# Patient Record
Sex: FEMALE | Race: WHITE | NOT HISPANIC OR LATINO | URBAN - METROPOLITAN AREA
[De-identification: names, ages, dates, MRNs, and addresses within clinical notes are randomized per-mention and may not be internally consistent; named-entity substitution may affect disease eponyms.]

---

## 2018-03-27 ENCOUNTER — INPATIENT (INPATIENT)
Facility: HOSPITAL | Age: 83
LOS: 2 days | Discharge: HOME | DRG: 390 | End: 2018-03-30
Attending: SURGERY | Admitting: SURGERY
Payer: MEDICARE

## 2018-03-27 VITALS
HEART RATE: 78 BPM | WEIGHT: 139.99 LBS | SYSTOLIC BLOOD PRESSURE: 134 MMHG | RESPIRATION RATE: 18 BRPM | DIASTOLIC BLOOD PRESSURE: 71 MMHG | OXYGEN SATURATION: 97 %

## 2018-03-27 DIAGNOSIS — R10.9 UNSPECIFIED ABDOMINAL PAIN: ICD-10-CM

## 2018-03-27 DIAGNOSIS — Z98.89 OTHER SPECIFIED POSTPROCEDURAL STATES: Chronic | ICD-10-CM

## 2018-03-27 LAB
ALBUMIN SERPL ELPH-MCNC: 4.1 G/DL — SIGNIFICANT CHANGE UP (ref 3.3–5)
ALP SERPL-CCNC: 70 U/L — SIGNIFICANT CHANGE UP (ref 40–120)
ALT FLD-CCNC: 11 U/L RC — SIGNIFICANT CHANGE UP (ref 10–45)
ANION GAP SERPL CALC-SCNC: 15 MMOL/L — SIGNIFICANT CHANGE UP (ref 5–17)
APTT BLD: 29.1 SEC — SIGNIFICANT CHANGE UP (ref 27.5–37.4)
AST SERPL-CCNC: 16 U/L — SIGNIFICANT CHANGE UP (ref 10–40)
BASOPHILS # BLD AUTO: 0 K/UL — SIGNIFICANT CHANGE UP (ref 0–0.2)
BASOPHILS NFR BLD AUTO: 0.1 % — SIGNIFICANT CHANGE UP (ref 0–2)
BILIRUB SERPL-MCNC: 0.7 MG/DL — SIGNIFICANT CHANGE UP (ref 0.2–1.2)
BUN SERPL-MCNC: 13 MG/DL — SIGNIFICANT CHANGE UP (ref 7–23)
CALCIUM SERPL-MCNC: 9.5 MG/DL — SIGNIFICANT CHANGE UP (ref 8.4–10.5)
CHLORIDE SERPL-SCNC: 102 MMOL/L — SIGNIFICANT CHANGE UP (ref 96–108)
CK MB BLD-MCNC: 5.9 % — HIGH (ref 0–3.5)
CK MB CFR SERPL CALC: 2.2 NG/ML — SIGNIFICANT CHANGE UP (ref 0–3.8)
CK SERPL-CCNC: 37 U/L — SIGNIFICANT CHANGE UP (ref 25–170)
CO2 SERPL-SCNC: 25 MMOL/L — SIGNIFICANT CHANGE UP (ref 22–31)
CREAT SERPL-MCNC: 0.71 MG/DL — SIGNIFICANT CHANGE UP (ref 0.5–1.3)
EOSINOPHIL # BLD AUTO: 0 K/UL — SIGNIFICANT CHANGE UP (ref 0–0.5)
EOSINOPHIL NFR BLD AUTO: 0.5 % — SIGNIFICANT CHANGE UP (ref 0–6)
GAS PNL BLDV: SIGNIFICANT CHANGE UP
GLUCOSE SERPL-MCNC: 118 MG/DL — HIGH (ref 70–99)
HCT VFR BLD CALC: 44.8 % — SIGNIFICANT CHANGE UP (ref 34.5–45)
HGB BLD-MCNC: 14.7 G/DL — SIGNIFICANT CHANGE UP (ref 11.5–15.5)
INR BLD: 1.06 RATIO — SIGNIFICANT CHANGE UP (ref 0.88–1.16)
LIDOCAIN IGE QN: 25 U/L — SIGNIFICANT CHANGE UP (ref 7–60)
LYMPHOCYTES # BLD AUTO: 0.4 K/UL — LOW (ref 1–3.3)
LYMPHOCYTES # BLD AUTO: 5.3 % — LOW (ref 13–44)
MAGNESIUM SERPL-MCNC: 2 MG/DL — SIGNIFICANT CHANGE UP (ref 1.6–2.6)
MCHC RBC-ENTMCNC: 30.4 PG — SIGNIFICANT CHANGE UP (ref 27–34)
MCHC RBC-ENTMCNC: 32.9 GM/DL — SIGNIFICANT CHANGE UP (ref 32–36)
MCV RBC AUTO: 92.5 FL — SIGNIFICANT CHANGE UP (ref 80–100)
MONOCYTES # BLD AUTO: 0.4 K/UL — SIGNIFICANT CHANGE UP (ref 0–0.9)
MONOCYTES NFR BLD AUTO: 4.2 % — SIGNIFICANT CHANGE UP (ref 2–14)
NEUTROPHILS # BLD AUTO: 7.6 K/UL — HIGH (ref 1.8–7.4)
NEUTROPHILS NFR BLD AUTO: 89.9 % — HIGH (ref 43–77)
PHOSPHATE SERPL-MCNC: 3.5 MG/DL — SIGNIFICANT CHANGE UP (ref 2.5–4.5)
PLATELET # BLD AUTO: 196 K/UL — SIGNIFICANT CHANGE UP (ref 150–400)
POTASSIUM SERPL-MCNC: 4.1 MMOL/L — SIGNIFICANT CHANGE UP (ref 3.5–5.3)
POTASSIUM SERPL-SCNC: 4.1 MMOL/L — SIGNIFICANT CHANGE UP (ref 3.5–5.3)
PROT SERPL-MCNC: 7.5 G/DL — SIGNIFICANT CHANGE UP (ref 6–8.3)
PROTHROM AB SERPL-ACNC: 11.5 SEC — SIGNIFICANT CHANGE UP (ref 9.8–12.7)
RBC # BLD: 4.84 M/UL — SIGNIFICANT CHANGE UP (ref 3.8–5.2)
RBC # FLD: 12 % — SIGNIFICANT CHANGE UP (ref 10.3–14.5)
SODIUM SERPL-SCNC: 142 MMOL/L — SIGNIFICANT CHANGE UP (ref 135–145)
TROPONIN T SERPL-MCNC: <0.01 NG/ML — SIGNIFICANT CHANGE UP (ref 0–0.06)
WBC # BLD: 8.4 K/UL — SIGNIFICANT CHANGE UP (ref 3.8–10.5)
WBC # FLD AUTO: 8.4 K/UL — SIGNIFICANT CHANGE UP (ref 3.8–10.5)

## 2018-03-27 PROCEDURE — 43753 TX GASTRO INTUB W/ASP: CPT | Mod: GC

## 2018-03-27 PROCEDURE — 99285 EMERGENCY DEPT VISIT HI MDM: CPT | Mod: 25,GC

## 2018-03-27 PROCEDURE — 71045 X-RAY EXAM CHEST 1 VIEW: CPT | Mod: 26

## 2018-03-27 PROCEDURE — 74177 CT ABD & PELVIS W/CONTRAST: CPT | Mod: 26

## 2018-03-27 RX ORDER — PANTOPRAZOLE SODIUM 20 MG/1
40 TABLET, DELAYED RELEASE ORAL DAILY
Qty: 0 | Refills: 0 | Status: DISCONTINUED | OUTPATIENT
Start: 2018-03-27 | End: 2018-03-28

## 2018-03-27 RX ORDER — LEVOTHYROXINE SODIUM 125 MCG
25 TABLET ORAL AT BEDTIME
Qty: 0 | Refills: 0 | Status: DISCONTINUED | OUTPATIENT
Start: 2018-03-27 | End: 2018-03-30

## 2018-03-27 RX ORDER — ONDANSETRON 8 MG/1
4 TABLET, FILM COATED ORAL ONCE
Qty: 0 | Refills: 0 | Status: COMPLETED | OUTPATIENT
Start: 2018-03-27 | End: 2018-03-27

## 2018-03-27 RX ORDER — MORPHINE SULFATE 50 MG/1
2 CAPSULE, EXTENDED RELEASE ORAL ONCE
Qty: 0 | Refills: 0 | Status: DISCONTINUED | OUTPATIENT
Start: 2018-03-27 | End: 2018-03-27

## 2018-03-27 RX ADMIN — PANTOPRAZOLE SODIUM 40 MILLIGRAM(S): 20 TABLET, DELAYED RELEASE ORAL at 19:05

## 2018-03-27 RX ADMIN — ONDANSETRON 4 MILLIGRAM(S): 8 TABLET, FILM COATED ORAL at 16:58

## 2018-03-27 RX ADMIN — MORPHINE SULFATE 2 MILLIGRAM(S): 50 CAPSULE, EXTENDED RELEASE ORAL at 17:02

## 2018-03-27 RX ADMIN — MORPHINE SULFATE 2 MILLIGRAM(S): 50 CAPSULE, EXTENDED RELEASE ORAL at 16:32

## 2018-03-27 NOTE — ED PROVIDER NOTE - GASTROINTESTINAL, MLM
Abdomen soft, with abdominal tenderness diffusely (epigastrum, LLQ, upper quadrants) with reduced bowel sounds, no guarding or rigidity

## 2018-03-27 NOTE — ED PROVIDER NOTE - PSH
H/O hemorrhoidectomy    History of Bilateral Breast Biopsy    S/P Exploratory Laparotomy  TB  S/P vein stripping

## 2018-03-27 NOTE — ED PROVIDER NOTE - PROGRESS NOTE DETAILS
Placed NG tube at bedside with consent of patient. Confirmed placement with CXR and started suction. Bebo Alcaraz MD

## 2018-03-27 NOTE — ED PROVIDER NOTE - MEDICAL DECISION MAKING DETAILS
Planning to d/c to medicine for further work-up. NG tube placed. Waiting for surgery consult. Pt with abdominal pain and n/v.  Pain mainly in upper abdomen worse with any oral intake Pt not able to give details. 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 Waiting for surgery consult. will admit --Palma

## 2018-03-27 NOTE — ED PROVIDER NOTE - OBJECTIVE STATEMENT
89 yo F pmhx HTN, atrial fibrillation, hypothyroidism, previously treated TB (1950s) abdominal TB?? s/p ex-lap and MACKENZIE CLEMENS (2015), who presents with abdominal pain. 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    Of note patient was diagnosed with abdominal TB during abdominal surgery. She has had recurrent episodes of SBO subsequently. She first noticed her symptoms a few days ago. It began with abdominal pain in the center/epigastrum in a bandlike distribution of her upper abdomen. She described it as pain which would wax/wane. Nothing makes the pain better or worse. She came in today due to her vomiting which began last night. She denied hemoptysis. She brought up food. She has not belia able to take her medications today as she has not been able to keep food down. She had a bowel movement yesterday. She denies fevers/chills.

## 2018-03-27 NOTE — ED PROVIDER NOTE - ATTENDING CONTRIBUTION TO CARE
I have seen and evaluated this patient with the resident.   I agree with the findings  unless other wise stated.  I have made appropriate changes in documentations where needed, After my face to face bedside evaluation, I am further  noting: Pt with abdominal pain and n/v.  Pain mainly in upper abdomen worse with any oral intake Pt not able to give details. 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 Waiting for surgery consult. will admit --Palma

## 2018-03-27 NOTE — ED PROVIDER NOTE - CARE PLAN
Principal Discharge DX:	Abdominal pain  Goal:	Improvement  Assessment and plan of treatment:	Possibly 2/2 small bowel obstruction given previous extensive history. Planning CT abdomen and pelvis. CMP/Lipase/CBC. Less likely diagnoses include cardiac, gastritis and pancreatitis. Planning to send set of cardiac enzymes as well. ECG inconsistent with acute coronary syndrome. ST depressions present in V2/V3, however only one of two beats (possible motion artifact).

## 2018-03-27 NOTE — ED PROVIDER NOTE - PLAN OF CARE
Improvement Possibly 2/2 small bowel obstruction given previous extensive history. Planning CT abdomen and pelvis. CMP/Lipase/CBC. Less likely diagnoses include cardiac, gastritis and pancreatitis. Planning to send set of cardiac enzymes as well. ECG inconsistent with acute coronary syndrome. ST depressions present in V2/V3, however only one of two beats (possible motion artifact).

## 2018-03-27 NOTE — ED PROVIDER NOTE - PMH
Afib    Gastritis    HTN (hypertension)    Hypothyroid    Pulmonary TB    SBO (small bowel obstruction)    TB (pulmonary tuberculosis)  TB of abdomen 1960  UTI (urinary tract infection)

## 2018-03-27 NOTE — ED ADULT NURSE NOTE - OBJECTIVE STATEMENT
89 y/o female A&Ox4, PMH afib on aspirin, HTN, SOB, TB pulmonary, gastritis, presents to ED with c/o ABD pain. Pt states she began having ABD pain and vomiting last night, decreased PO intake. Upon further assessment, airway patent and intact, denies chest pain/SOB. ABD soft, denies loose stools. Denies blood in urine and/or stool. Skin intact. Peripheral pulses strong and normal baseline sensation present x4. Safety and comfort measures maintained.

## 2018-03-28 LAB
ANION GAP SERPL CALC-SCNC: 8 MMOL/L — SIGNIFICANT CHANGE UP (ref 5–17)
BUN SERPL-MCNC: 11 MG/DL — SIGNIFICANT CHANGE UP (ref 7–23)
CALCIUM SERPL-MCNC: 8.8 MG/DL — SIGNIFICANT CHANGE UP (ref 8.4–10.5)
CHLORIDE SERPL-SCNC: 106 MMOL/L — SIGNIFICANT CHANGE UP (ref 96–108)
CO2 SERPL-SCNC: 28 MMOL/L — SIGNIFICANT CHANGE UP (ref 22–31)
CREAT SERPL-MCNC: 0.77 MG/DL — SIGNIFICANT CHANGE UP (ref 0.5–1.3)
GLUCOSE SERPL-MCNC: 76 MG/DL — SIGNIFICANT CHANGE UP (ref 70–99)
HCT VFR BLD CALC: 41.4 % — SIGNIFICANT CHANGE UP (ref 34.5–45)
HGB BLD-MCNC: 12.5 G/DL — SIGNIFICANT CHANGE UP (ref 11.5–15.5)
LACTATE SERPL-SCNC: 0.9 MMOL/L — SIGNIFICANT CHANGE UP (ref 0.7–2)
MAGNESIUM SERPL-MCNC: 2 MG/DL — SIGNIFICANT CHANGE UP (ref 1.6–2.6)
MCHC RBC-ENTMCNC: 28.3 PG — SIGNIFICANT CHANGE UP (ref 27–34)
MCHC RBC-ENTMCNC: 30.2 GM/DL — LOW (ref 32–36)
MCV RBC AUTO: 93.7 FL — SIGNIFICANT CHANGE UP (ref 80–100)
NRBC # BLD: 0 /100 WBCS — SIGNIFICANT CHANGE UP (ref 0–0)
PHOSPHATE SERPL-MCNC: 3.8 MG/DL — SIGNIFICANT CHANGE UP (ref 2.5–4.5)
PLATELET # BLD AUTO: 194 K/UL — SIGNIFICANT CHANGE UP (ref 150–400)
POTASSIUM SERPL-MCNC: 3.8 MMOL/L — SIGNIFICANT CHANGE UP (ref 3.5–5.3)
POTASSIUM SERPL-SCNC: 3.8 MMOL/L — SIGNIFICANT CHANGE UP (ref 3.5–5.3)
RBC # BLD: 4.42 M/UL — SIGNIFICANT CHANGE UP (ref 3.8–5.2)
RBC # FLD: 13.6 % — SIGNIFICANT CHANGE UP (ref 10.3–14.5)
SODIUM SERPL-SCNC: 142 MMOL/L — SIGNIFICANT CHANGE UP (ref 135–145)
WBC # BLD: 4.47 K/UL — SIGNIFICANT CHANGE UP (ref 3.8–10.5)
WBC # FLD AUTO: 4.47 K/UL — SIGNIFICANT CHANGE UP (ref 3.8–10.5)

## 2018-03-28 RX ORDER — ENOXAPARIN SODIUM 100 MG/ML
40 INJECTION SUBCUTANEOUS DAILY
Qty: 0 | Refills: 0 | Status: DISCONTINUED | OUTPATIENT
Start: 2018-03-28 | End: 2018-03-30

## 2018-03-28 RX ORDER — AMIODARONE HYDROCHLORIDE 400 MG/1
200 TABLET ORAL
Qty: 0 | Refills: 0 | Status: DISCONTINUED | OUTPATIENT
Start: 2018-03-28 | End: 2018-03-30

## 2018-03-28 RX ORDER — PANTOPRAZOLE SODIUM 20 MG/1
40 TABLET, DELAYED RELEASE ORAL
Qty: 0 | Refills: 0 | Status: DISCONTINUED | OUTPATIENT
Start: 2018-03-28 | End: 2018-03-30

## 2018-03-28 RX ORDER — POTASSIUM CHLORIDE 20 MEQ
10 PACKET (EA) ORAL
Qty: 0 | Refills: 0 | Status: COMPLETED | OUTPATIENT
Start: 2018-03-28 | End: 2018-03-28

## 2018-03-28 RX ORDER — ASPIRIN/CALCIUM CARB/MAGNESIUM 324 MG
81 TABLET ORAL DAILY
Qty: 0 | Refills: 0 | Status: DISCONTINUED | OUTPATIENT
Start: 2018-03-28 | End: 2018-03-30

## 2018-03-28 RX ORDER — AMLODIPINE BESYLATE 2.5 MG/1
2.5 TABLET ORAL DAILY
Qty: 0 | Refills: 0 | Status: DISCONTINUED | OUTPATIENT
Start: 2018-03-28 | End: 2018-03-30

## 2018-03-28 RX ORDER — ACETAMINOPHEN 500 MG
1000 TABLET ORAL ONCE
Qty: 0 | Refills: 0 | Status: COMPLETED | OUTPATIENT
Start: 2018-03-28 | End: 2018-03-28

## 2018-03-28 RX ORDER — SODIUM CHLORIDE 9 MG/ML
1000 INJECTION, SOLUTION INTRAVENOUS
Qty: 0 | Refills: 0 | Status: DISCONTINUED | OUTPATIENT
Start: 2018-03-28 | End: 2018-03-29

## 2018-03-28 RX ADMIN — Medication 100 MILLIEQUIVALENT(S): at 08:22

## 2018-03-28 RX ADMIN — ENOXAPARIN SODIUM 40 MILLIGRAM(S): 100 INJECTION SUBCUTANEOUS at 12:21

## 2018-03-28 RX ADMIN — AMIODARONE HYDROCHLORIDE 200 MILLIGRAM(S): 400 TABLET ORAL at 13:30

## 2018-03-28 RX ADMIN — Medication 1000 MILLIGRAM(S): at 23:30

## 2018-03-28 RX ADMIN — Medication 81 MILLIGRAM(S): at 12:21

## 2018-03-28 RX ADMIN — PANTOPRAZOLE SODIUM 40 MILLIGRAM(S): 20 TABLET, DELAYED RELEASE ORAL at 17:40

## 2018-03-28 RX ADMIN — AMLODIPINE BESYLATE 2.5 MILLIGRAM(S): 2.5 TABLET ORAL at 12:21

## 2018-03-28 RX ADMIN — Medication 25 MICROGRAM(S): at 06:55

## 2018-03-28 RX ADMIN — Medication 100 MILLIEQUIVALENT(S): at 12:27

## 2018-03-28 RX ADMIN — SODIUM CHLORIDE 65 MILLILITER(S): 9 INJECTION, SOLUTION INTRAVENOUS at 12:22

## 2018-03-28 RX ADMIN — Medication 400 MILLIGRAM(S): at 23:00

## 2018-03-28 NOTE — PROVIDER CONTACT NOTE (OTHER) - SITUATION
Patient urine output throughout my shift was only 300ml. Pt denies difficulty urinating or retention. IV fluids decreased from 100ml/hr to 65ml/hr during the day

## 2018-03-28 NOTE — H&P ADULT - NSHPLABSRESULTS_GEN_ALL_CORE
CBC (03-27 @ 12:33)                          14.7                     8.4     )--------------(  196        89.9<H>% Neuts, 5.3<L>% Lymphs, ANC: 7.6<H>                          44.8      BMP (03-27 @ 12:33)       142     |  102     |  13    			Ca++ --      Ca 9.5          ---------------------------------( 118<H>		Mg 2.0          4.1     |  25      |  0.71  			Ph 3.5       LFTs (03-27 @ 12:33)      TPro 7.5 / Alb 4.1 / TBili 0.7 / DBili -- / AST 16 / ALT 11 / AlkPhos 70    Coags (03-27 @ 12:33)  aPTT 29.1 / INR 1.06 / PT 11.5    Cardiac Markers (03-27 @ 12:33)     Trop: <0.01 -- / CKMB: -- / CK: 37      VBG (03-27 @ 12:33)     7.36 / 54<H> / 22<L> / 30<H> / 3.2<H> / 35<L>%      Lactate: 2.5<H>      CT abdomen/pelvis showing small bowel obstruction with a transition point in the proximal to mid ileum

## 2018-03-28 NOTE — H&P ADULT - NSHPREVIEWOFSYSTEMS_GEN_ALL_CORE
REVIEW OF SYSTEMS:    CONSTITUTIONAL: No weakness, fevers or chills  EYES/ENT: No visual changes;  No vertigo or throat pain   NECK: No pain or stiffness  RESPIRATORY: No cough, wheezing, hemoptysis; No shortness of breath  CARDIOVASCULAR: No chest pain or palpitations  GASTROINTESTINAL: See HPI  GENITOURINARY: No dysuria, frequency or hematuria  NEUROLOGICAL: No numbness or weakness  SKIN: No itching, burning, rashes, or lesions   All other review of systems is negative unless indicated above.

## 2018-03-28 NOTE — PROVIDER CONTACT NOTE (OTHER) - ASSESSMENT
VS stable; 300ml urine output noted over 11hrs, urine yellow & clear, pt denies lower abdominal discomfort or difficulty urinating, 250ml noted from NG tube; no distress noted.

## 2018-03-28 NOTE — PATIENT PROFILE ADULT. - VISION (WITH CORRECTIVE LENSES IF THE PATIENT USUALLY WEARS THEM):
wears eye glasses/Partially impaired: cannot see medication labels or newsprint, but can see obstacles in path, and the surrounding layout; can count fingers at arm's length

## 2018-03-28 NOTE — H&P ADULT - NSHPPHYSICALEXAM_GEN_ALL_CORE
Vital Signs Last 24 Hrs  T(C): 36.8 (28 Mar 2018 00:15), Max: 36.8 (27 Mar 2018 11:48)  T(F): 98.2 (28 Mar 2018 00:15), Max: 98.3 (27 Mar 2018 11:48)  HR: 67 (27 Mar 2018 23:15) (66 - 78)  BP: 141/64 (27 Mar 2018 23:15) (134/71 - 148/71)  RR: 18 (28 Mar 2018 00:15) (16 - 23)  SpO2: 93% (28 Mar 2018 00:15) (92% - 97%)    General: No acute distress, appears nontoxic, fully alert and oriented  Neurologic: No focal deficits  Psychiatric: Appropriate affect  Cardiovascular: Irregularly irregular   Pulmonary: Unlabored breathing  Abdominal: Nontender, soft, nondistended  Extremities: No edema, moves all without limitations  Skin: Warm, no rashes

## 2018-03-28 NOTE — H&P ADULT - ASSESSMENT
89 year old woman with recurrent SBO, nontoxic appearing    - Admit to [facility] (Dr. Millard)  - Spoke with Dr. Krishnamurthy, who has admitted this patient on previous admissions, and he defers to on call team for management at this time  - NPO/IVF  - Nasogastric decompression  - Serial abdominal exams  - Replete electrolytes PRN  - Patient seen and examined with Dr. Venice Brody MD PGY3 #[id number] 89 year old woman with recurrent SBO, nontoxic appearing    - Admit to [facility] (Dr. Millard)  - Spoke with Dr. Krishnamurthy, who has admitted this patient on previous admissions, and he defers to on call team for management at this time  - NPO/IVF  - Nasogastric decompression  - Serial abdominal exams  - Replete electrolytes PRN  - Resuscitate and recheck lactate  - Patient seen and examined with Dr. Venice Brody MD PGY3 #[id number]

## 2018-03-28 NOTE — H&P ADULT - HISTORY OF PRESENT ILLNESS
89 year old woman with history of afib (no AC), hypothyroidism, laparotomy in 1960s with recurrent SBO requiring multiple hospitalizations for nasogastric decompression presents with nausea, vomiting and abdominal pain.  A few days ago, she noted lower abdominal pain and subsequently had multiple episodes of emesis.  Last BM yesterday.  She has otherwise been feeling well recently.

## 2018-03-28 NOTE — PROVIDER CONTACT NOTE (OTHER) - ACTION/TREATMENT ORDERED:
MD aware, night RN aware, pending orders to increased IV fluids, will continue to monitor I&O, will continue to monitor. MD Adri mosley, juanis RN aware, pending orders to increased IV fluids, will continue to monitor I&O, will continue to monitor.

## 2018-03-29 DIAGNOSIS — Z29.9 ENCOUNTER FOR PROPHYLACTIC MEASURES, UNSPECIFIED: ICD-10-CM

## 2018-03-29 DIAGNOSIS — K56.609 UNSPECIFIED INTESTINAL OBSTRUCTION, UNSPECIFIED AS TO PARTIAL VERSUS COMPLETE OBSTRUCTION: ICD-10-CM

## 2018-03-29 DIAGNOSIS — I48.91 UNSPECIFIED ATRIAL FIBRILLATION: ICD-10-CM

## 2018-03-29 DIAGNOSIS — E03.9 HYPOTHYROIDISM, UNSPECIFIED: ICD-10-CM

## 2018-03-29 DIAGNOSIS — I10 ESSENTIAL (PRIMARY) HYPERTENSION: ICD-10-CM

## 2018-03-29 LAB
ANION GAP SERPL CALC-SCNC: 13 MMOL/L — SIGNIFICANT CHANGE UP (ref 5–17)
BUN SERPL-MCNC: 9 MG/DL — SIGNIFICANT CHANGE UP (ref 7–23)
CALCIUM SERPL-MCNC: 8.6 MG/DL — SIGNIFICANT CHANGE UP (ref 8.4–10.5)
CHLORIDE SERPL-SCNC: 98 MMOL/L — SIGNIFICANT CHANGE UP (ref 96–108)
CO2 SERPL-SCNC: 27 MMOL/L — SIGNIFICANT CHANGE UP (ref 22–31)
CREAT SERPL-MCNC: 0.66 MG/DL — SIGNIFICANT CHANGE UP (ref 0.5–1.3)
GLUCOSE SERPL-MCNC: 69 MG/DL — LOW (ref 70–99)
HCT VFR BLD CALC: 42.1 % — SIGNIFICANT CHANGE UP (ref 34.5–45)
HGB BLD-MCNC: 13.5 G/DL — SIGNIFICANT CHANGE UP (ref 11.5–15.5)
MAGNESIUM SERPL-MCNC: 2 MG/DL — SIGNIFICANT CHANGE UP (ref 1.6–2.6)
MCHC RBC-ENTMCNC: 29.3 PG — SIGNIFICANT CHANGE UP (ref 27–34)
MCHC RBC-ENTMCNC: 32.1 GM/DL — SIGNIFICANT CHANGE UP (ref 32–36)
MCV RBC AUTO: 91.5 FL — SIGNIFICANT CHANGE UP (ref 80–100)
NRBC # BLD: 0 /100 WBCS — SIGNIFICANT CHANGE UP (ref 0–0)
PHOSPHATE SERPL-MCNC: 3 MG/DL — SIGNIFICANT CHANGE UP (ref 2.5–4.5)
PLATELET # BLD AUTO: 173 K/UL — SIGNIFICANT CHANGE UP (ref 150–400)
POTASSIUM SERPL-MCNC: 3.5 MMOL/L — SIGNIFICANT CHANGE UP (ref 3.5–5.3)
POTASSIUM SERPL-SCNC: 3.5 MMOL/L — SIGNIFICANT CHANGE UP (ref 3.5–5.3)
RBC # BLD: 4.6 M/UL — SIGNIFICANT CHANGE UP (ref 3.8–5.2)
RBC # FLD: 13.6 % — SIGNIFICANT CHANGE UP (ref 10.3–14.5)
SODIUM SERPL-SCNC: 138 MMOL/L — SIGNIFICANT CHANGE UP (ref 135–145)
WBC # BLD: 4.13 K/UL — SIGNIFICANT CHANGE UP (ref 3.8–10.5)
WBC # FLD AUTO: 4.13 K/UL — SIGNIFICANT CHANGE UP (ref 3.8–10.5)

## 2018-03-29 PROCEDURE — 99232 SBSQ HOSP IP/OBS MODERATE 35: CPT

## 2018-03-29 PROCEDURE — 99223 1ST HOSP IP/OBS HIGH 75: CPT

## 2018-03-29 RX ORDER — DEXTROSE MONOHYDRATE, SODIUM CHLORIDE, AND POTASSIUM CHLORIDE 50; .745; 4.5 G/1000ML; G/1000ML; G/1000ML
1000 INJECTION, SOLUTION INTRAVENOUS
Qty: 0 | Refills: 0 | Status: DISCONTINUED | OUTPATIENT
Start: 2018-03-29 | End: 2018-03-30

## 2018-03-29 RX ORDER — POTASSIUM CHLORIDE 20 MEQ
10 PACKET (EA) ORAL
Qty: 0 | Refills: 0 | Status: COMPLETED | OUTPATIENT
Start: 2018-03-29 | End: 2018-03-29

## 2018-03-29 RX ADMIN — AMLODIPINE BESYLATE 2.5 MILLIGRAM(S): 2.5 TABLET ORAL at 05:56

## 2018-03-29 RX ADMIN — Medication 100 MILLIEQUIVALENT(S): at 11:40

## 2018-03-29 RX ADMIN — PANTOPRAZOLE SODIUM 40 MILLIGRAM(S): 20 TABLET, DELAYED RELEASE ORAL at 17:20

## 2018-03-29 RX ADMIN — Medication 25 MICROGRAM(S): at 05:56

## 2018-03-29 RX ADMIN — SODIUM CHLORIDE 65 MILLILITER(S): 9 INJECTION, SOLUTION INTRAVENOUS at 05:57

## 2018-03-29 RX ADMIN — Medication 100 MILLIEQUIVALENT(S): at 16:45

## 2018-03-29 RX ADMIN — PANTOPRAZOLE SODIUM 40 MILLIGRAM(S): 20 TABLET, DELAYED RELEASE ORAL at 05:56

## 2018-03-29 RX ADMIN — Medication 81 MILLIGRAM(S): at 11:37

## 2018-03-29 RX ADMIN — Medication 100 MILLIEQUIVALENT(S): at 14:12

## 2018-03-29 RX ADMIN — DEXTROSE MONOHYDRATE, SODIUM CHLORIDE, AND POTASSIUM CHLORIDE 100 MILLILITER(S): 50; .745; 4.5 INJECTION, SOLUTION INTRAVENOUS at 11:37

## 2018-03-29 RX ADMIN — ENOXAPARIN SODIUM 40 MILLIGRAM(S): 100 INJECTION SUBCUTANEOUS at 11:37

## 2018-03-29 NOTE — PHYSICAL THERAPY INITIAL EVALUATION ADULT - ADDITIONAL COMMENTS
Patient was living at home with spouse. No stairs to enter the home. Patient is independent in ADL's and ambulation.

## 2018-03-29 NOTE — PROGRESS NOTE ADULT - ATTENDING COMMENTS
Pt seen and examined at 3pm, agree with above. Pt feeling well, abdominal pain has resolved. +flatus, no BM. NGT was removed after successful clamp trial. Tolerated some clear liquids.     Resolving recurrent adhesive SBO: continue liquids today, start regular diet tomorrow. OOB, ambulate.    Afib: continue amiodarone    Essential htn: continue norvasc    Acquired hypothyroidism: continue synthroid

## 2018-03-29 NOTE — CONSULT NOTE ADULT - PROBLEM SELECTOR RECOMMENDATION 9
Pt found to have recurrent SBO, prior hx sbo s.p ex lap and lubna   NGT removed  Diet advanced to clears  C/w IVF  Monitor GI function   Passing flatus, has not had bm

## 2018-03-29 NOTE — PROGRESS NOTE ADULT - SUBJECTIVE AND OBJECTIVE BOX
Trauma Surgery Progress Note     Subjective/24hour Events: No acute events overnight. Complaining of abdominal pain, abdomen remained soft, nontender, mmildly distended. IV tylenol given. Awaiting bowel function    Vital Signs:  Vital Signs Last 24 Hrs  T(C): 36.9 (29 Mar 2018 05:00), Max: 37.1 (28 Mar 2018 14:34)  T(F): 98.4 (29 Mar 2018 05:00), Max: 98.8 (28 Mar 2018 14:34)  HR: 63 (29 Mar 2018 05:00) (62 - 72)  BP: 159/77 (29 Mar 2018 05:00) (128/75 - 176/77)  BP(mean): --  RR: 18 (29 Mar 2018 05:00) (18 - 18)  SpO2: 92% (29 Mar 2018 05:00) (92% - 93%)    CAPILLARY BLOOD GLUCOSE          I&O's Detail    27 Mar 2018 07:01  -  28 Mar 2018 07:00  --------------------------------------------------------  IN:    lactated ringers.: 500 mL  Total IN: 500 mL    OUT:    Nasoenteral Tube: 50 mL    Voided: 250 mL  Total OUT: 300 mL    Total NET: 200 mL      28 Mar 2018 07:01  -  29 Mar 2018 05:48  --------------------------------------------------------  IN:    IV PiggyBack: 300 mL    lactated ringers.: 920 mL  Total IN: 1220 mL    OUT:    Nasoenteral Tube: 350 mL    Voided: 475 mL  Total OUT: 825 mL    Total NET: 395 mL            MEDICATIONS  (STANDING):  amiodarone    Tablet 200 milliGRAM(s) Oral <User Schedule>  amLODIPine   Tablet 2.5 milliGRAM(s) Oral daily  aspirin enteric coated 81 milliGRAM(s) Oral daily  enoxaparin Injectable 40 milliGRAM(s) SubCutaneous daily  lactated ringers. 1000 milliLiter(s) (65 mL/Hr) IV Continuous <Continuous>  levothyroxine Injectable 25 MICROGram(s) IV Push at bedtime  pantoprazole  Injectable 40 milliGRAM(s) IV Push two times a day    MEDICATIONS  (PRN):        Physical Exam:  Gen: NAD, NGT  Abd: Soft, NT, Distended      Labs:    03-28    142  |  106  |  11  ----------------------------<  76  3.8   |  28  |  0.77    Ca    8.8      28 Mar 2018 07:18  Phos  3.8     03-28  Mg     2.0     03-28    TPro  7.5  /  Alb  4.1  /  TBili  0.7  /  DBili  x   /  AST  16  /  ALT  11  /  AlkPhos  70  03-27    LIVER FUNCTIONS - ( 27 Mar 2018 12:33 )  Alb: 4.1 g/dL / Pro: 7.5 g/dL / ALK PHOS: 70 U/L / ALT: 11 U/L RC / AST: 16 U/L / GGT: x                                 12.5   4.47  )-----------( 194      ( 28 Mar 2018 08:59 )             41.4     PT/INR - ( 27 Mar 2018 12:33 )   PT: 11.5 sec;   INR: 1.06 ratio         PTT - ( 27 Mar 2018 12:33 )  PTT:29.1 sec        89 year old woman with recurrent SBO, nontoxic appearing    - NPO/IVF  - Nasogastric decompression, await bowel function  - Serial abdominal exams

## 2018-03-29 NOTE — PHYSICAL THERAPY INITIAL EVALUATION ADULT - PERTINENT HX OF CURRENT PROBLEM, REHAB EVAL
89 year old woman with history of afib (no AC), hypothyroidism, laparotomy in 1960s with recurrent SBO requiring multiple hospitalizations for nasogastric decompression presents with nausea, vomiting and abdominal pain.  A few days ago, she noted lower abdominal pain and subsequently had multiple episodes of emesis.  Last BM yesterday.  She has otherwise been feeling well recently. dx- SBO

## 2018-03-29 NOTE — CONSULT NOTE ADULT - SUBJECTIVE AND OBJECTIVE BOX
HPI:  89 year old woman with history of afib (no AC), hypothyroidism, laparotomy in 1960s with recurrent SBO requiring multiple hospitalizations for nasogastric decompression presents with nausea, vomiting and abdominal pain.  A few days ago, she noted lower abdominal pain and subsequently had multiple episodes of emesis.  Last BM yesterday.  She has otherwise been feeling well recently. (28 Mar 2018 00:17)      PAST MEDICAL & SURGICAL HISTORY:  TB (pulmonary tuberculosis): TB of abdomen 1960  HTN (hypertension)  UTI (urinary tract infection)  Hypothyroid  SBO (small bowel obstruction)  Afib  Gastritis  Pulmonary TB  S/P vein stripping  H/O hemorrhoidectomy  S/P Exploratory Laparotomy: TB  History of Bilateral Breast Biopsy      Review of Systems:   CONSTITUTIONAL: No fever, weight loss, or fatigue  EYES: No eye pain, visual disturbances, or discharge  ENMT:  No difficulty hearing, tinnitus, vertigo; No sinus or throat pain  NECK: No pain or stiffness  RESPIRATORY: No cough, wheezing, chills or hemoptysis; No shortness of breath  CARDIOVASCULAR: No chest pain, palpitations, dizziness, or leg swelling  GASTROINTESTINAL: Abdominal pain and nausea better, no vomiting, or hematemesis; passing flatus, no bm   GENITOURINARY: No dysuria, frequency, hematuria, or incontinence  NEUROLOGICAL: No headaches, memory loss, loss of strength, numbness, or tremors  SKIN: No itching, burning, rashes, or lesions   MUSCULOSKELETAL: No joint pain or swelling; No muscle, back, or extremity pain  PSYCHIATRIC: No depression, anxiety, mood swings, or difficulty sleeping  ALLERY AND IMMUNOLOGIC: No hives or eczema    Allergies    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 reported allergy (Unknown)  penicillin (Rash)    Intolerances        Social History: No smoking or etoh use    FAMILY HISTORY:  No pertinent family history in first degree relatives      MEDICATIONS  (STANDING):  amiodarone    Tablet 200 milliGRAM(s) Oral <User Schedule>  amLODIPine   Tablet 2.5 milliGRAM(s) Oral daily  aspirin enteric coated 81 milliGRAM(s) Oral daily  dextrose 5% + sodium chloride 0.45% with potassium chloride 20 mEq/L 1000 milliLiter(s) (100 mL/Hr) IV Continuous <Continuous>  enoxaparin Injectable 40 milliGRAM(s) SubCutaneous daily  levothyroxine Injectable 25 MICROGram(s) IV Push at bedtime  pantoprazole  Injectable 40 milliGRAM(s) IV Push two times a day  potassium chloride  10 mEq/100 mL IVPB 10 milliEquivalent(s) IV Intermittent every 1 hour    MEDICATIONS  (PRN):        CAPILLARY BLOOD GLUCOSE        I&O's Summary    28 Mar 2018 07:01  -  29 Mar 2018 07:00  --------------------------------------------------------  IN: 1935 mL / OUT: 925 mL / NET: 1010 mL    29 Mar 2018 07:01  -  29 Mar 2018 15:21  --------------------------------------------------------  IN: 200 mL / OUT: 300 mL / NET: -100 mL        PHYSICAL EXAM:  GENERAL: NAD, well-developed  HEAD:  Atraumatic, Normocephalic  EYES: EOMI, PERRLA, conjunctiva and sclera clear  NECK: Supple, No JVD  CHEST/LUNG: Clear to auscultation bilaterally; No wheeze  HEART: Regular rate and rhythm; No murmurs, rubs, or gallops  ABDOMEN: Soft, Nontender, Nondistended; Bowel sounds present  EXTREMITIES:  2+ Peripheral Pulses, No clubbing, cyanosis, or edema  PSYCH: AAOx3  NEUROLOGY: non-focal  SKIN: No rashes or lesions    LABS:                        13.5   4.13  )-----------( 173      ( 29 Mar 2018 09:28 )             42.1     03-29    138  |  98  |  9   ----------------------------<  69<L>  3.5   |  27  |  0.66    Ca    8.6      29 Mar 2018 07:06  Phos  3.0     03-29  Mg     2.0     03-29                RADIOLOGY & ADDITIONAL TESTS:    Imaging Personally Reviewed:    Consultant(s) Notes Reviewed:  sx    Care Discussed with Consultants/Other Providers: sx

## 2018-03-29 NOTE — PROVIDER CONTACT NOTE (OTHER) - ASSESSMENT
Pt had one void in the beginning of shift that was counted.  Pt later had 175 ml in the AM. Pt urine output from previous shift was 300 ml, team was made aware. Pt states no bladder discomfort, abdomen no more distended than have previously been. Bladder scan done showing 68 ml.

## 2018-03-30 ENCOUNTER — TRANSCRIPTION ENCOUNTER (OUTPATIENT)
Age: 83
End: 2018-03-30

## 2018-03-30 VITALS
HEART RATE: 67 BPM | RESPIRATION RATE: 18 BRPM | SYSTOLIC BLOOD PRESSURE: 150 MMHG | DIASTOLIC BLOOD PRESSURE: 76 MMHG | OXYGEN SATURATION: 95 % | TEMPERATURE: 99 F

## 2018-03-30 DIAGNOSIS — N32.81 OVERACTIVE BLADDER: ICD-10-CM

## 2018-03-30 LAB
ANION GAP SERPL CALC-SCNC: 11 MMOL/L — SIGNIFICANT CHANGE UP (ref 5–17)
BUN SERPL-MCNC: 6 MG/DL — LOW (ref 7–23)
CALCIUM SERPL-MCNC: 9.2 MG/DL — SIGNIFICANT CHANGE UP (ref 8.4–10.5)
CHLORIDE SERPL-SCNC: 101 MMOL/L — SIGNIFICANT CHANGE UP (ref 96–108)
CO2 SERPL-SCNC: 27 MMOL/L — SIGNIFICANT CHANGE UP (ref 22–31)
CREAT SERPL-MCNC: 0.65 MG/DL — SIGNIFICANT CHANGE UP (ref 0.5–1.3)
GLUCOSE SERPL-MCNC: 88 MG/DL — SIGNIFICANT CHANGE UP (ref 70–99)
HCT VFR BLD CALC: 43.1 % — SIGNIFICANT CHANGE UP (ref 34.5–45)
HGB BLD-MCNC: 13.6 G/DL — SIGNIFICANT CHANGE UP (ref 11.5–15.5)
MAGNESIUM SERPL-MCNC: 1.9 MG/DL — SIGNIFICANT CHANGE UP (ref 1.6–2.6)
MCHC RBC-ENTMCNC: 28.9 PG — SIGNIFICANT CHANGE UP (ref 27–34)
MCHC RBC-ENTMCNC: 31.6 GM/DL — LOW (ref 32–36)
MCV RBC AUTO: 91.7 FL — SIGNIFICANT CHANGE UP (ref 80–100)
NRBC # BLD: 0 /100 WBCS — SIGNIFICANT CHANGE UP (ref 0–0)
PHOSPHATE SERPL-MCNC: 2.6 MG/DL — SIGNIFICANT CHANGE UP (ref 2.5–4.5)
PLATELET # BLD AUTO: 197 K/UL — SIGNIFICANT CHANGE UP (ref 150–400)
POTASSIUM SERPL-MCNC: 3.9 MMOL/L — SIGNIFICANT CHANGE UP (ref 3.5–5.3)
POTASSIUM SERPL-SCNC: 3.9 MMOL/L — SIGNIFICANT CHANGE UP (ref 3.5–5.3)
RBC # BLD: 4.7 M/UL — SIGNIFICANT CHANGE UP (ref 3.8–5.2)
RBC # FLD: 12.9 % — SIGNIFICANT CHANGE UP (ref 10.3–14.5)
SODIUM SERPL-SCNC: 139 MMOL/L — SIGNIFICANT CHANGE UP (ref 135–145)
WBC # BLD: 5 K/UL — SIGNIFICANT CHANGE UP (ref 3.8–10.5)
WBC # FLD AUTO: 5 K/UL — SIGNIFICANT CHANGE UP (ref 3.8–10.5)

## 2018-03-30 PROCEDURE — 82553 CREATINE MB FRACTION: CPT

## 2018-03-30 PROCEDURE — 96374 THER/PROPH/DIAG INJ IV PUSH: CPT | Mod: XU

## 2018-03-30 PROCEDURE — 96375 TX/PRO/DX INJ NEW DRUG ADDON: CPT | Mod: XU

## 2018-03-30 PROCEDURE — 83690 ASSAY OF LIPASE: CPT

## 2018-03-30 PROCEDURE — 80048 BASIC METABOLIC PNL TOTAL CA: CPT

## 2018-03-30 PROCEDURE — 99285 EMERGENCY DEPT VISIT HI MDM: CPT | Mod: 25

## 2018-03-30 PROCEDURE — 82803 BLOOD GASES ANY COMBINATION: CPT

## 2018-03-30 PROCEDURE — 80053 COMPREHEN METABOLIC PANEL: CPT

## 2018-03-30 PROCEDURE — 85027 COMPLETE CBC AUTOMATED: CPT

## 2018-03-30 PROCEDURE — 84295 ASSAY OF SERUM SODIUM: CPT

## 2018-03-30 PROCEDURE — 97161 PT EVAL LOW COMPLEX 20 MIN: CPT

## 2018-03-30 PROCEDURE — 82435 ASSAY OF BLOOD CHLORIDE: CPT

## 2018-03-30 PROCEDURE — 82550 ASSAY OF CK (CPK): CPT

## 2018-03-30 PROCEDURE — 99233 SBSQ HOSP IP/OBS HIGH 50: CPT

## 2018-03-30 PROCEDURE — 85730 THROMBOPLASTIN TIME PARTIAL: CPT

## 2018-03-30 PROCEDURE — 82947 ASSAY GLUCOSE BLOOD QUANT: CPT

## 2018-03-30 PROCEDURE — 85610 PROTHROMBIN TIME: CPT

## 2018-03-30 PROCEDURE — 43753 TX GASTRO INTUB W/ASP: CPT

## 2018-03-30 PROCEDURE — 84484 ASSAY OF TROPONIN QUANT: CPT

## 2018-03-30 PROCEDURE — 83735 ASSAY OF MAGNESIUM: CPT

## 2018-03-30 PROCEDURE — 82330 ASSAY OF CALCIUM: CPT

## 2018-03-30 PROCEDURE — 84132 ASSAY OF SERUM POTASSIUM: CPT

## 2018-03-30 PROCEDURE — 85014 HEMATOCRIT: CPT

## 2018-03-30 PROCEDURE — 99232 SBSQ HOSP IP/OBS MODERATE 35: CPT

## 2018-03-30 PROCEDURE — 74177 CT ABD & PELVIS W/CONTRAST: CPT

## 2018-03-30 PROCEDURE — 83605 ASSAY OF LACTIC ACID: CPT

## 2018-03-30 PROCEDURE — 84100 ASSAY OF PHOSPHORUS: CPT

## 2018-03-30 PROCEDURE — 71045 X-RAY EXAM CHEST 1 VIEW: CPT

## 2018-03-30 RX ADMIN — ENOXAPARIN SODIUM 40 MILLIGRAM(S): 100 INJECTION SUBCUTANEOUS at 12:35

## 2018-03-30 RX ADMIN — Medication 25 MICROGRAM(S): at 05:21

## 2018-03-30 RX ADMIN — PANTOPRAZOLE SODIUM 40 MILLIGRAM(S): 20 TABLET, DELAYED RELEASE ORAL at 05:21

## 2018-03-30 RX ADMIN — AMLODIPINE BESYLATE 2.5 MILLIGRAM(S): 2.5 TABLET ORAL at 05:21

## 2018-03-30 RX ADMIN — Medication 81 MILLIGRAM(S): at 12:35

## 2018-03-30 RX ADMIN — DEXTROSE MONOHYDRATE, SODIUM CHLORIDE, AND POTASSIUM CHLORIDE 50 MILLILITER(S): 50; .745; 4.5 INJECTION, SOLUTION INTRAVENOUS at 01:21

## 2018-03-30 RX ADMIN — AMIODARONE HYDROCHLORIDE 200 MILLIGRAM(S): 400 TABLET ORAL at 12:35

## 2018-03-30 NOTE — PROGRESS NOTE ADULT - SUBJECTIVE AND OBJECTIVE BOX
Patient is a 89y old  Female who presents with a chief complaint of Small bowel obstruction (30 Mar 2018 12:24)      SUBJECTIVE / OVERNIGHT EVENTS: Abdominal pain resolved, passing flatus, tolerating diet, no n/v, has not had bm    MEDICATIONS  (STANDING):  amiodarone    Tablet 200 milliGRAM(s) Oral <User Schedule>  amLODIPine   Tablet 2.5 milliGRAM(s) Oral daily  aspirin enteric coated 81 milliGRAM(s) Oral daily  enoxaparin Injectable 40 milliGRAM(s) SubCutaneous daily  levothyroxine Injectable 25 MICROGram(s) IV Push at bedtime  pantoprazole  Injectable 40 milliGRAM(s) IV Push two times a day    MEDICATIONS  (PRN):        CAPILLARY BLOOD GLUCOSE        I&O's Summary    29 Mar 2018 07:01  -  30 Mar 2018 07:00  --------------------------------------------------------  IN: 1500 mL / OUT: 925 mL / NET: 575 mL    30 Mar 2018 07:01  -  30 Mar 2018 12:57  --------------------------------------------------------  IN: 490 mL / OUT: 500 mL / NET: -10 mL        PHYSICAL EXAM:  GENERAL: NAD, well-developed  HEAD:  Atraumatic, Normocephalic  EYES: EOMI, PERRLA, conjunctiva and sclera clear  NECK: Supple, No JVD  CHEST/LUNG: Clear to auscultation bilaterally; No wheeze  HEART: Regular rate and rhythm; No murmurs, rubs, or gallops  ABDOMEN: Soft, Nontender, Nondistended; Bowel sounds present  EXTREMITIES:  2+ Peripheral Pulses, No clubbing, cyanosis, or edema  PSYCH: AAOx3  NEUROLOGY: non-focal  SKIN: No rashes or lesions    LABS:                        13.6   5.00  )-----------( 197      ( 30 Mar 2018 09:36 )             43.1     03-30    139  |  101  |  6<L>  ----------------------------<  88  3.9   |  27  |  0.65    Ca    9.2      30 Mar 2018 07:17  Phos  2.6     03-30  Mg     1.9     03-30                RADIOLOGY & ADDITIONAL TESTS:    Imaging Personally Reviewed:    Consultant(s) Notes Reviewed:  sx    Care Discussed with Consultants/Other Providers: sx

## 2018-03-30 NOTE — PROGRESS NOTE ADULT - SUBJECTIVE AND OBJECTIVE BOX
Trauma Surgery Progress Note     Subjective/24hour Events: No acute events. Pain controlled. NGT removed yesterday. Passing flatus, no stool. Tolerating CLD    Vital Signs:  Vital Signs Last 24 Hrs  T(C): 37.4 (30 Mar 2018 05:14), Max: 37.4 (30 Mar 2018 05:14)  T(F): 99.3 (30 Mar 2018 05:14), Max: 99.3 (30 Mar 2018 05:14)  HR: 60 (30 Mar 2018 05:14) (60 - 74)  BP: 130/69 (30 Mar 2018 05:14) (130/69 - 165/49)  BP(mean): --  RR: 18 (30 Mar 2018 05:14) (18 - 18)  SpO2: 94% (30 Mar 2018 05:14) (90% - 94%)    CAPILLARY BLOOD GLUCOSE          I&O's Detail    28 Mar 2018 07:01  -  29 Mar 2018 07:00  --------------------------------------------------------  IN:    IV PiggyBack: 300 mL    lactated ringers.: 1635 mL  Total IN: 1935 mL    OUT:    Nasoenteral Tube: 450 mL    Voided: 475 mL  Total OUT: 925 mL    Total NET: 1010 mL      29 Mar 2018 07:01  -  30 Mar 2018 05:28  --------------------------------------------------------  IN:    dextrose 5% + sodium chloride 0.45% with potassium chloride 20 mEq/L: 1200 mL    IV PiggyBack: 300 mL  Total IN: 1500 mL    OUT:    Voided: 925 mL  Total OUT: 925 mL    Total NET: 575 mL            MEDICATIONS  (STANDING):  amiodarone    Tablet 200 milliGRAM(s) Oral <User Schedule>  amLODIPine   Tablet 2.5 milliGRAM(s) Oral daily  aspirin enteric coated 81 milliGRAM(s) Oral daily  dextrose 5% + sodium chloride 0.45% with potassium chloride 20 mEq/L 1000 milliLiter(s) (50 mL/Hr) IV Continuous <Continuous>  enoxaparin Injectable 40 milliGRAM(s) SubCutaneous daily  levothyroxine Injectable 25 MICROGram(s) IV Push at bedtime  pantoprazole  Injectable 40 milliGRAM(s) IV Push two times a day    MEDICATIONS  (PRN):        Physical Exam:  Gen:  Abd: Soft, NT, mildly distended      Labs:    03-29    138  |  98  |  9   ----------------------------<  69<L>  3.5   |  27  |  0.66    Ca    8.6      29 Mar 2018 07:06  Phos  3.0     03-29  Mg     2.0     03-29                              13.5   4.13  )-----------( 173      ( 29 Mar 2018 09:28 )             42.1       89 year old woman with recurrent SBO, nontoxic appearing    - regular diet  - ambulate/OOB  - dispo planning

## 2018-03-30 NOTE — DISCHARGE NOTE ADULT - CARE PLAN
Principal Discharge DX:	SBO (small bowel obstruction)  Goal:	Resolved  Assessment and plan of treatment:	1. Please follow-up with your inpatient physician and surgeon Dr. Millard in 1-2 weeks. Please call to schedule appointment.   2. Pain control with over the counter tylenol or motrin as needed. No pain medications were prescribed in hospital while admitted.  3. Please follow-up with your Primary Care Physician within one week of discharge for further outpatient management.

## 2018-03-30 NOTE — DISCHARGE NOTE ADULT - HOSPITAL COURSE
89 year old woman with history of afib (no AC), hypothyroidism, laparotomy in 1960s with recurrent SBO requiring multiple hospitalizations for nasogastric decompression presents with nausea, vomiting and abdominal pain.  A few days ago, she noted lower abdominal pain and subsequently had multiple episodes of emesis. Patient was admitted for recurrent SBO with CT abdomen/pelvis showing small bowel obstruction with a transition point in the proximal to mid ileum. NGT was inserted on 3/28. Patient subsequently experienced no nausea and was passing flatus, so NGT removed on 3/29. Home medications were restarted and patient tolerated clear liquid diet and regular diet with no more feelings of nausea or vomiting. Patient is hemodynamically stable and ready for discharge to home, not requiring any skilled PT needs.

## 2018-03-30 NOTE — PROGRESS NOTE ADULT - ASSESSMENT
89 year old female with hx of HTN, Hypothyroid, SBO, Afib on asprin, overactive bladder admitted for SBO

## 2018-03-30 NOTE — DISCHARGE NOTE ADULT - VISION (WITH CORRECTIVE LENSES IF THE PATIENT USUALLY WEARS THEM):
Partially impaired: cannot see medication labels or newsprint, but can see obstacles in path, and the surrounding layout; can count fingers at arm's length/wears eye glasses

## 2018-03-30 NOTE — PROGRESS NOTE ADULT - PROBLEM SELECTOR PLAN 3
C/w amiodarone 200mg M,W,F  Resume asprin on discharge   Follow up with PMD Dr. Steven Goldberg as outpt

## 2018-03-30 NOTE — DISCHARGE NOTE ADULT - PATIENT PORTAL LINK FT
You can access the [facility] Patient Portal, offered by [facility], by registering with the following website: [email or web address]

## 2018-03-30 NOTE — DISCHARGE NOTE ADULT - PLAN OF CARE
Resolved 1. Please follow-up with your inpatient physician and surgeon Dr. Millard in 1-2 weeks. Please call to schedule appointment.   2. Pain control with over the counter tylenol or motrin as needed. No pain medications were prescribed in hospital while admitted.  3. Please follow-up with your Primary Care Physician within one week of discharge for further outpatient management.

## 2018-03-30 NOTE — DISCHARGE NOTE ADULT - MEDICATION SUMMARY - MEDICATIONS TO TAKE
I will START or STAY ON the medications listed below when I get home from the hospital:    Aspir 81 oral delayed release tablet  -- 1 tab(s) by mouth once a day  -- Indication: For clot prevention    amiodarone 200 mg oral tablet  -- 1 tab(s) by mouth Monday, Wednesday, and Friday  -- Indication: For Atrial fibrillation     amLODIPine 5 mg oral tablet  -- 1 tab(s) by mouth once a day  -- Indication: For HTN (hypertension)    Protonix 20 mg oral delayed release tablet  -- 1 tab(s) by mouth once a day  -- Indication: For reflux    Synthroid 50 mcg (0.05 mg) oral tablet  -- 1 tab(s) by mouth once a day  -- Indication: For Hypothyroid    Myrbetriq 50 mg oral tablet, extended release  -- 1 tab(s) by mouth once a day  -- Indication: For urinary spasm

## 2018-03-30 NOTE — PROGRESS NOTE ADULT - ATTENDING COMMENTS
Patient seen and examined  Tolerating diet  No complaints  Denies nausea / vomiting  abd exam benign    - Resolved SBO  - OK to d/c home

## 2018-03-30 NOTE — PROGRESS NOTE ADULT - PROBLEM SELECTOR PLAN 2
Has been on merbetriq 50mg qd for over a year   Pt reports it helps to control her symptoms of overactive bladder  High risk medication of causing severe hypertension and confusion, will continue for now and have pt follow up with PMD for titration off medication

## 2018-03-30 NOTE — PROGRESS NOTE ADULT - PROBLEM SELECTOR PLAN 1
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

## 2018-03-30 NOTE — DISCHARGE NOTE ADULT - CARE PROVIDER_API CALL
Cruz Millard), Surgery; Surgical Critical Care  [address] [place] [address]  Phone: [phone]  Fax: [phone]

## 2019-07-23 ENCOUNTER — EMERGENCY (EMERGENCY)
Facility: HOSPITAL | Age: 84
LOS: 1 days | Discharge: HOME | End: 2019-07-23
Attending: EMERGENCY MEDICINE
Payer: MEDICARE

## 2019-07-23 VITALS
RESPIRATION RATE: 18 BRPM | DIASTOLIC BLOOD PRESSURE: 77 MMHG | HEART RATE: 64 BPM | OXYGEN SATURATION: 95 % | TEMPERATURE: 98 F | WEIGHT: 145.95 LBS | HEIGHT: 66 IN | SYSTOLIC BLOOD PRESSURE: 179 MMHG

## 2019-07-23 VITALS
DIASTOLIC BLOOD PRESSURE: 72 MMHG | TEMPERATURE: 98 F | SYSTOLIC BLOOD PRESSURE: 147 MMHG | OXYGEN SATURATION: 99 % | RESPIRATION RATE: 18 BRPM | HEART RATE: 57 BPM

## 2019-07-23 DIAGNOSIS — Z98.89 OTHER SPECIFIED POSTPROCEDURAL STATES: Chronic | ICD-10-CM

## 2019-07-23 LAB
ALBUMIN SERPL ELPH-MCNC: 4.2 G/DL — SIGNIFICANT CHANGE UP (ref 3.3–5)
ALP SERPL-CCNC: 63 U/L — SIGNIFICANT CHANGE UP (ref 40–120)
ALT FLD-CCNC: 9 U/L — LOW (ref 10–45)
ANION GAP SERPL CALC-SCNC: 15 MMOL/L — SIGNIFICANT CHANGE UP (ref 5–17)
APPEARANCE UR: CLEAR — SIGNIFICANT CHANGE UP
AST SERPL-CCNC: 15 U/L — SIGNIFICANT CHANGE UP (ref 10–40)
BASOPHILS # BLD AUTO: 0 K/UL — SIGNIFICANT CHANGE UP (ref 0–0.2)
BASOPHILS NFR BLD AUTO: 0 % — SIGNIFICANT CHANGE UP (ref 0–2)
BILIRUB SERPL-MCNC: 0.6 MG/DL — SIGNIFICANT CHANGE UP (ref 0.2–1.2)
BILIRUB UR-MCNC: NEGATIVE — SIGNIFICANT CHANGE UP
BUN SERPL-MCNC: 20 MG/DL — SIGNIFICANT CHANGE UP (ref 7–23)
CALCIUM SERPL-MCNC: 9.1 MG/DL — SIGNIFICANT CHANGE UP (ref 8.4–10.5)
CHLORIDE SERPL-SCNC: 105 MMOL/L — SIGNIFICANT CHANGE UP (ref 96–108)
CO2 SERPL-SCNC: 22 MMOL/L — SIGNIFICANT CHANGE UP (ref 22–31)
COLOR SPEC: COLORLESS — SIGNIFICANT CHANGE UP
CREAT SERPL-MCNC: 0.98 MG/DL — SIGNIFICANT CHANGE UP (ref 0.5–1.3)
DIFF PNL FLD: ABNORMAL
EOSINOPHIL # BLD AUTO: 0.1 K/UL — SIGNIFICANT CHANGE UP (ref 0–0.5)
EOSINOPHIL NFR BLD AUTO: 0.7 % — SIGNIFICANT CHANGE UP (ref 0–6)
GAS PNL BLDV: SIGNIFICANT CHANGE UP
GAS PNL BLDV: SIGNIFICANT CHANGE UP
GLUCOSE SERPL-MCNC: 123 MG/DL — HIGH (ref 70–99)
GLUCOSE UR QL: NEGATIVE — SIGNIFICANT CHANGE UP
HCT VFR BLD CALC: 40.9 % — SIGNIFICANT CHANGE UP (ref 34.5–45)
HGB BLD-MCNC: 13.8 G/DL — SIGNIFICANT CHANGE UP (ref 11.5–15.5)
KETONES UR-MCNC: NEGATIVE — SIGNIFICANT CHANGE UP
LEUKOCYTE ESTERASE UR-ACNC: ABNORMAL
LIDOCAIN IGE QN: 27 U/L — SIGNIFICANT CHANGE UP (ref 7–60)
LYMPHOCYTES # BLD AUTO: 0.6 K/UL — LOW (ref 1–3.3)
LYMPHOCYTES # BLD AUTO: 8 % — LOW (ref 13–44)
MCHC RBC-ENTMCNC: 32.1 PG — SIGNIFICANT CHANGE UP (ref 27–34)
MCHC RBC-ENTMCNC: 33.7 GM/DL — SIGNIFICANT CHANGE UP (ref 32–36)
MCV RBC AUTO: 95.2 FL — SIGNIFICANT CHANGE UP (ref 80–100)
MONOCYTES # BLD AUTO: 0.4 K/UL — SIGNIFICANT CHANGE UP (ref 0–0.9)
MONOCYTES NFR BLD AUTO: 4.5 % — SIGNIFICANT CHANGE UP (ref 2–14)
NEUTROPHILS # BLD AUTO: 7 K/UL — SIGNIFICANT CHANGE UP (ref 1.8–7.4)
NEUTROPHILS NFR BLD AUTO: 86.8 % — HIGH (ref 43–77)
NITRITE UR-MCNC: NEGATIVE — SIGNIFICANT CHANGE UP
PH UR: 7 — SIGNIFICANT CHANGE UP (ref 5–8)
PLATELET # BLD AUTO: 159 K/UL — SIGNIFICANT CHANGE UP (ref 150–400)
POTASSIUM SERPL-MCNC: 4.1 MMOL/L — SIGNIFICANT CHANGE UP (ref 3.5–5.3)
POTASSIUM SERPL-SCNC: 4.1 MMOL/L — SIGNIFICANT CHANGE UP (ref 3.5–5.3)
PROT SERPL-MCNC: 7.1 G/DL — SIGNIFICANT CHANGE UP (ref 6–8.3)
PROT UR-MCNC: NEGATIVE — SIGNIFICANT CHANGE UP
RBC # BLD: 4.3 M/UL — SIGNIFICANT CHANGE UP (ref 3.8–5.2)
RBC # FLD: 11.8 % — SIGNIFICANT CHANGE UP (ref 10.3–14.5)
SODIUM SERPL-SCNC: 142 MMOL/L — SIGNIFICANT CHANGE UP (ref 135–145)
SP GR SPEC: 1.01 — SIGNIFICANT CHANGE UP (ref 1.01–1.02)
UROBILINOGEN FLD QL: NEGATIVE — SIGNIFICANT CHANGE UP
WBC # BLD: 8.1 K/UL — SIGNIFICANT CHANGE UP (ref 3.8–10.5)
WBC # FLD AUTO: 8.1 K/UL — SIGNIFICANT CHANGE UP (ref 3.8–10.5)

## 2019-07-23 PROCEDURE — 99284 EMERGENCY DEPT VISIT MOD MDM: CPT | Mod: GC

## 2019-07-23 PROCEDURE — 99284 EMERGENCY DEPT VISIT MOD MDM: CPT

## 2019-07-23 PROCEDURE — 84295 ASSAY OF SERUM SODIUM: CPT

## 2019-07-23 PROCEDURE — 83605 ASSAY OF LACTIC ACID: CPT

## 2019-07-23 PROCEDURE — 82435 ASSAY OF BLOOD CHLORIDE: CPT

## 2019-07-23 PROCEDURE — 83690 ASSAY OF LIPASE: CPT

## 2019-07-23 PROCEDURE — 80053 COMPREHEN METABOLIC PANEL: CPT

## 2019-07-23 PROCEDURE — 74177 CT ABD & PELVIS W/CONTRAST: CPT

## 2019-07-23 PROCEDURE — 85027 COMPLETE CBC AUTOMATED: CPT

## 2019-07-23 PROCEDURE — 74177 CT ABD & PELVIS W/CONTRAST: CPT | Mod: 26

## 2019-07-23 PROCEDURE — 96374 THER/PROPH/DIAG INJ IV PUSH: CPT | Mod: XU

## 2019-07-23 PROCEDURE — 82803 BLOOD GASES ANY COMBINATION: CPT

## 2019-07-23 PROCEDURE — 99284 EMERGENCY DEPT VISIT MOD MDM: CPT | Mod: 25

## 2019-07-23 PROCEDURE — 82330 ASSAY OF CALCIUM: CPT

## 2019-07-23 PROCEDURE — 96375 TX/PRO/DX INJ NEW DRUG ADDON: CPT | Mod: XU

## 2019-07-23 PROCEDURE — 81001 URINALYSIS AUTO W/SCOPE: CPT

## 2019-07-23 PROCEDURE — 82947 ASSAY GLUCOSE BLOOD QUANT: CPT

## 2019-07-23 PROCEDURE — 84132 ASSAY OF SERUM POTASSIUM: CPT

## 2019-07-23 PROCEDURE — 85014 HEMATOCRIT: CPT

## 2019-07-23 RX ORDER — OXYCODONE HYDROCHLORIDE 5 MG/1
1 TABLET ORAL
Qty: 6 | Refills: 0
Start: 2019-07-23 | End: 2019-07-24

## 2019-07-23 RX ORDER — MORPHINE SULFATE 50 MG/1
4 CAPSULE, EXTENDED RELEASE ORAL ONCE
Refills: 0 | Status: DISCONTINUED | OUTPATIENT
Start: 2019-07-23 | End: 2019-07-23

## 2019-07-23 RX ORDER — ONDANSETRON 8 MG/1
4 TABLET, FILM COATED ORAL ONCE
Refills: 0 | Status: COMPLETED | OUTPATIENT
Start: 2019-07-23 | End: 2019-07-23

## 2019-07-23 RX ORDER — SODIUM CHLORIDE 9 MG/ML
500 INJECTION INTRAMUSCULAR; INTRAVENOUS; SUBCUTANEOUS ONCE
Refills: 0 | Status: COMPLETED | OUTPATIENT
Start: 2019-07-23 | End: 2019-07-23

## 2019-07-23 RX ORDER — CEFTRIAXONE 500 MG/1
1000 INJECTION, POWDER, FOR SOLUTION INTRAMUSCULAR; INTRAVENOUS ONCE
Refills: 0 | Status: COMPLETED | OUTPATIENT
Start: 2019-07-23 | End: 2019-07-23

## 2019-07-23 RX ORDER — TAMSULOSIN HYDROCHLORIDE 0.4 MG/1
1 CAPSULE ORAL
Qty: 14 | Refills: 0
Start: 2019-07-23 | End: 2019-08-05

## 2019-07-23 RX ORDER — SODIUM CHLORIDE 9 MG/ML
1000 INJECTION INTRAMUSCULAR; INTRAVENOUS; SUBCUTANEOUS ONCE
Refills: 0 | Status: COMPLETED | OUTPATIENT
Start: 2019-07-23 | End: 2019-07-23

## 2019-07-23 RX ORDER — OXYCODONE HYDROCHLORIDE 5 MG/1
1 TABLET ORAL
Qty: 5 | Refills: 0
Start: 2019-07-23 | End: 2019-07-24

## 2019-07-23 RX ORDER — CEFPODOXIME PROXETIL 100 MG
1 TABLET ORAL
Qty: 20 | Refills: 0
Start: 2019-07-23 | End: 2019-08-01

## 2019-07-23 RX ADMIN — SODIUM CHLORIDE 1000 MILLILITER(S): 9 INJECTION INTRAMUSCULAR; INTRAVENOUS; SUBCUTANEOUS at 05:56

## 2019-07-23 RX ADMIN — SODIUM CHLORIDE 500 MILLILITER(S): 9 INJECTION INTRAMUSCULAR; INTRAVENOUS; SUBCUTANEOUS at 10:00

## 2019-07-23 RX ADMIN — MORPHINE SULFATE 4 MILLIGRAM(S): 50 CAPSULE, EXTENDED RELEASE ORAL at 07:07

## 2019-07-23 RX ADMIN — CEFTRIAXONE 100 MILLIGRAM(S): 500 INJECTION, POWDER, FOR SOLUTION INTRAMUSCULAR; INTRAVENOUS at 13:05

## 2019-07-23 RX ADMIN — ONDANSETRON 4 MILLIGRAM(S): 8 TABLET, FILM COATED ORAL at 05:56

## 2019-07-23 NOTE — CONSULT NOTE ADULT - ASSESSMENT
Patient is a 89 yo Female with a h/o A-fib (on eliquis), abdominal TB, SBO, presented with abdominal pain.  CT showed a  4 mm obstructing right UVJ stone.  Patient was alert and oriented in no acute distress, pain controlled.  - Advance to PO fluids as tolerated.  - Flomax   - Use straining when urinating to collect stone if it passes  - Pain medication PRN.  - Follow up with urology in one week.  - Return to ED if pain worsens of if fever presents. Patient is a 89 yo Female with a h/o A-fib (on eliquis), abdominal TB, SBO, presented with abdominal pain.  CT showed a  4 mm obstructing right UVJ stone.  Patient was alert and oriented in no acute distress, pain controlled.  - Advance to PO fluids as tolerated.  - Flomax   - Use straining when urinating to collect stone if it passes  - Pain medication PRN.  - Follow up with urology in one week, [phone]  - Return to ED if pain worsens of if fever presents.  - D/ W Dr. Sarkar

## 2019-07-23 NOTE — ED PROVIDER NOTE - NSFOLLOWUPCLINICS_GEN_ALL_ED_FT
[facility] - Urology  Urology  [address], [address]rd & [address]th [place] [address]  Phone: [phone]  Fax:   Follow Up Time:

## 2019-07-23 NOTE — ED PROVIDER NOTE - OBJECTIVE STATEMENT
89yF with h/o Afib on elii 89yF with h/o Afib on eliquis, abdominal TB (s/p laparotomy 1960), SBO presents with abd pain a few hours prior to presentation. Endorse non radiating epigastric pain, two episodes of NBNB emesis but denies fever, chest pain, sob, urinary or bowel irregularities. Last BM was yesterday

## 2019-07-23 NOTE — CONSULT NOTE ADULT - SUBJECTIVE AND OBJECTIVE BOX
HPI:  Patient is a 89 yo Female with a h/o A-fib (on eliquis), abdominal TB, SBO, presented with abdominal pain since the night prior to presentation.  Pain was also nauseous and had 2 episodes of emesis. Patient endorses mild flank discomfort prior to the abdominal pain and agreed to symptoms of urgency without dysuria.  She denied hematuria, fever, chills, no chest pain, SOB, no diarrhea, no constipation, no melena.  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 reported allergy (Unknown)  penicillin (Rash)      REVIEW OF SYSTEMS: Pertinent positives and negatives as stated in HPI, otherwise negative    Vital signs  T(C): 36.8 (19 @ 08:02), Max: 36.9 (19 @ 03:54)  HR: 93 (19 @ 08:02)  BP: 185/83 (19 @ 08:02)  SpO2: 98% (19 @ 08:02)  Wt(kg): --    Physical Exam  Gen: NAD, alert and oriented to time, place and person  Pulm: No respiratory distress, on nasal canula  CV: RRR, no JVD  Abd: Soft, NT, ND, mild right CVA tenderness  :   MSK: No edema present    LABS:     @ 05:39    WBC 8.1   / Hct 40.9  / SCr 0.98         142  |  105  |  20  ----------------------------<  123<H>  4.1   |  22  |  0.98    Ca    9.1      2019 05:39    TPro  7.1  /  Alb  4.2  /  TBili  0.6  /  DBili  x   /  AST  15  /  ALT  9<L>  /  AlkPhos  63        Urinalysis Basic - ( 2019 05:55 )    Color: Colorless / Appearance: Clear / S.012 / pH: x  Gluc: x / Ketone: Negative  / Bili: Negative / Urobili: Negative   Blood: x / Protein: Negative / Nitrite: Negative   Leuk Esterase: Large / RBC: 6 /hpf / WBC 8 /HPF   Sq Epi: x / Non Sq Epi: 2 /hpf / Bacteria: Negative        Urine Cx:   Blood Cx:    Radiology:     EXAM:  CT ABDOMEN AND PELVIS OC IC                            PROCEDURE DATE:  2019            INTERPRETATION:  CLINICAL INFORMATION: Right lower quadrant pain and   vomiting.    COMPARISON: CT abdomen pelvis 3/27/2018, 2014    PROCEDURE:   CT of the Abdomen and Pelvis was performed with intravenous contrast.   Intravenous contrast: 90 ml Omnipaque 350. 10 ml discarded.  Oral contrast: positive contrast was administered.  Sagittal and coronal reformats were performed.    FINDINGS:    LOWER CHEST: Aortic root and mitral annular calcifications Subsegmental   atelectasis of the right lung base.    LIVER: Hepatomegaly measuring 21 cm  BILE DUCTS: Normal caliber  GALLBLADDER: Biliary sludge  SPLEEN: Within normal limits.  PANCREAS: Within normal limits.  ADRENALS: Within normal limits.  KIDNEYS/URETERS: Moderate right hydroureteronephrosis to the level of the   4 mm obstructing right UVJ stone. Delayed right renal nephrogram, with   surrounding perinephric fat stranding.Subcentimeter left renal   hypodensities, too small to characterize.  BLADDER: Within normal limits.  REPRODUCTIVE ORGANS: Calcified fibroid uterus. There is a stable 2.7 x   2.2 cm cystic lesion in the left adnexa. There is a stable 5.8 x 5.0 cm   hypodense lesion in the right adnexa.    BOWEL: Colonic diverticula, predominantly in the ascending and transverse   colon. No bowel obstruction or wall thickening. Appendix is not identified  PERITONEUM: No ascites.  VESSELS:  Atherosclerotic calcifications.  RETROPERITONEUM: No lymphadenopathy.    ABDOMINAL WALL: Within normal limits.  BONES: Degenerative changes of the spine. Dextroscoliosis of the lumbar   spine with apex at L3. Grade 1 anterolisthesis of L5 on S1.    IMPRESSION:     Moderate right hydroureteronephrosis to the level of a 4 mm obstructing   right UVJ stone, with evidence of pyelonephritis of the right kidney.    Hypodense lesions in bilateral adnexa, measuring 5.8 x 5.0 cm on the   right and 2.7 x 2.2 cm on the left which are unchanged dating back to   . HPI:  Patient is a 89 yo Female with a h/o A-fib (on eliquis), abdominal TB, SBO, presented with abdominal pain since the night prior to presentation.  Patient was also nauseous and had 2 episodes of emesis. Patient endorses mild flank discomfort prior to the abdominal pain and agreed to symptoms of urgency without dysuria.  She denied hematuria, fever, chills, no chest pain, SOB, no diarrhea, no constipation, no melena.  Urology was consulted on CT results showing  4 mm obstructing right UVJ stone.    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 reported allergy (Unknown)  penicillin (Rash)      REVIEW OF SYSTEMS: Pertinent positives and negatives as stated in HPI, otherwise negative    Vital signs  T(C): 36.8 (19 @ 08:02), Max: 36.9 (19 @ 03:54)  HR: 93 (19 @ 08:02)  BP: 185/83 (19 @ 08:02)  SpO2: 98% (19 @ 08:02)  Wt(kg): --    Physical Exam  Gen: NAD, alert and oriented to time, place and person  Pulm: No respiratory distress, on nasal canula  CV: RRR, no JVD  Abd: Soft, NT, ND, mild right CVA tenderness  :   MSK: No edema present    LABS:     @ 05:39    WBC 8.1   / Hct 40.9  / SCr 0.98         142  |  105  |  20  ----------------------------<  123<H>  4.1   |  22  |  0.98    Ca    9.1      2019 05:39    TPro  7.1  /  Alb  4.2  /  TBili  0.6  /  DBili  x   /  AST  15  /  ALT  9<L>  /  AlkPhos  63  07-23      Urinalysis Basic - ( 2019 05:55 )    Color: Colorless / Appearance: Clear / S.012 / pH: x  Gluc: x / Ketone: Negative  / Bili: Negative / Urobili: Negative   Blood: x / Protein: Negative / Nitrite: Negative   Leuk Esterase: Large / RBC: 6 /hpf / WBC 8 /HPF   Sq Epi: x / Non Sq Epi: 2 /hpf / Bacteria: Negative        Urine Cx:   Blood Cx:    Radiology:     EXAM:  CT ABDOMEN AND PELVIS OC IC                            PROCEDURE DATE:  2019            INTERPRETATION:  CLINICAL INFORMATION: Right lower quadrant pain and   vomiting.    COMPARISON: CT abdomen pelvis 3/27/2018, 2014    PROCEDURE:   CT of the Abdomen and Pelvis was performed with intravenous contrast.   Intravenous contrast: 90 ml Omnipaque 350. 10 ml discarded.  Oral contrast: positive contrast was administered.  Sagittal and coronal reformats were performed.    FINDINGS:    LOWER CHEST: Aortic root and mitral annular calcifications Subsegmental   atelectasis of the right lung base.    LIVER: Hepatomegaly measuring 21 cm  BILE DUCTS: Normal caliber  GALLBLADDER: Biliary sludge  SPLEEN: Within normal limits.  PANCREAS: Within normal limits.  ADRENALS: Within normal limits.  KIDNEYS/URETERS: Moderate right hydroureteronephrosis to the level of the   4 mm obstructing right UVJ stone. Delayed right renal nephrogram, with   surrounding perinephric fat stranding.Subcentimeter left renal   hypodensities, too small to characterize.  BLADDER: Within normal limits.  REPRODUCTIVE ORGANS: Calcified fibroid uterus. There is a stable 2.7 x   2.2 cm cystic lesion in the left adnexa. There is a stable 5.8 x 5.0 cm   hypodense lesion in the right adnexa.    BOWEL: Colonic diverticula, predominantly in the ascending and transverse   colon. No bowel obstruction or wall thickening. Appendix is not identified  PERITONEUM: No ascites.  VESSELS:  Atherosclerotic calcifications.  RETROPERITONEUM: No lymphadenopathy.    ABDOMINAL WALL: Within normal limits.  BONES: Degenerative changes of the spine. Dextroscoliosis of the lumbar   spine with apex at L3. Grade 1 anterolisthesis of L5 on S1.    IMPRESSION:     Moderate right hydroureteronephrosis to the level of a 4 mm obstructing   right UVJ stone, with evidence of pyelonephritis of the right kidney.    Hypodense lesions in bilateral adnexa, measuring 5.8 x 5.0 cm on the   right and 2.7 x 2.2 cm on the left which are unchanged dating back to   . HPI:  Patient is a 89 yo Female with a h/o A-fib (on eliquis), abdominal TB, SBO, presented with abdominal pain since the night prior to presentation.  Patient was also nauseous and had 2 episodes of emesis. Patient endorses mild flank discomfort prior to the abdominal pain and agreed to symptoms of urgency without dysuria.  She denied hematuria, fever, chills, no chest pain, SOB, no diarrhea, no constipation, no melena.  Urology was consulted on CT results showing  4 mm obstructing right UVJ stone. She is currently pain controlled.     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 reported allergy (Unknown)  penicillin (Rash)      REVIEW OF SYSTEMS: Pertinent positives and negatives as stated in HPI, otherwise negative    Vital signs  T(C): 36.8 (19 @ 08:02), Max: 36.9 (19 @ 03:54)  HR: 93 (19 @ 08:02)  BP: 185/83 (19 @ 08:02)  SpO2: 98% (19 @ 08:02)  Wt(kg): --    Physical Exam  Gen: NAD, alert and oriented to time, place and person  Pulm: No respiratory distress, on nasal canula  CV: RRR, no JVD  Abd: Soft, NT, ND, mild right CVA tenderness  :   MSK: No edema present    LABS:     @ 05:39    WBC 8.1   / Hct 40.9  / SCr 0.98         142  |  105  |  20  ----------------------------<  123<H>  4.1   |  22  |  0.98    Ca    9.1      2019 05:39    TPro  7.1  /  Alb  4.2  /  TBili  0.6  /  DBili  x   /  AST  15  /  ALT  9<L>  /  AlkPhos  63        Urinalysis Basic - ( 2019 05:55 )    Color: Colorless / Appearance: Clear / S.012 / pH: x  Gluc: x / Ketone: Negative  / Bili: Negative / Urobili: Negative   Blood: x / Protein: Negative / Nitrite: Negative   Leuk Esterase: Large / RBC: 6 /hpf / WBC 8 /HPF   Sq Epi: x / Non Sq Epi: 2 /hpf / Bacteria: Negative        Radiology:     EXAM:  CT ABDOMEN AND PELVIS OC IC                            PROCEDURE DATE:  2019            INTERPRETATION:  CLINICAL INFORMATION: Right lower quadrant pain and   vomiting.    COMPARISON: CT abdomen pelvis 3/27/2018, 2014    PROCEDURE:   CT of the Abdomen and Pelvis was performed with intravenous contrast.   Intravenous contrast: 90 ml Omnipaque 350. 10 ml discarded.  Oral contrast: positive contrast was administered.  Sagittal and coronal reformats were performed.    FINDINGS:    LOWER CHEST: Aortic root and mitral annular calcifications Subsegmental   atelectasis of the right lung base.    LIVER: Hepatomegaly measuring 21 cm  BILE DUCTS: Normal caliber  GALLBLADDER: Biliary sludge  SPLEEN: Within normal limits.  PANCREAS: Within normal limits.  ADRENALS: Within normal limits.  KIDNEYS/URETERS: Moderate right hydroureteronephrosis to the level of the   4 mm obstructing right UVJ stone. Delayed right renal nephrogram, with   surrounding perinephric fat stranding.Subcentimeter left renal   hypodensities, too small to characterize.  BLADDER: Within normal limits.  REPRODUCTIVE ORGANS: Calcified fibroid uterus. There is a stable 2.7 x   2.2 cm cystic lesion in the left adnexa. There is a stable 5.8 x 5.0 cm   hypodense lesion in the right adnexa.    BOWEL: Colonic diverticula, predominantly in the ascending and transverse   colon. No bowel obstruction or wall thickening. Appendix is not identified  PERITONEUM: No ascites.  VESSELS:  Atherosclerotic calcifications.  RETROPERITONEUM: No lymphadenopathy.    ABDOMINAL WALL: Within normal limits.  BONES: Degenerative changes of the spine. Dextroscoliosis of the lumbar   spine with apex at L3. Grade 1 anterolisthesis of L5 on S1.    IMPRESSION:     Moderate right hydroureteronephrosis to the level of a 4 mm obstructing   right UVJ stone, with evidence of pyelonephritis of the right kidney.    Hypodense lesions in bilateral adnexa, measuring 5.8 x 5.0 cm on the   right and 2.7 x 2.2 cm on the left which are unchanged dating back to   .

## 2019-07-23 NOTE — ED PROVIDER NOTE - ATTENDING CONTRIBUTION TO CARE
Attending MD Gentile: I personally have seen and examined this patient.  Resident note reviewed and agree on plan of care and except where noted.  See below for details.    seen in [facility], accompanied by     89F with PMH/PSH including hypothyroidism, laparotomy in 1960s for abdominal TB, recurrent SBOs, HTN, AFib on Eliquis presents to the ED with abdominal pain.  Reports developed abdominal pain at around midnight, reports was not quite asleep yet.  Reports had BM yesterday morning and during the night twice, reports normal.  Reports vomiting "foam" once and here in the ED once (foam with few bits of gastric content), nonbloody.  Reports R sided abdominal pain, reports constant, feels like her previous SBOs.  Reports at one point was also feeling it in the R back. Denies dysuria, hematuria, change in urinary habits including frequency, urgency. Denies bloody stools.  Denies fevers, chills.  Denies chest pain, shortness of breath.  On exam, NAD, head NCAT, PERRL, FROM at neck, no tenderness to midline palpation, no stepoffs along length of spine, lungs CTAB with good inspiratory effort, +S1S2, no m/r/g, abdomen soft with +BS, +L sided abdominal tenderness, ND, no CVAT, moving all extremities with 5/5 strength bilateral upper and lower extremities, good and equal  strength bilaterally     TO BE COMPLETED Attending MD Gentile: I personally have seen and examined this patient.  Resident note reviewed and agree on plan of care and except where noted.  See below for details.    seen in [facility]3, accompanied by     89F with PMH/PSH including hypothyroidism, laparotomy in 1960s for abdominal TB, recurrent SBOs, HTN, AFib on Eliquis presents to the ED with abdominal pain.  Reports developed abdominal pain at around midnight, reports was not quite asleep yet.  Reports had BM yesterday morning and during the night twice, reports normal.  Reports vomiting "foam" once and here in the ED once (foam with few bits of gastric content), nonbloody.  Reports R sided abdominal pain, reports constant, feels like her previous SBOs.  Reports at one point was also feeling it in the R back. Denies dysuria, hematuria, change in urinary habits including frequency, urgency. Denies bloody stools.  Denies fevers, chills.  Denies chest pain, shortness of breath.  On exam, NAD, head NCAT, PERRL, FROM at neck, no tenderness to midline palpation, no stepoffs along length of spine, lungs CTAB with good inspiratory effort, +S1S2, no m/r/g, abdomen soft with +BS, +L sided abdominal tenderness, ND, no CVAT, moving all extremities; A/P: 89F with L sided abdominal pain, DDx includes SBO, colitis, will obtain labs, UA, give iVFs, keep npo, antiemetic, pain control, reassess

## 2019-07-23 NOTE — ED ADULT NURSE NOTE - OBJECTIVE STATEMENT
89 y.o F A&Ox3 with PMH of TB, HTN, UTI, hypothyroid, SBO, a-fib, gastritis, presents to the ED via EMS from home c.o R quad pain radiating to R flank x2 hours. Pt. reports symptoms presented suddenly associated with nausea. States she vomited "white foam." Had SBO in the past and reports symptoms are similar. 89 y.o F A&Ox3 with PMH of TB, HTN, UTI, hypothyroid, SBO, a-fib, gastritis, presents to the ED via EMS from home c.o R quad pain radiating to R flank x2 hours. Pt. reports symptoms presented suddenly associated with nausea. States she vomited "white foam." Had SBO about a year and a half ago and reports symptoms are similar. Pt. reports she has been passing flatus and belching. Had a BM yesterday and 2 hours ago - states it was normal. Denies melena. Rates pain level 8/10 at this time. Denies taking pain medication prior to arrival to the ED. Received 100 cc's of IVF by EMS. ABD appears slightly distended, tender to touch. Pt. denies fever, chills, SOB, CP, dizziness, urinary symptoms at this time. Safety and comfort provided. Family at bedside.

## 2019-07-23 NOTE — ED PROVIDER NOTE - NSFOLLOWUPINSTRUCTIONS_ED_ALL_ED_FT
You were seen in the Emergency Department for a kidney stone.  1) Advance activity as tolerated.  Drink plenty of fluids.  2) Continue all previously prescribed medications as directed.   your prescription for antibiotics and take the full course (10 days). You were also sent a prescription for Flomax to help you pass your kidney stone and a prescription for oxycodone AS NEEDED for moderate pain. For mild pain take 650 mg tylenol every 6 hours.  3) Follow up with urology this week - take copies of your results.    4) Return to the Emergency Department for worsening or persistent symptoms, and/or ANY NEW OR CONCERNING SYMPTOMS. If you have issues obtaining follow up, please call: [phone] to obtain a doctor or specialist who takes your insurance in your area.

## 2019-07-23 NOTE — ED PROVIDER NOTE - NS ED ROS FT
GENERAL: No fever or chills, EYES: no change in vision, HEENT: no trouble swallowing or speaking, CARDIAC: no chest pain, PULMONARY: no cough or SOB, GI: +abdominal pain, +nausea, +vomiting, no diarrhea or constipation, : No changes in urination, SKIN: no rashes, NEURO: no headache,  MSK: No joint pain ~Justin Aldana M.D. Resident

## 2019-07-23 NOTE — ED ADULT NURSE NOTE - NSIMPLEMENTINTERV_GEN_ALL_ED
Implemented All Fall with Harm Risk Interventions:  [place] to call system. Call bell, personal items and telephone within reach. Instruct patient to call for assistance. Room bathroom lighting operational. Non-slip footwear when patient is off stretcher. Physically safe environment: no spills, clutter or unnecessary equipment. Stretcher in lowest position, wheels locked, appropriate side rails in place. Provide visual cue, wrist band, yellow gown, etc. Monitor gait and stability. Monitor for mental status changes and reorient to person, place, and time. Review medications for side effects contributing to fall risk. Reinforce activity limits and safety measures with patient and family. Provide visual clues: red socks.

## 2019-07-23 NOTE — ED PROVIDER NOTE - CLINICAL SUMMARY MEDICAL DECISION MAKING FREE TEXT BOX
89yF with h/o Afib on eliquis, abdominal TB (s/p laparotomy 1960), SBO presents with abd pain a few hours prior to presentation. Concern for pancreatitis vs gastritis vs SBO. Will evaluate with labs, lipase, VBG, UA, Zofran, CT abd/pelvis and reassess

## 2019-07-23 NOTE — ED PROVIDER NOTE - PROGRESS NOTE DETAILS
Keerthi Escalante M.D. Resident: Discussed pt with urology, consult for infected kidney stone, will see pt in ER Keerthi Escalante M.D. Resident: Pt denies pain at this time, urology evaluated pt, will prescribe flomax, abx, and PRN pain meds at home. Will DC home with uro follow up.

## 2019-07-23 NOTE — ED PROVIDER NOTE - PHYSICAL EXAMINATION
Gen: AAOx3, non-toxic  Head: NCAT  HEENT: EOMI, oral mucosa moist, normal conjunctiva  Lung: CTAB, no respiratory distress,  speaking in full sentences  CV: RRR, no murmurs, rubs or gallops  Abd: soft, diffuse ttp (mostly epigastric), no guarding, no CVA tenderness  MSK: no visible deformities  Neuro: No focal sensory or motor deficits  Skin: Warm, well perfused, no rash  Psych: normal affect.   ~Justin Aldana M.D. Resident

## 2020-10-23 ENCOUNTER — INPATIENT (INPATIENT)
Facility: HOSPITAL | Age: 85
LOS: 2 days | Discharge: HOME | DRG: 390 | End: 2020-10-26
Attending: INTERNAL MEDICINE | Admitting: INTERNAL MEDICINE
Payer: MEDICARE

## 2020-10-23 VITALS
TEMPERATURE: 98 F | OXYGEN SATURATION: 95 % | HEIGHT: 66 IN | RESPIRATION RATE: 16 BRPM | SYSTOLIC BLOOD PRESSURE: 103 MMHG | DIASTOLIC BLOOD PRESSURE: 69 MMHG | HEART RATE: 78 BPM | WEIGHT: 130.07 LBS

## 2020-10-23 DIAGNOSIS — I48.0 PAROXYSMAL ATRIAL FIBRILLATION: ICD-10-CM

## 2020-10-23 DIAGNOSIS — E03.9 HYPOTHYROIDISM, UNSPECIFIED: ICD-10-CM

## 2020-10-23 DIAGNOSIS — N32.81 OVERACTIVE BLADDER: ICD-10-CM

## 2020-10-23 DIAGNOSIS — I10 ESSENTIAL (PRIMARY) HYPERTENSION: ICD-10-CM

## 2020-10-23 DIAGNOSIS — K56.7 ILEUS, UNSPECIFIED: ICD-10-CM

## 2020-10-23 DIAGNOSIS — K56.600 PARTIAL INTESTINAL OBSTRUCTION, UNSPECIFIED AS TO CAUSE: ICD-10-CM

## 2020-10-23 DIAGNOSIS — Z98.89 OTHER SPECIFIED POSTPROCEDURAL STATES: Chronic | ICD-10-CM

## 2020-10-23 LAB
ALBUMIN SERPL ELPH-MCNC: 4.3 G/DL — SIGNIFICANT CHANGE UP (ref 3.3–5)
ALP SERPL-CCNC: 73 U/L — SIGNIFICANT CHANGE UP (ref 40–120)
ALT FLD-CCNC: 13 U/L — SIGNIFICANT CHANGE UP (ref 10–45)
ANION GAP SERPL CALC-SCNC: 12 MMOL/L — SIGNIFICANT CHANGE UP (ref 5–17)
ANISOCYTOSIS BLD QL: SLIGHT — SIGNIFICANT CHANGE UP
APPEARANCE UR: CLEAR — SIGNIFICANT CHANGE UP
APTT BLD: 28.4 SEC — SIGNIFICANT CHANGE UP (ref 27.5–35.5)
AST SERPL-CCNC: 18 U/L — SIGNIFICANT CHANGE UP (ref 10–40)
BACTERIA # UR AUTO: NEGATIVE — SIGNIFICANT CHANGE UP
BASE EXCESS BLDV CALC-SCNC: 3.7 MMOL/L — HIGH (ref -2–2)
BASOPHILS # BLD AUTO: 0.07 K/UL — SIGNIFICANT CHANGE UP (ref 0–0.2)
BASOPHILS NFR BLD AUTO: 1.8 % — SIGNIFICANT CHANGE UP (ref 0–2)
BILIRUB SERPL-MCNC: 0.7 MG/DL — SIGNIFICANT CHANGE UP (ref 0.2–1.2)
BILIRUB UR-MCNC: NEGATIVE — SIGNIFICANT CHANGE UP
BUN SERPL-MCNC: 14 MG/DL — SIGNIFICANT CHANGE UP (ref 7–23)
CA-I SERPL-SCNC: 1.17 MMOL/L — SIGNIFICANT CHANGE UP (ref 1.12–1.3)
CALCIUM SERPL-MCNC: 9.4 MG/DL — SIGNIFICANT CHANGE UP (ref 8.4–10.5)
CHLORIDE BLDV-SCNC: 106 MMOL/L — SIGNIFICANT CHANGE UP (ref 96–108)
CHLORIDE SERPL-SCNC: 105 MMOL/L — SIGNIFICANT CHANGE UP (ref 96–108)
CO2 BLDV-SCNC: 30 MMOL/L — SIGNIFICANT CHANGE UP (ref 22–30)
CO2 SERPL-SCNC: 23 MMOL/L — SIGNIFICANT CHANGE UP (ref 22–31)
COLOR SPEC: YELLOW — SIGNIFICANT CHANGE UP
CREAT SERPL-MCNC: 0.81 MG/DL — SIGNIFICANT CHANGE UP (ref 0.5–1.3)
DIFF PNL FLD: NEGATIVE — SIGNIFICANT CHANGE UP
ELLIPTOCYTES BLD QL SMEAR: SLIGHT — SIGNIFICANT CHANGE UP
EOSINOPHIL # BLD AUTO: 0.04 K/UL — SIGNIFICANT CHANGE UP (ref 0–0.5)
EOSINOPHIL NFR BLD AUTO: 0.9 % — SIGNIFICANT CHANGE UP (ref 0–6)
EPI CELLS # UR: 1 /HPF — SIGNIFICANT CHANGE UP
GAS PNL BLDV: 135 MMOL/L — SIGNIFICANT CHANGE UP (ref 135–145)
GAS PNL BLDV: SIGNIFICANT CHANGE UP
GAS PNL BLDV: SIGNIFICANT CHANGE UP
GLUCOSE BLDV-MCNC: 130 MG/DL — HIGH (ref 70–99)
GLUCOSE SERPL-MCNC: 132 MG/DL — HIGH (ref 70–99)
GLUCOSE UR QL: NEGATIVE — SIGNIFICANT CHANGE UP
HCO3 BLDV-SCNC: 29 MMOL/L — SIGNIFICANT CHANGE UP (ref 21–29)
HCT VFR BLD CALC: 43.8 % — SIGNIFICANT CHANGE UP (ref 34.5–45)
HCT VFR BLDA CALC: 48 % — SIGNIFICANT CHANGE UP (ref 39–50)
HGB BLD CALC-MCNC: 15.5 G/DL — SIGNIFICANT CHANGE UP (ref 11.5–15.5)
HGB BLD-MCNC: 14 G/DL — SIGNIFICANT CHANGE UP (ref 11.5–15.5)
HOROWITZ INDEX BLDV+IHG-RTO: SIGNIFICANT CHANGE UP
HYALINE CASTS # UR AUTO: 1 /LPF — SIGNIFICANT CHANGE UP (ref 0–2)
INR BLD: 1.04 RATIO — SIGNIFICANT CHANGE UP (ref 0.88–1.16)
KETONES UR-MCNC: NEGATIVE — SIGNIFICANT CHANGE UP
LACTATE BLDV-MCNC: 2 MMOL/L — SIGNIFICANT CHANGE UP (ref 0.7–2)
LEUKOCYTE ESTERASE UR-ACNC: NEGATIVE — SIGNIFICANT CHANGE UP
LIDOCAIN IGE QN: 22 U/L — SIGNIFICANT CHANGE UP (ref 7–60)
LYMPHOCYTES # BLD AUTO: 0.44 K/UL — LOW (ref 1–3.3)
LYMPHOCYTES # BLD AUTO: 10.5 % — LOW (ref 13–44)
MACROCYTES BLD QL: SLIGHT — SIGNIFICANT CHANGE UP
MANUAL SMEAR VERIFICATION: SIGNIFICANT CHANGE UP
MCHC RBC-ENTMCNC: 30.4 PG — SIGNIFICANT CHANGE UP (ref 27–34)
MCHC RBC-ENTMCNC: 32 GM/DL — SIGNIFICANT CHANGE UP (ref 32–36)
MCV RBC AUTO: 95.2 FL — SIGNIFICANT CHANGE UP (ref 80–100)
MICROCYTES BLD QL: SLIGHT — SIGNIFICANT CHANGE UP
MONOCYTES # BLD AUTO: 0.15 K/UL — SIGNIFICANT CHANGE UP (ref 0–0.9)
MONOCYTES NFR BLD AUTO: 3.5 % — SIGNIFICANT CHANGE UP (ref 2–14)
NEUTROPHILS # BLD AUTO: 3.46 K/UL — SIGNIFICANT CHANGE UP (ref 1.8–7.4)
NEUTROPHILS NFR BLD AUTO: 78.9 % — HIGH (ref 43–77)
NEUTS BAND # BLD: 4.4 % — SIGNIFICANT CHANGE UP (ref 0–8)
NITRITE UR-MCNC: NEGATIVE — SIGNIFICANT CHANGE UP
PCO2 BLDV: 46 MMHG — SIGNIFICANT CHANGE UP (ref 35–50)
PH BLDV: 7.41 — SIGNIFICANT CHANGE UP (ref 7.35–7.45)
PH UR: 7 — SIGNIFICANT CHANGE UP (ref 5–8)
PLAT MORPH BLD: NORMAL — SIGNIFICANT CHANGE UP
PLATELET # BLD AUTO: 160 K/UL — SIGNIFICANT CHANGE UP (ref 150–400)
PO2 BLDV: 24 MMHG — LOW (ref 25–45)
POIKILOCYTOSIS BLD QL AUTO: SLIGHT — SIGNIFICANT CHANGE UP
POTASSIUM BLDV-SCNC: 4 MMOL/L — SIGNIFICANT CHANGE UP (ref 3.5–5.3)
POTASSIUM SERPL-MCNC: 4.4 MMOL/L — SIGNIFICANT CHANGE UP (ref 3.5–5.3)
POTASSIUM SERPL-SCNC: 4.4 MMOL/L — SIGNIFICANT CHANGE UP (ref 3.5–5.3)
PROT SERPL-MCNC: 7 G/DL — SIGNIFICANT CHANGE UP (ref 6–8.3)
PROT UR-MCNC: ABNORMAL
PROTHROM AB SERPL-ACNC: 12.5 SEC — SIGNIFICANT CHANGE UP (ref 10.6–13.6)
RBC # BLD: 4.6 M/UL — SIGNIFICANT CHANGE UP (ref 3.8–5.2)
RBC # FLD: 13.1 % — SIGNIFICANT CHANGE UP (ref 10.3–14.5)
RBC BLD AUTO: ABNORMAL
RBC CASTS # UR COMP ASSIST: 9 /HPF — HIGH (ref 0–4)
SAO2 % BLDV: 40 % — LOW (ref 67–88)
SODIUM SERPL-SCNC: 140 MMOL/L — SIGNIFICANT CHANGE UP (ref 135–145)
SP GR SPEC: 1.03 — HIGH (ref 1.01–1.02)
UROBILINOGEN FLD QL: NEGATIVE — SIGNIFICANT CHANGE UP
WBC # BLD: 4.15 K/UL — SIGNIFICANT CHANGE UP (ref 3.8–10.5)
WBC # FLD AUTO: 4.15 K/UL — SIGNIFICANT CHANGE UP (ref 3.8–10.5)
WBC UR QL: 1 /HPF — SIGNIFICANT CHANGE UP (ref 0–5)

## 2020-10-23 PROCEDURE — 74177 CT ABD & PELVIS W/CONTRAST: CPT | Mod: 26

## 2020-10-23 PROCEDURE — 99285 EMERGENCY DEPT VISIT HI MDM: CPT | Mod: CS

## 2020-10-23 PROCEDURE — 99223 1ST HOSP IP/OBS HIGH 75: CPT

## 2020-10-23 RX ORDER — ONDANSETRON 8 MG/1
4 TABLET, FILM COATED ORAL ONCE
Refills: 0 | Status: COMPLETED | OUTPATIENT
Start: 2020-10-23 | End: 2020-10-23

## 2020-10-23 RX ORDER — APIXABAN 2.5 MG/1
2.5 TABLET, FILM COATED ORAL EVERY 12 HOURS
Refills: 0 | Status: DISCONTINUED | OUTPATIENT
Start: 2020-10-23 | End: 2020-10-25

## 2020-10-23 RX ORDER — AMIODARONE HYDROCHLORIDE 400 MG/1
200 TABLET ORAL
Refills: 0 | Status: DISCONTINUED | OUTPATIENT
Start: 2020-10-23 | End: 2020-10-26

## 2020-10-23 RX ORDER — MIRABEGRON 50 MG/1
50 TABLET, EXTENDED RELEASE ORAL DAILY
Refills: 0 | Status: DISCONTINUED | OUTPATIENT
Start: 2020-10-23 | End: 2020-10-26

## 2020-10-23 RX ORDER — SODIUM CHLORIDE 9 MG/ML
1000 INJECTION INTRAMUSCULAR; INTRAVENOUS; SUBCUTANEOUS
Refills: 0 | Status: DISCONTINUED | OUTPATIENT
Start: 2020-10-23 | End: 2020-10-25

## 2020-10-23 RX ORDER — LEVOTHYROXINE SODIUM 125 MCG
50 TABLET ORAL DAILY
Refills: 0 | Status: DISCONTINUED | OUTPATIENT
Start: 2020-10-23 | End: 2020-10-26

## 2020-10-23 RX ORDER — ENOXAPARIN SODIUM 100 MG/ML
40 INJECTION SUBCUTANEOUS DAILY
Refills: 0 | Status: DISCONTINUED | OUTPATIENT
Start: 2020-10-23 | End: 2020-10-23

## 2020-10-23 RX ORDER — SODIUM CHLORIDE 9 MG/ML
500 INJECTION INTRAMUSCULAR; INTRAVENOUS; SUBCUTANEOUS ONCE
Refills: 0 | Status: COMPLETED | OUTPATIENT
Start: 2020-10-23 | End: 2020-10-23

## 2020-10-23 RX ORDER — MORPHINE SULFATE 50 MG/1
2 CAPSULE, EXTENDED RELEASE ORAL ONCE
Refills: 0 | Status: DISCONTINUED | OUTPATIENT
Start: 2020-10-23 | End: 2020-10-23

## 2020-10-23 RX ORDER — ACETAMINOPHEN 500 MG
650 TABLET ORAL EVERY 6 HOURS
Refills: 0 | Status: DISCONTINUED | OUTPATIENT
Start: 2020-10-23 | End: 2020-10-26

## 2020-10-23 RX ORDER — ONDANSETRON 8 MG/1
4 TABLET, FILM COATED ORAL EVERY 6 HOURS
Refills: 0 | Status: DISCONTINUED | OUTPATIENT
Start: 2020-10-23 | End: 2020-10-26

## 2020-10-23 RX ADMIN — SODIUM CHLORIDE 500 MILLILITER(S): 9 INJECTION INTRAMUSCULAR; INTRAVENOUS; SUBCUTANEOUS at 17:57

## 2020-10-23 RX ADMIN — SODIUM CHLORIDE 500 MILLILITER(S): 9 INJECTION INTRAMUSCULAR; INTRAVENOUS; SUBCUTANEOUS at 17:07

## 2020-10-23 RX ADMIN — ONDANSETRON 4 MILLIGRAM(S): 8 TABLET, FILM COATED ORAL at 17:07

## 2020-10-23 NOTE — ED PROVIDER NOTE - OBJECTIVE STATEMENT
89 yF with h/o Afib on eliquis, abdominal TB (s/p laparotomy 1960), SBO presenting with abdominal pain x today. Patient states she had sudden onset of upper abdominal pain that radiates to the back today. Patient states she has vomited due to the pain. Patient states her last BM was 1200 today. Patient states she hasn't passed flatus since. Patient denies chest pain, cough, sob, diarrhea, melena, brbpr, constipation, dysuria, hematuria, tingling, numbness, weakness, ha, and fever.

## 2020-10-23 NOTE — H&P ADULT - NSICDXPASTMEDICALHX_GEN_ALL_CORE_FT
PAST MEDICAL HISTORY:  Afib     Gastritis     HTN (hypertension)     Hypothyroid     Pulmonary TB     SBO (small bowel obstruction)     TB (pulmonary tuberculosis) TB of abdomen 1960    UTI (urinary tract infection)

## 2020-10-23 NOTE — H&P ADULT - PROBLEM SELECTOR PLAN 1
acute onset abdominal pain and nausea/vomiting, hx of SBO in past; imaging showing partial SBP vs focal ileus. Currently vitals stable, labs/lytes WNL, abdominal exam with mild tenderness in periumbilical region      - c/w bowel rest, NPO for now   - no need for decompression as symptoms currently improving    - serial abdominal exam   - zofran prn nausea/vomiting  - pain control prn  - awaiting further surgery recs

## 2020-10-23 NOTE — ED PROVIDER NOTE - ATTENDING CONTRIBUTION TO CARE
89 yF with h/o Afib on eliquis, abdominal TB (s/p laparotomy 1960), SBO presenting with abdominal pain upper epigastric and ruq today with n/v, still have her gb, not distended, bm today, no uti sts, no fever. ct ordered r/o sbo, abd work up, ua. antiemetics, ivf ordered, deferred pain meds.

## 2020-10-23 NOTE — ED PROVIDER NOTE - CLINICAL SUMMARY MEDICAL DECISION MAKING FREE TEXT BOX
89 yF with h/o Afib on eliquis, abdominal TB (s/p laparotomy 1960), SBO presenting with abdominal pain x today. PE with diffuse tenderness BSx4. Will obtain labs, urine and ct. Will give fluids and zofran.

## 2020-10-23 NOTE — H&P ADULT - NSICDXPASTSURGICALHX_GEN_ALL_CORE_FT
PAST SURGICAL HISTORY:  H/O hemorrhoidectomy     History of Bilateral Breast Biopsy     S/P Exploratory Laparotomy TB    S/P vein stripping

## 2020-10-23 NOTE — H&P ADULT - NSHPREVIEWOFSYSTEMS_GEN_ALL_CORE
REVIEW OF SYSTEMS:    CONSTITUTIONAL: No weakness, fevers, chills  EYES/ENT: No visual changes;  no throat pain   NECK: No pain or stiffness  RESPIRATORY: No cough, no shortness of breath  CARDIOVASCULAR: No chest pain or palpitations  GASTROINTESTINAL: as per HPI  GENITOURINARY: no hematuria, no dysuria  NEUROLOGICAL: no numbness, no headaches, no confusion   MUSCULOSKELETAL: no back pain, no focal weakness   SKIN: No rashes, or lesions   PSYCH: no anxiety, depression  HEME: no gum bleeding, no bruising

## 2020-10-23 NOTE — H&P ADULT - NSHPLABSRESULTS_GEN_ALL_CORE
Labs, imaging and EKG personally reviewed and interpreted by me.                           14.0   4.15  )-----------( 160      ( 23 Oct 2020 17:29 )             43.8     10    140  |  105  |  14  ----------------------------<  132<H>  4.4   |  23  |  0.81    Ca    9.4      23 Oct 2020 17:29    TPro  7.0  /  Alb  4.3  /  TBili  0.7  /  DBili  x   /  AST  18  /  ALT  13  /  AlkPhos  73  10    PT/INR - ( 23 Oct 2020 17:29 )   PT: 12.5 sec;   INR: 1.04 ratio     PTT - ( 23 Oct 2020 17:29 )  PTT:28.4 sec    Urinalysis Basic - ( 23 Oct 2020 19:45 )  Color: Yellow / Appearance: Clear / S.034 / pH: x  Gluc: x / Ketone: Negative  / Bili: Negative / Urobili: Negative   Blood: x / Protein: 30 mg/dL / Nitrite: Negative   Leuk Esterase: Negative / RBC: 9 /hpf / WBC 1 /HPF   Sq Epi: x / Non Sq Epi: 1 /hpf / Bacteria: Negative    < from: CT Abdomen and Pelvis w/ Oral Cont and w/ IV Cont (10.23.20 @ 19:07) >    BOWEL: Mildly distended to 3.5 cm fluid and air-filled loops of small bowel with gradual transition in the mid abdomen and collapsed distal small bowel loops. Oral contrast does pass through here and this likely represents a partial small bowel obstruction versus focal ileus. No bowel wall thickening or mesenteric inflammation at this time. Diverticulosis without diverticulitis. Appendix is not visualized.  PERITONEUM: No ascites. No pneumoperitoneum.  VESSELS: Atherosclerosis.  RETROPERITONEUM/LYMPH NODES: No lymphadenopathy.  ABDOMINAL WALL: Post surgical changes.  BONES: Scoliosis and degenerative changes of the spine. Unchanged minimal grade 1 anterolisthesis of L5 on S1.    IMPRESSION:  Probable partial small bowel obstruction vsfocal ileus, as described above.    Colonic diverticulosis without diverticulitis.    < end of copied text >

## 2020-10-23 NOTE — H&P ADULT - NSHPSOCIALHISTORY_GEN_ALL_CORE
never smoker, no etoh, no drugs   lives alone currently,  passed 4 weeks ago  independent with ADLs, told to walk with walker but has not been using it

## 2020-10-23 NOTE — ED ADULT NURSE REASSESSMENT NOTE - NS ED NURSE REASSESS COMMENT FT1
1930- patient alert & oriented x3. Denies worsening abdominal pain. No recurrence of vomiting. Voided freely. Urine sent to lab. Awaiting ct scan results.

## 2020-10-23 NOTE — H&P ADULT - NSHPPHYSICALEXAM_GEN_ALL_CORE
Vital Signs Last 24 Hrs  T(C): 36.9 (23 Oct 2020 19:45), Max: 36.9 (23 Oct 2020 19:45)  T(F): 98.5 (23 Oct 2020 19:45), Max: 98.5 (23 Oct 2020 19:45)  HR: 67 (23 Oct 2020 19:45) (67 - 78)  BP: 138/69 (23 Oct 2020 19:45) (103/69 - 141/74)  BP(mean): --  RR: 16 (23 Oct 2020 17:00) (16 - 16)  SpO2: 99% (23 Oct 2020 17:00) (95% - 99%)    PHYSICAL EXAM:  GENERAL: NAD, well-developed  HEAD:  Atraumatic, normocephalic  EYES: EOMI, conjunctiva and sclera clear  NECK: Supple, no JVD  CHEST/LUNG: Clear to auscultation bilaterally; no wheezing or rales  HEART: Regular rate and rhythm; no murmurs  ABDOMEN: Soft, periumbilical tenderness on palpation, nondistended; bowel sounds present in all 4 quadrants   EXTREMITIES:  2+ Peripheral Pulses, no edema  PSYCH: calm affect, not anxious  NEUROLOGY: non-focal, AAOx3  SKIN: +varicose vein in b/l LE, no rash   MUSCULOSKELETAL: no back pain, moving all extremities

## 2020-10-23 NOTE — H&P ADULT - ATTENDING COMMENTS
Patient assigned to me by night hospitalist in charge for management and care for patient for this evening only. Care to be resumed by day hospitalist ([facility])  in the morning and thereafter.

## 2020-10-23 NOTE — H&P ADULT - HISTORY OF PRESENT ILLNESS
89F with PMH Afib on Eliquis, hx of pulmonary and abdominal TB s/p laparotomy (1960) c/b recurrent SBO, HTN p/w abdominal pain. Pt states she wa sin normal state of health last night and was able to tolerate her dinner; this morning, woke up with severe upper abdominal pain, sharp, non radiating, constant, associated with nausea and at least 7 episodes of NBNB emesis; no associated fevers or chills, no diarrhea, no BRBPR/melena, no dysuria, hematuria. Does not feel like SBP pain she has had in the past, which typically presents on R side of abdomen/flank region. Last BM was around noon today and does not believes she had passed flatus since. Rest of ROS negative.

## 2020-10-23 NOTE — H&P ADULT - ASSESSMENT
89F with PMH Afib on Eliquis, hx of pulmonary and abdominal TB s/p laparotomy (1960) c/b recurrent SBO, HTN p/w severe abdominal pain x 1 days, associated with nausea and multiple episodes of emesis, found to have partial SBO vs focal ileus.

## 2020-10-23 NOTE — H&P ADULT - PROBLEM SELECTOR PLAN 5
noted norvasc on outpt med rec, but pt denies being on any medications  BP currently stable   clarify medications in AM and resume as needed

## 2020-10-23 NOTE — CONSULT NOTE ADULT - SUBJECTIVE AND OBJECTIVE BOX
General Surgery Consult  Consulting surgical team: [facility]  Consulting attending: Dr. QUINN Murillo    HPI: 89F w/ afib (on eliquis), abdominal TB (s/p laparotomy ), recurrent SBO (most recent admission Dr. Millard [facility] , discharge after NGT decompression),       PAST MEDICAL HISTORY:  TB (pulmonary tuberculosis)    HTN (hypertension)    UTI (urinary tract infection)    Hypothyroid    SBO (small bowel obstruction)    Afib    Gastritis    Pulmonary TB        PAST SURGICAL HISTORY:  S/P vein stripping    H/O hemorrhoidectomy    S/P Exploratory Laparotomy    History of Bilateral Breast Biopsy    History of Small Bowel Obstruction    Afib        MEDICATIONS:  acetaminophen   Tablet .. 650 milliGRAM(s) Oral every 6 hours PRN  ondansetron Injectable 4 milliGRAM(s) IV Push every 6 hours PRN  sodium chloride 0.9%. 1000 milliLiter(s) IV Continuous <Continuous>      ALLERGIES:  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 reported allergy (Unknown)  penicillin (Rash)      VITALS & I/Os:  Vital Signs Last 24 Hrs  T(C): 36.9 (23 Oct 2020 19:45), Max: 36.9 (23 Oct 2020 19:45)  T(F): 98.5 (23 Oct 2020 19:45), Max: 98.5 (23 Oct 2020 19:45)  HR: 67 (23 Oct 2020 19:45) (67 - 78)  BP: 138/69 (23 Oct 2020 19:45) (103/69 - 141/74)  BP(mean): --  RR: 16 (23 Oct 2020 17:00) (16 - 16)  SpO2: 99% (23 Oct 2020 17:00) (95% - 99%)    I&O's Summary      PHYSICAL EXAM:  General: No acute distress  Respiratory: Nonlabored  Cardiovascular: RRR  Abdominal: Soft, nondistended, nontender. No rebound or guarding. No organomegaly, no palpable mass.  Extremities: Warm    LABS:                        14.0   4.15  )-----------( 160      ( 23 Oct 2020 17:29 )             43.8     10-    140  |  105  |  14  ----------------------------<  132<H>  4.4   |  23  |  0.81    Ca    9.4      23 Oct 2020 17:29    TPro  7.0  /  Alb  4.3  /  TBili  0.7  /  DBili  x   /  AST  18  /  ALT  13  /  AlkPhos  73  10-23    Lactate:  10-23 @ 17:29  2.0    PT/INR - ( 23 Oct 2020 17:29 )   PT: 12.5 sec;   INR: 1.04 ratio         PTT - ( 23 Oct 2020 17:29 )  PTT:28.4 sec          Urinalysis Basic - ( 23 Oct 2020 19:45 )    Color: Yellow / Appearance: Clear / S.034 / pH: x  Gluc: x / Ketone: Negative  / Bili: Negative / Urobili: Negative   Blood: x / Protein: 30 mg/dL / Nitrite: Negative   Leuk Esterase: Negative / RBC: 9 /hpf / WBC 1 /HPF   Sq Epi: x / Non Sq Epi: 1 /hpf / Bacteria: Negative        IMAGING:   General Surgery Consult  Consulting surgical team: [facility]  Consulting attending: Dr. QUINN Murillo    HPI: 89F w/ afib (on eliquis), abdominal TB (s/p laparotomy ), recurrent SBO (most recent admission [facility] , discharge after NGT decompression), now presenting w/ n/v since Thursday. Pt states that she began to feel nauseated since Wednesday, vomited once on Thursday, and then this AM, vomited x 7, prompting her to present to ED. Pt adds that she passed gas and had BM last time at noon today before coming to ED. Otherwise, she denies any other symptoms    In ED, vitals wnl and stable, exam showing mild distended abd, soft, nontender, labs w/o any significant values, and CT abd/pelv w/ PO/IV contrast showing partial obstruction w/ transition point in mid abdomen, w/ contrast seen distal to transition point       PAST MEDICAL HISTORY:  TB (pulmonary tuberculosis)    HTN (hypertension)    UTI (urinary tract infection)    Hypothyroid    SBO (small bowel obstruction)    Afib    Gastritis    Pulmonary TB        PAST SURGICAL HISTORY:  S/P vein stripping    H/O hemorrhoidectomy    S/P Exploratory Laparotomy    History of Bilateral Breast Biopsy    History of Small Bowel Obstruction    Afib        MEDICATIONS:  acetaminophen   Tablet .. 650 milliGRAM(s) Oral every 6 hours PRN  ondansetron Injectable 4 milliGRAM(s) IV Push every 6 hours PRN  sodium chloride 0.9%. 1000 milliLiter(s) IV Continuous <Continuous>      ALLERGIES:  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 reported allergy (Unknown)  penicillin (Rash)      VITALS & I/Os:  Vital Signs Last 24 Hrs  T(C): 36.9 (23 Oct 2020 19:45), Max: 36.9 (23 Oct 2020 19:45)  T(F): 98.5 (23 Oct 2020 19:45), Max: 98.5 (23 Oct 2020 19:45)  HR: 67 (23 Oct 2020 19:45) (67 - 78)  BP: 138/69 (23 Oct 2020 19:45) (103/69 - 141/74)  BP(mean): --  RR: 16 (23 Oct 2020 17:00) (16 - 16)  SpO2: 99% (23 Oct 2020 17:00) (95% - 99%)    I&O's Summary      PHYSICAL EXAM:  General: No acute distress  Respiratory: Nonlabored  Cardiovascular: RRR  Abdominal: Soft, nontender, mildly distended, No rebound or guarding. No organomegaly, no palpable mass.  Extremities: Warm    LABS:                        14.0   4.15  )-----------( 160      ( 23 Oct 2020 17:29 )             43.8     10-23    140  |  105  |  14  ----------------------------<  132<H>  4.4   |  23  |  0.81    Ca    9.4      23 Oct 2020 17:29    TPro  7.0  /  Alb  4.3  /  TBili  0.7  /  DBili  x   /  AST  18  /  ALT  13  /  AlkPhos  73  10-23    Lactate:  10-23 @ 17:29  2.0    PT/INR - ( 23 Oct 2020 17:29 )   PT: 12.5 sec;   INR: 1.04 ratio         PTT - ( 23 Oct 2020 17:29 )  PTT:28.4 sec          Urinalysis Basic - ( 23 Oct 2020 19:45 )    Color: Yellow / Appearance: Clear / S.034 / pH: x  Gluc: x / Ketone: Negative  / Bili: Negative / Urobili: Negative   Blood: x / Protein: 30 mg/dL / Nitrite: Negative   Leuk Esterase: Negative / RBC: 9 /hpf / WBC 1 /HPF   Sq Epi: x / Non Sq Epi: 1 /hpf / Bacteria: Negative        IMAGING:    < from: CT Abdomen and Pelvis w/ Oral Cont and w/ IV Cont (10.23.20 @ 19:07) >    EXAM:  CT ABDOMEN AND PELVIS OC                             PROCEDURE DATE:  10/23/2020            INTERPRETATION:  CLINICAL INFORMATION: Abdominal pain.    COMPARISON: CT abdomen and pelvis 2019.    PROCEDURE:  CT of the Abdomen and Pelvis was performed with intravenous contrast.  Intravenous contrast: 90 ml Omnipaque 350. 10 ml discarded.  Oral contrast: positive contrast was administered.  Sagittal and coronal reformats were performed.    FINDINGS:  LOWER CHEST: Minimal bibasilar dependent and linear atelectasis. Aortic valve, coronary artery and mitral annular calcifications. Cardiomegaly.    LIVER: Unchanged hepatomegaly measuring 21 cm in craniocaudal dimension.  BILE DUCTS: Normal caliber.  GALLBLADDER: Within normal limits.  SPLEEN: Within normal limits.  PANCREAS: Within normal limits.  ADRENALS: Within normal limits.  KIDNEYS/URETERS: The kidneys enhance symmetrically. No hydronephrosis. A 1.0 cm exophytic hypodense right lower pole lesion, which measures higher than simple fluid. Additional bilateral subcentimeter hypodensities, small to characterize.    BLADDER: Within normal limits.  REPRODUCTIVE ORGANS: Calcified uterine fibroids. Unchanged 5.8 cm hypodense right adnexal lesion and unchanged 2.9 cm left adnexal cyst.    BOWEL: Mildly distended to 3.5 cm fluid and air-filled loops of small bowel with gradual transition in the mid abdomen and collapsed distal small bowel loops. Oral contrast does pass through here and this likely represents a partial small bowel obstruction versus focal ileus. No bowel wall thickening or mesenteric inflammation at this time. Diverticulosis without diverticulitis. Appendix is not visualized.  PERITONEUM: No ascites. No pneumoperitoneum.  VESSELS: Atherosclerosis.  RETROPERITONEUM/LYMPH NODES: No lymphadenopathy.  ABDOMINAL WALL: Post surgical changes.  BONES: Scoliosis and degenerative changes of the spine. Unchanged minimal grade 1 anterolisthesis of L5 on S1.    IMPRESSION:  Probable partial small bowel obstruction vsfocal ileus, as described above.    Colonic diverticulosis without diverticulitis.              YESY OSMAN MD; Resident Radiology  This document has been electronically signed.  RENU SAMUEL MD; Attending Radiologist  This document has been electronically signed. Oct 23 2020  7:47PM    < end of copied text >

## 2020-10-23 NOTE — ED ADULT NURSE NOTE - OBJECTIVE STATEMENT
89yr old female with a PMH of bowel obstruction, Afib (on eliquis) and urinary frequency coming in with c/o of abdominal pain and vomiting since last night. PT states that she vomited last night and x7 times this AM. Pt states that she is unable to keep any food down. Abdomen soft to touch with hypoactive bowel sounds. Pt denies any SOB, diarrhea, fever or chills. VSS. Pts skin is intact and warm to touch. Pt A&Ox3. Capillary refill <2 seconds. +2 palpable peripheral pulses. Call bell at bedside. Will continue to monitor.

## 2020-10-23 NOTE — ED ADULT NURSE NOTE - NSIMPLEMENTINTERV_GEN_ALL_ED
Implemented All Fall with Harm Risk Interventions:  [place] to call system. Call bell, personal items and telephone within reach. Instruct patient to call for assistance. Room bathroom lighting operational. Non-slip footwear when patient is off stretcher. Physically safe environment: no spills, clutter or unnecessary equipment. Stretcher in lowest position, wheels locked, appropriate side rails in place. Provide visual cue, wrist band, yellow gown, etc. Monitor gait and stability. Monitor for mental status changes and reorient to person, place, and time. Review medications for side effects contributing to fall risk. Reinforce activity limits and safety measures with patient and family. Provide visual clues: red socks.

## 2020-10-24 LAB
SARS-COV-2 IGG SERPL QL IA: NEGATIVE — SIGNIFICANT CHANGE UP
SARS-COV-2 IGM SERPL IA-ACNC: <0.1 INDEX — SIGNIFICANT CHANGE UP
SARS-COV-2 RNA SPEC QL NAA+PROBE: SIGNIFICANT CHANGE UP

## 2020-10-24 RX ADMIN — ONDANSETRON 4 MILLIGRAM(S): 8 TABLET, FILM COATED ORAL at 03:16

## 2020-10-24 RX ADMIN — MIRABEGRON 50 MILLIGRAM(S): 50 TABLET, EXTENDED RELEASE ORAL at 12:18

## 2020-10-24 RX ADMIN — APIXABAN 2.5 MILLIGRAM(S): 2.5 TABLET, FILM COATED ORAL at 05:37

## 2020-10-24 RX ADMIN — SODIUM CHLORIDE 75 MILLILITER(S): 9 INJECTION INTRAMUSCULAR; INTRAVENOUS; SUBCUTANEOUS at 01:44

## 2020-10-24 RX ADMIN — Medication 50 MICROGRAM(S): at 05:37

## 2020-10-24 RX ADMIN — APIXABAN 2.5 MILLIGRAM(S): 2.5 TABLET, FILM COATED ORAL at 17:27

## 2020-10-24 RX ADMIN — Medication 650 MILLIGRAM(S): at 17:55

## 2020-10-24 RX ADMIN — Medication 650 MILLIGRAM(S): at 17:27

## 2020-10-24 NOTE — PROGRESS NOTE ADULT - PROBLEM SELECTOR PLAN 1
acute onset abdominal pain and nausea/vomiting, hx of SBO in past; imaging showing partial SBP vs focal ileus. Currently vitals stable, labs/lytes WNL, abdominal exam with mild tenderness in periumbilical region      - c/w bowel rest, NPO for now   - no need for decompression as symptoms currently improving    - serial abdominal exam   - zofran prn nausea/vomiting  - pain control prn  - surgery recs no interventions

## 2020-10-24 NOTE — PROGRESS NOTE ADULT - SUBJECTIVE AND OBJECTIVE BOX
Patient is a 89y old  Female who presents with a chief complaint of abdominal pain (24 Oct 2020 11:40)      INTERVAL HPI/OVERNIGHT EVENTS: noted  pt seen and examined  abd pain, denies n/v, some flatus      Vital Signs Last 24 Hrs  T(C): 36.9 (24 Oct 2020 16:32), Max: 37.3 (24 Oct 2020 02:41)  T(F): 98.4 (24 Oct 2020 16:32), Max: 99.1 (24 Oct 2020 02:41)  HR: 65 (24 Oct 2020 16:32) (58 - 66)  BP: 148/74 (24 Oct 2020 16:32) (127/70 - 148/74)  BP(mean): --  RR: 18 (24 Oct 2020 16:32) (16 - 18)  SpO2: 94% (24 Oct 2020 16:32) (93% - 96%)    acetaminophen   Tablet .. 650 milliGRAM(s) Oral every 6 hours PRN  aMIOdarone    Tablet 200 milliGRAM(s) Oral <User Schedule>  apixaban 2.5 milliGRAM(s) Oral every 12 hours  levothyroxine 50 MICROGram(s) Oral daily  mirabegron ER 50 milliGRAM(s) Oral daily  ondansetron Injectable 4 milliGRAM(s) IV Push every 6 hours PRN  sodium chloride 0.9%. 1000 milliLiter(s) IV Continuous <Continuous>      PHYSICAL EXAM:  GENERAL: NAD,   EYES: conjunctiva and sclera clear  ENMT: Moist mucous membranes  NECK: Supple, No JVD, Normal thyroid  CHEST/LUNG: non labored, cta b/l  HEART: Regular rate and rhythm; No murmurs, rubs, or gallops  ABDOMEN: Soft, tender, distended; Bowel sounds distant  EXTREMITIES:  2+ Peripheral Pulses, No clubbing, cyanosis, or edema  LYMPH: No lymphadenopathy noted  SKIN: No rashes or lesions    Consultant(s) Notes Reviewed:  [x ] YES  [ ] NO  Care Discussed with Consultants/Other Providers [ x] YES  [ ] NO    LABS:                        14.0   4.15  )-----------( 160      ( 23 Oct 2020 17:29 )             43.8     10-    140  |  105  |  14  ----------------------------<  132<H>  4.4   |  23  |  0.81    Ca    9.4      23 Oct 2020 17:29    TPro  7.0  /  Alb  4.3  /  TBili  0.7  /  DBili  x   /  AST  18  /  ALT  13  /  AlkPhos  73  10-23    PT/INR - ( 23 Oct 2020 17:29 )   PT: 12.5 sec;   INR: 1.04 ratio         PTT - ( 23 Oct 2020 17:29 )  PTT:28.4 sec  Urinalysis Basic - ( 23 Oct 2020 19:45 )    Color: Yellow / Appearance: Clear / S.034 / pH: x  Gluc: x / Ketone: Negative  / Bili: Negative / Urobili: Negative   Blood: x / Protein: 30 mg/dL / Nitrite: Negative   Leuk Esterase: Negative / RBC: 9 /hpf / WBC 1 /HPF   Sq Epi: x / Non Sq Epi: 1 /hpf / Bacteria: Negative      CAPILLARY BLOOD GLUCOSE            Urinalysis Basic - ( 23 Oct 2020 19:45 )    Color: Yellow / Appearance: Clear / S.034 / pH: x  Gluc: x / Ketone: Negative  / Bili: Negative / Urobili: Negative   Blood: x / Protein: 30 mg/dL / Nitrite: Negative   Leuk Esterase: Negative / RBC: 9 /hpf / WBC 1 /HPF   Sq Epi: x / Non Sq Epi: 1 /hpf / Bacteria: Negative          RADIOLOGY & ADDITIONAL TESTS:    Imaging Personally Reviewed:  [x ] YES  [ ] NO

## 2020-10-24 NOTE — PROGRESS NOTE ADULT - ASSESSMENT
89F w/ afib, and abdominal TB, (s/p laparotomy 1960), and hx of recurrent SBOs, now p/w another episode of SBO    - Recommend supportive management.   - If worsens or has N/V consider NGT  - No standing pain medication to prevent masking of symptoms of progression of obstruction  - Continue to monitor GI function  - Try CLD  - Discussed w/ Dr. QUINN Murillo    [facility]  [id number]

## 2020-10-24 NOTE — PROGRESS NOTE ADULT - SUBJECTIVE AND OBJECTIVE BOX
GENERAL SURGERY PROGRESS NOTE    SUBJECTIVE  Patient seen and examined. Patient came in because of multiple bouts of N/V and decreased GIF.  CT showed non distended stomache and contrast making it past the transitioin point so no NGT was placed. Denies fever, chills, SOB, chest pain.         OBJECTIVE    PHYSICAL EXAM  General: Appears well, NAD  CHEST: breathing comfortably  CV: appears well perfused  Abdomen: soft, nontender, nondistended, no rebound or guarding  Extremities: Grossly symmetric    T(C): 36.8 (10-24-20 @ 09:52), Max: 37.3 (10-24-20 @ 02:41)  HR: 60 (10-24-20 @ 09:52) (58 - 78)  BP: 134/74 (10-24-20 @ 09:52) (103/69 - 148/74)  RR: 18 (10-24-20 @ 09:52) (16 - 18)  SpO2: 94% (10-24-20 @ 09:52) (93% - 99%)    10-23-20 @ 07:01  -  10-24-20 @ 07:00  --------------------------------------------------------  IN: 525 mL / OUT: 150 mL / NET: 375 mL    10-24-20 @ 07:01  -  10-24-20 @ 11:41  --------------------------------------------------------  IN: 0 mL / OUT: 0 mL / NET: 0 mL        MEDICATIONS  acetaminophen   Tablet .. 650 milliGRAM(s) Oral every 6 hours PRN  aMIOdarone    Tablet 200 milliGRAM(s) Oral <User Schedule>  apixaban 2.5 milliGRAM(s) Oral every 12 hours  levothyroxine 50 MICROGram(s) Oral daily  mirabegron ER 50 milliGRAM(s) Oral daily  ondansetron Injectable 4 milliGRAM(s) IV Push every 6 hours PRN  sodium chloride 0.9%. 1000 milliLiter(s) IV Continuous <Continuous>      LABS                        14.0   4.15  )-----------( 160      ( 23 Oct 2020 17:29 )             43.8     10-23    140  |  105  |  14  ----------------------------<  132<H>  4.4   |  23  |  0.81    Ca    9.4      23 Oct 2020 17:29    TPro  7.0  /  Alb  4.3  /  TBili  0.7  /  DBili  x   /  AST  18  /  ALT  13  /  AlkPhos  73  10-23    PT/INR - ( 23 Oct 2020 17:29 )   PT: 12.5 sec;   INR: 1.04 ratio         PTT - ( 23 Oct 2020 17:29 )  PTT:28.4 sec  Urinalysis Basic - ( 23 Oct 2020 19:45 )    Color: Yellow / Appearance: Clear / S.034 / pH: x  Gluc: x / Ketone: Negative  / Bili: Negative / Urobili: Negative   Blood: x / Protein: 30 mg/dL / Nitrite: Negative   Leuk Esterase: Negative / RBC: 9 /hpf / WBC 1 /HPF   Sq Epi: x / Non Sq Epi: 1 /hpf / Bacteria: Negative

## 2020-10-25 LAB
ANION GAP SERPL CALC-SCNC: 10 MMOL/L — SIGNIFICANT CHANGE UP (ref 5–17)
BUN SERPL-MCNC: 8 MG/DL — SIGNIFICANT CHANGE UP (ref 7–23)
CALCIUM SERPL-MCNC: 8.9 MG/DL — SIGNIFICANT CHANGE UP (ref 8.4–10.5)
CHLORIDE SERPL-SCNC: 105 MMOL/L — SIGNIFICANT CHANGE UP (ref 96–108)
CO2 SERPL-SCNC: 26 MMOL/L — SIGNIFICANT CHANGE UP (ref 22–31)
CREAT SERPL-MCNC: 0.62 MG/DL — SIGNIFICANT CHANGE UP (ref 0.5–1.3)
CULTURE RESULTS: SIGNIFICANT CHANGE UP
GLUCOSE SERPL-MCNC: 86 MG/DL — SIGNIFICANT CHANGE UP (ref 70–99)
HCT VFR BLD CALC: 38.8 % — SIGNIFICANT CHANGE UP (ref 34.5–45)
HGB BLD-MCNC: 12.3 G/DL — SIGNIFICANT CHANGE UP (ref 11.5–15.5)
MCHC RBC-ENTMCNC: 30.6 PG — SIGNIFICANT CHANGE UP (ref 27–34)
MCHC RBC-ENTMCNC: 31.7 GM/DL — LOW (ref 32–36)
MCV RBC AUTO: 96.5 FL — SIGNIFICANT CHANGE UP (ref 80–100)
NRBC # BLD: 0 /100 WBCS — SIGNIFICANT CHANGE UP (ref 0–0)
PLATELET # BLD AUTO: 148 K/UL — LOW (ref 150–400)
POTASSIUM SERPL-MCNC: 3.7 MMOL/L — SIGNIFICANT CHANGE UP (ref 3.5–5.3)
POTASSIUM SERPL-SCNC: 3.7 MMOL/L — SIGNIFICANT CHANGE UP (ref 3.5–5.3)
RBC # BLD: 4.02 M/UL — SIGNIFICANT CHANGE UP (ref 3.8–5.2)
RBC # FLD: 12.8 % — SIGNIFICANT CHANGE UP (ref 10.3–14.5)
SODIUM SERPL-SCNC: 141 MMOL/L — SIGNIFICANT CHANGE UP (ref 135–145)
SPECIMEN SOURCE: SIGNIFICANT CHANGE UP
WBC # BLD: 2.54 K/UL — LOW (ref 3.8–10.5)
WBC # FLD AUTO: 2.54 K/UL — LOW (ref 3.8–10.5)

## 2020-10-25 RX ORDER — APIXABAN 2.5 MG/1
2.5 TABLET, FILM COATED ORAL
Refills: 0 | Status: DISCONTINUED | OUTPATIENT
Start: 2020-10-26 | End: 2020-10-26

## 2020-10-25 RX ADMIN — Medication 50 MICROGRAM(S): at 05:37

## 2020-10-25 RX ADMIN — APIXABAN 2.5 MILLIGRAM(S): 2.5 TABLET, FILM COATED ORAL at 05:37

## 2020-10-25 RX ADMIN — MIRABEGRON 50 MILLIGRAM(S): 50 TABLET, EXTENDED RELEASE ORAL at 18:26

## 2020-10-25 NOTE — CONSULT NOTE ADULT - SUBJECTIVE AND OBJECTIVE BOX
Patient is a 89y old  Female who presents with a chief complaint of abdominal pain (25 Oct 2020 08:25)      HPI:  89F with PMH Afib on Eliquis, hx of pulmonary and abdominal TB s/p laparotomy () c/b recurrent SBO, HTN p/w abdominal pain. Pt states she wa sin normal state of health last night and was able to tolerate her dinner; this morning, woke up with severe upper abdominal pain, sharp, non radiating, constant, associated with nausea and at least 7 episodes of NBNB emesis; no associated fevers or chills, no diarrhea, no BRBPR/melena, no dysuria, hematuria. Does not feel like SBP pain she has had in the past, which typically presents on R side of abdomen/flank region. Last BM was around noon today and does not believes she had passed flatus since. Rest of ROS negative.    (23 Oct 2020 23:34)    However, this morning had episode of diarrhea without blood.  After that she fel better.  No NVFC.      PAST MEDICAL & SURGICAL HISTORY:  TB (pulmonary tuberculosis)  TB of abdomen     HTN (hypertension)    UTI (urinary tract infection)    Hypothyroid    SBO (small bowel obstruction)    Afib    Gastritis    Pulmonary TB    S/P vein stripping    H/O hemorrhoidectomy    S/P Exploratory Laparotomy  TB    History of Bilateral Breast Biopsy        Allergies    cocaine exposure in  with TB treatment. reported allergy (Unknown)  penicillin (Rash)    Intolerances        MEDICATIONS  (STANDING):  aMIOdarone    Tablet 200 milliGRAM(s) Oral <User Schedule>  apixaban 2.5 milliGRAM(s) Oral every 12 hours  levothyroxine 50 MICROGram(s) Oral daily  mirabegron ER 50 milliGRAM(s) Oral daily  sodium chloride 0.9%. 1000 milliLiter(s) (75 mL/Hr) IV Continuous <Continuous>    MEDICATIONS  (PRN):  acetaminophen   Tablet .. 650 milliGRAM(s) Oral every 6 hours PRN Temp greater or equal to 38C (100.4F), Mild Pain (1 - 3), Moderate Pain (4 - 6)  ondansetron Injectable 4 milliGRAM(s) IV Push every 6 hours PRN Nausea and/or Vomiting      Review of Systems:    No fever chills  No SOB, CP  No anorexia weight loss    Vital Signs Last 24 Hrs  T(C): 36.6 (25 Oct 2020 09:31), Max: 37 (24 Oct 2020 12:34)  T(F): 97.8 (25 Oct 2020 09:31), Max: 98.6 (24 Oct 2020 12:34)  HR: 63 (25 Oct 2020 09:31) (56 - 65)  BP: 129/71 (25 Oct 2020 09:31) (111/65 - 149/75)  BP(mean): --  RR: 18 (25 Oct 2020 09:31) (18 - 18)  SpO2: 94% (25 Oct 2020 09:31) (92% - 96%)    PHYSICAL EXAM:  Constitutional: NAD, well-developed  Neck: No LAD, supple  Respiratory: CTA and P  Cardiovascular: S1 and S2, RRR, no M  Gastrointestinal: BS+, soft, NT/ND, neg HSM,  Extremities: No peripheral edema, neg clubing, cyanosis  Vascular: 2+ peripheral pulses  Neurological: A/O x 3, no focal deficits  Psychiatric: Normal mood, normal affect  Skin: No rashes    LABS:                        12.3   2.54  )-----------( 148      ( 25 Oct 2020 07:50 )             38.8     10-25    141  |  105  |  8   ----------------------------<  86  3.7   |  26  |  0.62    Ca    8.9      25 Oct 2020 07:50    TPro  7.0  /  Alb  4.3  /  TBili  0.7  /  DBili  x   /  AST  18  /  ALT  13  /  AlkPhos  73  10-23    PT/INR - ( 23 Oct 2020 17:29 )   PT: 12.5 sec;   INR: 1.04 ratio         PTT - ( 23 Oct 2020 17:29 )  PTT:28.4 sec  Urinalysis Basic - ( 23 Oct 2020 19:45 )    Color: Yellow / Appearance: Clear / S.034 / pH: x  Gluc: x / Ketone: Negative  / Bili: Negative / Urobili: Negative   Blood: x / Protein: 30 mg/dL / Nitrite: Negative   Leuk Esterase: Negative / RBC: 9 /hpf / WBC 1 /HPF   Sq Epi: x / Non Sq Epi: 1 /hpf / Bacteria: Negative      LIVER FUNCTIONS - ( 23 Oct 2020 17:29 )  Alb: 4.3 g/dL / Pro: 7.0 g/dL / ALK PHOS: 73 U/L / ALT: 13 U/L / AST: 18 U/L / GGT: x             RADIOLOGY & ADDITIONAL TESTS:  reviewed CT scan.

## 2020-10-25 NOTE — CONSULT NOTE ADULT - ASSESSMENT
89F with PMH Afib on Eliquis, hx of pulmonary and abdominal TB s/p laparotomy (1960) c/b recurrent SBO, HTN with recurrent SBO which appears to have resolved.      REC:    Advance to low residue diet    Panchito Peña MD
89F w/ afib, and abdominal TB, (s/p laparotomy 1960), and hx of recurrent SBOs, now p/w another episode of SBO    - Recommend supportive management. Will hold off on NGT for now as pt is still having GI function, CT showing nondistended stomach, and endorses improvement in nausea since presentation to ED. If pt's nausea worsens or vomits again, NGT should be placed  - No standing pain medication to prevent masking of symptoms of progression of obstruction  - Continue to monitor GI function  - Discussed w/ Dr. QUINN Amador PGY-3  [facility]

## 2020-10-25 NOTE — PROGRESS NOTE ADULT - SUBJECTIVE AND OBJECTIVE BOX
INTERVAL HPI/OVERNIGHT EVENTS: patient seen and examined at bedside, resting comfortably, reports 1 episode of diarrhea. Denies any fevers, chills, N/V/D/C. Tolerating clear liquid diet. Pain well controlled.     MEDICATIONS  (STANDING):  aMIOdarone    Tablet 200 milliGRAM(s) Oral <User Schedule>  apixaban 2.5 milliGRAM(s) Oral every 12 hours  levothyroxine 50 MICROGram(s) Oral daily  mirabegron ER 50 milliGRAM(s) Oral daily  sodium chloride 0.9%. 1000 milliLiter(s) (75 mL/Hr) IV Continuous <Continuous>    MEDICATIONS  (PRN):  acetaminophen   Tablet .. 650 milliGRAM(s) Oral every 6 hours PRN Temp greater or equal to 38C (100.4F), Mild Pain (1 - 3), Moderate Pain (4 - 6)  ondansetron Injectable 4 milliGRAM(s) IV Push every 6 hours PRN Nausea and/or Vomiting      Vital Signs Last 24 Hrs  T(C): 36.6 (25 Oct 2020 09:31), Max: 37 (24 Oct 2020 12:34)  T(F): 97.8 (25 Oct 2020 09:31), Max: 98.6 (24 Oct 2020 12:34)  HR: 63 (25 Oct 2020 09:31) (56 - 65)  BP: 129/71 (25 Oct 2020 09:31) (111/65 - 149/75)  BP(mean): --  RR: 18 (25 Oct 2020 09:31) (18 - 18)  SpO2: 94% (25 Oct 2020 09:31) (92% - 96%)  I&O's Detail    24 Oct 2020 07:01  -  25 Oct 2020 07:00  --------------------------------------------------------  IN:  Total IN: 0 mL    OUT:    Oral Fluid: 0 mL  Total OUT: 0 mL    Total NET: 0 mL      25 Oct 2020 07:01  -  25 Oct 2020 11:34  --------------------------------------------------------  IN:    Oral Fluid: 300 mL  Total IN: 300 mL    OUT:  Total OUT: 0 mL    Total NET: 300 mL          REVIEW OF SYSTEMS:  CONSTITUTIONAL: No weakness, fevers or chills  EYES/ENT: No visual changes;  No vertigo or throat pain   NECK: No pain or stiffness  RESPIRATORY: No cough, wheezing, hemoptysis; No shortness of breath  CARDIOVASCULAR: No chest pain or palpitations  GASTROINTESTINAL: No abdominal or epigastric pain. No nausea, vomiting, or hematemesis; No diarrhea or constipation. No melena or hematochezia.  GENITOURINARY: No dysuria, frequency or hematuria  NEUROLOGICAL: No numbness or weakness  SKIN: No itching, burning, rashes, or lesions   All other review of systems is negative unless indicated above.    Physical Exam:  General: WN/WD NAD  Neurology: A&Ox3, nonfocal, YATES x 4  Abdominal: Soft, NT, ND +BS, Last BM in the AM      LABS:                        12.3   2.54  )-----------( 148      ( 25 Oct 2020 07:50 )             38.8     10-    141  |  105  |  8   ----------------------------<  86  3.7   |  26  |  0.62    Ca    8.9      25 Oct 2020 07:50    TPro  7.0  /  Alb  4.3  /  TBili  0.7  /  DBili  x   /  AST  18  /  ALT  13  /  AlkPhos  73  10-23    PT/INR - ( 23 Oct 2020 17:29 )   PT: 12.5 sec;   INR: 1.04 ratio         PTT - ( 23 Oct 2020 17:29 )  PTT:28.4 sec  Urinalysis Basic - ( 23 Oct 2020 19:45 )    Color: Yellow / Appearance: Clear / S.034 / pH: x  Gluc: x / Ketone: Negative  / Bili: Negative / Urobili: Negative   Blood: x / Protein: 30 mg/dL / Nitrite: Negative   Leuk Esterase: Negative / RBC: 9 /hpf / WBC 1 /HPF   Sq Epi: x / Non Sq Epi: 1 /hpf / Bacteria: Negative

## 2020-10-25 NOTE — PROGRESS NOTE ADULT - ASSESSMENT
89F w/ afib, and abdominal TB, (s/p laparotomy 1960), and hx of recurrent SBOs, now p/w another episode of SBO. Patient has bowel function and was tolerating CLD yesterday.     - Recommend advancing to regular diet today  - Once tolerating diet, can d/c fluids  - No standing pain medication to prevent masking of symptoms of progression of obstruction  - Continue to monitor GI function  - Discussed w/ Dr. QUINN Murillo    [facility]  0649 89F w/ afib, and abdominal TB, (s/p laparotomy 1960), and hx of recurrent SBOs, now p/w another episode of SBO. Patient has bowel function and was tolerating CLD yesterday. Patient amenable to trying regular diet today.     - Recommend advancing as tolerated today  - Once tolerating diet, can d/c fluids  - No standing pain medication to prevent masking of symptoms of progression of obstruction  - Continue to monitor GI function  - Discussed w/ Dr. QUINN Murillo    [facility]  [id number]

## 2020-10-25 NOTE — PROGRESS NOTE ADULT - ASSESSMENT
89F w/ afib, and abdominal TB, (s/p laparotomy 1960), and hx of recurrent SBOs, now p/w another episode of SBO.  Patient reports + bowel function and tolerating diet without any nausea or vomiting.     Patient seen and examined with Dr. Ruggiero  Plan:  - Normal Diet  - Once tolerating diet, can d/c fluids  - Continue to monitor GI function

## 2020-10-25 NOTE — PROGRESS NOTE ADULT - SUBJECTIVE AND OBJECTIVE BOX
Patient is a 89y old  Female who presents with a chief complaint of abdominal pain (25 Oct 2020 11:34)      INTERVAL HPI/OVERNIGHT EVENTS: noted  pt seen and examined  feels well, no complaints      Vital Signs Last 24 Hrs  T(C): 36.6 (25 Oct 2020 17:04), Max: 36.8 (24 Oct 2020 20:55)  T(F): 97.9 (25 Oct 2020 17:04), Max: 98.3 (24 Oct 2020 20:55)  HR: 56 (25 Oct 2020 17:04) (55 - 63)  BP: 136/74 (25 Oct 2020 17:04) (111/65 - 157/68)  BP(mean): --  RR: 18 (25 Oct 2020 17:04) (18 - 18)  SpO2: 97% (25 Oct 2020 17:04) (92% - 97%)    acetaminophen   Tablet .. 650 milliGRAM(s) Oral every 6 hours PRN  aMIOdarone    Tablet 200 milliGRAM(s) Oral <User Schedule>  levothyroxine 50 MICROGram(s) Oral daily  mirabegron ER 50 milliGRAM(s) Oral daily  ondansetron Injectable 4 milliGRAM(s) IV Push every 6 hours PRN      PHYSICAL EXAM:  GENERAL: NAD,   EYES: conjunctiva and sclera clear  ENMT: Moist mucous membranes  NECK: Supple, No JVD, Normal thyroid  CHEST/LUNG: non labored, cta b/l  HEART: Regular rate and rhythm; No murmurs, rubs, or gallops  ABDOMEN: Soft, Nontender, Nondistended; Bowel sounds present  EXTREMITIES:  2+ Peripheral Pulses, No clubbing, cyanosis, or edema  LYMPH: No lymphadenopathy noted  SKIN: No rashes or lesions    Consultant(s) Notes Reviewed:  [x ] YES  [ ] NO  Care Discussed with Consultants/Other Providers [ x] YES  [ ] NO    LABS:                        12.3   2.54  )-----------( 148      ( 25 Oct 2020 07:50 )             38.8     10-25    141  |  105  |  8   ----------------------------<  86  3.7   |  26  |  0.62    Ca    8.9      25 Oct 2020 07:50          CAPILLARY BLOOD GLUCOSE                Culture - Urine (collected 24 Oct 2020 01:00)  Source: .Urine Clean Catch (Midstream)  Final Report (25 Oct 2020 08:38):    <10,000 CFU/mL Normal Urogenital Yeimi        RADIOLOGY & ADDITIONAL TESTS:    Imaging Personally Reviewed:  [x ] YES  [ ] NO

## 2020-10-25 NOTE — PROGRESS NOTE ADULT - PROBLEM SELECTOR PLAN 1
acute onset abdominal pain and nausea/vomiting, hx of SBO in past; imaging showing partial SBP vs focal ileus.   started LR diet per sx and Gi clearence   - zofran prn nausea/vomiting  - pain control prn  - surgery recs no interventions

## 2020-10-25 NOTE — PROGRESS NOTE ADULT - ATTENDING COMMENTS
pt seen and examined  agree with above  pt reports +flatus no BM  pt adamant no surgery  softly distended, NT no r/g  resolving sbo  consider trial of CLD  will follow with you  d/w pt at bedside in detail
Patient seen and examined   agree with above plan
d/w dgtr at bedside -HCP -pt and dgtr refused surgical option if needed  dgtr is hCP , states pt is dnr/i      [facility]  
d/w dgtr at bedside -HCP -pt and dgtr refused surgical option if needed  dgtr is hCP , states pt is dnr/i      [facility]

## 2020-10-25 NOTE — PROGRESS NOTE ADULT - SUBJECTIVE AND OBJECTIVE BOX
GENERAL SURGERY PROGRESS NOTE    Patient: CATHRYN LARA , 89y ([birth year])Female   MRN: [id number]  Location: [facility] 2MON 212 D1  Visit: 10-23-20 Inpatient  Date: 10-25-20 @ 08:25    Events of past 24 hours:  - Advanced to CLD, tolerated well.    Subjective: Patient seen and examined at bedside. Tolerated CLD without N/V, abdominal pain. Bowel fxn +. Ambulating and voiding well without issue. Would like solids today.    PAST MEDICAL & SURGICAL HISTORY:  TB (pulmonary tuberculosis)  TB of abdomen 1960    HTN (hypertension)    UTI (urinary tract infection)    Hypothyroid    SBO (small bowel obstruction)    Afib    Gastritis    Pulmonary TB    S/P vein stripping    H/O hemorrhoidectomy    S/P Exploratory Laparotomy  TB    History of Bilateral Breast Biopsy        Vitals: T(F): 98.2 (10-25-20 @ 05:48), Max: 98.6 (10-24-20 @ 12:34)  HR: 56 (10-25-20 @ 05:48)  BP: 111/65 (10-25-20 @ 05:48)  RR: 18 (10-25-20 @ 05:48)  SpO2: 92% (10-25-20 @ 05:48)      Diet, Clear Liquid      10-24-20 @ 07:01  -  10-25-20 @ 07:00  --------------------------------------------------------  IN:  Total IN: 0 mL    OUT:    Oral Fluid: 0 mL  Total OUT: 0 mL    Total NET: 0 mL          PHYSICAL EXAM  General: NAD, resting comfortably  Respiratory: nonlabored breathing, normal chest wall expansion  Abdominal: Soft, NT, ND  Vascular: Warm and well perfused  Extremities: No edema    MEDICATIONS  (STANDING):  aMIOdarone    Tablet 200 milliGRAM(s) Oral <User Schedule>  apixaban 2.5 milliGRAM(s) Oral every 12 hours  levothyroxine 50 MICROGram(s) Oral daily  mirabegron ER 50 milliGRAM(s) Oral daily  sodium chloride 0.9%. 1000 milliLiter(s) (75 mL/Hr) IV Continuous <Continuous>    MEDICATIONS  (PRN):  acetaminophen   Tablet .. 650 milliGRAM(s) Oral every 6 hours PRN Temp greater or equal to 38C (100.4F), Mild Pain (1 - 3), Moderate Pain (4 - 6)  ondansetron Injectable 4 milliGRAM(s) IV Push every 6 hours PRN Nausea and/or Vomiting      LAB/STUDIES:  CAPILLARY BLOOD GLUCOSE    pending GENERAL SURGERY PROGRESS NOTE    Patient: CATHRYN LARA , 89y ([birth year])Female   MRN: [id number]  Location: [facility] 2MON 212 D1  Visit: 10-23-20 Inpatient  Date: 10-25-20 @ 08:25    Events of past 24 hours:  - Advanced to CLD, tolerated well.    Subjective: Patient seen and examined at bedside. Tolerated CLD without N/V, abdominal pain. Bowel fxn +. Ambulating and voiding well without issue. Would like solids today.    PAST MEDICAL & SURGICAL HISTORY:  TB (pulmonary tuberculosis)  TB of abdomen 1960    HTN (hypertension)    UTI (urinary tract infection)    Hypothyroid    SBO (small bowel obstruction)    Afib    Gastritis    Pulmonary TB    S/P vein stripping    H/O hemorrhoidectomy    S/P Exploratory Laparotomy  TB    History of Bilateral Breast Biopsy        Vitals: T(F): 98.2 (10-25-20 @ 05:48), Max: 98.6 (10-24-20 @ 12:34)  HR: 56 (10-25-20 @ 05:48)  BP: 111/65 (10-25-20 @ 05:48)  RR: 18 (10-25-20 @ 05:48)  SpO2: 92% (10-25-20 @ 05:48)      Diet, Clear Liquid      10-24-20 @ 07:01  -  10-25-20 @ 07:00  --------------------------------------------------------  IN:  Total IN: 0 mL    OUT:    Oral Fluid: 0 mL  Total OUT: 0 mL    Total NET: 0 mL          PHYSICAL EXAM  General: NAD, resting comfortably  Respiratory: nonlabored breathing, normal chest wall expansion  Abdominal: Soft, NT, ND  Vascular: Warm and well perfused  Extremities: No edema    MEDICATIONS  (STANDING):  aMIOdarone    Tablet 200 milliGRAM(s) Oral <User Schedule>  apixaban 2.5 milliGRAM(s) Oral every 12 hours  levothyroxine 50 MICROGram(s) Oral daily  mirabegron ER 50 milliGRAM(s) Oral daily  sodium chloride 0.9%. 1000 milliLiter(s) (75 mL/Hr) IV Continuous <Continuous>    MEDICATIONS  (PRN):  acetaminophen   Tablet .. 650 milliGRAM(s) Oral every 6 hours PRN Temp greater or equal to 38C (100.4F), Mild Pain (1 - 3), Moderate Pain (4 - 6)  ondansetron Injectable 4 milliGRAM(s) IV Push every 6 hours PRN Nausea and/or Vomiting      LAB/STUDIES:  CAPILLARY BLOOD GLUCOSE      LABS:                          12.3   2.54  )-----------( 148      ( 25 Oct 2020 07:50 )             38.8     10-25    141  |  105  |  8   ----------------------------<  86  3.7   |  26  |  0.62    Ca    8.9      25 Oct 2020 07:50    TPro  7.0  /  Alb  4.3  /  TBili  0.7  /  DBili  x   /  AST  18  /  ALT  13  /  AlkPhos  73  10-23    PT/INR - ( 23 Oct 2020 17:29 )   PT: 12.5 sec;   INR: 1.04 ratio         PTT - ( 23 Oct 2020 17:29 )  PTT:28.4 sec

## 2020-10-26 ENCOUNTER — TRANSCRIPTION ENCOUNTER (OUTPATIENT)
Age: 85
End: 2020-10-26

## 2020-10-26 VITALS
OXYGEN SATURATION: 97 % | SYSTOLIC BLOOD PRESSURE: 160 MMHG | HEART RATE: 63 BPM | TEMPERATURE: 98 F | DIASTOLIC BLOOD PRESSURE: 74 MMHG | RESPIRATION RATE: 18 BRPM

## 2020-10-26 PROCEDURE — 74177 CT ABD & PELVIS W/CONTRAST: CPT

## 2020-10-26 PROCEDURE — 85025 COMPLETE CBC W/AUTO DIFF WBC: CPT

## 2020-10-26 PROCEDURE — 84132 ASSAY OF SERUM POTASSIUM: CPT

## 2020-10-26 PROCEDURE — 80048 BASIC METABOLIC PNL TOTAL CA: CPT

## 2020-10-26 PROCEDURE — 96374 THER/PROPH/DIAG INJ IV PUSH: CPT | Mod: XU

## 2020-10-26 PROCEDURE — 85730 THROMBOPLASTIN TIME PARTIAL: CPT

## 2020-10-26 PROCEDURE — 82947 ASSAY GLUCOSE BLOOD QUANT: CPT

## 2020-10-26 PROCEDURE — 83605 ASSAY OF LACTIC ACID: CPT

## 2020-10-26 PROCEDURE — 85014 HEMATOCRIT: CPT

## 2020-10-26 PROCEDURE — 80053 COMPREHEN METABOLIC PANEL: CPT

## 2020-10-26 PROCEDURE — 85018 HEMOGLOBIN: CPT

## 2020-10-26 PROCEDURE — 87086 URINE CULTURE/COLONY COUNT: CPT

## 2020-10-26 PROCEDURE — 82435 ASSAY OF BLOOD CHLORIDE: CPT

## 2020-10-26 PROCEDURE — 86769 SARS-COV-2 COVID-19 ANTIBODY: CPT

## 2020-10-26 PROCEDURE — 85027 COMPLETE CBC AUTOMATED: CPT

## 2020-10-26 PROCEDURE — 81001 URINALYSIS AUTO W/SCOPE: CPT

## 2020-10-26 PROCEDURE — 85610 PROTHROMBIN TIME: CPT

## 2020-10-26 PROCEDURE — 82330 ASSAY OF CALCIUM: CPT

## 2020-10-26 PROCEDURE — 99285 EMERGENCY DEPT VISIT HI MDM: CPT | Mod: 25

## 2020-10-26 PROCEDURE — U0003: CPT

## 2020-10-26 PROCEDURE — 84295 ASSAY OF SERUM SODIUM: CPT

## 2020-10-26 PROCEDURE — 83690 ASSAY OF LIPASE: CPT

## 2020-10-26 PROCEDURE — 96361 HYDRATE IV INFUSION ADD-ON: CPT | Mod: XU

## 2020-10-26 PROCEDURE — 82803 BLOOD GASES ANY COMBINATION: CPT

## 2020-10-26 RX ADMIN — APIXABAN 2.5 MILLIGRAM(S): 2.5 TABLET, FILM COATED ORAL at 05:37

## 2020-10-26 RX ADMIN — Medication 50 MICROGRAM(S): at 05:37

## 2020-10-26 RX ADMIN — AMIODARONE HYDROCHLORIDE 200 MILLIGRAM(S): 400 TABLET ORAL at 09:45

## 2020-10-26 NOTE — PROGRESS NOTE ADULT - SUBJECTIVE AND OBJECTIVE BOX
GENERAL SURGERY PROGRESS NOTE    SUBJECTIVE  Patient seen and examined. No acute events overnight. Reports tolerating diet without nausea, vomiting, passing flatus, having bowel movements, voiding without issues, have been ambulating and out of bed. Denies fever, chills, SOB, chest pain.         OBJECTIVE    PHYSICAL EXAM  General: Appears well, NAD  CHEST: breathing comfortably  CV: appears well perfused  Abdomen: soft, nontender, nondistended, no rebound or guarding  Extremities: Grossly symmetric    T(C): 36.7 (10-26-20 @ 04:54), Max: 36.7 (10-26-20 @ 04:54)  HR: 55 (10-26-20 @ 04:54) (55 - 63)  BP: 147/73 (10-26-20 @ 04:54) (129/70 - 157/68)  RR: 18 (10-26-20 @ 04:54) (18 - 18)  SpO2: 95% (10-26-20 @ 04:54) (94% - 97%)    10-24-20 @ 07:01  -  10-25-20 @ 07:00  --------------------------------------------------------  IN: 0 mL / OUT: 0 mL / NET: 0 mL    10-25-20 @ 07:01  -  10-26-20 @ 05:53  --------------------------------------------------------  IN: 600 mL / OUT: 0 mL / NET: 600 mL        MEDICATIONS  acetaminophen   Tablet .. 650 milliGRAM(s) Oral every 6 hours PRN  aMIOdarone    Tablet 200 milliGRAM(s) Oral <User Schedule>  apixaban 2.5 milliGRAM(s) Oral <User Schedule>  levothyroxine 50 MICROGram(s) Oral daily  mirabegron ER 50 milliGRAM(s) Oral daily  ondansetron Injectable 4 milliGRAM(s) IV Push every 6 hours PRN      LABS                        12.3   2.54  )-----------( 148      ( 25 Oct 2020 07:50 )             38.8     10-25    141  |  105  |  8   ----------------------------<  86  3.7   |  26  |  0.62    Ca    8.9      25 Oct 2020 07:50             GENERAL SURGERY PROGRESS NOTE    SUBJECTIVE  Patient seen and examined. No acute events overnight. Reports tolerating diet without nausea, vomiting, passing flatus, having bowel movements, voiding without issues, have been ambulating and out of bed. Denies fever, chills, SOB, chest pain.     OBJECTIVE  PHYSICAL EXAM  General: Appears well, NAD  CHEST: breathing comfortably  CV: appears well perfused  Abdomen: soft, nontender, nondistended, no rebound or guarding  Extremities: Grossly symmetric    T(C): 36.7 (10-26-20 @ 04:54), Max: 36.7 (10-26-20 @ 04:54)  HR: 55 (10-26-20 @ 04:54) (55 - 63)  BP: 147/73 (10-26-20 @ 04:54) (129/70 - 157/68)  RR: 18 (10-26-20 @ 04:54) (18 - 18)  SpO2: 95% (10-26-20 @ 04:54) (94% - 97%)    10-24-20 @ 07:01  -  10-25-20 @ 07:00  --------------------------------------------------------  IN: 0 mL / OUT: 0 mL / NET: 0 mL    10-25-20 @ 07:01  -  10-26-20 @ 05:53  --------------------------------------------------------  IN: 600 mL / OUT: 0 mL / NET: 600 mL        MEDICATIONS  acetaminophen   Tablet .. 650 milliGRAM(s) Oral every 6 hours PRN  aMIOdarone    Tablet 200 milliGRAM(s) Oral <User Schedule>  apixaban 2.5 milliGRAM(s) Oral <User Schedule>  levothyroxine 50 MICROGram(s) Oral daily  mirabegron ER 50 milliGRAM(s) Oral daily  ondansetron Injectable 4 milliGRAM(s) IV Push every 6 hours PRN      LABS                        12.3   2.54  )-----------( 148      ( 25 Oct 2020 07:50 )             38.8     10-25    141  |  105  |  8   ----------------------------<  86  3.7   |  26  |  0.62    Ca    8.9      25 Oct 2020 07:50

## 2020-10-26 NOTE — DISCHARGE NOTE PROVIDER - HOSPITAL COURSE
89F with PMH Afib on Eliquis, hx of pulmonary and abdominal TB s/p laparotomy (1960) c/b recurrent SBO, HTN p/w abdominal pain. Pt states she wa sin normal state of health last night and was able to tolerate her dinner; this morning, woke up with severe upper abdominal pain, sharp, non radiating, constant, associated with nausea and at least 7 episodes of NBNB emesis; no associated fevers or chills, no diarrhea, no BRBPR/melena, no dysuria, hematuria. Does not feel like SBP pain she has had in the past, which typically presents on R side of abdomen/flank region. Last BM was around noon today and does not believes she had passed flatus since.     Nausea, vomiting, abd pain resolved.  Diet advanced and patient tolerating.  See. by surgery and GI.  Plan to discharge home today.

## 2020-10-26 NOTE — DISCHARGE NOTE PROVIDER - NSDCCPCAREPLAN_GEN_ALL_CORE_FT
PRINCIPAL DISCHARGE DIAGNOSIS  Diagnosis: Ileus  Assessment and Plan of Treatment: resume your diet as tolerated  limit pain medication as much as possible because can contribute to bowel problems  walk around as much as tolerated (helps prevent bowel problems)  resume your home medications as described in medication instructions  if you develop nausea, vomitting, fever contact your PCP  follow up with you r pcp within 2 weeks      SECONDARY DISCHARGE DIAGNOSES  Diagnosis: Paroxysmal atrial fibrillation  Assessment and Plan of Treatment: continue all medication as ordered

## 2020-10-26 NOTE — DISCHARGE NOTE PROVIDER - NSDCMRMEDTOKEN_GEN_ALL_CORE_FT
amiodarone 200 mg oral tablet: 1 tab(s) orally Monday, Tuesday, Thursday, and Friday  amLODIPine 2.5 mg oral tablet: 1 tab(s) orally once a day  Eliquis 2.5 mg oral tablet: 1 tab(s) orally 2 times a day  Myrbetriq 50 mg oral tablet, extended release: 1 tab(s) orally once a day  Protonix 40 mg oral delayed release tablet: 1 tab(s) orally once a day  Synthroid 50 mcg (0.05 mg) oral tablet: 1 tab(s) orally once a day

## 2020-10-26 NOTE — PROGRESS NOTE ADULT - SUBJECTIVE AND OBJECTIVE BOX
Pt seen and examined.  Feels well. +flatus. Tolerated breakfast. Anticipate discharge today      Vital Signs Last 24 Hrs  T(C): 36.7 (26 Oct 2020 09:43), Max: 36.7 (26 Oct 2020 04:54)  T(F): 98.1 (26 Oct 2020 09:43), Max: 98.1 (26 Oct 2020 04:54)  HR: 63 (26 Oct 2020 09:43) (55 - 63)  BP: 160/74 (26 Oct 2020 09:43) (129/70 - 160/74)  BP(mean): --  RR: 18 (26 Oct 2020 09:43) (18 - 18)  SpO2: 97% (26 Oct 2020 09:43) (94% - 97%)    PHYSICAL EXAM:    Constitutional: NAD, well-developed  Neck: No LAD, supple  Respiratory: CTA and P  Cardiovascular: S1 and S2, RRR, no M  Gastrointestinal: BS+, soft, NT/ND, neg HSM,  Extremities: No peripheral edema, neg clubing, cyanosis  Vascular: 2+ peripheral pulses  Neurological: A/O x 3, no focal deficits  Psychiatric: Normal mood, normal affect  Skin: No rashes    MEDICATIONS  (STANDING):  aMIOdarone    Tablet 200 milliGRAM(s) Oral <User Schedule>  apixaban 2.5 milliGRAM(s) Oral <User Schedule>  levothyroxine 50 MICROGram(s) Oral daily  mirabegron ER 50 milliGRAM(s) Oral daily    MEDICATIONS  (PRN):  acetaminophen   Tablet .. 650 milliGRAM(s) Oral every 6 hours PRN Temp greater or equal to 38C (100.4F), Mild Pain (1 - 3), Moderate Pain (4 - 6)  ondansetron Injectable 4 milliGRAM(s) IV Push every 6 hours PRN Nausea and/or Vomiting      Allergies    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 reported allergy (Unknown)  penicillin (Rash)    Intolerances          LABS:                        12.3   2.54  )-----------( 148      ( 25 Oct 2020 07:50 )             38.8                         14.0   4.15  )-----------( 160      ( 23 Oct 2020 17:29 )             43.8     10-25    141  |  105  |  8   ----------------------------<  86  3.7   |  26  |  0.62    Ca    8.9      25 Oct 2020 07:50          LIVER FUNCTIONS - ( 23 Oct 2020 17:29 )  Alb: 4.3 g/dL / Pro: 7.0 g/dL / ALK PHOS: 73 U/L / ALT: 13 U/L / AST: 18 U/L / GGT: x               RADIOLOGY & ADDITIONAL TESTS:

## 2020-10-26 NOTE — PROGRESS NOTE ADULT - ASSESSMENT
89F with PMH Afib on Eliquis, hx of pulmonary and abdominal TB s/p laparotomy (1960) c/b recurrent SBO, HTN with recurrent SBO which appears to have resolved.    Low residue diet  DC planning     Jacquelyn Lopez MD

## 2020-10-26 NOTE — DISCHARGE NOTE PROVIDER - CARE PROVIDER_API CALL
Goldberg, Steven M (MD)  Cardiovascular Disease; Internal Medicine  [address] [place] [address]  Phone: [phone]  Fax: [phone]  Follow Up Time: 2 weeks

## 2020-10-26 NOTE — PROGRESS NOTE ADULT - ASSESSMENT
89F w/ afib, and abdominal TB, (s/p laparotomy 1960), and hx of recurrent SBOs, now p/w another episode of SBO. Patient has bowel function and was tolerating CLD yesterday. Patient amenable to trying regular diet today.     - Patient tolerating diet, can d/c fluids  - No standing pain medication to prevent masking of symptoms of progression of obstruction  - Continue to monitor GI function  - Discussed w/ Dr. QUINN Murillo    Red Surgery  [id number]F w/ afib, and abdominal TB, (s/p laparotomy 1960), and hx of recurrent SBOs, now p/w another episode of SBO. Patient has bowel function and was tolerating regular diet yesterday.     - Patient tolerating diet, can d/c fluids  - Continue to monitor GI function  - Patient passing gas and having BMs, no contraindication for discharge from a surgical standpoint.  - Please recontact surgery as needed.    SANDRO Faith, PGY-2   [facility]   Red Team Surgery   [id number]   89F w/ afib, and abdominal TB, (s/p laparotomy 1960), and hx of recurrent SBOs, now p/w another episode of SBO. Patient has bowel function and was tolerating regular diet yesterday.     PLAN:  - Continue to monitor GI function  - Patient passing gas and having BMs, no contraindication for discharge from a surgical standpoint.  - Please recontact surgery as needed.    SANDRO Faith, PGY-2   [facility]   Red Team Surgery   [phone]

## 2020-10-26 NOTE — DISCHARGE NOTE NURSING/CASE MANAGEMENT/SOCIAL WORK - PATIENT PORTAL LINK FT
You can access the FollowMyHealth Patient Portal offered by [facility] by registering at the following website: [email or web address]. By joining [email or web address]’s FollowMyHealth portal, you will also be able to view your health information using other applications (apps) compatible with our system.

## 2021-03-21 ENCOUNTER — INPATIENT (INPATIENT)
Facility: HOSPITAL | Age: 86
LOS: 7 days | Discharge: HOME HEALTH | DRG: 388 | End: 2021-03-29
Attending: SURGERY | Admitting: SURGERY
Payer: MEDICARE

## 2021-03-21 VITALS
OXYGEN SATURATION: 98 % | TEMPERATURE: 98 F | DIASTOLIC BLOOD PRESSURE: 80 MMHG | WEIGHT: 138.01 LBS | SYSTOLIC BLOOD PRESSURE: 126 MMHG | RESPIRATION RATE: 18 BRPM | HEART RATE: 63 BPM | HEIGHT: 66 IN

## 2021-03-21 DIAGNOSIS — K56.609 UNSPECIFIED INTESTINAL OBSTRUCTION, UNSPECIFIED AS TO PARTIAL VERSUS COMPLETE OBSTRUCTION: ICD-10-CM

## 2021-03-21 DIAGNOSIS — Z98.89 OTHER SPECIFIED POSTPROCEDURAL STATES: Chronic | ICD-10-CM

## 2021-03-21 LAB
ALBUMIN SERPL ELPH-MCNC: 4.2 G/DL — SIGNIFICANT CHANGE UP (ref 3.3–5)
ALP SERPL-CCNC: 70 U/L — SIGNIFICANT CHANGE UP (ref 40–120)
ALT FLD-CCNC: 10 U/L — SIGNIFICANT CHANGE UP (ref 10–45)
ANION GAP SERPL CALC-SCNC: 11 MMOL/L — SIGNIFICANT CHANGE UP (ref 5–17)
APPEARANCE UR: ABNORMAL
APTT BLD: 28.1 SEC — SIGNIFICANT CHANGE UP (ref 27.5–35.5)
AST SERPL-CCNC: 14 U/L — SIGNIFICANT CHANGE UP (ref 10–40)
BACTERIA # UR AUTO: ABNORMAL
BASE EXCESS BLDV CALC-SCNC: 3.4 MMOL/L — HIGH (ref -2–2)
BASOPHILS # BLD AUTO: 0 K/UL — SIGNIFICANT CHANGE UP (ref 0–0.2)
BASOPHILS NFR BLD AUTO: 0 % — SIGNIFICANT CHANGE UP (ref 0–2)
BILIRUB SERPL-MCNC: 0.5 MG/DL — SIGNIFICANT CHANGE UP (ref 0.2–1.2)
BILIRUB UR-MCNC: NEGATIVE — SIGNIFICANT CHANGE UP
BLD GP AB SCN SERPL QL: NEGATIVE — SIGNIFICANT CHANGE UP
BUN SERPL-MCNC: 16 MG/DL — SIGNIFICANT CHANGE UP (ref 7–23)
CA-I SERPL-SCNC: 1.2 MMOL/L — SIGNIFICANT CHANGE UP (ref 1.12–1.3)
CALCIUM SERPL-MCNC: 9.3 MG/DL — SIGNIFICANT CHANGE UP (ref 8.4–10.5)
CHLORIDE BLDV-SCNC: 107 MMOL/L — SIGNIFICANT CHANGE UP (ref 96–108)
CHLORIDE SERPL-SCNC: 103 MMOL/L — SIGNIFICANT CHANGE UP (ref 96–108)
CO2 BLDV-SCNC: 32 MMOL/L — HIGH (ref 22–30)
CO2 SERPL-SCNC: 26 MMOL/L — SIGNIFICANT CHANGE UP (ref 22–31)
COLOR SPEC: YELLOW — SIGNIFICANT CHANGE UP
CREAT SERPL-MCNC: 0.66 MG/DL — SIGNIFICANT CHANGE UP (ref 0.5–1.3)
DIFF PNL FLD: ABNORMAL
ELLIPTOCYTES BLD QL SMEAR: SLIGHT — SIGNIFICANT CHANGE UP
EOSINOPHIL # BLD AUTO: 0.08 K/UL — SIGNIFICANT CHANGE UP (ref 0–0.5)
EOSINOPHIL NFR BLD AUTO: 1.7 % — SIGNIFICANT CHANGE UP (ref 0–6)
EPI CELLS # UR: 2 /HPF — SIGNIFICANT CHANGE UP
GAS PNL BLDV: 141 MMOL/L — SIGNIFICANT CHANGE UP (ref 135–145)
GAS PNL BLDV: SIGNIFICANT CHANGE UP
GIANT PLATELETS BLD QL SMEAR: PRESENT — SIGNIFICANT CHANGE UP
GLUCOSE BLDV-MCNC: 126 MG/DL — HIGH (ref 70–99)
GLUCOSE SERPL-MCNC: 130 MG/DL — HIGH (ref 70–99)
GLUCOSE UR QL: NEGATIVE — SIGNIFICANT CHANGE UP
HCO3 BLDV-SCNC: 30 MMOL/L — HIGH (ref 21–29)
HCT VFR BLD CALC: 43.5 % — SIGNIFICANT CHANGE UP (ref 34.5–45)
HCT VFR BLDA CALC: 44 % — SIGNIFICANT CHANGE UP (ref 39–50)
HGB BLD CALC-MCNC: 14.5 G/DL — SIGNIFICANT CHANGE UP (ref 11.5–15.5)
HGB BLD-MCNC: 13.6 G/DL — SIGNIFICANT CHANGE UP (ref 11.5–15.5)
HYALINE CASTS # UR AUTO: 4 /LPF — HIGH (ref 0–2)
INR BLD: 1.04 RATIO — SIGNIFICANT CHANGE UP (ref 0.88–1.16)
KETONES UR-MCNC: NEGATIVE — SIGNIFICANT CHANGE UP
LACTATE BLDV-MCNC: 1.6 MMOL/L — SIGNIFICANT CHANGE UP (ref 0.7–2)
LEUKOCYTE ESTERASE UR-ACNC: ABNORMAL
LIDOCAIN IGE QN: 27 U/L — SIGNIFICANT CHANGE UP (ref 7–60)
LYMPHOCYTES # BLD AUTO: 0.13 K/UL — LOW (ref 1–3.3)
LYMPHOCYTES # BLD AUTO: 2.6 % — LOW (ref 13–44)
MANUAL SMEAR VERIFICATION: SIGNIFICANT CHANGE UP
MCHC RBC-ENTMCNC: 30.4 PG — SIGNIFICANT CHANGE UP (ref 27–34)
MCHC RBC-ENTMCNC: 31.3 GM/DL — LOW (ref 32–36)
MCV RBC AUTO: 97.1 FL — SIGNIFICANT CHANGE UP (ref 80–100)
MONOCYTES # BLD AUTO: 0.21 K/UL — SIGNIFICANT CHANGE UP (ref 0–0.9)
MONOCYTES NFR BLD AUTO: 4.4 % — SIGNIFICANT CHANGE UP (ref 2–14)
NEUTROPHILS # BLD AUTO: 4.43 K/UL — SIGNIFICANT CHANGE UP (ref 1.8–7.4)
NEUTROPHILS NFR BLD AUTO: 83.5 % — HIGH (ref 43–77)
NEUTS BAND # BLD: 7.8 % — SIGNIFICANT CHANGE UP (ref 0–8)
NITRITE UR-MCNC: POSITIVE
OTHER CELLS CSF MANUAL: 10 ML/DL — LOW (ref 18–22)
PCO2 BLDV: 57 MMHG — HIGH (ref 35–50)
PH BLDV: 7.34 — LOW (ref 7.35–7.45)
PH UR: 6 — SIGNIFICANT CHANGE UP (ref 5–8)
PLAT MORPH BLD: NORMAL — SIGNIFICANT CHANGE UP
PLATELET # BLD AUTO: 161 K/UL — SIGNIFICANT CHANGE UP (ref 150–400)
PO2 BLDV: 30 MMHG — SIGNIFICANT CHANGE UP (ref 25–45)
POIKILOCYTOSIS BLD QL AUTO: SLIGHT — SIGNIFICANT CHANGE UP
POTASSIUM BLDV-SCNC: 3.9 MMOL/L — SIGNIFICANT CHANGE UP (ref 3.5–5.3)
POTASSIUM SERPL-MCNC: 4 MMOL/L — SIGNIFICANT CHANGE UP (ref 3.5–5.3)
POTASSIUM SERPL-SCNC: 4 MMOL/L — SIGNIFICANT CHANGE UP (ref 3.5–5.3)
PROT SERPL-MCNC: 7.1 G/DL — SIGNIFICANT CHANGE UP (ref 6–8.3)
PROT UR-MCNC: ABNORMAL
PROTHROM AB SERPL-ACNC: 12.5 SEC — SIGNIFICANT CHANGE UP (ref 10.6–13.6)
RBC # BLD: 4.48 M/UL — SIGNIFICANT CHANGE UP (ref 3.8–5.2)
RBC # FLD: 12.2 % — SIGNIFICANT CHANGE UP (ref 10.3–14.5)
RBC BLD AUTO: ABNORMAL
RBC CASTS # UR COMP ASSIST: 24 /HPF — HIGH (ref 0–4)
RH IG SCN BLD-IMP: POSITIVE — SIGNIFICANT CHANGE UP
SAO2 % BLDV: 53 % — LOW (ref 67–88)
SARS-COV-2 RNA SPEC QL NAA+PROBE: SIGNIFICANT CHANGE UP
SCHISTOCYTES BLD QL AUTO: SLIGHT — SIGNIFICANT CHANGE UP
SODIUM SERPL-SCNC: 140 MMOL/L — SIGNIFICANT CHANGE UP (ref 135–145)
SP GR SPEC: 1.02 — SIGNIFICANT CHANGE UP (ref 1.01–1.02)
TROPONIN T, HIGH SENSITIVITY RESULT: 14 NG/L — SIGNIFICANT CHANGE UP (ref 0–51)
UROBILINOGEN FLD QL: NEGATIVE — SIGNIFICANT CHANGE UP
WBC # BLD: 4.85 K/UL — SIGNIFICANT CHANGE UP (ref 3.8–10.5)
WBC # FLD AUTO: 4.85 K/UL — SIGNIFICANT CHANGE UP (ref 3.8–10.5)
WBC UR QL: 106 /HPF — HIGH (ref 0–5)

## 2021-03-21 PROCEDURE — 74177 CT ABD & PELVIS W/CONTRAST: CPT | Mod: 26,MA

## 2021-03-21 PROCEDURE — 43753 TX GASTRO INTUB W/ASP: CPT | Mod: GC

## 2021-03-21 PROCEDURE — 71275 CT ANGIOGRAPHY CHEST: CPT | Mod: 26,MA

## 2021-03-21 PROCEDURE — 71045 X-RAY EXAM CHEST 1 VIEW: CPT | Mod: 26,77

## 2021-03-21 PROCEDURE — 99285 EMERGENCY DEPT VISIT HI MDM: CPT | Mod: CS,25,GC

## 2021-03-21 PROCEDURE — 93010 ELECTROCARDIOGRAM REPORT: CPT | Mod: 59,GC

## 2021-03-21 PROCEDURE — 71045 X-RAY EXAM CHEST 1 VIEW: CPT | Mod: 26

## 2021-03-21 RX ORDER — CEFEPIME 1 G/1
1000 INJECTION, POWDER, FOR SOLUTION INTRAMUSCULAR; INTRAVENOUS ONCE
Refills: 0 | Status: COMPLETED | OUTPATIENT
Start: 2021-03-21 | End: 2021-03-21

## 2021-03-21 RX ORDER — HEPARIN SODIUM 5000 [USP'U]/ML
5000 INJECTION INTRAVENOUS; SUBCUTANEOUS EVERY 8 HOURS
Refills: 0 | Status: DISCONTINUED | OUTPATIENT
Start: 2021-03-21 | End: 2021-03-28

## 2021-03-21 RX ORDER — ONDANSETRON 8 MG/1
4 TABLET, FILM COATED ORAL ONCE
Refills: 0 | Status: COMPLETED | OUTPATIENT
Start: 2021-03-21 | End: 2021-03-22

## 2021-03-21 RX ORDER — SODIUM CHLORIDE 9 MG/ML
1000 INJECTION INTRAMUSCULAR; INTRAVENOUS; SUBCUTANEOUS ONCE
Refills: 0 | Status: COMPLETED | OUTPATIENT
Start: 2021-03-21 | End: 2021-03-21

## 2021-03-21 RX ORDER — FAMOTIDINE 10 MG/ML
20 INJECTION INTRAVENOUS ONCE
Refills: 0 | Status: COMPLETED | OUTPATIENT
Start: 2021-03-21 | End: 2021-03-21

## 2021-03-21 RX ORDER — HEPARIN SODIUM 5000 [USP'U]/ML
5000 INJECTION INTRAVENOUS; SUBCUTANEOUS EVERY 8 HOURS
Refills: 0 | Status: DISCONTINUED | OUTPATIENT
Start: 2021-03-21 | End: 2021-03-21

## 2021-03-21 RX ORDER — CEFEPIME 1 G/1
1000 INJECTION, POWDER, FOR SOLUTION INTRAMUSCULAR; INTRAVENOUS EVERY 12 HOURS
Refills: 0 | Status: DISCONTINUED | OUTPATIENT
Start: 2021-03-21 | End: 2021-03-29

## 2021-03-21 RX ORDER — ONDANSETRON 8 MG/1
4 TABLET, FILM COATED ORAL ONCE
Refills: 0 | Status: COMPLETED | OUTPATIENT
Start: 2021-03-21 | End: 2021-03-21

## 2021-03-21 RX ORDER — PANTOPRAZOLE SODIUM 20 MG/1
40 TABLET, DELAYED RELEASE ORAL DAILY
Refills: 0 | Status: DISCONTINUED | OUTPATIENT
Start: 2021-03-21 | End: 2021-03-24

## 2021-03-21 RX ORDER — SODIUM CHLORIDE 9 MG/ML
1000 INJECTION, SOLUTION INTRAVENOUS
Refills: 0 | Status: DISCONTINUED | OUTPATIENT
Start: 2021-03-21 | End: 2021-03-22

## 2021-03-21 RX ADMIN — ONDANSETRON 4 MILLIGRAM(S): 8 TABLET, FILM COATED ORAL at 13:44

## 2021-03-21 RX ADMIN — SODIUM CHLORIDE 1000 MILLILITER(S): 9 INJECTION INTRAMUSCULAR; INTRAVENOUS; SUBCUTANEOUS at 18:43

## 2021-03-21 RX ADMIN — CEFEPIME 100 MILLIGRAM(S): 1 INJECTION, POWDER, FOR SOLUTION INTRAMUSCULAR; INTRAVENOUS at 18:42

## 2021-03-21 RX ADMIN — HEPARIN SODIUM 5000 UNIT(S): 5000 INJECTION INTRAVENOUS; SUBCUTANEOUS at 22:45

## 2021-03-21 RX ADMIN — FAMOTIDINE 20 MILLIGRAM(S): 10 INJECTION INTRAVENOUS at 13:44

## 2021-03-21 RX ADMIN — SODIUM CHLORIDE 75 MILLILITER(S): 9 INJECTION, SOLUTION INTRAVENOUS at 23:10

## 2021-03-21 NOTE — ED ADULT NURSE NOTE - NSIMPLEMENTINTERV_GEN_ALL_ED
Implemented All Fall with Harm Risk Interventions:  [place] to call system. Call bell, personal items and telephone within reach. Instruct patient to call for assistance. Room bathroom lighting operational. Non-slip footwear when patient is off stretcher. Physically safe environment: no spills, clutter or unnecessary equipment. Stretcher in lowest position, wheels locked, appropriate side rails in place. Provide visual cue, wrist band, yellow gown, etc. Monitor gait and stability. Monitor for mental status changes and reorient to person, place, and time. Review medications for side effects contributing to fall risk. Reinforce activity limits and safety measures with patient and family. Provide visual clues: red socks.

## 2021-03-21 NOTE — H&P ADULT - HISTORY OF PRESENT ILLNESS
General Surgery   Surgical team: ACS  Attending: Dr. Witt    HPI:  89F hx of pAF on eliquis, TB s/p ex lap for "abdominal TB" in the , recurrent SBO (admitted to Dr. Millard in  treated with NGT then consult for Dr. Dae Murillo in 10/2020 treated conservatively without NGT) presents with 1 day of epigastric abdominal pain and brown emesis with emesis x 1 in ED, desatting to 88% in ED, WBC 4, lactate 1.7, mild TTP to lower abdomen, found to have +UA and CT negative for PE, patchy GGO and consolidations in lungs, SBO with transition point in lower mid abdomen. ED placed NGT after large emesis episode, minimal output upon placement of NGT. cefepime x 1 given in ED. COVID negative.    patient takes eliquis 2.5mg BID and amiodarone for pAF    PAST MEDICAL HISTORY:  TB (pulmonary tuberculosis)    HTN (hypertension)    UTI (urinary tract infection)    Hypothyroid    SBO (small bowel obstruction)    Afib    Gastritis    Pulmonary TB        PAST SURGICAL HISTORY:  S/P vein stripping    H/O hemorrhoidectomy    S/P Exploratory Laparotomy    History of Bilateral Breast Biopsy    History of Small Bowel Obstruction    Afib        MEDICATIONS:      ALLERGIES:  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 reported allergy (Unknown)  penicillin (Rash)      VITALS & I/Os:  Vital Signs Last 24 Hrs  T(C): 36.3 (21 Mar 2021 13:20), Max: 36.4 (21 Mar 2021 12:48)  T(F): 97.4 (21 Mar 2021 13:20), Max: 97.5 (21 Mar 2021 12:48)  HR: 64 (21 Mar 2021 13:20) (63 - 64)  BP: 160/72 (21 Mar 2021 13:20) (126/80 - 160/72)  BP(mean): 95 (21 Mar 2021 13:20) (95 - 95)  RR: 18 (21 Mar 2021 13:20) (18 - 18)  SpO2: 98% (21 Mar 2021 13:20) (98% - 98%)    I&O's Summary      PHYSICAL EXAM:  General: No acute distress  Respiratory: Nonlabored  Cardiovascular: appears well perfused, sinus rhythm  Abdominal: Soft, nondistended, minimally TTP lower mid abdomen. well healed midline surgical scar. No rebound or guarding. No organomegaly, no palpable mass.  Extremities: Warm    LABS:                        13.6   4.85  )-----------( 161      ( 21 Mar 2021 13:42 )             43.5         140  |  103  |  16  ----------------------------<  130<H>  4.0   |  26  |  0.66    Ca    9.3      21 Mar 2021 13:42    TPro  7.1  /  Alb  4.2  /  TBili  0.5  /  DBili  x   /  AST  14  /  ALT  10  /  AlkPhos  70      Lactate:  - @ 13:42  1.6    PT/INR - ( 21 Mar 2021 18:57 )   PT: 12.5 sec;   INR: 1.04 ratio         PTT - ( 21 Mar 2021 18:57 )  PTT:28.1 sec          Urinalysis Basic - ( 21 Mar 2021 14:17 )    Color: Yellow / Appearance: Slightly Turbid / S.021 / pH: x  Gluc: x / Ketone: Negative  / Bili: Negative / Urobili: Negative   Blood: x / Protein: 30 mg/dL / Nitrite: Positive   Leuk Esterase: Large / RBC: 24 /hpf /  /HPF   Sq Epi: x / Non Sq Epi: 2 /hpf / Bacteria: Many

## 2021-03-21 NOTE — ED ADULT NURSE NOTE - OBJECTIVE STATEMENT
Pt bib EMS from home for eval of multiple episodes of vomiting since last night.  EMS reported possible coffee grounds emesis, denies melanotic stools.  Oral mucosa dry, not cracked. Pt bib EMS from home for eval of multiple episodes of vomiting since last night.  EMS reported possible coffee grounds emesis, denies melanotic stools.  Oral mucosa dry, not cracked.  Abd soft, non-tender.

## 2021-03-21 NOTE — ED PROCEDURE NOTE - CPROC ED POST RADIOGRAPHY1
post-procedure radiography performed advanced 2 cm by sx s/p xray/post-procedure radiography performed/gastric tube in stomach/duodenum

## 2021-03-21 NOTE — H&P ADULT - NSHPLABSRESULTS_GEN_ALL_CORE
EXAM:  CT ABDOMEN AND PELVIS IC                          EXAM:  CT ANGIO CHEST (W)AW IC                            PROCEDURE DATE:  03/21/2021            INTERPRETATION:  CLINICAL INFORMATION: New onset hypoxia, vomiting, abdominal pain.    COMPARISON: CT abdomen and pelvis 10/23/2020, 7/23/2019.    CONTRAST/COMPLICATIONS:  IV Contrast: 90 cc Omnipaque 350 was administered. 10 cc Omnipaque 350 was discarded.  Oral Contrast: None.  Complications: None reported at the time of exam completion.    PROCEDURE:  CT Angiography of the Chest was performed followed by portal venous phase imaging of the Abdomen and Pelvis.  Sagittal and coronal reformats were performed as well as 3D (MIP) reconstructions.    FINDINGS:  CHEST:  LUNGS AND LARGE AIRWAYS: Left apical scarring with calcifications. Left upper lobe bronchiectasis with secretions within the segmental airways. In addition, there are secretions and bronchiectasis best shown in the superior segment of the left lower lobe and tree-in-bud nodules in the left lower lobe    Patchy groundglass opacities involving right upper lobe, right middle lobe and bilateral lower lobes. In addition, there are patchy foci of consolidation in the right upper, middle, and lower lobes.    PLEURA: No pleural effusion or pneumothorax.  VESSELS: There is no main, lobar or segmental pulmonary emboli. The subsegmental vessels are not well evaluated secondary to respiratory motion.  Left-sided aortic arch and left-sided descending thoracic aorta. The ascending aorta measures 4.3 cm. The descending thoracic aorta measures 3.4 cm. Aortic calcifications.  HEART: The left atrium is enlarged. No thrombus in the atrial appendage. No pericardial effusion. Coronary artery calcifications. Aortic valve calcifications. Mitral annular calcification. No pericardial effusion.  MEDIASTINUM AND TUNG: No lymphadenopathy.  CHEST WALL AND LOWER NECK: Within normal limits.    ABDOMEN AND PELVIS:  LIVER: Riedel's lobe configuration, normal variant. Unchanged calcified granuloma in the subcapsular location in the right hepatic lobe.  BILE DUCTS: Normal caliber.  GALLBLADDER: Within normal limits.  SPLEEN: Within normal limits.  PANCREAS: Within normal limits.  ADRENALS: The left adrenal gland is thickened. The right adrenal gland is unremarkable.  KIDNEYS/URETERS: Symmetric enhancement. No hydronephrosis. Bilateral renal cysts and subcentimeter hypodensities, too small to characterize, unchanged.    BLADDER: Within normal limits.  REPRODUCTIVE ORGANS: Calcified uterine fibroids. Unchanged 5.4 x 4.5 cm right and 3.3 x 2.2 cm left adnexal cystic lesions.    BOWEL: Multiple dilated small bowel loops, measuring up to 5.2 cm in the midabdomen with transition point in the lower midabdomen (series 605, images 25-27), consistent with small bowel obstruction. Appendix is normal.  PERITONEUM: No ascites.  VESSELS:The aorta is tortuous and ectatic. The major branches of the aorta are patent proximally. Atherosclerotic changes. The portal vein and hepatic veins are patent. The visualized segments of the SMV and its tributaries are patent.  RETROPERITONEUM/LYMPH NODES: No lymphadenopathy.  ABDOMINAL WALL: Surgical clips along the anterior abdominal wall.  BONES: Degenerative changes. Generalized osteopenia. Lumbar scoliosis.    IMPRESSION:    Chest:    1.  No pulmonary embolus.  2.  Patchy consolidations in the right upper lobe, right middle lobe and bilateral lower lobes, suggestive of multifocal infection.    Abdomen and pelvis:    1.  Small bowel obstruction with transition point in the lower midabdomen.              ANSLEY SCHWARTZ MD; Fellow Radiology  This document has been electronically signed.  MYRIAM OWENS MD; Attending Radiologist  This document has been electronically signed. Mar 21 2021  4:24PM

## 2021-03-21 NOTE — ED PROVIDER NOTE - PROGRESS NOTE DETAILS
Patient hypoxic down to the 90% after taken off NC. added CTPE with trops and covid swab. patient experienced brief tussive episode. ap- pt signed out to me pending results, found to have SBO, ng tube placed; pt counselled on results w hx of of sbo in the past, awaiting sx consult.

## 2021-03-21 NOTE — ED PROVIDER NOTE - ATTENDING CONTRIBUTION TO CARE
Yunieres - 89yo F with a pmhx of Afib, TB, recurrent SBO requiring surgery presents to the ED with an acute onset of vomiting that started 12 hours ago. She admits to associated abdominal pain that is localized over her upper abdomen. The pain does not radiate anywhere else. She has not been able to tolerate PO. Her vomitus is described as brown and watery, no blood. She denies any CP, SOB, urinary symptoms. VS wnl, well appearing, in NAD. DRY mucosae, pink conjunctivae. Neck supple, neuro grossly intact. Lungs clear, cardiac wnl, no JVD. Abdomen soft/TTP to upper abdomen, pos tympanism, no CVAT. No pedal edema, no calf TTP. Likely SBO, will get CT abdomen, antiemetics, revaluate, likely admit

## 2021-03-21 NOTE — ED PROVIDER NOTE - CLINICAL SUMMARY MEDICAL DECISION MAKING FREE TEXT BOX
89 yo F with a pmhx of Afib, TB, recurrent SBO requiring surgery presents to the ED with an acute onset of vomiting that started 12 hours ago. She admits to associated abdomina pain that is localized over her upper abdomen. VSS. concerns for SBO. will order labs, meds, imaging    triage noted that patient required NC. patient take off NC and placed on RA. breathing well at 100%. RR wnl

## 2021-03-21 NOTE — H&P ADULT - ASSESSMENT
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 Millard in 2018 treated with NGT then consult for Dr. Dae Murillo in 10/2020 treated conservatively without NGT) presents with 1 day of epigastric abdominal pain and brown emesis with emesis x 1 in ED, desatting to 88% in ED, WBC 4, lactate 1.7, mild TTP to lower abdomen, found to have +UA and CT negative for PE, patchy GGO and consolidations in lungs, SBO with transition point in lower mid abdomen.    in sinus rhythm in ED    Plan:  -admit to Dr. Witt  -NPO/NGT/IVF, await return of bowel fxn  -f/u cardiology recommendations (left message with [facility])  -hold AC, amio  -SQH for VTE ppx  -c/w home med protonix  -cefepime for asymptomatic bacteruria, plan to discontinue antibiotics if urine culture is negative    Discussed with Dr. Eduar Clayton PGY2  General Surgery    ACS  [id number] 89F hx of pAF on eliquis, 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 Millard in 2018 treated with NGT then consult for Dr. Dae Murillo in 10/2020 treated conservatively without NGT) presents with 1 day of epigastric abdominal pain and brown emesis with emesis x 1 in ED, desatting to 88% in ED, WBC 4, lactate 1.7, mild TTP to lower abdomen, found to have +UA and CT negative for PE, patchy GGO and consolidations in lungs, SBO with transition point in lower mid abdomen.    in sinus rhythm in ED  ?PNA, afebrile and normal WBC    Plan:  -admit to Dr. Witt  -NPO/NGT/IVF, await return of bowel fxn  -f/u cardiology recommendations (left message with [facility])  -hold AC, amio  -SQH for VTE ppx  -c/w home med protonix  -cefepime for asymptomatic bacteruria, plan to discontinue antibiotics if urine culture is negative  no abx for possible PNA at this time    Discussed with Dr. Eduar Clayton PGY2  General Surgery    ACS  [id number] 89F hx of pAF on eliquis, 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 Millard in 2018 treated with NGT then consult for Dr. Dae Murillo in 10/2020 treated conservatively without NGT) presents with 1 day of epigastric abdominal pain and brown emesis with emesis x 1 in ED, desatting to 88% in ED, WBC 4, lactate 1.7, mild TTP to lower abdomen, found to have +UA and CT negative for PE, patchy GGO and consolidations in lungs, SBO with transition point in lower mid abdomen.    in sinus rhythm in ED  ?PNA, afebrile and normal WBC    Plan:  -admit to Dr. Witt  -NPO/NGT/IVF, await return of bowel fxn  -f/u cardiology recommendations (patient's [facility] doctor to see patient in AM)  -hold AC, amio  -SQH for VTE ppx  -c/w home med protonix  -cefepime for asymptomatic bacteruria, plan to discontinue antibiotics if urine culture is negative  no abx for possible PNA at this time    Discussed with Dr. Eduar Clayton PGY2  General Surgery    ACS  [id number]

## 2021-03-21 NOTE — ED PROVIDER NOTE - NS ED ROS FT
GENERAL: no fever, chills  HEENT: no cough, congestion, odynophagia, dysphagia  CARDIAC: no chest pain, palpitations, lightheadedness  PULM: no dyspnea, wheezing     GI: (+) abdominal pain, (+) nausea, (+) vomiting, no diarrhea, constipation, melena, hematochezia    : no urinary dysuria, frequency, incontinence, hematuria  NEURO: no headache, motor weakness, sensory changes  MSK: no joint or muscle pain  SKIN: no rashes, hives  HEME: no active bleeding, bruising

## 2021-03-21 NOTE — H&P ADULT - NSICDXPASTSURGICALHX_GEN_ALL_CORE_FT
PAST SURGICAL HISTORY:  H/O hemorrhoidectomy     History of Bilateral Breast Biopsy     S/P Exploratory Laparotomy TB in 1960s    S/P vein stripping

## 2021-03-21 NOTE — ED PROVIDER NOTE - PHYSICAL EXAMINATION
GENERAL: no acute distress, non-toxic appearing  HEAD: normocephalic, atraumatic  HEENT: normal conjunctiva, oral mucosa moist, neck supple  CARDIAC: regular rate and rhythm, normal S1 and S2,  no appreciable murmurs  PULM: clear to ascultation bilaterally, no crackles, rales, rhonchi, or wheezing    GI: abdomen nondistended, (+) tenderness to epistraic/RUQ    : no CVA tenderness, no suprapubic tenderness    NEURO: alert and oriented x 3, normal speech, PERRLA, EOMI, no focal motor or sensory deficits    MSK: no visible deformities, no peripheral edema, calf tenderness/redness/swelling  SKIN: no visible rashes, dry, well-perfused  PSYCH: appropriate mood and affect

## 2021-03-21 NOTE — ED PROVIDER NOTE - OBJECTIVE STATEMENT
89 yo F with a pmhx of Afib, TB, recurrent SBO requiring surgery presents to the ED with an acute onset of vomiting that started 12 hours ago. She admits to associated abdomina pain that is localized over her upper abdomen. The pain does not radiate anywhere else. She has not been able to tolerate po. Her vomitus is described as brown and watery, no blood. She denies any CP, SOB, urinary symptoms.

## 2021-03-22 DIAGNOSIS — I48.0 PAROXYSMAL ATRIAL FIBRILLATION: ICD-10-CM

## 2021-03-22 DIAGNOSIS — K56.609 UNSPECIFIED INTESTINAL OBSTRUCTION, UNSPECIFIED AS TO PARTIAL VERSUS COMPLETE OBSTRUCTION: ICD-10-CM

## 2021-03-22 LAB
ANION GAP SERPL CALC-SCNC: 13 MMOL/L — SIGNIFICANT CHANGE UP (ref 5–17)
BASOPHILS # BLD AUTO: 0.02 K/UL — SIGNIFICANT CHANGE UP (ref 0–0.2)
BASOPHILS NFR BLD AUTO: 0.9 % — SIGNIFICANT CHANGE UP (ref 0–2)
BUN SERPL-MCNC: 14 MG/DL — SIGNIFICANT CHANGE UP (ref 7–23)
CALCIUM SERPL-MCNC: 9 MG/DL — SIGNIFICANT CHANGE UP (ref 8.4–10.5)
CHLORIDE SERPL-SCNC: 104 MMOL/L — SIGNIFICANT CHANGE UP (ref 96–108)
CO2 SERPL-SCNC: 25 MMOL/L — SIGNIFICANT CHANGE UP (ref 22–31)
COVID-19 SPIKE DOMAIN AB INTERP: POSITIVE
COVID-19 SPIKE DOMAIN ANTIBODY RESULT: >250 U/ML — HIGH
CREAT SERPL-MCNC: 0.7 MG/DL — SIGNIFICANT CHANGE UP (ref 0.5–1.3)
EOSINOPHIL # BLD AUTO: 0 K/UL — SIGNIFICANT CHANGE UP (ref 0–0.5)
EOSINOPHIL NFR BLD AUTO: 0 % — SIGNIFICANT CHANGE UP (ref 0–6)
GIANT PLATELETS BLD QL SMEAR: PRESENT — SIGNIFICANT CHANGE UP
GLUCOSE BLDC GLUCOMTR-MCNC: 94 MG/DL — SIGNIFICANT CHANGE UP (ref 70–99)
GLUCOSE SERPL-MCNC: 143 MG/DL — HIGH (ref 70–99)
HCT VFR BLD CALC: 40.1 % — SIGNIFICANT CHANGE UP (ref 34.5–45)
HCT VFR BLD CALC: 41.8 % — SIGNIFICANT CHANGE UP (ref 34.5–45)
HGB BLD-MCNC: 12.5 G/DL — SIGNIFICANT CHANGE UP (ref 11.5–15.5)
HGB BLD-MCNC: 13 G/DL — SIGNIFICANT CHANGE UP (ref 11.5–15.5)
LYMPHOCYTES # BLD AUTO: 0.24 K/UL — LOW (ref 1–3.3)
LYMPHOCYTES # BLD AUTO: 13.1 % — SIGNIFICANT CHANGE UP (ref 13–44)
MAGNESIUM SERPL-MCNC: 1.9 MG/DL — SIGNIFICANT CHANGE UP (ref 1.6–2.6)
MANUAL SMEAR VERIFICATION: SIGNIFICANT CHANGE UP
MCHC RBC-ENTMCNC: 30.6 PG — SIGNIFICANT CHANGE UP (ref 27–34)
MCHC RBC-ENTMCNC: 30.7 PG — SIGNIFICANT CHANGE UP (ref 27–34)
MCHC RBC-ENTMCNC: 31.1 GM/DL — LOW (ref 32–36)
MCHC RBC-ENTMCNC: 31.2 GM/DL — LOW (ref 32–36)
MCV RBC AUTO: 98 FL — SIGNIFICANT CHANGE UP (ref 80–100)
MCV RBC AUTO: 98.8 FL — SIGNIFICANT CHANGE UP (ref 80–100)
METAMYELOCYTES # FLD: 8.8 % — HIGH (ref 0–0)
MONOCYTES # BLD AUTO: 0.06 K/UL — SIGNIFICANT CHANGE UP (ref 0–0.9)
MONOCYTES NFR BLD AUTO: 3.5 % — SIGNIFICANT CHANGE UP (ref 2–14)
MYELOCYTES NFR BLD: 3.5 % — HIGH (ref 0–0)
NEUTROPHILS # BLD AUTO: 1.3 K/UL — LOW (ref 1.8–7.4)
NEUTROPHILS NFR BLD AUTO: 50.9 % — SIGNIFICANT CHANGE UP (ref 43–77)
NEUTS BAND # BLD: 19.3 % — HIGH (ref 0–8)
NRBC # BLD: 0 /100 WBCS — SIGNIFICANT CHANGE UP (ref 0–0)
PHOSPHATE SERPL-MCNC: 3.7 MG/DL — SIGNIFICANT CHANGE UP (ref 2.5–4.5)
PLAT MORPH BLD: NORMAL — SIGNIFICANT CHANGE UP
PLATELET # BLD AUTO: 144 K/UL — LOW (ref 150–400)
PLATELET # BLD AUTO: 144 K/UL — LOW (ref 150–400)
POTASSIUM SERPL-MCNC: 4.3 MMOL/L — SIGNIFICANT CHANGE UP (ref 3.5–5.3)
POTASSIUM SERPL-SCNC: 4.3 MMOL/L — SIGNIFICANT CHANGE UP (ref 3.5–5.3)
RBC # BLD: 4.09 M/UL — SIGNIFICANT CHANGE UP (ref 3.8–5.2)
RBC # BLD: 4.23 M/UL — SIGNIFICANT CHANGE UP (ref 3.8–5.2)
RBC # FLD: 12.4 % — SIGNIFICANT CHANGE UP (ref 10.3–14.5)
RBC # FLD: 12.6 % — SIGNIFICANT CHANGE UP (ref 10.3–14.5)
RBC BLD AUTO: SIGNIFICANT CHANGE UP
SARS-COV-2 IGG+IGM SERPL QL IA: >250 U/ML — HIGH
SARS-COV-2 IGG+IGM SERPL QL IA: POSITIVE
SARS-COV-2 RNA SPEC QL NAA+PROBE: SIGNIFICANT CHANGE UP
SMUDGE CELLS # BLD: PRESENT — SIGNIFICANT CHANGE UP
SODIUM SERPL-SCNC: 142 MMOL/L — SIGNIFICANT CHANGE UP (ref 135–145)
WBC # BLD: 1.47 K/UL — LOW (ref 3.8–10.5)
WBC # BLD: 1.85 K/UL — LOW (ref 3.8–10.5)
WBC # FLD AUTO: 1.47 K/UL — LOW (ref 3.8–10.5)
WBC # FLD AUTO: 1.85 K/UL — LOW (ref 3.8–10.5)

## 2021-03-22 PROCEDURE — 74018 RADEX ABDOMEN 1 VIEW: CPT | Mod: 26

## 2021-03-22 RX ORDER — DEXTROSE MONOHYDRATE, SODIUM CHLORIDE, AND POTASSIUM CHLORIDE 50; .745; 4.5 G/1000ML; G/1000ML; G/1000ML
1000 INJECTION, SOLUTION INTRAVENOUS
Refills: 0 | Status: DISCONTINUED | OUTPATIENT
Start: 2021-03-22 | End: 2021-03-24

## 2021-03-22 RX ADMIN — HEPARIN SODIUM 5000 UNIT(S): 5000 INJECTION INTRAVENOUS; SUBCUTANEOUS at 13:55

## 2021-03-22 RX ADMIN — HEPARIN SODIUM 5000 UNIT(S): 5000 INJECTION INTRAVENOUS; SUBCUTANEOUS at 05:21

## 2021-03-22 RX ADMIN — ONDANSETRON 4 MILLIGRAM(S): 8 TABLET, FILM COATED ORAL at 00:01

## 2021-03-22 RX ADMIN — HEPARIN SODIUM 5000 UNIT(S): 5000 INJECTION INTRAVENOUS; SUBCUTANEOUS at 21:05

## 2021-03-22 RX ADMIN — CEFEPIME 100 MILLIGRAM(S): 1 INJECTION, POWDER, FOR SOLUTION INTRAMUSCULAR; INTRAVENOUS at 05:20

## 2021-03-22 RX ADMIN — DEXTROSE MONOHYDRATE, SODIUM CHLORIDE, AND POTASSIUM CHLORIDE 75 MILLILITER(S): 50; .745; 4.5 INJECTION, SOLUTION INTRAVENOUS at 13:56

## 2021-03-22 RX ADMIN — SODIUM CHLORIDE 75 MILLILITER(S): 9 INJECTION, SOLUTION INTRAVENOUS at 05:20

## 2021-03-22 RX ADMIN — CEFEPIME 100 MILLIGRAM(S): 1 INJECTION, POWDER, FOR SOLUTION INTRAMUSCULAR; INTRAVENOUS at 18:10

## 2021-03-22 RX ADMIN — PANTOPRAZOLE SODIUM 40 MILLIGRAM(S): 20 TABLET, DELAYED RELEASE ORAL at 13:54

## 2021-03-22 NOTE — PHYSICAL THERAPY INITIAL EVALUATION ADULT - PRECAUTIONS/LIMITATIONS, REHAB EVAL
CONT: CT Abd 3/21: No pulmonary embolus. Patchy consolidations in the right upper lobe, right middle lobe and bilateral lower lobes, suggestive of multifocal infection. SBO with transition point in the lower midabdomen. CONT: CT Abd 3/21: No pulmonary embolus. Patchy consolidations in the right upper lobe, right middle lobe and bilateral lower lobes, suggestive of multifocal infection. SBO with transition point in the lower midabdomen./fall precautions

## 2021-03-22 NOTE — PHYSICAL THERAPY INITIAL EVALUATION ADULT - GAIT DEVIATIONS NOTED, PT EVAL
decreased cruz/increased time in double stance/decreased velocity of limb motion/decreased stride length/decreased weight-shifting ability

## 2021-03-22 NOTE — PROGRESS NOTE ADULT - SUBJECTIVE AND OBJECTIVE BOX
ACS DAILY PROGRESS NOTE:       SUBJECTIVE/ROS: Patient had episode emesis overnight- NGT flushed and no further issues- Denies anymore nausea or vomiting, chest pain, shortness of breath         MEDICATIONS  (STANDING):  cefepime   IVPB 1000 milliGRAM(s) IV Intermittent every 12 hours  heparin   Injectable 5000 Unit(s) SubCutaneous every 8 hours  lactated ringers. 1000 milliLiter(s) (75 mL/Hr) IV Continuous <Continuous>  pantoprazole  Injectable 40 milliGRAM(s) IV Push daily    MEDICATIONS  (PRN):      OBJECTIVE:    Vital Signs Last 24 Hrs  T(C): 36.9 (22 Mar 2021 06:25), Max: 37.2 (21 Mar 2021 22:22)  T(F): 98.4 (22 Mar 2021 06:25), Max: 98.9 (21 Mar 2021 22:22)  HR: 72 (22 Mar 2021 06:25) (63 - 80)  BP: 136/78 (22 Mar 2021 06:25) (126/80 - 160/72)  BP(mean): 95 (21 Mar 2021 13:20) (95 - 95)  RR: 18 (22 Mar 2021 06:25) (17 - 20)  SpO2: 99% (22 Mar 2021 06:25) (85% - 100%)        I&O's Detail    21 Mar 2021 07:01  -  22 Mar 2021 07:00  --------------------------------------------------------  IN:  Total IN: 0 mL    OUT:    Nasogastric/Oral tube (mL): 250 mL    Voided (mL): 200 mL  Total OUT: 450 mL    Total NET: -450 mL          Daily Height in cm: 167.64 (21 Mar 2021 12:48)    Daily Weight in k (22 Mar 2021 03:29)    LABS:                        13.6   4.85  )-----------( 161      ( 21 Mar 2021 13:42 )             43.5     03-21    140  |  103  |  16  ----------------------------<  130<H>  4.0   |  26  |  0.66    Ca    9.3      21 Mar 2021 13:42    TPro  7.1  /  Alb  4.2  /  TBili  0.5  /  DBili  x   /  AST  14  /  ALT  10  /  AlkPhos  70  03-21    PT/INR - ( 21 Mar 2021 18:57 )   PT: 12.5 sec;   INR: 1.04 ratio         PTT - ( 21 Mar 2021 18:57 )  PTT:28.1 sec  Urinalysis Basic - ( 21 Mar 2021 14:17 )    Color: Yellow / Appearance: Slightly Turbid / S.021 / pH: x  Gluc: x / Ketone: Negative  / Bili: Negative / Urobili: Negative   Blood: x / Protein: 30 mg/dL / Nitrite: Positive   Leuk Esterase: Large / RBC: 24 /hpf /  /HPF   Sq Epi: x / Non Sq Epi: 2 /hpf / Bacteria: Many              PHYSICAL EXAM:  General: No acute distress  Respiratory: Nonlabored  Cardiovascular: appears well perfused, sinus rhythm  Abdominal: Soft, nondistended, minimally TTP lower mid abdomen. well healed midline surgical scar. No rebound or guarding. No organomegaly, no palpable mass.  Extremities: Warm

## 2021-03-22 NOTE — PHYSICAL THERAPY INITIAL EVALUATION ADULT - RISK REDUCTION/PREVENTION, PT EVAL
Problem: Perioperative Period (Adult)  Goal: Signs and Symptoms of Listed Potential Problems Will be Absent or Manageable (Perioperative Period)  Outcome: Ongoing (interventions implemented as appropriate)    08/01/17 0839   Perioperative Period   Problems Assessed (Perioperative Period) pain;hypoxia/hypoxemia;hypothermia;situational response   Problems Present (Perioperative Period) situational response            risk factors

## 2021-03-22 NOTE — PHYSICAL THERAPY INITIAL EVALUATION ADULT - ADDITIONAL COMMENTS
Pt lives alone in elevated apt. Prior to admission, pt was I with all functional mobility and ADLs with rolling walker. Pt has cleaning lady 1x/ week.

## 2021-03-22 NOTE — CONSULT NOTE ADULT - SUBJECTIVE AND OBJECTIVE BOX
[facility] Cardiology Consult  _________________________    Patient is a 89y old  Female who presents with a chief complaint of SBO (22 Mar 2021 08:32)      HPI:                                                                                              General Surgery   Surgical team: [facility]  Attending: Dr. Witt    HPI:  89F hx of pAF on eliquis, TB s/p ex lap for "abdominal TB" in the , recurrent SBO (admitted to Dr. Millard in  treated with NGT then consult for Dr. Dae Murillo in 10/2020 treated conservatively without NGT) presents with 1 day of epigastric abdominal pain and brown emesis with emesis x 1 in ED, desatting to 88% in ED, WBC 4, lactate 1.7, mild TTP to lower abdomen, found to have +UA and CT negative for PE, patchy GGO and consolidations in lungs, SBO with transition point in lower mid abdomen. ED placed NGT after large emesis episode, minimal output upon placement of NGT. cefepime x 1 given in ED. COVID negative.    patient takes eliquis 2.5mg BID and amiodarone for pAF    PAST MEDICAL HISTORY:  TB (pulmonary tuberculosis)    HTN (hypertension)    UTI (urinary tract infection)    Hypothyroid    SBO (small bowel obstruction)    Afib    Gastritis    Pulmonary TB        PAST SURGICAL HISTORY:  S/P vein stripping    H/O hemorrhoidectomy    S/P Exploratory Laparotomy    History of Bilateral Breast Biopsy    History of Small Bowel Obstruction    Afib        MEDICATIONS:      ALLERGIES:  cocaine exposure in  with TB treatment. reported allergy (Unknown)  penicillin (Rash)      VITALS & I/Os:  Vital Signs Last 24 Hrs  T(C): 36.3 (21 Mar 2021 13:20), Max: 36.4 (21 Mar 2021 12:48)  T(F): 97.4 (21 Mar 2021 13:20), Max: 97.5 (21 Mar 2021 12:48)  HR: 64 (21 Mar 2021 13:20) (63 - 64)  BP: 160/72 (21 Mar 2021 13:20) (126/80 - 160/72)  BP(mean): 95 (21 Mar 2021 13:20) (95 - 95)  RR: 18 (21 Mar 2021 13:20) (18 - 18)  SpO2: 98% (21 Mar 2021 13:20) (98% - 98%)    I&O's Summary      PHYSICAL EXAM:  General: No acute distress  Respiratory: Nonlabored  Cardiovascular: appears well perfused, sinus rhythm  Abdominal: Soft, nondistended, minimally TTP lower mid abdomen. well healed midline surgical scar. No rebound or guarding. No organomegaly, no palpable mass.  Extremities: Warm    LABS:                        13.6   4.85  )-----------( 161      ( 21 Mar 2021 13:42 )             43.5         140  |  103  |  16  ----------------------------<  130<H>  4.0   |  26  |  0.66    Ca    9.3      21 Mar 2021 13:42    TPro  7.1  /  Alb  4.2  /  TBili  0.5  /  DBili  x   /  AST  14  /  ALT  10  /  AlkPhos  70      Lactate:  - @ 13:42  1.6    PT/INR - ( 21 Mar 2021 18:57 )   PT: 12.5 sec;   INR: 1.04 ratio         PTT - ( 21 Mar 2021 18:57 )  PTT:28.1 sec          Urinalysis Basic - ( 21 Mar 2021 14:17 )    Color: Yellow / Appearance: Slightly Turbid / S.021 / pH: x  Gluc: x / Ketone: Negative  / Bili: Negative / Urobili: Negative   Blood: x / Protein: 30 mg/dL / Nitrite: Positive   Leuk Esterase: Large / RBC: 24 /hpf /  /HPF   Sq Epi: x / Non Sq Epi: 2 /hpf / Bacteria: Many (21 Mar 2021 19:23)      PAST MEDICAL & SURGICAL HISTORY:  TB (pulmonary tuberculosis)  TB of abdomen     HTN (hypertension)    UTI (urinary tract infection)    Hypothyroid    SBO (small bowel obstruction)    Afib    Gastritis    Pulmonary TB    S/P vein stripping    H/O hemorrhoidectomy    S/P Exploratory Laparotomy  TB in     History of Bilateral Breast Biopsy        MEDICATIONS  (STANDING):  cefepime   IVPB 1000 milliGRAM(s) IV Intermittent every 12 hours  heparin   Injectable 5000 Unit(s) SubCutaneous every 8 hours  lactated ringers. 1000 milliLiter(s) (75 mL/Hr) IV Continuous <Continuous>  pantoprazole  Injectable 40 milliGRAM(s) IV Push daily    MEDICATIONS  (PRN):      Allergies    cocaine exposure in  with TB treatment. reported allergy (Unknown)  penicillin (Rash)    Intolerances        Social Histroy: Tobacco- , ETOH-, Illicit Drugs-    T(C): 36.9 (21 @ 06:25), Max: 37.2 (21 @ 22:22)  HR: 72 (21 @ 06:25) (63 - 80)  BP: 136/78 (21 @ 06:25) (126/80 - 160/72)  RR: 18 (21 @ 06:25) (17 - 20)  SpO2: 99% (21 @ 06:25) (85% - 100%)  I&O's Summary    21 Mar 2021 07:01  -  22 Mar 2021 07:00  --------------------------------------------------------  IN: 375 mL / OUT: 450 mL / NET: -75 mL        Review of Systems:  Constitutional: [ ] Fever [ ] Chills [ ] Fatigue [ ] Weight change   HEENT: [ ] Blurred vision [ ] Eye Pain [ ] Headache [ ] Runny nose [ ] Sore Throat   Respiratory: [ ] Cough [ ] Wheezing [ ] Shortness of breath  Cardiovascular: [ ] Chest Pain [ ] Palpitations [ ] HARRY [ ] PND [ ] Orthopnea  Gastrointestinal: [ ] Abdominal Pain [ ] Diarrhea [ ] Constipation [ ] Hemorrhoids [ ] Nausea [ ] Vomiting  Genitourinary: [ ] Nocturia [ ] Dysuria [ ] Incontinence  Extremities: [ ] Swelling [ ] Joint Pain  Neurologic: [ ] Focal deficit [ ] Paresthesias [ ] Syncope  Lymphatic: [ ] Swelling [ ] Lymphadenopathy   Skin: [ ] Rash [ ] Ecchymoses [ ] Wounds [ ] Lesions  Psychiatry: [ ] Depression [ ] Suicidal/Homicidal Ideation [ ] Anxiety [ ] Sleep Disturbances  [ x] 10 point review of systems is otherwise negative except as mentioned above            [ ]Unable to obtain    PHYSICAL EXAM:  GENERAL: Alert, NAD  NECK: Supple  CHEST/LUNG: Clear to auscultation bilaterally; No wheezes, rales, or rhonchi  HEART: S1 S2 normal, RRR,  No murmurs, rubs, or gallops  ABDOMEN: Soft, Nondistended  EXTREMITIES:  No LE edema.      LABS:                        13.0   1.47  )-----------( 144      ( 22 Mar 2021 07:16 )             41.8         142  |  104  |  14  ----------------------------<  143<H>  4.3   |  25  |  0.70    Ca    9.0      22 Mar 2021 07:15  Phos  3.7       Mg     1.9         TPro  7.1  /  Alb  4.2  /  TBili  0.5  /  DBili  x   /  AST  14  /  ALT  10  /  AlkPhos  70      PT/INR - ( 21 Mar 2021 18:57 )   PT: 12.5 sec;   INR: 1.04 ratio         PTT - ( 21 Mar 2021 18:57 )  PTT:28.1 sec          Urinalysis Basic - ( 21 Mar 2021 14:17 )    Color: Yellow / Appearance: Slightly Turbid / S.021 / pH: x  Gluc: x / Ketone: Negative  / Bili: Negative / Urobili: Negative   Blood: x / Protein: 30 mg/dL / Nitrite: Positive   Leuk Esterase: Large / RBC: 24 /hpf /  /HPF   Sq Epi: x / Non Sq Epi: 2 /hpf / Bacteria: Many        MEDICATIONS  (STANDING):  cefepime   IVPB 1000 milliGRAM(s) IV Intermittent every 12 hours  heparin   Injectable 5000 Unit(s) SubCutaneous every 8 hours  lactated ringers. 1000 milliLiter(s) (75 mL/Hr) IV Continuous <Continuous>  pantoprazole  Injectable 40 milliGRAM(s) IV Push daily    MEDICATIONS  (PRN):      Troponin T, High Sensitivity Result: 14 ng/L (21 @ 19:40)  Troponin T, High Sensitivity Result: 15 ng/L (21 @ 13:42)      RADIOLOGY & ADDITIONAL TESTS:    Cardiology testing:  EKG: sinus

## 2021-03-22 NOTE — CONSULT NOTE ADULT - ASSESSMENT
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 Millard in 2018 treated with NGT then consult for Dr. Dae Murillo in 10/2020 treated conservatively without NGT) presents with 1 day of epigastric abdominal pain and brown emesis with emesis x 1 in ED, desatting to 88% in ED, WBC 4, lactate 1.7, mild TTP to lower abdomen, found to have +UA and CT negative for PE, patchy GGO and consolidations in lungs, SBO with transition point in lower mid abdomen.    in sinus rhythm in ED  ?PNA, afebrile and normal WBC    # SBO:  Admitted to Dr. Witt  NPO/NGT/IVF, await return of bowel fxn  Hold AC, Amio  Cont with home med Protonix  No abx for possible PNA at this time  Follow up primary team for mgmt    #UTI:  Cefepime for asymptomatic bacteruria, plan to discontinue antibiotics if urine culture is negative    # Paroxysmal Atrial Fibrillation:  In NSR  No PO meds - can give Lopressor 5MG IV Q6H if HR >110 as per cards  OK to hold PO AC for 1-2 days  If still unable to take PO meds in 1-2 days, recommend Lovenox or heparin gtt for AC as per cards  Follow up cards recs    # DVT ppx:  Heparin subq Q8H  89F hx of pAF on eliquis, 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 Millard in 2018 treated with NGT then consult for Dr. Dae Murillo in 10/2020 treated conservatively without NGT) presents with 1 day of epigastric abdominal pain and brown emesis with emesis x 1 in ED, desatting to 88% in ED, WBC 4, lactate 1.7, mild TTP to lower abdomen, found to have +UA and CT negative for PE, patchy GGO and consolidations in lungs, SBO with transition point in lower mid abdomen.    in sinus rhythm in ED  ?PNA, afebrile and normal WBC    # SBO:  Admitted to Dr. Witt  NPO/NGT/IVF, await return of bowel fxn  Hold AC, Amio  Cont with home med Protonix  would observe off abx, no signs of infection  Follow up primary team for mgmt    #UTI:  Cefepime for asymptomatic bacteruria, plan to discontinue antibiotics if urine culture is negative    # Paroxysmal Atrial Fibrillation:  In NSR  No PO meds - can give Lopressor 5MG IV Q6H if HR >110 as per cards  OK to hold PO AC for 1-2 days  If still unable to take PO meds in 1-2 days, recommend Lovenox or heparin gtt for AC as per cards  Follow up cards recs    # DVT ppx:  Heparin subq Q8H

## 2021-03-22 NOTE — PROGRESS NOTE ADULT - ASSESSMENT
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 Millard in 2018 treated with NGT then consult for Dr. Dae Murillo in 10/2020 treated conservatively without NGT) presented with 1 day of epigastric abdominal pain and brown emesis, SBO with transition point in lower mid abdomen c/w UTI and possible aspiration PNA      Plan:  -NPO/NGT/IVF, await return of bowel fxn  -f/u cardiology recommendations- patient's [facility] docto  -hold AC, amio  -SQH for VTE ppx  -c/w home med protonix  -cefepime for asymptomatic bacteruria, plan to discontinue antibiotics if urine culture is negative  -no abx for possible PNA at this time    Alysa Hernández PA-C [id number]  89F hx of pAF on eliquis, 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 Millard in 2018 treated with NGT then consult for Dr. Dae Murillo in 10/2020 treated conservatively without NGT) presented with 1 day of epigastric abdominal pain and brown emesis, SBO with transition point in lower mid abdomen c/w UTI     Plan:  - Gastrograffin xray study  -NPO/NGT/IVF, await return of bowel fxn  -f/u cardiology recommendations- patient's [facility] doctor  -hold AC, amio  -SQH for VTE ppx  -c/w home med protonix  -cefepime for asymptomatic bacteruria, plan to discontinue antibiotics if urine culture is negative    Alysa Hernández PA-C [id number]

## 2021-03-22 NOTE — CONSULT NOTE ADULT - PROBLEM SELECTOR RECOMMENDATION 2
-  -management as per primary team    Patient of Dr. Steven Goldberg ([facility])    Alejandro Ro D.O.  [phone]

## 2021-03-22 NOTE — PHYSICAL THERAPY INITIAL EVALUATION ADULT - PERTINENT HX OF CURRENT PROBLEM, REHAB EVAL
89 yo F with recurrent SBO requiring surgery presents to the ED with an acute onset of vomiting that started 12 hours ago. She admits to associated abdomina pain that is localized over her upper abdomen, non radiating. She has not been able to tolerate po. Her vomitus is described as brown and watery, no blood. ED placed NGT after large emesis episode, minimal output upon placement of NGT.

## 2021-03-22 NOTE — CONSULT NOTE ADULT - PROBLEM SELECTOR RECOMMENDATION 9
-  -In sinus rhythm  -cannot take PO medications given SBO  -Can give lopressor 5 mg iv q6h if rates >110 bpm  -ok to hold PO anticoagulation for 1-2 days.   -if still not able to take PO meds in 1-2 days then recommend lovenox or heparin drip for anticoagulation.

## 2021-03-22 NOTE — CONSULT NOTE ADULT - SUBJECTIVE AND OBJECTIVE BOX
Patient is a 89y old  Female who presents with a chief complaint of SBO (22 Mar 2021 08:52)      HPI:                                                                                              General Surgery   Surgical team: [facility]  Attending: Dr. Witt    HPI:  89F hx of pAF on eliquis, TB s/p ex lap for "abdominal TB" in the , recurrent SBO (admitted to Dr. Millard in  treated with NGT then consult for Dr. Dae Murillo in 10/2020 treated conservatively without NGT) presents with 1 day of epigastric abdominal pain and brown emesis with emesis x 1 in ED, desatting to 88% in ED, WBC 4, lactate 1.7, mild TTP to lower abdomen, found to have +UA and CT negative for PE, patchy GGO and consolidations in lungs, SBO with transition point in lower mid abdomen. ED placed NGT after large emesis episode, minimal output upon placement of NGT. cefepime x 1 given in ED. COVID negative.    patient takes eliquis 2.5mg BID and amiodarone for pAF    PAST MEDICAL HISTORY:  TB (pulmonary tuberculosis)    HTN (hypertension)    UTI (urinary tract infection)    Hypothyroid    SBO (small bowel obstruction)    Afib    Gastritis    Pulmonary TB        PAST SURGICAL HISTORY:  S/P vein stripping    H/O hemorrhoidectomy    S/P Exploratory Laparotomy    History of Bilateral Breast Biopsy    History of Small Bowel Obstruction    Afib        MEDICATIONS:      ALLERGIES:  cocaine exposure in  with TB treatment. reported allergy (Unknown)  penicillin (Rash)      VITALS & I/Os:  Vital Signs Last 24 Hrs  T(C): 36.3 (21 Mar 2021 13:20), Max: 36.4 (21 Mar 2021 12:48)  T(F): 97.4 (21 Mar 2021 13:20), Max: 97.5 (21 Mar 2021 12:48)  HR: 64 (21 Mar 2021 13:20) (63 - 64)  BP: 160/72 (21 Mar 2021 13:20) (126/80 - 160/72)  BP(mean): 95 (21 Mar 2021 13:20) (95 - 95)  RR: 18 (21 Mar 2021 13:20) (18 - 18)  SpO2: 98% (21 Mar 2021 13:20) (98% - 98%)    I&O's Summary      PHYSICAL EXAM:  General: No acute distress  Respiratory: Nonlabored  Cardiovascular: appears well perfused, sinus rhythm  Abdominal: Soft, nondistended, minimally TTP lower mid abdomen. well healed midline surgical scar. No rebound or guarding. No organomegaly, no palpable mass.  Extremities: Warm    LABS:                        13.6   4.85  )-----------( 161      ( 21 Mar 2021 13:42 )             43.5         140  |  103  |  16  ----------------------------<  130<H>  4.0   |  26  |  0.66    Ca    9.3      21 Mar 2021 13:42    TPro  7.1  /  Alb  4.2  /  TBili  0.5  /  DBili  x   /  AST  14  /  ALT  10  /  AlkPhos  70      Lactate:   @ 13:42  1.6    PT/INR - ( 21 Mar 2021 18:57 )   PT: 12.5 sec;   INR: 1.04 ratio         PTT - ( 21 Mar 2021 18:57 )  PTT:28.1 sec          Urinalysis Basic - ( 21 Mar 2021 14:17 )    Color: Yellow / Appearance: Slightly Turbid / S.021 / pH: x  Gluc: x / Ketone: Negative  / Bili: Negative / Urobili: Negative   Blood: x / Protein: 30 mg/dL / Nitrite: Positive   Leuk Esterase: Large / RBC: 24 /hpf /  /HPF   Sq Epi: x / Non Sq Epi: 2 /hpf / Bacteria: Many (21 Mar 2021 19:23)      PAST MEDICAL & SURGICAL HISTORY:  TB (pulmonary tuberculosis)  TB of abdomen     HTN (hypertension)    UTI (urinary tract infection)    Hypothyroid    SBO (small bowel obstruction)    Afib    Gastritis    Pulmonary TB    S/P vein stripping    H/O hemorrhoidectomy    S/P Exploratory Laparotomy  TB in     History of Bilateral Breast Biopsy        FAMILY HISTORY:      SOCIAL HISTORY:    Allergies    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 reported allergy (Unknown)  penicillin (Rash)    Intolerances      REVIEW OF SYSTEMS:  General: no weakness, no fever/chills, no weight loss/gain  Ophthalmologic: no vision changes, no dry eyes   Respiratory and Thorax: no cough, no wheezing, no hemoptysis, no dyspnea  Cardiovascular: no chest pain, no shortness of breath, no orthopnea  Gastrointestinal: no n/v/d, no abdominal pain, no dysphagia   Genitourinary: no dysuria, no frequency, no nocturia, no hematuria  Musculoskeletal: no trauma, no sprain/strain, no myalgias, no arthralgias, no fracture  Neurological: no HA, no dizziness, no weakness, no numbness  Psychiatric: no depression, no SI/HI  Hematology/Lymphatics: no easy bruising  Endocrine: no heat or cold intolerance. no weight gain or loss  Allergic/Immunologic: no allergy or recent reaction     SUBJECTIVE / OVERNIGHT EVENTS:    emesis episode overnight  patient seen and examined  denies anymore n/v  denies cp/sob  +NGT      Vital Signs Last 24 Hrs  T(C): 36.7 (22 Mar 2021 10:01), Max: 37.2 (21 Mar 2021 22:22)  T(F): 98 (22 Mar 2021 10:01), Max: 98.9 (21 Mar 2021 22:22)  HR: 70 (22 Mar 2021 10:01) (63 - 80)  BP: 112/58 (22 Mar 2021 10:01) (112/58 - 160/72)  BP(mean): 95 (21 Mar 2021 13:20) (95 - 95)  RR: 18 (22 Mar 2021 10:01) (17 - 20)  SpO2: 96% (22 Mar 2021 10:01) (85% - 100%)  I&O's Summary    21 Mar 2021 07:  -  22 Mar 2021 07:00  --------------------------------------------------------  IN: 375 mL / OUT: 450 mL / NET: -75 mL    22 Mar 2021 07:01  -  22 Mar 2021 11:20  --------------------------------------------------------  IN: 0 mL / OUT: 325 mL / NET: -325 mL        PHYSICAL EXAM:  GENERAL: NAD, Comfortable  HEAD:  Atraumatic, Normocephalic  CHEST/LUNG: Decreased BS bilaterally; No wheeze  HEART: Regular rate and rhythm; No murmurs, rubs, or gallops  ABDOMEN: Soft, Nontender, Nondistended; Bowel sounds present  Neuro: AAOx3, no focal deficit, 5/5 b/l extremities  EXTREMITIES:  2+ Peripheral Pulses, No clubbing, cyanosis, or edema  SKIN: No rashes or lesions    LABS:                        12.5   1.85  )-----------( 144      ( 22 Mar 2021 09:06 )             40.1     -    142  |  104  |  14  ----------------------------<  143<H>  4.3   |  25  |  0.70    Ca    9.0      22 Mar 2021 07:15  Phos  3.7     -  Mg     1.9         TPro  7.1  /  Alb  4.2  /  TBili  0.5  /  DBili  x   /  AST  14  /  ALT  10  /  AlkPhos  70  03-    PT/INR - ( 21 Mar 2021 18:57 )   PT: 12.5 sec;   INR: 1.04 ratio         PTT - ( 21 Mar 2021 18:57 )  PTT:28.1 sec  CAPILLARY BLOOD GLUCOSE            Urinalysis Basic - ( 21 Mar 2021 14:17 )    Color: Yellow / Appearance: Slightly Turbid / S.021 / pH: x  Gluc: x / Ketone: Negative  / Bili: Negative / Urobili: Negative   Blood: x / Protein: 30 mg/dL / Nitrite: Positive   Leuk Esterase: Large / RBC: 24 /hpf /  /HPF   Sq Epi: x / Non Sq Epi: 2 /hpf / Bacteria: Many        RADIOLOGY & ADDITIONAL TESTS:    Imaging Personally Reviewed:  [x] YES  [ ] NO    Consultant(s) Notes Reviewed:  [x] YES  [ ] NO      MEDICATIONS  (STANDING):  cefepime   IVPB 1000 milliGRAM(s) IV Intermittent every 12 hours  heparin   Injectable 5000 Unit(s) SubCutaneous every 8 hours  lactated ringers. 1000 milliLiter(s) (75 mL/Hr) IV Continuous <Continuous>  pantoprazole  Injectable 40 milliGRAM(s) IV Push daily    MEDICATIONS  (PRN):      Care Discussed with Consultants/Other Providers [x] YES  [ ] NO     Patient is a 89y old  Female who presents with a chief complaint of SBO (22 Mar 2021 08:52)      HPI:                                                                                              General Surgery   Surgical team: [facility]  Attending: Dr. Witt    HPI:  89F hx of pAF on eliquis, TB s/p ex lap for "abdominal TB" in the , recurrent SBO (admitted to Dr. Millard in  treated with NGT then consult for Dr. Dae Murillo in 10/2020 treated conservatively without NGT) presents with 1 day of epigastric abdominal pain and brown emesis with emesis x 1 in ED, desatting to 88% in ED, WBC 4, lactate 1.7, mild TTP to lower abdomen, found to have +UA and CT negative for PE, patchy GGO and consolidations in lungs, SBO with transition point in lower mid abdomen. ED placed NGT after large emesis episode, minimal output upon placement of NGT. cefepime x 1 given in ED. COVID negative.    patient takes eliquis 2.5mg BID and amiodarone for pAF    PAST MEDICAL HISTORY:  TB (pulmonary tuberculosis)    HTN (hypertension)    UTI (urinary tract infection)    Hypothyroid    SBO (small bowel obstruction)    Afib    Gastritis    Pulmonary TB        PAST SURGICAL HISTORY:  S/P vein stripping    H/O hemorrhoidectomy    S/P Exploratory Laparotomy    History of Bilateral Breast Biopsy    History of Small Bowel Obstruction    Afib        MEDICATIONS:      ALLERGIES:  cocaine exposure in  with TB treatment. reported allergy (Unknown)  penicillin (Rash)      VITALS & I/Os:  Vital Signs Last 24 Hrs  T(C): 36.3 (21 Mar 2021 13:20), Max: 36.4 (21 Mar 2021 12:48)  T(F): 97.4 (21 Mar 2021 13:20), Max: 97.5 (21 Mar 2021 12:48)  HR: 64 (21 Mar 2021 13:20) (63 - 64)  BP: 160/72 (21 Mar 2021 13:20) (126/80 - 160/72)  BP(mean): 95 (21 Mar 2021 13:20) (95 - 95)  RR: 18 (21 Mar 2021 13:20) (18 - 18)  SpO2: 98% (21 Mar 2021 13:20) (98% - 98%)    I&O's Summary      PHYSICAL EXAM:  General: No acute distress  Respiratory: Nonlabored  Cardiovascular: appears well perfused, sinus rhythm  Abdominal: Soft, nondistended, minimally TTP lower mid abdomen. well healed midline surgical scar. No rebound or guarding. No organomegaly, no palpable mass.  Extremities: Warm    LABS:                        13.6   4.85  )-----------( 161      ( 21 Mar 2021 13:42 )             43.5         140  |  103  |  16  ----------------------------<  130<H>  4.0   |  26  |  0.66    Ca    9.3      21 Mar 2021 13:42    TPro  7.1  /  Alb  4.2  /  TBili  0.5  /  DBili  x   /  AST  14  /  ALT  10  /  AlkPhos  70      Lactate:   @ 13:42  1.6    PT/INR - ( 21 Mar 2021 18:57 )   PT: 12.5 sec;   INR: 1.04 ratio         PTT - ( 21 Mar 2021 18:57 )  PTT:28.1 sec          Urinalysis Basic - ( 21 Mar 2021 14:17 )    Color: Yellow / Appearance: Slightly Turbid / S.021 / pH: x  Gluc: x / Ketone: Negative  / Bili: Negative / Urobili: Negative   Blood: x / Protein: 30 mg/dL / Nitrite: Positive   Leuk Esterase: Large / RBC: 24 /hpf /  /HPF   Sq Epi: x / Non Sq Epi: 2 /hpf / Bacteria: Many (21 Mar 2021 19:23)      PAST MEDICAL & SURGICAL HISTORY:  TB (pulmonary tuberculosis)  TB of abdomen     HTN (hypertension)    UTI (urinary tract infection)    Hypothyroid    SBO (small bowel obstruction)    Afib    Gastritis    Pulmonary TB    S/P vein stripping    H/O hemorrhoidectomy    S/P Exploratory Laparotomy  TB in     History of Bilateral Breast Biopsy        FAMILY HISTORY:      SOCIAL HISTORY:    Allergies    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 reported allergy (Unknown)  penicillin (Rash)    Intolerances      REVIEW OF SYSTEMS:  General: no weakness, no fever/chills, no weight loss/gain  Ophthalmologic: no vision changes, no dry eyes   Respiratory and Thorax: no cough, no wheezing, no hemoptysis, no dyspnea  Cardiovascular: no chest pain, no shortness of breath, no orthopnea  Gastrointestinal: no n/v/d, no abdominal pain, no dysphagia   Genitourinary: no dysuria, no frequency, no nocturia, no hematuria  Musculoskeletal: no trauma, no sprain/strain, no myalgias, no arthralgias, no fracture  Neurological: no HA, no dizziness, no weakness, no numbness  Psychiatric: no depression, no SI/HI  Hematology/Lymphatics: no easy bruising  Endocrine: no heat or cold intolerance. no weight gain or loss  Allergic/Immunologic: no allergy or recent reaction     SUBJECTIVE / OVERNIGHT EVENTS:    emesis episode overnight  patient seen and examined  denies anymore n/v  denies cp/sob  +NGT      Vital Signs Last 24 Hrs  T(C): 36.7 (22 Mar 2021 10:01), Max: 37.2 (21 Mar 2021 22:22)  T(F): 98 (22 Mar 2021 10:01), Max: 98.9 (21 Mar 2021 22:22)  HR: 70 (22 Mar 2021 10:01) (63 - 80)  BP: 112/58 (22 Mar 2021 10:01) (112/58 - 160/72)  BP(mean): 95 (21 Mar 2021 13:20) (95 - 95)  RR: 18 (22 Mar 2021 10:01) (17 - 20)  SpO2: 96% (22 Mar 2021 10:01) (85% - 100%)  I&O's Summary    21 Mar 2021 07:  -  22 Mar 2021 07:00  --------------------------------------------------------  IN: 375 mL / OUT: 450 mL / NET: -75 mL    22 Mar 2021 07:01  -  22 Mar 2021 11:20  --------------------------------------------------------  IN: 0 mL / OUT: 325 mL / NET: -325 mL        PHYSICAL EXAM:  GENERAL: NAD, Comfortable  HEAD:  Atraumatic, Normocephalic  CHEST/LUNG: Decreased BS bilaterally; No wheeze  HEART: Regular rate and rhythm; No murmurs, rubs, or gallops  ABDOMEN: Soft, Nontender, Nondistended; Bowel sounds present, NGT  Neuro: AAOx3, no focal deficit, 5/5 b/l extremities  EXTREMITIES:  2+ Peripheral Pulses, No clubbing, cyanosis, or edema  SKIN: No rashes or lesions    LABS:                        12.5   1.85  )-----------( 144      ( 22 Mar 2021 09:06 )             40.1     -    142  |  104  |  14  ----------------------------<  143<H>  4.3   |  25  |  0.70    Ca    9.0      22 Mar 2021 07:15  Phos  3.7     -  Mg     1.9     -    TPro  7.1  /  Alb  4.2  /  TBili  0.5  /  DBili  x   /  AST  14  /  ALT  10  /  AlkPhos  70  03-21    PT/INR - ( 21 Mar 2021 18:57 )   PT: 12.5 sec;   INR: 1.04 ratio         PTT - ( 21 Mar 2021 18:57 )  PTT:28.1 sec  CAPILLARY BLOOD GLUCOSE            Urinalysis Basic - ( 21 Mar 2021 14:17 )    Color: Yellow / Appearance: Slightly Turbid / S.021 / pH: x  Gluc: x / Ketone: Negative  / Bili: Negative / Urobili: Negative   Blood: x / Protein: 30 mg/dL / Nitrite: Positive   Leuk Esterase: Large / RBC: 24 /hpf /  /HPF   Sq Epi: x / Non Sq Epi: 2 /hpf / Bacteria: Many        RADIOLOGY & ADDITIONAL TESTS:    Imaging Personally Reviewed:  [x] YES  [ ] NO    Consultant(s) Notes Reviewed:  [x] YES  [ ] NO      MEDICATIONS  (STANDING):  cefepime   IVPB 1000 milliGRAM(s) IV Intermittent every 12 hours  heparin   Injectable 5000 Unit(s) SubCutaneous every 8 hours  lactated ringers. 1000 milliLiter(s) (75 mL/Hr) IV Continuous <Continuous>  pantoprazole  Injectable 40 milliGRAM(s) IV Push daily    MEDICATIONS  (PRN):      Care Discussed with Consultants/Other Providers [x] YES  [ ] NO

## 2021-03-22 NOTE — PROVIDER CONTACT NOTE (OTHER) - ASSESSMENT
Pt AOx4, denies any nausea or pain. Pt with NGT placed in ED today, and vomitied x1- green. No distress noted on 3L NC

## 2021-03-22 NOTE — CONSULT NOTE ADULT - ASSESSMENT
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 Millard in 2018 treated with NGT then consult for Dr. Dae Murillo in 10/2020 treated conservatively without NGT) presents with sbo

## 2021-03-23 LAB
-  AMIKACIN: SIGNIFICANT CHANGE UP
-  AMOXICILLIN/CLAVULANIC ACID: SIGNIFICANT CHANGE UP
-  AMPICILLIN/SULBACTAM: SIGNIFICANT CHANGE UP
-  AMPICILLIN: SIGNIFICANT CHANGE UP
-  AZTREONAM: SIGNIFICANT CHANGE UP
-  CEFAZOLIN: SIGNIFICANT CHANGE UP
-  CEFEPIME: SIGNIFICANT CHANGE UP
-  CEFOXITIN: SIGNIFICANT CHANGE UP
-  CEFTRIAXONE: SIGNIFICANT CHANGE UP
-  CIPROFLOXACIN: SIGNIFICANT CHANGE UP
-  ERTAPENEM: SIGNIFICANT CHANGE UP
-  GENTAMICIN: SIGNIFICANT CHANGE UP
-  IMIPENEM: SIGNIFICANT CHANGE UP
-  LEVOFLOXACIN: SIGNIFICANT CHANGE UP
-  MEROPENEM: SIGNIFICANT CHANGE UP
-  NITROFURANTOIN: SIGNIFICANT CHANGE UP
-  PIPERACILLIN/TAZOBACTAM: SIGNIFICANT CHANGE UP
-  TIGECYCLINE: SIGNIFICANT CHANGE UP
-  TOBRAMYCIN: SIGNIFICANT CHANGE UP
-  TRIMETHOPRIM/SULFAMETHOXAZOLE: SIGNIFICANT CHANGE UP
ANION GAP SERPL CALC-SCNC: 10 MMOL/L — SIGNIFICANT CHANGE UP (ref 5–17)
BASOPHILS # BLD AUTO: 0 K/UL — SIGNIFICANT CHANGE UP (ref 0–0.2)
BASOPHILS NFR BLD AUTO: 0 % — SIGNIFICANT CHANGE UP (ref 0–2)
BUN SERPL-MCNC: 17 MG/DL — SIGNIFICANT CHANGE UP (ref 7–23)
CALCIUM SERPL-MCNC: 9.1 MG/DL — SIGNIFICANT CHANGE UP (ref 8.4–10.5)
CHLORIDE SERPL-SCNC: 103 MMOL/L — SIGNIFICANT CHANGE UP (ref 96–108)
CO2 SERPL-SCNC: 27 MMOL/L — SIGNIFICANT CHANGE UP (ref 22–31)
CREAT SERPL-MCNC: 0.74 MG/DL — SIGNIFICANT CHANGE UP (ref 0.5–1.3)
CULTURE RESULTS: SIGNIFICANT CHANGE UP
EOSINOPHIL # BLD AUTO: 0.07 K/UL — SIGNIFICANT CHANGE UP (ref 0–0.5)
EOSINOPHIL NFR BLD AUTO: 1.8 % — SIGNIFICANT CHANGE UP (ref 0–6)
GIANT PLATELETS BLD QL SMEAR: PRESENT — SIGNIFICANT CHANGE UP
GLUCOSE BLDC GLUCOMTR-MCNC: 92 MG/DL — SIGNIFICANT CHANGE UP (ref 70–99)
GLUCOSE SERPL-MCNC: 98 MG/DL — SIGNIFICANT CHANGE UP (ref 70–99)
HCT VFR BLD CALC: 39.8 % — SIGNIFICANT CHANGE UP (ref 34.5–45)
HGB BLD-MCNC: 12.2 G/DL — SIGNIFICANT CHANGE UP (ref 11.5–15.5)
LYMPHOCYTES # BLD AUTO: 0.32 K/UL — LOW (ref 1–3.3)
LYMPHOCYTES # BLD AUTO: 8.1 % — LOW (ref 13–44)
MAGNESIUM SERPL-MCNC: 2.2 MG/DL — SIGNIFICANT CHANGE UP (ref 1.6–2.6)
MANUAL SMEAR VERIFICATION: SIGNIFICANT CHANGE UP
MCHC RBC-ENTMCNC: 30.7 GM/DL — LOW (ref 32–36)
MCHC RBC-ENTMCNC: 30.9 PG — SIGNIFICANT CHANGE UP (ref 27–34)
MCV RBC AUTO: 100.8 FL — HIGH (ref 80–100)
METHOD TYPE: SIGNIFICANT CHANGE UP
MONOCYTES # BLD AUTO: 0.28 K/UL — SIGNIFICANT CHANGE UP (ref 0–0.9)
MONOCYTES NFR BLD AUTO: 7.2 % — SIGNIFICANT CHANGE UP (ref 2–14)
NEUTROPHILS # BLD AUTO: 3.27 K/UL — SIGNIFICANT CHANGE UP (ref 1.8–7.4)
NEUTROPHILS NFR BLD AUTO: 57.7 % — SIGNIFICANT CHANGE UP (ref 43–77)
NEUTS BAND # BLD: 25.2 % — HIGH (ref 0–8)
ORGANISM # SPEC MICROSCOPIC CNT: SIGNIFICANT CHANGE UP
ORGANISM # SPEC MICROSCOPIC CNT: SIGNIFICANT CHANGE UP
PHOSPHATE SERPL-MCNC: 2.1 MG/DL — LOW (ref 2.5–4.5)
PLAT MORPH BLD: ABNORMAL
PLATELET # BLD AUTO: 141 K/UL — LOW (ref 150–400)
POTASSIUM SERPL-MCNC: 3.8 MMOL/L — SIGNIFICANT CHANGE UP (ref 3.5–5.3)
POTASSIUM SERPL-SCNC: 3.8 MMOL/L — SIGNIFICANT CHANGE UP (ref 3.5–5.3)
RBC # BLD: 3.95 M/UL — SIGNIFICANT CHANGE UP (ref 3.8–5.2)
RBC # FLD: 12.6 % — SIGNIFICANT CHANGE UP (ref 10.3–14.5)
RBC BLD AUTO: SIGNIFICANT CHANGE UP
SODIUM SERPL-SCNC: 140 MMOL/L — SIGNIFICANT CHANGE UP (ref 135–145)
SPECIMEN SOURCE: SIGNIFICANT CHANGE UP
WBC # BLD: 3.95 K/UL — SIGNIFICANT CHANGE UP (ref 3.8–10.5)
WBC # FLD AUTO: 3.95 K/UL — SIGNIFICANT CHANGE UP (ref 3.8–10.5)

## 2021-03-23 PROCEDURE — 99231 SBSQ HOSP IP/OBS SF/LOW 25: CPT

## 2021-03-23 RX ORDER — POTASSIUM PHOSPHATE, MONOBASIC POTASSIUM PHOSPHATE, DIBASIC 236; 224 MG/ML; MG/ML
30 INJECTION, SOLUTION INTRAVENOUS ONCE
Refills: 0 | Status: COMPLETED | OUTPATIENT
Start: 2021-03-23 | End: 2021-03-23

## 2021-03-23 RX ADMIN — CEFEPIME 100 MILLIGRAM(S): 1 INJECTION, POWDER, FOR SOLUTION INTRAMUSCULAR; INTRAVENOUS at 05:31

## 2021-03-23 RX ADMIN — HEPARIN SODIUM 5000 UNIT(S): 5000 INJECTION INTRAVENOUS; SUBCUTANEOUS at 14:14

## 2021-03-23 RX ADMIN — HEPARIN SODIUM 5000 UNIT(S): 5000 INJECTION INTRAVENOUS; SUBCUTANEOUS at 05:31

## 2021-03-23 RX ADMIN — POTASSIUM PHOSPHATE, MONOBASIC POTASSIUM PHOSPHATE, DIBASIC 83.33 MILLIMOLE(S): 236; 224 INJECTION, SOLUTION INTRAVENOUS at 13:34

## 2021-03-23 RX ADMIN — DEXTROSE MONOHYDRATE, SODIUM CHLORIDE, AND POTASSIUM CHLORIDE 75 MILLILITER(S): 50; .745; 4.5 INJECTION, SOLUTION INTRAVENOUS at 12:57

## 2021-03-23 RX ADMIN — CEFEPIME 100 MILLIGRAM(S): 1 INJECTION, POWDER, FOR SOLUTION INTRAMUSCULAR; INTRAVENOUS at 18:33

## 2021-03-23 RX ADMIN — HEPARIN SODIUM 5000 UNIT(S): 5000 INJECTION INTRAVENOUS; SUBCUTANEOUS at 21:39

## 2021-03-23 RX ADMIN — DEXTROSE MONOHYDRATE, SODIUM CHLORIDE, AND POTASSIUM CHLORIDE 75 MILLILITER(S): 50; .745; 4.5 INJECTION, SOLUTION INTRAVENOUS at 03:35

## 2021-03-23 RX ADMIN — PANTOPRAZOLE SODIUM 40 MILLIGRAM(S): 20 TABLET, DELAYED RELEASE ORAL at 12:57

## 2021-03-23 NOTE — PROGRESS NOTE ADULT - SUBJECTIVE AND OBJECTIVE BOX
ACS SURGERY DAILY PROGRESS NOTE:       SUBJECTIVE/ROS: Patient seen and examined at bedside.  No acute overnight events.  Gastrograffin study yesterday showing contrast in colon. NGT output ~260/24hrs. Patient states she is passing flatus.  No n/v.      MEDICATIONS  (STANDING):  cefepime   IVPB 1000 milliGRAM(s) IV Intermittent every 12 hours  dextrose 5% + sodium chloride 0.45% with potassium chloride 20 mEq/L 1000 milliLiter(s) (75 mL/Hr) IV Continuous <Continuous>  heparin   Injectable 5000 Unit(s) SubCutaneous every 8 hours  pantoprazole  Injectable 40 milliGRAM(s) IV Push daily    MEDICATIONS  (PRN):      OBJECTIVE:    Vital Signs Last 24 Hrs  T(C): 36.9 (23 Mar 2021 09:22), Max: 37.6 (23 Mar 2021 05:07)  T(F): 98.5 (23 Mar 2021 09:22), Max: 99.6 (23 Mar 2021 05:07)  HR: 67 (23 Mar 2021 09:22) (66 - 71)  BP: 132/63 (23 Mar 2021 09:22) (111/67 - 142/69)  BP(mean): --  RR: 18 (23 Mar 2021 09:22) (18 - 18)  SpO2: 96% (23 Mar 2021 09:22) (92% - 97%)        I&O's Detail    22 Mar 2021 07:01  -  23 Mar 2021 07:00  --------------------------------------------------------  IN:    dextrose 5% + sodium chloride 0.45% w/ Additives: 900 mL    IV PiggyBack: 50 mL  Total IN: 950 mL    OUT:    Nasogastric/Oral tube (mL): 260 mL    Oral Fluid: 0 mL    Voided (mL): 1000 mL  Total OUT: 1260 mL    Total NET: -310 mL      23 Mar 2021 07:01  -  23 Mar 2021 09:40  --------------------------------------------------------  IN:  Total IN: 0 mL    OUT:    Nasogastric/Oral tube (mL): 0 mL    Oral Fluid: 0 mL    Voided (mL): 150 mL  Total OUT: 150 mL    Total NET: -150 mL          Daily     Daily     LABS:                        12.2   3.95  )-----------( 141      ( 23 Mar 2021 07:14 )             39.8     03-23    140  |  103  |  17  ----------------------------<  98  3.8   |  27  |  0.74    Ca    9.1      23 Mar 2021 07:13  Phos  2.1     03-  Mg     2.2     -    TPro  7.1  /  Alb  4.2  /  TBili  0.5  /  DBili  x   /  AST  14  /  ALT  10  /  AlkPhos  70  03-21    PT/INR - ( 21 Mar 2021 18:57 )   PT: 12.5 sec;   INR: 1.04 ratio         PTT - ( 21 Mar 2021 18:57 )  PTT:28.1 sec  Urinalysis Basic - ( 21 Mar 2021 14:17 )    Color: Yellow / Appearance: Slightly Turbid / S.021 / pH: x  Gluc: x / Ketone: Negative  / Bili: Negative / Urobili: Negative   Blood: x / Protein: 30 mg/dL / Nitrite: Positive   Leuk Esterase: Large / RBC: 24 /hpf /  /HPF   Sq Epi: x / Non Sq Epi: 2 /hpf / Bacteria: Many        Culture - Urine (21 @ 18:09)    Specimen Source: .Urine Clean Catch (Midstream)    Culture Results:   >100,000 CFU/ml Escherichia coli        PHYSICAL EXAM:  General: No acute distress  Respiratory: Nonlabored  Abdominal: Soft, minimally distended, non-tender abdomen. Well-healed midline surgical scar. No rebound or guarding.   Extremities: Warm

## 2021-03-23 NOTE — CHART NOTE - NSCHARTNOTEFT_GEN_A_CORE
Patient with incidental finding of "Patchy consolidations in the right upper lobe, right middle lobe and bilateral lower lobes, suggestive of multifocal infection" from CT chest on admission 3/21.  Spoke to patient's PCP, Dr. Goldberg ([phone]) regarding patients hospital course and made him aware of the incidental finding on CT chest to ensure adequate follow up.  I explained this finding to patient (and daughter at the bedside) and a copy of the report was printed and given to the patient.      ACS [id number]

## 2021-03-23 NOTE — PROGRESS NOTE ADULT - ASSESSMENT
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 Millard in 2018 treated with NGT then consult for Dr. Dae Murillo in 10/2020 treated conservatively without NGT) presents with 1 day of epigastric abdominal pain and brown emesis with emesis x 1 in ED, desatting to 88% in ED, WBC 4, lactate 1.7, mild TTP to lower abdomen, found to have +UA and CT negative for PE, patchy GGO and consolidations in lungs, SBO with transition point in lower mid abdomen.    in sinus rhythm in ED  ?PNA, afebrile and normal WBC    # SBO:  Gastrograffin study yesterday showing contrast in colon  NGT output: 260/24hrs  Clamp trial today  Cont NPO/IVF  Monitor GI function  Hold AC, Amio  Cont with home med Protonix  would observe off abx, no signs of infection  Follow up primary team for mgmt    #UTI:  Urine Cx - +E.coli - pt already on Cefepime Q12H; need to determine duration of abx    # Paroxysmal Atrial Fibrillation:  In NSR  No PO meds - can give Lopressor 5MG IV Q6H if HR >110 as per cards  OK to hold PO AC for 1-2 days  If still unable to take PO meds in 1-2 days, recommend Lovenox or heparin gtt for AC as per cards  Follow up cards recs    # DVT ppx:  Heparin subq Q8H   PT recs: KALYANI 89F hx of pAF on eliquis, 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 Millard in 2018 treated with NGT then consult for Dr. Dae Murillo in 10/2020 treated conservatively without NGT) presents with 1 day of epigastric abdominal pain and brown emesis with emesis x 1 in ED, desatting to 88% in ED, WBC 4, lactate 1.7, mild TTP to lower abdomen, found to have +UA and CT negative for PE, patchy GGO and consolidations in lungs, SBO with transition point in lower mid abdomen.    in sinus rhythm in ED  ?PNA, afebrile and normal WBC    # SBO:  Gastrograffin study yesterday showing contrast in colon  NGT output: 260/24hrs  Clamp trial today  Cont NPO/IVF  Monitor GI function  Hold AC, Amio  Cont with home med Protonix  would observe off abx, no signs of infection  Follow up primary team for mgmt    #UTI:  Urine Cx +E.coli - pt already on Cefepime Q12H; need to determine duration of abx    # Paroxysmal Atrial Fibrillation:  In NSR  No PO meds - can give Lopressor 5MG IV Q6H if HR >110 as per cards  OK to hold PO AC for 1-2 days  If still unable to take PO meds in 1-2 days, recommend Lovenox or heparin gtt for AC as per cards  Follow up cards recs    # DVT ppx:  Heparin subq Q8H   PT recs: KALYANI 89F hx of pAF on eliquis, 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 Millard in 2018 treated with NGT then consult for Dr. Dae Murillo in 10/2020 treated conservatively without NGT) presents with 1 day of epigastric abdominal pain and brown emesis with emesis x 1 in ED, desatting to 88% in ED, WBC 4, lactate 1.7, mild TTP to lower abdomen, found to have +UA and CT negative for PE, patchy GGO and consolidations in lungs, SBO with transition point in lower mid abdomen.    in sinus rhythm in ED  ?PNA, afebrile and normal WBC    # SBO:  Gastrograffin study yesterday showing contrast in colon  NGT output: 260/24hrs  Clamp trial today  Cont NPO/IVF  Monitor GI function  Hold AC, Amio  Cont with home med Protonix  Follow up primary team for mgmt    #UTI:  Urine Cx +E.coli - pt already on Cefepime Q12H; need to determine duration of abx    # Paroxysmal Atrial Fibrillation:  In NSR  No PO meds - can give Lopressor 5MG IV Q6H if HR >110 as per cards  OK to hold PO AC for 1-2 days  If still unable to take PO meds in 1-2 days, recommend Lovenox or heparin gtt for AC as per cards  Follow up cards recs    # DVT ppx:  Heparin subq Q8H   PT recs: KALYANI

## 2021-03-23 NOTE — PROGRESS NOTE ADULT - ASSESSMENT
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 Millard in 2018 treated with NGT then consult for Dr. Dae Murillo in 10/2020 treated conservatively without NGT) presented with 1 day of epigastric abdominal pain and brown emesis, SBO with transition point in lower mid abdomen c/w UTI.    Plan:  - Clamp trial today  - Monitor GI function  - f/u cardiology recommendations- if still not able to take PO meds in 1-2 days then recommend Lovenox or heparin drip for anticoagulation, Can give lopressor 5 mg iv q6h if rates >110 bpm  - SQH for VTE ppx  - c/w home med protonix  - UCX +E.coli , will determine duration of cefepime    Alysa Hernández PA-C [id number]  89F hx of pAF on eliquis, 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 Millard in 2018 treated with NGT then consult for Dr. Dae Murillo in 10/2020 treated conservatively without NGT) presented with 1 day of epigastric abdominal pain and brown emesis, SBO with transition point in lower mid abdomen c/w UTI.    Plan:  - Clamp trial today  - Monitor GI function  - f/u cardiology recommendations- if still not able to take PO meds in 1-2 days then recommend Lovenox or heparin drip for anticoagulation, Can give lopressor 5 mg iv q6h if rates >110 bpm  - SQH for VTE ppx  - c/w home med protonix  - UCX +E.coli , will determine duration of cefepime  - PT: KALYANI Hernández PA-C [phone]  89F hx of pAF on eliquis, 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 Millard in 2018 treated with NGT then consult for Dr. Dae Murillo in 10/2020 treated conservatively without NGT) presented with 1 day of epigastric abdominal pain and brown emesis, SBO with transition point in lower mid abdomen c/w UTI.    Plan:  - Clamp trial today  - c/w NPO/IVF  - Monitor GI function  - f/u cardiology recommendations- if still not able to take PO meds in 1-2 days then recommend Lovenox or heparin drip for anticoagulation, Can give lopressor 5 mg iv q6h if rates >110 bpm  - SQH for VTE ppx  - c/w home med protonix  - UCX +E.coli , will determine duration of cefepime  - PT: KALYANI Hernández PA-C [id number]

## 2021-03-23 NOTE — PROGRESS NOTE ADULT - SUBJECTIVE AND OBJECTIVE BOX
SUBJECTIVE / OVERNIGHT EVENTS:    no events overnight  patient seen and examined  denies cp/sob  no n/v  afebrile  + flatus  +NGT      --------------------------------------------------------------------------------------------  LABS:                        12.2   3.95  )-----------( 141      ( 23 Mar 2021 07:14 )             39.8     03-23    140  |  103  |  17  ----------------------------<  98  3.8   |  27  |  0.74    Ca    9.1      23 Mar 2021 07:13  Phos  2.1     03-  Mg     2.2     03-23    TPro  7.1  /  Alb  4.2  /  TBili  0.5  /  DBili  x   /  AST  14  /  ALT  10  /  AlkPhos  70  03-21    PT/INR - ( 21 Mar 2021 18:57 )   PT: 12.5 sec;   INR: 1.04 ratio         PTT - ( 21 Mar 2021 18:57 )  PTT:28.1 sec  CAPILLARY BLOOD GLUCOSE      POCT Blood Glucose.: 92 mg/dL (23 Mar 2021 05:40)  POCT Blood Glucose.: 94 mg/dL (22 Mar 2021 23:58)        Urinalysis Basic - ( 21 Mar 2021 14:17 )    Color: Yellow / Appearance: Slightly Turbid / S.021 / pH: x  Gluc: x / Ketone: Negative  / Bili: Negative / Urobili: Negative   Blood: x / Protein: 30 mg/dL / Nitrite: Positive   Leuk Esterase: Large / RBC: 24 /hpf /  /HPF   Sq Epi: x / Non Sq Epi: 2 /hpf / Bacteria: Many        RADIOLOGY & ADDITIONAL TESTS:    Imaging Personally Reviewed:  [x] YES  [ ] NO    Consultant(s) Notes Reviewed:  [x] YES  [ ] NO    MEDICATIONS  (STANDING):  cefepime   IVPB 1000 milliGRAM(s) IV Intermittent every 12 hours  dextrose 5% + sodium chloride 0.45% with potassium chloride 20 mEq/L 1000 milliLiter(s) (75 mL/Hr) IV Continuous <Continuous>  heparin   Injectable 5000 Unit(s) SubCutaneous every 8 hours  pantoprazole  Injectable 40 milliGRAM(s) IV Push daily    MEDICATIONS  (PRN):      Care Discussed with Consultants/Other Providers [x] YES  [ ] NO    Vital Signs Last 24 Hrs  T(C): 36.9 (23 Mar 2021 09:22), Max: 37.6 (23 Mar 2021 05:07)  T(F): 98.5 (23 Mar 2021 09:22), Max: 99.6 (23 Mar 2021 05:07)  HR: 67 (23 Mar 2021 09:22) (66 - 71)  BP: 132/63 (23 Mar 2021 09:22) (111/67 - 142/69)  BP(mean): --  RR: 18 (23 Mar 2021 09:22) (18 - 18)  SpO2: 96% (23 Mar 2021 09:22) (92% - 97%)  I&O's Summary    22 Mar 2021 07:01  -  23 Mar 2021 07:00  --------------------------------------------------------  IN: 950 mL / OUT: 1260 mL / NET: -310 mL    23 Mar 2021 07:01  -  23 Mar 2021 10:08  --------------------------------------------------------  IN: 0 mL / OUT: 150 mL / NET: -150 mL      PHYSICAL EXAM:  GENERAL: NAD, Comfortable  HEAD:  Atraumatic, Normocephalic  CHEST/LUNG: Decreased BS bilaterally; No wheeze  HEART: Regular rate and rhythm; No murmurs, rubs, or gallops  ABDOMEN: Soft, Nontender, Nondistended; Bowel sounds present, NGT  Neuro: AAOx3, no focal deficit, 5/5 b/l extremities  EXTREMITIES:  2+ Peripheral Pulses, No clubbing, cyanosis, or edema  SKIN: No rashes or lesions

## 2021-03-24 ENCOUNTER — TRANSCRIPTION ENCOUNTER (OUTPATIENT)
Age: 86
End: 2021-03-24

## 2021-03-24 LAB
ANION GAP SERPL CALC-SCNC: 9 MMOL/L — SIGNIFICANT CHANGE UP (ref 5–17)
BUN SERPL-MCNC: 10 MG/DL — SIGNIFICANT CHANGE UP (ref 7–23)
CALCIUM SERPL-MCNC: 8.5 MG/DL — SIGNIFICANT CHANGE UP (ref 8.4–10.5)
CHLORIDE SERPL-SCNC: 105 MMOL/L — SIGNIFICANT CHANGE UP (ref 96–108)
CO2 SERPL-SCNC: 25 MMOL/L — SIGNIFICANT CHANGE UP (ref 22–31)
CREAT SERPL-MCNC: 0.47 MG/DL — LOW (ref 0.5–1.3)
GLUCOSE SERPL-MCNC: 106 MG/DL — HIGH (ref 70–99)
HCT VFR BLD CALC: 37.4 % — SIGNIFICANT CHANGE UP (ref 34.5–45)
HGB BLD-MCNC: 11.6 G/DL — SIGNIFICANT CHANGE UP (ref 11.5–15.5)
MAGNESIUM SERPL-MCNC: 2 MG/DL — SIGNIFICANT CHANGE UP (ref 1.6–2.6)
MCHC RBC-ENTMCNC: 30.9 PG — SIGNIFICANT CHANGE UP (ref 27–34)
MCHC RBC-ENTMCNC: 31 GM/DL — LOW (ref 32–36)
MCV RBC AUTO: 99.7 FL — SIGNIFICANT CHANGE UP (ref 80–100)
NRBC # BLD: 0 /100 WBCS — SIGNIFICANT CHANGE UP (ref 0–0)
PHOSPHATE SERPL-MCNC: 2.1 MG/DL — LOW (ref 2.5–4.5)
PLATELET # BLD AUTO: 128 K/UL — LOW (ref 150–400)
POTASSIUM SERPL-MCNC: 3.8 MMOL/L — SIGNIFICANT CHANGE UP (ref 3.5–5.3)
POTASSIUM SERPL-SCNC: 3.8 MMOL/L — SIGNIFICANT CHANGE UP (ref 3.5–5.3)
RBC # BLD: 3.75 M/UL — LOW (ref 3.8–5.2)
RBC # FLD: 12.3 % — SIGNIFICANT CHANGE UP (ref 10.3–14.5)
SODIUM SERPL-SCNC: 139 MMOL/L — SIGNIFICANT CHANGE UP (ref 135–145)
WBC # BLD: 4.74 K/UL — SIGNIFICANT CHANGE UP (ref 3.8–10.5)
WBC # FLD AUTO: 4.74 K/UL — SIGNIFICANT CHANGE UP (ref 3.8–10.5)

## 2021-03-24 RX ORDER — AMIODARONE HYDROCHLORIDE 400 MG/1
200 TABLET ORAL
Refills: 0 | Status: DISCONTINUED | OUTPATIENT
Start: 2021-03-24 | End: 2021-03-29

## 2021-03-24 RX ORDER — SERTRALINE 25 MG/1
50 TABLET, FILM COATED ORAL DAILY
Refills: 0 | Status: DISCONTINUED | OUTPATIENT
Start: 2021-03-24 | End: 2021-03-29

## 2021-03-24 RX ORDER — AMLODIPINE BESYLATE 2.5 MG/1
2.5 TABLET ORAL DAILY
Refills: 0 | Status: DISCONTINUED | OUTPATIENT
Start: 2021-03-24 | End: 2021-03-29

## 2021-03-24 RX ORDER — PANTOPRAZOLE SODIUM 20 MG/1
40 TABLET, DELAYED RELEASE ORAL
Refills: 0 | Status: DISCONTINUED | OUTPATIENT
Start: 2021-03-24 | End: 2021-03-29

## 2021-03-24 RX ADMIN — HEPARIN SODIUM 5000 UNIT(S): 5000 INJECTION INTRAVENOUS; SUBCUTANEOUS at 15:09

## 2021-03-24 RX ADMIN — HEPARIN SODIUM 5000 UNIT(S): 5000 INJECTION INTRAVENOUS; SUBCUTANEOUS at 05:04

## 2021-03-24 RX ADMIN — DEXTROSE MONOHYDRATE, SODIUM CHLORIDE, AND POTASSIUM CHLORIDE 75 MILLILITER(S): 50; .745; 4.5 INJECTION, SOLUTION INTRAVENOUS at 05:04

## 2021-03-24 RX ADMIN — CEFEPIME 100 MILLIGRAM(S): 1 INJECTION, POWDER, FOR SOLUTION INTRAMUSCULAR; INTRAVENOUS at 22:43

## 2021-03-24 RX ADMIN — Medication 83.33 MILLIMOLE(S): at 15:09

## 2021-03-24 RX ADMIN — HEPARIN SODIUM 5000 UNIT(S): 5000 INJECTION INTRAVENOUS; SUBCUTANEOUS at 22:43

## 2021-03-24 RX ADMIN — PANTOPRAZOLE SODIUM 40 MILLIGRAM(S): 20 TABLET, DELAYED RELEASE ORAL at 10:48

## 2021-03-24 RX ADMIN — CEFEPIME 100 MILLIGRAM(S): 1 INJECTION, POWDER, FOR SOLUTION INTRAMUSCULAR; INTRAVENOUS at 05:04

## 2021-03-24 NOTE — PROGRESS NOTE ADULT - SUBJECTIVE AND OBJECTIVE BOX
Patient is a 89y old  Female who presents with a chief complaint of SBO (24 Mar 2021 09:29)      INTERVAL HPI/OVERNIGHT EVENTS: noted  pt seen and examined this am   events noted  tolerating po well  denies any abd pain/n/v      Vital Signs Last 24 Hrs  T(C): 37.3 (24 Mar 2021 09:23), Max: 37.8 (23 Mar 2021 16:36)  T(F): 99.1 (24 Mar 2021 09:23), Max: 100 (23 Mar 2021 16:36)  HR: 62 (24 Mar 2021 09:23) (62 - 77)  BP: 127/65 (24 Mar 2021 09:23) (112/74 - 155/75)  BP(mean): --  RR: 18 (24 Mar 2021 09:23) (18 - 19)  SpO2: 95% (24 Mar 2021 09:23) (87% - 97%)    aMIOdarone    Tablet 200 milliGRAM(s) Oral <User Schedule>  amLODIPine   Tablet 2.5 milliGRAM(s) Oral daily  cefepime   IVPB 1000 milliGRAM(s) IV Intermittent every 12 hours  dextrose 5% + sodium chloride 0.45% with potassium chloride 20 mEq/L 1000 milliLiter(s) IV Continuous <Continuous>  heparin   Injectable 5000 Unit(s) SubCutaneous every 8 hours  pantoprazole    Tablet 40 milliGRAM(s) Oral before breakfast  sodium phosphate IVPB 30 milliMole(s) IV Intermittent once      PHYSICAL EXAM:  GENERAL: NAD,   EYES: conjunctiva and sclera clear  ENMT: Moist mucous membranes  NECK: Supple, No JVD, Normal thyroid  CHEST/LUNG: non labored, cta b/l  HEART: Regular rate and rhythm; No murmurs, rubs, or gallops  ABDOMEN: Soft, Nontender, Nondistended; Bowel sounds present  EXTREMITIES:  2+ Peripheral Pulses, No clubbing, cyanosis, or edema  LYMPH: No lymphadenopathy noted  SKIN: No rashes or lesions    Consultant(s) Notes Reviewed:  [x ] YES  [ ] NO  Care Discussed with Consultants/Other Providers [ x] YES  [ ] NO    LABS:                        11.6   4.74  )-----------( 128      ( 24 Mar 2021 07:16 )             37.4     03-24    139  |  105  |  10  ----------------------------<  106<H>  3.8   |  25  |  0.47<L>    Ca    8.5      24 Mar 2021 07:15  Phos  2.1     03-24  Mg     2.0     03-24          CAPILLARY BLOOD GLUCOSE                Culture - Urine (collected 21 Mar 2021 18:09)  Source: .Urine Clean Catch (Midstream)  Final Report (23 Mar 2021 14:18):    >100,000 CFU/ml Escherichia coli  Organism: Escherichia coli (23 Mar 2021 14:18)  Organism: Escherichia coli (23 Mar 2021 14:18)        RADIOLOGY & ADDITIONAL TESTS:    Imaging Personally Reviewed:  [x ] YES  [ ] NO

## 2021-03-24 NOTE — PROGRESS NOTE ADULT - PROBLEM SELECTOR PLAN 1
-  -In sinus rhythm  -plan for clears. if can tolerate po then ok to resume home regimen.   -if still not able to take PO meds in 1-2 days then recommend lovenox or heparin drip for anticoagulation.

## 2021-03-24 NOTE — DISCHARGE NOTE PROVIDER - NSDCMRMEDTOKEN_GEN_ALL_CORE_FT
amiodarone 200 mg oral tablet: 1 tab(s) orally Monday, Tuesday, Thursday, and Friday  amLODIPine 2.5 mg oral tablet: 2 tab(s) orally once a day  Eliquis 2.5 mg oral tablet: 1 tab(s) orally 2 times a day  Protonix 40 mg oral delayed release tablet: 1 tab(s) orally once a day  sertraline 50 mg oral tablet: 1 tab(s) orally once a day  traMADol 50 mg oral tablet: 1 tab(s) orally 3 times, As Needed   amiodarone 200 mg oral tablet: 1 tab(s) orally Monday, Tuesday, Thursday, and Friday  amLODIPine 2.5 mg oral tablet: 2 tab(s) orally once a day  commode: dx: K56.609  Eliquis 2.5 mg oral tablet: 1 tab(s) orally 2 times a day  Protonix 40 mg oral delayed release tablet: 1 tab(s) orally once a day  sertraline 50 mg oral tablet: 1 tab(s) orally once a day  traMADol 50 mg oral tablet: 1 tab(s) orally 3 times, As Needed

## 2021-03-24 NOTE — DISCHARGE NOTE PROVIDER - HOSPITAL COURSE
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 Millard in 2018 treated with NGT then consult for Dr. Dae Murillo in 10/2020 treated conservatively without NGT) presents with 1 day of epigastric abdominal pain and brown emesis with emesis x 1 in ED, desatting to 88% in ED, WBC 4, lactate 1.7, mild TTP to lower abdomen, found to have +UA and CT negative for PE, patchy GGO and consolidations in lungs, SBO with transition point in lower mid abdomen. ED placed NGT after large emesis episode, minimal output upon placement of NGT. cefepime x 1 given in ED. COVID negative.    The patient was admitted to the Trauma surgery service, made NPO/IVF, and an NG tube was placed.  Internal medicine/cardiology was consulted for medical comanagement.  Physical therapy evaluated patient and recommended [facility] when medically cleared for discharge.    Patients pain improved, and had return of GI function.  NG tube was removed and patients diet was advanced which she tolerated. On day of discharge, the patient was tolerating diet, working with PT.  The patient will be discharged to [facility] with outpatient follow up with Dr. Redd within 1-2 weeks. 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 Millard in 2018 treated with NGT then consult for Dr. Dae Murillo in 10/2020 treated conservatively without NGT) presents with 1 day of epigastric abdominal pain and brown emesis with emesis x 1 in ED, desatting to 88% in ED, WBC 4, lactate 1.7, mild TTP to lower abdomen, found to have +UA and CT negative for PE, patchy GGO and consolidations in lungs, SBO with transition point in lower mid abdomen. ED placed NGT after large emesis episode, minimal output upon placement of NGT. cefepime x 1 given in ED. COVID negative.    The patient was admitted to the Trauma surgery service, made NPO/IVF, and an NG tube was placed.  Internal medicine/cardiology was consulted for medical comanagement.  Physical therapy evaluated patient and recommended [facility] when medically cleared for discharge.    Patients pain improved, and had return of GI function.  NG tube was removed and patients diet was advanced which she tolerated. Pt was requiring supplemental oxygen, pulmonary was consulted and patient was started on nebulizers.  She was successfully weaned off of supplemental oxygen.  On day of discharge, the patient was tolerating diet, working with PT.  The patient will be discharged to [facility] with outpatient follow up with Dr. Redd within 1-2 weeks.  She will also follow-up with her PMD within 1-2 weeks. 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 Millard in 2018 treated with NGT then consult for Dr. Dae Murillo in 10/2020 treated conservatively without NGT) presents with 1 day of epigastric abdominal pain and brown emesis with emesis x 1 in ED, desatting to 88% in ED, WBC 4, lactate 1.7, mild TTP to lower abdomen, found to have +UA and CT negative for PE, patchy GGO and consolidations in lungs, SBO with transition point in lower mid abdomen. ED placed NGT after large emesis episode, minimal output upon placement of NGT. cefepime x 1 given in ED. COVID negative.    The patient was admitted to the Trauma surgery service, made NPO/IVF, and an NG tube was placed.  Internal medicine/cardiology was consulted for medical comanagement.  Physical therapy evaluated patient and recommended [facility] when medically cleared for discharge, however pt's daughter preferred taking her home with home care.    Patients pain improved, and had return of GI function.  NG tube was removed and patients diet was advanced which she tolerated. Pt was requiring supplemental oxygen, pulmonary was consulted and patient was started on nebulizers.  She was successfully weaned off of supplemental oxygen.  On day of discharge, the patient was tolerating diet, working with PT.  The patient will be discharged to home with outpatient follow up with Dr. Redd within 1-2 weeks.  She will also follow-up with her PMD within 1-2 weeks.

## 2021-03-24 NOTE — PROGRESS NOTE ADULT - SUBJECTIVE AND OBJECTIVE BOX
[facility] Cardiology Progress Note  _______________________________    Pt. seen and examined. No new cardiac-related complaints.      T(C): 37.3 (03-24-21 @ 09:23), Max: 37.8 (03-23-21 @ 16:36)  HR: 62 (03-24-21 @ 09:23) (62 - 77)  BP: 127/65 (03-24-21 @ 09:23) (112/74 - 155/75)  RR: 18 (03-24-21 @ 09:23) (18 - 19)  SpO2: 95% (03-24-21 @ 09:23) (87% - 97%)  I&O's Summary    23 Mar 2021 07:01  -  24 Mar 2021 07:00  --------------------------------------------------------  IN: 1000 mL / OUT: 1570 mL / NET: -570 mL        PHYSICAL EXAM:  GENERAL: Alert, NAD.  NECK: Supple  CHEST/LUNG: Clear to auscultation bilaterally; No wheezes, rales, or rhonchi.  HEART: S1 S2 normal, RRR; No murmurs, rubs, or gallops  ABDOMEN: Soft, nondistended  EXTREMITIES:  No LE edema.      LABS:                        11.6   4.74  )-----------( 128      ( 24 Mar 2021 07:16 )             37.4     03-24    139  |  105  |  10  ----------------------------<  106<H>  3.8   |  25  |  0.47<L>    Ca    8.5      24 Mar 2021 07:15  Phos  2.1     03-24  Mg     2.0     03-24                    MEDICATIONS  (STANDING):  aMIOdarone    Tablet 200 milliGRAM(s) Oral <User Schedule>  amLODIPine   Tablet 2.5 milliGRAM(s) Oral daily  cefepime   IVPB 1000 milliGRAM(s) IV Intermittent every 12 hours  dextrose 5% + sodium chloride 0.45% with potassium chloride 20 mEq/L 1000 milliLiter(s) (75 mL/Hr) IV Continuous <Continuous>  heparin   Injectable 5000 Unit(s) SubCutaneous every 8 hours  pantoprazole    Tablet 40 milliGRAM(s) Oral before breakfast  sodium phosphate IVPB 30 milliMole(s) IV Intermittent once    MEDICATIONS  (PRN):        RADIOLOGY & ADDITIONAL TESTS:

## 2021-03-24 NOTE — PROGRESS NOTE ADULT - ASSESSMENT
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 Millard in 2018 treated with NGT then consult for Dr. Dae Murillo in 10/2020 treated conservatively without NGT) presented with 1 day of epigastric abdominal pain and brown emesis, SBO with transition point in lower mid abdomen c/w UTI.    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 Millard in 2018 treated with NGT then consult for Dr. Dae Murillo in 10/2020 treated conservatively without NGT) presented with 1 day of epigastric abdominal pain and brown emesis, SBO with transition point in lower mid abdomen c/w UTI.    Plan:  - NGT removed  - possible clears   - Monitor GI function  - will continue to hold Eliquis this admission and follow up with PCP as outpatient regarding restarting given age and fall risk  - SQH for VTE ppx  - c/w home med protonix  - UCX +E.coli , -> cefepime for total of 5 days  - PT: [facility] ALFONSOC [id number]

## 2021-03-24 NOTE — PROGRESS NOTE ADULT - PROBLEM SELECTOR PLAN 2
-  -management as per primary team    will follow as needed.     Patient of Dr. Steven Goldberg ([facility])    Alejandro Ro D.O.  [phone].

## 2021-03-24 NOTE — DISCHARGE NOTE PROVIDER - CARE PROVIDER_API CALL
Celeste Redd (MD)  Surgery; Surgical Critical Care  [address] [facility], [facility] [address], [place] [address]  Phone: [phone]  Fax: [phone]  Follow Up Time: 2 weeks    Alejandro Ro ()  Internal Medicine  [address] [place] [address], [facility] [address]  [place] [address]  Phone: [phone]  Fax: [phone]  Follow Up Time: 2 weeks    Goldberg, Steven M (MD)  Cardiovascular Disease; Internal Medicine  [address] [place] [address]  Phone: [phone]  Fax: [phone]  Follow Up Time: 2 weeks

## 2021-03-24 NOTE — DISCHARGE NOTE PROVIDER - NSFTFHOMEHTHYNRD_GEN_ALL_CORE
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
Yes

## 2021-03-24 NOTE — DISCHARGE NOTE PROVIDER - CARE PROVIDERS DIRECT ADDRESSES
[email or web address]

## 2021-03-24 NOTE — DISCHARGE NOTE PROVIDER - PROVIDER TOKENS
PROVIDER:[TOKEN:[[id number]],FOLLOWUP:[2 weeks]],PROVIDER:[TOKEN:[[id number]],FOLLOWUP:[2 weeks]],PROVIDER:[TOKEN:[[id number]],FOLLOWUP:[2 weeks]]

## 2021-03-24 NOTE — PROGRESS NOTE ADULT - SUBJECTIVE AND OBJECTIVE BOX
ACS DAILY PROGRESS NOTE:       SUBJECTIVE/ROS: Patient feels well- passing flatus and several bowel movements overnight- Denies nausea, vomiting, chest pain, shortness of breath         MEDICATIONS  (STANDING):  cefepime   IVPB 1000 milliGRAM(s) IV Intermittent every 12 hours  dextrose 5% + sodium chloride 0.45% with potassium chloride 20 mEq/L 1000 milliLiter(s) (75 mL/Hr) IV Continuous <Continuous>  heparin   Injectable 5000 Unit(s) SubCutaneous every 8 hours  pantoprazole  Injectable 40 milliGRAM(s) IV Push daily  sodium phosphate IVPB 30 milliMole(s) IV Intermittent once    MEDICATIONS  (PRN):      OBJECTIVE:    Vital Signs Last 24 Hrs  T(C): 36.8 (24 Mar 2021 01:11), Max: 37.8 (23 Mar 2021 16:36)  T(F): 98.2 (24 Mar 2021 01:11), Max: 100 (23 Mar 2021 16:36)  HR: 69 (24 Mar 2021 01:11) (67 - 77)  BP: 146/74 (24 Mar 2021 01:11) (112/74 - 155/75)  BP(mean): --  RR: 19 (24 Mar 2021 08:11) (18 - 19)  SpO2: 92% (24 Mar 2021 08:11) (87% - 97%)        I&O's Detail    23 Mar 2021 07:01  -  24 Mar 2021 07:00  --------------------------------------------------------  IN:    dextrose 5% + sodium chloride 0.45% w/ Additives: 900 mL    IV PiggyBack: 100 mL  Total IN: 1000 mL    OUT:    Nasogastric/Oral tube (mL): 300 mL    Oral Fluid: 0 mL    Voided (mL): 1270 mL  Total OUT: 1570 mL    Total NET: -570 mL          Daily     Daily     LABS:                        11.6   4.74  )-----------( 128      ( 24 Mar 2021 07:16 )             37.4     03-24    139  |  105  |  10  ----------------------------<  106<H>  3.8   |  25  |  0.47<L>    Ca    8.5      24 Mar 2021 07:15  Phos  2.1     03-24  Mg     2.0     03-24                  PHYSICAL EXAM:  General: No acute distress  Respiratory: Nonlabored  Abdominal: Soft, minimally distended, non-tender abdomen. Well-healed midline surgical scar. No rebound or guarding.   Extremities: Warm

## 2021-03-24 NOTE — DISCHARGE NOTE PROVIDER - NSDCCPCAREPLAN_GEN_ALL_CORE_FT
PRINCIPAL DISCHARGE DIAGNOSIS  Diagnosis: SBO (small bowel obstruction)  Assessment and Plan of Treatment: DIET: Low fiber diet  NOTIFY YOUR SURGEON IF: You have any fever (over 100.4 F) or chills, persistent nausea/vomiting with inability to tolerate food or liquids, persistent diarrhea, or if your pain is not controlled on your discharge pain medications.  FOLLOW-UP:  1. Please call to make a follow-up appointment within one week of discharge   2. Please follow up with your primary care physician in one week regarding your hospitalization.      SECONDARY DISCHARGE DIAGNOSES  Diagnosis: Multifocal lung consolidation  Assessment and Plan of Treatment: You were found to have lung opacities on chest CT.  Please follow up with your PCP, Dr. Goldberg within 1-2 weeks for further surveillance.

## 2021-03-24 NOTE — PROGRESS NOTE ADULT - ASSESSMENT
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 Millard in 2018 treated with NGT then consult for Dr. Dae Murillo in 10/2020 treated conservatively without NGT) presents with 1 day of epigastric abdominal pain and brown emesis. admitted for SBO.    # SBO  # PAF  # HTN  # GGO on CT    resume amlodipine and amio and AC now that pt no longer npo  NGT per surgery  leukopenia improving  urine cx ecoli, can cont ceftriaxone, fu speciation, 5 day course abx  no signs of pna, afebrile, no cough sputum  GGO on CT chest, outpt fu with Dr. Goldberg for repeat CT    [facility] [phone].

## 2021-03-24 NOTE — PROGRESS NOTE ADULT - PROBLEM SELECTOR PROBLEM 1
Paroxysmal atrial fibrillation Advancement Flap (Double) Text: The defect edges were debeveled with a #15 scalpel blade.  Given the location of the defect and the proximity to free margins a double advancement flap was deemed most appropriate.  Using a sterile surgical marker, the appropriate advancement flaps were drawn incorporating the defect and placing the expected incisions within the relaxed skin tension lines where possible.    The area thus outlined was incised deep to adipose tissue with a #15 scalpel blade.  The skin margins were undermined to an appropriate distance in all directions utilizing iris scissors.

## 2021-03-25 LAB
ANION GAP SERPL CALC-SCNC: 9 MMOL/L — SIGNIFICANT CHANGE UP (ref 5–17)
BUN SERPL-MCNC: 7 MG/DL — SIGNIFICANT CHANGE UP (ref 7–23)
CALCIUM SERPL-MCNC: 8.4 MG/DL — SIGNIFICANT CHANGE UP (ref 8.4–10.5)
CHLORIDE SERPL-SCNC: 101 MMOL/L — SIGNIFICANT CHANGE UP (ref 96–108)
CO2 SERPL-SCNC: 26 MMOL/L — SIGNIFICANT CHANGE UP (ref 22–31)
CREAT SERPL-MCNC: 0.44 MG/DL — LOW (ref 0.5–1.3)
GLUCOSE SERPL-MCNC: 90 MG/DL — SIGNIFICANT CHANGE UP (ref 70–99)
HCT VFR BLD CALC: 36.6 % — SIGNIFICANT CHANGE UP (ref 34.5–45)
HGB BLD-MCNC: 11.4 G/DL — LOW (ref 11.5–15.5)
MAGNESIUM SERPL-MCNC: 2 MG/DL — SIGNIFICANT CHANGE UP (ref 1.6–2.6)
MCHC RBC-ENTMCNC: 30.7 PG — SIGNIFICANT CHANGE UP (ref 27–34)
MCHC RBC-ENTMCNC: 31.1 GM/DL — LOW (ref 32–36)
MCV RBC AUTO: 98.7 FL — SIGNIFICANT CHANGE UP (ref 80–100)
NRBC # BLD: 0 /100 WBCS — SIGNIFICANT CHANGE UP (ref 0–0)
PHOSPHATE SERPL-MCNC: 2.5 MG/DL — SIGNIFICANT CHANGE UP (ref 2.5–4.5)
PLATELET # BLD AUTO: 121 K/UL — LOW (ref 150–400)
POTASSIUM SERPL-MCNC: 3.5 MMOL/L — SIGNIFICANT CHANGE UP (ref 3.5–5.3)
POTASSIUM SERPL-SCNC: 3.5 MMOL/L — SIGNIFICANT CHANGE UP (ref 3.5–5.3)
RBC # BLD: 3.71 M/UL — LOW (ref 3.8–5.2)
RBC # FLD: 12.1 % — SIGNIFICANT CHANGE UP (ref 10.3–14.5)
SODIUM SERPL-SCNC: 136 MMOL/L — SIGNIFICANT CHANGE UP (ref 135–145)
WBC # BLD: 4.62 K/UL — SIGNIFICANT CHANGE UP (ref 3.8–10.5)
WBC # FLD AUTO: 4.62 K/UL — SIGNIFICANT CHANGE UP (ref 3.8–10.5)

## 2021-03-25 RX ORDER — POTASSIUM PHOSPHATE, MONOBASIC POTASSIUM PHOSPHATE, DIBASIC 236; 224 MG/ML; MG/ML
30 INJECTION, SOLUTION INTRAVENOUS ONCE
Refills: 0 | Status: COMPLETED | OUTPATIENT
Start: 2021-03-25 | End: 2021-03-25

## 2021-03-25 RX ADMIN — AMIODARONE HYDROCHLORIDE 200 MILLIGRAM(S): 400 TABLET ORAL at 13:55

## 2021-03-25 RX ADMIN — CEFEPIME 100 MILLIGRAM(S): 1 INJECTION, POWDER, FOR SOLUTION INTRAMUSCULAR; INTRAVENOUS at 06:01

## 2021-03-25 RX ADMIN — SERTRALINE 50 MILLIGRAM(S): 25 TABLET, FILM COATED ORAL at 13:55

## 2021-03-25 RX ADMIN — PANTOPRAZOLE SODIUM 40 MILLIGRAM(S): 20 TABLET, DELAYED RELEASE ORAL at 06:01

## 2021-03-25 RX ADMIN — CEFEPIME 100 MILLIGRAM(S): 1 INJECTION, POWDER, FOR SOLUTION INTRAMUSCULAR; INTRAVENOUS at 18:06

## 2021-03-25 RX ADMIN — HEPARIN SODIUM 5000 UNIT(S): 5000 INJECTION INTRAVENOUS; SUBCUTANEOUS at 21:49

## 2021-03-25 RX ADMIN — POTASSIUM PHOSPHATE, MONOBASIC POTASSIUM PHOSPHATE, DIBASIC 83.33 MILLIMOLE(S): 236; 224 INJECTION, SOLUTION INTRAVENOUS at 15:37

## 2021-03-25 RX ADMIN — HEPARIN SODIUM 5000 UNIT(S): 5000 INJECTION INTRAVENOUS; SUBCUTANEOUS at 13:56

## 2021-03-25 RX ADMIN — AMLODIPINE BESYLATE 2.5 MILLIGRAM(S): 2.5 TABLET ORAL at 06:01

## 2021-03-25 RX ADMIN — HEPARIN SODIUM 5000 UNIT(S): 5000 INJECTION INTRAVENOUS; SUBCUTANEOUS at 06:01

## 2021-03-25 NOTE — PROGRESS NOTE ADULT - ASSESSMENT
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 Millard in 2018 treated with NGT then consult for Dr. Dae Murillo in 10/2020 treated conservatively without NGT) presents with 1 day of epigastric abdominal pain and brown emesis. admitted for SBO.    # SBO  # PAF  # HTN  # GGO on CT    resume amlodipine and amio and AC now that pt no longer npo  NGT per surgery  leukopenia improving  urine cx ecoli, can cont ceftriaxone, fu speciation, 5 day course abx  no signs of pna, afebrile, no cough sputum, wean o2 to off  GGO on CT chest, outpt fu with Dr. Goldberg for repeat CT    Please call [facility] with questions [phone].

## 2021-03-25 NOTE — PROGRESS NOTE ADULT - SUBJECTIVE AND OBJECTIVE BOX
Patient is a 89y old  Female who presents with a chief complaint of SBO (25 Mar 2021 09:23)      INTERVAL HPI/OVERNIGHT EVENTS: noted  pt seen and examined this am   events noted  tolerating po well  c/o loose bm, denies n/v/abd pain      Vital Signs Last 24 Hrs  T(C): 36.9 (25 Mar 2021 09:54), Max: 37.3 (24 Mar 2021 21:49)  T(F): 98.4 (25 Mar 2021 09:54), Max: 99.2 (24 Mar 2021 21:49)  HR: 67 (25 Mar 2021 09:54) (62 - 70)  BP: 129/76 (25 Mar 2021 09:54) (120/70 - 150/72)  BP(mean): --  RR: 18 (25 Mar 2021 09:54) (18 - 18)  SpO2: 97% (25 Mar 2021 09:54) (94% - 97%)    aMIOdarone    Tablet 200 milliGRAM(s) Oral <User Schedule>  amLODIPine   Tablet 2.5 milliGRAM(s) Oral daily  cefepime   IVPB 1000 milliGRAM(s) IV Intermittent every 12 hours  heparin   Injectable 5000 Unit(s) SubCutaneous every 8 hours  pantoprazole    Tablet 40 milliGRAM(s) Oral before breakfast  potassium phosphate IVPB 30 milliMole(s) IV Intermittent once  sertraline 50 milliGRAM(s) Oral daily      PHYSICAL EXAM:  GENERAL: NAD,   EYES: conjunctiva and sclera clear  ENMT: Moist mucous membranes  NECK: Supple, No JVD, Normal thyroid  CHEST/LUNG: non labored, cta b/l  HEART: Regular rate and rhythm; No murmurs, rubs, or gallops  ABDOMEN: Soft, Nontender, Nondistended; Bowel sounds present  EXTREMITIES:  2+ Peripheral Pulses, No clubbing, cyanosis, or edema  LYMPH: No lymphadenopathy noted  SKIN: No rashes or lesions    Consultant(s) Notes Reviewed:  [x ] YES  [ ] NO  Care Discussed with Consultants/Other Providers [ x] YES  [ ] NO    LABS:                        11.4   4.62  )-----------( 121      ( 25 Mar 2021 07:05 )             36.6     03-25    136  |  101  |  7   ----------------------------<  90  3.5   |  26  |  0.44<L>    Ca    8.4      25 Mar 2021 07:05  Phos  2.5     03-25  Mg     2.0     03-25          CAPILLARY BLOOD GLUCOSE                  RADIOLOGY & ADDITIONAL TESTS:    Imaging Personally Reviewed:  [x ] YES  [ ] NO

## 2021-03-25 NOTE — PROGRESS NOTE ADULT - ASSESSMENT
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 Millard in 2018 treated with NGT then consult for Dr. Dae Murillo in 10/2020 treated conservatively without NGT) presented with 1 day of epigastric abdominal pain and brown emesis, SBO with transition point in lower mid abdomen c/w UTI.    Plan:  - O2 weaned, monitor saturation  - Low Residue Diet  - Monitor GI function  - will continue to hold Eliquis this admission and follow up with PCP as outpatient regarding restarting given age and fall risk  - SQH for VTE ppx  - c/w home med protonix  - UCX +E.coli , -> cefepime for total of 5 days (until 3/26)  - PT: [facility] although patient may want home care via [place] [employer] benefits    ATP  [id number]

## 2021-03-25 NOTE — PROGRESS NOTE ADULT - SUBJECTIVE AND OBJECTIVE BOX
[facility] Surgery Progress Note    POD #:     24hr events:    Overnight events:     SUBJECTIVE:  Reports pain  Denies nausea, vomiting  Is passing gas and having bowel movements. Denies diarrhea  Tolerating diet  Ambulating independently    OBJECTIVE:  Vital Signs Last 24 Hrs  T(C): 37.1 (25 Mar 2021 06:00), Max: 37.3 (24 Mar 2021 21:49)  T(F): 98.7 (25 Mar 2021 06:00), Max: 99.2 (24 Mar 2021 21:49)  HR: 62 (25 Mar 2021 06:00) (62 - 70)  BP: 147/80 (25 Mar 2021 06:00) (120/70 - 150/72)  BP(mean): --  RR: 18 (25 Mar 2021 06:00) (18 - 18)  SpO2: 97% (25 Mar 2021 06:00) (94% - 97%)    Physical Examination:  GEN: NAD, resting quietly  NEURO: AAOx3, CN II-XII grossly intact, no focal deficits  PULM: symmetric chest rise bilaterally, no increased WOB  ABD: soft, nontender, nondistended, incision CDI  EXTR: no LE erythema, moving all extremities  VASC: LE warm and well-perfused    I&O's Detail    24 Mar 2021 07:01  -  25 Mar 2021 07:00  --------------------------------------------------------  IN:    IV PiggyBack: 100 mL    Oral Fluid: 580 mL  Total IN: 680 mL    OUT:    Voided (mL): 800 mL  Total OUT: 800 mL    Total NET: -120 mL            LABS:                        11.4   4.62  )-----------( 121      ( 25 Mar 2021 07:05 )             36.6       03-25    136  |  101  |  7   ----------------------------<  90  3.5   |  26  |  0.44<L>    Ca    8.4      25 Mar 2021 07:05  Phos  2.5     03-25  Mg     2.0     03-25          IMAGING:  [] [facility] Surgery Progress Note    Overnight events:   No acute events overnight    SUBJECTIVE:  Patient did not each much last night  +/+ bowel function  Denies shortness of breath, chest pain, abdominal pain, N/V    OBJECTIVE:  Vital Signs Last 24 Hrs  T(C): 37.1 (25 Mar 2021 06:00), Max: 37.3 (24 Mar 2021 21:49)  T(F): 98.7 (25 Mar 2021 06:00), Max: 99.2 (24 Mar 2021 21:49)  HR: 62 (25 Mar 2021 06:00) (62 - 70)  BP: 147/80 (25 Mar 2021 06:00) (120/70 - 150/72)  BP(mean): --  RR: 18 (25 Mar 2021 06:00) (18 - 18)  SpO2: 97% (25 Mar 2021 06:00) (94% - 97%)    Physical Examination:  General: No acute distress  Respiratory: Nonlabored  Abdominal: Soft, minimally distended, non-tender abdomen. Well-healed midline surgical scar. No rebound or guarding.   Extremities: Warm    I&O's Detail    24 Mar 2021 07:01  -  25 Mar 2021 07:00  --------------------------------------------------------  IN:    IV PiggyBack: 100 mL    Oral Fluid: 580 mL  Total IN: 680 mL    OUT:    Voided (mL): 800 mL  Total OUT: 800 mL    Total NET: -120 mL            LABS:                        11.4   4.62  )-----------( 121      ( 25 Mar 2021 07:05 )             36.6       03-25    136  |  101  |  7   ----------------------------<  90  3.5   |  26  |  0.44<L>    Ca    8.4      25 Mar 2021 07:05  Phos  2.5     03-25  Mg     2.0     03-25          IMAGING:  []

## 2021-03-26 LAB
ANION GAP SERPL CALC-SCNC: 10 MMOL/L — SIGNIFICANT CHANGE UP (ref 5–17)
BUN SERPL-MCNC: 9 MG/DL — SIGNIFICANT CHANGE UP (ref 7–23)
CALCIUM SERPL-MCNC: 8.7 MG/DL — SIGNIFICANT CHANGE UP (ref 8.4–10.5)
CHLORIDE SERPL-SCNC: 102 MMOL/L — SIGNIFICANT CHANGE UP (ref 96–108)
CO2 SERPL-SCNC: 26 MMOL/L — SIGNIFICANT CHANGE UP (ref 22–31)
CREAT SERPL-MCNC: 0.46 MG/DL — LOW (ref 0.5–1.3)
GLUCOSE SERPL-MCNC: 81 MG/DL — SIGNIFICANT CHANGE UP (ref 70–99)
HCT VFR BLD CALC: 35.6 % — SIGNIFICANT CHANGE UP (ref 34.5–45)
HGB BLD-MCNC: 11.1 G/DL — LOW (ref 11.5–15.5)
MAGNESIUM SERPL-MCNC: 2 MG/DL — SIGNIFICANT CHANGE UP (ref 1.6–2.6)
MCHC RBC-ENTMCNC: 30.7 PG — SIGNIFICANT CHANGE UP (ref 27–34)
MCHC RBC-ENTMCNC: 31.2 GM/DL — LOW (ref 32–36)
MCV RBC AUTO: 98.3 FL — SIGNIFICANT CHANGE UP (ref 80–100)
NRBC # BLD: 0 /100 WBCS — SIGNIFICANT CHANGE UP (ref 0–0)
PHOSPHATE SERPL-MCNC: 2.6 MG/DL — SIGNIFICANT CHANGE UP (ref 2.5–4.5)
PLATELET # BLD AUTO: 132 K/UL — LOW (ref 150–400)
POTASSIUM SERPL-MCNC: 3.8 MMOL/L — SIGNIFICANT CHANGE UP (ref 3.5–5.3)
POTASSIUM SERPL-SCNC: 3.8 MMOL/L — SIGNIFICANT CHANGE UP (ref 3.5–5.3)
RBC # BLD: 3.62 M/UL — LOW (ref 3.8–5.2)
RBC # FLD: 12 % — SIGNIFICANT CHANGE UP (ref 10.3–14.5)
SARS-COV-2 RNA SPEC QL NAA+PROBE: SIGNIFICANT CHANGE UP
SODIUM SERPL-SCNC: 138 MMOL/L — SIGNIFICANT CHANGE UP (ref 135–145)
WBC # BLD: 3.44 K/UL — LOW (ref 3.8–10.5)
WBC # FLD AUTO: 3.44 K/UL — LOW (ref 3.8–10.5)

## 2021-03-26 PROCEDURE — 99232 SBSQ HOSP IP/OBS MODERATE 35: CPT | Mod: GC

## 2021-03-26 PROCEDURE — 71045 X-RAY EXAM CHEST 1 VIEW: CPT | Mod: 26

## 2021-03-26 PROCEDURE — 93010 ELECTROCARDIOGRAM REPORT: CPT | Mod: 77

## 2021-03-26 PROCEDURE — 93010 ELECTROCARDIOGRAM REPORT: CPT

## 2021-03-26 RX ORDER — POTASSIUM PHOSPHATE, MONOBASIC POTASSIUM PHOSPHATE, DIBASIC 236; 224 MG/ML; MG/ML
15 INJECTION, SOLUTION INTRAVENOUS ONCE
Refills: 0 | Status: COMPLETED | OUTPATIENT
Start: 2021-03-26 | End: 2021-03-26

## 2021-03-26 RX ORDER — IPRATROPIUM/ALBUTEROL SULFATE 18-103MCG
3 AEROSOL WITH ADAPTER (GRAM) INHALATION EVERY 6 HOURS
Refills: 0 | Status: COMPLETED | OUTPATIENT
Start: 2021-03-26 | End: 2021-03-28

## 2021-03-26 RX ADMIN — AMLODIPINE BESYLATE 2.5 MILLIGRAM(S): 2.5 TABLET ORAL at 06:34

## 2021-03-26 RX ADMIN — AMIODARONE HYDROCHLORIDE 200 MILLIGRAM(S): 400 TABLET ORAL at 09:04

## 2021-03-26 RX ADMIN — PANTOPRAZOLE SODIUM 40 MILLIGRAM(S): 20 TABLET, DELAYED RELEASE ORAL at 06:34

## 2021-03-26 RX ADMIN — CEFEPIME 100 MILLIGRAM(S): 1 INJECTION, POWDER, FOR SOLUTION INTRAMUSCULAR; INTRAVENOUS at 17:02

## 2021-03-26 RX ADMIN — HEPARIN SODIUM 5000 UNIT(S): 5000 INJECTION INTRAVENOUS; SUBCUTANEOUS at 14:35

## 2021-03-26 RX ADMIN — CEFEPIME 100 MILLIGRAM(S): 1 INJECTION, POWDER, FOR SOLUTION INTRAMUSCULAR; INTRAVENOUS at 06:34

## 2021-03-26 RX ADMIN — HEPARIN SODIUM 5000 UNIT(S): 5000 INJECTION INTRAVENOUS; SUBCUTANEOUS at 06:34

## 2021-03-26 RX ADMIN — Medication 3 MILLILITER(S): at 17:02

## 2021-03-26 RX ADMIN — Medication 3 MILLILITER(S): at 13:03

## 2021-03-26 RX ADMIN — HEPARIN SODIUM 5000 UNIT(S): 5000 INJECTION INTRAVENOUS; SUBCUTANEOUS at 23:44

## 2021-03-26 RX ADMIN — SERTRALINE 50 MILLIGRAM(S): 25 TABLET, FILM COATED ORAL at 14:34

## 2021-03-26 RX ADMIN — Medication 3 MILLILITER(S): at 23:44

## 2021-03-26 RX ADMIN — POTASSIUM PHOSPHATE, MONOBASIC POTASSIUM PHOSPHATE, DIBASIC 62.5 MILLIMOLE(S): 236; 224 INJECTION, SOLUTION INTRAVENOUS at 09:36

## 2021-03-26 NOTE — CONSULT NOTE ADULT - ASSESSMENT
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   Initial chest imaging and CT show mainly RLL ground glass and nodular opacities which, given her history are suspicious for aspiration pneumonia.   She is currently on minimal oxygen (0.5L) for an adequate saturation of 92% and is improving.   Likely combination atelectasis, the aspiration pneumonia and underlying V/Q mismatch from bronchiectasis and scarring from MTB.     She has mild asthma by history, occasionally uses inhaled albuterol at home.     Recs  Agree with bronchodilators as ordered  Continue with oxygen titration as needed for SpO2 >88%.   short course of antibiotics for pneumonia (5 days max)    Keanu Silva MD  Pulmonary/Critical Care Fellow  page: [phone]

## 2021-03-26 NOTE — CONSULT NOTE ADULT - ATTENDING COMMENTS
89F p/w  SBO and now with hypoxia. Most likely c/w aspiraiton PNA and possible worsening of her baseline asthma. CT shows PNA as well as TB scarring.  Cont abx. Cont BD tx.
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 Millard in 2018 treated with NGT then consult for Dr. Dae Murillo in 10/2020 treated conservatively without NGT) presents with 1 day of epigastric abdominal pain and brown emesis with emesis x 1 in ED, desatting to 88% in ED, WBC 4, lactate 1.7, mild TTP to lower abdomen, found to have +UA and CT negative for PE, patchy GGO and consolidations in lungs, SBO with transition point in lower mid abdomen.    # SBO  # PAF  # HTN    holding amlodipine and amio and AC  NGT per surgery  leukopenia, trend lactate  fu urine cx, on cefepime empirically  unlikely pna but could have aspirated with emesis, would monitor for fever    Thank you for this consult. Please call [facility] with questions [phone].

## 2021-03-26 NOTE — PROGRESS NOTE ADULT - ASSESSMENT
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 Millard in 2018 treated with NGT then consult for Dr. Dae Murillo in 10/2020 treated conservatively without NGT) presents with 1 day of epigastric abdominal pain and brown emesis. admitted for SBO.  CT Angio chest- no PE  Patchy consolidations in the right upper lobe, right middle lobe and bilateral lower lobes, suggestive of multifocal infection.      # SBO  # PAF  # HTN  # GGO on CT  #PNA- multifocal /r/o aspiration    resume amlodipine and amio and AC now that pt no longer npo  leukopenia   urine cx ecoli, sp cefepime,  5 day course abx  multifocal Pna-r/o aspiration,  wean o2 to off as possible, nebs prn, swallow eval, Incentive spirometry  cxr reviewed, lt trace effusion/ atelectasis,  improved PNA- likely responded w cefepime, will cont cefepime for 2more days, may consider ID and pulm cs as needed  assess the need for home o2   outpt fu with Dr. Goldberg for repeat CT     [facility] with questions [phone].

## 2021-03-26 NOTE — PROGRESS NOTE ADULT - SUBJECTIVE AND OBJECTIVE BOX
SURGERY DAILY PROGRESS NOTE:       Subjective / Overnight events:  No acute overnight events.  Requiring supplemental oxygen, desats to 88-89 when off supplemental O2.  Tolerating diet, denies N/V.  Pain controlled.      Objective:      MEDICATIONS  (STANDING):  albuterol/ipratropium for Nebulization 3 milliLiter(s) Nebulizer every 6 hours  aMIOdarone    Tablet 200 milliGRAM(s) Oral <User Schedule>  amLODIPine   Tablet 2.5 milliGRAM(s) Oral daily  cefepime   IVPB 1000 milliGRAM(s) IV Intermittent every 12 hours  heparin   Injectable 5000 Unit(s) SubCutaneous every 8 hours  pantoprazole    Tablet 40 milliGRAM(s) Oral before breakfast  potassium phosphate IVPB 15 milliMole(s) IV Intermittent once  sertraline 50 milliGRAM(s) Oral daily    MEDICATIONS  (PRN):      Vital Signs Last 24 Hrs  T(C): 36.8 (26 Mar 2021 09:07), Max: 37.1 (25 Mar 2021 16:37)  T(F): 98.3 (26 Mar 2021 09:07), Max: 98.7 (25 Mar 2021 16:37)  HR: 67 (26 Mar 2021 09:07) (62 - 72)  BP: 134/68 (26 Mar 2021 09:07) (119/74 - 163/74)  BP(mean): --  RR: 18 (26 Mar 2021 09:07) (18 - 18)  SpO2: 92% (26 Mar 2021 11:59) (88% - 96%)    I&O's Detail    25 Mar 2021 07:01  -  26 Mar 2021 07:00  --------------------------------------------------------  IN:    IV PiggyBack: 100 mL    IV PiggyBack: 250 mL    Oral Fluid: 840 mL  Total IN: 1190 mL    OUT:    Estimated Blood Loss (mL): 0 mL    Voided (mL): 725 mL  Total OUT: 725 mL    Total NET: 465 mL      26 Mar 2021 07:01  -  26 Mar 2021 12:30  --------------------------------------------------------  IN:    IV PiggyBack: 250 mL    Oral Fluid: 340 mL  Total IN: 590 mL    OUT:    Voided (mL): 350 mL  Total OUT: 350 mL    Total NET: 240 mL          Daily     Daily     LABS:                        11.1   3.44  )-----------( 132      ( 26 Mar 2021 07:22 )             35.6     03-26    138  |  102  |  9   ----------------------------<  81  3.8   |  26  |  0.46<L>    Ca    8.7      26 Mar 2021 07:09  Phos  2.6     03-26  Mg     2.0     03-26                Physical Examination:  General: No acute distress  Respiratory: Nonlabored  Abdominal: Soft, nondistended, non-tender abdomen. Well-healed midline surgical scar. No rebound or guarding.   Extremities: Warm

## 2021-03-26 NOTE — CONSULT NOTE ADULT - SUBJECTIVE AND OBJECTIVE BOX
CHIEF COMPLAINT:  vomiting    HPI:    89yF with history of recurrent SBO presents for vomiting. She said she had frequent large volume uncontrollable vomiting episodes which brought her to the hospital . She has a history of bronchiectasis due to REDD TB infection.   She reports intermittent cough and wheezing at home and infrequently uses nebulizers including albuterol for the wheezing.     Now she has no respiratory distress. She has minimal discomfort now, occasional cough with minimal sputum production.     She is currently on 0.5L nasal cannula - SpO2 92%      PAST MEDICAL & SURGICAL HISTORY:  TB (pulmonary tuberculosis)  TB of abdomen 1960    HTN (hypertension)    UTI (urinary tract infection)    Hypothyroid    SBO (small bowel obstruction)    Afib    Gastritis    Pulmonary TB    S/P vein stripping    H/O hemorrhoidectomy    S/P Exploratory Laparotomy  TB in 1960s    History of Bilateral Breast Biopsy        FAMILY HISTORY:    HOME MEDICATIONS:  Home Medications:  amiodarone 200 mg oral tablet: 1 tab(s) orally Monday, Tuesday, Thursday, and Friday (23 Oct 2020 23:47)  amLODIPine 2.5 mg oral tablet: 2 tab(s) orally once a day (21 Mar 2021 20:11)  Eliquis 2.5 mg oral tablet: 1 tab(s) orally 2 times a day (23 Oct 2020 23:47)  Protonix 40 mg oral delayed release tablet: 1 tab(s) orally once a day (23 Oct 2020 23:47)  sertraline 50 mg oral tablet: 1 tab(s) orally once a day (21 Mar 2021 20:11)  traMADol 50 mg oral tablet: 1 tab(s) orally 3 times, As Needed (21 Mar 2021 20:11)      REVIEW OF SYSTEMS:  Constitutional: [X ] negative [ ] fevers [ ] chills [ ] weight loss [ ] weight gain  HEENT: [X ] negative [ ] dry eyes [ ] eye irritation [ ] postnasal drip [ ] nasal congestion  CV: [X ] negative  [ ] chest pain [ ] orthopnea [ ] palpitations [ ] murmur  Resp: [ ] negative [X ] cough [ ] shortness of breath [ ] dyspnea [ ] wheezing [ ] sputum [ ] hemoptysis  GI: [ ] negative [X ] nausea [ X] vomiting [ ] diarrhea [ ] constipation [ ] abd pain [ ] dysphagia   : [ ] negative [ ] dysuria [ ] nocturia [ ] hematuria [ ] increased urinary frequency  Musculoskeletal: [ ] negative [ ] back pain [ ] myalgias [ ] arthralgias [ ] fracture  Skin: [X ] negative [ ] rash [ ] itch  Neurological: [ X] negative [ ] headache [ ] dizziness [ ] syncope [ ] weakness [ ] numbness  Psychiatric: [ X] negative [ ] anxiety [ ] depression  Endocrine: [X ] negative [ ] diabetes [ ] thyroid problem  Hematologic/Lymphatic: [ X] negative [ ] anemia [ ] bleeding problem  Allergic/Immunologic: [ ] negative [ ] itchy eyes [ ] nasal discharge [ ] hives [ ] angioedema  [ X] All other systems negative  [ ] Unable to assess ROS because ________    OBJECTIVE:  ICU Vital Signs Last 24 Hrs  T(C): 36.8 (26 Mar 2021 15:03), Max: 36.9 (26 Mar 2021 01:18)  T(F): 98.2 (26 Mar 2021 15:03), Max: 98.4 (26 Mar 2021 01:18)  HR: 77 (26 Mar 2021 15:03) (63 - 77)  BP: 117/70 (26 Mar 2021 15:03) (117/70 - 163/74)  RR: 18 (26 Mar 2021 15:03) (18 - 18)  SpO2: 91% (26 Mar 2021 15:03) (88% - 96%)        03-25 @ 07:01 - 03-26 @ 07:00  --------------------------------------------------------  IN: 1190 mL / OUT: 725 mL / NET: 465 mL    03-26 @ 07:01  - 03-26 @ 17:13  --------------------------------------------------------  IN: 980 mL / OUT: 350 mL / NET: 630 mL      CAPILLARY BLOOD GLUCOSE          PHYSICAL EXAM:  General: well appearing elderly woman in no distress  Respiratory:  normal effort on 0.5L nasal cannula, no distress  Cardiovascular: rate regu, no edema   Neurological: awake, alert, oriented, no deficits      HOSPITAL MEDICATIONS:  Standing Meds:  albuterol/ipratropium for Nebulization 3 milliLiter(s) Nebulizer every 6 hours  aMIOdarone    Tablet 200 milliGRAM(s) Oral <User Schedule>  amLODIPine   Tablet 2.5 milliGRAM(s) Oral daily  cefepime   IVPB 1000 milliGRAM(s) IV Intermittent every 12 hours  heparin   Injectable 5000 Unit(s) SubCutaneous every 8 hours  pantoprazole    Tablet 40 milliGRAM(s) Oral before breakfast  sertraline 50 milliGRAM(s) Oral daily      PRN Meds:      LABS:                        11.1   3.44  )-----------( 132      ( 26 Mar 2021 07:22 )             35.6     Hgb Trend: 11.1<--, 11.4<--, 11.6<--, 12.2<--, 12.5<--  03-26    138  |  102  |  9   ----------------------------<  81  3.8   |  26  |  0.46<L>    Ca    8.7      26 Mar 2021 07:09  Phos  2.6     03-26  Mg     2.0     03-26      Creatinine Trend: 0.46<--, 0.44<--, 0.47<--, 0.74<--, 0.70<--, 0.66<--            MICROBIOLOGY:       RADIOLOGY:  [ ] Reviewed and interpreted by me    PULMONARY FUNCTION TESTS:    EKG:

## 2021-03-26 NOTE — PROGRESS NOTE ADULT - SUBJECTIVE AND OBJECTIVE BOX
Patient is a 89y old  Female who presents with a chief complaint of SBO (26 Mar 2021 12:29)      INTERVAL HPI/OVERNIGHT EVENTS: noted  pt seen and examined this am   events noted  cough noted since admission  more since yesterday      Vital Signs Last 24 Hrs  T(C): 36.8 (26 Mar 2021 09:07), Max: 37.1 (25 Mar 2021 16:37)  T(F): 98.3 (26 Mar 2021 09:07), Max: 98.7 (25 Mar 2021 16:37)  HR: 67 (26 Mar 2021 09:07) (62 - 72)  BP: 134/68 (26 Mar 2021 09:07) (119/74 - 163/74)  BP(mean): --  RR: 18 (26 Mar 2021 09:07) (18 - 18)  SpO2: 92% (26 Mar 2021 11:59) (88% - 96%)    albuterol/ipratropium for Nebulization 3 milliLiter(s) Nebulizer every 6 hours  aMIOdarone    Tablet 200 milliGRAM(s) Oral <User Schedule>  amLODIPine   Tablet 2.5 milliGRAM(s) Oral daily  cefepime   IVPB 1000 milliGRAM(s) IV Intermittent every 12 hours  heparin   Injectable 5000 Unit(s) SubCutaneous every 8 hours  pantoprazole    Tablet 40 milliGRAM(s) Oral before breakfast  potassium phosphate IVPB 15 milliMole(s) IV Intermittent once  sertraline 50 milliGRAM(s) Oral daily      PHYSICAL EXAM:  GENERAL: NAD,   EYES: conjunctiva and sclera clear  ENMT: Moist mucous membranes  NECK: Supple, No JVD, Normal thyroid  CHEST/LUNG: lt side rhonchi bilat crackles  HEART: Regular rate and rhythm; No murmurs, rubs, or gallops  ABDOMEN: Soft, Nontender, Nondistended; Bowel sounds present  EXTREMITIES:  2+ Peripheral Pulses, No clubbing, cyanosis, or edema  LYMPH: No lymphadenopathy noted  SKIN: No rashes or lesions    Consultant(s) Notes Reviewed:  [x ] YES  [ ] NO  Care Discussed with Consultants/Other Providers [ x] YES  [ ] NO    LABS:                        11.1   3.44  )-----------( 132      ( 26 Mar 2021 07:22 )             35.6     03-26    138  |  102  |  9   ----------------------------<  81  3.8   |  26  |  0.46<L>    Ca    8.7      26 Mar 2021 07:09  Phos  2.6     03-26  Mg     2.0     03-26          CAPILLARY BLOOD GLUCOSE                  RADIOLOGY & ADDITIONAL TESTS:    Imaging Personally Reviewed:  [x ] YES  [ ] NO

## 2021-03-26 NOTE — PROGRESS NOTE ADULT - ASSESSMENT
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 Millard in 2018 treated with NGT then consult for Dr. Dae Murillo in 10/2020 treated conservatively without NGT) presented with 1 day of epigastric abdominal pain and brown emesis, SBO with transition point in lower mid abdomen c/w UTI.    Plan:  - f/u medicine re: supplemental O2 on d/c, possibly consult pulm  - Low Residue Diet  - SQH for VTE ppx  - c/w home med protonix  - dispo: [facility] recommended, however pt/daughter prefer home with home PT    Acute care surgery, [id number]

## 2021-03-27 LAB
ANION GAP SERPL CALC-SCNC: 10 MMOL/L — SIGNIFICANT CHANGE UP (ref 5–17)
BUN SERPL-MCNC: 10 MG/DL — SIGNIFICANT CHANGE UP (ref 7–23)
CALCIUM SERPL-MCNC: 8.6 MG/DL — SIGNIFICANT CHANGE UP (ref 8.4–10.5)
CHLORIDE SERPL-SCNC: 103 MMOL/L — SIGNIFICANT CHANGE UP (ref 96–108)
CO2 SERPL-SCNC: 27 MMOL/L — SIGNIFICANT CHANGE UP (ref 22–31)
CREAT SERPL-MCNC: 0.48 MG/DL — LOW (ref 0.5–1.3)
GLUCOSE SERPL-MCNC: 94 MG/DL — SIGNIFICANT CHANGE UP (ref 70–99)
HCT VFR BLD CALC: 34.7 % — SIGNIFICANT CHANGE UP (ref 34.5–45)
HGB BLD-MCNC: 11 G/DL — LOW (ref 11.5–15.5)
MAGNESIUM SERPL-MCNC: 2 MG/DL — SIGNIFICANT CHANGE UP (ref 1.6–2.6)
MCHC RBC-ENTMCNC: 30.6 PG — SIGNIFICANT CHANGE UP (ref 27–34)
MCHC RBC-ENTMCNC: 31.7 GM/DL — LOW (ref 32–36)
MCV RBC AUTO: 96.7 FL — SIGNIFICANT CHANGE UP (ref 80–100)
NRBC # BLD: 0 /100 WBCS — SIGNIFICANT CHANGE UP (ref 0–0)
PHOSPHATE SERPL-MCNC: 2.9 MG/DL — SIGNIFICANT CHANGE UP (ref 2.5–4.5)
PLATELET # BLD AUTO: 131 K/UL — LOW (ref 150–400)
POTASSIUM SERPL-MCNC: 3.8 MMOL/L — SIGNIFICANT CHANGE UP (ref 3.5–5.3)
POTASSIUM SERPL-SCNC: 3.8 MMOL/L — SIGNIFICANT CHANGE UP (ref 3.5–5.3)
RBC # BLD: 3.59 M/UL — LOW (ref 3.8–5.2)
RBC # FLD: 11.9 % — SIGNIFICANT CHANGE UP (ref 10.3–14.5)
SODIUM SERPL-SCNC: 140 MMOL/L — SIGNIFICANT CHANGE UP (ref 135–145)
WBC # BLD: 2.89 K/UL — LOW (ref 3.8–10.5)
WBC # FLD AUTO: 2.89 K/UL — LOW (ref 3.8–10.5)

## 2021-03-27 PROCEDURE — 99232 SBSQ HOSP IP/OBS MODERATE 35: CPT

## 2021-03-27 RX ORDER — POTASSIUM CHLORIDE 20 MEQ
20 PACKET (EA) ORAL ONCE
Refills: 0 | Status: COMPLETED | OUTPATIENT
Start: 2021-03-27 | End: 2021-03-27

## 2021-03-27 RX ORDER — SODIUM,POTASSIUM PHOSPHATES 278-250MG
1 POWDER IN PACKET (EA) ORAL ONCE
Refills: 0 | Status: COMPLETED | OUTPATIENT
Start: 2021-03-27 | End: 2021-03-27

## 2021-03-27 RX ADMIN — Medication 1 PACKET(S): at 13:40

## 2021-03-27 RX ADMIN — SERTRALINE 50 MILLIGRAM(S): 25 TABLET, FILM COATED ORAL at 12:28

## 2021-03-27 RX ADMIN — HEPARIN SODIUM 5000 UNIT(S): 5000 INJECTION INTRAVENOUS; SUBCUTANEOUS at 13:26

## 2021-03-27 RX ADMIN — AMLODIPINE BESYLATE 2.5 MILLIGRAM(S): 2.5 TABLET ORAL at 05:49

## 2021-03-27 RX ADMIN — Medication 20 MILLIEQUIVALENT(S): at 13:39

## 2021-03-27 RX ADMIN — CEFEPIME 100 MILLIGRAM(S): 1 INJECTION, POWDER, FOR SOLUTION INTRAMUSCULAR; INTRAVENOUS at 05:48

## 2021-03-27 RX ADMIN — Medication 3 MILLILITER(S): at 17:00

## 2021-03-27 RX ADMIN — Medication 3 MILLILITER(S): at 12:28

## 2021-03-27 RX ADMIN — HEPARIN SODIUM 5000 UNIT(S): 5000 INJECTION INTRAVENOUS; SUBCUTANEOUS at 05:49

## 2021-03-27 RX ADMIN — Medication 3 MILLILITER(S): at 05:49

## 2021-03-27 RX ADMIN — CEFEPIME 100 MILLIGRAM(S): 1 INJECTION, POWDER, FOR SOLUTION INTRAMUSCULAR; INTRAVENOUS at 17:00

## 2021-03-27 RX ADMIN — PANTOPRAZOLE SODIUM 40 MILLIGRAM(S): 20 TABLET, DELAYED RELEASE ORAL at 05:50

## 2021-03-27 RX ADMIN — HEPARIN SODIUM 5000 UNIT(S): 5000 INJECTION INTRAVENOUS; SUBCUTANEOUS at 21:47

## 2021-03-27 NOTE — PROGRESS NOTE ADULT - ATTENDING COMMENTS
ATTENDING ATTESTATION  I have seen and examined this patient on rounds thismorning with the red surgery team. I have reviewed all new labs, imaging and reports. I have participated in formulating the plan for the day, and have read and agree with the history, ROS, exam, assessment and plan as stated above.     SBO is resolved.   Has completed 5 days of Cefipime for PNA with improvement. Remains on 1.5L O2 via NC, will change sat goal to >88% which will allow her to come off her oxygen. Plan for continuing abx for 2 more days.     Total time spent in the care of this patient today (excluding critical care & procedures): 25 min                 Over 50% of the total time was spent in discussion and coordination of care with consulting services, dietary and rehab services.     Sylwia Gregory M.D., M.S.  Dept of Trauma, Acute and Critical Care

## 2021-03-27 NOTE — PROGRESS NOTE ADULT - ASSESSMENT
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 Millard in 2018 treated with NGT then consult for Dr. Dae Murillo in 10/2020 treated conservatively without NGT) presented with 1 day of epigastric abdominal pain and brown emesis, SBO with transition point in lower mid abdomen c/w UTI.    Plan:  - f/u medicine re: supplemental O2 on d/c, possibly consult pulm  - Low Residue Diet  - SQH for VTE ppx  - c/w home med protonix  - cefepime  - dispo: [facility] recommended, however pt/daughter prefer home with home PT    Acute care surgery, [id number]

## 2021-03-27 NOTE — PROGRESS NOTE ADULT - ASSESSMENT
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 Millard in 2018 treated with NGT then consult for Dr. Dae Murillo in 10/2020 treated conservatively without NGT) presents with 1 day of epigastric abdominal pain and brown emesis. admitted for SBO.  CT Angio chest- no PE  Patchy consolidations in the right upper lobe, right middle lobe and bilateral lower lobes, suggestive of multifocal infection.      # SBO  # PAF  # HTN  # GGO on CT  #PNA- multifocal /r/o aspiration    resume amlodipine and amio and AC now that pt no longer npo  leukopenia - downtrending, monitor  urine cx ecoli, sp cefepime,  5 day course abx  multifocal Pna-r/o aspiration,  wean o2 to off as possible, nebs prn, swallow eval, Incentive spirometry  cxr reviewed, lt trace effusion/ atelectasis,  improved PNA- likely responded w cefepime, will cont cefepime for 2more days, may consider ID and pulm cs as needed  assess the need for home o2   outpt fu with Dr. Goldberg for repeat CT     Please call [employer] [facility] with questions [phone].

## 2021-03-27 NOTE — PROGRESS NOTE ADULT - SUBJECTIVE AND OBJECTIVE BOX
ATP Surgery Progress Note    24hr events:  No acute events  CXR appears to be improving, on cefepime  Pulmonary consulted, okay with o2 say >88% on RA  Home duonebs started    SUBJECTIVE:  Reports pain is well controlled  Denies nausea, vomiting  Is passing gas and having bowel movement  Tolerating diet      OBJECTIVE:  Vital Signs Last 24 Hrs  T(C): 36.6 (27 Mar 2021 09:43), Max: 37.3 (27 Mar 2021 01:17)  T(F): 97.8 (27 Mar 2021 09:43), Max: 99.1 (27 Mar 2021 01:17)  HR: 68 (27 Mar 2021 09:43) (64 - 77)  BP: 148/76 (27 Mar 2021 09:43) (117/70 - 168/80)  BP(mean): --  RR: 18 (27 Mar 2021 09:43) (18 - 18)  SpO2: 91% (27 Mar 2021 09:43) (91% - 96%)    Physical Examination:  GEN: NAD, resting quietly  NEURO: AAOx3, CN II-XII grossly intact, no focal deficits  PULM: symmetric chest rise bilaterally, no increased WOB, weaned to RA during rounds  ABD: soft, nontender, nondistended, incision CDI  EXTR: no LE erythema, moving all extremities  VASC: LE warm and well-perfused    I&O's Detail    26 Mar 2021 07:01  -  27 Mar 2021 07:00  --------------------------------------------------------  IN:    IV PiggyBack: 250 mL    IV PiggyBack: 100 mL    Oral Fluid: 680 mL  Total IN: 1030 mL    OUT:    Voided (mL): 600 mL  Total OUT: 600 mL    Total NET: 430 mL      27 Mar 2021 07:01  -  27 Mar 2021 12:30  --------------------------------------------------------  IN:    Oral Fluid: 300 mL  Total IN: 300 mL    OUT:    Voided (mL): 200 mL  Total OUT: 200 mL    Total NET: 100 mL            LABS:                        11.0   2.89  )-----------( 131      ( 27 Mar 2021 07:28 )             34.7       03-27    140  |  103  |  10  ----------------------------<  94  3.8   |  27  |  0.48<L>    Ca    8.6      27 Mar 2021 07:28  Phos  2.9     03-27  Mg     2.0     03-27          IMAGING:  []

## 2021-03-27 NOTE — PROGRESS NOTE ADULT - SUBJECTIVE AND OBJECTIVE BOX
Patient is a 89y old  Female who presents with a chief complaint of SBO (27 Mar 2021 12:30)      INTERVAL HPI/OVERNIGHT EVENTS: noted  pt seen and examined this am   events noted  imrpved cough      Vital Signs Last 24 Hrs  T(C): 37 (27 Mar 2021 17:19), Max: 37.3 (27 Mar 2021 01:17)  T(F): 98.6 (27 Mar 2021 17:19), Max: 99.1 (27 Mar 2021 01:17)  HR: 77 (27 Mar 2021 17:19) (64 - 79)  BP: 115/68 (27 Mar 2021 17:19) (115/68 - 168/80)  BP(mean): --  RR: 18 (27 Mar 2021 17:19) (18 - 18)  SpO2: 91% (27 Mar 2021 17:19) (91% - 96%)    albuterol/ipratropium for Nebulization 3 milliLiter(s) Nebulizer every 6 hours  aMIOdarone    Tablet 200 milliGRAM(s) Oral <User Schedule>  amLODIPine   Tablet 2.5 milliGRAM(s) Oral daily  cefepime   IVPB 1000 milliGRAM(s) IV Intermittent every 12 hours  heparin   Injectable 5000 Unit(s) SubCutaneous every 8 hours  pantoprazole    Tablet 40 milliGRAM(s) Oral before breakfast  sertraline 50 milliGRAM(s) Oral daily      PHYSICAL EXAM:  GENERAL: NAD,   EYES: conjunctiva and sclera clear  ENMT: Moist mucous membranes  NECK: Supple, No JVD, Normal thyroid  CHEST/LUNG: non labored, cta b/l  HEART: Regular rate and rhythm; No murmurs, rubs, or gallops  ABDOMEN: Soft, Nontender, Nondistended; Bowel sounds present  EXTREMITIES:  2+ Peripheral Pulses, No clubbing, cyanosis, or edema  LYMPH: No lymphadenopathy noted  SKIN: No rashes or lesions    Consultant(s) Notes Reviewed:  [x ] YES  [ ] NO  Care Discussed with Consultants/Other Providers [ x] YES  [ ] NO    LABS:                        11.0   2.89  )-----------( 131      ( 27 Mar 2021 07:28 )             34.7     03-27    140  |  103  |  10  ----------------------------<  94  3.8   |  27  |  0.48<L>    Ca    8.6      27 Mar 2021 07:28  Phos  2.9     03-27  Mg     2.0     03-27          CAPILLARY BLOOD GLUCOSE                  RADIOLOGY & ADDITIONAL TESTS:    Imaging Personally Reviewed:  [x ] YES  [ ] NO

## 2021-03-28 LAB
ANION GAP SERPL CALC-SCNC: 9 MMOL/L — SIGNIFICANT CHANGE UP (ref 5–17)
BUN SERPL-MCNC: 10 MG/DL — SIGNIFICANT CHANGE UP (ref 7–23)
CALCIUM SERPL-MCNC: 8.6 MG/DL — SIGNIFICANT CHANGE UP (ref 8.4–10.5)
CHLORIDE SERPL-SCNC: 103 MMOL/L — SIGNIFICANT CHANGE UP (ref 96–108)
CO2 SERPL-SCNC: 25 MMOL/L — SIGNIFICANT CHANGE UP (ref 22–31)
CREAT SERPL-MCNC: 0.46 MG/DL — LOW (ref 0.5–1.3)
GLUCOSE SERPL-MCNC: 92 MG/DL — SIGNIFICANT CHANGE UP (ref 70–99)
HCT VFR BLD CALC: 33.9 % — LOW (ref 34.5–45)
HGB BLD-MCNC: 10.6 G/DL — LOW (ref 11.5–15.5)
MAGNESIUM SERPL-MCNC: 1.9 MG/DL — SIGNIFICANT CHANGE UP (ref 1.6–2.6)
MCHC RBC-ENTMCNC: 30.5 PG — SIGNIFICANT CHANGE UP (ref 27–34)
MCHC RBC-ENTMCNC: 31.3 GM/DL — LOW (ref 32–36)
MCV RBC AUTO: 97.4 FL — SIGNIFICANT CHANGE UP (ref 80–100)
NRBC # BLD: 0 /100 WBCS — SIGNIFICANT CHANGE UP (ref 0–0)
PHOSPHATE SERPL-MCNC: 2.9 MG/DL — SIGNIFICANT CHANGE UP (ref 2.5–4.5)
PLATELET # BLD AUTO: 152 K/UL — SIGNIFICANT CHANGE UP (ref 150–400)
POTASSIUM SERPL-MCNC: 3.9 MMOL/L — SIGNIFICANT CHANGE UP (ref 3.5–5.3)
POTASSIUM SERPL-SCNC: 3.9 MMOL/L — SIGNIFICANT CHANGE UP (ref 3.5–5.3)
RBC # BLD: 3.48 M/UL — LOW (ref 3.8–5.2)
RBC # FLD: 12 % — SIGNIFICANT CHANGE UP (ref 10.3–14.5)
SODIUM SERPL-SCNC: 137 MMOL/L — SIGNIFICANT CHANGE UP (ref 135–145)
WBC # BLD: 3.68 K/UL — LOW (ref 3.8–10.5)
WBC # FLD AUTO: 3.68 K/UL — LOW (ref 3.8–10.5)

## 2021-03-28 RX ORDER — APIXABAN 2.5 MG/1
2.5 TABLET, FILM COATED ORAL
Refills: 0 | Status: DISCONTINUED | OUTPATIENT
Start: 2021-03-28 | End: 2021-03-29

## 2021-03-28 RX ADMIN — Medication 3 MILLILITER(S): at 06:00

## 2021-03-28 RX ADMIN — Medication 3 MILLILITER(S): at 00:19

## 2021-03-28 RX ADMIN — CEFEPIME 100 MILLIGRAM(S): 1 INJECTION, POWDER, FOR SOLUTION INTRAMUSCULAR; INTRAVENOUS at 06:01

## 2021-03-28 RX ADMIN — CEFEPIME 100 MILLIGRAM(S): 1 INJECTION, POWDER, FOR SOLUTION INTRAMUSCULAR; INTRAVENOUS at 18:23

## 2021-03-28 RX ADMIN — PANTOPRAZOLE SODIUM 40 MILLIGRAM(S): 20 TABLET, DELAYED RELEASE ORAL at 06:00

## 2021-03-28 RX ADMIN — AMLODIPINE BESYLATE 2.5 MILLIGRAM(S): 2.5 TABLET ORAL at 06:01

## 2021-03-28 RX ADMIN — APIXABAN 2.5 MILLIGRAM(S): 2.5 TABLET, FILM COATED ORAL at 18:23

## 2021-03-28 RX ADMIN — HEPARIN SODIUM 5000 UNIT(S): 5000 INJECTION INTRAVENOUS; SUBCUTANEOUS at 06:00

## 2021-03-28 RX ADMIN — SERTRALINE 50 MILLIGRAM(S): 25 TABLET, FILM COATED ORAL at 13:17

## 2021-03-28 NOTE — PROGRESS NOTE ADULT - ASSESSMENT
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 Millard in 2018 treated with NGT then consult for Dr. Dae Murillo in 10/2020 treated conservatively without NGT) presents with 1 day of epigastric abdominal pain and brown emesis. admitted for SBO.  CT Angio chest- no PE  Patchy consolidations in the right upper lobe, right middle lobe and bilateral lower lobes, suggestive of multifocal infection.      # SBO  # PAF  # HTN  # GGO on CT  #PNA- multifocal /r/o aspiration    resume amlodipine and amio and AC now that pt no longer npo  leukopenia - downtrending, monitor  urine cx ecoli, sp cefepime,  5 day course abx  multifocal Pna-r/o aspiration, o2 sat stable off, nebs prn, swallow eval, Incentive spirometry  cxr reviewed, lt trace effusion/ atelectasis,  improved PNA- likely responded w cefepime,   monitor off abx   outpt fu with Dr. Goldberg for repeat CT     Please call [employer] with questions [phone].

## 2021-03-28 NOTE — PROGRESS NOTE ADULT - SUBJECTIVE AND OBJECTIVE BOX
Patient is a 89y old  Female who presents with a chief complaint of SBO (28 Mar 2021 04:04)      INTERVAL HPI/OVERNIGHT EVENTS: noted  pt seen and examined this am   events noted  feels well  improved cough and sob  sating well on RA      Vital Signs Last 24 Hrs  T(C): 37.1 (28 Mar 2021 17:04), Max: 37.3 (28 Mar 2021 06:05)  T(F): 98.8 (28 Mar 2021 17:04), Max: 99.2 (28 Mar 2021 06:05)  HR: 68 (28 Mar 2021 17:04) (66 - 74)  BP: 118/75 (28 Mar 2021 17:04) (118/75 - 157/74)  BP(mean): --  RR: 18 (28 Mar 2021 17:04) (18 - 18)  SpO2: 92% (28 Mar 2021 17:04) (91% - 94%)    aMIOdarone    Tablet 200 milliGRAM(s) Oral <User Schedule>  amLODIPine   Tablet 2.5 milliGRAM(s) Oral daily  apixaban 2.5 milliGRAM(s) Oral two times a day  cefepime   IVPB 1000 milliGRAM(s) IV Intermittent every 12 hours  pantoprazole    Tablet 40 milliGRAM(s) Oral before breakfast  sertraline 50 milliGRAM(s) Oral daily      PHYSICAL EXAM:  GENERAL: NAD,   EYES: conjunctiva and sclera clear  ENMT: Moist mucous membranes  NECK: Supple, No JVD, Normal thyroid  CHEST/LUNG: non labored, cta b/l  HEART: Regular rate and rhythm; No murmurs, rubs, or gallops  ABDOMEN: Soft, Nontender, Nondistended; Bowel sounds present  EXTREMITIES:  2+ Peripheral Pulses, No clubbing, cyanosis, or edema  LYMPH: No lymphadenopathy noted  SKIN: No rashes or lesions    Consultant(s) Notes Reviewed:  [x ] YES  [ ] NO  Care Discussed with Consultants/Other Providers [ x] YES  [ ] NO    LABS:                        10.6   3.68  )-----------( 152      ( 28 Mar 2021 07:23 )             33.9     03-28    137  |  103  |  10  ----------------------------<  92  3.9   |  25  |  0.46<L>    Ca    8.6      28 Mar 2021 07:22  Phos  2.9     03-28  Mg     1.9     03-28          CAPILLARY BLOOD GLUCOSE                  RADIOLOGY & ADDITIONAL TESTS:    Imaging Personally Reviewed:  [x ] YES  [ ] NO

## 2021-03-28 NOTE — PROGRESS NOTE ADULT - SUBJECTIVE AND OBJECTIVE BOX
[facility] Progress Note    24hr events:  Clinically improving on cefepime  Weaned off of oxygen    SUBJECTIVE:  Reports pain is well controlled  Denies nausea, vomiting  Is passing gas and having bowel movements  Tolerating diet  Ambulating with assistance    OBJECTIVE:  Vital Signs Last 24 Hrs  T(C): 36.7 (28 Mar 2021 01:21), Max: 37 (27 Mar 2021 17:19)  T(F): 98 (28 Mar 2021 01:21), Max: 98.6 (27 Mar 2021 17:19)  HR: 68 (28 Mar 2021 01:21) (64 - 79)  BP: 136/75 (28 Mar 2021 01:21) (115/68 - 168/80)  BP(mean): --  RR: 18 (28 Mar 2021 01:21) (18 - 18)  SpO2: 94% (28 Mar 2021 01:21) (91% - 95%)    Physical Examination:  GEN: NAD, resting quietly  NEURO: AAOx3, CN II-XII grossly intact, no focal deficits  PULM: symmetric chest rise bilaterally, no increased WOB  ABD: soft, nontender, nondistended  EXTR: no LE erythema, moving all extremities  VASC: LE warm and well-perfused    I&O's Detail    26 Mar 2021 07:01  -  27 Mar 2021 07:00  --------------------------------------------------------  IN:    IV PiggyBack: 250 mL    IV PiggyBack: 100 mL    Oral Fluid: 680 mL  Total IN: 1030 mL    OUT:    Voided (mL): 600 mL  Total OUT: 600 mL    Total NET: 430 mL      27 Mar 2021 07:01  -  28 Mar 2021 04:05  --------------------------------------------------------  IN:    IV PiggyBack: 50 mL    Oral Fluid: 480 mL  Total IN: 530 mL    OUT:    Voided (mL): 625 mL  Total OUT: 625 mL    Total NET: -95 mL            LABS:                        11.0   2.89  )-----------( 131      ( 27 Mar 2021 07:28 )             34.7       03-27    140  |  103  |  10  ----------------------------<  94  3.8   |  27  |  0.48<L>    Ca    8.6      27 Mar 2021 07:28  Phos  2.9     03-27  Mg     2.0     03-27          IMAGING:  []

## 2021-03-28 NOTE — PROGRESS NOTE ADULT - ASSESSMENT
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 Millard in 2018 treated with NGT then consult for Dr. Dae Murillo in 10/2020 treated conservatively without NGT) presented with 1 day of epigastric abdominal pain and brown emesis, SBO with transition point in lower mid abdomen. Currently being treated for PNA which is improving.    Plan:  - f/u medicine re: supplemental O2 on d/c, tolerating room air  - Low Residue Diet  - SQH for VTE ppx  - c/w home med protonix  - cefepime, last day 3/28  - dispo: [facility] recommended, however pt/daughter prefer home with home PT with [facility], which is available on Monday    Acute care surgery, [id number]

## 2021-03-28 NOTE — PROGRESS NOTE ADULT - ATTENDING COMMENTS
Patient seen and examined and agree with above.   No acute events overnight.  Hemodynamically stable   SBO has resolved and patient is tolerating a regular diet.   Right sided multifocal pneumonia is resolving. Will continue cefepime for one more day. Patient with improved cough.   Labs reviewed.  Discharge planning as clinical condition is improving.

## 2021-03-29 ENCOUNTER — TRANSCRIPTION ENCOUNTER (OUTPATIENT)
Age: 86
End: 2021-03-29

## 2021-03-29 VITALS
SYSTOLIC BLOOD PRESSURE: 133 MMHG | OXYGEN SATURATION: 95 % | RESPIRATION RATE: 18 BRPM | DIASTOLIC BLOOD PRESSURE: 72 MMHG | HEART RATE: 63 BPM | TEMPERATURE: 98 F

## 2021-03-29 PROCEDURE — 97161 PT EVAL LOW COMPLEX 20 MIN: CPT

## 2021-03-29 PROCEDURE — 85018 HEMOGLOBIN: CPT

## 2021-03-29 PROCEDURE — 97116 GAIT TRAINING THERAPY: CPT

## 2021-03-29 PROCEDURE — U0003: CPT

## 2021-03-29 PROCEDURE — 82947 ASSAY GLUCOSE BLOOD QUANT: CPT

## 2021-03-29 PROCEDURE — 83735 ASSAY OF MAGNESIUM: CPT

## 2021-03-29 PROCEDURE — 85730 THROMBOPLASTIN TIME PARTIAL: CPT

## 2021-03-29 PROCEDURE — 97530 THERAPEUTIC ACTIVITIES: CPT

## 2021-03-29 PROCEDURE — 74018 RADEX ABDOMEN 1 VIEW: CPT

## 2021-03-29 PROCEDURE — 82803 BLOOD GASES ANY COMBINATION: CPT

## 2021-03-29 PROCEDURE — 93005 ELECTROCARDIOGRAM TRACING: CPT

## 2021-03-29 PROCEDURE — 82330 ASSAY OF CALCIUM: CPT

## 2021-03-29 PROCEDURE — 96374 THER/PROPH/DIAG INJ IV PUSH: CPT

## 2021-03-29 PROCEDURE — 85014 HEMATOCRIT: CPT

## 2021-03-29 PROCEDURE — 71045 X-RAY EXAM CHEST 1 VIEW: CPT

## 2021-03-29 PROCEDURE — U0005: CPT

## 2021-03-29 PROCEDURE — 84100 ASSAY OF PHOSPHORUS: CPT

## 2021-03-29 PROCEDURE — 86769 SARS-COV-2 COVID-19 ANTIBODY: CPT

## 2021-03-29 PROCEDURE — 74177 CT ABD & PELVIS W/CONTRAST: CPT

## 2021-03-29 PROCEDURE — 82435 ASSAY OF BLOOD CHLORIDE: CPT

## 2021-03-29 PROCEDURE — 99232 SBSQ HOSP IP/OBS MODERATE 35: CPT

## 2021-03-29 PROCEDURE — 96375 TX/PRO/DX INJ NEW DRUG ADDON: CPT

## 2021-03-29 PROCEDURE — 81001 URINALYSIS AUTO W/SCOPE: CPT

## 2021-03-29 PROCEDURE — 87086 URINE CULTURE/COLONY COUNT: CPT

## 2021-03-29 PROCEDURE — 86900 BLOOD TYPING SEROLOGIC ABO: CPT

## 2021-03-29 PROCEDURE — 80048 BASIC METABOLIC PNL TOTAL CA: CPT

## 2021-03-29 PROCEDURE — 71275 CT ANGIOGRAPHY CHEST: CPT

## 2021-03-29 PROCEDURE — 80053 COMPREHEN METABOLIC PANEL: CPT

## 2021-03-29 PROCEDURE — 94640 AIRWAY INHALATION TREATMENT: CPT

## 2021-03-29 PROCEDURE — 85025 COMPLETE CBC W/AUTO DIFF WBC: CPT

## 2021-03-29 PROCEDURE — 86901 BLOOD TYPING SEROLOGIC RH(D): CPT

## 2021-03-29 PROCEDURE — 82962 GLUCOSE BLOOD TEST: CPT

## 2021-03-29 PROCEDURE — 43752 NASAL/OROGASTRIC W/TUBE PLMT: CPT

## 2021-03-29 PROCEDURE — 84132 ASSAY OF SERUM POTASSIUM: CPT

## 2021-03-29 PROCEDURE — 85610 PROTHROMBIN TIME: CPT

## 2021-03-29 PROCEDURE — 99285 EMERGENCY DEPT VISIT HI MDM: CPT | Mod: 25

## 2021-03-29 PROCEDURE — 85027 COMPLETE CBC AUTOMATED: CPT

## 2021-03-29 PROCEDURE — 83605 ASSAY OF LACTIC ACID: CPT

## 2021-03-29 PROCEDURE — 87186 SC STD MICRODIL/AGAR DIL: CPT

## 2021-03-29 PROCEDURE — 86850 RBC ANTIBODY SCREEN: CPT

## 2021-03-29 PROCEDURE — 83690 ASSAY OF LIPASE: CPT

## 2021-03-29 PROCEDURE — 84484 ASSAY OF TROPONIN QUANT: CPT

## 2021-03-29 PROCEDURE — 84295 ASSAY OF SERUM SODIUM: CPT

## 2021-03-29 RX ADMIN — APIXABAN 2.5 MILLIGRAM(S): 2.5 TABLET, FILM COATED ORAL at 05:44

## 2021-03-29 RX ADMIN — SERTRALINE 50 MILLIGRAM(S): 25 TABLET, FILM COATED ORAL at 12:40

## 2021-03-29 RX ADMIN — CEFEPIME 100 MILLIGRAM(S): 1 INJECTION, POWDER, FOR SOLUTION INTRAMUSCULAR; INTRAVENOUS at 05:44

## 2021-03-29 RX ADMIN — PANTOPRAZOLE SODIUM 40 MILLIGRAM(S): 20 TABLET, DELAYED RELEASE ORAL at 05:44

## 2021-03-29 RX ADMIN — AMIODARONE HYDROCHLORIDE 200 MILLIGRAM(S): 400 TABLET ORAL at 12:39

## 2021-03-29 RX ADMIN — AMLODIPINE BESYLATE 2.5 MILLIGRAM(S): 2.5 TABLET ORAL at 05:44

## 2021-03-29 NOTE — PROGRESS NOTE ADULT - SUBJECTIVE AND OBJECTIVE BOX
Patient is a 89y old  Female who presents with a chief complaint of SBO (29 Mar 2021 11:50)      INTERVAL HPI/OVERNIGHT EVENTS: noted  pt seen and examined this am   events noted  improved cough and sob      Vital Signs Last 24 Hrs  T(C): 36.6 (29 Mar 2021 15:00), Max: 37.1 (28 Mar 2021 21:07)  T(F): 97.8 (29 Mar 2021 15:00), Max: 98.8 (28 Mar 2021 21:07)  HR: 63 (29 Mar 2021 15:00) (63 - 70)  BP: 133/72 (29 Mar 2021 15:00) (117/68 - 149/69)  BP(mean): --  RR: 18 (29 Mar 2021 15:00) (18 - 18)  SpO2: 95% (29 Mar 2021 15:00) (93% - 95%)    aMIOdarone    Tablet 200 milliGRAM(s) Oral <User Schedule>  amLODIPine   Tablet 2.5 milliGRAM(s) Oral daily  apixaban 2.5 milliGRAM(s) Oral two times a day  pantoprazole    Tablet 40 milliGRAM(s) Oral before breakfast  sertraline 50 milliGRAM(s) Oral daily      PHYSICAL EXAM:  GENERAL: NAD,   EYES: conjunctiva and sclera clear  ENMT: Moist mucous membranes  NECK: Supple, No JVD, Normal thyroid  CHEST/LUNG: non labored, cta b/l  HEART: Regular rate and rhythm; No murmurs, rubs, or gallops  ABDOMEN: Soft, Nontender, Nondistended; Bowel sounds present  EXTREMITIES:  2+ Peripheral Pulses, No clubbing, cyanosis, or edema  LYMPH: No lymphadenopathy noted  SKIN: No rashes or lesions    Consultant(s) Notes Reviewed:  [x ] YES  [ ] NO  Care Discussed with Consultants/Other Providers [ x] YES  [ ] NO    LABS:                        10.6   3.68  )-----------( 152      ( 28 Mar 2021 07:23 )             33.9     03-28    137  |  103  |  10  ----------------------------<  92  3.9   |  25  |  0.46<L>    Ca    8.6      28 Mar 2021 07:22  Phos  2.9     03-28  Mg     1.9     03-28          CAPILLARY BLOOD GLUCOSE                  RADIOLOGY & ADDITIONAL TESTS:    Imaging Personally Reviewed:  [x ] YES  [ ] NO

## 2021-03-29 NOTE — PROGRESS NOTE ADULT - SUBJECTIVE AND OBJECTIVE BOX
[facility] Progress Note    24hr events:  Completed course of cefepime   Weaned off of oxygen    SUBJECTIVE:  Reports pain is well controlled  Denies nausea, vomiting  Is passing gas and having bowel movements  Tolerating diet  Ambulating with assistance    OBJECTIVE:  Vital Signs Last 24 Hrs  T(C): 36.8 (29 Mar 2021 10:25), Max: 37.2 (28 Mar 2021 13:51)  T(F): 98.2 (29 Mar 2021 10:25), Max: 98.9 (28 Mar 2021 13:51)  HR: 66 (29 Mar 2021 10:25) (65 - 74)  BP: 117/68 (29 Mar 2021 10:25) (117/68 - 149/69)  BP(mean): --  RR: 18 (29 Mar 2021 10:25) (18 - 18)  SpO2: 94% (29 Mar 2021 10:25) (92% - 94%)    Physical Examination:  GEN: NAD, resting quietly  NEURO: AAOx3, CN II-XII grossly intact, no focal deficits  PULM: symmetric chest rise bilaterally, no increased WOB  ABD: soft, nontender, nondistended  EXTR: no LE erythema, moving all extremities  VASC: LE warm and well-perfused    I&O's Detail    28 Mar 2021 07:01  -  29 Mar 2021 07:00  --------------------------------------------------------  IN:    IV PiggyBack: 50 mL    Oral Fluid: 800 mL  Total IN: 850 mL    OUT:    Voided (mL): 700 mL  Total OUT: 700 mL    Total NET: 150 mL      29 Mar 2021 07:01  -  29 Mar 2021 11:53  --------------------------------------------------------  IN:    Oral Fluid: 300 mL  Total IN: 300 mL    OUT:    Voided (mL): 275 mL  Total OUT: 275 mL    Total NET: 25 mL        LABS:                                     10.6   3.68  )-----------( 152      ( 28 Mar 2021 07:23 )             33.9     03-28    137  |  103  |  10  ----------------------------<  92  3.9   |  25  |  0.46<L>    Ca    8.6      28 Mar 2021 07:22  Phos  2.9     03-28  Mg     1.9     03-28                IMAGING:  []

## 2021-03-29 NOTE — DISCHARGE NOTE NURSING/CASE MANAGEMENT/SOCIAL WORK - PATIENT PORTAL LINK FT
You can access the FollowMyHealth Patient Portal offered by [facility] by registering at the following website: [email or web address]. By joining [employer]’s FollowMyHealth portal, you will also be able to view your health information using other applications (apps) compatible with our system.

## 2021-03-29 NOTE — PROGRESS NOTE ADULT - ASSESSMENT
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 Millard in 2018 treated with NGT then consult for Dr. Dae Murillo in 10/2020 treated conservatively without NGT) presented with 1 day of epigastric abdominal pain and brown emesis, SBO with transition point in lower mid abdomen. Currently being treated for PNA which is improving.    Plan:  - f/u medicine re: supplemental O2 on d/c, tolerating room air  - Low Residue Diet  - SQH for VTE ppx  - c/w home med protonix  - cefepime, last day 3/28  - dispo: [facility] recommended, however pt/daughter prefer home with home PT with [facility] today    Acute care surgery, [id number]

## 2021-03-29 NOTE — PROGRESS NOTE ADULT - PROVIDER SPECIALTY LIST ADULT
Surgery
Internal Medicine
Internal Medicine
Trauma Surgery
Internal Medicine
Surgery
Surgery
Trauma Surgery
Cardiology
Internal Medicine
Internal Medicine
Surgery

## 2021-03-29 NOTE — PROGRESS NOTE ADULT - ASSESSMENT
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 Millard in 2018 treated with NGT then consult for Dr. Dae Murillo in 10/2020 treated conservatively without NGT) presents with 1 day of epigastric abdominal pain and brown emesis. admitted for SBO.  CT Angio chest- no PE  Patchy consolidations in the right upper lobe, right middle lobe and bilateral lower lobes, suggestive of multifocal infection.      # SBO  # PAF  # HTN  # GGO on CT  #PNA- multifocal /r/o aspiration    resume amlodipine and amio and AC now that pt no longer npo  leukopenia - downtrending, monitor  urine cx ecoli, sp cefepime,  7d course  multifocal Pna-r/o aspiration, o2 sat stable off, nebs prn, swallow eval, Incentive spirometry  cxr reviewed, lt trace effusion/ atelectasis,  improved PNA- likely responded w cefepime,   monitor off abx   outpt fu with Dr. Goldberg for repeat CT     Please call [facility] with questions [phone].

## 2021-05-02 ENCOUNTER — INPATIENT (INPATIENT)
Facility: HOSPITAL | Age: 86
LOS: 3 days | Discharge: HOME HEALTH | DRG: 206 | End: 2021-05-06
Attending: INTERNAL MEDICINE | Admitting: INTERNAL MEDICINE
Payer: MEDICARE

## 2021-05-02 VITALS
HEART RATE: 75 BPM | TEMPERATURE: 99 F | SYSTOLIC BLOOD PRESSURE: 138 MMHG | DIASTOLIC BLOOD PRESSURE: 78 MMHG | OXYGEN SATURATION: 98 % | RESPIRATION RATE: 15 BRPM | HEIGHT: 66 IN | WEIGHT: 138.01 LBS

## 2021-05-02 DIAGNOSIS — I10 ESSENTIAL (PRIMARY) HYPERTENSION: ICD-10-CM

## 2021-05-02 DIAGNOSIS — Z79.899 OTHER LONG TERM (CURRENT) DRUG THERAPY: ICD-10-CM

## 2021-05-02 DIAGNOSIS — J18.9 PNEUMONIA, UNSPECIFIED ORGANISM: ICD-10-CM

## 2021-05-02 DIAGNOSIS — Z98.89 OTHER SPECIFIED POSTPROCEDURAL STATES: Chronic | ICD-10-CM

## 2021-05-02 DIAGNOSIS — I48.91 UNSPECIFIED ATRIAL FIBRILLATION: ICD-10-CM

## 2021-05-02 DIAGNOSIS — Z29.9 ENCOUNTER FOR PROPHYLACTIC MEASURES, UNSPECIFIED: ICD-10-CM

## 2021-05-02 DIAGNOSIS — R06.00 DYSPNEA, UNSPECIFIED: ICD-10-CM

## 2021-05-02 LAB
ALBUMIN SERPL ELPH-MCNC: 4.1 G/DL — SIGNIFICANT CHANGE UP (ref 3.3–5)
ALP SERPL-CCNC: 70 U/L — SIGNIFICANT CHANGE UP (ref 40–120)
ALT FLD-CCNC: 9 U/L — LOW (ref 10–45)
ANION GAP SERPL CALC-SCNC: 14 MMOL/L — SIGNIFICANT CHANGE UP (ref 5–17)
APPEARANCE UR: CLEAR — SIGNIFICANT CHANGE UP
APTT BLD: 31 SEC — SIGNIFICANT CHANGE UP (ref 27.5–35.5)
AST SERPL-CCNC: 15 U/L — SIGNIFICANT CHANGE UP (ref 10–40)
BACTERIA # UR AUTO: NEGATIVE — SIGNIFICANT CHANGE UP
BASOPHILS # BLD AUTO: 0.02 K/UL — SIGNIFICANT CHANGE UP (ref 0–0.2)
BASOPHILS NFR BLD AUTO: 0.4 % — SIGNIFICANT CHANGE UP (ref 0–2)
BILIRUB SERPL-MCNC: 0.6 MG/DL — SIGNIFICANT CHANGE UP (ref 0.2–1.2)
BILIRUB UR-MCNC: NEGATIVE — SIGNIFICANT CHANGE UP
BUN SERPL-MCNC: 14 MG/DL — SIGNIFICANT CHANGE UP (ref 7–23)
CALCIUM SERPL-MCNC: 9.3 MG/DL — SIGNIFICANT CHANGE UP (ref 8.4–10.5)
CHLORIDE SERPL-SCNC: 104 MMOL/L — SIGNIFICANT CHANGE UP (ref 96–108)
CO2 SERPL-SCNC: 21 MMOL/L — LOW (ref 22–31)
COLOR SPEC: SIGNIFICANT CHANGE UP
CREAT SERPL-MCNC: 0.64 MG/DL — SIGNIFICANT CHANGE UP (ref 0.5–1.3)
CRP SERPL-MCNC: <3 MG/L — SIGNIFICANT CHANGE UP (ref 0–4)
DIFF PNL FLD: NEGATIVE — SIGNIFICANT CHANGE UP
EOSINOPHIL # BLD AUTO: 0.06 K/UL — SIGNIFICANT CHANGE UP (ref 0–0.5)
EOSINOPHIL NFR BLD AUTO: 1.1 % — SIGNIFICANT CHANGE UP (ref 0–6)
EPI CELLS # UR: 7 /HPF — HIGH
GAS PNL BLDV: SIGNIFICANT CHANGE UP
GLUCOSE SERPL-MCNC: 108 MG/DL — HIGH (ref 70–99)
GLUCOSE UR QL: NEGATIVE — SIGNIFICANT CHANGE UP
HCT VFR BLD CALC: 40.4 % — SIGNIFICANT CHANGE UP (ref 34.5–45)
HGB BLD-MCNC: 12.9 G/DL — SIGNIFICANT CHANGE UP (ref 11.5–15.5)
HYALINE CASTS # UR AUTO: 1 /LPF — SIGNIFICANT CHANGE UP (ref 0–2)
IMM GRANULOCYTES NFR BLD AUTO: 0.2 % — SIGNIFICANT CHANGE UP (ref 0–1.5)
INR BLD: 1.21 RATIO — HIGH (ref 0.88–1.16)
KETONES UR-MCNC: NEGATIVE — SIGNIFICANT CHANGE UP
LEUKOCYTE ESTERASE UR-ACNC: ABNORMAL
LYMPHOCYTES # BLD AUTO: 0.67 K/UL — LOW (ref 1–3.3)
LYMPHOCYTES # BLD AUTO: 12.8 % — LOW (ref 13–44)
MCHC RBC-ENTMCNC: 30.1 PG — SIGNIFICANT CHANGE UP (ref 27–34)
MCHC RBC-ENTMCNC: 31.9 GM/DL — LOW (ref 32–36)
MCV RBC AUTO: 94.4 FL — SIGNIFICANT CHANGE UP (ref 80–100)
MONOCYTES # BLD AUTO: 0.41 K/UL — SIGNIFICANT CHANGE UP (ref 0–0.9)
MONOCYTES NFR BLD AUTO: 7.8 % — SIGNIFICANT CHANGE UP (ref 2–14)
NEUTROPHILS # BLD AUTO: 4.06 K/UL — SIGNIFICANT CHANGE UP (ref 1.8–7.4)
NEUTROPHILS NFR BLD AUTO: 77.7 % — HIGH (ref 43–77)
NITRITE UR-MCNC: NEGATIVE — SIGNIFICANT CHANGE UP
NRBC # BLD: 0 /100 WBCS — SIGNIFICANT CHANGE UP (ref 0–0)
NT-PROBNP SERPL-SCNC: 296 PG/ML — SIGNIFICANT CHANGE UP (ref 0–300)
PH UR: 7 — SIGNIFICANT CHANGE UP (ref 5–8)
PLATELET # BLD AUTO: 188 K/UL — SIGNIFICANT CHANGE UP (ref 150–400)
POTASSIUM SERPL-MCNC: 3.8 MMOL/L — SIGNIFICANT CHANGE UP (ref 3.5–5.3)
POTASSIUM SERPL-SCNC: 3.8 MMOL/L — SIGNIFICANT CHANGE UP (ref 3.5–5.3)
PROCALCITONIN SERPL-MCNC: 0.06 NG/ML — SIGNIFICANT CHANGE UP (ref 0.02–0.1)
PROT SERPL-MCNC: 7 G/DL — SIGNIFICANT CHANGE UP (ref 6–8.3)
PROT UR-MCNC: NEGATIVE — SIGNIFICANT CHANGE UP
PROTHROM AB SERPL-ACNC: 14.4 SEC — HIGH (ref 10.6–13.6)
RBC # BLD: 4.28 M/UL — SIGNIFICANT CHANGE UP (ref 3.8–5.2)
RBC # FLD: 12.9 % — SIGNIFICANT CHANGE UP (ref 10.3–14.5)
RBC CASTS # UR COMP ASSIST: 1 /HPF — SIGNIFICANT CHANGE UP (ref 0–4)
SARS-COV-2 RNA SPEC QL NAA+PROBE: SIGNIFICANT CHANGE UP
SODIUM SERPL-SCNC: 139 MMOL/L — SIGNIFICANT CHANGE UP (ref 135–145)
SP GR SPEC: 1.01 — SIGNIFICANT CHANGE UP (ref 1.01–1.02)
TROPONIN T, HIGH SENSITIVITY RESULT: 14 NG/L — SIGNIFICANT CHANGE UP (ref 0–51)
UROBILINOGEN FLD QL: NEGATIVE — SIGNIFICANT CHANGE UP
WBC # BLD: 5.23 K/UL — SIGNIFICANT CHANGE UP (ref 3.8–10.5)
WBC # FLD AUTO: 5.23 K/UL — SIGNIFICANT CHANGE UP (ref 3.8–10.5)
WBC UR QL: 11 /HPF — HIGH (ref 0–5)

## 2021-05-02 PROCEDURE — 99285 EMERGENCY DEPT VISIT HI MDM: CPT | Mod: CS,GC

## 2021-05-02 PROCEDURE — 74177 CT ABD & PELVIS W/CONTRAST: CPT | Mod: 26,ME

## 2021-05-02 PROCEDURE — 71045 X-RAY EXAM CHEST 1 VIEW: CPT | Mod: 26

## 2021-05-02 PROCEDURE — G1004: CPT

## 2021-05-02 PROCEDURE — 99223 1ST HOSP IP/OBS HIGH 75: CPT

## 2021-05-02 RX ORDER — AMLODIPINE BESYLATE 2.5 MG/1
5 TABLET ORAL DAILY
Refills: 0 | Status: DISCONTINUED | OUTPATIENT
Start: 2021-05-02 | End: 2021-05-06

## 2021-05-02 RX ORDER — VANCOMYCIN HCL 1 G
1000 VIAL (EA) INTRAVENOUS ONCE
Refills: 0 | Status: COMPLETED | OUTPATIENT
Start: 2021-05-02 | End: 2021-05-02

## 2021-05-02 RX ORDER — AZTREONAM 2 G
1000 VIAL (EA) INJECTION ONCE
Refills: 0 | Status: COMPLETED | OUTPATIENT
Start: 2021-05-02 | End: 2021-05-02

## 2021-05-02 RX ORDER — APIXABAN 2.5 MG/1
1 TABLET, FILM COATED ORAL
Qty: 0 | Refills: 0 | DISCHARGE

## 2021-05-02 RX ORDER — ACETAMINOPHEN 500 MG
650 TABLET ORAL ONCE
Refills: 0 | Status: COMPLETED | OUTPATIENT
Start: 2021-05-02 | End: 2021-05-02

## 2021-05-02 RX ORDER — SODIUM CHLORIDE 9 MG/ML
1000 INJECTION INTRAMUSCULAR; INTRAVENOUS; SUBCUTANEOUS ONCE
Refills: 0 | Status: COMPLETED | OUTPATIENT
Start: 2021-05-02 | End: 2021-05-02

## 2021-05-02 RX ORDER — TRAMADOL HYDROCHLORIDE 50 MG/1
25 TABLET ORAL THREE TIMES A DAY
Refills: 0 | Status: DISCONTINUED | OUTPATIENT
Start: 2021-05-02 | End: 2021-05-06

## 2021-05-02 RX ORDER — AMIODARONE HYDROCHLORIDE 400 MG/1
200 TABLET ORAL DAILY
Refills: 0 | Status: DISCONTINUED | OUTPATIENT
Start: 2021-05-02 | End: 2021-05-06

## 2021-05-02 RX ORDER — SERTRALINE 25 MG/1
50 TABLET, FILM COATED ORAL DAILY
Refills: 0 | Status: DISCONTINUED | OUTPATIENT
Start: 2021-05-02 | End: 2021-05-06

## 2021-05-02 RX ORDER — APIXABAN 2.5 MG/1
2.5 TABLET, FILM COATED ORAL
Refills: 0 | Status: DISCONTINUED | OUTPATIENT
Start: 2021-05-03 | End: 2021-05-06

## 2021-05-02 RX ORDER — PANTOPRAZOLE SODIUM 20 MG/1
40 TABLET, DELAYED RELEASE ORAL
Refills: 0 | Status: DISCONTINUED | OUTPATIENT
Start: 2021-05-02 | End: 2021-05-06

## 2021-05-02 RX ORDER — AMLODIPINE BESYLATE 2.5 MG/1
1 TABLET ORAL
Qty: 0 | Refills: 0 | DISCHARGE

## 2021-05-02 RX ADMIN — Medication 50 MILLIGRAM(S): at 20:53

## 2021-05-02 RX ADMIN — SODIUM CHLORIDE 1000 MILLILITER(S): 9 INJECTION INTRAMUSCULAR; INTRAVENOUS; SUBCUTANEOUS at 16:54

## 2021-05-02 RX ADMIN — Medication 650 MILLIGRAM(S): at 16:54

## 2021-05-02 RX ADMIN — Medication 250 MILLIGRAM(S): at 22:23

## 2021-05-02 NOTE — ED ADULT NURSE NOTE - PSH
H/O hemorrhoidectomy    History of Bilateral Breast Biopsy    S/P Exploratory Laparotomy  TB in 1960s  S/P vein stripping

## 2021-05-02 NOTE — ED ADULT NURSE REASSESSMENT NOTE - NS ED NURSE REASSESS COMMENT FT1
Report received from Debby CRUZ. Pt resting comfortably without complaints & does not appear to be in any acute distress at this time with VSS. Updated pt that she is admitted, currently awaiting a bed.

## 2021-05-02 NOTE — ED PROVIDER NOTE - CLINICAL SUMMARY MEDICAL DECISION MAKING FREE TEXT BOX
Haverty, PGY3- exhaustion for 1 day, found to be febrile. Hx of SBO from abdominal TB/ex lap but no NV or pain. Febrile but looks well, no shock state.  Eval for pna, cystitis. Will obtain CT given hx and diarrhea.

## 2021-05-02 NOTE — ED PROVIDER NOTE - PROGRESS NOTE DETAILS
britany- discussed with [facility] hospitalist and the patient was admitted to medicine for further evaluation and treatment/management

## 2021-05-02 NOTE — H&P ADULT - PROBLEM SELECTOR PLAN 1
Unclear etiology, currently denies SOB or cough, lying down in bed comfortably  Does not appear to be fluid overloaded  CT shows GGO, which is not new  s/p IV vanc/aztreonam; hold off further abx at this time  monitor for now  check ESR, CRP, Procal

## 2021-05-02 NOTE — H&P ADULT - HISTORY OF PRESENT ILLNESS
89 yo F with PMH of PAfib on eliquis, TB s/p ex lap for "abdominal TB" in 1960s, recurrent SBO (2018, 2020, 03/2021),     Patient was recently discharged on March 29 after being admitted for SBO, treated conservatively.  Patient also had GGO, on CT thought to be due to multifocal pneumonia, was treated with IV cefepime.           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  Unable to reach daughter Connie @ [phone] or @ [phone]. Although at triage, it was documented patient was feeling SOB, but she denies it at this time.  Patient states for last 2 days she has been feeling lethargic, unable to do her usual ADLs.  She had temp of 99 and had chills this morning.  Denies coughing or SOB.  Denies nausea, vomiting, abdominal pain.  Last bowel movement was "diarrhea" yesterday.  She was vaccinated for COVID.     Patient was recently discharged on March 29 after being admitted for SBO, treated conservatively.  Patient also had GGO, on CT thought to be due to multifocal pneumonia, was treated with IV cefepime.      On arrival, patient's vitals showed temp 99.1, HR 75, /78, saturation 98% on RA         89 yo F with PMH of PAfib on eliquis, 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  Unable to reach daughter Connie @ [phone] or @ [phone]. Although at triage, it was documented patient was feeling SOB, but she denies it at this time.  Patient states for last 2 days she has been feeling lethargic, unable to do her usual ADLs.  She had temp of 99 and had chills this morning.  Denies coughing or SOB.  Denies nausea, vomiting, abdominal pain.  Last bowel movement was "diarrhea" yesterday.  She was vaccinated for COVID.     Patient was recently discharged on March 29 after being admitted for SBO, treated conservatively.  Patient also had GGO, on CT thought to be due to multifocal pneumonia, was treated with IV cefepime.      On arrival, patient's vitals showed temp 99.1, HR 75, /78, saturation 98% on RA

## 2021-05-02 NOTE — ED PROVIDER NOTE - PHYSICAL EXAMINATION
General: alert, conversant, looks well, febrile, well nourished   Head: atraumatic, normocephalic  Eyes: PERRL, EOMI, no scleral icterus  ENT: no epistaxis, moist mucous membranes, normal phonation, airway patent  Neck: full ROM, no midline ttp  CV: RRR, no murmurs, HDS  Pulm: crackles L lung, normal spo2, no resp distress   GI: abd soft, non tender, no guarding/rebound/masses  Back: normal ROM, no midline ttp, no signs of trauma, no CVA ttp  Extremities: normal ROM, joints stable, distal pulses intact, no edema  Neuro: alert, oriented x3, moving all extremities, interactive, normal speech/memory, normal strength in extremities   Derm: warm, dry, normal color, no rash/wounds

## 2021-05-02 NOTE — H&P ADULT - ASSESSMENT
89 yo F with PMH of PAfib on eliquis, TB s/p ex lap for "abdominal TB" in 1960s, recurrent SBO (2018, 2020, 03/2021), here with SOB and lethargy

## 2021-05-02 NOTE — ED ADULT NURSE NOTE - NSIMPLEMENTINTERV_GEN_ALL_ED
Implemented All Fall with Harm Risk Interventions:  [place] to call system. Call bell, personal items and telephone within reach. Instruct patient to call for assistance. Room bathroom lighting operational. Non-slip footwear when patient is off stretcher. Physically safe environment: no spills, clutter or unnecessary equipment. Stretcher in lowest position, wheels locked, appropriate side rails in place. Provide visual cue, wrist band, yellow gown, etc. Monitor gait and stability. Monitor for mental status changes and reorient to person, place, and time. Review medications for side effects contributing to fall risk. Reinforce activity limits and safety measures with patient and family. Provide visual clues: red socks.

## 2021-05-02 NOTE — H&P ADULT - NSHPLABSRESULTS_GEN_ALL_CORE
Labs personally reviewed:                          12.9   5.23  )-----------( 188      ( 02 May 2021 16:45 )             40.4     05-    139  |  104  |  14  ----------------------------<  108<H>  3.8   |  21<L>  |  0.64    Ca    9.3      02 May 2021 16:45    TPro  7.0  /  Alb  4.1  /  TBili  0.6  /  DBili  x   /  AST  15  /  ALT  9<L>  /  AlkPhos  70  05-        LIVER FUNCTIONS - ( 02 May 2021 16:45 )  Alb: 4.1 g/dL / Pro: 7.0 g/dL / ALK PHOS: 70 U/L / ALT: 9 U/L / AST: 15 U/L / GGT: x           PT/INR - ( 02 May 2021 16:45 )   PT: 14.4 sec;   INR: 1.21 ratio         PTT - ( 02 May 2021 16:45 )  PTT:31.0 sec  Urinalysis Basic - ( 02 May 2021 17:56 )    Color: Light Yellow / Appearance: Clear / S.013 / pH: x  Gluc: x / Ketone: Negative  / Bili: Negative / Urobili: Negative   Blood: x / Protein: Negative / Nitrite: Negative   Leuk Esterase: Large / RBC: 1 /hpf / WBC 11 /HPF   Sq Epi: x / Non Sq Epi: 7 /hpf / Bacteria: Negative      Imaging:  CT abd/pelv reviewed:  FINDINGS:  LOWER CHEST: Cardiomegaly. Valvular and coronary artery calcifications. Bibasilar patchy lung opacities.    LIVER: Within normal limits.  BILE DUCTS: Normal caliber.  GALLBLADDER: Within normal limits.  SPLEEN: Within normal limits.  PANCREAS: Within normal limits.  ADRENALS: Within normal limits.  KIDNEYS/URETERS: Small bilateral renal cysts. Bilateral renal hypodensities too small to characterize.    BLADDER: Within normal limits.  REPRODUCTIVE ORGANS: Uterine fibroids. Right adnexal cystic lesion measures 5.6 x 5.5 cm previously 5.4 x 4.5 cm. Left adnexal cystic lesion measures 4.0 x 1.8 cm previously 3.3 x 2.2 cm.    BOWEL: No bowel obstruction. Appendix is normal. There is no diverticulitis.  PERITONEUM: No ascites.  VESSELS: Within normal limits.  RETROPERITONEUM/LYMPH NODES: No lymphadenopathy.  ABDOMINAL WALL: Within normal limits.  BONES: Degenerative changes. Diffuse osteopenia. Scoliosis.    IMPRESSION:    No CT finding to explain the patient's symptoms.  Bilateral adnexal cystic lesions slightly increased in size since the previous exam. These are indeterminant.    Bibasilar patchy lung opacities. Correlate clinically for infection. Follow to resolution to exclude other processes.  CXR personally reviewed: no focal opacity    EKG pending

## 2021-05-02 NOTE — H&P ADULT - NSHPPHYSICALEXAM_GEN_ALL_CORE
PHYSICAL EXAM:  Vital Signs Last 24 Hrs  T(C): 36.8 (05-02-21 @ 21:18)  T(F): 98.3 (05-02-21 @ 21:18), Max: 100.4 (05-02-21 @ 16:31)  HR: 60 (05-02-21 @ 21:18) (60 - 86)  BP: 154/75 (05-02-21 @ 21:18)  BP(mean): --  RR: 18 (05-02-21 @ 21:18) (15 - 20)  SpO2: 98% (05-02-21 @ 21:18) (96% - 98%)  Wt(kg): --    Constitutional: NAD, awake and alert  EYES: EOMI  ENT:  Normal Hearing, no tonsillar exudates   Neck: Soft and supple, No JVD  Lungs: Breath sounds are clear bilaterally, No wheezing, rales or rhonchi  Heart: S1 and S2, regular rate and rhythm, no Murmurs, gallops or rubs  Abdomen: Bowel Sounds present, soft, nontender, nondistended, no guarding, no rebound  Extremities: No cyanosis or clubbing; warm to touch  Vascular: 2+ peripheral pulses lower ex  Neurological: A/O x 3, no focal deficits  Musculoskeletal: 5/5 strength b/l upper and lower extremities  Skin: No rashes  Psych: no depression or anhedonia  HEME: no bruises, no nose bleeds

## 2021-05-02 NOTE — ED ADULT NURSE NOTE - OBJECTIVE STATEMENT
89 yo female PMH 89 yo female PMH TB, adhesions from TB, s/p SBO in March presents to ED with fatigue, lethargy, and "exhaustion" x 1 day. Recently admitted and treated for PNA, UTI and SBO, not on abx currently. Confirms chills and a couple of episodes of nonbloody diarrhea. Abd soft, nondistended, nontender. Crackles/diminished lung sounds heard in left upper lobe, right lung clear without adventitious sounds, 96% on room air. CM applied, EKG done, afebrile rectally. Denies CP, SOB, n/v/d, fevers, abdominal pain, urinary symptoms,  numbness, tingling in upper and lower extremities, HA, blurry vision. VSS updated on plan of care.

## 2021-05-03 LAB
ALBUMIN SERPL ELPH-MCNC: 4.1 G/DL — SIGNIFICANT CHANGE UP (ref 3.3–5)
ALP SERPL-CCNC: 67 U/L — SIGNIFICANT CHANGE UP (ref 40–120)
ALT FLD-CCNC: 8 U/L — LOW (ref 10–45)
ANION GAP SERPL CALC-SCNC: 13 MMOL/L — SIGNIFICANT CHANGE UP (ref 5–17)
AST SERPL-CCNC: 14 U/L — SIGNIFICANT CHANGE UP (ref 10–40)
BASOPHILS # BLD AUTO: 0.01 K/UL — SIGNIFICANT CHANGE UP (ref 0–0.2)
BASOPHILS NFR BLD AUTO: 0.2 % — SIGNIFICANT CHANGE UP (ref 0–2)
BILIRUB SERPL-MCNC: 0.8 MG/DL — SIGNIFICANT CHANGE UP (ref 0.2–1.2)
BUN SERPL-MCNC: 9 MG/DL — SIGNIFICANT CHANGE UP (ref 7–23)
CALCIUM SERPL-MCNC: 9.2 MG/DL — SIGNIFICANT CHANGE UP (ref 8.4–10.5)
CHLORIDE SERPL-SCNC: 104 MMOL/L — SIGNIFICANT CHANGE UP (ref 96–108)
CO2 SERPL-SCNC: 21 MMOL/L — LOW (ref 22–31)
COVID-19 SPIKE DOMAIN AB INTERP: POSITIVE
COVID-19 SPIKE DOMAIN ANTIBODY RESULT: >250 U/ML — HIGH
CREAT SERPL-MCNC: 0.53 MG/DL — SIGNIFICANT CHANGE UP (ref 0.5–1.3)
EOSINOPHIL # BLD AUTO: 0.08 K/UL — SIGNIFICANT CHANGE UP (ref 0–0.5)
EOSINOPHIL NFR BLD AUTO: 1.6 % — SIGNIFICANT CHANGE UP (ref 0–6)
ERYTHROCYTE [SEDIMENTATION RATE] IN BLOOD: 42 MM/HR — HIGH (ref 0–20)
GLUCOSE SERPL-MCNC: 83 MG/DL — SIGNIFICANT CHANGE UP (ref 70–99)
HCT VFR BLD CALC: 41.7 % — SIGNIFICANT CHANGE UP (ref 34.5–45)
HGB BLD-MCNC: 13.6 G/DL — SIGNIFICANT CHANGE UP (ref 11.5–15.5)
IMM GRANULOCYTES NFR BLD AUTO: 0.4 % — SIGNIFICANT CHANGE UP (ref 0–1.5)
LYMPHOCYTES # BLD AUTO: 0.56 K/UL — LOW (ref 1–3.3)
LYMPHOCYTES # BLD AUTO: 11.5 % — LOW (ref 13–44)
MAGNESIUM SERPL-MCNC: 2.1 MG/DL — SIGNIFICANT CHANGE UP (ref 1.6–2.6)
MCHC RBC-ENTMCNC: 30.4 PG — SIGNIFICANT CHANGE UP (ref 27–34)
MCHC RBC-ENTMCNC: 32.6 GM/DL — SIGNIFICANT CHANGE UP (ref 32–36)
MCV RBC AUTO: 93.3 FL — SIGNIFICANT CHANGE UP (ref 80–100)
MONOCYTES # BLD AUTO: 0.24 K/UL — SIGNIFICANT CHANGE UP (ref 0–0.9)
MONOCYTES NFR BLD AUTO: 4.9 % — SIGNIFICANT CHANGE UP (ref 2–14)
NEUTROPHILS # BLD AUTO: 3.96 K/UL — SIGNIFICANT CHANGE UP (ref 1.8–7.4)
NEUTROPHILS NFR BLD AUTO: 81.4 % — HIGH (ref 43–77)
NRBC # BLD: 0 /100 WBCS — SIGNIFICANT CHANGE UP (ref 0–0)
PHOSPHATE SERPL-MCNC: 3.5 MG/DL — SIGNIFICANT CHANGE UP (ref 2.5–4.5)
PLATELET # BLD AUTO: 179 K/UL — SIGNIFICANT CHANGE UP (ref 150–400)
POTASSIUM SERPL-MCNC: 3.9 MMOL/L — SIGNIFICANT CHANGE UP (ref 3.5–5.3)
POTASSIUM SERPL-SCNC: 3.9 MMOL/L — SIGNIFICANT CHANGE UP (ref 3.5–5.3)
PROT SERPL-MCNC: 7.1 G/DL — SIGNIFICANT CHANGE UP (ref 6–8.3)
RBC # BLD: 4.47 M/UL — SIGNIFICANT CHANGE UP (ref 3.8–5.2)
RBC # FLD: 12.9 % — SIGNIFICANT CHANGE UP (ref 10.3–14.5)
SARS-COV-2 IGG+IGM SERPL QL IA: >250 U/ML — HIGH
SARS-COV-2 IGG+IGM SERPL QL IA: POSITIVE
SODIUM SERPL-SCNC: 138 MMOL/L — SIGNIFICANT CHANGE UP (ref 135–145)
WBC # BLD: 4.87 K/UL — SIGNIFICANT CHANGE UP (ref 3.8–10.5)
WBC # FLD AUTO: 4.87 K/UL — SIGNIFICANT CHANGE UP (ref 3.8–10.5)

## 2021-05-03 PROCEDURE — 71250 CT THORAX DX C-: CPT | Mod: 26

## 2021-05-03 RX ADMIN — AMLODIPINE BESYLATE 5 MILLIGRAM(S): 2.5 TABLET ORAL at 06:38

## 2021-05-03 RX ADMIN — SERTRALINE 50 MILLIGRAM(S): 25 TABLET, FILM COATED ORAL at 11:29

## 2021-05-03 RX ADMIN — PANTOPRAZOLE SODIUM 40 MILLIGRAM(S): 20 TABLET, DELAYED RELEASE ORAL at 06:38

## 2021-05-03 RX ADMIN — APIXABAN 2.5 MILLIGRAM(S): 2.5 TABLET, FILM COATED ORAL at 09:16

## 2021-05-03 RX ADMIN — AMIODARONE HYDROCHLORIDE 200 MILLIGRAM(S): 400 TABLET ORAL at 06:38

## 2021-05-03 NOTE — PROGRESS NOTE ADULT - ATTENDING COMMENTS
Pt seen and examined on 5/3/21    Agree with ACP note as above    Vitals/imaging/labs were reviewed    My physical examination of the patient corresponded to that documented above except as noted    89 yo F with PMH of PAfib on eliquis, 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  In March, she had an admission for SBO, was treated with cefepime x 8 days for UTI and she also had GGO noted on a CTA chest thought to be 2' multifocal PNA.  So far infectious workup not revealing, but will check chest CT without contrast to see if there is a new infiltrate/effusion to explain her presenting symptoms.  Holding off further antibiotics for now.  F/u blood cultures.

## 2021-05-04 LAB
ALBUMIN SERPL ELPH-MCNC: 3.7 G/DL — SIGNIFICANT CHANGE UP (ref 3.3–5)
ALP SERPL-CCNC: 66 U/L — SIGNIFICANT CHANGE UP (ref 40–120)
ALT FLD-CCNC: 8 U/L — LOW (ref 10–45)
ANION GAP SERPL CALC-SCNC: 11 MMOL/L — SIGNIFICANT CHANGE UP (ref 5–17)
AST SERPL-CCNC: 10 U/L — SIGNIFICANT CHANGE UP (ref 10–40)
BILIRUB SERPL-MCNC: 0.7 MG/DL — SIGNIFICANT CHANGE UP (ref 0.2–1.2)
BUN SERPL-MCNC: 16 MG/DL — SIGNIFICANT CHANGE UP (ref 7–23)
CALCIUM SERPL-MCNC: 9 MG/DL — SIGNIFICANT CHANGE UP (ref 8.4–10.5)
CHLORIDE SERPL-SCNC: 105 MMOL/L — SIGNIFICANT CHANGE UP (ref 96–108)
CO2 SERPL-SCNC: 22 MMOL/L — SIGNIFICANT CHANGE UP (ref 22–31)
CREAT SERPL-MCNC: 0.68 MG/DL — SIGNIFICANT CHANGE UP (ref 0.5–1.3)
CULTURE RESULTS: SIGNIFICANT CHANGE UP
GLUCOSE SERPL-MCNC: 93 MG/DL — SIGNIFICANT CHANGE UP (ref 70–99)
HCT VFR BLD CALC: 40.2 % — SIGNIFICANT CHANGE UP (ref 34.5–45)
HGB BLD-MCNC: 13 G/DL — SIGNIFICANT CHANGE UP (ref 11.5–15.5)
MCHC RBC-ENTMCNC: 30 PG — SIGNIFICANT CHANGE UP (ref 27–34)
MCHC RBC-ENTMCNC: 32.3 GM/DL — SIGNIFICANT CHANGE UP (ref 32–36)
MCV RBC AUTO: 92.6 FL — SIGNIFICANT CHANGE UP (ref 80–100)
NRBC # BLD: 0 /100 WBCS — SIGNIFICANT CHANGE UP (ref 0–0)
PLATELET # BLD AUTO: 181 K/UL — SIGNIFICANT CHANGE UP (ref 150–400)
POTASSIUM SERPL-MCNC: 3.4 MMOL/L — LOW (ref 3.5–5.3)
POTASSIUM SERPL-SCNC: 3.4 MMOL/L — LOW (ref 3.5–5.3)
PROT SERPL-MCNC: 6.5 G/DL — SIGNIFICANT CHANGE UP (ref 6–8.3)
RBC # BLD: 4.34 M/UL — SIGNIFICANT CHANGE UP (ref 3.8–5.2)
RBC # FLD: 13 % — SIGNIFICANT CHANGE UP (ref 10.3–14.5)
SODIUM SERPL-SCNC: 138 MMOL/L — SIGNIFICANT CHANGE UP (ref 135–145)
SPECIMEN SOURCE: SIGNIFICANT CHANGE UP
WBC # BLD: 5.76 K/UL — SIGNIFICANT CHANGE UP (ref 3.8–10.5)
WBC # FLD AUTO: 5.76 K/UL — SIGNIFICANT CHANGE UP (ref 3.8–10.5)

## 2021-05-04 RX ORDER — POTASSIUM CHLORIDE 20 MEQ
20 PACKET (EA) ORAL ONCE
Refills: 0 | Status: COMPLETED | OUTPATIENT
Start: 2021-05-04 | End: 2021-05-04

## 2021-05-04 RX ADMIN — APIXABAN 2.5 MILLIGRAM(S): 2.5 TABLET, FILM COATED ORAL at 08:33

## 2021-05-04 RX ADMIN — SERTRALINE 50 MILLIGRAM(S): 25 TABLET, FILM COATED ORAL at 11:48

## 2021-05-04 RX ADMIN — AMIODARONE HYDROCHLORIDE 200 MILLIGRAM(S): 400 TABLET ORAL at 06:27

## 2021-05-04 RX ADMIN — Medication 20 MILLIEQUIVALENT(S): at 16:10

## 2021-05-04 RX ADMIN — PANTOPRAZOLE SODIUM 40 MILLIGRAM(S): 20 TABLET, DELAYED RELEASE ORAL at 06:27

## 2021-05-04 RX ADMIN — AMLODIPINE BESYLATE 5 MILLIGRAM(S): 2.5 TABLET ORAL at 06:26

## 2021-05-04 NOTE — PHYSICAL THERAPY INITIAL EVALUATION ADULT - ADDITIONAL COMMENTS
pt lives in apt no steps to negotiate alone , has a [facility] private hire 8 hrs x 7 days (per pt 8 am to 4 or 4:30 PM ) ;assists pt with adl's IAdl's and personal care as need ; pt dresses self ; HHA supervise when shower in walk-in shower with shower chair and grab bars ; pt has commode next to bed so at night uses commode for BR at night ; pt has a daughter Misa Purvis [phone] is emergency contact , no caregiver ID ;pt has a st cane , rollator , transport w/c , commode , rolling walker

## 2021-05-04 NOTE — PHYSICAL THERAPY INITIAL EVALUATION ADULT - ASR EQUIP NEEDS DISCH PT EVAL
pt has rolling walker , rollator , std cane , shower chair , grab bars in shower , commode at b/s , transport w/c , HHA 8 to 8.5 hrs x 7 days

## 2021-05-04 NOTE — CONSULT NOTE ADULT - SUBJECTIVE AND OBJECTIVE BOX
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  Unable to reach daughter Connie @ [phone] or @ [phone]. Although at triage, it was documented patient was feeling SOB, but she denies it at this time.  Patient states for last 2 days she has been feeling lethargic, unable to do her usual ADLs.  She had temp of 99 and had chills this morning.  Denies coughing or SOB.  Denies nausea, vomiting, abdominal pain.  Last bowel movement was "diarrhea" yesterday.  She was vaccinated for COVID. Patient was recently discharged on  after being admitted for SBO, treated conservatively.  Patient also had GGO, on CT thought to be due to multifocal pneumonia, was treated with IV cefepime.  On arrival, patient's vitals showed temp 99.1, HR 75, /78, saturation 98% on RA    Patient admitted with 2 days complaints of weakness/lethargry - denied fever, rigors. No change in chronic intermttent cough with clear sputum. Pateint discharged 3/39 after admission to r/o SBO: patient treated with cefepime for 7 days for multilobar opacities c/w aspiration/mucoid impaction.  She has history of pulmonary and abd MBTB: former diagosed in 1950's where she spent time in [facility], followed by abd disease requireing lysis of adhesions. Treatment for TB was prolonged and included streptomycin and later INH  She received one dose each of Aztreonam and Vancomycin in ER on . She has remainded afebrile since then and denies dyspnea, productive cough. Her c/o weakness have resolved and she ambulated on floor with assistance without difficulty. She is on Eliquis for AF and is followed by Dr Steven Goldberg as PCP/cardiologist.  She is nontoxic, afebrile, with normal WBC and procalcitonin.    PAST MEDICAL & SURGICAL HISTORY:  Pulmonary TB    Gastritis    Afib    SBO (small bowel obstruction)    Hypothyroid    UTI (urinary tract infection)    HTN (hypertension)    TB (pulmonary tuberculosis)  TB of abdomen     History of Bilateral Breast Biopsy    S/P Exploratory Laparotomy  TB in 1960s    H/O hemorrhoidectomy    S/P vein stripping      Allergies    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 reported allergy (Unknown)  penicillin (Rash)    Intolerances      FAMILY HISTORY:  No pertinent family history      SH:  Lives with HHA  Non smoker  Daughter present      Medications:  MEDICATIONS  (STANDING):  aMIOdarone    Tablet 200 milliGRAM(s) Oral daily  amLODIPine   Tablet 5 milliGRAM(s) Oral daily  apixaban 2.5 milliGRAM(s) Oral <User Schedule>  pantoprazole    Tablet 40 milliGRAM(s) Oral before breakfast  potassium chloride    Tablet ER 20 milliEquivalent(s) Oral once  sertraline 50 milliGRAM(s) Oral daily    MEDICATIONS  (PRN):  traMADol 25 milliGRAM(s) Oral three times a day PRN Mild Pain (1 - 3)    Vital Signs Last 24 Hrs  T(C): 36.5 (04 May 2021 15:06), Max: 36.6 (04 May 2021 04:15)  T(F): 97.7 (04 May 2021 15:06), Max: 97.8 (04 May 2021 04:15)  HR: 60 (04 May 2021 15:06) (60 - 65)  BP: 127/71 (04 May 2021 15:06) (125/66 - 136/73)  BP(mean): --  RR: 18 (04 May 2021 15:06) (18 - 18)  SpO2: 97% (04 May 2021 15:06) (95% - 97%)    05-03 @ 07:01  -  05-04 @ 07:00  --------------------------------------------------------  IN: 600 mL / OUT: 950 mL / NET: -350 mL    LABS:                        13.0   5.76  )-----------( 181      ( 04 May 2021 07:15 )             40.2     05-04    138  |  105  |  16  ----------------------------<  93  3.4<L>   |  22  |  0.68    Ca    9.0      04 May 2021 07:16  Phos  3.5     05-  Mg     2.1     05-03    TPro  6.5  /  Alb  3.7  /  TBili  0.7  /  DBili  x   /  AST  10  /  ALT  8<L>  /  AlkPhos  66  05-04      PT/INR - ( 02 May 2021 16:45 )   PT: 14.4 sec;   INR: 1.21 ratio         PTT - ( 02 May 2021 16:45 )  PTT:31.0 sec  Urinalysis Basic - ( 02 May 2021 17:56 )    Color: Light Yellow / Appearance: Clear / S.013 / pH: x  Gluc: x / Ketone: Negative  / Bili: Negative / Urobili: Negative   Blood: x / Protein: Negative / Nitrite: Negative   Leuk Esterase: Large / RBC: 1 /hpf / WBC 11 /HPF   Sq Epi: x / Non Sq Epi: 7 /hpf / Bacteria: Negative    Procalcitonin, Serum: 0.06 ng/mL (21 @ 22:47)    Serum Pro-Brain Natriuretic Peptide: 296 pg/mL (21 @ 16:45)      CULTURES:  Culture Results:   No growth to date. ( @ 21:21)  Culture Results:   No growth to date. ( @ 21:21)  Culture Results:   <10,000 CFU/mL Normal Urogenital Yeimi ( @ 21:00)    Physical Examination:    General: Non toxic, No acute distress.      HEENT: Pupils equal, reactive to light.  Symmetric.    PULM: Clear without wheeze or rhonchi    CVS: Regular rate and rhythm, no murmurs, rubs, or gallops    ABD: Soft, nondistended, nontender, normoactive bowel sounds, no masses    EXT: No edema, nontender    SKIN: Warm and well perfused, no rashes noted.    NEURO: Alert, oriented, interactive, nonfocal    RADIOLOGY REVIEWED PERSONALLY  CXR:    CT chest:    PROCEDURE DATE:  2021      INTERPRETATION:  CLINICAL INFORMATION: Evaluate bibasilar opacities noted on CT abdomen and pelvis.    COMPARISON: CT abdomen pelvis 2021, CT chest 3/21/2021.    CONTRAST/COMPLICATIONS:  IV Contrast: None  Oral Contrast: None  Complications: None    PROCEDURE:  CT of the Chest was performed.  Sagittal and coronal reformats were performed.    FINDINGS:    LUNGS AND AIRWAYS: Patent central airways. Patchy left lower lobe opacity in the paraspinal location is new when compared to prior study 3/21/2021; peripheral right lower and upper nodules are also new, and these findings are concerning for new foci of infection. Remaining patchy and nodular opacities seenon the prior chest CT of 2021, majority of which have increased lobe. Left upper lung bronchiectasis, linear scarring, and calcifications are unchanged.  PLEURA: No pleural effusion.  MEDIASTINUM AND TUNG: No lymphadenopathy.  VESSELS: Aortic and coronary artery calcifications.  HEART: Heart size is normal. No pericardial effusion.  CHEST WALL AND LOWER NECK: Within normal limits.  VISUALIZED UPPER ABDOMEN: Within normal limits.  BONES: Degenerative changes.    IMPRESSION:  Patchy left lower lobe opacity in the paraspinal location is new when compared to prior study 3/21/2021; peripheral right lower and upper nodules are also new, and these findings are concerning for new foci of infection. Recommend a 3 month follow-up to assess resolution.  More extensive patchy and nodular opacities seen on 3/21/2021 have significantly improved or resolved.    TTE:

## 2021-05-04 NOTE — PHYSICAL THERAPY INITIAL EVALUATION ADULT - IMPAIRMENTS CONTRIBUTING TO GAIT DEVIATIONS, PT EVAL
decreased endurance , amb w/ RW w/ vc to keep feet in base of walker as pt w/ tendency to ER R foot pt self correct , vc to not make a quick turn as pt get mild off balance cg to min of1 to balance , vc on approach to bed with RW to go forwards and not backwards pt self correct all w/ vc/impaired balance/impaired postural control/decreased strength

## 2021-05-04 NOTE — PHYSICAL THERAPY INITIAL EVALUATION ADULT - IMPAIRED TRANSFERS: SIT/STAND, REHAB EVAL
decreased endurance , sit-stand x2 at RW vc for proper hand placement with transfer pt self correct mild unsteady/impaired balance/impaired postural control/decreased strength

## 2021-05-04 NOTE — PHYSICAL THERAPY INITIAL EVALUATION ADULT - PRECAUTIONS/LIMITATIONS, REHAB EVAL
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 Although at triage, it was documented patient was feeling SOB, but she denies it at this time.  Patient states for last 2 days she has been feeling lethargic, unable to do her usual ADLs.  She had temp of 99 and had chills this morning.  Denies coughing or SOB.  Denies nausea, vomiting, abdominal pain.  Last bowel movement was "diarrhea" yesterday.  She was vaccinated for COVID. 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 Although at triage, it was documented patient was feeling SOB, but she denies it at this time.  Patient states for last 2 days she has been feeling lethargic, unable to do her usual ADLs.  She had temp of 99 and had chills this morning.  Denies coughing or SOB.  Denies nausea, vomiting, abdominal pain.  Last bowel movement was "diarrhea" yesterday.  She was vaccinated for COVID./fall precautions

## 2021-05-04 NOTE — PHYSICAL THERAPY INITIAL EVALUATION ADULT - GENERAL OBSERVATIONS, REHAB EVAL
pt received in bed top rails up and 1 siderail HOB 30 + primafit to wall suction call bell,phone and table in reach ;pt resting but arousable and agreeable to PT

## 2021-05-04 NOTE — PROGRESS NOTE ADULT - ATTENDING COMMENTS
Pt seen and examined on 5/4/21.  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  In March, she had an admission for SBO, was treated with cefepime x 8 days for UTI and she also had GGO noted on a CTA chest thought to be 2' multifocal PNA.  So far infectious workup not revealing, but chest CT without contrast revealed some new nodular opacities, new since last imaging.  Holding off further antibiotics for now.    Will consult pulmonology.   Palliative consult was also ordered.

## 2021-05-04 NOTE — PHYSICAL THERAPY INITIAL EVALUATION ADULT - DISCHARGE DISPOSITION, PT EVAL
Home with assist as prior and Home PT to increase functional mobility , strength, balance, endurance , fall prevention education continued/home w/ assist/home w/ home PT

## 2021-05-04 NOTE — CHART NOTE - NSCHARTNOTEFT_GEN_A_CORE
Palliative consulted to assist in GOC discussion and community resources in the setting of patient with advanced illness. Chart reviewed, previous notes read and appreciated. LMSW met with patient at bedside today.    LMSW met with patient and introduced self and role during hospitalization. Patient verbalized understanding and was receptive to consult today. LMSW inquired into patient's home supports, and patient notes no safety concerns present in the home. Patient states she has a HHA 7 days x 8 hours, and states she feels this amount of HHA assistance is sufficient for her needs at this time. Patient states she ambulates using a RW as well to ensure safety. Patient states her daughter resides in [place], [place], but visits patient every other day to check in and determine if any needs are present. Patient states her daughter assists her with everything, and notes the close relationship the two share. Patient states she is a  and notes her daughter as her only child (surrogate decision maker should patient lose capacity, [facility] Patient states she has non-biological grandchildren through her daughter as well, and confirms strong social supports. LMSW discussed community case management programs available to patient, and patient informed LMSW that she already receives case management services through [employer] [place], and has a community  through that program as well.    LMSW discussed HCP today, and patient states she is unsure if HCP completed in the past. LMSW provided overview of HCP form, and discussed completion of form during current admission. Patient verbalized understanding, but requests blank copy of HCP form for review and states she would prefer to complete form with her community [employer]  upon returning home. LMSW provided patient with blank HCP, as well as contact card should any questions, concerns, or needs arise. Patient verbalized appreciation of visit and information provided today.     Palliative MD to follow up for advance care planning and code status discussion, and LMSW remains available as needed.

## 2021-05-04 NOTE — PHYSICAL THERAPY INITIAL EVALUATION ADULT - PERTINENT HX OF CURRENT PROBLEM, REHAB EVAL
COVID (not detected ) 5/2/21 and antibody positive 5/3/21 > 250 ; CT CHEST /CXR 5/3/21 : B patchy opacity REDD bronchiectasis , L apical scarring ; EKG 5/2/21 SR with SA

## 2021-05-05 ENCOUNTER — TRANSCRIPTION ENCOUNTER (OUTPATIENT)
Age: 86
End: 2021-05-05

## 2021-05-05 DIAGNOSIS — R06.00 DYSPNEA, UNSPECIFIED: ICD-10-CM

## 2021-05-05 DIAGNOSIS — Z51.5 ENCOUNTER FOR PALLIATIVE CARE: ICD-10-CM

## 2021-05-05 DIAGNOSIS — Z71.89 OTHER SPECIFIED COUNSELING: ICD-10-CM

## 2021-05-05 LAB
ANION GAP SERPL CALC-SCNC: 12 MMOL/L — SIGNIFICANT CHANGE UP (ref 5–17)
BUN SERPL-MCNC: 14 MG/DL — SIGNIFICANT CHANGE UP (ref 7–23)
CALCIUM SERPL-MCNC: 8.8 MG/DL — SIGNIFICANT CHANGE UP (ref 8.4–10.5)
CHLORIDE SERPL-SCNC: 105 MMOL/L — SIGNIFICANT CHANGE UP (ref 96–108)
CO2 SERPL-SCNC: 23 MMOL/L — SIGNIFICANT CHANGE UP (ref 22–31)
CREAT SERPL-MCNC: 0.66 MG/DL — SIGNIFICANT CHANGE UP (ref 0.5–1.3)
GLUCOSE SERPL-MCNC: 82 MG/DL — SIGNIFICANT CHANGE UP (ref 70–99)
POTASSIUM SERPL-MCNC: 3.6 MMOL/L — SIGNIFICANT CHANGE UP (ref 3.5–5.3)
POTASSIUM SERPL-SCNC: 3.6 MMOL/L — SIGNIFICANT CHANGE UP (ref 3.5–5.3)
SODIUM SERPL-SCNC: 140 MMOL/L — SIGNIFICANT CHANGE UP (ref 135–145)

## 2021-05-05 PROCEDURE — 99223 1ST HOSP IP/OBS HIGH 75: CPT

## 2021-05-05 RX ADMIN — APIXABAN 2.5 MILLIGRAM(S): 2.5 TABLET, FILM COATED ORAL at 08:53

## 2021-05-05 RX ADMIN — PANTOPRAZOLE SODIUM 40 MILLIGRAM(S): 20 TABLET, DELAYED RELEASE ORAL at 06:04

## 2021-05-05 RX ADMIN — SERTRALINE 50 MILLIGRAM(S): 25 TABLET, FILM COATED ORAL at 12:15

## 2021-05-05 RX ADMIN — AMIODARONE HYDROCHLORIDE 200 MILLIGRAM(S): 400 TABLET ORAL at 06:04

## 2021-05-05 RX ADMIN — AMLODIPINE BESYLATE 5 MILLIGRAM(S): 2.5 TABLET ORAL at 06:04

## 2021-05-05 NOTE — CONSULT NOTE ADULT - CONSULT REASON
Goals of care and advanced care planing in a patient at risk of readmission.  LACE 12
Abnormal Chest CT

## 2021-05-05 NOTE — DISCHARGE NOTE PROVIDER - NSDCCPCAREPLAN_GEN_ALL_CORE_FT
PRINCIPAL DISCHARGE DIAGNOSIS  Diagnosis: Dyspnea  Assessment and Plan of Treatment:       SECONDARY DISCHARGE DIAGNOSES  Diagnosis: Afib  Assessment and Plan of Treatment: Afib    Diagnosis: HTN (hypertension)  Assessment and Plan of Treatment:      PRINCIPAL DISCHARGE DIAGNOSIS  Diagnosis: Dyspnea  Assessment and Plan of Treatment: f/u with pulmonary, Dr. Shannon after discharge  continue current medications      SECONDARY DISCHARGE DIAGNOSES  Diagnosis: HTN (hypertension)  Assessment and Plan of Treatment: Follow up with your medical doctor to establish long term blood pressure treatment goals.      Diagnosis: Afib  Assessment and Plan of Treatment: Afib  Atrial fibrillation is the most common heart rhythm problem.  The condition puts you at risk for has stroke and heart attack  It helps if you control your blood pressure, not drink more than 1-2 alcohol drinks per day, cut down on caffeine, getting treatment for over active thyroid gland, and get regular exercise  Call your doctor if you feel your heart racing or beating unusually, chest tightness or pain, lightheaded, faint, shortness of breath especially with exercise  It is important to take your heart medication as prescribed  You may be on anticoagulation which is very important to take as directed - you may need blood work to monitor drug levels

## 2021-05-05 NOTE — DISCHARGE NOTE PROVIDER - HOSPITAL COURSE
89F younger than stated age admitted with nonspecific complaints of weakness, chronic non purulent cough. Patient received one dose of Aztreonam/Vanco in ER . She has remained afebrile with normal WBC and procalcitonin since admission without c/o weakness, dyspnea or CP.  CT scans from prior admission on 3/21 reviewed and compared with current CT 5/3  3/21: patchy nodular mulitlobar opacites noted with prominant REDD bronchiectasis and borderline bronchiect in basilar segments. REDD apical scarring noted. CT c/w aspiration pneumonia and foci of mucoid impaction. Significant motion artifact invalidates "GG opacities"  5/3 CT with substantial resolution of opacites - especially in RML and RLL. Some nodular residual and few new opacities are airwa associated and likely c/w mucoid impaction. 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  Observe off antibiotics  Outpatient f/u Dr Shannon, [facility] pulmonary    Afib: On Eliquis 2.5MG ONCE DAILY     HTN: Chronic, stable, Cont home med Amlodipine 5MG daily

## 2021-05-05 NOTE — CONSULT NOTE ADULT - PROBLEM SELECTOR RECOMMENDATION 2
d/w the patient and her daughter about possible home hazards and it appears as if the patient and her daughter have taken all measures in other to avoid falls (e.g., removing/attaching rugs, adding grab bars, removing furniture that may be an obstacle, no pets).   May benefit from checking Vit D as OP.   Home PT.

## 2021-05-05 NOTE — CONSULT NOTE ADULT - PROBLEM SELECTOR RECOMMENDATION 3
Please see palliative care SW note dated 5/4 for details about  psychosocial assessment and resources. However, in summary, the patient believes the amount of support and services she receives are able to meet her needs. Will anyway advise for the patient to f/u with the geriatrics and palliative medicine office. Her daughter (Alyssa) is her surrogate. Completing a HCP from was discussed but the patient was not into completing a form during this admission. Please see palliative care SW note dated 5/4 for details about  psychosocial assessment and resources. However, in summary, the patient believes the amount of support and services she receives are able to meet her needs.     Will anyway advise for the patient to f/u with the geriatrics and palliative medicine office with Dr. Carlene Millan or Dr. Milla Coombs for a Geriatrics assessment and to f/u regarding ACP. [address] [place] [address], [address]. [place] [address]. Phone [phone]. This was d/w the patient and with her daughter. Contact info was provided. See GOC Above.   Will recommend f/u with the geriatrics and palliative medicine ([facility]) outpatient office to further address ACP

## 2021-05-05 NOTE — DISCHARGE NOTE PROVIDER - PROVIDER TOKENS
PROVIDER:[TOKEN:[[id number]]] PROVIDER:[TOKEN:[[id number]]],PROVIDER:[TOKEN:[[id number]]]

## 2021-05-05 NOTE — CONSULT NOTE ADULT - PROBLEM SELECTOR RECOMMENDATION 4
Please see palliative care SW note dated 5/4 for details about  psychosocial assessment and resources. However, in summary, the patient believes the amount of support and services she receives are able to meet her needs. Will anyway advise for the patient to f/u with the geriatrics and palliative medicine office with Dr. Carlene Millan or Dr. Milla Coombs for a Geriatrics assessment and to f/u regarding ACP. [address] [place] [address], [address]. [place] [address]. Phone [phone]. This was d/w the patient and with her daughter. Contact info was provided. Will also f/u with [facility] to see if a health navigator may be appropriate.     Will sing off.         Johnnie Kaplan MD   Geriatrics and Palliative Care ([facility]) Consult Service    of Geriatric and Palliative Medicine  [facility]      Please page the following number for clinical matters between the hours of 9 am and 5 pm from Monday through Friday : [phone]    After 5pm and on weekends, please see the contact information below:    In the event of newly developing, evolving, or worsening symptoms, please contact the Palliative Medicine team via pager (if the patient is at [facility] #[phone] or if the patient is at [facility] #[phone]) The Geriatric and Palliative Medicine service has coverage 24 hours a day/ 7 days a week to provide medical recommendations regarding symptom management needs via telephone

## 2021-05-05 NOTE — CONSULT NOTE ADULT - SUBJECTIVE AND OBJECTIVE BOX
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 As per CT chest "Patchy left lower lobe opacity in the paraspinal location is new when compared to prior study 3/21/2021; peripheral right lower and upper nodules are also new, and these findings are concerning for new foci of infection. Recommend a 3 month follow-up to assess resolution. More extensive patchy and nodular opacities seen on 3/21/2021 have significantly improved or resolved." The patient's condition progressively improved while off of Abx. Palliative care is seen for GOC and ACP.       PERTINENT PM/SXH:   Pulmonary TB    Gastritis    Afib    SBO (small bowel obstruction)    Hypothyroid    UTI (urinary tract infection)    HTN (hypertension)    TB (pulmonary tuberculosis)      Afib    History of Small Bowel Obstruction    History of Bilateral Breast Biopsy    S/P Exploratory Laparotomy    H/O hemorrhoidectomy    S/P vein stripping      FAMILY HISTORY:  No pertinent family history      ITEMS NOT CHECKED ARE NOT PRESENT    SOCIAL HISTORY:   Significant other/partner[x ]  Children[ ]  Rastafarian/Spirituality:  Substance hx:  [ ]   Tobacco hx:  [ ]   Alcohol hx: [ ]   Home Opioid hx:  [ ] I-Stop Reference No:  Living Situation: [x ]Home  [ ]Long term care  [ ]Rehab [ ]Other   Pt and daughter  verbalized understanding.  information given to patient and  daughter. Pt resides alone in elevator access apartment with no steps to enter.  Pt has 7 days, 8 hrs private aides in place to assist with ADLs and IADLs. Pt  has walker and cane in home.  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 reported allergy (Unknown)  penicillin (Rash)    Intolerances    MEDICATIONS  (STANDING):  aMIOdarone    Tablet 200 milliGRAM(s) Oral daily  amLODIPine   Tablet 5 milliGRAM(s) Oral daily  apixaban 2.5 milliGRAM(s) Oral <User Schedule>  pantoprazole    Tablet 40 milliGRAM(s) Oral before breakfast  sertraline 50 milliGRAM(s) Oral daily    MEDICATIONS  (PRN):  traMADol 25 milliGRAM(s) Oral three times a day PRN Mild Pain (1 - 3)    PRESENT SYMPTOMS: [ ]Unable to obtain due to poor mentation   Source if other than patient:  [ ]Family   [ ]Team     Pain: [ ]yes [ x]no  QOL impact -   Location -                    Aggravating factors -  Quality -  Radiation -  Timing-  Severity (0-10 scale):  Minimal acceptable level (0-10 scale):     CPOT:    https://www.[facility].org/getattachment/[email or web address]/Critical-Care-Pain-Observation-Tool-(CPOT)      PAIN AD Score:     http://geriatrictoolkit.[facility]/cog/painad.pdf (press ctrl +  left click to view)    Dyspnea:                           [ ]Mild [ ]Moderate [ ]Severe  Anxiety:                             [ ]Mild [ ]Moderate [ ]Severe  Fatigue:                             [ ]Mild [ ]Moderate [ ]Severe  Nausea:                             [ ]Mild [ ]Moderate [ ]Severe  Loss of appetite:              [ ]Mild [ ]Moderate [ ]Severe  Constipation:                    [ ]Mild [ ]Moderate [ ]Severe    Other Symptoms:  [ ]All other review of systems negative     Palliative Performance Status Version 2:         %    http://[email or web address].org/files/news/palliative_performance_scale_ppsv2.pdf  PHYSICAL EXAM:  Vital Signs Last 24 Hrs  T(C): 36.4 (05 May 2021 11:43), Max: 37.1 (04 May 2021 21:19)  T(F): 97.6 (05 May 2021 11:43), Max: 98.7 (04 May 2021 21:19)  HR: 57 (05 May 2021 11:43) (57 - 65)  BP: 129/75 (05 May 2021 11:43) (112/60 - 129/75)  BP(mean): --  RR: 18 (05 May 2021 11:43) (17 - 18)  SpO2: 94% (05 May 2021 11:43) (93% - 95%) I&O's Summary    04 May 2021 07:01  -  05 May 2021 07:00  --------------------------------------------------------  IN: 820 mL / OUT: 200 mL / NET: 620 mL    05 May 2021 07:01  -  05 May 2021 15:50  --------------------------------------------------------  IN: 360 mL / OUT: 500 mL / NET: -140 mL      GENERAL:  [ ]Alert  [ ]Oriented x   [ ]Lethargic  [ ]Cachexia  [ ]Unarousable  [ ]Verbal  [ ]Non-Verbal  Behavioral:   [ ] Anxiety  [ ] Delirium [ ] Agitation [ ] Other  HEENT:  [ ]Normal   [ ]Dry mouth   [ ]ET Tube/Trach  [ ]Oral lesions  PULMONARY:   [ ]Clear [ ]Tachypnea  [ ]Audible excessive secretions   [ ]Rhonchi        [ ]Right [ ]Left [ ]Bilateral  [ ]Crackles        [ ]Right [ ]Left [ ]Bilateral  [ ]Wheezing     [ ]Right [ ]Left [ ]Bilateral  [ ]Diminished breath sounds [ ]right [ ]left [ ]bilateral  CARDIOVASCULAR:    [ ]Regular [ ]Irregular [ ]Tachy  [ ]Faisal [ ]Murmur [ ]Other  GASTROINTESTINAL:  [ ]Soft  [ ]Distended   [ ]+BS  [ ]Non tender [ ]Tender  [ ]PEG [ ]OGT/ NGT  Last BM:     GENITOURINARY:  [ ]Normal [ ] Incontinent   [ ]Oliguria/Anuria   [ ]Baum  MUSCULOSKELETAL:   [ ]Normal   [ ]Weakness  [ ]Bed/Wheelchair bound [ ]Edema  NEUROLOGIC:   [ ]No focal deficits  [ ]Cognitive impairment  [ ]Dysphagia [ ]Dysarthria [ ]Paresis [ ]Other   SKIN:   [ ]Normal    [ ]Rash  [ ]Pressure ulcer(s)       Present on admission [ ]y [ ]n    CRITICAL CARE:  [ ] Shock Present  [ ]Septic [ ]Cardiogenic [ ]Neurologic [ ]Hypovolemic  [ ]  Vasopressors [ ]  Inotropes   [ ]Respiratory failure present [ ]Mechanical ventilation [ ]Non-invasive ventilatory support [ ]High flow    [ ]Acute  [ ]Chronic [ ]Hypoxic  [ ]Hypercarbic [ ]Other  [ ]Other organ failure     LABS:                        13.0   5.76  )-----------( 181      ( 04 May 2021 07:15 )             40.2   05-05    140  |  105  |  14  ----------------------------<  82  3.6   |  23  |  0.66    Ca    8.8      05 May 2021 07:13    TPro  6.5  /  Alb  3.7  /  TBili  0.7  /  DBili  x   /  AST  10  /  ALT  8<L>  /  AlkPhos  66  05-04        RADIOLOGY & ADDITIONAL STUDIES:  CT as above.   PROTEIN CALORIE MALNUTRITION PRESENT: [ ]mild [ ]moderate [ ]severe [ ]underweight [ ]morbid obesity  https://www.andeal.org/vault/2440/web/files/ONC/Table_Clinical%20Characteristics%20to%20Document%20Malnutrition-White%20JV%20et%20al%202012.pdf    Height (cm): 167.6 (05-03-21 @ 00:57), 167.6 (03-21-21 @ 12:48), 167.6 (10-23-20 @ 16:17)  Weight (kg): 62.8 (05-03-21 @ 00:57), 62.6 (03-21-21 @ 12:48), 59 (10-23-20 @ 16:17)  BMI (kg/m2): 22.4 (05-03-21 @ 00:57), 22.3 (03-21-21 @ 12:48), 21 (10-23-20 @ 16:17)    [ ]PPSV2 < or = to 30% [ ]significant weight loss  [ ]poor nutritional intake  [ ]anasarca     Albumin, Serum: 3.7 g/dL (05-04-21 @ 07:16)   [ ]Artificial Nutrition      REFERRALS:   [ ]Chaplaincy  [ ]Hospice  [ ]Child Life  [ ]Social Work  [ ]Case management [ ]Holistic Therapy     Goals of Care Document:  HPI:  89 yo F with PMH of PAfib on eliquis, 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 As per CT chest "Patchy left lower lobe opacity in the paraspinal location is new when compared to prior study 3/21/2021; peripheral right lower and upper nodules are also new, and these findings are concerning for new foci of infection. Recommend a 3 month follow-up to assess resolution. More extensive patchy and nodular opacities seen on 3/21/2021 have significantly improved or resolved." The patient's condition progressively improved while off of Abx. Palliative care is seen for GOC and ACP.       PERTINENT PM/SXH:   Pulmonary TB    Gastritis    Afib    SBO (small bowel obstruction)    Hypothyroid    UTI (urinary tract infection)    HTN (hypertension)    TB (pulmonary tuberculosis)      Afib    History of Small Bowel Obstruction    History of Bilateral Breast Biopsy    S/P Exploratory Laparotomy    H/O hemorrhoidectomy    S/P vein stripping      FAMILY HISTORY:  No pertinent family history      ITEMS NOT CHECKED ARE NOT PRESENT    SOCIAL HISTORY:   Significant other/partner[x ]  Children[ ]  Mosque/Spirituality:  Substance hx:  [ ]   Tobacco hx:  [ ]   Alcohol hx: [ ]   Home Opioid hx:  [ ] I-Stop Reference No:  Living Situation: [x ]Home  [ ]Long term care  [ ]Rehab [ ]Other  As per care coordination notes: Pt and daughter  verbalized understanding.  information given to patient and  daughter. Pt resides alone in elevator access apartment with no steps to enter.  Pt has 7 days, 8 hrs private aides in place to assist with ADLs and IADLs. Pt  has walker and cane in home.  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 reported allergy (Unknown)  penicillin (Rash)    Intolerances    MEDICATIONS  (STANDING):  aMIOdarone    Tablet 200 milliGRAM(s) Oral daily  amLODIPine   Tablet 5 milliGRAM(s) Oral daily  apixaban 2.5 milliGRAM(s) Oral <User Schedule>  pantoprazole    Tablet 40 milliGRAM(s) Oral before breakfast  sertraline 50 milliGRAM(s) Oral daily    MEDICATIONS  (PRN):  traMADol 25 milliGRAM(s) Oral three times a day PRN Mild Pain (1 - 3)    PRESENT SYMPTOMS: [ ]Unable to obtain due to poor mentation   Source if other than patient:  [ ]Family   [ ]Team     Pain: [ ]yes [ x]no  QOL impact -   Location -                    Aggravating factors -  Quality -  Radiation -  Timing-  Severity (0-10 scale):  Minimal acceptable level (0-10 scale):     CPOT:    https://www.[facility].org/getattachment/[email or web address]/Critical-Care-Pain-Observation-Tool-(CPOT)      PAIN AD Score:     http://geriatrictoolkit.[facility]/cog/painad.pdf (press ctrl +  left click to view)    Dyspnea:                           [ ]Mild [ ]Moderate [ ]Severe  Anxiety:                             [ ]Mild [ ]Moderate [ ]Severe  Fatigue:                             [ ]Mild [ ]Moderate [ ]Severe  Nausea:                             [ ]Mild [ ]Moderate [ ]Severe  Loss of appetite:              [ ]Mild [ ]Moderate [ ]Severe  Constipation:                    [ ]Mild [ ]Moderate [ ]Severe    Other Symptoms:  [x ]All other review of systems negative but weakness and issues with balance     Palliative Performance Status Version 2: 40        %    http://[facility].org/files/news/palliative_performance_scale_ppsv2.pdf  PHYSICAL EXAM:  Vital Signs Last 24 Hrs  T(C): 36.4 (05 May 2021 11:43), Max: 37.1 (04 May 2021 21:19)  T(F): 97.6 (05 May 2021 11:43), Max: 98.7 (04 May 2021 21:19)  HR: 57 (05 May 2021 11:43) (57 - 65)  BP: 129/75 (05 May 2021 11:43) (112/60 - 129/75)  BP(mean): --  RR: 18 (05 May 2021 11:43) (17 - 18)  SpO2: 94% (05 May 2021 11:43) (93% - 95%) I&O's Summary    04 May 2021 07:01  -  05 May 2021 07:00  --------------------------------------------------------  IN: 820 mL / OUT: 200 mL / NET: 620 mL    05 May 2021 07:01  -  05 May 2021 15:50  --------------------------------------------------------  IN: 360 mL / OUT: 500 mL / NET: -140 mL      GENERAL:  [x ]Alert  [x ]Oriented x 3  [ ]Lethargic  [ ]Cachexia  [ ]Unarousable  [ ]Verbal  [ ]Non-Verbal  Behavioral:   [ ] Anxiety  [ ] Delirium [ ] Agitation [ ] Other  HEENT:  [x ]Normal   [ ]Dry mouth   [ ]ET Tube/Trach  [ ]Oral lesions  PULMONARY:   [ ]Clear [ ]Tachypnea  [ ]Audible excessive secretions   [ ]Rhonchi        [ ]Right [ ]Left [ ]Bilateral  [ ]Crackles        [ ]Right [ ]Left [ ]Bilateral  [ ]Wheezing     [ ]Right [ ]Left [ ]Bilateral  [x ]Diminished breath sounds [ ]right [x ]left [ ]bilateral  CARDIOVASCULAR:    [x ]Regular [ ]Irregular [ ]Tachy  [ ]Faisal [ ]Murmur [ ]Other  GASTROINTESTINAL:  [x ]Soft  [ ]Distended   [ ]+BS  [ ]Non tender [ ]Tender  [ ]PEG [ ]OGT/ NGT  Last BM:     GENITOURINARY:  [x ]Normal [ ] Incontinent   [ ]Oliguria/Anuria   [ ]Baum  MUSCULOSKELETAL:   [ ]Normal   [ x]Weakness  [ ]Bed/Wheelchair bound [ ]Edema  NEUROLOGIC:   [x ]No focal deficits  [ ]Cognitive impairment  [ ]Dysphagia [ ]Dysarthria [ ]Paresis [ ]Other   SKIN:   [ x]Normal    [ ]Rash  [ ]Pressure ulcer(s)       Present on admission [ ]y [ ]n    CRITICAL CARE:  [ ] Shock Present  [ ]Septic [ ]Cardiogenic [ ]Neurologic [ ]Hypovolemic  [ ]  Vasopressors [ ]  Inotropes   [ ]Respiratory failure present [ ]Mechanical ventilation [ ]Non-invasive ventilatory support [ ]High flow    [ ]Acute  [ ]Chronic [ ]Hypoxic  [ ]Hypercarbic [ ]Other  [ ]Other organ failure     LABS:                        13.0   5.76  )-----------( 181      ( 04 May 2021 07:15 )             40.2   05-05    140  |  105  |  14  ----------------------------<  82  3.6   |  23  |  0.66    Ca    8.8      05 May 2021 07:13    TPro  6.5  /  Alb  3.7  /  TBili  0.7  /  DBili  x   /  AST  10  /  ALT  8<L>  /  AlkPhos  66  05-04        RADIOLOGY & ADDITIONAL STUDIES:  CT as above.   PROTEIN CALORIE MALNUTRITION PRESENT: [ ]mild [ ]moderate [ ]severe [ ]underweight [ ]morbid obesity  https://www.andeal.org/vault/2440/web/files/ONC/Table_Clinical%20Characteristics%20to%20Document%20Malnutrition-White%20JV%20et%20al%202012.pdf    Height (cm): 167.6 (05-03-21 @ 00:57), 167.6 (03-21-21 @ 12:48), 167.6 (10-23-20 @ 16:17)  Weight (kg): 62.8 (05-03-21 @ 00:57), 62.6 (03-21-21 @ 12:48), 59 (10-23-20 @ 16:17)  BMI (kg/m2): 22.4 (05-03-21 @ 00:57), 22.3 (03-21-21 @ 12:48), 21 (10-23-20 @ 16:17)    [ ]PPSV2 < or = to 30% [ ]significant weight loss  [ ]poor nutritional intake  [ ]anasarca     Albumin, Serum: 3.7 g/dL (05-04-21 @ 07:16)   [ ]Artificial Nutrition      REFERRALS:   [ ]Chaplaincy  [ ]Hospice  [ ]Child Life  [ ]Social Work  [ ]Case management [ ]Holistic Therapy     Goals of Care Document:

## 2021-05-05 NOTE — DISCHARGE NOTE NURSING/CASE MANAGEMENT/SOCIAL WORK - PATIENT PORTAL LINK FT
You can access the FollowMyHealth Patient Portal offered by [facility] by registering at the following website: [email or web address]. By joining [employer]’s FollowMyHealth portal, you will also be able to view your health information using other applications (apps) compatible with our system.

## 2021-05-05 NOTE — CONSULT NOTE ADULT - CONVERSATION DETAILS
Her daughter (Alyssa) is her surrogate. Completing a HCP from was discussed but the patient was not into completing a form during this admission. She indicated she has a living will but this first encounter did not allow for enough rapport to discuss about code status.

## 2021-05-05 NOTE — DISCHARGE NOTE PROVIDER - CARE PROVIDER_API CALL
Hubert Shannon (DO)  Critical Care Medicine; Internal Medicine; Pulmonary Disease  [address] [place] [address], [facility] [address]  [place] [address]  Phone: [phone]  Fax: [phone]  Follow Up Time:    Hubert Shannon (DO)  Critical Care Medicine; Internal Medicine; Pulmonary Disease  [address] [facility], [address]  [place] [address]  Phone: [phone]  Fax: [phone]  Follow Up Time:     Carlene Millan)  Internal Medicine  [address] [facility], [address]  [place] [address]  Phone: [phone]  Fax: ()-  Follow Up Time:

## 2021-05-05 NOTE — DISCHARGE NOTE PROVIDER - NSDCMRMEDTOKEN_GEN_ALL_CORE_FT
amiodarone 200 mg oral tablet: 1 tab(s) orally Monday, Tuesday, Thursday, and Friday  amLODIPine 2.5 mg oral tablet: 2 tab(s) orally once a day  Eliquis 2.5 mg oral tablet: 1 tab(s) orally once a day  Protonix 40 mg oral delayed release tablet: 1 tab(s) orally once a day  sertraline 50 mg oral tablet: 1 tab(s) orally once a day  traMADol 50 mg oral tablet: 1 tab(s) orally 3 times, As Needed   amiodarone 200 mg oral tablet: 1 tab(s) orally Monday, Tuesday, Thursday, and Friday  amLODIPine 2.5 mg oral tablet: 2 tab(s) orally once a day  Eliquis 2.5 mg oral tablet: 1 tab(s) orally once a day  Protonix 40 mg oral delayed release tablet: 1 tab(s) orally once a day  sertraline 50 mg oral tablet: 1 tab(s) orally once a day  traMADol 50 mg oral tablet: 0.5 tab(s) orally 3 times a day, As needed, Mild Pain (1 - 3)

## 2021-05-05 NOTE — CONSULT NOTE ADULT - ASSESSMENT
89F younger than stated age admitted with nonspecific complaints of weakness, chronic non purulent cough. Patient received one dose of Aztreonam/Vanco in ER . She has remained afebrile with normal WBC and procalcitonin since admission without c/o weakness, dyspnea or CP.  CT scans from prior admission on 3/21 reviewed and compared with current CT 5/3  3/21: patchy nodular mulitlobar opacites noted with prominant REDD bronchiectasis and borderline bronchiect in basilar segments. REDD apical scarring noted. CT c/w aspiration pneumonia and foci of mucoid impaction. Significant motion artifact invalidates "GG opacities"  5/3 CT with substantial resilution of opacites - especially in RML and RLL. Some nodular residual and few new opacities are airwa associated and likely c/w mucoid impaction. 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    REC    Observe off antibiotics  Possible DC 5/5 per clinical status  Outpatient f/u Dr Shannon, [facility] pulmonary
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 As per CT chest "Patchy left lower lobe opacity in the paraspinal location is new when compared to prior study 3/21/2021; peripheral right lower and upper nodules are also new, and these findings are concerning for new foci of infection. Recommend a 3 month follow-up to assess resolution. More extensive patchy and nodular opacities seen on 3/21/2021 have significantly improved or resolved." The patient's condition progressively improved while off of Abx. Palliative care is seen for GOC and ACP.

## 2021-05-05 NOTE — DISCHARGE NOTE PROVIDER - CARE PROVIDERS DIRECT ADDRESSES
,DirectAddress_Unknown ,DirectAddress_Unknown,[email or web address]

## 2021-05-06 VITALS
TEMPERATURE: 98 F | HEART RATE: 59 BPM | OXYGEN SATURATION: 91 % | SYSTOLIC BLOOD PRESSURE: 114 MMHG | DIASTOLIC BLOOD PRESSURE: 65 MMHG | RESPIRATION RATE: 18 BRPM

## 2021-05-06 DIAGNOSIS — Z91.81 HISTORY OF FALLING: ICD-10-CM

## 2021-05-06 PROCEDURE — 83605 ASSAY OF LACTIC ACID: CPT

## 2021-05-06 PROCEDURE — 84145 PROCALCITONIN (PCT): CPT

## 2021-05-06 PROCEDURE — 85018 HEMOGLOBIN: CPT

## 2021-05-06 PROCEDURE — U0005: CPT

## 2021-05-06 PROCEDURE — 80053 COMPREHEN METABOLIC PANEL: CPT

## 2021-05-06 PROCEDURE — 97116 GAIT TRAINING THERAPY: CPT

## 2021-05-06 PROCEDURE — 85027 COMPLETE CBC AUTOMATED: CPT

## 2021-05-06 PROCEDURE — 84100 ASSAY OF PHOSPHORUS: CPT

## 2021-05-06 PROCEDURE — 83735 ASSAY OF MAGNESIUM: CPT

## 2021-05-06 PROCEDURE — 71045 X-RAY EXAM CHEST 1 VIEW: CPT

## 2021-05-06 PROCEDURE — 86140 C-REACTIVE PROTEIN: CPT

## 2021-05-06 PROCEDURE — 84295 ASSAY OF SERUM SODIUM: CPT

## 2021-05-06 PROCEDURE — 71250 CT THORAX DX C-: CPT

## 2021-05-06 PROCEDURE — 84132 ASSAY OF SERUM POTASSIUM: CPT

## 2021-05-06 PROCEDURE — 82947 ASSAY GLUCOSE BLOOD QUANT: CPT

## 2021-05-06 PROCEDURE — 86769 SARS-COV-2 COVID-19 ANTIBODY: CPT

## 2021-05-06 PROCEDURE — U0003: CPT

## 2021-05-06 PROCEDURE — 85025 COMPLETE CBC W/AUTO DIFF WBC: CPT

## 2021-05-06 PROCEDURE — 85652 RBC SED RATE AUTOMATED: CPT

## 2021-05-06 PROCEDURE — 84484 ASSAY OF TROPONIN QUANT: CPT

## 2021-05-06 PROCEDURE — 99285 EMERGENCY DEPT VISIT HI MDM: CPT | Mod: 25

## 2021-05-06 PROCEDURE — 85610 PROTHROMBIN TIME: CPT

## 2021-05-06 PROCEDURE — 82803 BLOOD GASES ANY COMBINATION: CPT

## 2021-05-06 PROCEDURE — 81001 URINALYSIS AUTO W/SCOPE: CPT

## 2021-05-06 PROCEDURE — 87040 BLOOD CULTURE FOR BACTERIA: CPT

## 2021-05-06 PROCEDURE — 82330 ASSAY OF CALCIUM: CPT

## 2021-05-06 PROCEDURE — 97530 THERAPEUTIC ACTIVITIES: CPT

## 2021-05-06 PROCEDURE — 97162 PT EVAL MOD COMPLEX 30 MIN: CPT

## 2021-05-06 PROCEDURE — 83880 ASSAY OF NATRIURETIC PEPTIDE: CPT

## 2021-05-06 PROCEDURE — 85730 THROMBOPLASTIN TIME PARTIAL: CPT

## 2021-05-06 PROCEDURE — 85014 HEMATOCRIT: CPT

## 2021-05-06 PROCEDURE — 74177 CT ABD & PELVIS W/CONTRAST: CPT

## 2021-05-06 PROCEDURE — 80048 BASIC METABOLIC PNL TOTAL CA: CPT

## 2021-05-06 PROCEDURE — 82435 ASSAY OF BLOOD CHLORIDE: CPT

## 2021-05-06 PROCEDURE — 87086 URINE CULTURE/COLONY COUNT: CPT

## 2021-05-06 RX ORDER — TRAMADOL HYDROCHLORIDE 50 MG/1
1 TABLET ORAL
Qty: 0 | Refills: 0 | DISCHARGE

## 2021-05-06 RX ORDER — TRAMADOL HYDROCHLORIDE 50 MG/1
0.5 TABLET ORAL
Qty: 0 | Refills: 0 | DISCHARGE
Start: 2021-05-06

## 2021-05-06 RX ADMIN — PANTOPRAZOLE SODIUM 40 MILLIGRAM(S): 20 TABLET, DELAYED RELEASE ORAL at 06:08

## 2021-05-06 RX ADMIN — APIXABAN 2.5 MILLIGRAM(S): 2.5 TABLET, FILM COATED ORAL at 09:03

## 2021-05-06 RX ADMIN — AMLODIPINE BESYLATE 5 MILLIGRAM(S): 2.5 TABLET ORAL at 06:08

## 2021-05-06 RX ADMIN — AMIODARONE HYDROCHLORIDE 200 MILLIGRAM(S): 400 TABLET ORAL at 06:08

## 2021-05-06 RX ADMIN — SERTRALINE 50 MILLIGRAM(S): 25 TABLET, FILM COATED ORAL at 09:03

## 2021-05-06 NOTE — PROGRESS NOTE ADULT - SUBJECTIVE AND OBJECTIVE BOX
Follow-up Pulm Progress Note  Daquan Frost MD  [phone]    Afebrile off antibiotics with normal wbc  Normal room air O2 sat  No resp complaints    Medications:  Vital Signs Last 24 Hrs  T(C): 36.4 (05 May 2021 11:43), Max: 37.1 (04 May 2021 21:19)  T(F): 97.6 (05 May 2021 11:43), Max: 98.7 (04 May 2021 21:19)  HR: 57 (05 May 2021 11:43) (57 - 65)  BP: 129/75 (05 May 2021 11:43) (112/60 - 129/75)  BP(mean): --  RR: 18 (05 May 2021 11:43) (17 - 18)  SpO2: 94% (05 May 2021 11:43) (93% - 97%)      05-04 @ 07:01  -  05-05 @ 07:00  --------------------------------------------------------  IN: 820 mL / OUT: 200 mL / NET: 620 mL        LABS:                        13.0   5.76  )-----------( 181      ( 04 May 2021 07:15 )             40.2     05-05    140  |  105  |  14  ----------------------------<  82  3.6   |  23  |  0.66    Ca    8.8      05 May 2021 07:13    TPro  6.5  /  Alb  3.7  /  TBili  0.7  /  DBili  x   /  AST  10  /  ALT  8<L>  /  AlkPhos  66  05-04        Procalcitonin, Serum: 0.06 ng/mL (05-02-21 @ 22:47)    Serum Pro-Brain Natriuretic Peptide: 296 pg/mL (05-02-21 @ 16:45)      CULTURES:  Culture Results:   No growth to date. (05-02 @ 21:21)  Culture Results:   No growth to date. (05-02 @ 21:21)  Culture Results:   <10,000 CFU/mL Normal Urogenital Yeimi (05-02 @ 21:00)    Most recent blood culture -- 05-02 @ 21:21   -- -- .Blood Blood-Venous 05-02 @ 21:21  Most recent blood culture -- 05-02 @ 21:00   -- -- .Urine Clean Catch (Midstream) 05-02 @ 21:00      Physical Examination:  PULM: No wheeze or rhonchi  CVS: Regular rate and rhythm, no murmurs, rubs, or gallops  ABD: Soft, non-tender  EXT:  No clubbing, cyanosis, or edema    RADIOLOGY REVIEWED  CXR:    CT chest:    PROCEDURE DATE:  05/03/2021      INTERPRETATION:  CLINICAL INFORMATION: Evaluate bibasilar opacities noted on CT abdomen and pelvis.    COMPARISON: CT abdomen pelvis 5/2/2021, CT chest 3/21/2021.    CONTRAST/COMPLICATIONS:  IV Contrast: None  Oral Contrast: None  Complications: None    PROCEDURE:  CT of the Chest was performed.  Sagittal and coronal reformats were performed.    FINDINGS:    LUNGS AND AIRWAYS: Patent central airways. Patchy left lower lobe opacity in the paraspinal location is new when compared to prior study 3/21/2021; peripheral right lower and upper nodules are also new, and these findings are concerning for new foci of infection. Remaining patchy and nodular opacities seenon the prior chest CT of March 21, 2021, majority of which have increased lobe. Left upper lung bronchiectasis, linear scarring, and calcifications are unchanged.  PLEURA: No pleural effusion.  MEDIASTINUM AND TUNG: No lymphadenopathy.  VESSELS: Aortic and coronary artery calcifications.  HEART: Heart size is normal. No pericardial effusion.  CHEST WALL AND LOWER NECK: Within normal limits.  VISUALIZED UPPER ABDOMEN: Within normal limits.  BONES: Degenerative changes.    IMPRESSION:  Patchy left lower lobe opacity in the paraspinal location is new when compared to prior study 3/21/2021; peripheral right lower and upper nodules are also new, and these findings are concerning for new foci of infection. Recommend a 3 month follow-up to assess resolution.  More extensive patchy and nodular opacities seen on 3/21/2021 have significantly improved or resolved.    These findings represent a change from the preliminary findings and were discussed with SUAD Prince on 5/4/2021 at 11:00 AM by Dr. Sauceda with read back confirmation.    TTE:  
Follow-up Pulm Progress Note  Daquan Frost MD  [phone]    Stable resp status  Normal RA sats and afebrile off antibiotics  PC evaluation noted          Vital Signs Last 24 Hrs  T(C): 36.7 (06 May 2021 12:08), Max: 36.8 (06 May 2021 04:09)  T(F): 98.1 (06 May 2021 12:08), Max: 98.2 (06 May 2021 04:09)  HR: 59 (06 May 2021 12:08) (58 - 59)  BP: 114/65 (06 May 2021 12:08) (109/60 - 129/77)  BP(mean): --  RR: 18 (06 May 2021 12:08) (18 - 18)  SpO2: 91% (06 May 2021 12:08) (91% - 93%)    05-05    140  |  105  |  14  ----------------------------<  82  3.6   |  23  |  0.66    Ca    8.8      05 May 2021 07:13      Procalcitonin, Serum: 0.06 ng/mL (05-02-21 @ 22:47)    Serum Pro-Brain Natriuretic Peptide: 296 pg/mL (05-02-21 @ 16:45)      CULTURES:  Culture Results:   No growth to date. (05-02 @ 21:21)  Culture Results:   No growth to date. (05-02 @ 21:21)  Culture Results:   <10,000 CFU/mL Normal Urogenital Yeimi (05-02 @ 21:00)    Most recent blood culture -- 05-02 @ 21:21   -- -- .Blood Blood-Venous 05-02 @ 21:21  Most recent blood culture -- 05-02 @ 21:00   -- -- .Urine Clean Catch (Midstream) 05-02 @ 21:00      Physical Examination:  PULM: No wheeze or rhonchi  CVS: Regular rate and rhythm, no murmurs, rubs, or gallops  ABD: Soft, non-tender  EXT:  No clubbing, cyanosis, or edema    RADIOLOGY REVIEWED  CXR:    CT chest:    PROCEDURE DATE:  05/03/2021      INTERPRETATION:  CLINICAL INFORMATION: Evaluate bibasilar opacities noted on CT abdomen and pelvis.    COMPARISON: CT abdomen pelvis 5/2/2021, CT chest 3/21/2021.    CONTRAST/COMPLICATIONS:  IV Contrast: None  Oral Contrast: None  Complications: None    PROCEDURE:  CT of the Chest was performed.  Sagittal and coronal reformats were performed.    FINDINGS:    LUNGS AND AIRWAYS: Patent central airways. Patchy left lower lobe opacity in the paraspinal location is new when compared to prior study 3/21/2021; peripheral right lower and upper nodules are also new, and these findings are concerning for new foci of infection. Remaining patchy and nodular opacities seenon the prior chest CT of March 21, 2021, majority of which have increased lobe. Left upper lung bronchiectasis, linear scarring, and calcifications are unchanged.  PLEURA: No pleural effusion.  MEDIASTINUM AND TUNG: No lymphadenopathy.  VESSELS: Aortic and coronary artery calcifications.  HEART: Heart size is normal. No pericardial effusion.  CHEST WALL AND LOWER NECK: Within normal limits.  VISUALIZED UPPER ABDOMEN: Within normal limits.  BONES: Degenerative changes.    IMPRESSION:  Patchy left lower lobe opacity in the paraspinal location is new when compared to prior study 3/21/2021; peripheral right lower and upper nodules are also new, and these findings are concerning for new foci of infection. Recommend a 3 month follow-up to assess resolution.  More extensive patchy and nodular opacities seen on 3/21/2021 have significantly improved or resolved.    These findings represent a change from the preliminary findings and were discussed with SUAD Prince on 5/4/2021 at 11:00 AM by Dr. Sauceda with read back confirmation.    TTE:  
SUBJECTIVE / OVERNIGHT EVENTS:    patient seen and examined  afebrile  denies cp/sob  no n/v  no further diarrhea  on room air    --------------------------------------------------------------------------------------------  LABS:                        13.0   5.76  )-----------( 181      ( 04 May 2021 07:15 )             40.2     05-04    138  |  105  |  16  ----------------------------<  93  3.4<L>   |  22  |  0.68    Ca    9.0      04 May 2021 07:16  Phos  3.5     05-03  Mg     2.1     05-03    TPro  6.5  /  Alb  3.7  /  TBili  0.7  /  DBili  x   /  AST  10  /  ALT  8<L>  /  AlkPhos  66  05-04    PT/INR - ( 02 May 2021 16:45 )   PT: 14.4 sec;   INR: 1.21 ratio         PTT - ( 02 May 2021 16:45 )  PTT:31.0 sec  CAPILLARY BLOOD GLUCOSE            Urinalysis Basic - ( 02 May 2021 17:56 )    Color: Light Yellow / Appearance: Clear / S.013 / pH: x  Gluc: x / Ketone: Negative  / Bili: Negative / Urobili: Negative   Blood: x / Protein: Negative / Nitrite: Negative   Leuk Esterase: Large / RBC: 1 /hpf / WBC 11 /HPF   Sq Epi: x / Non Sq Epi: 7 /hpf / Bacteria: Negative        RADIOLOGY & ADDITIONAL TESTS:    Imaging Personally Reviewed:  [x] YES  [ ] NO    Consultant(s) Notes Reviewed:  [x] YES  [ ] NO    MEDICATIONS  (STANDING):  aMIOdarone    Tablet 200 milliGRAM(s) Oral daily  amLODIPine   Tablet 5 milliGRAM(s) Oral daily  apixaban 2.5 milliGRAM(s) Oral <User Schedule>  pantoprazole    Tablet 40 milliGRAM(s) Oral before breakfast  potassium chloride    Tablet ER 20 milliEquivalent(s) Oral once  sertraline 50 milliGRAM(s) Oral daily    MEDICATIONS  (PRN):  traMADol 25 milliGRAM(s) Oral three times a day PRN Mild Pain (1 - 3)      Care Discussed with Consultants/Other Providers [x] YES  [ ] NO    Vital Signs Last 24 Hrs  T(C): 36.6 (04 May 2021 04:15), Max: 36.6 (04 May 2021 04:15)  T(F): 97.8 (04 May 2021 04:15), Max: 97.8 (04 May 2021 04:15)  HR: 60 (04 May 2021 08:49) (60 - 65)  BP: 131/65 (04 May 2021 08:49) (125/66 - 136/73)  BP(mean): --  RR: 18 (04 May 2021 08:49) (18 - 18)  SpO2: 95% (04 May 2021 08:49) (95% - 96%)  I&O's Summary    03 May 2021 07:01  -  04 May 2021 07:00  --------------------------------------------------------  IN: 600 mL / OUT: 950 mL / NET: -350 mL    PHYSICAL EXAM:  GENERAL: NAD, well-developed, comfortable  HEAD:  Atraumatic, Normocephalic  EYES: EOMI, PERRLA, conjunctiva and sclera clear  NECK: Supple, No JVD  CHEST/LUNG: Clear to auscultation bilaterally; No wheeze  HEART: Regular rate and rhythm; No murmurs, rubs, or gallops  ABDOMEN: Soft, Nontender, Nondistended; Bowel sounds present  NEURO: AAOx3, no focal weakness, 5/5 b/l extremity strength, b/l knee no arthritis, no effusion   EXTREMITIES:  2+ Peripheral Pulses, No clubbing, cyanosis, or edema  SKIN: No rashes or lesions        
Sitting up in bed  No new s ymptoms    Vital Signs Last 24 Hrs  T(C): 36.4 (05 May 2021 11:43), Max: 37.1 (04 May 2021 21:19)  T(F): 97.6 (05 May 2021 11:43), Max: 98.7 (04 May 2021 21:19)  HR: 57 (05 May 2021 11:43) (57 - 65)  BP: 129/75 (05 May 2021 11:43) (112/60 - 129/75)  BP(mean): --  RR: 18 (05 May 2021 11:43) (17 - 18)  SpO2: 94% (05 May 2021 11:43) (93% - 97%)    I&O's Summary    05-04-21 @ 07:01  -  05-05-21 @ 07:00  --------------------------------------------------------  IN: 820 mL / OUT: 200 mL / NET: 620 mL        PHYSICAL EXAM:  GENERAL: NAD, well-developed, comfortable  HEAD:  NCAT, EOMI, (+)hearing aides  NECK: Supple, No JVD  CHEST/LUNG: Clear to auscultation bilaterally; No wheeze  HEART: Regular rate and rhythm; No murmurs, rubs, or gallops  ABDOMEN: Soft, Nontender, Nondistended; Bowel sounds present  NEURO: AAOx3, no focal weakness, 5/5 b/l extremity strength, b/l knee no arthritis, no effusion   EXTREMITIES:  2+ Peripheral Pulses, No clubbing, cyanosis, or edema  SKIN: No rashes or lesions    LABS:                        13.0   5.76  )-----------( 181      ( 04 May 2021 07:15 )             40.2     05-05    140  |  105  |  14  ----------------------------<  82  3.6   |  23  |  0.66    Ca    8.8      05 May 2021 07:13    TPro  6.5  /  Alb  3.7  /  TBili  0.7  /  DBili  x   /  AST  10  /  ALT  8<L>  /  AlkPhos  66  05-04      CAPILLARY BLOOD GLUCOSE                RADIOLOGY & ADDITIONAL TESTS:    Imaging Personally Reviewed:  [x] YES  [ ] NO    Consultant(s) Notes Reviewed:  [x] YES  [ ] NO      
SUBJECTIVE / OVERNIGHT EVENTS:    no events overnight  patient seen and examined  afebrile  denies cp/sob  no n/v  no further diarrhea  on room air    --------------------------------------------------------------------------------------------  LABS:                        13.6   4.87  )-----------( 179      ( 03 May 2021 07:20 )             41.7     05-    138  |  104  |  9   ----------------------------<  83  3.9   |  21<L>  |  0.53    Ca    9.2      03 May 2021 07:20  Phos  3.5     05-  Mg     2.1     05-    TPro  7.1  /  Alb  4.1  /  TBili  0.8  /  DBili  x   /  AST  14  /  ALT  8<L>  /  AlkPhos  67  05-03    PT/INR - ( 02 May 2021 16:45 )   PT: 14.4 sec;   INR: 1.21 ratio         PTT - ( 02 May 2021 16:45 )  PTT:31.0 sec  CAPILLARY BLOOD GLUCOSE            Urinalysis Basic - ( 02 May 2021 17:56 )    Color: Light Yellow / Appearance: Clear / S.013 / pH: x  Gluc: x / Ketone: Negative  / Bili: Negative / Urobili: Negative   Blood: x / Protein: Negative / Nitrite: Negative   Leuk Esterase: Large / RBC: 1 /hpf / WBC 11 /HPF   Sq Epi: x / Non Sq Epi: 7 /hpf / Bacteria: Negative        RADIOLOGY & ADDITIONAL TESTS:    Imaging Personally Reviewed:  [x] YES  [ ] NO    Consultant(s) Notes Reviewed:  [x] YES  [ ] NO    MEDICATIONS  (STANDING):  aMIOdarone    Tablet 200 milliGRAM(s) Oral daily  amLODIPine   Tablet 5 milliGRAM(s) Oral daily  apixaban 2.5 milliGRAM(s) Oral <User Schedule>  pantoprazole    Tablet 40 milliGRAM(s) Oral before breakfast  sertraline 50 milliGRAM(s) Oral daily    MEDICATIONS  (PRN):  traMADol 25 milliGRAM(s) Oral three times a day PRN Mild Pain (1 - 3)      Care Discussed with Consultants/Other Providers [x] YES  [ ] NO    Vital Signs Last 24 Hrs  T(C): 36.4 (03 May 2021 04:42), Max: 38 (02 May 2021 16:31)  T(F): 97.6 (03 May 2021 04:42), Max: 100.4 (02 May 2021 16:31)  HR: 60 (03 May 2021 04:42) (60 - 86)  BP: 154/66 (03 May 2021 04:42) (138/78 - 163/74)  BP(mean): --  RR: 18 (03 May 2021 04:42) (15 - 20)  SpO2: 97% (03 May 2021 04:42) (95% - 98%)  I&O's Summary    PHYSICAL EXAM:  GENERAL: NAD, well-developed, comfortable  HEAD:  Atraumatic, Normocephalic  EYES: EOMI, PERRLA, conjunctiva and sclera clear  NECK: Supple, No JVD  CHEST/LUNG: Clear to auscultation bilaterally; No wheeze  HEART: Regular rate and rhythm; No murmurs, rubs, or gallops  ABDOMEN: Soft, Nontender, Nondistended; Bowel sounds present  NEURO: AAOx3, no focal weakness, 5/5 b/l extremity strength, b/l knee no arthritis, no effusion   EXTREMITIES:  2+ Peripheral Pulses, No clubbing, cyanosis, or edema  SKIN: No rashes or lesions

## 2021-05-06 NOTE — PROGRESS NOTE ADULT - ASSESSMENT
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 Recommend a 3 month follow-up to assess resolution. More extensive patchy and nodular opacities seen on 3/21/2021 have significantly improved or resolved.    # Afib:  On Eliquis 2.5MG ONCE DAILY     # HTN:  Chronic, stable  Cont home med Amlodipine 5MG daily     # DVT ppx:  On Eliquis     PT consult: home w/ assist; home w/ home PT; Home with assist as prior and Home PT to increase functional mobility , strength, balance, endurance , fall prevention education continued
Dyspnea/weakness resolved  Post MTB chronic bronchiectasis, focal and primaryily ijh REDD  Resolved prior aspiration pneumonia [id number] with some nodular residua   Few irrgeular bronchi-associated nodular opacities c/w mucoid impaction  No clinical or radiographic evidence of pna  Recurrent SBO due or chronic abd adhesions post TB    REC    Clear for DC home from pulm POV  Outpatinet follow up for f/u imaging and pFT's: Dr Hubert Shannon
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 Recommend a 3 month follow-up to assess resolution. More extensive patchy and nodular opacities seen on 3/21/2021 have significantly improved or resolved.  Likely reflects mucous plugging rather than acute pneumonia  Appreciate pulmonology    # Afib:  On Eliquis 2.5MG ONCE DAILY     # HTN:  Chronic, stable  Cont home med Amlodipine 5MG daily     # DVT ppx:  On Eliquis     PT consult: home w/ assist; home w/ home PT; Home with assist as prior and Home PT to increase functional mobility , strength, balance, endurance , fall prevention education continued

## 2021-05-07 LAB
CULTURE RESULTS: SIGNIFICANT CHANGE UP
CULTURE RESULTS: SIGNIFICANT CHANGE UP
SPECIMEN SOURCE: SIGNIFICANT CHANGE UP
SPECIMEN SOURCE: SIGNIFICANT CHANGE UP

## 2021-09-27 ENCOUNTER — EMERGENCY (EMERGENCY)
Facility: HOSPITAL | Age: 86
LOS: 1 days | Discharge: HOME | End: 2021-09-27
Attending: EMERGENCY MEDICINE | Admitting: INTERNAL MEDICINE
Payer: MEDICARE

## 2021-09-27 VITALS
OXYGEN SATURATION: 97 % | WEIGHT: 132.06 LBS | HEIGHT: 66 IN | SYSTOLIC BLOOD PRESSURE: 142 MMHG | TEMPERATURE: 98 F | RESPIRATION RATE: 18 BRPM | DIASTOLIC BLOOD PRESSURE: 79 MMHG | HEART RATE: 68 BPM

## 2021-09-27 DIAGNOSIS — Z98.89 OTHER SPECIFIED POSTPROCEDURAL STATES: Chronic | ICD-10-CM

## 2021-09-27 LAB — GAS PNL BLDV: SIGNIFICANT CHANGE UP

## 2021-09-27 PROCEDURE — 99285 EMERGENCY DEPT VISIT HI MDM: CPT | Mod: GC

## 2021-09-27 PROCEDURE — 93010 ELECTROCARDIOGRAM REPORT: CPT

## 2021-09-27 PROCEDURE — 71046 X-RAY EXAM CHEST 2 VIEWS: CPT | Mod: 26

## 2021-09-27 RX ORDER — ASPIRIN/CALCIUM CARB/MAGNESIUM 324 MG
324 TABLET ORAL ONCE
Refills: 0 | Status: COMPLETED | OUTPATIENT
Start: 2021-09-27 | End: 2021-09-27

## 2021-09-27 RX ADMIN — Medication 324 MILLIGRAM(S): at 23:50

## 2021-09-27 NOTE — ED PROVIDER NOTE - NS ED ROS FT
GENERAL: No fever, no chills  EYES: no change in vision  HEENT: no trouble swallowing, no trouble speaking  CARDIAC: +chest pain, palpitations  PULMONARY: no cough, no SOB  GI: no abdominal pain, no nausea, no vomiting, no diarrhea, no constipation  : no dysuria, no frequency, no change in appearance, no odor of urine  SKIN: no rashes  NEURO: no headache, no weakness  MSK: no joint pain

## 2021-09-27 NOTE — ED PROVIDER NOTE - PHYSICAL EXAMINATION
Gen: NAD, non-toxic appearing  Head: normal appearing  HEENT: normal conjunctiva, oral mucosa moist  Lung: no respiratory distress, speaking in full sentences, CTA b/l     CV: irregularly irregular rhythm, no murmurs  Abd: soft, non distended, non tender   MSK: no visible deformities  Neuro: No focal deficits, AAOx3  Skin: Warm  Psych: normal affect

## 2021-09-27 NOTE — ED PROVIDER NOTE - OBJECTIVE STATEMENT
89y/o F w/ h/o Afib (on amiodarone, Eliquis), latent TB p/w non radiating chest discomfort gradual onset started at 4pm today L side feels like heart beating out of chest and skipping beats. Was recently hospitalized at [facility]. No sob, dyspnea, n/v, diaphoresis, syncope.     Cards: Aden Ireland

## 2021-09-27 NOTE — ED PROVIDER NOTE - ATTENDING CONTRIBUTION TO CARE
MD Bass:  patient seen and evaluated personally.   I agree with the History & Physical,  Impression & Plan other than what was detailed in my note.  MD Bass  89y/o F w/ h/o Afib (on amiodarone, Eliquis), latent TB p/w presents w/ l sided chest pain, goes to l arm, now gone, no associated n/v but did have palpitations associated with it. afebrile vitals stable,  non toxic well appearing, NC/AT,  conjunctiva non conjected, sclera anicteric, moist mucous membranes, neck supple, heart sounds, normal, no mrg, lungs cta b/l no wrr, abd soft non distended w/ no tenderness, no visual deformities of extremities, axox3, , normal mood and affect. Given age and comorbidities will get acs workup, check electrolytes, telemetry, pt may need admission for further observation

## 2021-09-27 NOTE — ED PROVIDER NOTE - CLINICAL SUMMARY MEDICAL DECISION MAKING FREE TEXT BOX
02/28/17 1300   General Information   Start of Care Date 02/28/17   Referring Physician Lilia Flores    Orders Evaluate and Treat as Indicated   Order Date 01/25/17   Medical Diagnosis cervical radiculopathy, pain R shoulder, DJD cervical   Precautions/Limitations no known precautions/limitations   Special Instructions pt stated L glut pain is more the issue than R shoulder   Surgical/Medical history reviewed Yes   Pertinent history of current problem (include personal factors and/or comorbidities that impact the POC)   pt here w Ayleen dtr. my left sitting bone bothers. don't notice it in my chair at home. i notice in the car i feel it. i fell the other day. tripped on a rug. coming out of pantry. didn't fall but caught self. it pulled on my L sore leg. R leg is fine. onset 3 mo. also R shoulder sore, can't sleep on R side. using 4ww. my right hand can get numb. at times. it may start at my neck. worst pain is mid R humerus. had xrays etc in june. have bilat old KATELYN's.     Pertinent Visual History  glasses   Prior level of functional mobility Ambulation   Ambulation walker   Current Community Support Family/friend caregiver   Patient role/Employment history Retired   Living environment House/[facility]   Current Assistive Devices Standard Cane;Four Wheeled Walker   Patient/Family Goals Statement decrease pain- L glut area   General Information Comments pt has good memory; self reports, dtr w her but is her    Fall Risk Screen   Fall screen completed by PT   Per patient - Fall 2 or more times in past year? No   System Outcome Measures   Outcome Measures AM-PAC   AM-PAC  Basic Mobility Score Level  (Lower scores equate to lower levels of function) 54.87   AM-PAC  Daily Activity Score Level  (Lower scores equate to lower levels of function) 71.14   AM-PAC  Applied Cognitive Score Level  (Lower scores equate to lower levels of function) 64.08   Pain   Patient currently in pain Yes   Pain rating 5 when in a bad  chair.  or in a low seat in a car is worse.     Pain comments R shoulder only occas mid upper arm pain - today none   Vital Signs   Pulse 77   BP (!) 167/64   Cognitive Status Examination   Orientation orientation to person, place and time   Level of Consciousness alert   Follows Commands and Answers Questions 100% of the time   Personal Safety and Judgment intact   Memory intact   Posture   Posture Forward head position;Protracted shoulders   Palpation   Palpation L piriformis sore.  pt able to point to it.     Range of Motion (ROM)   ROM Comment hip abd gr 3+ bilat.  L hip fl gr 3+ due to pain   Strength   Strength Comments above, ROM wfls   Bed Mobility   Bed Mobility Comments indep but slow   Transfer Skills   Transfer Comments indep   Locomotion   Wheel Chair Mobility Comments n/a   Gait Special Tests   Gait Special Tests 25 FOOT TIMED WALK   Gait Special Tests 25 Foot Timed Walk   Seconds 13   Steps 21 Steps   Comments w 4ww   Balance   Balance Comments cannot sls   Sensory Examination   Sensory Perception no deficits were identified   Coordination   Coordination no deficits were identified   Muscle Tone   Muscle Tone no deficits were identified   Modality Interventions   Planned Modality Interventions Ultrasound   Planned Modality Interventions Comments possible US, manual therapy   Planned Therapy Interventions   Planned Therapy Interventions balance training;gait training;manual therapy;strengthening;stretching;ROM   Clinical Impression   Criteria for Skilled Therapeutic Interventions Met yes, treatment indicated   PT Diagnosis pain L leg affecting gait,sitting in car etc   Influenced by the following impairments pain; weakness, genu valgus knees bilat   Functional limitations due to impairments impaired ability to sit; pain affecting QOL   Clinical Presentation Stable/Uncomplicated   Clinical Presentation Rationale PT exam, good family support   Clinical Decision Making (Complexity) Low complexity    Therapy Frequency other (see comments)   Predicted Duration of Therapy Intervention (days/wks) up to 8x in 90 days   Risk & Benefits of therapy have been explained Yes   Patient, Family & other staff in agreement with plan of care Yes   Clinical Impression Comments 89 yo female w old bilat KATELYN's - known to me from past episode, now using 4ww, now w intermittent L glut pain    Education Assessment   Preferred Learning Style Listening;Demonstration   Barriers to Learning Physical   Goal 1   Goal Identifier pain   Target Date 05/28/17   Goal Description pt to demo/verbalize 2-3 ways to manage her L hip/leg pain   Goal 2   Goal Identifier HEP   Goal Description pt to be indep w 2-4 HEP exer to manage her L hip pain.   Target Date 05/28/17   Goal 3   Goal Identifier gait speed   Goal Description 25 ft walk to improve to 10 sec or less for safer community ambulation in busy areas.   Target Date 05/28/17   Total Evaluation Time   Total Evaluation Time (Minutes) 30      well appearing 89y/o F w/ h/o Afib (on amiodarone, Eliquis), latent TB p/w non radiating chest discomfort gradual onset started at 4pm without sob, dyspnea, n/v, diaphoresis, syncope. EKG shows afib, rate controlled. R.o electrolyte abnormalities, ACS. Plan labs, ekg, cxr, cardiac markers and likely admission.

## 2021-09-27 NOTE — ED ADULT TRIAGE NOTE - NS ED TRIAGE EKG
CC:  Elham Adam is here today for complete examination, monitoring history of toxoplasmosis, left eye cataract, floaters, and dry eyes.Patient notes that VA seems about the same, feels she is needing to wear glasses more to see. Patient denies any comfort complaint/changes since previous visit..    PCP: Rosario Negro    Medications, allergies, tobacco history, past medical, past surgical and pertinent family histories reviewed and updated where needed in the EMR.    Ocular Medications:  none    Denies known Latex allergy or symptoms of Latex sensitivity.  
Chief Complaint: presents for annual follow up of toxoplasmosis     History of Present Illness:  agree with tech note, denies pain, redness or discharge and she notes that she has become more dependent on her glasses and feels that the vision is adequate for her needs.        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 Discussed elective nature of cataract removal.  Continue to follow conservatively, no surgery needed.  Floaters: benign, signs and symptoms of a retinal detachment including a change in floaters, photopsia and peripheral vision loss were explained. The patient will call/return to clinic immediately PRN as instructed  Dry eye: stable: use artificial tears as needed up to 6 times per day  Refractive error: no new glasses needed.  A no charge diagnostic refraction was performed.  The patient declines on any routine update on their glass prescription with the understanding that if they call back to get a copy of their glass prescription they will be given a copy of the current prescription they are wearing.        DILATED COMPLETE EYE EXAM IN 1 YEAR or immediately prn as instructed for pain, redness, or decreased vision.    I, Lizet Montano, attest that I performed the duties of a scribe for this encounter, in the presence of Dr. Quiñonez who personally saw and examined the patient.    The documentation recorded by the scribe accurately and completely reflects the service(s) I personally performed and the decisions made by me.               
EKG completed

## 2021-09-28 ENCOUNTER — TRANSCRIPTION ENCOUNTER (OUTPATIENT)
Age: 86
End: 2021-09-28

## 2021-09-28 VITALS
OXYGEN SATURATION: 96 % | DIASTOLIC BLOOD PRESSURE: 69 MMHG | SYSTOLIC BLOOD PRESSURE: 107 MMHG | RESPIRATION RATE: 18 BRPM | TEMPERATURE: 99 F | HEART RATE: 64 BPM

## 2021-09-28 DIAGNOSIS — Z29.9 ENCOUNTER FOR PROPHYLACTIC MEASURES, UNSPECIFIED: ICD-10-CM

## 2021-09-28 DIAGNOSIS — F32.9 MAJOR DEPRESSIVE DISORDER, SINGLE EPISODE, UNSPECIFIED: ICD-10-CM

## 2021-09-28 DIAGNOSIS — R07.89 OTHER CHEST PAIN: ICD-10-CM

## 2021-09-28 DIAGNOSIS — I48.91 UNSPECIFIED ATRIAL FIBRILLATION: ICD-10-CM

## 2021-09-28 DIAGNOSIS — I10 ESSENTIAL (PRIMARY) HYPERTENSION: ICD-10-CM

## 2021-09-28 DIAGNOSIS — I48.20 CHRONIC ATRIAL FIBRILLATION, UNSPECIFIED: ICD-10-CM

## 2021-09-28 LAB
ALBUMIN SERPL ELPH-MCNC: 4.2 G/DL — SIGNIFICANT CHANGE UP (ref 3.3–5)
ALP SERPL-CCNC: 69 U/L — SIGNIFICANT CHANGE UP (ref 40–120)
ALT FLD-CCNC: 9 U/L — LOW (ref 10–45)
ANION GAP SERPL CALC-SCNC: 11 MMOL/L — SIGNIFICANT CHANGE UP (ref 5–17)
APTT BLD: 31.7 SEC — SIGNIFICANT CHANGE UP (ref 27.5–35.5)
AST SERPL-CCNC: 12 U/L — SIGNIFICANT CHANGE UP (ref 10–40)
BASE EXCESS BLDV CALC-SCNC: 4.3 MMOL/L — HIGH (ref -2–2)
BASOPHILS # BLD AUTO: 0.01 K/UL — SIGNIFICANT CHANGE UP (ref 0–0.2)
BASOPHILS NFR BLD AUTO: 0.3 % — SIGNIFICANT CHANGE UP (ref 0–2)
BILIRUB SERPL-MCNC: 0.4 MG/DL — SIGNIFICANT CHANGE UP (ref 0.2–1.2)
BUN SERPL-MCNC: 16 MG/DL — SIGNIFICANT CHANGE UP (ref 7–23)
CA-I SERPL-SCNC: 1.2 MMOL/L — SIGNIFICANT CHANGE UP (ref 1.15–1.33)
CALCIUM SERPL-MCNC: 9.2 MG/DL — SIGNIFICANT CHANGE UP (ref 8.4–10.5)
CHLORIDE BLDV-SCNC: 102 MMOL/L — SIGNIFICANT CHANGE UP (ref 96–108)
CHLORIDE SERPL-SCNC: 104 MMOL/L — SIGNIFICANT CHANGE UP (ref 96–108)
CO2 BLDV-SCNC: 32 MMOL/L — HIGH (ref 22–26)
CO2 SERPL-SCNC: 24 MMOL/L — SIGNIFICANT CHANGE UP (ref 22–31)
CREAT SERPL-MCNC: 0.7 MG/DL — SIGNIFICANT CHANGE UP (ref 0.5–1.3)
EOSINOPHIL # BLD AUTO: 0.06 K/UL — SIGNIFICANT CHANGE UP (ref 0–0.5)
EOSINOPHIL NFR BLD AUTO: 1.6 % — SIGNIFICANT CHANGE UP (ref 0–6)
GAS PNL BLDV: 139 MMOL/L — SIGNIFICANT CHANGE UP (ref 136–145)
GAS PNL BLDV: SIGNIFICANT CHANGE UP
GLUCOSE BLDV-MCNC: 87 MG/DL — SIGNIFICANT CHANGE UP (ref 70–99)
GLUCOSE SERPL-MCNC: 91 MG/DL — SIGNIFICANT CHANGE UP (ref 70–99)
HCO3 BLDV-SCNC: 30 MMOL/L — HIGH (ref 22–29)
HCT VFR BLD CALC: 37.6 % — SIGNIFICANT CHANGE UP (ref 34.5–45)
HCT VFR BLDA CALC: 38 % — SIGNIFICANT CHANGE UP (ref 34.5–46.5)
HGB BLD CALC-MCNC: 12.6 G/DL — SIGNIFICANT CHANGE UP (ref 11.7–16.1)
HGB BLD-MCNC: 12.1 G/DL — SIGNIFICANT CHANGE UP (ref 11.5–15.5)
HOROWITZ INDEX BLDV+IHG-RTO: SIGNIFICANT CHANGE UP
IMM GRANULOCYTES NFR BLD AUTO: 0.3 % — SIGNIFICANT CHANGE UP (ref 0–1.5)
INR BLD: 1.09 RATIO — SIGNIFICANT CHANGE UP (ref 0.88–1.16)
LACTATE BLDV-MCNC: 1.5 MMOL/L — SIGNIFICANT CHANGE UP (ref 0.7–2)
LYMPHOCYTES # BLD AUTO: 0.68 K/UL — LOW (ref 1–3.3)
LYMPHOCYTES # BLD AUTO: 17.6 % — SIGNIFICANT CHANGE UP (ref 13–44)
MAGNESIUM SERPL-MCNC: 2 MG/DL — SIGNIFICANT CHANGE UP (ref 1.6–2.6)
MCHC RBC-ENTMCNC: 30.6 PG — SIGNIFICANT CHANGE UP (ref 27–34)
MCHC RBC-ENTMCNC: 32.2 GM/DL — SIGNIFICANT CHANGE UP (ref 32–36)
MCV RBC AUTO: 95.2 FL — SIGNIFICANT CHANGE UP (ref 80–100)
MONOCYTES # BLD AUTO: 0.24 K/UL — SIGNIFICANT CHANGE UP (ref 0–0.9)
MONOCYTES NFR BLD AUTO: 6.2 % — SIGNIFICANT CHANGE UP (ref 2–14)
NEUTROPHILS # BLD AUTO: 2.86 K/UL — SIGNIFICANT CHANGE UP (ref 1.8–7.4)
NEUTROPHILS NFR BLD AUTO: 74 % — SIGNIFICANT CHANGE UP (ref 43–77)
NRBC # BLD: 0 /100 WBCS — SIGNIFICANT CHANGE UP (ref 0–0)
NT-PROBNP SERPL-SCNC: 300 PG/ML — SIGNIFICANT CHANGE UP (ref 0–300)
OTHER CELLS CSF MANUAL: 4.9 ML/DL — LOW (ref 18–22)
PCO2 BLDV: 50 MMHG — HIGH (ref 39–42)
PH BLDV: 7.39 — SIGNIFICANT CHANGE UP (ref 7.32–7.43)
PHOSPHATE SERPL-MCNC: 2.6 MG/DL — SIGNIFICANT CHANGE UP (ref 2.5–4.5)
PLATELET # BLD AUTO: 146 K/UL — LOW (ref 150–400)
PO2 BLDV: 19 MMHG — LOW (ref 25–45)
POTASSIUM BLDV-SCNC: 4 MMOL/L — SIGNIFICANT CHANGE UP (ref 3.5–5.1)
POTASSIUM SERPL-MCNC: 3.9 MMOL/L — SIGNIFICANT CHANGE UP (ref 3.5–5.3)
POTASSIUM SERPL-SCNC: 3.9 MMOL/L — SIGNIFICANT CHANGE UP (ref 3.5–5.3)
PROT SERPL-MCNC: 6.8 G/DL — SIGNIFICANT CHANGE UP (ref 6–8.3)
PROTHROM AB SERPL-ACNC: 13 SEC — SIGNIFICANT CHANGE UP (ref 10.6–13.6)
RBC # BLD: 3.95 M/UL — SIGNIFICANT CHANGE UP (ref 3.8–5.2)
RBC # FLD: 13 % — SIGNIFICANT CHANGE UP (ref 10.3–14.5)
SAO2 % BLDV: 28 % — LOW (ref 67–88)
SARS-COV-2 RNA SPEC QL NAA+PROBE: SIGNIFICANT CHANGE UP
SODIUM SERPL-SCNC: 139 MMOL/L — SIGNIFICANT CHANGE UP (ref 135–145)
TROPONIN T, HIGH SENSITIVITY RESULT: 23 NG/L — SIGNIFICANT CHANGE UP (ref 0–51)
TROPONIN T, HIGH SENSITIVITY RESULT: 23 NG/L — SIGNIFICANT CHANGE UP (ref 0–51)
TSH SERPL-MCNC: 6.33 UIU/ML — HIGH (ref 0.27–4.2)
WBC # BLD: 3.86 K/UL — SIGNIFICANT CHANGE UP (ref 3.8–10.5)
WBC # FLD AUTO: 3.86 K/UL — SIGNIFICANT CHANGE UP (ref 3.8–10.5)

## 2021-09-28 PROCEDURE — 83735 ASSAY OF MAGNESIUM: CPT

## 2021-09-28 PROCEDURE — 85730 THROMBOPLASTIN TIME PARTIAL: CPT

## 2021-09-28 PROCEDURE — 84295 ASSAY OF SERUM SODIUM: CPT

## 2021-09-28 PROCEDURE — 80053 COMPREHEN METABOLIC PANEL: CPT

## 2021-09-28 PROCEDURE — 84132 ASSAY OF SERUM POTASSIUM: CPT

## 2021-09-28 PROCEDURE — 83880 ASSAY OF NATRIURETIC PEPTIDE: CPT

## 2021-09-28 PROCEDURE — 85014 HEMATOCRIT: CPT

## 2021-09-28 PROCEDURE — 82947 ASSAY GLUCOSE BLOOD QUANT: CPT

## 2021-09-28 PROCEDURE — 99285 EMERGENCY DEPT VISIT HI MDM: CPT | Mod: 25

## 2021-09-28 PROCEDURE — 71046 X-RAY EXAM CHEST 2 VIEWS: CPT

## 2021-09-28 PROCEDURE — 85610 PROTHROMBIN TIME: CPT

## 2021-09-28 PROCEDURE — 93005 ELECTROCARDIOGRAM TRACING: CPT

## 2021-09-28 PROCEDURE — 84484 ASSAY OF TROPONIN QUANT: CPT

## 2021-09-28 PROCEDURE — 96374 THER/PROPH/DIAG INJ IV PUSH: CPT

## 2021-09-28 PROCEDURE — 82803 BLOOD GASES ANY COMBINATION: CPT

## 2021-09-28 PROCEDURE — 82435 ASSAY OF BLOOD CHLORIDE: CPT

## 2021-09-28 PROCEDURE — 36415 COLL VENOUS BLD VENIPUNCTURE: CPT

## 2021-09-28 PROCEDURE — 85025 COMPLETE CBC W/AUTO DIFF WBC: CPT

## 2021-09-28 PROCEDURE — 85018 HEMOGLOBIN: CPT

## 2021-09-28 PROCEDURE — U0003: CPT

## 2021-09-28 PROCEDURE — 82330 ASSAY OF CALCIUM: CPT

## 2021-09-28 PROCEDURE — 83605 ASSAY OF LACTIC ACID: CPT

## 2021-09-28 PROCEDURE — U0005: CPT

## 2021-09-28 PROCEDURE — 84100 ASSAY OF PHOSPHORUS: CPT

## 2021-09-28 PROCEDURE — 84443 ASSAY THYROID STIM HORMONE: CPT

## 2021-09-28 RX ORDER — TRAMADOL HYDROCHLORIDE 50 MG/1
25 TABLET ORAL THREE TIMES A DAY
Refills: 0 | Status: DISCONTINUED | OUTPATIENT
Start: 2021-09-28 | End: 2021-09-28

## 2021-09-28 RX ORDER — AMIODARONE HYDROCHLORIDE 400 MG/1
200 TABLET ORAL
Refills: 0 | Status: DISCONTINUED | OUTPATIENT
Start: 2021-09-28 | End: 2021-09-28

## 2021-09-28 RX ORDER — SERTRALINE 25 MG/1
50 TABLET, FILM COATED ORAL DAILY
Refills: 0 | Status: DISCONTINUED | OUTPATIENT
Start: 2021-09-28 | End: 2021-09-28

## 2021-09-28 RX ORDER — APIXABAN 2.5 MG/1
2.5 TABLET, FILM COATED ORAL ONCE
Refills: 0 | Status: DISCONTINUED | OUTPATIENT
Start: 2021-09-28 | End: 2021-09-28

## 2021-09-28 RX ORDER — AMLODIPINE BESYLATE 2.5 MG/1
5 TABLET ORAL DAILY
Refills: 0 | Status: DISCONTINUED | OUTPATIENT
Start: 2021-09-28 | End: 2021-09-28

## 2021-09-28 RX ORDER — PANTOPRAZOLE SODIUM 20 MG/1
40 TABLET, DELAYED RELEASE ORAL
Refills: 0 | Status: DISCONTINUED | OUTPATIENT
Start: 2021-09-28 | End: 2021-09-28

## 2021-09-28 RX ORDER — APIXABAN 2.5 MG/1
2.5 TABLET, FILM COATED ORAL EVERY 12 HOURS
Refills: 0 | Status: DISCONTINUED | OUTPATIENT
Start: 2021-09-28 | End: 2021-09-28

## 2021-09-28 RX ORDER — ACETAMINOPHEN 500 MG
650 TABLET ORAL EVERY 6 HOURS
Refills: 0 | Status: DISCONTINUED | OUTPATIENT
Start: 2021-09-28 | End: 2021-09-28

## 2021-09-28 RX ORDER — AMIODARONE HYDROCHLORIDE 400 MG/1
1 TABLET ORAL
Qty: 6 | Refills: 0
Start: 2021-09-28 | End: 2021-09-30

## 2021-09-28 RX ORDER — ONDANSETRON 8 MG/1
4 TABLET, FILM COATED ORAL ONCE
Refills: 0 | Status: COMPLETED | OUTPATIENT
Start: 2021-09-28 | End: 2021-09-28

## 2021-09-28 RX ADMIN — ONDANSETRON 4 MILLIGRAM(S): 8 TABLET, FILM COATED ORAL at 06:10

## 2021-09-28 NOTE — DISCHARGE NOTE NURSING/CASE MANAGEMENT/SOCIAL WORK - PATIENT PORTAL LINK FT
You can access the FollowMyHealth Patient Portal offered by [facility] by registering at the following website: [email or web address]. By joining [facility]’s FollowMyHealth portal, you will also be able to view your health information using other applications (apps) compatible with our system.

## 2021-09-28 NOTE — DISCHARGE NOTE PROVIDER - HOSPITAL COURSE
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 She says the chest tightness started yesterday while she was watching TV. She says it is not painful nor tender. It is a slight pressure, substernal without any SOB or HARRY. She denies orthopnea, PND or leg swelling. She can walk a few blocks before feeling SOB but presently denies any lightheadedness or blurry vision. Otherwise she feels well and is in good health.     presented with chest tightness and found to be in Atrial Fibrillation.   Likely due to uncontrolled AF  EKG shows AF, no ischemia. Troponins negative x 2. BNP wnl, clinically lungs are clear with   minimal crackles and no congestion on CXR thus HF ruled out. ACS ruled out.  Discussed with PMD/Cardiologist Steven Goldberg who has been managing these symptoms outpatient.   Increase Amiodarone to 200mg PO BID for 3 days.   She will follow up with Dr. Goldberg at the end of this week.   Discussed with PMD and patient in detail.  Medically cleared for discharge by Traci Parikh (MD) who also informed   ([facility] ) who agrees with the plan.

## 2021-09-28 NOTE — DISCHARGE NOTE PROVIDER - PROVIDER TOKENS
FREE:[LAST:[PCP/Cardiology],PHONE:[(   )    -],FAX:[(   )    -],ADDRESS:[Dr. Goldberg [facility]   [phone]]]

## 2021-09-28 NOTE — ED ADULT NURSE NOTE - NSIMPLEMENTINTERV_GEN_ALL_ED
Implemented All Fall with Harm Risk Interventions:  [place] to call system. Call bell, personal items and telephone within reach. Instruct patient to call for assistance. Room bathroom lighting operational. Non-slip footwear when patient is off stretcher. Physically safe environment: no spills, clutter or unnecessary equipment. Stretcher in lowest position, wheels locked, appropriate side rails in place. Provide visual cue, wrist band, yellow gown, etc. Monitor gait and stability. Monitor for mental status changes and reorient to person, place, and time. Review medications for side effects contributing to fall risk. Reinforce activity limits and safety measures with patient and family. Provide visual clues: red socks.

## 2021-09-28 NOTE — H&P ADULT - PROBLEM SELECTOR PLAN 5
DVT ppx: On Eliquis  GI ppx: Continue Protonix 50mg PO daily  Tramadol 25mg PO TID PRN pain DVT ppx: On Eliquis  GI ppx: Continue Protonix 50mg PO daily  Tramadol 25mg PO TID PRN pain    Based on my assessment, this patient’s condition has improved and no longer warrants inpatient status and will be changed to outpatient status prior to being discharged from the hospital.  This decision is based on the following reasons: Primary cause for admission has been ruled out. Current issue is a chronic acute problem that is currently being followed and managed by her primary doctor who is also a cardiologist. She has excellent functional status and would run the risk of deterioration by being admitted. Her PMD is in agreement and she will have close follow up with him this week.

## 2021-09-28 NOTE — ED ADULT NURSE REASSESSMENT NOTE - NS ED NURSE REASSESS COMMENT FT1
Received report from night ANTHONY Wild. Patient resting in the stretcher, reports her nausea has improved as well as the chest pain. Patient is on a tele box. VSS. Safety measures maintained. Bed in the lowest position. No acute distress noted or further complaints at this time.

## 2021-09-28 NOTE — DISCHARGE NOTE PROVIDER - CARE PROVIDER_API CALL
PCP/Cardiology,   Dr. Goldberg , steven   [phone]  Phone: (   )    -  Fax: (   )    -  Follow Up Time:

## 2021-09-28 NOTE — H&P ADULT - HISTORY OF PRESENT ILLNESS
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 She says the chest tightness started yesterday while she was watching TV. She says it is not painful nor tender. It is a slight pressure, substernal without any SOB or HARRY. She denies orthopnea, PND or leg swelling. She can walk a few blocks before feeling SOB but presently denies any lightheadedness or blurry vision. Otherwise she feels well and is in good health.

## 2021-09-28 NOTE — ED ADULT NURSE NOTE - OBJECTIVE STATEMENT
89 year old female presents to ED via walk-in complaining of chest discomfort. PMH A-fib on Eliquis, and latent TB. States discomfort started on 4pm, while doing nothing. Upon assessment A&O x3, uses walked to get around. Pt lives at home with an aide for 8 hours a day. Describes chest discomfort as feeling like it is beating fast Breathing comfortably on room air, non labored and non tachypneic. IV placed, on remote telemetry monitoring. Bed locked and lowered. Comfort and safety measures maintained.

## 2021-09-28 NOTE — DISCHARGE NOTE PROVIDER - NSDCMRMEDTOKEN_GEN_ALL_CORE_FT
amiodarone 200 mg oral tablet: 1 tab(s) orally Monday, Tuesday, Thursday, and Friday  amLODIPine 2.5 mg oral tablet: 2 tab(s) orally once a day  Eliquis 2.5 mg oral tablet: 1 tab(s) orally once a day  Protonix 40 mg oral delayed release tablet: 1 tab(s) orally once a day  sertraline 50 mg oral tablet: 1 tab(s) orally once a day  traMADol 50 mg oral tablet: 0.5 tab(s) orally 3 times a day, As needed, Mild Pain (1 - 3)

## 2021-09-28 NOTE — DISCHARGE NOTE NURSING/CASE MANAGEMENT/SOCIAL WORK - NSDCFUADDAPPT_GEN_ALL_CORE_FT
She will follow up with Dr. Goldberg at the end of this week.   Discussed with PMD and patient in detail.

## 2021-09-28 NOTE — H&P ADULT - PROBLEM SELECTOR PLAN 1
Likely due to uncontrolled AF  EKG shows AF, no ischemia. Troponins negative x 2. BNP wnl, clinically lungs are clear with minimal crackles and no congestion on CXR thus HF ruled out. ACS ruled out.  Discussed with PMD/Cardiologist Steven Goldberg who has been managing these symptoms outpatient.   Increase Amiodarone to 200mg PO BID for 3 days.   She will follow up with Dr. Goldberg at the end of this week.   Discussed with PMD and patient in detail.

## 2021-09-28 NOTE — H&P ADULT - ASSESSMENT
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

## 2021-09-28 NOTE — DISCHARGE NOTE PROVIDER - NSDCCPCAREPLAN_GEN_ALL_CORE_FT
PRINCIPAL DISCHARGE DIAGNOSIS  Diagnosis: Atrial fibrillation  Assessment and Plan of Treatment: Likely due to uncontrolled AF  EKG shows AF, no ischemia. Troponins negative x 2. BNP wnl, clinically lungs are clear with no signs of heart failure.  Medication adjusted for atrial fibrillation : Amiodarone 200mg PO BID x3 days .

## 2021-09-28 NOTE — ED ADULT NURSE NOTE - BRAND OF COVID-19 VACCINATION
5/31/2019 social work transition of care  Sw followed up with pt, referred to pt's dtr Hadley Powell [phone]. Hadley Powell stated that they would like to go home with [facility]. Sw made referral to [phone]. Sw will follow and assist prn.   Electronically signed by DICK Cooney on 5/31/2019 at 1:01 PM Moderna dose 1 and 2

## 2021-09-28 NOTE — H&P ADULT - NSHPSOCIALHISTORY_GEN_ALL_CORE
Denies cigarettes, alcohol or illicit drug use.  Lives alone and has a home attendant for 4hrs.  Her daughter dose her grocery shopping. She is ambulatory with a rolling walker.

## 2021-12-28 NOTE — ED PROVIDER NOTE - NS ED MD EM SELECTION
Physical Therapy Note:  Orders received and appreciated. Chart reviewed. Spoke with RN and pt. Pt loudly stated she was in too much pain to participate and not to come back for at least 3-4 days. Educated pt on benefits of mobility participation post TENZIN. She still refused and stated she already has a therapist.  Pt stated she will let MD know she will not participate in anything until her pain is managed. Will follow up tomorrow per RN request.  Thank you for the consult.   Lubna Mejia, PT, DPT [id number] [facility]

## 2022-03-06 ENCOUNTER — INPATIENT (INPATIENT)
Facility: HOSPITAL | Age: 87
LOS: 0 days | Discharge: HOME | DRG: 313 | End: 2022-03-07
Attending: STUDENT IN AN ORGANIZED HEALTH CARE EDUCATION/TRAINING PROGRAM | Admitting: STUDENT IN AN ORGANIZED HEALTH CARE EDUCATION/TRAINING PROGRAM
Payer: COMMERCIAL

## 2022-03-06 VITALS
TEMPERATURE: 98 F | OXYGEN SATURATION: 97 % | WEIGHT: 171.96 LBS | HEART RATE: 92 BPM | DIASTOLIC BLOOD PRESSURE: 79 MMHG | HEIGHT: 66 IN | SYSTOLIC BLOOD PRESSURE: 179 MMHG | RESPIRATION RATE: 20 BRPM

## 2022-03-06 DIAGNOSIS — Z98.89 OTHER SPECIFIED POSTPROCEDURAL STATES: Chronic | ICD-10-CM

## 2022-03-06 LAB
ALBUMIN SERPL ELPH-MCNC: 4.1 G/DL — SIGNIFICANT CHANGE UP (ref 3.3–5)
ALP SERPL-CCNC: 67 U/L — SIGNIFICANT CHANGE UP (ref 40–120)
ALT FLD-CCNC: 20 U/L — SIGNIFICANT CHANGE UP (ref 10–45)
ANION GAP SERPL CALC-SCNC: 12 MMOL/L — SIGNIFICANT CHANGE UP (ref 5–17)
AST SERPL-CCNC: 19 U/L — SIGNIFICANT CHANGE UP (ref 10–40)
BILIRUB SERPL-MCNC: 0.3 MG/DL — SIGNIFICANT CHANGE UP (ref 0.2–1.2)
BUN SERPL-MCNC: 14 MG/DL — SIGNIFICANT CHANGE UP (ref 7–23)
CALCIUM SERPL-MCNC: 9.1 MG/DL — SIGNIFICANT CHANGE UP (ref 8.4–10.5)
CHLORIDE SERPL-SCNC: 107 MMOL/L — SIGNIFICANT CHANGE UP (ref 96–108)
CO2 SERPL-SCNC: 22 MMOL/L — SIGNIFICANT CHANGE UP (ref 22–31)
CREAT SERPL-MCNC: 0.68 MG/DL — SIGNIFICANT CHANGE UP (ref 0.5–1.3)
EGFR: 79 ML/MIN/1.73M2 — SIGNIFICANT CHANGE UP
GLUCOSE SERPL-MCNC: 90 MG/DL — SIGNIFICANT CHANGE UP (ref 70–99)
NT-PROBNP SERPL-SCNC: 187 PG/ML — SIGNIFICANT CHANGE UP (ref 0–300)
POTASSIUM SERPL-MCNC: 3.9 MMOL/L — SIGNIFICANT CHANGE UP (ref 3.5–5.3)
POTASSIUM SERPL-SCNC: 3.9 MMOL/L — SIGNIFICANT CHANGE UP (ref 3.5–5.3)
PROT SERPL-MCNC: 6.5 G/DL — SIGNIFICANT CHANGE UP (ref 6–8.3)
SODIUM SERPL-SCNC: 141 MMOL/L — SIGNIFICANT CHANGE UP (ref 135–145)
TROPONIN T, HIGH SENSITIVITY RESULT: 14 NG/L — SIGNIFICANT CHANGE UP (ref 0–51)

## 2022-03-06 PROCEDURE — 93010 ELECTROCARDIOGRAM REPORT: CPT

## 2022-03-06 PROCEDURE — 71045 X-RAY EXAM CHEST 1 VIEW: CPT | Mod: 26

## 2022-03-06 PROCEDURE — 99284 EMERGENCY DEPT VISIT MOD MDM: CPT

## 2022-03-06 NOTE — ED ADULT NURSE NOTE - OBJECTIVE STATEMENT
Patient is a 89 yr old female with a PMH of afib/HTN/TB who presents to the ED from home complaining of HTN. Per patient she had an episode of HTN and chest pressure earlier today. PAtient currently denies any pain/discomfort. Upon assessment, patient is AOx3, afebrile, tenisha on CM, abdomen soft/non tender, +pulses, skin intact and denies v/d/f/chills/SOB/headache/cough. PAtient states she has nausea daily that she takes meds for. PRovider assessed at bedside, heplock placed with labs drawn/sent, call bell at bedside, all safety precautions maintained, and all needs met at this time

## 2022-03-06 NOTE — ED PROVIDER NOTE - PROGRESS NOTE DETAILS
britany attending- discussed with daughter findings of todays visit thus far and need for admission for further monitoring and mgmt, agreed with plan britany- discussed with hospitalist and the patient was admitted to medicine for further evaluation and treatment/management

## 2022-03-06 NOTE — ED ADULT NURSE NOTE - NSFALLRSKASSESSDT_ED_ALL_ED
Clinical Pharmacy Consult:                                                      Transplant Specific:  4 Year Transplant Call  Date of Transplant: 01/21/2016  Type of Transplant: kidney  First Transplant: yes  History of rejection: no    Immunosuppression Regimen   TAC 1mg qAM & 0.5qPM and MMF 750mg qAM & 750qPM  Immunosuppressant Levels:  Therapeutic, pending level today  Patient specific goal: 4-6  Pt adherent to lab draws: yes  Scr:   Lab Results   Component Value Date    CR 1.20 01/06/2020     Side effects: dry cough    Prophylactic Medications  Antibacterial:  Bactrim SS 3 times per week  Scheduled Discontinue Date: lifelong    Thrombosis Prevention: Aspirin 81 mg PO daily  Scheduled Discontinue Date: unknown    Blood Pressure Management  Frequency of home Blood Pressure checks: rarely  Most recent home BP: runs below 120/80  Patient Blood pressure goal: <140/90  Patient blood pressure at goal:  yes   Hospitalizations/ER visits since last assessment: 0    Medication adherence flowsheet 1/6/2020   Patient medication administration: Responsible for own medications   Patient estimated adherence level: %   Pharmacist assessment of adherence: Excellent   Patient reported doses missed per week: 0-1   Facilitators to medication adherence  Pill box;Schedule/routine   Patient reported barriers to medication adherence  No issues identified      Medication access flowsheet 1/6/2020   Number of pharmacies used: 2   Pharmacy: [facility];Other   Other [facility]    Enrolled in [facility] pharmacy? Yes   Patient reported barriers to accessing medications: No issues reported by patient   Medication access interventions: No issues identified      Med rec/DUR performed: yes  Med Rec Discrepancies: no    John is feeling good!  He was sitting in the lobby waiting for his annual visit.  No issues with adherence or blood pressure.  He reports a dry cough at times, but it isn't really bothersome.      No  concerns today.  Medications are refilled on time.  Will follow up in one year.    Myra Pimentel [facility]    06-Mar-2022 23:29

## 2022-03-06 NOTE — ED PROVIDER NOTE - OBJECTIVE STATEMENT
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 Pt stated she felt weird feeling to her head yesterday, unable to described, no pain or dizziness, and checked high BP (over 200). She also had one episode of left chest discomfort this after noon with high BP and came to ED for evaluation. Asymptomatic. Denies headache, dizziness, or N/V. Denies CP/palpitaion/SOB. Denies ABD pain or N/V/D. Denies fever, chills, cough or congestion. Denies urinary problems.

## 2022-03-06 NOTE — ED ADULT TRIAGE NOTE - CHIEF COMPLAINT QUOTE
high BP today, 190s/100 at home. Endorses "head pressure." Denies vision changes/numbness/tingling/weakness.

## 2022-03-06 NOTE — ED PROVIDER NOTE - PHYSICAL EXAMINATION
NAD. A&Ox4, Hypertensive. Afebrile. Lungs clear. ABD soft, non tender. No CVA tender. Peripheral ankle edema without tender with N/V- intact. Neuro- intact.

## 2022-03-06 NOTE — ED PROVIDER NOTE - ATTENDING CONTRIBUTION TO CARE
I, Alberto Flores, performed a history and physical exam of the patient and discussed their management with the resident and/or advanced care provider. I reviewed the resident and/or advanced care provider's note and agree with the documented findings and plan of care. I was present and available for all procedures.    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 reports checked high BP over 200 while having episode of left chest discomfort this after noon with high BP and came to ED for evaluation. Asymptomatic. Denies headache, dizziness, or N/V. Denies chest pain, palpitations, shortness of breath. Denies ABD pain or N/V/D. Denies fever, chills, cough or congestion. Denies urinary problems.    Well appearing and in NAD, head normal appearing atraumatic, trachea midline, no respiratory distress, lungs cta bilaterally, rrr no murmurs, soft NT ND abdomen, no visible extremity deformities, Alert and oriented, non focal neuro exam, skin warm and dry, normal affect and mood    well appearing concerning for possible symptomatic angina related to htn, now neuro intact, no chest pain but with chest pain this afternoon will obtain enzymes, ekg, screening labs acs w/u admit for further monitoring and mgmt optimization prior to dc patient agreeable with plan

## 2022-03-07 ENCOUNTER — TRANSCRIPTION ENCOUNTER (OUTPATIENT)
Age: 87
End: 2022-03-07

## 2022-03-07 VITALS — HEART RATE: 65 BPM | TEMPERATURE: 98 F | RESPIRATION RATE: 18 BRPM | OXYGEN SATURATION: 97 %

## 2022-03-07 DIAGNOSIS — R07.9 CHEST PAIN, UNSPECIFIED: ICD-10-CM

## 2022-03-07 DIAGNOSIS — Z86.59 PERSONAL HISTORY OF OTHER MENTAL AND BEHAVIORAL DISORDERS: ICD-10-CM

## 2022-03-07 DIAGNOSIS — I48.0 PAROXYSMAL ATRIAL FIBRILLATION: ICD-10-CM

## 2022-03-07 DIAGNOSIS — I10 ESSENTIAL (PRIMARY) HYPERTENSION: ICD-10-CM

## 2022-03-07 LAB
ANION GAP SERPL CALC-SCNC: 12 MMOL/L — SIGNIFICANT CHANGE UP (ref 5–17)
BASOPHILS # BLD AUTO: 0 K/UL — SIGNIFICANT CHANGE UP (ref 0–0.2)
BASOPHILS # BLD AUTO: 0.01 K/UL — SIGNIFICANT CHANGE UP (ref 0–0.2)
BASOPHILS NFR BLD AUTO: 0 % — SIGNIFICANT CHANGE UP (ref 0–2)
BASOPHILS NFR BLD AUTO: 0.3 % — SIGNIFICANT CHANGE UP (ref 0–2)
BUN SERPL-MCNC: 12 MG/DL — SIGNIFICANT CHANGE UP (ref 7–23)
CALCIUM SERPL-MCNC: 9.2 MG/DL — SIGNIFICANT CHANGE UP (ref 8.4–10.5)
CHLORIDE SERPL-SCNC: 106 MMOL/L — SIGNIFICANT CHANGE UP (ref 96–108)
CO2 SERPL-SCNC: 22 MMOL/L — SIGNIFICANT CHANGE UP (ref 22–31)
CREAT SERPL-MCNC: 0.61 MG/DL — SIGNIFICANT CHANGE UP (ref 0.5–1.3)
EGFR: 81 ML/MIN/1.73M2 — SIGNIFICANT CHANGE UP
ELLIPTOCYTES BLD QL SMEAR: SIGNIFICANT CHANGE UP
EOSINOPHIL # BLD AUTO: 0.03 K/UL — SIGNIFICANT CHANGE UP (ref 0–0.5)
EOSINOPHIL # BLD AUTO: 0.08 K/UL — SIGNIFICANT CHANGE UP (ref 0–0.5)
EOSINOPHIL NFR BLD AUTO: 1 % — SIGNIFICANT CHANGE UP (ref 0–6)
EOSINOPHIL NFR BLD AUTO: 2.7 % — SIGNIFICANT CHANGE UP (ref 0–6)
GLUCOSE SERPL-MCNC: 76 MG/DL — SIGNIFICANT CHANGE UP (ref 70–99)
HCT VFR BLD CALC: 37 % — SIGNIFICANT CHANGE UP (ref 34.5–45)
HCT VFR BLD CALC: 38.3 % — SIGNIFICANT CHANGE UP (ref 34.5–45)
HGB BLD-MCNC: 11.6 G/DL — SIGNIFICANT CHANGE UP (ref 11.5–15.5)
HGB BLD-MCNC: 11.9 G/DL — SIGNIFICANT CHANGE UP (ref 11.5–15.5)
LYMPHOCYTES # BLD AUTO: 0.64 K/UL — LOW (ref 1–3.3)
LYMPHOCYTES # BLD AUTO: 1.08 K/UL — SIGNIFICANT CHANGE UP (ref 1–3.3)
LYMPHOCYTES # BLD AUTO: 22 % — SIGNIFICANT CHANGE UP (ref 13–44)
LYMPHOCYTES # BLD AUTO: 36 % — SIGNIFICANT CHANGE UP (ref 13–44)
MANUAL SMEAR VERIFICATION: SIGNIFICANT CHANGE UP
MCHC RBC-ENTMCNC: 30.7 PG — SIGNIFICANT CHANGE UP (ref 27–34)
MCHC RBC-ENTMCNC: 31 PG — SIGNIFICANT CHANGE UP (ref 27–34)
MCHC RBC-ENTMCNC: 31.1 GM/DL — LOW (ref 32–36)
MCHC RBC-ENTMCNC: 31.4 GM/DL — LOW (ref 32–36)
MCV RBC AUTO: 97.9 FL — SIGNIFICANT CHANGE UP (ref 80–100)
MCV RBC AUTO: 99.7 FL — SIGNIFICANT CHANGE UP (ref 80–100)
MONOCYTES # BLD AUTO: 0.06 K/UL — SIGNIFICANT CHANGE UP (ref 0–0.9)
MONOCYTES # BLD AUTO: 0.23 K/UL — SIGNIFICANT CHANGE UP (ref 0–0.9)
MONOCYTES NFR BLD AUTO: 2 % — SIGNIFICANT CHANGE UP (ref 2–14)
MONOCYTES NFR BLD AUTO: 7.9 % — SIGNIFICANT CHANGE UP (ref 2–14)
NEUTROPHILS # BLD AUTO: 1.82 K/UL — SIGNIFICANT CHANGE UP (ref 1.8–7.4)
NEUTROPHILS # BLD AUTO: 1.95 K/UL — SIGNIFICANT CHANGE UP (ref 1.8–7.4)
NEUTROPHILS NFR BLD AUTO: 61 % — SIGNIFICANT CHANGE UP (ref 43–77)
NEUTROPHILS NFR BLD AUTO: 67.1 % — SIGNIFICANT CHANGE UP (ref 43–77)
NRBC # BLD: 0 /100 WBCS — SIGNIFICANT CHANGE UP (ref 0–0)
NRBC # BLD: 0 /100 — SIGNIFICANT CHANGE UP (ref 0–0)
PLAT MORPH BLD: NORMAL — SIGNIFICANT CHANGE UP
PLATELET # BLD AUTO: 106 K/UL — LOW (ref 150–400)
PLATELET # BLD AUTO: 110 K/UL — LOW (ref 150–400)
POIKILOCYTOSIS BLD QL AUTO: SIGNIFICANT CHANGE UP
POTASSIUM SERPL-MCNC: 3.8 MMOL/L — SIGNIFICANT CHANGE UP (ref 3.5–5.3)
POTASSIUM SERPL-SCNC: 3.8 MMOL/L — SIGNIFICANT CHANGE UP (ref 3.5–5.3)
RBC # BLD: 3.78 M/UL — LOW (ref 3.8–5.2)
RBC # BLD: 3.84 M/UL — SIGNIFICANT CHANGE UP (ref 3.8–5.2)
RBC # FLD: 14.1 % — SIGNIFICANT CHANGE UP (ref 10.3–14.5)
RBC # FLD: 14.1 % — SIGNIFICANT CHANGE UP (ref 10.3–14.5)
RBC BLD AUTO: ABNORMAL
SARS-COV-2 RNA SPEC QL NAA+PROBE: SIGNIFICANT CHANGE UP
SODIUM SERPL-SCNC: 140 MMOL/L — SIGNIFICANT CHANGE UP (ref 135–145)
TROPONIN T, HIGH SENSITIVITY RESULT: 14 NG/L — SIGNIFICANT CHANGE UP (ref 0–51)
WBC # BLD: 2.91 K/UL — LOW (ref 3.8–10.5)
WBC # BLD: 2.99 K/UL — LOW (ref 3.8–10.5)
WBC # FLD AUTO: 2.91 K/UL — LOW (ref 3.8–10.5)
WBC # FLD AUTO: 2.99 K/UL — LOW (ref 3.8–10.5)

## 2022-03-07 PROCEDURE — 70450 CT HEAD/BRAIN W/O DYE: CPT

## 2022-03-07 PROCEDURE — 87635 SARS-COV-2 COVID-19 AMP PRB: CPT

## 2022-03-07 PROCEDURE — 93005 ELECTROCARDIOGRAM TRACING: CPT

## 2022-03-07 PROCEDURE — 80053 COMPREHEN METABOLIC PANEL: CPT

## 2022-03-07 PROCEDURE — 93306 TTE W/DOPPLER COMPLETE: CPT

## 2022-03-07 PROCEDURE — 84484 ASSAY OF TROPONIN QUANT: CPT

## 2022-03-07 PROCEDURE — 99285 EMERGENCY DEPT VISIT HI MDM: CPT | Mod: 25

## 2022-03-07 PROCEDURE — 71045 X-RAY EXAM CHEST 1 VIEW: CPT

## 2022-03-07 PROCEDURE — 97161 PT EVAL LOW COMPLEX 20 MIN: CPT

## 2022-03-07 PROCEDURE — 80048 BASIC METABOLIC PNL TOTAL CA: CPT

## 2022-03-07 PROCEDURE — 99223 1ST HOSP IP/OBS HIGH 75: CPT

## 2022-03-07 PROCEDURE — 36415 COLL VENOUS BLD VENIPUNCTURE: CPT

## 2022-03-07 PROCEDURE — 83880 ASSAY OF NATRIURETIC PEPTIDE: CPT

## 2022-03-07 PROCEDURE — 85025 COMPLETE CBC W/AUTO DIFF WBC: CPT

## 2022-03-07 PROCEDURE — 70450 CT HEAD/BRAIN W/O DYE: CPT | Mod: 26

## 2022-03-07 PROCEDURE — 93306 TTE W/DOPPLER COMPLETE: CPT | Mod: 26

## 2022-03-07 RX ORDER — APIXABAN 2.5 MG/1
2.5 TABLET, FILM COATED ORAL
Refills: 0 | Status: DISCONTINUED | OUTPATIENT
Start: 2022-03-07 | End: 2022-03-07

## 2022-03-07 RX ORDER — LOSARTAN POTASSIUM 100 MG/1
1 TABLET, FILM COATED ORAL
Qty: 30 | Refills: 0
Start: 2022-03-07 | End: 2022-04-05

## 2022-03-07 RX ORDER — AMLODIPINE BESYLATE 2.5 MG/1
2 TABLET ORAL
Qty: 0 | Refills: 0 | DISCHARGE

## 2022-03-07 RX ORDER — LOSARTAN POTASSIUM 100 MG/1
50 TABLET, FILM COATED ORAL DAILY
Refills: 0 | Status: DISCONTINUED | OUTPATIENT
Start: 2022-03-07 | End: 2022-03-07

## 2022-03-07 RX ORDER — AMLODIPINE BESYLATE 2.5 MG/1
10 TABLET ORAL DAILY
Refills: 0 | Status: DISCONTINUED | OUTPATIENT
Start: 2022-03-07 | End: 2022-03-07

## 2022-03-07 RX ORDER — AMLODIPINE BESYLATE 2.5 MG/1
1 TABLET ORAL
Qty: 30 | Refills: 0
Start: 2022-03-07 | End: 2022-04-05

## 2022-03-07 RX ORDER — SERTRALINE 25 MG/1
1 TABLET, FILM COATED ORAL
Qty: 0 | Refills: 0 | DISCHARGE

## 2022-03-07 RX ADMIN — APIXABAN 2.5 MILLIGRAM(S): 2.5 TABLET, FILM COATED ORAL at 11:37

## 2022-03-07 RX ADMIN — LOSARTAN POTASSIUM 50 MILLIGRAM(S): 100 TABLET, FILM COATED ORAL at 11:37

## 2022-03-07 RX ADMIN — AMLODIPINE BESYLATE 10 MILLIGRAM(S): 2.5 TABLET ORAL at 05:44

## 2022-03-07 NOTE — DISCHARGE NOTE NURSING/CASE MANAGEMENT/SOCIAL WORK - NSDCPEFALRISK_GEN_ALL_CORE
For information on Fall & Injury Prevention, visit: [email or web address] OR  [email or web address] OR  https://www.cdc.gov/steadi/patient.html

## 2022-03-07 NOTE — PATIENT PROFILE ADULT - FALL HARM RISK - RISK INTERVENTIONS
Assistance OOB with selected safe patient handling equipment/Assistance with ambulation/Communicate Fall Risk and Risk Factors to all staff, patient, and family/Discuss with provider need for PT consult/Monitor gait and stability/Provide patient with walking aids - walker, cane, crutches/Reinforce activity limits and safety measures with patient and family/Review medications for side effects contributing to fall risk/Sit up slowly, dangle for a short time, stand at bedside before walking/Toileting schedule using arm’s reach rule for commode and bathroom/Visual Cue: Yellow wristband/Bed in lowest position, wheels locked, appropriate side rails in place/Call bell, personal items and telephone in reach/Instruct patient to call for assistance before getting out of bed or chair/Non-slip footwear when patient is out of bed/[place] to call system/Physically safe environment - no spills, clutter or unnecessary equipment/Purposeful Proactive Rounding/Room/bathroom lighting operational, light cord in reach

## 2022-03-07 NOTE — H&P ADULT - NSHPLABSRESULTS_GEN_ALL_CORE
WBC 2.9   61N  Hgb 11.6    Random glucose of 90.    Cr 0.6    K+ 3.9    HS troponin 14    COVID-19 PCR>>negative.    Chest radiograph reviewed with no infiltrate or effusion.    EKG tracing with ventricular rate at 61 with nonspecific ST T changes.

## 2022-03-07 NOTE — CONSULT NOTE ADULT - ASSESSMENT
89 y/o, lives alone, with a history fo PAF (on Eliquis and amiodarone), history of apparent remote pulmonary and abdominal TB s/P resection with reported chronic partial SBO, essential HTN maintained on Norvasc as monotherapy, with the patient self referring following an episode of LEFT chest pressur

## 2022-03-07 NOTE — DISCHARGE NOTE PROVIDER - CARE PROVIDER_API CALL
Goldberg, Steven M (MD)  Cardiovascular Disease; Internal Medicine  [address] [place] [address]  Phone: [phone]  Fax: [phone]  Follow Up Time:

## 2022-03-07 NOTE — DISCHARGE NOTE PROVIDER - HOSPITAL COURSE
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   ·  Plan: See above.   Follow troponin.   Echo.  Doubt acute aortic catastrophe but will have to assume ACS pending further workup.      >  Essential hypertension.   ·  Plan: See above.   CTT head reviewed by examiner with radiology>>negative for bleed/mass/shift.  titrate Norvasc to 10 mg daily  and losartan 50mg daily      >  PAF (paroxysmal atrial fibrillation).   ·  Plan: See above.   Patient agrees to continue with full AC for PAF.      >  History of depression.   ·  Plan:  clarify patient's Zoloft medication   Cleared by Dr Andrew for discharge home.

## 2022-03-07 NOTE — H&P ADULT - HISTORY OF PRESENT ILLNESS
NIGHT HOSPITALIST:    Patient UNKNOWN to me previously, assigned to me at this point via the ER and by Dr. 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   Patient denies tearing chest pain/back pain.  NO HA, no focal weakness.  Patient with an episode 2 days ago of head fullness sensation with her BP elevated but only sought medical attention today following the episode of chest pain.  Patient's discharge from [place] in Sept 2021 with chest pressure apparently related to uncontrolled atrial fibrillation with patient at the time on a loading dose of amiodarone.  Patient denies fever, chills, rigors.  NO hemoptysis, no red blood per rectum or melena.  NO vaginal bleeding.  NO LOC>    Patient reports receiving the Modern a COVID-19 vaccine x 3.

## 2022-03-07 NOTE — H&P ADULT - NSHPSOURCEINFOTX_GEN_ALL_CORE
Reviewed partial Medex with patient via patient recall with Medex from Sept 2021.  Daughter aware of admission but the patient did NOT wish for the examiner to contact daughter at this hour.  Shanda  closed at this hour. Reviewed partial Medex with patient via patient recall with Medex from Sept 2021.  Daughter, Alyssa Slater  aware of admission but the patient did NOT wish for the examiner to contact daughter at this hour.  Shanda  closed at this hour.

## 2022-03-07 NOTE — CONSULT NOTE ADULT - PROBLEM SELECTOR RECOMMENDATION 9
-  -atypical chest pain  -likely from hypertensive crisis  -currently pain free  -no significant events on telemetry  -bp control as below  -cardiac enzymes unremarkable  -check ecg

## 2022-03-07 NOTE — H&P ADULT - PROBLEM SELECTOR PLAN 1
See above.   Follow troponin.   Echo.  Doubt acute aortic catastrophe but will have to assume ACS pending further workup.

## 2022-03-07 NOTE — DISCHARGE NOTE PROVIDER - PROVIDER TOKENS
no loss of consciousness, no gait abnormality, no headache, no sensory deficits, and no weakness.
PROVIDER:[TOKEN:[[id number]]]

## 2022-03-07 NOTE — H&P ADULT - MENTAL STATUS
Speech fluent.   Subtle short term recall impairment but cognition grossly intact.  NO need for prompting.

## 2022-03-07 NOTE — DISCHARGE NOTE PROVIDER - NSDCMRMEDTOKEN_GEN_ALL_CORE_FT
amLODIPine 10 mg oral tablet: 1 tab(s) orally once a day  Eliquis 2.5 mg oral tablet: 1 tab(s) orally once a day  losartan 50 mg oral tablet: 1 tab(s) orally once a day  Protonix 40 mg oral delayed release tablet: 1 tab(s) orally once a day

## 2022-03-07 NOTE — CONSULT NOTE ADULT - PROBLEM SELECTOR RECOMMENDATION 3
-  -sinus on telemetry  -continue with eliquis    [facility]. Goldberg ([facility]    Alejandro Ro D.O.  [phone]

## 2022-03-07 NOTE — PHYSICAL THERAPY INITIAL EVALUATION ADULT - PERTINENT HX OF CURRENT PROBLEM, REHAB EVAL
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

## 2022-03-07 NOTE — CONSULT NOTE ADULT - SUBJECTIVE AND OBJECTIVE BOX
[facility] Cardiology Consult  _________________________    Patient is a 89y old  Female who presents with a chief complaint of LEFT sided chest pressure today, head fullness sensation 2 days ago. 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   Patient denies tearing chest pain/back pain.  NO HA, no focal weakness.  Patient with an episode 2 days ago of head fullness sensation with her BP elevated but only sought medical attention today following the episode of chest pain.  Patient's discharge from [place] in Sept 2021 with chest pressure apparently related to uncontrolled atrial fibrillation with patient at the time on a loading dose of amiodarone.  Patient denies fever, chills, rigors.  NO hemoptysis, no red blood per rectum or melena.  NO vaginal bleeding.  NO LOC>    Patient reports receiving the Modern a COVID-19 vaccine x 3. (07 Mar 2022 03:03)      PAST MEDICAL & SURGICAL HISTORY:  Pulmonary TB    Gastritis    Afib    SBO (small bowel obstruction)    Hypothyroid    UTI (urinary tract infection)    HTN (hypertension)    TB (pulmonary tuberculosis)  TB of abdomen 1960    History of Bilateral Breast Biopsy    S/P Exploratory Laparotomy  TB in 1960s    H/O hemorrhoidectomy    S/P vein stripping        MEDICATIONS  (STANDING):  amLODIPine   Tablet 10 milliGRAM(s) Oral daily  apixaban 2.5 milliGRAM(s) Oral <User Schedule>    MEDICATIONS  (PRN):      Allergies    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 reported allergy (Unknown)  penicillin (Rash)    Intolerances        Social Histroy: Tobacco- , ETOH-, Illicit Drugs-    T(C): 36.5 (03-07-22 @ 06:38), Max: 36.7 (03-06-22 @ 20:37)  HR: 56 (03-07-22 @ 06:38) (55 - 92)  BP: 173/76 (03-07-22 @ 06:38) (156/84 - 179/79)  RR: 18 (03-07-22 @ 06:38) (18 - 20)  SpO2: 96% (03-07-22 @ 06:38) (93% - 99%)  I&O's Summary    06 Mar 2022 07:01  -  07 Mar 2022 07:00  --------------------------------------------------------  IN: 120 mL / OUT: 0 mL / NET: 120 mL        Review of Systems:  Constitutional: [ ] Fever [ ] Chills [ ] Fatigue [ ] Weight change   HEENT: [ ] Blurred vision [ ] Eye Pain [ ] Headache [ ] Runny nose [ ] Sore Throat   Respiratory: [ ] Cough [ ] Wheezing [ ] Shortness of breath  Cardiovascular: [x ] Chest Pain [ ] Palpitations [ ] HARRY [ ] PND [ ] Orthopnea  Gastrointestinal: [ ] Abdominal Pain [ ] Diarrhea [ ] Constipation [ ] Hemorrhoids [ ] Nausea [ ] Vomiting  Genitourinary: [ ] Nocturia [ ] Dysuria [ ] Incontinence  Extremities: [ ] Swelling [ ] Joint Pain  Neurologic: [ ] Focal deficit [ ] Paresthesias [ ] Syncope  Lymphatic: [ ] Swelling [ ] Lymphadenopathy   Skin: [ ] Rash [ ] Ecchymoses [ ] Wounds [ ] Lesions  Psychiatry: [ ] Depression [ ] Suicidal/Homicidal Ideation [ ] Anxiety [ ] Sleep Disturbances  [ x] 10 point review of systems is otherwise negative except as mentioned above            [ ]Unable to obtain    PHYSICAL EXAM:  GENERAL: Alert, NAD  NECK: Supple.  CHEST/LUNG: Clear to auscultation bilaterally; No wheezes, rales, or rhonchi  HEART: S1 S2 normal, RRR,  No murmurs, rubs, or gallops  ABDOMEN: Soft, Nondistended  EXTREMITIES:  No LE edema.      LABS:                        11.9   2.91  )-----------( 106      ( 07 Mar 2022 07:14 )             38.3     03-07    140  |  106  |  12  ----------------------------<  76  3.8   |  22  |  0.61    Ca    9.2      07 Mar 2022 07:14    TPro  6.5  /  Alb  4.1  /  TBili  0.3  /  DBili  x   /  AST  19  /  ALT  20  /  AlkPhos  67  03-06          Serum Pro-Brain Natriuretic Peptide: 187 pg/mL (03-06-22 @ 23:25)          MEDICATIONS  (STANDING):  amLODIPine   Tablet 10 milliGRAM(s) Oral daily  apixaban 2.5 milliGRAM(s) Oral <User Schedule>    MEDICATIONS  (PRN):      Troponin T, High Sensitivity Result: 14 ng/L (03-07-22 @ 07:14)  Troponin T, High Sensitivity Result: 14 ng/L (03-06-22 @ 23:25)      RADIOLOGY & ADDITIONAL TESTS:    Cardiology testing:  EKG:  Telemetry: sinus 50-60s

## 2022-03-07 NOTE — H&P ADULT - ASSESSMENT
NIGHT HOSPITALIST:   PAF on amiodarone and Eliquis with essential HTN and elevated BP but at present no clinical evidence of acute end organ damage.   Will obtain non contrast CTT head given symptoms from 2 days ago.  Risks/benefits reviewed with patient  who is alert, oriented x 4, who agrees to continue with patient's Eliquis for full AC with PAF.    Will obtain an echo, follow troponin.    Would contact patient's pharmacy in the AM to confirm patient's remaining medications that could not be confirmed at present. NIGHT HOSPITALIST:   PAF on amiodarone and Eliquis with essential HTN and elevated BP but at present no clinical evidence of acute end organ damage.   Will obtain non contrast CTT head given symptoms from 2 days ago>>reviewed with radiologist on call:  NO bleed or mass or shift.  Risks/benefits reviewed with patient  who is alert, oriented x 4, who agrees to continue with patient's Eliquis for full AC with PAF.    Will obtain an echo, follow troponin.    Would contact patient's pharmacy in the AM to confirm patient's remaining medications that could not be confirmed at present.

## 2022-03-07 NOTE — DISCHARGE NOTE NURSING/CASE MANAGEMENT/SOCIAL WORK - PATIENT PORTAL LINK FT
You can access the FollowMyHealth Patient Portal offered by [facility] by registering at the following website: [email or web address]. By joining [employer]’s FollowMyHealth portal, you will also be able to view your health information using other applications (apps) compatible with our system.

## 2022-03-07 NOTE — H&P ADULT - NSHPADDITIONALINFOADULT_GEN_ALL_CORE
NIGHT HOSPITALIST:   Patient/ daughter aware of admission and agrees with plan/care as above.   The patient did not wish for the examiner to contact her daughter at this hour.  The patient is not yet ready to discuss advance directives.   Care reviewed with covering NP/PA for endorsement to the [employer] [facility].    Donovan Junior MD NIGHT HOSPITALIST:   Patient/ daughter aware of admission and agrees with plan/care as above.   The patient did not wish for the examiner to contact her daughter at this hour.  The patient is not yet ready to discuss advance directives.   Care reviewed with covering NP/Yola CHÁVEZ for endorsement to the [facility].    Donovan Junior MD

## 2022-03-07 NOTE — H&P ADULT - PROBLEM SELECTOR PLAN 2
See above.   Will titrate Norvasc to 10 mg daily but would consider addition of a second agent as tolerated. See above.   CTT head reviewed by examiner with radiology>>negative for bleed/mass/shift.  Will titrate Norvasc to 10 mg daily but would consider in the AM addition of a second agent as tolerated.

## 2022-03-07 NOTE — H&P ADULT - EYES
PHYSICAL THERAPY EVALUATION Patient: Sudheer Mortensen (25 y.o. male) Date: 12/30/2020 Primary Diagnosis: Intractable back pain [M54.9] Open compression fracture of thoracolumbar vertebra ([facility] [address] [S22.080B, S32.010B] Liver cirrhosis ([facility] [address] [G21.41] Pulmonary nodule [R91.1] Precautions:falls ASSESSMENT This is a 60 y/o male came to  [facility]  with c/o  bilateral lower back pain rendering him unable to ambulate,admitted  on 12/26/20 for intractable back pain , open compression fracture of thoracolumbar vertebra , liver cirrhosis , pulmonary nodule  . CT scan of the abdomen revealing Old right lower rib and spinous process fractures. Bony demineralization. Moderate compression fractures of T12, L3 and L4 vertebral bodies with mild retropulsion into the canal.Chest CT shows a right lower lobe nodule in the lung . As per orthopaedic PN, the compression fractures appear to be stable without involvement of the spinal cord area and can be seen by PT /OT for mobilization . Pt. Received  sitting at the EOB with OT in the room, pt  initially declining for OOB transfers but agreed when PT offered to work with OT as pt demonstrated decreased activity tolerance . Pt is A& O x 4 , as per pt. Report ,he lives with wife in a single story home with 1 step to enter , no HR ,   IND with ADL's and mod I  for functional transfers/mobility with SPC  prior to admission . Based on the objective data described below, the patient presents with confusion ,c/o pain in back - 8/10 , decreased safety awareness ,  decreased strength ,-3/5 grossly in  b/l LE , balance deficits , generalized weakness ,and need for increased assist with functional mobility ( as per OT , pt required maxA x2 for bed mobility)  , close SUP for  for static sitting balance ,minAx2 for sit <>stand and SPT ,Oneil for static  standing balance , is able to ambulate - 22' with RW, Oenil with slow henok , decreased step length and decreased foot clearance b/l Le ) . Recommend d/c to home with HHPT and family care  when medically appropriate . Current Level of Function Impacting Discharge (mobility/balance): Requires minAx 1-2 persons for functional mobility Functional Outcome Measure: The patient scored 15 on the AM PAC basic mobility  outcome measure which is indicative of medium complexity . Other factors to consider for discharge: decreased safety awareness Patient will benefit from skilled therapy intervention to address the above noted impairments. PLAN : 
Recommendations and Planned Interventions: bed mobility training, transfer training, gait training, therapeutic exercises, neuromuscular re-education, patient and family training/education and therapeutic activities Frequency/Duration: Patient will be followed by physical therapy:  5 times a week to address goals. Recommendation for discharge: (in order for the patient to meet his/her long term goals) HHPT with family care This discharge recommendation: 
Has been made in collaboration with the attending provider and/or case management IF patient discharges home will need the following DME: rolling walker SUBJECTIVE:  
Patient stated My back hurts.  OBJECTIVE DATA SUMMARY:  
HISTORY:   
Past Medical History:  
Diagnosis Date  AICD (automatic cardioverter/defibrillator) present  CAD (coronary artery disease)  Cirrhosis of liver ([facility] [address]  Gallstones  Hypertension  Nodule of lower lobe of right lung  Renal stones Past Surgical History:  
Procedure Laterality Date  HX PACEMAKER PLACEMENT    
 IR PARACENTESIS ABD W IMAGE  12/30/2020 Personal factors and/or comorbidities impacting plan of care:  
 
Home Situation Home Environment: Private residence # Steps to Enter: 1 Rails to Enter: No 
One/Two Story Residence: One story Living Alone: No 
Support Systems: Spouse/Significant Other/Partner Patient Expects to be Discharged to[de-identified] Private residence Current DME Used/Available at Home: Cane, straight PLOF: Pt IND for ADLS/IADLS, mod I  with mobility with SPC prior to admission. EXAMINATION/PRESENTATION/DECISION MAKING:  
Critical Behavior: 
Neurologic State: Confused Orientation Level: Oriented X4 Cognition: Follows commands Hearing: Auditory Auditory Impairment: None Skin:  Intact where exposed Range Of Motion: 
AROM: Generally decreased, functional 
 Strength:   
Strength: Generally decreased, functional(-3/5 grossly) Tone & Sensation:  
Tone: Normal 
Sensation: Intact Coordination: 
Coordination: Generally decreased, functional 
Functional Mobility: 
Bed Mobility:  
 
Transfers: 
Sit to Stand: Minimum assistance;Assist x2 Stand to Sit: Minimum assistance Bed to Chair: Minimum assistance Balance:  
Sitting - Static: Good (unsupported) Sitting - Dynamic: Fair (occasional) Standing: With support; Impaired Standing - Static: Fair;Constant support Standing - Dynamic : Fair;Constant support Ambulation/Gait Training: 
Distance (ft): 25 Feet (ft) Assistive Device: Walker, rolling Ambulation - Level of Assistance: Minimal assistance Speed/Shawna: Slow Step Length: Left shortened;Right shortened Functional Measure: 
[address] [facility] [address] [address] AM-PAC 6 Clicks Basic Mobility Inpatient Short Form How much difficulty does the patient currently have. .. Unable A Lot A Little None 1. Turning over in bed (including adjusting bedclothes, sheets and blankets)? [] 1   [x] 2   [] 3   [] 4  
2. Sitting down on and standing up from a chair with arms ( e.g., wheelchair, bedside commode, etc.)   [] 1   [] 2   [x] 3   [] 4  
3. Moving from lying on back to sitting on the side of the bed? [] 1   [x] 2   [] 3   [] 4 How much help from another person does the patient currently need. .. Total A Lot A Little None 4. Moving to and from a bed to a chair (including a wheelchair)? [] 1   [] 2   [x] 3   [] 4  
5. Need to walk in hospital room? [] 1   [] 2   [x] 3   [] 4  
6. Climbing 3-5 steps with a railing? [] 1   [x] 2   [] 3   [] 4  
© , Trustees of [address] [place] [address], under license to [employer]. All rights reserved Score:  Initial: 15 Most Recent: X (Date:20 -- ) Interpretation of Tool:  Represents activities that are increasingly more difficult (i.e. Bed mobility, Transfers, Gait). Score 24 23 22-20 19-15 14-10 9-7 6 Modifier CH CI CJ CK CL CM CN Physical Therapy Evaluation Charge Determination History Examination Presentation Decision-Making MEDIUM  Complexity : 1-2 comorbidities / personal factors will impact the outcome/ POC  MEDIUM Complexity : 3 Standardized tests and measures addressing body structure, function, activity limitation and / or participation in recreation  MEDIUM Complexity : Evolving with changing characteristics  Other Functional Measure [facility] 6 medium complexity Based on the above components, the patient evaluation is determined to be of the following complexity level: MEDIUM Pain Ratin/10 at back Activity Tolerance:  
Fair Please refer to the flowsheet for vital signs taken during this treatment. After treatment patient left in no apparent distress:  
Sitting in chair, Call bell within reach and Caregiver / family present COMMUNICATION/EDUCATION:  
The patients plan of care was discussed with: Registered nurse. Fall prevention education was provided and the patient/caregiver indicated understanding. and Patient/family agree to work toward stated goals and plan of care. Problem: Mobility Impaired (Adult and Pediatric) Goal: *Acute Goals and Plan of Care (Insert Text) Description: Pt will be I with LE HEP in 7 days. Pt will perform bed mobility with SBA in 7 days. Pt will perform transfers with SUP in 7 days. Pt will amb >150 feet with LRAD safely with SUP in 7 days. Outcome: Not Met Thank you for this referral. 
Quang Copeland Time Calculation: 21 mins PERRL/EOMI detailed exam

## 2022-03-07 NOTE — H&P ADULT - NSHPREVIEWOFSYSTEMS_GEN_ALL_CORE
NO cough, no dyspnoea, no wheezing.  NO abdominal pain, no red blood per rectum or melena.  NO back pain, no tearing back pain.  NO rash.  NO joint pain.    No breast symptoms.  NO vaginal bleeding.  No dysuria, no hematuria.  NO anorexia.  NO thyroid symptoms.  NO SI/HI.

## 2022-03-07 NOTE — DISCHARGE NOTE PROVIDER - NSDCCPCAREPLAN_GEN_ALL_CORE_FT
PRINCIPAL DISCHARGE DIAGNOSIS  Diagnosis: Chest pain  Assessment and Plan of Treatment:   HOME CARE INSTRUCTIONS  For the next few days, avoid physical activities that bring on chest pain. Continue physical activities as directed.  Do not smoke.  Avoid drinking alcohol.   Only take over-the-counter or prescription medicine for pain, discomfort, or fever as directed by your caregiver.  Follow your caregiver's suggestions for further testing if your chest pain does not go away.  Keep any follow-up appointments you made. If you do not go to an appointment, you could develop lasting (chronic) problems with pain. If there is any problem keeping an appointment, you must call to reschedule.   SEEK MEDICAL CARE IF:  You think you are having problems from the medicine you are taking. Read your medicine instructions carefully.  Your chest pain does not go away, even after treatment.  You develop a rash with blisters on your chest.  SEEK IMMEDIATE MEDICAL CARE IF:  You have increased chest pain or pain that spreads to your arm, neck, jaw, back, or abdomen.   You develop shortness of breath, an increasing cough, or you are coughing up blood.  You have severe back or abdominal pain, feel nauseous, or vomit.  You develop severe weakness, fainting, or chills.  You have a fever.  THIS IS AN EMERGENCY. Do not wait to see if the pain will go away. Get medical help at once. Call your local emergency services (_____________________). Do not drive yourself to the hospital.        SECONDARY DISCHARGE DIAGNOSES  Diagnosis: HTN (hypertension)  Assessment and Plan of Treatment: Cont home meds    Diagnosis: History of depression  Assessment and Plan of Treatment:     Diagnosis: PAF (paroxysmal atrial fibrillation)  Assessment and Plan of Treatment:

## 2022-03-22 NOTE — ED PROVIDER NOTE - IV ALTEPLASE EXCL REL HIDDEN
CC:   Chief Complaint   Patient presents with   • Ingrown Toenail     Fight foot 1st digit ingrown nail, X 2 days        OBJECTIVE:    Winston Eisenberg is a 38 year old male who present to Immediate Care for an evaluation of a skin problem described as red swelling at the cuticle edge of the RF1D which is growing bigger/painful since it began 2 days ago. admits tenderness, and admits swelling. Symptoms include: PAIN AND SWELLING. Present treatments include none.      The remainder of the ROS is negative.    Patient Active Problem List   Diagnosis   • Scrotal varices   • Hemorrhoids   • Class 1 obesity due to excess calories with serious comorbidity and body mass index (BMI) of 34.0 to 34.9 in adult   • Hyperlipidemia   • Vitamin D deficiency, unspecified   • Elevated ALT measurement   • Fatty liver   • Ingrown right big toenail     Social Hx: non-smoking household  ALLERGIES:  Patient has no known allergies.    OBJECTIVE:     Vitals:    03/22/22 1834   BP: 137/67   Pulse: (!) 56   Resp: 18   Temp: 97.7 °F (36.5 °C)   TempSrc: Temporal   SpO2: 97%     General appearance: Alert, well hydrated, in no apparent distress  Skin exam: tenderness and swelling over the RF1D cuticle edge with overlying erythema. No streaking or discharge seen. Warmer to touch than surrounding skin.    ASSESSMENT/PLAN:     PARONYCHIA - Wound care instructions were given. May use tylenol as directed prn pain. The patient was started on antibiotics which are to be taken as directed.  Follow-up in the [facility] for any worsening, or lack of improvement within 2 days, or lack of resolution at the end of therapy.     Diagnosis and prognosis, treatment and side-effects were explained and all questions were answered satisfactorily and there were no further questions upon discharge.    Electronically signed by: Randell Le DO  3/22/2022     show

## 2022-05-09 NOTE — H&P ADULT - REASON FOR ADMISSION
Physical Therapy  Visit Type: initial evaluation  Precautions:  Medical precautions: ; standard precautions. Lines:     Basic: IV and telemetry      Lines in chart and on patient reviewed, precautions maintained throughout session. SUBJECTIVE  Patient agreed to participate in therapy this date. Agreeable to PT at this time. RN approved the session.   Patient / Family Goal: return home    Pain     Location: Denies  RN informed on pain level     OBJECTIVE   Level of consciousness: alert    Oriented to person, place, time and situation     Arousal alertness: appropriate responses to stimuli    Affect/Behavior: alert and cooperative  Patient activity tolerance: 1 to 1 activity to rest  Range of Motion (measured in degrees unless otherwise noted, active unless indicated)  WFL: RLE, LLE  Strength (out of 5 unless otherwise indicated)   WFL: LLE, RLE  Balance (trials measured in sec unless otherwise indicted)    Sitting: Static: modified independent, Dynamic: modified independent    Standing - Firm Surface - Eyes Open: Static: stand by assist Dynamic: stand by assist    Bed Mobility:      Repositioning in bed: modified independent      Supine to sit: modified independent    Sit to supine: modified independent  Training completed:    Tasks: supine to sit and sit to supine    Education details: patient safety, body mechanics and patient demonstrates understanding  Transfers:    Assistive devices: gait belt and none    Sit to stand: supervision    Stand to sit: supervision  Training completed:    Tasks: sit to stand and stand to sit    Education details: patient safety and patient demonstrates understanding       Gait/Ambulation:     Assistance: stand by assist, with tactile cues and with verbal cues   Assistive device: gait belt    Distance (ft): 50; 50    Pattern: step through    Type: decreased edi  Training Completed:    Tasks: gait training on level surfaces    Education details: body mechanics and patient requires additional training    No LOB or unsteadiness when ambulated without an Assistive device. Interventions     Training provided: activity tolerance, bed mobility training, compensatory techniques, safety training, use of adaptive equipment, transfer training and functional ambulation    Skilled input: Verbal instruction/cues  Verbal Consent: Writer verbally educated and received verbal consent for hand placement, positioning of patient, and techniques to be performed today from patient for clothing adjustments for techniques and therapist position for techniques as described above and how they are pertinent to the patient's plan of care. ASSESSMENT    Impairments: strength, activity tolerance, balance deficits and endurance  Functional Limitations: all functional mobility       Discharge Recommendations  Recommendation for Discharge: PT WI: Home (with support from adult children)   PT/OT Mobility Equipment for Discharge: None  PT Identified Barriers to Discharge: None  Skilled therapy is required to address these limitations in attempt to maximize the patient's independence. Progress: progressing toward goals    Pain at end of session: , location: Denies  Predicted patient presentation: Moderate (evolving) - Patient comorbidities and complexities, as defined above, may have varying impact on steady progress for prescribed plan of care.     End of Session:   Location: in bed  Safety measures: equipment intact, call light within reach and lines intact  Handoff to: nurse  Education Provided:   Learning assessment:  - Primary learner: patient  - Are they ready to learn: yes  - Preferred learning style: verbal  - Barriers to learning: no barriers apparent at this time  Education provided during session:  - Receiving education: patient  - Results of above outlined education: Verbalizes understanding    PLAN    Suggestions for next session as indicated: Ambulation without Assistive device and trail stairs. PT Frequency: 4 days/week  Frequency Comments: 5/9 (RK)    Interventions: balance, bed mobility, gait training, neuromuscular re-education, strengthening, HEP train/position, safety education, functional transfer training and stairs retraining  Agreement to plan and goals: patient agrees with goals and treatment plan        GOALS  Review Date: 5/16/2022  Long Term Goals: (to be met by time of discharge from hospital)  Ambulation (even): Patient will ambulate on even surface for 150 feet with no device, independent. Status: progressing/ongoing  3-4 steps: Patient will ambulate 3-4 steps with using one rail, modified independent. Status: not met  Home program: patient independent with home program as instructed. Status: not met    Documented in the chart in the following areas: Prior Level of Function. Assessment.       Therapy procedure time and total treatment time can be found documented on the Time Entry flowsheet LEFT sided chest pressure today, head fullness sensation 2 days ago.

## 2022-09-24 ENCOUNTER — EMERGENCY (EMERGENCY)
Facility: HOSPITAL | Age: 87
LOS: 1 days | Discharge: HOME | End: 2022-09-24
Attending: EMERGENCY MEDICINE
Payer: MEDICARE

## 2022-09-24 VITALS
RESPIRATION RATE: 16 BRPM | DIASTOLIC BLOOD PRESSURE: 80 MMHG | SYSTOLIC BLOOD PRESSURE: 156 MMHG | TEMPERATURE: 99 F | HEART RATE: 68 BPM | OXYGEN SATURATION: 98 %

## 2022-09-24 VITALS
SYSTOLIC BLOOD PRESSURE: 167 MMHG | HEIGHT: 66 IN | TEMPERATURE: 99 F | DIASTOLIC BLOOD PRESSURE: 82 MMHG | HEART RATE: 66 BPM | WEIGHT: 136.03 LBS | OXYGEN SATURATION: 95 % | RESPIRATION RATE: 16 BRPM

## 2022-09-24 DIAGNOSIS — Z98.89 OTHER SPECIFIED POSTPROCEDURAL STATES: Chronic | ICD-10-CM

## 2022-09-24 LAB
ALBUMIN SERPL ELPH-MCNC: 4.6 G/DL — SIGNIFICANT CHANGE UP (ref 3.3–5)
ALP SERPL-CCNC: 74 U/L — SIGNIFICANT CHANGE UP (ref 40–120)
ALT FLD-CCNC: 18 U/L — SIGNIFICANT CHANGE UP (ref 10–45)
ANION GAP SERPL CALC-SCNC: 12 MMOL/L — SIGNIFICANT CHANGE UP (ref 5–17)
ANISOCYTOSIS BLD QL: SLIGHT — SIGNIFICANT CHANGE UP
APPEARANCE UR: CLEAR — SIGNIFICANT CHANGE UP
APTT BLD: 30.4 SEC — SIGNIFICANT CHANGE UP (ref 27.5–35.5)
AST SERPL-CCNC: 25 U/L — SIGNIFICANT CHANGE UP (ref 10–40)
BACTERIA # UR AUTO: NEGATIVE — SIGNIFICANT CHANGE UP
BASE EXCESS BLDV CALC-SCNC: 4 MMOL/L — HIGH (ref -2–3)
BASOPHILS # BLD AUTO: 0 K/UL — SIGNIFICANT CHANGE UP (ref 0–0.2)
BASOPHILS NFR BLD AUTO: 0 % — SIGNIFICANT CHANGE UP (ref 0–2)
BILIRUB SERPL-MCNC: 0.6 MG/DL — SIGNIFICANT CHANGE UP (ref 0.2–1.2)
BILIRUB UR-MCNC: NEGATIVE — SIGNIFICANT CHANGE UP
BUN SERPL-MCNC: 16 MG/DL — SIGNIFICANT CHANGE UP (ref 7–23)
CA-I SERPL-SCNC: 1.18 MMOL/L — SIGNIFICANT CHANGE UP (ref 1.15–1.33)
CALCIUM SERPL-MCNC: 9.5 MG/DL — SIGNIFICANT CHANGE UP (ref 8.4–10.5)
CHLORIDE BLDV-SCNC: 103 MMOL/L — SIGNIFICANT CHANGE UP (ref 96–108)
CHLORIDE SERPL-SCNC: 104 MMOL/L — SIGNIFICANT CHANGE UP (ref 96–108)
CO2 BLDV-SCNC: 32 MMOL/L — HIGH (ref 22–26)
CO2 SERPL-SCNC: 24 MMOL/L — SIGNIFICANT CHANGE UP (ref 22–31)
COLOR SPEC: YELLOW — SIGNIFICANT CHANGE UP
CREAT SERPL-MCNC: 0.63 MG/DL — SIGNIFICANT CHANGE UP (ref 0.5–1.3)
DIFF PNL FLD: ABNORMAL
EGFR: 81 ML/MIN/1.73M2 — SIGNIFICANT CHANGE UP
ELLIPTOCYTES BLD QL SMEAR: SIGNIFICANT CHANGE UP
EOSINOPHIL # BLD AUTO: 0 K/UL — SIGNIFICANT CHANGE UP (ref 0–0.5)
EOSINOPHIL NFR BLD AUTO: 0 % — SIGNIFICANT CHANGE UP (ref 0–6)
EPI CELLS # UR: 4 /HPF — SIGNIFICANT CHANGE UP
GAS PNL BLDV: 138 MMOL/L — SIGNIFICANT CHANGE UP (ref 136–145)
GAS PNL BLDV: SIGNIFICANT CHANGE UP
GIANT PLATELETS BLD QL SMEAR: PRESENT — SIGNIFICANT CHANGE UP
GLUCOSE BLDV-MCNC: 105 MG/DL — HIGH (ref 70–99)
GLUCOSE SERPL-MCNC: 100 MG/DL — HIGH (ref 70–99)
GLUCOSE UR QL: NEGATIVE — SIGNIFICANT CHANGE UP
HCO3 BLDV-SCNC: 30 MMOL/L — HIGH (ref 22–29)
HCT VFR BLD CALC: 38.2 % — SIGNIFICANT CHANGE UP (ref 34.5–45)
HCT VFR BLDA CALC: 38 % — SIGNIFICANT CHANGE UP (ref 34.5–46.5)
HGB BLD CALC-MCNC: 12.8 G/DL — SIGNIFICANT CHANGE UP (ref 11.7–16.1)
HGB BLD-MCNC: 12.2 G/DL — SIGNIFICANT CHANGE UP (ref 11.5–15.5)
HYALINE CASTS # UR AUTO: 4 /LPF — HIGH (ref 0–2)
INR BLD: 1.12 RATIO — SIGNIFICANT CHANGE UP (ref 0.88–1.16)
KETONES UR-MCNC: NEGATIVE — SIGNIFICANT CHANGE UP
LACTATE BLDV-MCNC: 1.6 MMOL/L — SIGNIFICANT CHANGE UP (ref 0.5–2)
LEUKOCYTE ESTERASE UR-ACNC: ABNORMAL
LYMPHOCYTES # BLD AUTO: 0.67 K/UL — LOW (ref 1–3.3)
LYMPHOCYTES # BLD AUTO: 20.5 % — SIGNIFICANT CHANGE UP (ref 13–44)
MANUAL SMEAR VERIFICATION: SIGNIFICANT CHANGE UP
MCHC RBC-ENTMCNC: 31.7 PG — SIGNIFICANT CHANGE UP (ref 27–34)
MCHC RBC-ENTMCNC: 31.9 GM/DL — LOW (ref 32–36)
MCV RBC AUTO: 99.2 FL — SIGNIFICANT CHANGE UP (ref 80–100)
MONOCYTES # BLD AUTO: 0.14 K/UL — SIGNIFICANT CHANGE UP (ref 0–0.9)
MONOCYTES NFR BLD AUTO: 4.3 % — SIGNIFICANT CHANGE UP (ref 2–14)
NEUTROPHILS # BLD AUTO: 2.45 K/UL — SIGNIFICANT CHANGE UP (ref 1.8–7.4)
NEUTROPHILS NFR BLD AUTO: 75.2 % — SIGNIFICANT CHANGE UP (ref 43–77)
NITRITE UR-MCNC: NEGATIVE — SIGNIFICANT CHANGE UP
PCO2 BLDV: 51 MMHG — HIGH (ref 39–42)
PH BLDV: 7.38 — SIGNIFICANT CHANGE UP (ref 7.32–7.43)
PH UR: 5.5 — SIGNIFICANT CHANGE UP (ref 5–8)
PLAT MORPH BLD: ABNORMAL
PLATELET # BLD AUTO: 104 K/UL — LOW (ref 150–400)
PO2 BLDV: 11 MMHG — LOW (ref 25–45)
POIKILOCYTOSIS BLD QL AUTO: SIGNIFICANT CHANGE UP
POTASSIUM BLDV-SCNC: 3.7 MMOL/L — SIGNIFICANT CHANGE UP (ref 3.5–5.1)
POTASSIUM SERPL-MCNC: 4.2 MMOL/L — SIGNIFICANT CHANGE UP (ref 3.5–5.3)
POTASSIUM SERPL-SCNC: 4.2 MMOL/L — SIGNIFICANT CHANGE UP (ref 3.5–5.3)
PROT SERPL-MCNC: 7.9 G/DL — SIGNIFICANT CHANGE UP (ref 6–8.3)
PROT UR-MCNC: ABNORMAL
PROTHROM AB SERPL-ACNC: 12.9 SEC — SIGNIFICANT CHANGE UP (ref 10.5–13.4)
RAPID RVP RESULT: SIGNIFICANT CHANGE UP
RBC # BLD: 3.85 M/UL — SIGNIFICANT CHANGE UP (ref 3.8–5.2)
RBC # FLD: 13.4 % — SIGNIFICANT CHANGE UP (ref 10.3–14.5)
RBC BLD AUTO: ABNORMAL
RBC CASTS # UR COMP ASSIST: 4 /HPF — SIGNIFICANT CHANGE UP (ref 0–4)
SAO2 % BLDV: 31.7 % — LOW (ref 67–88)
SARS-COV-2 RNA SPEC QL NAA+PROBE: SIGNIFICANT CHANGE UP
SODIUM SERPL-SCNC: 140 MMOL/L — SIGNIFICANT CHANGE UP (ref 135–145)
SP GR SPEC: 1.03 — HIGH (ref 1.01–1.02)
TROPONIN T, HIGH SENSITIVITY RESULT: 12 NG/L — SIGNIFICANT CHANGE UP (ref 0–51)
UROBILINOGEN FLD QL: NEGATIVE — SIGNIFICANT CHANGE UP
WBC # BLD: 3.26 K/UL — LOW (ref 3.8–10.5)
WBC # FLD AUTO: 3.26 K/UL — LOW (ref 3.8–10.5)
WBC UR QL: 6 /HPF — HIGH (ref 0–5)

## 2022-09-24 PROCEDURE — 93010 ELECTROCARDIOGRAM REPORT: CPT | Mod: GC

## 2022-09-24 PROCEDURE — 85730 THROMBOPLASTIN TIME PARTIAL: CPT

## 2022-09-24 PROCEDURE — 84295 ASSAY OF SERUM SODIUM: CPT

## 2022-09-24 PROCEDURE — 99285 EMERGENCY DEPT VISIT HI MDM: CPT | Mod: 25

## 2022-09-24 PROCEDURE — 93005 ELECTROCARDIOGRAM TRACING: CPT

## 2022-09-24 PROCEDURE — 82803 BLOOD GASES ANY COMBINATION: CPT

## 2022-09-24 PROCEDURE — 99285 EMERGENCY DEPT VISIT HI MDM: CPT | Mod: GC

## 2022-09-24 PROCEDURE — 82947 ASSAY GLUCOSE BLOOD QUANT: CPT

## 2022-09-24 PROCEDURE — 0225U NFCT DS DNA&RNA 21 SARSCOV2: CPT

## 2022-09-24 PROCEDURE — 85610 PROTHROMBIN TIME: CPT

## 2022-09-24 PROCEDURE — 83605 ASSAY OF LACTIC ACID: CPT

## 2022-09-24 PROCEDURE — 85025 COMPLETE CBC W/AUTO DIFF WBC: CPT

## 2022-09-24 PROCEDURE — 84132 ASSAY OF SERUM POTASSIUM: CPT

## 2022-09-24 PROCEDURE — 82435 ASSAY OF BLOOD CHLORIDE: CPT

## 2022-09-24 PROCEDURE — 71045 X-RAY EXAM CHEST 1 VIEW: CPT | Mod: 26

## 2022-09-24 PROCEDURE — 84484 ASSAY OF TROPONIN QUANT: CPT

## 2022-09-24 PROCEDURE — 81001 URINALYSIS AUTO W/SCOPE: CPT

## 2022-09-24 PROCEDURE — 85014 HEMATOCRIT: CPT

## 2022-09-24 PROCEDURE — 87086 URINE CULTURE/COLONY COUNT: CPT

## 2022-09-24 PROCEDURE — 36415 COLL VENOUS BLD VENIPUNCTURE: CPT

## 2022-09-24 PROCEDURE — 82330 ASSAY OF CALCIUM: CPT

## 2022-09-24 PROCEDURE — 85018 HEMOGLOBIN: CPT

## 2022-09-24 PROCEDURE — 71045 X-RAY EXAM CHEST 1 VIEW: CPT

## 2022-09-24 PROCEDURE — 80053 COMPREHEN METABOLIC PANEL: CPT

## 2022-09-24 RX ORDER — SODIUM CHLORIDE 9 MG/ML
500 INJECTION INTRAMUSCULAR; INTRAVENOUS; SUBCUTANEOUS ONCE
Refills: 0 | Status: COMPLETED | OUTPATIENT
Start: 2022-09-24 | End: 2022-09-24

## 2022-09-24 RX ADMIN — SODIUM CHLORIDE 500 MILLILITER(S): 9 INJECTION INTRAMUSCULAR; INTRAVENOUS; SUBCUTANEOUS at 20:48

## 2022-09-24 NOTE — ED PROVIDER NOTE - NS ED ROS FT
GENERAL: + fever, chills  EYES: no vision changes, no discharge.   ENT: no difficulty swallowing or speaking   CARDIAC: no chest pain/pressure, SOB, lower extremity swelling  PULMONARY: no cough, SOB  GI: no abdominal pain, n/v/d  : no dysuria  SKIN: no rashes  NEURO: no headache, lightheadedness, paresthesia  MSK: No joint pain, myalgia, weakness.

## 2022-09-24 NOTE — ED ADULT TRIAGE NOTE - GLASGOW COMA SCALE: SCORE, MLM
Continued Stay Note  [facility]     Patient Name: Todd Phillips  MRN: [id number]  Today's Date: 1/11/2019    Admit Date: 12/31/2018    Discharge Plan     Row Name 01/11/19 0959       Plan    Plan Comments  Goal is to transfer to [facility] when medically ready.        Discharge Codes    No documentation.       Expected Discharge Date and Time     Expected Discharge Date Expected Discharge Time    Reji 15, 2019             Lico Cadena RN     15

## 2022-09-24 NOTE — ED ADULT NURSE NOTE - NSIMPLEMENTINTERV_GEN_ALL_ED
Implemented All Fall Risk Interventions:  [place] to call system. Call bell, personal items and telephone within reach. Instruct patient to call for assistance. Room bathroom lighting operational. Non-slip footwear when patient is off stretcher. Physically safe environment: no spills, clutter or unnecessary equipment. Stretcher in lowest position, wheels locked, appropriate side rails in place. Provide visual cue, wrist band, yellow gown, etc. Monitor gait and stability. Monitor for mental status changes and reorient to person, place, and time. Review medications for side effects contributing to fall risk. Reinforce activity limits and safety measures with patient and family.

## 2022-09-24 NOTE — ED ADULT NURSE NOTE - OBJECTIVE STATEMENT
89 yo F pt PMhx of Bronchiectasis, TB, AFib on Eliquis p/w feeling Cold and "generally unwell" with low grade temp prior to arrival as per daughter at bedside unable to provide recorded temp. pt has tried nothing for relief. pt ambulates with a walker at home and has a recent R sided Fibula fx distal.  pt is A&Ox4, MAEW, PERRL, LS CTA, abd soft non tender, no peripheral edema, pt afebrile rectally, skin warm dry intact, PMS intact, no unilateral weakness.  Pt denies headache, dizziness, chest pain, palpitations, cough, SOB, abdominal pain, n/v/d, urinary symptoms, weakness at this time.

## 2022-09-24 NOTE — ED PROVIDER NOTE - PATIENT PORTAL LINK FT
You can access the FollowMyHealth Patient Portal offered by [facility] by registering at the following website: [email or web address]. By joining [facility]’s FollowMyHealth portal, you will also be able to view your health information using other applications (apps) compatible with our system.

## 2022-09-24 NOTE — ED PROVIDER NOTE - PHYSICAL EXAMINATION
Shirley Siegel MD  GENERAL: Patient awake alert NAD.  HEENT: NC/AT, Moist mucous membranes, EOMI.  LUNGS: CTAB, no wheezes or crackles.   CARDIAC: irregularly irregular, no m/r/g.    ABDOMEN: Soft, NT, ND, No rebound, guarding. No CVA tenderness.   EXT: No edema. No calf tenderness.   MSK: No pain with movement, no deformities.  NEURO: A&Ox3. Moving all extremities.  SKIN: Warm and dry. No rash.  PSYCH: Normal affect.
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
## 2022-09-24 NOTE — ED PROVIDER NOTE - ATTENDING CONTRIBUTION TO CARE
Attending MD Gentile: I personally have seen and examined this patient.  Resident note reviewed and agree on plan of care and except where noted.  See below for details.     Seen in [facility]    89F with PMH/PSH including AFib on Eliquis, Amiodarone, history of pulmonary and abdominal TB s/p resection, recurrent partial SBO, bronchiectasis complicated by recurrent PNA presents to the ED with one days of chills.  Reports febrile at home, unknown Tmax.  Reports took Tylenol this morning but onne since then, afebrile here.  Denies URI symptoms including cough, congestion, sore throat.  Denies chest pain, shortness of breath.  Denies abdominal pain, nausea, vomiting, diarrhea, bloody or black stools. Denies dysuria, hematuria, frequency.  Reports feels well otherwise.    Exam:   General: NAD, well appearing  HENT: head NCAT, airway patent   Eyes: no conjunctival injection, anicteric   Lungs: lungs CTAB with good inspiratory effort, no wheezing, no rhonchi, no rales  Cardiac: +S1S2, no obvious m/r/g, irregularly irregular  GI: abdomen soft with +BS, NT, ND, well healed abdominal incision  : no CVAT  MSK: FROM at neck, no calf tenderness, swelling, erythema or warmth   Neuro: moving all extremities spontaneously, sensory grossly intact, no gross neuro deficits  Psych: normal mood and affect     A/P: 89F with fevers at home, unclear etiology, will proceed with infectious work up to try to elucidate source, will obtain CXR given previous history, will obtain UA/UrCx, will send labs for metabolic derangement or clear infectious signs, will swab for RVP given prevalence

## 2022-09-24 NOTE — ED PROVIDER NOTE - NSFOLLOWUPINSTRUCTIONS_ED_ALL_ED_FT
You were seen in the ED for chills.    Your blood work, chest xray, and urine here were reassuring and did not show a cause for your symptoms.    You may have a viral infection.    For pain and fevers, please take 650mg Tylenol every 6 hours as needed.    Your blood pressure was elevated today. follow up with your pcp about this.    Please follow up with your primary care doctor in 2-3 days. Return to the ED if you experience any worsening or new symptoms or any symptoms that concern you, including fevers, chills, shortness of breath, chest pain.
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
## 2022-09-26 LAB
CULTURE RESULTS: SIGNIFICANT CHANGE UP
SPECIMEN SOURCE: SIGNIFICANT CHANGE UP

## 2022-10-01 ENCOUNTER — INPATIENT (INPATIENT)
Facility: HOSPITAL | Age: 87
LOS: 1 days | Discharge: HOME | DRG: 552 | End: 2022-10-03
Attending: STUDENT IN AN ORGANIZED HEALTH CARE EDUCATION/TRAINING PROGRAM | Admitting: STUDENT IN AN ORGANIZED HEALTH CARE EDUCATION/TRAINING PROGRAM
Payer: MEDICARE

## 2022-10-01 VITALS
HEIGHT: 66 IN | SYSTOLIC BLOOD PRESSURE: 141 MMHG | RESPIRATION RATE: 18 BRPM | TEMPERATURE: 98 F | WEIGHT: 134.92 LBS | DIASTOLIC BLOOD PRESSURE: 79 MMHG | HEART RATE: 78 BPM | OXYGEN SATURATION: 94 %

## 2022-10-01 DIAGNOSIS — S82.409A UNSPECIFIED FRACTURE OF SHAFT OF UNSPECIFIED FIBULA, INITIAL ENCOUNTER FOR CLOSED FRACTURE: ICD-10-CM
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
## 2022-10-01 DIAGNOSIS — Z98.89 OTHER SPECIFIED POSTPROCEDURAL STATES: Chronic | ICD-10-CM

## 2022-10-01 DIAGNOSIS — I10 ESSENTIAL (PRIMARY) HYPERTENSION: ICD-10-CM

## 2022-10-01 DIAGNOSIS — R26.2 DIFFICULTY IN WALKING, NOT ELSEWHERE CLASSIFIED: ICD-10-CM

## 2022-10-01 DIAGNOSIS — Z94.7 CORNEAL TRANSPLANT STATUS: ICD-10-CM

## 2022-10-01 DIAGNOSIS — I48.20 CHRONIC ATRIAL FIBRILLATION, UNSPECIFIED: ICD-10-CM

## 2022-10-01 DIAGNOSIS — E03.9 HYPOTHYROIDISM, UNSPECIFIED: ICD-10-CM

## 2022-10-01 LAB
ALBUMIN SERPL ELPH-MCNC: 4.2 G/DL — SIGNIFICANT CHANGE UP (ref 3.3–5)
ALP SERPL-CCNC: 73 U/L — SIGNIFICANT CHANGE UP (ref 40–120)
ALT FLD-CCNC: 11 U/L — SIGNIFICANT CHANGE UP (ref 10–45)
ANION GAP SERPL CALC-SCNC: 12 MMOL/L — SIGNIFICANT CHANGE UP (ref 5–17)
AST SERPL-CCNC: 16 U/L — SIGNIFICANT CHANGE UP (ref 10–40)
BASOPHILS # BLD AUTO: 0 K/UL — SIGNIFICANT CHANGE UP (ref 0–0.2)
BASOPHILS NFR BLD AUTO: 0 % — SIGNIFICANT CHANGE UP (ref 0–2)
BILIRUB SERPL-MCNC: 0.9 MG/DL — SIGNIFICANT CHANGE UP (ref 0.2–1.2)
BUN SERPL-MCNC: 12 MG/DL — SIGNIFICANT CHANGE UP (ref 7–23)
CALCIUM SERPL-MCNC: 9.1 MG/DL — SIGNIFICANT CHANGE UP (ref 8.4–10.5)
CHLORIDE SERPL-SCNC: 105 MMOL/L — SIGNIFICANT CHANGE UP (ref 96–108)
CK SERPL-CCNC: 47 U/L — SIGNIFICANT CHANGE UP (ref 25–170)
CO2 SERPL-SCNC: 23 MMOL/L — SIGNIFICANT CHANGE UP (ref 22–31)
CREAT SERPL-MCNC: 0.6 MG/DL — SIGNIFICANT CHANGE UP (ref 0.5–1.3)
EGFR: 82 ML/MIN/1.73M2 — SIGNIFICANT CHANGE UP
ELLIPTOCYTES BLD QL SMEAR: SLIGHT — SIGNIFICANT CHANGE UP
EOSINOPHIL # BLD AUTO: 0.05 K/UL — SIGNIFICANT CHANGE UP (ref 0–0.5)
EOSINOPHIL NFR BLD AUTO: 1.8 % — SIGNIFICANT CHANGE UP (ref 0–6)
FLUAV AG NPH QL: SIGNIFICANT CHANGE UP
FLUBV AG NPH QL: SIGNIFICANT CHANGE UP
GLUCOSE SERPL-MCNC: 89 MG/DL — SIGNIFICANT CHANGE UP (ref 70–99)
HCT VFR BLD CALC: 36.9 % — SIGNIFICANT CHANGE UP (ref 34.5–45)
HGB BLD-MCNC: 11.3 G/DL — LOW (ref 11.5–15.5)
LIDOCAIN IGE QN: 23 U/L — SIGNIFICANT CHANGE UP (ref 7–60)
LYMPHOCYTES # BLD AUTO: 0.5 K/UL — LOW (ref 1–3.3)
LYMPHOCYTES # BLD AUTO: 19.3 % — SIGNIFICANT CHANGE UP (ref 13–44)
MANUAL SMEAR VERIFICATION: SIGNIFICANT CHANGE UP
MCHC RBC-ENTMCNC: 30.6 GM/DL — LOW (ref 32–36)
MCHC RBC-ENTMCNC: 30.9 PG — SIGNIFICANT CHANGE UP (ref 27–34)
MCV RBC AUTO: 100.8 FL — HIGH (ref 80–100)
MONOCYTES # BLD AUTO: 0.07 K/UL — SIGNIFICANT CHANGE UP (ref 0–0.9)
MONOCYTES NFR BLD AUTO: 2.6 % — SIGNIFICANT CHANGE UP (ref 2–14)
NEUTROPHILS # BLD AUTO: 1.99 K/UL — SIGNIFICANT CHANGE UP (ref 1.8–7.4)
NEUTROPHILS NFR BLD AUTO: 76.3 % — SIGNIFICANT CHANGE UP (ref 43–77)
PLAT MORPH BLD: NORMAL — SIGNIFICANT CHANGE UP
PLATELET # BLD AUTO: 98 K/UL — LOW (ref 150–400)
POIKILOCYTOSIS BLD QL AUTO: SLIGHT — SIGNIFICANT CHANGE UP
POLYCHROMASIA BLD QL SMEAR: SLIGHT — SIGNIFICANT CHANGE UP
POTASSIUM SERPL-MCNC: 4.4 MMOL/L — SIGNIFICANT CHANGE UP (ref 3.5–5.3)
POTASSIUM SERPL-SCNC: 4.4 MMOL/L — SIGNIFICANT CHANGE UP (ref 3.5–5.3)
PROT SERPL-MCNC: 7 G/DL — SIGNIFICANT CHANGE UP (ref 6–8.3)
RBC # BLD: 3.66 M/UL — LOW (ref 3.8–5.2)
RBC # FLD: 13.6 % — SIGNIFICANT CHANGE UP (ref 10.3–14.5)
RBC BLD AUTO: ABNORMAL
RSV RNA NPH QL NAA+NON-PROBE: SIGNIFICANT CHANGE UP
SARS-COV-2 RNA SPEC QL NAA+PROBE: SIGNIFICANT CHANGE UP
SODIUM SERPL-SCNC: 140 MMOL/L — SIGNIFICANT CHANGE UP (ref 135–145)
WBC # BLD: 2.61 K/UL — LOW (ref 3.8–10.5)
WBC # FLD AUTO: 2.61 K/UL — LOW (ref 3.8–10.5)

## 2022-10-01 PROCEDURE — 71045 X-RAY EXAM CHEST 1 VIEW: CPT | Mod: 26

## 2022-10-01 PROCEDURE — 73552 X-RAY EXAM OF FEMUR 2/>: CPT | Mod: 26,RT

## 2022-10-01 PROCEDURE — 99285 EMERGENCY DEPT VISIT HI MDM: CPT

## 2022-10-01 PROCEDURE — 73564 X-RAY EXAM KNEE 4 OR MORE: CPT | Mod: 26,LT

## 2022-10-01 PROCEDURE — 73502 X-RAY EXAM HIP UNI 2-3 VIEWS: CPT | Mod: 26,RT

## 2022-10-01 PROCEDURE — 73080 X-RAY EXAM OF ELBOW: CPT | Mod: 26,RT

## 2022-10-01 PROCEDURE — 76377 3D RENDER W/INTRP POSTPROCES: CPT | Mod: 26

## 2022-10-01 PROCEDURE — 99223 1ST HOSP IP/OBS HIGH 75: CPT

## 2022-10-01 PROCEDURE — 73590 X-RAY EXAM OF LOWER LEG: CPT | Mod: 26,LT

## 2022-10-01 PROCEDURE — 72131 CT LUMBAR SPINE W/O DYE: CPT | Mod: 26,MD

## 2022-10-01 PROCEDURE — 73610 X-RAY EXAM OF ANKLE: CPT | Mod: 26,LT

## 2022-10-01 PROCEDURE — 72192 CT PELVIS W/O DYE: CPT | Mod: 26,MA

## 2022-10-01 RX ORDER — APIXABAN 2.5 MG/1
2.5 TABLET, FILM COATED ORAL
Refills: 0 | Status: DISCONTINUED | OUTPATIENT
Start: 2022-10-01 | End: 2022-10-03

## 2022-10-01 RX ORDER — LANOLIN ALCOHOL/MO/W.PET/CERES
3 CREAM (GRAM) TOPICAL AT BEDTIME
Refills: 0 | Status: DISCONTINUED | OUTPATIENT
Start: 2022-10-01 | End: 2022-10-03

## 2022-10-01 RX ORDER — SODIUM CHLORIDE 9 MG/ML
500 INJECTION INTRAMUSCULAR; INTRAVENOUS; SUBCUTANEOUS ONCE
Refills: 0 | Status: COMPLETED | OUTPATIENT
Start: 2022-10-01 | End: 2022-10-01

## 2022-10-01 RX ORDER — ALBUTEROL 90 UG/1
2 AEROSOL, METERED ORAL EVERY 6 HOURS
Refills: 0 | Status: DISCONTINUED | OUTPATIENT
Start: 2022-10-01 | End: 2022-10-03

## 2022-10-01 RX ORDER — ACETAMINOPHEN 500 MG
1000 TABLET ORAL ONCE
Refills: 0 | Status: COMPLETED | OUTPATIENT
Start: 2022-10-01 | End: 2022-10-01

## 2022-10-01 RX ORDER — ONDANSETRON 8 MG/1
4 TABLET, FILM COATED ORAL EVERY 8 HOURS
Refills: 0 | Status: DISCONTINUED | OUTPATIENT
Start: 2022-10-01 | End: 2022-10-03

## 2022-10-01 RX ORDER — PANTOPRAZOLE SODIUM 20 MG/1
40 TABLET, DELAYED RELEASE ORAL
Refills: 0 | Status: DISCONTINUED | OUTPATIENT
Start: 2022-10-01 | End: 2022-10-03

## 2022-10-01 RX ORDER — TRAMADOL HYDROCHLORIDE 50 MG/1
25 TABLET ORAL THREE TIMES A DAY
Refills: 0 | Status: DISCONTINUED | OUTPATIENT
Start: 2022-10-01 | End: 2022-10-03

## 2022-10-01 RX ORDER — ACETAMINOPHEN 500 MG
650 TABLET ORAL EVERY 6 HOURS
Refills: 0 | Status: DISCONTINUED | OUTPATIENT
Start: 2022-10-01 | End: 2022-10-03

## 2022-10-01 RX ORDER — LEVOTHYROXINE SODIUM 125 MCG
50 TABLET ORAL DAILY
Refills: 0 | Status: DISCONTINUED | OUTPATIENT
Start: 2022-10-01 | End: 2022-10-03

## 2022-10-01 RX ORDER — ACETAMINOPHEN 500 MG
650 TABLET ORAL EVERY 6 HOURS
Refills: 0 | Status: DISCONTINUED | OUTPATIENT
Start: 2022-10-01 | End: 2022-10-01

## 2022-10-01 RX ADMIN — APIXABAN 2.5 MILLIGRAM(S): 2.5 TABLET, FILM COATED ORAL at 17:10

## 2022-10-01 RX ADMIN — Medication 650 MILLIGRAM(S): at 17:14

## 2022-10-01 RX ADMIN — Medication 1000 MILLIGRAM(S): at 13:00

## 2022-10-01 RX ADMIN — SODIUM CHLORIDE 500 MILLILITER(S): 9 INJECTION INTRAMUSCULAR; INTRAVENOUS; SUBCUTANEOUS at 12:34

## 2022-10-01 RX ADMIN — TRAMADOL HYDROCHLORIDE 25 MILLIGRAM(S): 50 TABLET ORAL at 21:09

## 2022-10-01 RX ADMIN — SODIUM CHLORIDE 500 MILLILITER(S): 9 INJECTION INTRAMUSCULAR; INTRAVENOUS; SUBCUTANEOUS at 13:00

## 2022-10-01 RX ADMIN — Medication 400 MILLIGRAM(S): at 12:33

## 2022-10-01 RX ADMIN — Medication 650 MILLIGRAM(S): at 17:10

## 2022-10-01 NOTE — H&P ADULT - PROBLEM SELECTOR PLAN 3
noted as OP by ortho- pt to be in boot  - dtr to bring in boot tomorrow 10/2  - fall precautions until boot and PT eval

## 2022-10-01 NOTE — H&P ADULT - PROBLEM SELECTOR PLAN 4
pt on the following eyedrops- will need to be sent to pharmacy for verification  - prednisolone 1% 1 drop L eye QID  - Bromfenac 0.09% 1 drop L eye daily  - Artifical tears, restasis both eyes QID  - Avenova 1 drop both eyes BID  - hypertonic saline 2%

## 2022-10-01 NOTE — H&P ADULT - PROBLEM SELECTOR PLAN 1
mechanical fall in kitchen, landed on buttocks and back, now with severe back pain and inability to ambulate  - CT lumbosacral and pelvis without obvious fracture however unable to fully rule out due to bone demineralization  - rec MRI L/S spine  - pain control with tylenol and tramadol ATC- may transition to PRN once pain controlled, monitor closely and hold for oversedation; patient may refuse medication  - eventual PT evaluation

## 2022-10-01 NOTE — ED PROVIDER NOTE - NS ED ROS FT
CONST: no fevers, no chills  EYES: no vision changes  ENT: no sore throat  CV: no chest pain, no leg swelling  RESP: no shortness of breath, no cough  ABD: no abdominal pain, no nausea, no vomiting, no diarrhea  : no dysuria, no flank pain, no hematuria  MSK: no back pain, +extremity pain  NEURO: no headache or additional neurologic complaints  SKIN:  +skin tear

## 2022-10-01 NOTE — H&P ADULT - PROBLEM SELECTOR PLAN 6
rate controlled at present  - cont eliquis 2.5mg po bid    dispo: pending boot, MRI, pain control and PT eval  plan discussed with and approved by pt and daughter at bedside.

## 2022-10-01 NOTE — ED PROVIDER NOTE - PROGRESS NOTE DETAILS
Primitivo AMBROSE (PGY-3)  when attempting to ambulate pt, she was not able to. states she has midline low back pain. will order ct lumbosacral and reassess Primitivo AMBROSE (PGY-3)  CT lumbosacral read pending. [facility]. pt' and daughter at bedside are amenable for admission

## 2022-10-01 NOTE — ED ADULT NURSE NOTE - OBJECTIVE STATEMENT
1035 89 yr old WF brought to ER via ambulance on stretcher for further eval and tx. s/p fell in her kitchen last night, "Was walking with walker and fell, landing on buttocks"  No LOC. Recent left fibula fx last wk. Was not wearing boot that was prescribed by  Pt on eliquis for hx of Afib. Did not hit her head when she fell. Denies chest pain, dzziness, palp, SOB. was on floor all night because unable to get up. Crawled to phone this morning to call EMS. YATES x 4. skin tear right elbow. c/o right lower back pain. A&Ox4. MAEx4. dr morgan pt. Fall risk precautions maintained

## 2022-10-01 NOTE — H&P ADULT - ASSESSMENT
89f afib on eliquis, prior abd tb s/p partial bowel resection, recent L fibular fracture with nonop management with boot, corneal transplant 2 weeks ago presenting from home s/p mechanical fall in kitchen now with difficulty ambulating

## 2022-10-01 NOTE — ED PROVIDER NOTE - CLINICAL SUMMARY MEDICAL DECISION MAKING FREE TEXT BOX
89F PMH AF on Amiodarone/Eliquis, h/o Pulm and Abd TB s/p resection with a Chronic partial SBO, bronchiectasis c/b multiple pneumonias p/w b/l buttock pain after mechanical fall while walking w/ walker. VSS in ED. Head ncat, lungs ctab, mild epigastric ttp, TTP of b/l buttocks  Will eval for traumatic sequelae w/ xr's; no need for CNS imaging as pt did not have head trauma or LOC. Will also eval lipase for possible pancreatic injury from fall. Also eval for rhabdo due to prolonged down time  Plan: labs, xrs of extremities, reassess smyptoms

## 2022-10-01 NOTE — H&P ADULT - NSHPLABSRESULTS_GEN_ALL_CORE
.  LABS:                         11.3   2.61  )-----------( 98       ( 01 Oct 2022 11:32 )             36.9     10-01    140  |  105  |  12  ----------------------------<  89  4.4   |  23  |  0.60    Ca    9.1      01 Oct 2022 11:32    TPro  7.0  /  Alb  4.2  /  TBili  0.9  /  DBili  x   /  AST  16  /  ALT  11  /  AlkPhos  73  10-01              RADIOLOGY, EKG & ADDITIONAL TESTS: Reviewed.

## 2022-10-01 NOTE — ED PROVIDER NOTE - PHYSICAL EXAMINATION
Physical Exam:  Gen: awake alert   Head: NCAT  HEENT: EOMI, normal conjunctiva, oral mucosa moist  Lung: CTAB, no respiratory distress, no wheezes/rhonchi/rales B/L, speaking in full sentences  CV: RRR  Abd: soft, mild epigastric ttp, ND, no guarding, no rigidity, no rebound tenderness  MSK: no visible deformities, no spinal tenderness , TTP of b/l buttocks  Skin: Warm, well perfused, small skin tear to R elbow, no leg swelling  ~Sukhdeep Langford MD (PGY-3)

## 2022-10-01 NOTE — ED ADULT NURSE NOTE - CAS TRG GENERAL NORM CIRC DET
Procedure(s):  RIGHT BREAST CAPSULECTOMY, BILATERAL MASTOPEXY. general, total IV anesthesia    Anesthesia Post Evaluation        Patient participation: complete - patient participated  Level of consciousness: awake  Pain management: adequate  Airway patency: patent  Anesthetic complications: no  Cardiovascular status: hemodynamically stable  Respiratory status: acceptable  Hydration status: acceptable  Comments: The patient is ready for PACU discharge. [address], DO                   Post anesthesia nausea and vomiting:  controlled      INITIAL Post-op Vital signs:   Vitals Value Taken Time   /72 10/29/21 1200   Temp 36.4 °C (97.6 °F) 10/29/21 1033   Pulse 84 10/29/21 1205   Resp 21 10/29/21 1205   SpO2 94 % 10/29/21 1205   Vitals shown include unvalidated device data.
Strong peripheral pulses/Capillary refill less/equal to 2 seconds

## 2022-10-01 NOTE — ED PROVIDER NOTE - ATTENDING CONTRIBUTION TO CARE
Attending (Helder Polanco D.O.):  I have personally seen and examined this patient. I have performed a substantive portion of the visit including all aspects of the medical decision making. Resident, fellow, and/or ACP note reviewed. I agree on the plan of care except where noted.    89F PMH AF on Amiodarone and Eliquis, h/o Pulm and Abd TB s/p resection with a Chronic partial SBO, bronchiectasis c/b multiple pneumonias here via EMS after being found on floor, complaining of gluteal pain. Patient normally ambulates w/ walker, wearing LLE boot 2/2 left fibular fx dx by ortho 1.5wks ago. Was standing in kitchen, states legs gave out, fell onto her gluteal region. Denies hitting head. Crawled to bedroom, unable to get into bed so slept on floor. - loc, + AC. Hemdynamically stable here. ABCs intact. GCS 15. Nuerovasc intact. No midline spinal tenderness. Stable chest wall and pelvis. + right hip tenderness, Left ankle and fibular tenderness w/ left ankle swelling. + scant rales at lung bases, R>L. Eval for fx. No present concern for ICH. No signs of rib fracture. Eval for traumatic rhabdo, harish from dehydration given on floor throughout night. Check labs, Xrays, analgesia.

## 2022-10-01 NOTE — H&P ADULT - HISTORY OF PRESENT ILLNESS
89f afib on eliquis, prior abd tb s/p partial bowel resection, recent L fibular fracture with nonop management with boot, corneal transplant 2 weeks ago presenting from home s/p mechanical fall in kitchen, was not using her walker as it doesn't fit well in the kitchen. Denies LOC/syncope or preceding CP/palpitations, fall onto her buttocks and back, no head strike. At present she notes pain is 3/10, but could not comfortably bear weight or ambulate when ED staff tried to walk her. Left her boot at home. No fevers/chills, no abd pain/n/v/d, no recent sick contacts or travel.

## 2022-10-01 NOTE — ED PROVIDER NOTE - OBJECTIVE STATEMENT
89F PMH AF on Amiodarone and Eliquis, h/o Pulm and Abd TB s/p resection with a Chronic partial SBO, bronchiectasis c/b multiple pneumonias p/w b/l buttock pain after 89F PMH AF on Amiodarone/Eliquis, h/o Pulm and Abd TB s/p resection with a Chronic partial SBO, bronchiectasis c/b multiple pneumonias p/w b/l buttock pain after mechanical fall while walking w/ walker. States she fell straight onto her buttocks but did not have head trauma/LOC. Complaining of b/l buttock pain at this time; she was not able to stand up and was on the ground all night. States she had L fibular fx that her ortho told her about outpt; she was supposed to wear a boot but she di dnot wear it. No vision changes, chest pain, SOB, midline back pain, abd pain

## 2022-10-02 LAB
ALBUMIN SERPL ELPH-MCNC: 3.8 G/DL — SIGNIFICANT CHANGE UP (ref 3.3–5)
ALP SERPL-CCNC: 70 U/L — SIGNIFICANT CHANGE UP (ref 40–120)
ALT FLD-CCNC: 10 U/L — SIGNIFICANT CHANGE UP (ref 10–45)
ANION GAP SERPL CALC-SCNC: 11 MMOL/L — SIGNIFICANT CHANGE UP (ref 5–17)
ANISOCYTOSIS BLD QL: SLIGHT — SIGNIFICANT CHANGE UP
AST SERPL-CCNC: 13 U/L — SIGNIFICANT CHANGE UP (ref 10–40)
BASOPHILS # BLD AUTO: 0 K/UL — SIGNIFICANT CHANGE UP (ref 0–0.2)
BASOPHILS NFR BLD AUTO: 0 % — SIGNIFICANT CHANGE UP (ref 0–2)
BILIRUB SERPL-MCNC: 0.7 MG/DL — SIGNIFICANT CHANGE UP (ref 0.2–1.2)
BUN SERPL-MCNC: 8 MG/DL — SIGNIFICANT CHANGE UP (ref 7–23)
CALCIUM SERPL-MCNC: 8.8 MG/DL — SIGNIFICANT CHANGE UP (ref 8.4–10.5)
CHLORIDE SERPL-SCNC: 104 MMOL/L — SIGNIFICANT CHANGE UP (ref 96–108)
CO2 SERPL-SCNC: 23 MMOL/L — SIGNIFICANT CHANGE UP (ref 22–31)
CREAT SERPL-MCNC: 0.6 MG/DL — SIGNIFICANT CHANGE UP (ref 0.5–1.3)
DACRYOCYTES BLD QL SMEAR: SLIGHT — SIGNIFICANT CHANGE UP
EGFR: 82 ML/MIN/1.73M2 — SIGNIFICANT CHANGE UP
EOSINOPHIL # BLD AUTO: 0.06 K/UL — SIGNIFICANT CHANGE UP (ref 0–0.5)
EOSINOPHIL NFR BLD AUTO: 2.6 % — SIGNIFICANT CHANGE UP (ref 0–6)
GLUCOSE SERPL-MCNC: 81 MG/DL — SIGNIFICANT CHANGE UP (ref 70–99)
HCT VFR BLD CALC: 35.1 % — SIGNIFICANT CHANGE UP (ref 34.5–45)
HGB BLD-MCNC: 11 G/DL — LOW (ref 11.5–15.5)
LYMPHOCYTES # BLD AUTO: 0.29 K/UL — LOW (ref 1–3.3)
LYMPHOCYTES # BLD AUTO: 12.3 % — LOW (ref 13–44)
MANUAL SMEAR VERIFICATION: SIGNIFICANT CHANGE UP
MCHC RBC-ENTMCNC: 31.3 GM/DL — LOW (ref 32–36)
MCHC RBC-ENTMCNC: 32.1 PG — SIGNIFICANT CHANGE UP (ref 27–34)
MCV RBC AUTO: 102.3 FL — HIGH (ref 80–100)
MONOCYTES # BLD AUTO: 0.13 K/UL — SIGNIFICANT CHANGE UP (ref 0–0.9)
MONOCYTES NFR BLD AUTO: 5.3 % — SIGNIFICANT CHANGE UP (ref 2–14)
NEUTROPHILS # BLD AUTO: 1.89 K/UL — SIGNIFICANT CHANGE UP (ref 1.8–7.4)
NEUTROPHILS NFR BLD AUTO: 78.9 % — HIGH (ref 43–77)
OVALOCYTES BLD QL SMEAR: SLIGHT — SIGNIFICANT CHANGE UP
PLAT MORPH BLD: NORMAL — SIGNIFICANT CHANGE UP
PLATELET # BLD AUTO: 93 K/UL — LOW (ref 150–400)
POIKILOCYTOSIS BLD QL AUTO: SLIGHT — SIGNIFICANT CHANGE UP
POTASSIUM SERPL-MCNC: 4.8 MMOL/L — SIGNIFICANT CHANGE UP (ref 3.5–5.3)
POTASSIUM SERPL-SCNC: 4.8 MMOL/L — SIGNIFICANT CHANGE UP (ref 3.5–5.3)
PROT SERPL-MCNC: 6.5 G/DL — SIGNIFICANT CHANGE UP (ref 6–8.3)
RBC # BLD: 3.43 M/UL — LOW (ref 3.8–5.2)
RBC # FLD: 13.8 % — SIGNIFICANT CHANGE UP (ref 10.3–14.5)
RBC BLD AUTO: ABNORMAL
SODIUM SERPL-SCNC: 138 MMOL/L — SIGNIFICANT CHANGE UP (ref 135–145)
TSH SERPL-MCNC: 18.4 UIU/ML — HIGH (ref 0.27–4.2)
VARIANT LYMPHS # BLD: 0.9 % — SIGNIFICANT CHANGE UP (ref 0–6)
WBC # BLD: 2.39 K/UL — LOW (ref 3.8–10.5)
WBC # FLD AUTO: 2.39 K/UL — LOW (ref 3.8–10.5)

## 2022-10-02 PROCEDURE — 72148 MRI LUMBAR SPINE W/O DYE: CPT | Mod: 26

## 2022-10-02 RX ADMIN — TRAMADOL HYDROCHLORIDE 25 MILLIGRAM(S): 50 TABLET ORAL at 07:00

## 2022-10-02 RX ADMIN — PANTOPRAZOLE SODIUM 40 MILLIGRAM(S): 20 TABLET, DELAYED RELEASE ORAL at 06:07

## 2022-10-02 RX ADMIN — TRAMADOL HYDROCHLORIDE 25 MILLIGRAM(S): 50 TABLET ORAL at 13:11

## 2022-10-02 RX ADMIN — APIXABAN 2.5 MILLIGRAM(S): 2.5 TABLET, FILM COATED ORAL at 06:06

## 2022-10-02 RX ADMIN — Medication 50 MICROGRAM(S): at 06:06

## 2022-10-02 RX ADMIN — TRAMADOL HYDROCHLORIDE 25 MILLIGRAM(S): 50 TABLET ORAL at 06:07

## 2022-10-02 RX ADMIN — Medication 650 MILLIGRAM(S): at 06:06

## 2022-10-02 RX ADMIN — Medication 650 MILLIGRAM(S): at 23:17

## 2022-10-02 RX ADMIN — Medication 650 MILLIGRAM(S): at 07:00

## 2022-10-02 RX ADMIN — APIXABAN 2.5 MILLIGRAM(S): 2.5 TABLET, FILM COATED ORAL at 17:40

## 2022-10-02 RX ADMIN — Medication 650 MILLIGRAM(S): at 13:11

## 2022-10-02 RX ADMIN — TRAMADOL HYDROCHLORIDE 25 MILLIGRAM(S): 50 TABLET ORAL at 21:44

## 2022-10-02 RX ADMIN — Medication 650 MILLIGRAM(S): at 17:39

## 2022-10-02 NOTE — PHYSICAL THERAPY INITIAL EVALUATION ADULT - PERTINENT HX OF CURRENT PROBLEM, REHAB EVAL
89 y/oF admitted 10/1 s/p mechanical fall in kitchen, landed on buttocks and back. Recent L fibular fracture with nonop management with boot, pt not using her walker at time of fall as it doesn't fit well in the kitchen.  CT lumbosacral and pelvis without obvious fracture however unable to fully rule out due to bone demineralization. As per H&P, recommend MRI L/S spine. PMH  prior abd tb s/p partial bowel resection, corneal transplant 2 weeks ago 89 y/oF admitted 10/1 s/p mechanical fall in kitchen, landed on buttocks and back. Recent L fibular fracture with nonop management with boot, pt not using her walker at time of fall as it doesn't fit well in the kitchen.  CT lumbosacral and pelvis without obvious fracture however unable to fully rule out due to bone demineralization. As per H&P, recommend MRI L/S spine. PMH  prior abd tb s/p partial bowel resection, corneal transplant 2 weeks ago. 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 As per MD Pulido, nothing is acute on MRI.

## 2022-10-02 NOTE — PHYSICAL THERAPY INITIAL EVALUATION ADULT - ADDITIONAL COMMENTS
Daughter at bedside provided history. Pt lives alone in apartment with elevator. Has HHA 8 hrs/day. Ambulates with rolling walker in apartment, does not leave apartment without assist. Has commode at bedside for nighttime use. Also has shower seat, grab bars, raised toilet seat. Recently had Home PT for 8-10 visits, before recent fall/fibular fracture. As per daughter, pt fell in kitchen, walker does not fit in [employer] kitchen, pt holds on to counters when is there. Daughter at bedside provided history. Pt lives alone in apartment with elevator. Has HHA 8 hrs/day. Ambulates with rolling walker in apartment, does not leave apartment without assist. Has commode at bedside for nighttime use. Also has shower seat, grab bars, raised toilet seat. Recently had Home PT for 8-10 visits, before recent fall/fibular fracture. As per daughter, pt fell in kitchen, walker does not fit in [employer] kitchen, pt holds on to counters when is there. 10/3- As per phone conversation with daughter, will increased pt's HHA hours to include dinner meal.

## 2022-10-02 NOTE — PROGRESS NOTE ADULT - ASSESSMENT
89f afib on eliquis, prior abd tb s/p partial bowel resection, recent L fibular fracture with nonop management with boot, corneal transplant 2 weeks ago presenting from home s/p mechanical fall in kitchen now with difficulty ambulating.    # Accident due to mechanical fall without injury  mechanical fall in kitchen, landed on buttocks and back, now with severe back pain and inability to ambulate  CT lumbosacral and pelvis without obvious fracture however unable to fully rule out due to bone demineralization  rec MRI L/S spine  pain control with tylenol and tramadol ATC- may transition to PRN once pain controlled, monitor closely and hold for oversedation; patient may refuse medication  PT evaluation after MRI    # Hypothyroidism  cont synthroid 50mcg po daily  check TSH    # Closed fibular fracture  noted as OP by ortho - pt to be in boot  dtr to bring in boot tomorrow 10/2  fall precautions until boot and PT eval    # Post corneal transplant  pt on the following eyedrops - will need to be sent to pharmacy for verification  prednisolone 1% 1 drop L eye QID  Bromfenac 0.09% 1 drop L eye daily  Artifical tears, restasis both eyes QID  Avenova 1 drop both eyes BID  hypertonic saline 2%    # Hypertension  hold antihypertensives unless pressures climb    # Chronic atrial fibrillation  rate controlled at present  cont eliquis 2.5mg po bid    dispo: pending boot, MRI, pain control and PT eval    [facility] with questions [phone]. 89f afib on eliquis, prior abd tb s/p partial bowel resection, recent L fibular fracture with nonop management with boot, corneal transplant 2 weeks ago presenting from home s/p mechanical fall in kitchen now with difficulty ambulating.    # Accident due to mechanical fall without injury  mechanical fall in kitchen, landed on buttocks and back, now with severe back pain and inability to ambulate  CT lumbosacral and pelvis no fx, herniated disc L4/L5  MRI L/S spine pending  pain control with tylenol and tramadol ATC- may transition to PRN once pain controlled, monitor closely and hold for oversedation; patient may refuse medication  PT evaluation after MRI    # Hypothyroidism  cont synthroid 50mcg po daily  check TSH    # Closed fibular fracture  noted as OP by ortho - pt to be in boot  dtr to bring in boot tomorrow 10/2  fall precautions until boot and PT eval    # Post corneal transplant  pt on the following eyedrops - will need to be sent to pharmacy for verification  prednisolone 1% 1 drop L eye QID  Bromfenac 0.09% 1 drop L eye daily  Artifical tears, restasis both eyes QID  Avenova 1 drop both eyes BID  hypertonic saline 2%    # Hypertension  hold antihypertensives unless pressures climb    # Chronic atrial fibrillation  rate controlled at present  cont eliquis 2.5mg po bid    dispo: pending boot, MRI, pain control and PT eval    Please call [facility] with questions [phone].

## 2022-10-02 NOTE — PHYSICAL THERAPY INITIAL EVALUATION ADULT - NSPTDISCHREC_GEN_A_CORE
TBD when functional assessment is completed assist for functional activities. As per conversation with pt's daughter, will increased pt's HHA hours to include dinner meal/Home PT

## 2022-10-02 NOTE — ED ADULT NURSE REASSESSMENT NOTE - NS ED NURSE REASSESS COMMENT FT1
Report received from ANTHONY Ruvalcaba. Pt received A&Ox3, IV intact and patent, VSS, pt denies pain/discomfort, bed locked and in lowest position, safety and comfort measures maintained, pending bed assignment
As per family member, home meds lists are the following:   Levothyroxine 0.05mg before breakfast   Eliquis 2.5mg one time in the morning   Sertraline 50mg tablets 2 tablets   Amiodarone 200mg twice a day  Eye drops:   Prednisoline left eye 4 times a day / Avenova Both eyes 2 times per day  Bromfenac left eye once a day  Tears both eyes 4 times a day

## 2022-10-02 NOTE — PHYSICAL THERAPY INITIAL EVALUATION ADULT - GENERAL OBSERVATIONS, REHAB EVAL
Received pt on stretcher in ED. Received pt on stretcher in ED. /70, HR 55, O2 sats 94% on room air. LLE cam boot at bedside.

## 2022-10-03 ENCOUNTER — TRANSCRIPTION ENCOUNTER (OUTPATIENT)
Age: 87
End: 2022-10-03

## 2022-10-03 VITALS
HEART RATE: 83 BPM | OXYGEN SATURATION: 98 % | SYSTOLIC BLOOD PRESSURE: 123 MMHG | DIASTOLIC BLOOD PRESSURE: 76 MMHG | RESPIRATION RATE: 18 BRPM | TEMPERATURE: 99 F

## 2022-10-03 RX ORDER — APIXABAN 2.5 MG/1
1 TABLET, FILM COATED ORAL
Qty: 0 | Refills: 0 | DISCHARGE

## 2022-10-03 RX ORDER — LEVOTHYROXINE SODIUM 125 MCG
1 TABLET ORAL
Qty: 0 | Refills: 0 | DISCHARGE

## 2022-10-03 RX ORDER — LEVOTHYROXINE SODIUM 125 MCG
1 TABLET ORAL
Qty: 30 | Refills: 0
Start: 2022-10-03 | End: 2022-11-01

## 2022-10-03 RX ORDER — LEVOTHYROXINE SODIUM 125 MCG
75 TABLET ORAL DAILY
Refills: 0 | Status: DISCONTINUED | OUTPATIENT
Start: 2022-10-03 | End: 2022-10-03

## 2022-10-03 RX ORDER — TRAMADOL HYDROCHLORIDE 50 MG/1
0.5 TABLET ORAL
Qty: 9 | Refills: 0
Start: 2022-10-03 | End: 2022-10-08

## 2022-10-03 RX ORDER — ALBUTEROL 90 UG/1
2 AEROSOL, METERED ORAL
Qty: 0 | Refills: 0 | DISCHARGE
Start: 2022-10-03

## 2022-10-03 RX ORDER — ALBUTEROL 90 UG/1
2 AEROSOL, METERED ORAL
Qty: 0 | Refills: 0 | DISCHARGE

## 2022-10-03 RX ORDER — ACETAMINOPHEN 500 MG
650 TABLET ORAL EVERY 6 HOURS
Refills: 0 | Status: DISCONTINUED | OUTPATIENT
Start: 2022-10-03 | End: 2022-10-03

## 2022-10-03 RX ORDER — PANTOPRAZOLE SODIUM 20 MG/1
1 TABLET, DELAYED RELEASE ORAL
Qty: 0 | Refills: 0 | DISCHARGE
Start: 2022-10-03

## 2022-10-03 RX ORDER — ACETAMINOPHEN 500 MG
2 TABLET ORAL
Qty: 0 | Refills: 0 | DISCHARGE
Start: 2022-10-03

## 2022-10-03 RX ORDER — LIDOCAINE 4 G/100G
1 CREAM TOPICAL
Qty: 14 | Refills: 0
Start: 2022-10-03 | End: 2022-10-16

## 2022-10-03 RX ORDER — PANTOPRAZOLE SODIUM 20 MG/1
1 TABLET, DELAYED RELEASE ORAL
Qty: 0 | Refills: 0 | DISCHARGE

## 2022-10-03 RX ORDER — LIDOCAINE 4 G/100G
1 CREAM TOPICAL EVERY 24 HOURS
Refills: 0 | Status: DISCONTINUED | OUTPATIENT
Start: 2022-10-03 | End: 2022-10-03

## 2022-10-03 RX ORDER — APIXABAN 2.5 MG/1
1 TABLET, FILM COATED ORAL
Qty: 0 | Refills: 0 | DISCHARGE
Start: 2022-10-03

## 2022-10-03 RX ADMIN — APIXABAN 2.5 MILLIGRAM(S): 2.5 TABLET, FILM COATED ORAL at 05:56

## 2022-10-03 RX ADMIN — Medication 50 MICROGRAM(S): at 05:56

## 2022-10-03 RX ADMIN — Medication 650 MILLIGRAM(S): at 05:55

## 2022-10-03 RX ADMIN — Medication 75 MICROGRAM(S): at 08:27

## 2022-10-03 RX ADMIN — TRAMADOL HYDROCHLORIDE 25 MILLIGRAM(S): 50 TABLET ORAL at 05:55

## 2022-10-03 RX ADMIN — Medication 650 MILLIGRAM(S): at 13:57

## 2022-10-03 RX ADMIN — PANTOPRAZOLE SODIUM 40 MILLIGRAM(S): 20 TABLET, DELAYED RELEASE ORAL at 05:55

## 2022-10-03 RX ADMIN — TRAMADOL HYDROCHLORIDE 25 MILLIGRAM(S): 50 TABLET ORAL at 13:57

## 2022-10-03 NOTE — DISCHARGE NOTE NURSING/CASE MANAGEMENT/SOCIAL WORK - NSDCPEELIQUISDIET_GEN_ALL_CORE
Eat healthy foods you enjoy. Apixaban/Eliquis DOES NOT have a special diet. Limit your alcohol intake. (0) swallows foods and liquids w/o difficulty

## 2022-10-03 NOTE — DISCHARGE NOTE PROVIDER - CARE PROVIDER_API CALL
Goldberg, Steven M (MD)  Cardiovascular Disease; Internal Medicine  [address] [place] [address]  Phone: [phone]  Fax: [phone]  Follow Up Time: 1 week

## 2022-10-03 NOTE — DISCHARGE NOTE PROVIDER - NSDCMRMEDTOKEN_GEN_ALL_CORE_FT
acetaminophen 325 mg oral tablet: 2 tab(s) orally every 6 hours  albuterol 90 mcg/inh inhalation aerosol: 2 puff(s) inhaled every 6 hours, As needed, Bronchospasm  aluminum hydroxide-magnesium hydroxide 200 mg-200 mg/5 mL oral suspension: 30 milliliter(s) orally every 4 hours, As needed, Dyspepsia  apixaban 2.5 mg oral tablet: 1 tab(s) orally 2 times a day  bromfenac 0.09% ophthalmic solution: 1 drop(s) to each affected eye once a day  levothyroxine 75 mcg (0.075 mg) oral tablet: 1 tab(s) orally once a day  lidocaine 4% topical film: Apply topically to affected area once a day   pantoprazole 40 mg oral delayed release tablet: 1 tab(s) orally once a day (before a meal)  prednisoLONE acetate 1% ophthalmic suspension: 1 drop(s) to each affected eye 4 times a day  Restasis 0.05% ophthalmic emulsion: 1 drop(s) to each affected eye every 12 hours  sodium chloride, hypertonic 2% ophthalmic solution: 1 drop(s) to each affected eye 4 times a day

## 2022-10-03 NOTE — DISCHARGE NOTE NURSING/CASE MANAGEMENT/SOCIAL WORK - NSDCPEFALRISK_GEN_ALL_CORE
For information on Fall & Injury Prevention, visit: [email or web address] OR  [email or web address] OR  https://www.cdc.gov/steadi/patient.html

## 2022-10-03 NOTE — PROGRESS NOTE ADULT - ASSESSMENT
89f afib on eliquis, prior abd tb s/p partial bowel resection, recent L fibular fracture with nonop management with boot, corneal transplant 2 weeks ago presenting from home s/p mechanical fall in kitchen now with difficulty ambulating.    # mechanical fall without injury  # L4/5 herniated disc, spinal stenosis  mechanical fall in kitchen, landed on buttocks and back, now with severe back pain and inability to ambulate  CT lumbosacral and pelvis no fx, herniated disc L4/L5  MRI L/S spine L1-L2 discogenic sclerosis and edema. Fluid within the disc space,  CT similar to CT 10/23/2020. These features are felt likely to be degenerative. Widespread lumbar degenerative disc disease results in only low-grade central canal compromise and in high-grade foraminal compromise, greatest to the left at L4-L5, see above  pain control with tylenol and tramadol ATC for 3 days, after transition to PRN  PT eval    # Hypothyroidism  TSH 18, increase synthroid 88mcg, outpt repeat TFT 4 wkns    # Closed fibular fracture  noted as OP by ortho - pt to be in boot  dtr to bring in boot tomorrow 10/2  fall precautions until boot and PT eval    # Post corneal transplant  pt on the following eyedrops - will need to be sent to pharmacy for verification  prednisolone 1% 1 drop L eye QID  Bromfenac 0.09% 1 drop L eye daily  Artifical tears, restasis both eyes QID  Avenova 1 drop both eyes BID  hypertonic saline 2%    # Hypertension  give advanced age, will allow higher BP    # Chronic atrial fibrillation  rate controlled at present  cont eliquis 2.5mg po bid    dispo: PT shaheed morgan planning    Please call [facility] with questions [phone].

## 2022-10-03 NOTE — DISCHARGE NOTE NURSING/CASE MANAGEMENT/SOCIAL WORK - PATIENT PORTAL LINK FT
You can access the FollowMyHealth Patient Portal offered by [facility] by registering at the following website: [email or web address]. By joining [employer]’s FollowMyHealth portal, you will also be able to view your health information using other applications (apps) compatible with our system.

## 2022-10-03 NOTE — PROGRESS NOTE ADULT - SUBJECTIVE AND OBJECTIVE BOX
Patient is a 89y old  Female who presents with a chief complaint of s/p fall (02 Oct 2022 07:49)      SUBJECTIVE / OVERNIGHT EVENTS:    Patient seen and examined.  no cp sob. states back pain stable.       Vital Signs Last 24 Hrs  T(C): 36.7 (03 Oct 2022 00:46), Max: 36.7 (03 Oct 2022 00:46)  T(F): 98 (03 Oct 2022 00:46), Max: 98 (03 Oct 2022 00:46)  HR: 55 (03 Oct 2022 00:46) (55 - 56)  BP: 124/62 (03 Oct 2022 00:46) (124/62 - 137/66)  BP(mean): --  RR: 18 (03 Oct 2022 00:46) (18 - 18)  SpO2: 95% (03 Oct 2022 00:46) (95% - 95%)    Parameters below as of 03 Oct 2022 00:46  Patient On (Oxygen Delivery Method): room air      I&O's Summary      PE:  GENERAL: NAD, AAOx2-3  CHEST/LUNG: CTABL, No wheeze  HEART: Regular rate and rhythm; no murmur  ABDOMEN: Soft, Nontender, Nondistended; Bowel sounds present  EXTREMITIES:  2+ Peripheral Pulses, No edema  NEURO: No focal deficits    LABS:                        11.0   2.39  )-----------( 93       ( 02 Oct 2022 10:02 )             35.1     10-02    138  |  104  |  8   ----------------------------<  81  4.8   |  23  |  0.60    Ca    8.8      02 Oct 2022 10:02    TPro  6.5  /  Alb  3.8  /  TBili  0.7  /  DBili  x   /  AST  13  /  ALT  10  /  AlkPhos  70  10-02      CAPILLARY BLOOD GLUCOSE        CARDIAC MARKERS ( 01 Oct 2022 11:32 )  x     / x     / 47 U/L / x     / x              RADIOLOGY & ADDITIONAL TESTS:    Imaging Personally Reviewed:  [x] YES  [ ] NO    Consultant(s) Notes Reviewed:  [x] YES  [ ] NO    MEDICATIONS  (STANDING):  acetaminophen     Tablet .. 650 milliGRAM(s) Oral every 6 hours  apixaban 2.5 milliGRAM(s) Oral two times a day  levothyroxine 75 MICROGram(s) Oral daily  lidocaine   4% Patch 1 Patch Transdermal every 24 hours  pantoprazole    Tablet 40 milliGRAM(s) Oral before breakfast  traMADol 25 milliGRAM(s) Oral three times a day    MEDICATIONS  (PRN):  ALBUTerol    90 MICROgram(s) HFA Inhaler 2 Puff(s) Inhalation every 6 hours PRN Bronchospasm  aluminum hydroxide/magnesium hydroxide/simethicone Suspension 30 milliLiter(s) Oral every 4 hours PRN Dyspepsia  melatonin 3 milliGRAM(s) Oral at bedtime PRN Insomnia  ondansetron Injectable 4 milliGRAM(s) IV Push every 8 hours PRN Nausea and/or Vomiting      Care Discussed with Consultants/Other Providers [x] YES  [ ] NO    HEALTH ISSUES - PROBLEM Dx:  Accident due to mechanical fall without injury    Hypothyroidism    Closed fibular fracture    Post corneal transplant    Hypertension    Chronic atrial fibrillation        
Patient is a 89y old  Female who presents with a chief complaint of s/p fall (02 Oct 2022 07:49)      SUBJECTIVE / OVERNIGHT EVENTS:    Patient seen and examined. no complaints. denies pain anywhere.      Vital Signs Last 24 Hrs  T(C): 36.7 (02 Oct 2022 05:18), Max: 36.7 (02 Oct 2022 00:40)  T(F): 98 (02 Oct 2022 05:18), Max: 98 (02 Oct 2022 00:40)  HR: 55 (02 Oct 2022 05:18) (55 - 59)  BP: 154/66 (02 Oct 2022 05:18) (140/75 - 183/76)  BP(mean): 98 (01 Oct 2022 16:18) (98 - 98)  RR: 18 (02 Oct 2022 05:18) (16 - 18)  SpO2: 97% (02 Oct 2022 05:18) (94% - 100%)    Parameters below as of 02 Oct 2022 05:18  Patient On (Oxygen Delivery Method): room air      I&O's Summary      PE:  GENERAL: NAD, AAOx3  CHEST/LUNG: CTABL, No wheeze  HEART: Regular rate and rhythm; no murmur  ABDOMEN: Soft, Nontender, Nondistended; Bowel sounds present  EXTREMITIES:  2+ Peripheral Pulses, No edema  NEURO: No focal deficits    LABS:                        11.0   2.39  )-----------( 93       ( 02 Oct 2022 10:02 )             35.1     10-02    138  |  104  |  8   ----------------------------<  81  4.8   |  23  |  0.60    Ca    8.8      02 Oct 2022 10:02    TPro  6.5  /  Alb  3.8  /  TBili  0.7  /  DBili  x   /  AST  13  /  ALT  10  /  AlkPhos  70  10-02      CAPILLARY BLOOD GLUCOSE        CARDIAC MARKERS ( 01 Oct 2022 11:32 )  x     / x     / 47 U/L / x     / x              RADIOLOGY & ADDITIONAL TESTS:    Imaging Personally Reviewed:  [x] YES  [ ] NO    Consultant(s) Notes Reviewed:  [x] YES  [ ] NO    MEDICATIONS  (STANDING):  acetaminophen     Tablet .. 650 milliGRAM(s) Oral every 6 hours  apixaban 2.5 milliGRAM(s) Oral two times a day  levothyroxine 50 MICROGram(s) Oral daily  pantoprazole    Tablet 40 milliGRAM(s) Oral before breakfast  traMADol 25 milliGRAM(s) Oral three times a day    MEDICATIONS  (PRN):  ALBUTerol    90 MICROgram(s) HFA Inhaler 2 Puff(s) Inhalation every 6 hours PRN Bronchospasm  aluminum hydroxide/magnesium hydroxide/simethicone Suspension 30 milliLiter(s) Oral every 4 hours PRN Dyspepsia  melatonin 3 milliGRAM(s) Oral at bedtime PRN Insomnia  ondansetron Injectable 4 milliGRAM(s) IV Push every 8 hours PRN Nausea and/or Vomiting      Care Discussed with Consultants/Other Providers [x] YES  [ ] NO    HEALTH ISSUES - PROBLEM Dx:  Accident due to mechanical fall without injury    Hypothyroidism    Closed fibular fracture    Post corneal transplant    Hypertension    Chronic atrial fibrillation

## 2022-10-03 NOTE — DISCHARGE NOTE PROVIDER - HOSPITAL COURSE
89f afib on eliquis, prior abd tb s/p partial bowel resection, recent L fibular fracture with nonop management with boot, corneal transplant 2 weeks ago presenting from home s/p mechanical fall in kitchen now with difficulty ambulating    --  Fall - landed on buttocks and back, now with severe back pain and inability to ambulate, CT lumbosacral and pelvis without obvious fracture however unable to fully rule out due to bone demineralization, MRI L/S spine: No acute changes  --  Closed fibular fracture  was present prior to admission.  Patient wears a boot to ambulate.  --  Hypertension - monitored during admission  --  TSH was high, >18, Levothyroxine dose increased to 75mcg daily    Patient stable for discharge as per discussion with Dr. Andrew on 10/3/22. 89f afib on eliquis, prior abd tb s/p partial bowel resection, recent L fibular fracture with nonop management with boot, corneal transplant 2 weeks ago presenting from home s/p mechanical fall in kitchen now with difficulty ambulating    --  Fall - landed on buttocks and back, now with severe back pain and inability to ambulate, CT lumbosacral and pelvis without obvious fracture however unable to fully rule out due to bone demineralization, MRI L/S spine L1-L2 discogenic sclerosis and edema. Fluid within the disc space,  CT similar to CT 10/23/2020. These features are felt likely to be degenerative. Widespread lumbar degenerative disc disease results in only low-grade central canal compromise and in high-grade foraminal compromise, greatest to the left at L4-L5, see above  --  Closed fibular fracture  was present prior to admission.  Patient wears a boot to ambulate.  --  Hypertension - monitored during admission  --  TSH was high, >18, Levothyroxine dose increased to 75mcg daily  -- given advanced age, medical management, outpt fu, PT eval    Patient stable for discharge as per discussion with Dr. Andrew on 10/3/22.

## 2022-10-03 NOTE — DISCHARGE NOTE PROVIDER - NSDCCPCAREPLAN_GEN_ALL_CORE_FT
PRINCIPAL DISCHARGE DIAGNOSIS  Diagnosis: Accident due to mechanical fall without injury  Assessment and Plan of Treatment: Be very careful walking and moving around.  Be sure to have something to hold onto at all times.  Seek urgent medical care if you have sudden pain in your back, weakness, pain or numbness sown your legs.  Take tylenol for pain as needed and as prescribed.      SECONDARY DISCHARGE DIAGNOSES  Diagnosis: Hypothyroidism  Assessment and Plan of Treatment: Your Levothyroxine dose was increased. Take the  new dose as perscribed.  Follow up with your Primary Care provider for continued managment.    Diagnosis: Closed fibular fracture  Assessment and Plan of Treatment: Continue to wear the boot on your lower leg for support and walk as directed by your provider.  Seek urgent medical care if you have pain, numbness or weakness in your lower leg or foot/toes or swelling.

## 2022-10-20 PROCEDURE — 97116 GAIT TRAINING THERAPY: CPT

## 2022-10-20 PROCEDURE — 80053 COMPREHEN METABOLIC PANEL: CPT

## 2022-10-20 PROCEDURE — 97161 PT EVAL LOW COMPLEX 20 MIN: CPT

## 2022-10-20 PROCEDURE — 82550 ASSAY OF CK (CPK): CPT

## 2022-10-20 PROCEDURE — 73610 X-RAY EXAM OF ANKLE: CPT

## 2022-10-20 PROCEDURE — 87637 SARSCOV2&INF A&B&RSV AMP PRB: CPT

## 2022-10-20 PROCEDURE — 71045 X-RAY EXAM CHEST 1 VIEW: CPT

## 2022-10-20 PROCEDURE — 73552 X-RAY EXAM OF FEMUR 2/>: CPT

## 2022-10-20 PROCEDURE — 72192 CT PELVIS W/O DYE: CPT | Mod: MA

## 2022-10-20 PROCEDURE — 72131 CT LUMBAR SPINE W/O DYE: CPT | Mod: MD

## 2022-10-20 PROCEDURE — 73590 X-RAY EXAM OF LOWER LEG: CPT

## 2022-10-20 PROCEDURE — 73080 X-RAY EXAM OF ELBOW: CPT

## 2022-10-20 PROCEDURE — 83690 ASSAY OF LIPASE: CPT

## 2022-10-20 PROCEDURE — 73502 X-RAY EXAM HIP UNI 2-3 VIEWS: CPT

## 2022-10-20 PROCEDURE — 84443 ASSAY THYROID STIM HORMONE: CPT

## 2022-10-20 PROCEDURE — 85025 COMPLETE CBC W/AUTO DIFF WBC: CPT

## 2022-10-20 PROCEDURE — 76377 3D RENDER W/INTRP POSTPROCES: CPT

## 2022-10-20 PROCEDURE — 72148 MRI LUMBAR SPINE W/O DYE: CPT

## 2022-10-20 PROCEDURE — 96374 THER/PROPH/DIAG INJ IV PUSH: CPT

## 2022-10-20 PROCEDURE — 73564 X-RAY EXAM KNEE 4 OR MORE: CPT

## 2022-10-20 PROCEDURE — 99285 EMERGENCY DEPT VISIT HI MDM: CPT

## 2022-11-06 ENCOUNTER — EMERGENCY (EMERGENCY)
Facility: HOSPITAL | Age: 87
LOS: 1 days | Discharge: HOME | End: 2022-11-06
Attending: EMERGENCY MEDICINE
Payer: MEDICARE

## 2022-11-06 VITALS
SYSTOLIC BLOOD PRESSURE: 136 MMHG | HEART RATE: 58 BPM | WEIGHT: 134.92 LBS | OXYGEN SATURATION: 97 % | RESPIRATION RATE: 18 BRPM | TEMPERATURE: 98 F | DIASTOLIC BLOOD PRESSURE: 86 MMHG | HEIGHT: 66 IN

## 2022-11-06 DIAGNOSIS — Z98.89 OTHER SPECIFIED POSTPROCEDURAL STATES: Chronic | ICD-10-CM

## 2022-11-06 LAB
ALBUMIN SERPL ELPH-MCNC: 4.2 G/DL — SIGNIFICANT CHANGE UP (ref 3.3–5)
ALP SERPL-CCNC: 91 U/L — SIGNIFICANT CHANGE UP (ref 40–120)
ALT FLD-CCNC: 13 U/L — SIGNIFICANT CHANGE UP (ref 10–45)
ANION GAP SERPL CALC-SCNC: 9 MMOL/L — SIGNIFICANT CHANGE UP (ref 5–17)
AST SERPL-CCNC: 17 U/L — SIGNIFICANT CHANGE UP (ref 10–40)
BASOPHILS # BLD AUTO: 0.01 K/UL — SIGNIFICANT CHANGE UP (ref 0–0.2)
BASOPHILS NFR BLD AUTO: 0.3 % — SIGNIFICANT CHANGE UP (ref 0–2)
BILIRUB SERPL-MCNC: 0.6 MG/DL — SIGNIFICANT CHANGE UP (ref 0.2–1.2)
BUN SERPL-MCNC: 20 MG/DL — SIGNIFICANT CHANGE UP (ref 7–23)
CALCIUM SERPL-MCNC: 9.2 MG/DL — SIGNIFICANT CHANGE UP (ref 8.4–10.5)
CHLORIDE SERPL-SCNC: 105 MMOL/L — SIGNIFICANT CHANGE UP (ref 96–108)
CO2 SERPL-SCNC: 26 MMOL/L — SIGNIFICANT CHANGE UP (ref 22–31)
CREAT SERPL-MCNC: 0.71 MG/DL — SIGNIFICANT CHANGE UP (ref 0.5–1.3)
EGFR: 77 ML/MIN/1.73M2 — SIGNIFICANT CHANGE UP
EOSINOPHIL # BLD AUTO: 0.03 K/UL — SIGNIFICANT CHANGE UP (ref 0–0.5)
EOSINOPHIL NFR BLD AUTO: 0.8 % — SIGNIFICANT CHANGE UP (ref 0–6)
GLUCOSE SERPL-MCNC: 126 MG/DL — HIGH (ref 70–99)
HCT VFR BLD CALC: 35.5 % — SIGNIFICANT CHANGE UP (ref 34.5–45)
HGB BLD-MCNC: 11.2 G/DL — LOW (ref 11.5–15.5)
IMM GRANULOCYTES NFR BLD AUTO: 0.6 % — SIGNIFICANT CHANGE UP (ref 0–0.9)
LYMPHOCYTES # BLD AUTO: 0.69 K/UL — LOW (ref 1–3.3)
LYMPHOCYTES # BLD AUTO: 19.5 % — SIGNIFICANT CHANGE UP (ref 13–44)
MAGNESIUM SERPL-MCNC: 2.2 MG/DL — SIGNIFICANT CHANGE UP (ref 1.6–2.6)
MCHC RBC-ENTMCNC: 31.5 GM/DL — LOW (ref 32–36)
MCHC RBC-ENTMCNC: 32 PG — SIGNIFICANT CHANGE UP (ref 27–34)
MCV RBC AUTO: 101.4 FL — HIGH (ref 80–100)
MONOCYTES # BLD AUTO: 0.21 K/UL — SIGNIFICANT CHANGE UP (ref 0–0.9)
MONOCYTES NFR BLD AUTO: 5.9 % — SIGNIFICANT CHANGE UP (ref 2–14)
NEUTROPHILS # BLD AUTO: 2.58 K/UL — SIGNIFICANT CHANGE UP (ref 1.8–7.4)
NEUTROPHILS NFR BLD AUTO: 72.9 % — SIGNIFICANT CHANGE UP (ref 43–77)
NRBC # BLD: 0 /100 WBCS — SIGNIFICANT CHANGE UP (ref 0–0)
NT-PROBNP SERPL-SCNC: 472 PG/ML — HIGH (ref 0–300)
PLATELET # BLD AUTO: 107 K/UL — LOW (ref 150–400)
POTASSIUM SERPL-MCNC: 4 MMOL/L — SIGNIFICANT CHANGE UP (ref 3.5–5.3)
POTASSIUM SERPL-SCNC: 4 MMOL/L — SIGNIFICANT CHANGE UP (ref 3.5–5.3)
PROT SERPL-MCNC: 7.1 G/DL — SIGNIFICANT CHANGE UP (ref 6–8.3)
RAPID RVP RESULT: SIGNIFICANT CHANGE UP
RBC # BLD: 3.5 M/UL — LOW (ref 3.8–5.2)
RBC # FLD: 13.6 % — SIGNIFICANT CHANGE UP (ref 10.3–14.5)
SARS-COV-2 RNA SPEC QL NAA+PROBE: SIGNIFICANT CHANGE UP
SODIUM SERPL-SCNC: 140 MMOL/L — SIGNIFICANT CHANGE UP (ref 135–145)
TROPONIN T, HIGH SENSITIVITY RESULT: 14 NG/L — SIGNIFICANT CHANGE UP (ref 0–51)
TSH SERPL-MCNC: 4.97 UIU/ML — HIGH (ref 0.27–4.2)
WBC # BLD: 3.54 K/UL — LOW (ref 3.8–10.5)
WBC # FLD AUTO: 3.54 K/UL — LOW (ref 3.8–10.5)

## 2022-11-06 PROCEDURE — 71275 CT ANGIOGRAPHY CHEST: CPT | Mod: 26,MA

## 2022-11-06 PROCEDURE — 99284 EMERGENCY DEPT VISIT MOD MDM: CPT | Mod: CS,GC

## 2022-11-06 PROCEDURE — 71046 X-RAY EXAM CHEST 2 VIEWS: CPT | Mod: 26

## 2022-11-06 RX ORDER — IPRATROPIUM/ALBUTEROL SULFATE 18-103MCG
3 AEROSOL WITH ADAPTER (GRAM) INHALATION ONCE
Refills: 0 | Status: COMPLETED | OUTPATIENT
Start: 2022-11-06 | End: 2022-11-06

## 2022-11-06 RX ADMIN — Medication 3 MILLILITER(S): at 21:14

## 2022-11-06 NOTE — ED PROVIDER NOTE - PHYSICAL EXAMINATION
Physical Exam:  Gen: NAD, AOx3, non-toxic appearing  Head: NCAT  HEENT: EOMI, PEERLA, normal conjunctiva, tongue midline, oral mucosa moist  Lung: CTAB, no respiratory distress, no wheezes/rhonchi/rales B/L, speaking in full sentences, stating 95 % on RA  CV: AFIB; not tachycardic   Abd: soft, NT, ND, no guarding, no rigidity, no rebound tenderness, no CVA tenderness   MSK: tibia (left) in boot s/p fracture 6 weeks ago. No other deformities   Neuro: No focal sensory or motor deficits  Skin: Warm, well perfused, no rash, no leg swelling  Psych: normal affect, calm

## 2022-11-06 NOTE — ED PROVIDER NOTE - NS ED ROS FT
CONSTITUTIONAL: No fevers, + chills, no lightheadedness, no dizziness  EYES: no visual changes, no eye pain  EARS: no ear drainage, no ear pain, no change in hearing  NOSE: no nasal congestion  MOUTH/THROAT: no sore throat  CV: No chest pain, no palpitations  RESP: + SOB, no cough  GI: + nausea, vomiting, no diarrhea, no abd pain  : no dysuria, no hematuria, no flank pain  MSK: no back pain, no extremity pain  SKIN: no rashes  NEURO: no headache, no focal weakness, no decreased sensation/parasthesias   PSYCHIATRIC: no known mental health issues

## 2022-11-06 NOTE — ED PROVIDER NOTE - PATIENT PORTAL LINK FT
You can access the FollowMyHealth Patient Portal offered by [facility] by registering at the following website: [email or web address]. By joining [employer]’s FollowMyHealth portal, you will also be able to view your health information using other applications (apps) compatible with our system.

## 2022-11-06 NOTE — ED PROVIDER NOTE - CLINICAL SUMMARY MEDICAL DECISION MAKING FREE TEXT BOX
Dr. Skaggs (Attending Physician)  Pt. with ho afib on apixaban, htn, TB, sbo pw shortness of breath, decreased air movement on exam but not wheezing or crackles, recent ortho fx will check labs, trop, probnp, CTA chest to eval for PE/TB and reassess. Patient presents to the ED with shortness of breath and nausea. Hemodynamically stable, afebrile, physical exam - no wheezes or crackles on exam but observed decreased air movement- will give duoneb tx and reassess. Patient hx + symptoms concerning for PE/Tb- will obtain CTA of chest for evaluation. Patients age and symptoms also warrant cardiac workup with CBC/CMP/Tropinin/Probnp. Pending labs + imaging for further dispo         Dr. Skaggs (Attending Physician)  Pt. with ho afib on apixaban, htn, TB, sbo pw shortness of breath, decreased air movement on exam but not wheezing or crackles, recent ortho fx will check labs, trop, probnp, CTA chest to eval for PE/TB and reassess.

## 2022-11-06 NOTE — ED ADULT NURSE NOTE - OBJECTIVE STATEMENT
89y F arrived to the Ed c/o diff breathing. Pt presents c/o diff breathing and SOB x4-5 days. Pt reports she has a hx of pneumonia in the past and is having similar symptoms. Pt endorses nausea and vomiting x1 at home. Pt denies Dizziness, HA, abd pain, weakness. Pt breathing spontaneous, nonlabored at present. Pt well appearing, no acute distress.

## 2022-11-06 NOTE — ED PROVIDER NOTE - RAPID ASSESSMENT
89 yo female pmhx HTN, hypothyroidism, afib on eliquis presents to the ED c/o difficulty breathing and nausea x 1 week. Of note had fractured tibia 6 weeks ago, no surgery, has been ambulatory w/ CAM walker boot. Denies chest pain, palpitations, vomiting, abd pain, recent travel, sick contacts, fever.    **Patient was rapidly assessed via telemedicine encounter by Joshua stallworth [place], PA-C. A limited history was obtained. The patient will be seen and further examined and worked up in the main ED and their care will be completed by the main ED team. Receiving team will follow up on labs, analgesia, any clinical imaging, and perform reassessment and disposition of the patient as clinically indicated. All decisions regarding the progression of care will be made at their discretion.

## 2022-11-06 NOTE — ED ADULT NURSE NOTE - NSIMPLEMENTINTERV_GEN_ALL_ED
Implemented All Fall with Harm Risk Interventions:  [place] to call system. Call bell, personal items and telephone within reach. Instruct patient to call for assistance. Room bathroom lighting operational. Non-slip footwear when patient is off stretcher. Physically safe environment: no spills, clutter or unnecessary equipment. Stretcher in lowest position, wheels locked, appropriate side rails in place. Provide visual cue, wrist band, yellow gown, etc. Monitor gait and stability. Monitor for mental status changes and reorient to person, place, and time. Review medications for side effects contributing to fall risk. Reinforce activity limits and safety measures with patient and family. Provide visual clues: red socks.

## 2022-11-06 NOTE — ED PROVIDER NOTE - ATTENDING CONTRIBUTION TO CARE
Dr. Skaggs (Attending Physician)  I performed a history and physical exam of the patient and discussed their management with the resident. I reviewed the resident's note and agree with the documented findings and plan of care. My medical decision making and observations are found above.

## 2022-11-06 NOTE — ED PROVIDER NOTE - OBJECTIVE STATEMENT
89 yo female pmhx HTN, hypothyroidism, afib on eliquis presents to the ED c/o difficulty breathing and nausea x 1 week. Patient also had tibia fx 6 weeks ago. Patient states she has been dealing with this shortness of breath for one week. It is not exertional in nature, no pain with inspiration, no fever, no cough, no chest pain, no palpitations. Patient also has hx of pulmonary tb. Patient denies any abd pain, urinary symptoms, diarrhea or constipation. Had one episode of emesis today which was nonbloody.

## 2022-11-06 NOTE — ED PROVIDER NOTE - NSFOLLOWUPINSTRUCTIONS_ED_ALL_ED_FT
Shortness of breath    Shortness of breath (dyspnea) means you have trouble breathing and could indicate a medical problem. Causes include lung disease, heart disease, low amount of red blood cells (anemia), poor physical fitness, being overweight, smoking, etc. Your health care provider today may not be able to find a cause for your shortness of breath after your exam. In this case, it is important to have a follow-up exam with your primary care physician as instructed. If medicines were prescribed, take them as directed for the full length of time directed. Refrain from tobacco products.    SEEK IMMEDIATE MEDICAL CARE IF YOU HAVE ANY OF THE FOLLOWING SYMPTOMS: worsening shortness of breath, chest pain, back pain, abdominal pain, fever, coughing up blood, lightheadedness/dizziness.      PLEASE FOLLOW UP WITH YOUR PCP   PLEASE FOLLOW UP WITH VASCULAR SURGERY   PLEASE FOLLOW UP WITH GENERAL SURGERY

## 2022-11-07 VITALS
TEMPERATURE: 98 F | RESPIRATION RATE: 16 BRPM | SYSTOLIC BLOOD PRESSURE: 170 MMHG | HEART RATE: 55 BPM | OXYGEN SATURATION: 96 % | DIASTOLIC BLOOD PRESSURE: 76 MMHG

## 2022-11-07 PROCEDURE — 93005 ELECTROCARDIOGRAM TRACING: CPT

## 2022-11-07 PROCEDURE — 84484 ASSAY OF TROPONIN QUANT: CPT

## 2022-11-07 PROCEDURE — 71046 X-RAY EXAM CHEST 2 VIEWS: CPT

## 2022-11-07 PROCEDURE — 84443 ASSAY THYROID STIM HORMONE: CPT

## 2022-11-07 PROCEDURE — 83880 ASSAY OF NATRIURETIC PEPTIDE: CPT

## 2022-11-07 PROCEDURE — 94640 AIRWAY INHALATION TREATMENT: CPT

## 2022-11-07 PROCEDURE — 99285 EMERGENCY DEPT VISIT HI MDM: CPT | Mod: 25

## 2022-11-07 PROCEDURE — 80053 COMPREHEN METABOLIC PANEL: CPT

## 2022-11-07 PROCEDURE — 85025 COMPLETE CBC W/AUTO DIFF WBC: CPT

## 2022-11-07 PROCEDURE — 36415 COLL VENOUS BLD VENIPUNCTURE: CPT

## 2022-11-07 PROCEDURE — 83735 ASSAY OF MAGNESIUM: CPT

## 2022-11-07 PROCEDURE — 71275 CT ANGIOGRAPHY CHEST: CPT | Mod: MA

## 2022-11-07 PROCEDURE — 0225U NFCT DS DNA&RNA 21 SARSCOV2: CPT

## 2022-11-07 RX ORDER — AZITHROMYCIN 500 MG/1
1 TABLET, FILM COATED ORAL
Qty: 6 | Refills: 0
Start: 2022-11-07 | End: 2022-11-11

## 2022-11-07 RX ORDER — CIPROFLOXACIN LACTATE 400MG/40ML
1 VIAL (ML) INTRAVENOUS
Qty: 14 | Refills: 0
Start: 2022-11-07 | End: 2022-11-13

## 2022-11-07 NOTE — ED ADULT NURSE REASSESSMENT NOTE - NS ED NURSE REASSESS COMMENT FT1
Patient d/c. Reviewed d/c paperwork with patients, all questions answered at this time. Patient verbalizes understanding. IV removed. Patient instructed to return to the ER for any worsening s/s including chest pain, SOB, fever, n/v/d. Patient alert and stable at time of d/c. Patient educated on dc medications.

## 2023-02-11 NOTE — PROGRESS NOTE ADULT - ATTENDING COMMENTS
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 Millard in 2018 treated with NGT then consult for Dr. Dae Murillo in 10/2020 treated conservatively without NGT) presents with 1 day of epigastric abdominal pain and brown emesis. admitted for SBO.    # SBO  # PAF  # HTN  # GGO on CT    holding amlodipine and amio and AC while NPO  NGT per surgery  leukopenia improving  urine cx ecoli, can cont ceftriaxone, fu speciation  no signs of pna, afebrile, no cough sputum    Please call [facility] with questions [phone]. 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 Millard in 2018 treated with NGT then consult for Dr. Dae Murillo in 10/2020 treated conservatively without NGT) presents with 1 day of epigastric abdominal pain and brown emesis. admitted for SBO.    # SBO  # PAF  # HTN  # GGO on CT    holding amlodipine and amio and AC while NPO  NGT per surgery  leukopenia improving  urine cx ecoli, can cont ceftriaxone, fu speciation, 5 day course abx  no signs of pna, afebrile, no cough sputum  GGO on CT chest, outpt fu with Dr. Goldberg for repeat CT    Please call [employer] with questions [phone]. Yes...

## 2023-02-21 ENCOUNTER — EMERGENCY (EMERGENCY)
Facility: HOSPITAL | Age: 88
LOS: 1 days | Discharge: HOME | End: 2023-02-21
Attending: EMERGENCY MEDICINE
Payer: MEDICARE

## 2023-02-21 VITALS
WEIGHT: 130.07 LBS | HEIGHT: 60 IN | HEART RATE: 65 BPM | DIASTOLIC BLOOD PRESSURE: 70 MMHG | OXYGEN SATURATION: 96 % | SYSTOLIC BLOOD PRESSURE: 108 MMHG | TEMPERATURE: 98 F | RESPIRATION RATE: 16 BRPM

## 2023-02-21 VITALS
TEMPERATURE: 98 F | SYSTOLIC BLOOD PRESSURE: 151 MMHG | HEART RATE: 61 BPM | DIASTOLIC BLOOD PRESSURE: 83 MMHG | OXYGEN SATURATION: 96 % | RESPIRATION RATE: 18 BRPM

## 2023-02-21 DIAGNOSIS — Z98.89 OTHER SPECIFIED POSTPROCEDURAL STATES: Chronic | ICD-10-CM

## 2023-02-21 LAB
ALBUMIN SERPL ELPH-MCNC: 4.2 G/DL — SIGNIFICANT CHANGE UP (ref 3.3–5)
ALP SERPL-CCNC: 73 U/L — SIGNIFICANT CHANGE UP (ref 40–120)
ALT FLD-CCNC: 20 U/L — SIGNIFICANT CHANGE UP (ref 10–45)
ANION GAP SERPL CALC-SCNC: 12 MMOL/L — SIGNIFICANT CHANGE UP (ref 5–17)
AST SERPL-CCNC: 24 U/L — SIGNIFICANT CHANGE UP (ref 10–40)
BASOPHILS # BLD AUTO: 0 K/UL — SIGNIFICANT CHANGE UP (ref 0–0.2)
BASOPHILS NFR BLD AUTO: 0 % — SIGNIFICANT CHANGE UP (ref 0–2)
BILIRUB SERPL-MCNC: 0.7 MG/DL — SIGNIFICANT CHANGE UP (ref 0.2–1.2)
BUN SERPL-MCNC: 12 MG/DL — SIGNIFICANT CHANGE UP (ref 7–23)
CALCIUM SERPL-MCNC: 9 MG/DL — SIGNIFICANT CHANGE UP (ref 8.4–10.5)
CHLORIDE SERPL-SCNC: 102 MMOL/L — SIGNIFICANT CHANGE UP (ref 96–108)
CO2 SERPL-SCNC: 26 MMOL/L — SIGNIFICANT CHANGE UP (ref 22–31)
CREAT SERPL-MCNC: 0.79 MG/DL — SIGNIFICANT CHANGE UP (ref 0.5–1.3)
EGFR: 68 ML/MIN/1.73M2 — SIGNIFICANT CHANGE UP
EOSINOPHIL # BLD AUTO: 0.05 K/UL — SIGNIFICANT CHANGE UP (ref 0–0.5)
EOSINOPHIL NFR BLD AUTO: 2 % — SIGNIFICANT CHANGE UP (ref 0–6)
GLUCOSE SERPL-MCNC: 89 MG/DL — SIGNIFICANT CHANGE UP (ref 70–99)
HCT VFR BLD CALC: 34.4 % — LOW (ref 34.5–45)
HGB BLD-MCNC: 11.1 G/DL — LOW (ref 11.5–15.5)
LYMPHOCYTES # BLD AUTO: 0.36 K/UL — LOW (ref 1–3.3)
LYMPHOCYTES # BLD AUTO: 14 % — SIGNIFICANT CHANGE UP (ref 13–44)
MCHC RBC-ENTMCNC: 32.3 GM/DL — SIGNIFICANT CHANGE UP (ref 32–36)
MCHC RBC-ENTMCNC: 32.8 PG — SIGNIFICANT CHANGE UP (ref 27–34)
MCV RBC AUTO: 101.8 FL — HIGH (ref 80–100)
MONOCYTES # BLD AUTO: 0.15 K/UL — SIGNIFICANT CHANGE UP (ref 0–0.9)
MONOCYTES NFR BLD AUTO: 6 % — SIGNIFICANT CHANGE UP (ref 2–14)
NEUTROPHILS # BLD AUTO: 1.98 K/UL — SIGNIFICANT CHANGE UP (ref 1.8–7.4)
NEUTROPHILS NFR BLD AUTO: 78 % — HIGH (ref 43–77)
PLATELET # BLD AUTO: 103 K/UL — LOW (ref 150–400)
POTASSIUM SERPL-MCNC: 4.1 MMOL/L — SIGNIFICANT CHANGE UP (ref 3.5–5.3)
POTASSIUM SERPL-SCNC: 4.1 MMOL/L — SIGNIFICANT CHANGE UP (ref 3.5–5.3)
PROT SERPL-MCNC: 6.9 G/DL — SIGNIFICANT CHANGE UP (ref 6–8.3)
RBC # BLD: 3.38 M/UL — LOW (ref 3.8–5.2)
RBC # FLD: 13.8 % — SIGNIFICANT CHANGE UP (ref 10.3–14.5)
SARS-COV-2 RNA SPEC QL NAA+PROBE: SIGNIFICANT CHANGE UP
SODIUM SERPL-SCNC: 140 MMOL/L — SIGNIFICANT CHANGE UP (ref 135–145)
TROPONIN T, HIGH SENSITIVITY RESULT: 16 NG/L — SIGNIFICANT CHANGE UP (ref 0–51)
WBC # BLD: 2.54 K/UL — LOW (ref 3.8–10.5)
WBC # FLD AUTO: 2.54 K/UL — LOW (ref 3.8–10.5)

## 2023-02-21 PROCEDURE — 99285 EMERGENCY DEPT VISIT HI MDM: CPT | Mod: FS

## 2023-02-21 PROCEDURE — 82962 GLUCOSE BLOOD TEST: CPT

## 2023-02-21 PROCEDURE — 84484 ASSAY OF TROPONIN QUANT: CPT

## 2023-02-21 PROCEDURE — 70450 CT HEAD/BRAIN W/O DYE: CPT | Mod: 26,MA

## 2023-02-21 PROCEDURE — 96374 THER/PROPH/DIAG INJ IV PUSH: CPT

## 2023-02-21 PROCEDURE — 72125 CT NECK SPINE W/O DYE: CPT | Mod: MA

## 2023-02-21 PROCEDURE — U0003: CPT

## 2023-02-21 PROCEDURE — 96375 TX/PRO/DX INJ NEW DRUG ADDON: CPT

## 2023-02-21 PROCEDURE — 70450 CT HEAD/BRAIN W/O DYE: CPT | Mod: MA

## 2023-02-21 PROCEDURE — 85025 COMPLETE CBC W/AUTO DIFF WBC: CPT

## 2023-02-21 PROCEDURE — 93005 ELECTROCARDIOGRAM TRACING: CPT

## 2023-02-21 PROCEDURE — U0005: CPT

## 2023-02-21 PROCEDURE — 36415 COLL VENOUS BLD VENIPUNCTURE: CPT

## 2023-02-21 PROCEDURE — 80053 COMPREHEN METABOLIC PANEL: CPT

## 2023-02-21 PROCEDURE — 99284 EMERGENCY DEPT VISIT MOD MDM: CPT | Mod: 25

## 2023-02-21 PROCEDURE — 72125 CT NECK SPINE W/O DYE: CPT | Mod: 26,MA

## 2023-02-21 RX ORDER — ACETAMINOPHEN 500 MG
1000 TABLET ORAL ONCE
Refills: 0 | Status: COMPLETED | OUTPATIENT
Start: 2023-02-21 | End: 2023-02-21

## 2023-02-21 RX ORDER — METOCLOPRAMIDE HCL 10 MG
10 TABLET ORAL ONCE
Refills: 0 | Status: COMPLETED | OUTPATIENT
Start: 2023-02-21 | End: 2023-02-21

## 2023-02-21 RX ADMIN — Medication 10 MILLIGRAM(S): at 14:56

## 2023-02-21 RX ADMIN — Medication 400 MILLIGRAM(S): at 14:56

## 2023-02-21 NOTE — ED ADULT NURSE NOTE - NSIMPLEMENTINTERV_GEN_ALL_ED
Implemented All Universal Safety Interventions:  [place] to call system. Call bell, personal items and telephone within reach. Instruct patient to call for assistance. Room bathroom lighting operational. Non-slip footwear when patient is off stretcher. Physically safe environment: no spills, clutter or unnecessary equipment. Stretcher in lowest position, wheels locked, appropriate side rails in place.

## 2023-02-21 NOTE — ED PROVIDER NOTE - PROGRESS NOTE DETAILS
I discussed with the patient the abnormal CT findings and that she needs to follow-up with Dr. Goldberg to continue the evaluation of her lung.  Patient understands and will follow-up.  She said that she had an appointment with him today but she had to cancel because she came here.  She is going to reschedule.  I told her that we would print the results from today so that she can bring it to him.  She noted that she had dark stools and she is following with Dr. Millan for that.  I advised her that her hemoglobin has been stable and that she should follow-up with Dr. Millan regarding the results of his testing.  I ambulated with the patient and she is able to ambulate with minimal assistance even though she usually uses a walker and she has an aide 9 hours a day.  Patient reiterated that the headache is the majority of her complaint and as such she would like headache medicine.  I ordered Reglan and Ofirmev. Head pressure improved s/p medications. Able to ambulate at her baseline in the ED. Stable for d/c. Lela De La Rosa PA-C

## 2023-02-21 NOTE — ED PROVIDER NOTE - OBJECTIVE STATEMENT
89 y/o female with PMHX of HTN, A. fib, hypothyroidism presents to the ED complaining of head pressure x 1 week. Patient states that about a week ago she had a mechanical trip and fall at home. She hit her head. She did not pass out. She did not seek medica l attention at this time. Since then she has had a head pressure every day. Has not taken any pain medication at home. Has been ambulating with walker since fall. This AM feels lightheaded. No recent illness, fever, chills, chest pain, cough, n/v/d.

## 2023-02-21 NOTE — ED PROVIDER NOTE - PHYSICAL EXAMINATION
CONSTITUTIONAL: Patient is awake, alert and oriented x 3. Patient is well appearing and in no acute distress.  HEAD: NCAT  EYES: PERRL bilaterally  ENT: Airway patent, Nasal mucosa clear.  NECK: Supple,   LUNGS: CTA B/L,   HEART: RRR.+S1S2   ABDOMEN: Soft, non-tender to palpation throughout all four quadrants,   MSK: No edema or calf tenderness b/l, FROM upper and lower ext b/l,   SKIN: No rash or lesions  NEURO:  No focal deficits,

## 2023-02-21 NOTE — ED PROVIDER NOTE - NSFOLLOWUPINSTRUCTIONS_ED_ALL_ED_FT
1. Follow up with your PCP within 2-3 days. Your CT scan revealed Left apical bronchiectasis, possible nodules and scarring. Nodularity may   be the increased compared to 11/6/2022. Please discuss this with your doctor.   2. Continue home medications.   3. If you have headaches you may take tylenol 650 mg every 6 hours as needed.   4. Return to the emergency department if you develop worsening pain, dizziness, weakness, inability to ambulate or any other concerning symptoms.

## 2023-02-21 NOTE — ED ADULT NURSE NOTE - OBJECTIVE STATEMENT
89 y/o female presents to ED c/o pain to the top of her head s/p fall x1 week ago. as per caregiver, pt has had 3 falls in one week, pt ambulates with walker at home, negative loc, negative blood thinners. pt denies SOB or chest pain, will continue to monitor.

## 2023-02-21 NOTE — ED PROVIDER NOTE - PATIENT PORTAL LINK FT
You can access the FollowMyHealth Patient Portal offered by [facility] by registering at the following website: [email or web address]. By joining [facility]’s FollowMyHealth portal, you will also be able to view your health information using other applications (apps) compatible with our system.

## 2023-02-21 NOTE — ED PROVIDER NOTE - ATTENDING APP SHARED VISIT CONTRIBUTION OF CARE
Given the persistent dizziness we will do EKG blood work and head CT and C-spine CT.  Concern for intracranial hemorrhage history of subdural hemorrhage or parenchymal bleeding.

## 2023-03-26 ENCOUNTER — INPATIENT (INPATIENT)
Facility: HOSPITAL | Age: 88
LOS: 3 days | Discharge: HOME HEALTH | DRG: 388 | End: 2023-03-30
Attending: TRANSPLANT SURGERY | Admitting: TRANSPLANT SURGERY
Payer: MEDICARE

## 2023-03-26 VITALS
RESPIRATION RATE: 20 BRPM | TEMPERATURE: 98 F | OXYGEN SATURATION: 95 % | HEIGHT: 60 IN | SYSTOLIC BLOOD PRESSURE: 147 MMHG | DIASTOLIC BLOOD PRESSURE: 77 MMHG | HEART RATE: 70 BPM | WEIGHT: 139.99 LBS

## 2023-03-26 DIAGNOSIS — K56.609 UNSPECIFIED INTESTINAL OBSTRUCTION, UNSPECIFIED AS TO PARTIAL VERSUS COMPLETE OBSTRUCTION: ICD-10-CM

## 2023-03-26 DIAGNOSIS — Z98.89 OTHER SPECIFIED POSTPROCEDURAL STATES: Chronic | ICD-10-CM

## 2023-03-26 LAB
ALBUMIN SERPL ELPH-MCNC: 3.6 G/DL — SIGNIFICANT CHANGE UP (ref 3.3–5)
ALP SERPL-CCNC: 49 U/L — SIGNIFICANT CHANGE UP (ref 40–120)
ALT FLD-CCNC: 18 U/L — SIGNIFICANT CHANGE UP (ref 10–45)
ANION GAP SERPL CALC-SCNC: 11 MMOL/L — SIGNIFICANT CHANGE UP (ref 5–17)
APPEARANCE UR: CLEAR — SIGNIFICANT CHANGE UP
APTT BLD: 25.2 SEC — LOW (ref 27.5–35.5)
AST SERPL-CCNC: 28 U/L — SIGNIFICANT CHANGE UP (ref 10–40)
BACTERIA # UR AUTO: NEGATIVE — SIGNIFICANT CHANGE UP
BASE EXCESS BLDV CALC-SCNC: 3.1 MMOL/L — HIGH (ref -2–3)
BASOPHILS # BLD AUTO: 0 K/UL — SIGNIFICANT CHANGE UP (ref 0–0.2)
BASOPHILS NFR BLD AUTO: 0 % — SIGNIFICANT CHANGE UP (ref 0–2)
BILIRUB SERPL-MCNC: 0.8 MG/DL — SIGNIFICANT CHANGE UP (ref 0.2–1.2)
BILIRUB UR-MCNC: NEGATIVE — SIGNIFICANT CHANGE UP
BUN SERPL-MCNC: 11 MG/DL — SIGNIFICANT CHANGE UP (ref 7–23)
CA-I SERPL-SCNC: 1.12 MMOL/L — LOW (ref 1.15–1.33)
CALCIUM SERPL-MCNC: 8.3 MG/DL — LOW (ref 8.4–10.5)
CHLORIDE BLDV-SCNC: 104 MMOL/L — SIGNIFICANT CHANGE UP (ref 96–108)
CHLORIDE SERPL-SCNC: 103 MMOL/L — SIGNIFICANT CHANGE UP (ref 96–108)
CO2 BLDV-SCNC: 32 MMOL/L — HIGH (ref 22–26)
CO2 SERPL-SCNC: 24 MMOL/L — SIGNIFICANT CHANGE UP (ref 22–31)
COLOR SPEC: YELLOW — SIGNIFICANT CHANGE UP
CREAT SERPL-MCNC: 0.66 MG/DL — SIGNIFICANT CHANGE UP (ref 0.5–1.3)
DIFF PNL FLD: ABNORMAL
EGFR: 79 ML/MIN/1.73M2 — SIGNIFICANT CHANGE UP
EOSINOPHIL # BLD AUTO: 0 K/UL — SIGNIFICANT CHANGE UP (ref 0–0.5)
EOSINOPHIL NFR BLD AUTO: 0 % — SIGNIFICANT CHANGE UP (ref 0–6)
EPI CELLS # UR: 20 /HPF — HIGH
FLUAV AG NPH QL: SIGNIFICANT CHANGE UP
FLUBV AG NPH QL: SIGNIFICANT CHANGE UP
GAS PNL BLDV: 136 MMOL/L — SIGNIFICANT CHANGE UP (ref 136–145)
GAS PNL BLDV: SIGNIFICANT CHANGE UP
GAS PNL BLDV: SIGNIFICANT CHANGE UP
GLUCOSE BLDV-MCNC: 94 MG/DL — SIGNIFICANT CHANGE UP (ref 70–99)
GLUCOSE SERPL-MCNC: 102 MG/DL — HIGH (ref 70–99)
GLUCOSE UR QL: NEGATIVE — SIGNIFICANT CHANGE UP
HCO3 BLDV-SCNC: 30 MMOL/L — HIGH (ref 22–29)
HCT VFR BLD CALC: 32.6 % — LOW (ref 34.5–45)
HCT VFR BLDA CALC: 32 % — LOW (ref 34.5–46.5)
HGB BLD CALC-MCNC: 10.7 G/DL — LOW (ref 11.7–16.1)
HGB BLD-MCNC: 10.3 G/DL — LOW (ref 11.5–15.5)
HYALINE CASTS # UR AUTO: 0 /LPF — SIGNIFICANT CHANGE UP (ref 0–7)
INR BLD: 1.22 RATIO — HIGH (ref 0.88–1.16)
KETONES UR-MCNC: NEGATIVE — SIGNIFICANT CHANGE UP
LACTATE BLDV-MCNC: 1.2 MMOL/L — SIGNIFICANT CHANGE UP (ref 0.5–2)
LEUKOCYTE ESTERASE UR-ACNC: NEGATIVE — SIGNIFICANT CHANGE UP
LIDOCAIN IGE QN: 11 U/L — SIGNIFICANT CHANGE UP (ref 7–60)
LYMPHOCYTES # BLD AUTO: 0.27 K/UL — LOW (ref 1–3.3)
LYMPHOCYTES # BLD AUTO: 12.2 % — LOW (ref 13–44)
MAGNESIUM SERPL-MCNC: 1.9 MG/DL — SIGNIFICANT CHANGE UP (ref 1.6–2.6)
MANUAL SMEAR VERIFICATION: SIGNIFICANT CHANGE UP
MCHC RBC-ENTMCNC: 31.6 GM/DL — LOW (ref 32–36)
MCHC RBC-ENTMCNC: 32.8 PG — SIGNIFICANT CHANGE UP (ref 27–34)
MCV RBC AUTO: 103.8 FL — HIGH (ref 80–100)
MONOCYTES # BLD AUTO: 0.02 K/UL — SIGNIFICANT CHANGE UP (ref 0–0.9)
MONOCYTES NFR BLD AUTO: 0.9 % — LOW (ref 2–14)
NEUTROPHILS # BLD AUTO: 1.91 K/UL — SIGNIFICANT CHANGE UP (ref 1.8–7.4)
NEUTROPHILS NFR BLD AUTO: 79.1 % — HIGH (ref 43–77)
NEUTS BAND # BLD: 7.8 % — SIGNIFICANT CHANGE UP (ref 0–8)
NITRITE UR-MCNC: NEGATIVE — SIGNIFICANT CHANGE UP
PCO2 BLDV: 57 MMHG — HIGH (ref 39–42)
PH BLDV: 7.33 — SIGNIFICANT CHANGE UP (ref 7.32–7.43)
PH UR: 6.5 — SIGNIFICANT CHANGE UP (ref 5–8)
PLAT MORPH BLD: ABNORMAL
PLATELET # BLD AUTO: 72 K/UL — LOW (ref 150–400)
PO2 BLDV: 39 MMHG — SIGNIFICANT CHANGE UP (ref 25–45)
POTASSIUM BLDV-SCNC: 4.9 MMOL/L — SIGNIFICANT CHANGE UP (ref 3.5–5.1)
POTASSIUM SERPL-MCNC: 4.4 MMOL/L — SIGNIFICANT CHANGE UP (ref 3.5–5.3)
POTASSIUM SERPL-SCNC: 4.4 MMOL/L — SIGNIFICANT CHANGE UP (ref 3.5–5.3)
PROT SERPL-MCNC: 6.1 G/DL — SIGNIFICANT CHANGE UP (ref 6–8.3)
PROT UR-MCNC: ABNORMAL
PROTHROM AB SERPL-ACNC: 14.1 SEC — HIGH (ref 10.5–13.4)
RBC # BLD: 3.14 M/UL — LOW (ref 3.8–5.2)
RBC # FLD: 13.9 % — SIGNIFICANT CHANGE UP (ref 10.3–14.5)
RBC BLD AUTO: SIGNIFICANT CHANGE UP
RBC CASTS # UR COMP ASSIST: 7 /HPF — HIGH (ref 0–4)
RSV RNA NPH QL NAA+NON-PROBE: SIGNIFICANT CHANGE UP
SAO2 % BLDV: 61.5 % — LOW (ref 67–88)
SARS-COV-2 RNA SPEC QL NAA+PROBE: SIGNIFICANT CHANGE UP
SODIUM SERPL-SCNC: 138 MMOL/L — SIGNIFICANT CHANGE UP (ref 135–145)
SP GR SPEC: >1.05 (ref 1.01–1.02)
UROBILINOGEN FLD QL: NEGATIVE — SIGNIFICANT CHANGE UP
WBC # BLD: 2.2 K/UL — LOW (ref 3.8–10.5)
WBC # FLD AUTO: 2.2 K/UL — LOW (ref 3.8–10.5)
WBC UR QL: 3 /HPF — SIGNIFICANT CHANGE UP (ref 0–5)

## 2023-03-26 PROCEDURE — 71045 X-RAY EXAM CHEST 1 VIEW: CPT | Mod: 26

## 2023-03-26 PROCEDURE — 74177 CT ABD & PELVIS W/CONTRAST: CPT | Mod: 26,MA

## 2023-03-26 PROCEDURE — 99285 EMERGENCY DEPT VISIT HI MDM: CPT | Mod: GC

## 2023-03-26 PROCEDURE — 99222 1ST HOSP IP/OBS MODERATE 55: CPT

## 2023-03-26 RX ORDER — AZITHROMYCIN 500 MG/1
500 TABLET, FILM COATED ORAL ONCE
Refills: 0 | Status: COMPLETED | OUTPATIENT
Start: 2023-03-26 | End: 2023-03-26

## 2023-03-26 RX ORDER — PANTOPRAZOLE SODIUM 20 MG/1
40 TABLET, DELAYED RELEASE ORAL EVERY 24 HOURS
Refills: 0 | Status: DISCONTINUED | OUTPATIENT
Start: 2023-03-26 | End: 2023-03-28

## 2023-03-26 RX ORDER — ENOXAPARIN SODIUM 100 MG/ML
40 INJECTION SUBCUTANEOUS EVERY 24 HOURS
Refills: 0 | Status: DISCONTINUED | OUTPATIENT
Start: 2023-03-26 | End: 2023-03-26

## 2023-03-26 RX ORDER — CEFTRIAXONE 500 MG/1
1000 INJECTION, POWDER, FOR SOLUTION INTRAMUSCULAR; INTRAVENOUS ONCE
Refills: 0 | Status: COMPLETED | OUTPATIENT
Start: 2023-03-26 | End: 2023-03-26

## 2023-03-26 RX ORDER — LEVOTHYROXINE SODIUM 125 MCG
75 TABLET ORAL AT BEDTIME
Refills: 0 | Status: DISCONTINUED | OUTPATIENT
Start: 2023-03-26 | End: 2023-03-28

## 2023-03-26 RX ORDER — SODIUM CHLORIDE 9 MG/ML
1000 INJECTION, SOLUTION INTRAVENOUS
Refills: 0 | Status: DISCONTINUED | OUTPATIENT
Start: 2023-03-26 | End: 2023-03-28

## 2023-03-26 RX ORDER — ACETAMINOPHEN 500 MG
1000 TABLET ORAL ONCE
Refills: 0 | Status: COMPLETED | OUTPATIENT
Start: 2023-03-26 | End: 2023-03-26

## 2023-03-26 RX ORDER — TETRACAINE/BENZOCAINE/BUTAMBEN 2%-14%-2%
1 OINTMENT (GRAM) TOPICAL ONCE
Refills: 0 | Status: COMPLETED | OUTPATIENT
Start: 2023-03-26 | End: 2023-03-26

## 2023-03-26 RX ORDER — HEPARIN SODIUM 5000 [USP'U]/ML
5000 INJECTION INTRAVENOUS; SUBCUTANEOUS EVERY 12 HOURS
Refills: 0 | Status: DISCONTINUED | OUTPATIENT
Start: 2023-03-26 | End: 2023-03-26

## 2023-03-26 RX ORDER — HEPARIN SODIUM 5000 [USP'U]/ML
5000 INJECTION INTRAVENOUS; SUBCUTANEOUS EVERY 8 HOURS
Refills: 0 | Status: DISCONTINUED | OUTPATIENT
Start: 2023-03-26 | End: 2023-03-28

## 2023-03-26 RX ADMIN — Medication 1 SPRAY(S): at 10:18

## 2023-03-26 RX ADMIN — CEFTRIAXONE 100 MILLIGRAM(S): 500 INJECTION, POWDER, FOR SOLUTION INTRAMUSCULAR; INTRAVENOUS at 09:32

## 2023-03-26 RX ADMIN — AZITHROMYCIN 255 MILLIGRAM(S): 500 TABLET, FILM COATED ORAL at 10:11

## 2023-03-26 RX ADMIN — PANTOPRAZOLE SODIUM 40 MILLIGRAM(S): 20 TABLET, DELAYED RELEASE ORAL at 21:36

## 2023-03-26 RX ADMIN — Medication 75 MICROGRAM(S): at 21:36

## 2023-03-26 RX ADMIN — Medication 400 MILLIGRAM(S): at 08:49

## 2023-03-26 RX ADMIN — HEPARIN SODIUM 5000 UNIT(S): 5000 INJECTION INTRAVENOUS; SUBCUTANEOUS at 21:49

## 2023-03-26 RX ADMIN — SODIUM CHLORIDE 100 MILLILITER(S): 9 INJECTION, SOLUTION INTRAVENOUS at 11:46

## 2023-03-26 NOTE — ED PROVIDER NOTE - OBJECTIVE STATEMENT
89 yo Female with PMhx Afib (on Eliquus), HTN, hypothyroidism, presenting with abdominal pain with n/v/d. Patient states that she started having epigastric pain bilaterally on Thursday night with diarrhea. Patient started having episodes of emesis the day after. Unable to state how many, but nonbloody. Patient reports hx of SBO, but states pain does not feel similar to prior SBO. Denies sick contacts. Denies travel, fever, CP, SOB.

## 2023-03-26 NOTE — ED PROVIDER NOTE - NS ED ROS FT
CONST: no fevers, no chills, no trauma  EYES: no blurry vision   ENT: no sore throat  CV: no chest pain, no extremity swelling  RESP: no shortness of breath  ABD: (+) abdominal pain, (+) nausea, (+) vomiting, (+) diarrhea, no melena  : no dysuria, no hematuria, no frequency  MSK: no back pain, no neck pain, no extremity pain  NEURO: no headache, no focal weakness, no dizziness  HEME: no bruising  SKIN: no rash

## 2023-03-26 NOTE — ED ADULT NURSE REASSESSMENT NOTE - NS ED NURSE REASSESS COMMENT FT1
Report received from Abdirizak CRUZ. Pt AAOx4, NAD, resp nonlabored, skin warm/dry, resting comfortably in bed with family at bedside. pt on 2L NC. Pt awaiting CTr. Safety maintained.
pt assisted to bedpan, repositioned. vss.

## 2023-03-26 NOTE — PATIENT PROFILE ADULT - FALL HARM RISK - RISK INTERVENTIONS
Assistance OOB with selected safe patient handling equipment/Assistance with ambulation/Communicate Fall Risk and Risk Factors to all staff, patient, and family/Discuss with provider need for PT consult/Monitor gait and stability/Provide patient with walking aids - walker, cane, crutches/Reinforce activity limits and safety measures with patient and family/Review medications for side effects contributing to fall risk/Sit up slowly, dangle for a short time, stand at bedside before walking/Toileting schedule using arm’s reach rule for commode and bathroom/Visual Cue: Yellow wristband/Bed in lowest position, wheels locked, appropriate side rails in place/Call bell, personal items and telephone in reach/Instruct patient to call for assistance before getting out of bed or chair/Non-slip footwear when patient is out of bed/[place] to call system/Physically safe environment - no spills, clutter or unnecessary equipment/Purposeful Proactive Rounding/Room/bathroom lighting operational, light cord in reach

## 2023-03-26 NOTE — ED ADULT NURSE NOTE - NSIMPLEMENTINTERV_GEN_ALL_ED
Implemented All Fall with Harm Risk Interventions:  [place] to call system. Call bell, personal items and telephone within reach. Instruct patient to call for assistance. Room bathroom lighting operational. Non-slip footwear when patient is off stretcher. Physically safe environment: no spills, clutter or unnecessary equipment. Stretcher in lowest position, wheels locked, appropriate side rails in place. Provide visual cue, wrist band, yellow gown, etc. Monitor gait and stability. Monitor for mental status changes and reorient to person, place, and time. Review medications for side effects contributing to fall risk. Reinforce activity limits and safety measures with patient and family. Provide visual clues: red socks.

## 2023-03-26 NOTE — ED PROVIDER NOTE - CLINICAL SUMMARY MEDICAL DECISION MAKING FREE TEXT BOX
89 yo Female with PMhx Afib (on Eliquus), HTN, hypothyroidism, presenting with abdominal pain with n/v/d. Vitals stable. Physical exam with tenderness in epigastric region. Concern for gastroenteritis, pancreatitis, cholecystitis, lower lobe pneumonia, atypical ACS. Will get labs, trop, CXR, CTAP, VBG. Patient given zofran and fluids in ambulance. 89 yo Female with PMhx Afib (on Eliquus), HTN, hypothyroidism, presenting with abdominal pain with n/v/d.  Last BM 24 hours ago. Vitals stable. Physical exam with tenderness in epigastric region, as well as some mild distention. Concern for gastroenteritis, pancreatitis, cholecystitis, lower lobe pneumonia, atypical acute coronary syndrome, SBO. Will get labs, trop, CXR, CTAP, VBG. Patient given zofran and fluids in ambulance.  --BMM

## 2023-03-26 NOTE — H&P ADULT - NSHPPHYSICALEXAM_GEN_ALL_CORE
PHYSICAL EXAM:  GENERAL: NAD, well-groomed, well-developed  HEAD:  Atraumatic, Normocephalic  EYES: EOMI, PERRLA, conjunctiva and sclera clear  ENMT: No tonsillar erythema, exudates, or enlargement; Moist mucous membranes  NECK: Supple, No JVD, Normal thyroid  HEART: Regular rate and rhythm; No murmurs, rubs, or gallops  RESPIRATORY: CTA B/L, No W/R/R  ABDOMEN: Soft, milddly TTP   incisionc cdi  NGT placed to LCS

## 2023-03-26 NOTE — ED PROVIDER NOTE - PROGRESS NOTE DETAILS
Joe Solano PGY3: Pt signed out to me pending CT A/P. CT concerning for Multifocal PNA and SBO w/ transition point in the mid pelvis. Surgery consulted. Requesting NGT placement, and will see in ED shortly. Joe oSlano PGY3: Pt evaluated by surgery. NGT placed by surgery team at bedside. Appears to be in stomach per CXR; set up to suction. Will make NPO and admit to surgery for bowel decompression/rest.

## 2023-03-26 NOTE — ED PROVIDER NOTE - CARE PLAN
1 Principal Discharge DX:	SBO (small bowel obstruction)  Secondary Diagnosis:	Multifocal pneumonia

## 2023-03-26 NOTE — PATIENT PROFILE ADULT - CENTRAL VENOUS CATHETER/PICC LINE
Patient continues sleeping  Grandmother and Father are not in room  Will continue to monitor       Nils Pearl  07/14[birth year]19 [id number] no

## 2023-03-26 NOTE — H&P ADULT - ASSESSMENT
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 Millard in 2018 treated with NGT then consult for Dr. Dae Murillo in 10/2020 treated conservatively without NGT, and in 2021 with NGT) presents with 2 days of epigastric pain, nausea and emesis. CT c/w SBO with TP in mid abdomen.    - admit to Dr. Middleton, [facility] Surgery  - NPO/IVF  - NGT to [facility]  - holding home cardiac medications for now - eliquis and amiodarone (no heparin gtt at this time)  - exam before pain medications      d/w Dr. Middleton, [facility] Attending    1043

## 2023-03-26 NOTE — H&P ADULT - NSHPLABSRESULTS_GEN_ALL_CORE
< from: CT Abdomen and Pelvis w/ IV Cont (03.26.23 @ 08:39) >    FINDINGS:    LOWER CHEST: Multifocal patchy parenchymal opacities of the imaged lower   thorax, associated with distal airways impaction, concerning for   multifocal pneumonia. Partially imaged heart is prominent in size with   dense aortic valve calcification and multivessel coronary artery   calcification. Correlate with echocardiogram. No visualized pleural   effusion. Pleural calcifications redemonstrated.    LIVER: Mildly enlarged measuring 19 cm in craniocaudal dimension. Main   portal vein and hepatic veins are patent. Coarse calcification in the   posterior right liver.  BILE DUCTS: No biliary distention.  GALLBLADDER: Gallbladder is filled with stones.  SPLEEN: Spleen size within normal limits. Subcentimeter hypodensity too   small to characterize.  PANCREAS: No acute peripancreatic inflammation  ADRENALS: Unremarkable  KIDNEYS/URETERS: No hydronephrosis. Right renal cortical scarring. Renal   cysts and similar distribution to prior imaging from 2014. Additional   subcentimeter hypodensities of the kidneys are too small to characterize.    BLADDER: Minimally distended.  REPRODUCTIVE ORGANS: Numerous calcified degenerating uterine fibroids.   Bilateral adnexal cystic lesions measuring 5 cm on the right and 3.5 cm   on the left, similar to prior imaging.    BOWEL: Stomach is underdistended. Proximal and mid small bowel is   distended with transition at the mid pelvis, images 78-80 series 2.   Fecalization noted at the site of transition. Distal small bowel loops   are collapsed. Findings are concerning for small bowel obstruction.   Appendix is not obstructed. Colon is overall nondistended. Colonic   diverticulosis, without diverticulitis.  PERITONEUM: No ascites.  VESSELS: No abdominal aortic aneurysm. Aortoiliac atheromatous changes.  RETROPERITONEUM/LYMPH NODES: Small volume nodes.  ABDOMINAL WALL: Postsurgical changes.  BONES: Osseous demineralization, scoliotic spinal curvature, and   multilevel degenerative changes of the bones. Old rib fractures.    IMPRESSION:    Small bowel obstruction with transition at the mid pelvis.    Multifocal pneumonia. Images lower thorax are followed to resolution with   repeat chest CT in one month, or sooner as clinically warranted.    Dr. Bonilla discussed these above findings with Dr. Solano on 3/26/2023   at 9:06AM, with read back.    Gallbladder filled with stones.    Dense aortic valve calcification and multivessel coronary artery   calcification. Correlate with echocardiogram.    Stable bilateral adnexal cystic lesions measuring 5 cm on the right and   3.5 cm on the left.    --- End of Report ---    < end of copied text >

## 2023-03-26 NOTE — H&P ADULT - HISTORY OF PRESENT ILLNESS
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 Millard in 2018 treated with NGT then consult for Dr. Dae Murillo in 10/2020 treated conservatively without NGT, and in 2021 with NGT) presents with 2 days of epigastric pain, nausea and emesis. Last flatus ,and BM on Friday, was diarrhea. Denies fevers, chills, SOB. CT c/w with recurrent SBO.

## 2023-03-26 NOTE — ED ADULT NURSE NOTE - OBJECTIVE STATEMENT
89 year old F, A&Ox4, PMH of afib, HTN, HLD, bowel obstruction, BIB EMS for abdominal pain. Patient started experiencing abdominal pain accompanied by nausea and vomiting starting Friday. Patient had two episodes of vomiting when EMS arrived. Given Zofran by EMS. Denies CP, SOB, fever, chills, HA, difficulty urinating at this time. Patient well appearing, respirations nonlabored. Abdomen diffusely tender. 20 gauge R forearm EMS, labs drawn. Safety and comfort measures maintained.

## 2023-03-27 LAB
ALBUMIN SERPL ELPH-MCNC: 3.4 G/DL — SIGNIFICANT CHANGE UP (ref 3.3–5)
ALP SERPL-CCNC: 52 U/L — SIGNIFICANT CHANGE UP (ref 40–120)
ALT FLD-CCNC: 18 U/L — SIGNIFICANT CHANGE UP (ref 10–45)
ANION GAP SERPL CALC-SCNC: 12 MMOL/L — SIGNIFICANT CHANGE UP (ref 5–17)
APTT BLD: 27.8 SEC — SIGNIFICANT CHANGE UP (ref 27.5–35.5)
AST SERPL-CCNC: 21 U/L — SIGNIFICANT CHANGE UP (ref 10–40)
BILIRUB SERPL-MCNC: 0.8 MG/DL — SIGNIFICANT CHANGE UP (ref 0.2–1.2)
BUN SERPL-MCNC: 8 MG/DL — SIGNIFICANT CHANGE UP (ref 7–23)
CALCIUM SERPL-MCNC: 8.8 MG/DL — SIGNIFICANT CHANGE UP (ref 8.4–10.5)
CHLORIDE SERPL-SCNC: 101 MMOL/L — SIGNIFICANT CHANGE UP (ref 96–108)
CO2 SERPL-SCNC: 24 MMOL/L — SIGNIFICANT CHANGE UP (ref 22–31)
CREAT SERPL-MCNC: 0.47 MG/DL — LOW (ref 0.5–1.3)
CULTURE RESULTS: SIGNIFICANT CHANGE UP
EGFR: 86 ML/MIN/1.73M2 — SIGNIFICANT CHANGE UP
GLUCOSE SERPL-MCNC: 73 MG/DL — SIGNIFICANT CHANGE UP (ref 70–99)
HCT VFR BLD CALC: 35.2 % — SIGNIFICANT CHANGE UP (ref 34.5–45)
HGB BLD-MCNC: 11 G/DL — LOW (ref 11.5–15.5)
INR BLD: 1.14 RATIO — SIGNIFICANT CHANGE UP (ref 0.88–1.16)
MAGNESIUM SERPL-MCNC: 1.8 MG/DL — SIGNIFICANT CHANGE UP (ref 1.6–2.6)
MCHC RBC-ENTMCNC: 31.3 GM/DL — LOW (ref 32–36)
MCHC RBC-ENTMCNC: 32.4 PG — SIGNIFICANT CHANGE UP (ref 27–34)
MCV RBC AUTO: 103.5 FL — HIGH (ref 80–100)
NRBC # BLD: 0 /100 WBCS — SIGNIFICANT CHANGE UP (ref 0–0)
PHOSPHATE SERPL-MCNC: 3 MG/DL — SIGNIFICANT CHANGE UP (ref 2.5–4.5)
PLATELET # BLD AUTO: 66 K/UL — LOW (ref 150–400)
POTASSIUM SERPL-MCNC: 4 MMOL/L — SIGNIFICANT CHANGE UP (ref 3.5–5.3)
POTASSIUM SERPL-SCNC: 4 MMOL/L — SIGNIFICANT CHANGE UP (ref 3.5–5.3)
PROT SERPL-MCNC: 6.3 G/DL — SIGNIFICANT CHANGE UP (ref 6–8.3)
PROTHROM AB SERPL-ACNC: 13.3 SEC — SIGNIFICANT CHANGE UP (ref 10.5–13.4)
RBC # BLD: 3.4 M/UL — LOW (ref 3.8–5.2)
RBC # FLD: 13.8 % — SIGNIFICANT CHANGE UP (ref 10.3–14.5)
SODIUM SERPL-SCNC: 137 MMOL/L — SIGNIFICANT CHANGE UP (ref 135–145)
SPECIMEN SOURCE: SIGNIFICANT CHANGE UP
WBC # BLD: 1.67 K/UL — LOW (ref 3.8–10.5)
WBC # FLD AUTO: 1.67 K/UL — LOW (ref 3.8–10.5)

## 2023-03-27 PROCEDURE — 93010 ELECTROCARDIOGRAM REPORT: CPT

## 2023-03-27 PROCEDURE — 99232 SBSQ HOSP IP/OBS MODERATE 35: CPT

## 2023-03-27 PROCEDURE — 74018 RADEX ABDOMEN 1 VIEW: CPT | Mod: 26

## 2023-03-27 RX ORDER — MAGNESIUM SULFATE 500 MG/ML
2 VIAL (ML) INJECTION ONCE
Refills: 0 | Status: COMPLETED | OUTPATIENT
Start: 2023-03-27 | End: 2023-03-27

## 2023-03-27 RX ORDER — CEFTRIAXONE 500 MG/1
1000 INJECTION, POWDER, FOR SOLUTION INTRAMUSCULAR; INTRAVENOUS EVERY 24 HOURS
Refills: 0 | Status: DISCONTINUED | OUTPATIENT
Start: 2023-03-27 | End: 2023-03-30

## 2023-03-27 RX ADMIN — PANTOPRAZOLE SODIUM 40 MILLIGRAM(S): 20 TABLET, DELAYED RELEASE ORAL at 22:16

## 2023-03-27 RX ADMIN — HEPARIN SODIUM 5000 UNIT(S): 5000 INJECTION INTRAVENOUS; SUBCUTANEOUS at 22:16

## 2023-03-27 RX ADMIN — HEPARIN SODIUM 5000 UNIT(S): 5000 INJECTION INTRAVENOUS; SUBCUTANEOUS at 05:20

## 2023-03-27 RX ADMIN — Medication 75 MICROGRAM(S): at 22:16

## 2023-03-27 RX ADMIN — Medication 25 GRAM(S): at 10:26

## 2023-03-27 RX ADMIN — HEPARIN SODIUM 5000 UNIT(S): 5000 INJECTION INTRAVENOUS; SUBCUTANEOUS at 13:30

## 2023-03-27 NOTE — PROGRESS NOTE ADULT - ASSESSMENT
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 Millard in 2018 treated with NGT then consult for Dr. Dae Murillo in 10/2020 treated conservatively without NGT, and in 2021 with NGT) presents with 2 days of epigastric pain, nausea and emesis. CT c/w SBO with TP in mid abdomen.    Plan:  - NPO/IVF  - NGT to [facility]  - Gastrograffin challenge today  - PNA abx coverage  - holding home cardiac medications for now - eliquis and amiodarone (no heparin gtt at this time)  - exam before pain medications    ACS/Trauma Surgery  [id number]

## 2023-03-28 ENCOUNTER — TRANSCRIPTION ENCOUNTER (OUTPATIENT)
Age: 88
End: 2023-03-28

## 2023-03-28 LAB
ALBUMIN SERPL ELPH-MCNC: 3.3 G/DL — SIGNIFICANT CHANGE UP (ref 3.3–5)
ALP SERPL-CCNC: 49 U/L — SIGNIFICANT CHANGE UP (ref 40–120)
ALT FLD-CCNC: 14 U/L — SIGNIFICANT CHANGE UP (ref 10–45)
ANION GAP SERPL CALC-SCNC: 12 MMOL/L — SIGNIFICANT CHANGE UP (ref 5–17)
AST SERPL-CCNC: 15 U/L — SIGNIFICANT CHANGE UP (ref 10–40)
BILIRUB SERPL-MCNC: 0.7 MG/DL — SIGNIFICANT CHANGE UP (ref 0.2–1.2)
BUN SERPL-MCNC: 10 MG/DL — SIGNIFICANT CHANGE UP (ref 7–23)
CALCIUM SERPL-MCNC: 8.5 MG/DL — SIGNIFICANT CHANGE UP (ref 8.4–10.5)
CHLORIDE SERPL-SCNC: 99 MMOL/L — SIGNIFICANT CHANGE UP (ref 96–108)
CO2 SERPL-SCNC: 29 MMOL/L — SIGNIFICANT CHANGE UP (ref 22–31)
CREAT SERPL-MCNC: 0.51 MG/DL — SIGNIFICANT CHANGE UP (ref 0.5–1.3)
EGFR: 84 ML/MIN/1.73M2 — SIGNIFICANT CHANGE UP
GLUCOSE SERPL-MCNC: 63 MG/DL — LOW (ref 70–99)
GRAM STN FLD: SIGNIFICANT CHANGE UP
HCT VFR BLD CALC: 33.7 % — LOW (ref 34.5–45)
HGB BLD-MCNC: 10.6 G/DL — LOW (ref 11.5–15.5)
LEGIONELLA AG UR QL: NEGATIVE — SIGNIFICANT CHANGE UP
MAGNESIUM SERPL-MCNC: 2.2 MG/DL — SIGNIFICANT CHANGE UP (ref 1.6–2.6)
MCHC RBC-ENTMCNC: 31.5 GM/DL — LOW (ref 32–36)
MCHC RBC-ENTMCNC: 32.9 PG — SIGNIFICANT CHANGE UP (ref 27–34)
MCV RBC AUTO: 104.7 FL — HIGH (ref 80–100)
NRBC # BLD: 0 /100 WBCS — SIGNIFICANT CHANGE UP (ref 0–0)
PHOSPHATE SERPL-MCNC: 3.2 MG/DL — SIGNIFICANT CHANGE UP (ref 2.5–4.5)
PLATELET # BLD AUTO: 71 K/UL — LOW (ref 150–400)
POTASSIUM SERPL-MCNC: 3.6 MMOL/L — SIGNIFICANT CHANGE UP (ref 3.5–5.3)
POTASSIUM SERPL-SCNC: 3.6 MMOL/L — SIGNIFICANT CHANGE UP (ref 3.5–5.3)
PROT SERPL-MCNC: 6.3 G/DL — SIGNIFICANT CHANGE UP (ref 6–8.3)
RBC # BLD: 3.22 M/UL — LOW (ref 3.8–5.2)
RBC # FLD: 13.5 % — SIGNIFICANT CHANGE UP (ref 10.3–14.5)
SODIUM SERPL-SCNC: 140 MMOL/L — SIGNIFICANT CHANGE UP (ref 135–145)
SPECIMEN SOURCE: SIGNIFICANT CHANGE UP
WBC # BLD: 1.98 K/UL — LOW (ref 3.8–10.5)
WBC # FLD AUTO: 1.98 K/UL — LOW (ref 3.8–10.5)

## 2023-03-28 PROCEDURE — 99232 SBSQ HOSP IP/OBS MODERATE 35: CPT

## 2023-03-28 RX ORDER — PREDNISOLONE SODIUM PHOSPHATE 1 %
1 DROPS OPHTHALMIC (EYE)
Refills: 0 | Status: DISCONTINUED | OUTPATIENT
Start: 2023-03-28 | End: 2023-03-30

## 2023-03-28 RX ORDER — AMIODARONE HYDROCHLORIDE 400 MG/1
200 TABLET ORAL DAILY
Refills: 0 | Status: DISCONTINUED | OUTPATIENT
Start: 2023-03-28 | End: 2023-03-30

## 2023-03-28 RX ORDER — SODIUM CHLORIDE 5 %
1 DROPS OPHTHALMIC (EYE)
Refills: 0 | Status: DISCONTINUED | OUTPATIENT
Start: 2023-03-28 | End: 2023-03-30

## 2023-03-28 RX ORDER — SERTRALINE 25 MG/1
50 TABLET, FILM COATED ORAL DAILY
Refills: 0 | Status: DISCONTINUED | OUTPATIENT
Start: 2023-03-28 | End: 2023-03-30

## 2023-03-28 RX ORDER — APIXABAN 2.5 MG/1
2.5 TABLET, FILM COATED ORAL EVERY 12 HOURS
Refills: 0 | Status: DISCONTINUED | OUTPATIENT
Start: 2023-03-28 | End: 2023-03-30

## 2023-03-28 RX ORDER — LEVOTHYROXINE SODIUM 125 MCG
100 TABLET ORAL DAILY
Refills: 0 | Status: DISCONTINUED | OUTPATIENT
Start: 2023-03-28 | End: 2023-03-30

## 2023-03-28 RX ORDER — ALBUTEROL 90 UG/1
2 AEROSOL, METERED ORAL EVERY 6 HOURS
Refills: 0 | Status: DISCONTINUED | OUTPATIENT
Start: 2023-03-28 | End: 2023-03-30

## 2023-03-28 RX ORDER — KETOROLAC TROMETHAMINE 0.5 %
1 DROPS OPHTHALMIC (EYE) DAILY
Refills: 0 | Status: DISCONTINUED | OUTPATIENT
Start: 2023-03-28 | End: 2023-03-30

## 2023-03-28 RX ORDER — PANTOPRAZOLE SODIUM 20 MG/1
40 TABLET, DELAYED RELEASE ORAL
Refills: 0 | Status: DISCONTINUED | OUTPATIENT
Start: 2023-03-28 | End: 2023-03-30

## 2023-03-28 RX ORDER — POTASSIUM CHLORIDE 20 MEQ
40 PACKET (EA) ORAL ONCE
Refills: 0 | Status: COMPLETED | OUTPATIENT
Start: 2023-03-28 | End: 2023-03-28

## 2023-03-28 RX ADMIN — Medication 1 DROP(S): at 17:04

## 2023-03-28 RX ADMIN — CEFTRIAXONE 100 MILLIGRAM(S): 500 INJECTION, POWDER, FOR SOLUTION INTRAMUSCULAR; INTRAVENOUS at 05:13

## 2023-03-28 RX ADMIN — PANTOPRAZOLE SODIUM 40 MILLIGRAM(S): 20 TABLET, DELAYED RELEASE ORAL at 09:45

## 2023-03-28 RX ADMIN — Medication 100 MICROGRAM(S): at 09:45

## 2023-03-28 RX ADMIN — APIXABAN 2.5 MILLIGRAM(S): 2.5 TABLET, FILM COATED ORAL at 17:04

## 2023-03-28 RX ADMIN — Medication 40 MILLIEQUIVALENT(S): at 09:45

## 2023-03-28 RX ADMIN — SERTRALINE 50 MILLIGRAM(S): 25 TABLET, FILM COATED ORAL at 17:04

## 2023-03-28 RX ADMIN — Medication 1 DROP(S): at 23:09

## 2023-03-28 RX ADMIN — SODIUM CHLORIDE 100 MILLILITER(S): 9 INJECTION, SOLUTION INTRAVENOUS at 09:45

## 2023-03-28 RX ADMIN — HEPARIN SODIUM 5000 UNIT(S): 5000 INJECTION INTRAVENOUS; SUBCUTANEOUS at 05:12

## 2023-03-28 RX ADMIN — AMIODARONE HYDROCHLORIDE 200 MILLIGRAM(S): 400 TABLET ORAL at 09:45

## 2023-03-28 NOTE — PHARMACOTHERAPY INTERVENTION NOTE - COMMENTS
Modified penicillin allergy history to state that patient tolerated ceftriaxone during this admission.    Jeannette Maldonado, PharmD, [facility]DP  Clinical Pharmacy Specialist, Infectious Diseases  Tele-Antimicrobial Stewardship Program (Tele-ASP)  Tele-ASP Phone: [phone]

## 2023-03-28 NOTE — DISCHARGE NOTE PROVIDER - NSDCCPCAREPLAN_GEN_ALL_CORE_FT
PRINCIPAL DISCHARGE DIAGNOSIS  Diagnosis: SBO (small bowel obstruction)  Assessment and Plan of Treatment: 1. Activity- Ambulate as tolerated; no heavy lifting   2. Diet- Low fiber Regular diet  3. Follow-up with Dr. Reynolds in 1 week, and your PMD within 1-2 weeks. Please call office for appointment.   Call office sooner or return to emergency room if you have worsening/severe abdominal pain, nausea/vomiting, excessive diarrhea, fever/chills, inabilty to tolerate food or liquids, stop passing gas or having bowel movements.      SECONDARY DISCHARGE DIAGNOSES  Diagnosis: Multifocal pneumonia  Assessment and Plan of Treatment: Pneumonia is a lung infection that can cause a fever, cough, and trouble breathing.  Continue all antibiotics as ordered until complete.  Nutrition is important, eat small frequent meals.  Get lots of rest and drink fluids.  Call your health care provider upon arrival home from hospital and make a follow up appointment for one week.  If your cough worsens, you develop fever greater than 101', you have shaking chills, a fast heartbeat, trouble breathing and/or feel your are breathing much faster than usual, call your healthcare provider.  Make sure you wash your hands frequently.       PRINCIPAL DISCHARGE DIAGNOSIS  Diagnosis: SBO (small bowel obstruction)  Assessment and Plan of Treatment: 1. Activity- Ambulate as tolerated; no heavy lifting   2. Diet- Low fiber Regular diet  3. Follow-up with your PCP in 1 week.   Call office sooner or return to emergency room if you have worsening/severe abdominal pain, nausea/vomiting, excessive diarrhea, fever/chills, inabilty to tolerate food or liquids, stop passing gas or having bowel movements.      SECONDARY DISCHARGE DIAGNOSES  Diagnosis: Multifocal pneumonia  Assessment and Plan of Treatment: Pneumonia is a lung infection that can cause a fever, cough, and trouble breathing.  Continue all antibiotics as ordered until complete.  Nutrition is important, eat small frequent meals.  Get lots of rest and drink fluids.  Call your health care provider upon arrival home from hospital and make a follow up appointment for one week.  If your cough worsens, you develop fever greater than 101', you have shaking chills, a fast heartbeat, trouble breathing and/or feel your are breathing much faster than usual, call your healthcare provider.  Make sure you wash your hands frequently.

## 2023-03-28 NOTE — CONSULT NOTE ADULT - TIME BILLING
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 Millard in 2018 treated with NGT then consult for Dr. Dae Murillo in 10/2020 treated conservatively without NGT, and in 2021 with NGT) presents with 2 days of epigastric pain, nausea and emesis, found to have SBO and pna.    #pAFib  -Stable  -Continue amiodarone  -Continue apixaban    #Mod AS  -As noted on prior echo  -No evidence of volume overload on exam  -Recent CT with Dense aortic valve calcification and multivessel coronary artery calcification  -Can repeat echo  -If echo shows severe AS, she is unlikely to be candidate for surgical mgmt given her age    #SBO  -S/p NGT   -Surgery following  -Pt now tolerating regular diet    #Multifocal PNA  -Suspect 2/2 aspiration  -Abx per ID  -Rest of mgmt per med

## 2023-03-28 NOTE — DISCHARGE NOTE PROVIDER - CARE PROVIDERS DIRECT ADDRESSES
Continues on MORALES CPAP at floor FiO2 40%, remains tachypneic. Continuous feeding increased, tolerating. Voiding, no stool.   [email or web address]

## 2023-03-28 NOTE — DISCHARGE NOTE PROVIDER - NSDCMRMEDTOKEN_GEN_ALL_CORE_FT
albuterol 90 mcg/inh inhalation aerosol: 2 puff(s) inhaled every 6 hours, As needed, Bronchospasm  amiodarone 200 mg oral tablet: 1 orally  apixaban 2.5 mg oral tablet: 1 tab(s) orally 2 times a day  bromfenac 0.09% ophthalmic solution: 1 drop(s) to each affected eye once a day  levothyroxine 100 mcg (0.1 mg) oral tablet: 1 orally  pantoprazole 40 mg oral delayed release tablet: 1 tab(s) orally once a day (before a meal)  prednisoLONE acetate 1% ophthalmic suspension: 1 drop(s) to each affected eye 4 times a day  Restasis 0.05% ophthalmic emulsion: 1 drop(s) to each affected eye every 12 hours  sertraline 50 mg oral tablet: 1 orally  sodium chloride, hypertonic 2% ophthalmic solution: 1 drop(s) to each affected eye 4 times a day   albuterol 90 mcg/inh inhalation aerosol: 2 puff(s) inhaled every 6 hours, As needed, Bronchospasm  amiodarone 200 mg oral tablet: 1 orally  apixaban 2.5 mg oral tablet: 1 tab(s) orally 2 times a day  bromfenac 0.09% ophthalmic solution: 1 drop(s) to each affected eye once a day  cefpodoxime 200 mg oral tablet: 1 tab(s) orally 2 times a day  levothyroxine 100 mcg (0.1 mg) oral tablet: 1 orally  pantoprazole 40 mg oral delayed release tablet: 1 tab(s) orally once a day (before a meal)  prednisoLONE acetate 1% ophthalmic suspension: 1 drop(s) to each affected eye 4 times a day  Restasis 0.05% ophthalmic emulsion: 1 drop(s) to each affected eye every 12 hours  sertraline 50 mg oral tablet: 1 orally  sodium chloride, hypertonic 2% ophthalmic solution: 1 drop(s) to each affected eye 4 times a day

## 2023-03-28 NOTE — PHYSICAL THERAPY INITIAL EVALUATION ADULT - ADDITIONAL COMMENTS
pt lives in elevated apt, HHA 9a-6p x7 days. Prior to admission, pt was ambulatory with rolling walker, pt also owns a transport wheelchair.

## 2023-03-28 NOTE — DISCHARGE NOTE PROVIDER - PROVIDER TOKENS
PROVIDER:[TOKEN:[[id number]],FOLLOWUP:[1 week]] PROVIDER:[TOKEN:[[id number]],FOLLOWUP:[Routine]]

## 2023-03-28 NOTE — H&P ADULT - CLICK TO LAUNCH ORM
.
I personally performed the service described in the documentation recorded by the scribe in my presence, and it accurately and completely records my words and actions.

## 2023-03-28 NOTE — PROGRESS NOTE ADULT - ASSESSMENT
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 Millard in 2018 treated with NGT then consult for Dr. Dae Murillo in 10/2020 treated conservatively without NGT, and in 2021 with NGT) presents with 2 days of epigastric pain, nausea and emesis. CT c/w SBO with TP in mid abdomen.    Plan:  - Diet: Regular  - PNA abx coverage. pending sputum cx   - May restart cardiac medications for now - eliquis and amiodarone (no heparin gtt at this time)  - exam before pain medications  - PT eval pending today    ACS/Trauma Surgery  [id number]

## 2023-03-28 NOTE — CONSULT NOTE ADULT - SUBJECTIVE AND OBJECTIVE BOX
CARDIOLOGY CONSULT - Dr. Moeller         HPI:  89F hx of pAF on eliquis and amiodarone, 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 Millard in 2018 treated with NGT then consult for Dr. Dae Murillo in 10/2020 treated conservatively without NGT, and in 2021 with NGT) presents with 2 days of epigastric pain, nausea and emesis. Last flatus ,and BM on Friday, was diarrhea. Denies fevers, chills, SOB. CT c/w with recurrent SBO.       PAST MEDICAL & SURGICAL HISTORY:  Pulmonary TB      Gastritis      Afib      SBO (small bowel obstruction)      Hypothyroid      UTI (urinary tract infection)      HTN (hypertension)      TB (pulmonary tuberculosis)  TB of abdomen 1960      History of Bilateral Breast Biopsy      S/P Exploratory Laparotomy  TB in 1960s      H/O hemorrhoidectomy      S/P vein stripping              PREVIOUS DIAGNOSTIC TESTING:    [x] Echocardiogram:  < from: Transthoracic Echocardiogram (03.07.22 @ 13:42) >  Conclusions:  1. Calcified trileaflet aortic valve with decreased  opening. Peak transaortic valve gradient equals 29 mm Hg,  mean transaortic valve gradient equals 15 mm Hg, estimated  aortic valve area equals 1.5 sqcm(by continuity equation),  aortic valve velocity time integral equals 57 cm,  consistent with moderate aortic stenosis. Mild-moderate  aortic regurgitation.  2. Moderately dilated left atrium.  LA volume index = 46  cc/m2.  3. Basal septal hypertrophy.  4. Normal left ventricular systolic function. No segmental  wall motion abnormalities.  5. Mild diastolic dysfunction (Stage I).  6. Normal right ventricular size and function.  7. Estimated pulmonary artery systolic pressure equals 37  mm Hg, assuming right atrial pressure equals 8 mm Hg,  consistent with borderline pulmonary pressures.  *** No recent echocardiogram for comparision.    < end of copied text >    [ ]  Catheterization:  [ ] Stress Test:  	    MEDICATIONS:  Home Medications:  albuterol 90 mcg/inh inhalation aerosol: 2 puff(s) inhaled every 6 hours, As needed, Bronchospasm (03 Oct 2022 15:49)  amiodarone 200 mg oral tablet: 1 orally (26 Mar 2023 12:48)  apixaban 2.5 mg oral tablet: 1 tab(s) orally 2 times a day (03 Oct 2022 15:49)  bromfenac 0.09% ophthalmic solution: 1 drop(s) to each affected eye once a day (01 Oct 2022 16:28)  levothyroxine 100 mcg (0.1 mg) oral tablet: 1 orally (26 Mar 2023 12:50)  pantoprazole 40 mg oral delayed release tablet: 1 tab(s) orally once a day (before a meal) (03 Oct 2022 15:49)  prednisoLONE acetate 1% ophthalmic suspension: 1 drop(s) to each affected eye 4 times a day (01 Oct 2022 16:28)  Restasis 0.05% ophthalmic emulsion: 1 drop(s) to each affected eye every 12 hours (01 Oct 2022 16:28)  sertraline 50 mg oral tablet: 1 orally (26 Mar 2023 12:49)  sodium chloride, hypertonic 2% ophthalmic solution: 1 drop(s) to each affected eye 4 times a day (01 Oct 2022 16:28)      MEDICATIONS  (STANDING):  aMIOdarone    Tablet 200 milliGRAM(s) Oral daily  apixaban 2.5 milliGRAM(s) Oral every 12 hours  cefTRIAXone   IVPB 1000 milliGRAM(s) IV Intermittent every 24 hours  ketorolac 0.5% Ophthalmic Solution 1 Drop(s) Both EYES daily  lactated ringers. 1000 milliLiter(s) (100 mL/Hr) IV Continuous <Continuous>  levothyroxine 100 MICROGram(s) Oral daily  pantoprazole    Tablet 40 milliGRAM(s) Oral before breakfast  prednisoLONE acetate 1% Suspension 1 Drop(s) Both EYES four times a day  sertraline 50 milliGRAM(s) Oral daily  sodium chloride 2% Ophthalmic Solution 1 Drop(s) Both EYES four times a day      FAMILY HISTORY:      SOCIAL HISTORY:    [x] Non-smoker  [ ] Smoker  [ ] Alcohol    Allergies    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 reported allergy (Unknown)  penicillin (Rash)    Intolerances    	    REVIEW OF SYSTEMS:  CONSTITUTIONAL: No fever, weight loss, or fatigue  EYES: No eye pain, visual disturbances, or discharge  ENMT:  No difficulty hearing, tinnitus, vertigo; No sinus or throat pain  NECK: No pain or stiffness  RESPIRATORY: No cough, wheezing, chills or hemoptysis; No Shortness of Breath  CARDIOVASCULAR: No chest pain, palpitations, passing out, dizziness, or leg swelling  GASTROINTESTINAL: No abdominal or epigastric pain. No nausea, vomiting, or hematemesis; No diarrhea or constipation. No melena or hematochezia.  GENITOURINARY: No dysuria, frequency, hematuria, or incontinence  NEUROLOGICAL: No headaches, memory loss, loss of strength, numbness, or tremors  SKIN: No itching, burning, rashes, or lesions   	    [x] All others negative	  [ ] Unable to obtain    PHYSICAL EXAM:  T(C): 36.8 (03-28-23 @ 13:14), Max: 37.8 (03-27-23 @ 20:47)  HR: 68 (03-28-23 @ 13:14) (68 - 99)  BP: 121/67 (03-28-23 @ 13:14) (121/67 - 173/80)  RR: 18 (03-28-23 @ 13:14) (18 - 18)  SpO2: 99% (03-28-23 @ 13:14) (96% - 99%)  Wt(kg): --  I&O's Summary    27 Mar 2023 07:01  -  28 Mar 2023 07:00  --------------------------------------------------------  IN: 1250 mL / OUT: 720 mL / NET: 530 mL    28 Mar 2023 07:01  -  28 Mar 2023 15:30  --------------------------------------------------------  IN: 180 mL / OUT: 50 mL / NET: 130 mL        Appearance: Elderly female	  Psychiatry: A & O x 3, Mood & affect appropriate  HEENT:   Normal oral mucosa, PERRL, EOMI	  Lymphatic: No lymphadenopathy  Cardiovascular: Normal S1 S2,Irregular rhythm, No JVD, No murmurs  Respiratory: Scattered rhonchi b/l  Gastrointestinal:  Soft, Non-tender, + BS	  Skin: No rashes, No ecchymoses, No cyanosis	  Neurologic: Non-focal  Extremities: Normal range of motion, No clubbing, cyanosis or edema  Vascular: Peripheral pulses palpable 2+ bilaterally    TELEMETRY: 	    ECG:  	  RADIOLOGY:  < from: Xray Chest 1 View- PORTABLE-Urgent (Xray Chest 1 View- PORTABLE-Urgent .) (03.26.23 @ 10:52) >    IMPRESSION:  Interval placement of enteric tube with tip projecting over the stomach.    < end of copied text >      < from: CT Abdomen and Pelvis w/ IV Cont (03.26.23 @ 08:39) >  IMPRESSION:    Small bowel obstruction with transition at the mid pelvis.    Multifocal pneumonia. Images lower thorax are followed to resolution with   repeat chest CT in one month, or sooner as clinically warranted.    Dr. Bonilla discussed these above findings with Dr. Solano on 3/26/2023   at 9:06AM, with read back.    Gallbladder filled with stones.    Dense aortic valve calcification and multivessel coronary artery   calcification. Correlate with echocardiogram.    Stable bilateral adnexal cystic lesions measuring 5 cm on the right and   3.5 cm on the left.    < end of copied text >    OTHER: 	  	  LABS:	 	    CARDIAC MARKERS:  Troponin T, High Sensitivity Result: 13 ng/L (03-26 @ 06:55)                                  10.6   1.98  )-----------( 71       ( 28 Mar 2023 06:28 )             33.7     03-28    140  |  99  |  10  ----------------------------<  63<L>  3.6   |  29  |  0.51    Ca    8.5      28 Mar 2023 06:27  Phos  3.2     03-28  Mg     2.2     03-28    TPro  6.3  /  Alb  3.3  /  TBili  0.7  /  DBili  x   /  AST  15  /  ALT  14  /  AlkPhos  49  03-28    PT/INR - ( 27 Mar 2023 07:17 )   PT: 13.3 sec;   INR: 1.14 ratio         PTT - ( 27 Mar 2023 07:17 )  PTT:27.8 sec  proBNP:   Lipid Profile:   HgA1c:   TSH:       
Patient is a 89y old  Female who presents with a chief complaint of SBO (27 Mar 2023 08:21)      HPI:  89F hx of pAF on eliquis and amiodarone, 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 Millard in 2018 treated with NGT then consult for Dr. aDe Murillo in 10/2020 treated conservatively without NGT, and in 2021 with NGT) presents with 2 days of epigastric pain, nausea and emesis. Last flatus ,and BM on Friday, was diarrhea. Denies fevers, chills, SOB. CT c/w with recurrent SBO.   + cough. no fevers or chills. Currently no n, v. ng tube in place. Not passing flatus.      PAST MEDICAL & SURGICAL HISTORY:  Pulmonary TB      Gastritis      Afib      SBO (small bowel obstruction)      Hypothyroid      UTI (urinary tract infection)      HTN (hypertension)      TB (pulmonary tuberculosis)  TB of abdomen 1960      History of Bilateral Breast Biopsy      S/P Exploratory Laparotomy  TB in 1960s      H/O hemorrhoidectomy      S/P vein stripping          Allergies    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 reported allergy (Unknown)  penicillin (Rash)    Intolerances        Home Medications:  albuterol 90 mcg/inh inhalation aerosol: 2 puff(s) inhaled every 6 hours, As needed, Bronchospasm (03 Oct 2022 15:49)  amiodarone 200 mg oral tablet: 1 orally (26 Mar 2023 12:48)  apixaban 2.5 mg oral tablet: 1 tab(s) orally 2 times a day (03 Oct 2022 15:49)  bromfenac 0.09% ophthalmic solution: 1 drop(s) to each affected eye once a day (01 Oct 2022 16:28)  levothyroxine 100 mcg (0.1 mg) oral tablet: 1 orally (26 Mar 2023 12:50)  pantoprazole 40 mg oral delayed release tablet: 1 tab(s) orally once a day (before a meal) (03 Oct 2022 15:49)  prednisoLONE acetate 1% ophthalmic suspension: 1 drop(s) to each affected eye 4 times a day (01 Oct 2022 16:28)  Restasis 0.05% ophthalmic emulsion: 1 drop(s) to each affected eye every 12 hours (01 Oct 2022 16:28)  sertraline 50 mg oral tablet: 1 orally (26 Mar 2023 12:49)  sodium chloride, hypertonic 2% ophthalmic solution: 1 drop(s) to each affected eye 4 times a day (01 Oct 2022 16:28)      SOCIAL HISTORY:    FAMILY HISTORY:        REVIEW OF SYSEMS:  General: no weakness, no fever/chills, no weight loss/gain  Skin/Breast: no rash, no jaundice  Ophthalmologic: no vision changes, no dry eyes   Respiratory and Thorax: no wheezing, no hemoptysis, no dyspnea; + cough   Cardiovascular: no chest pain, no shortness of breath, no orthopnea  Gastrointestinal: no n/v/d, no abdominal pain, no dysphagia   Genitourinary: no dysuria, no frequency, no nocturia, no hematuria  Musculoskeletal: no trauma, no sprain/strain, no myalgias, no arthralgias, no fracture  Neurological: no HA, no dizziness, no weakness, no numbness  Psychiatric: no depression, no SI/HI  Hematology/Lymphatics: no easy bruising  Endocrine: no heat or cold intolerance. no weight gain or loss  Allergic/Immunologic: no allergy or recent reaction       Vital Signs Last 24 Hrs  T(C): 36.8 (27 Mar 2023 09:26), Max: 37.1 (27 Mar 2023 04:43)  T(F): 98.3 (27 Mar 2023 09:26), Max: 98.8 (27 Mar 2023 04:43)  HR: 66 (27 Mar 2023 09:26) (62 - 71)  BP: 146/74 (27 Mar 2023 09:26) (121/86 - 185/71)  BP(mean): --  RR: 18 (27 Mar 2023 09:26) (16 - 18)  SpO2: 98% (27 Mar 2023 09:26) (94% - 100%)    Parameters below as of 27 Mar 2023 09:26  Patient On (Oxygen Delivery Method): nasal cannula  O2 Flow (L/min): 2    I&O's Summary    26 Mar 2023 07:01  -  27 Mar 2023 07:00  --------------------------------------------------------  IN: 1200 mL / OUT: 1100 mL / NET: 100 mL    27 Mar 2023 07:01  -  27 Mar 2023 12:23  --------------------------------------------------------  IN: 0 mL / OUT: 400 mL / NET: -400 mL        PHYSICAL EXAM:  GENERAL: NAD, AAOx3  HEAD:  Atraumatic, Normocephalic  EYES: EOMI; conjunctiva and sclera clear  NECK: Supple, No JVD, No LAD  CHEST/LUNG: B/L air entry; No wheezes, rales or rhonci   HEART: Regular rate and rhythm; No murmurs, rubs, or gallops  ABDOMEN: Soft, Nontender, Nondistended; Bowel sounds present  EXTREMITIES:  2+ Peripheral Pulses, No clubbing, cyanosis, or edema  SKIN: No rashes or lesions    LABS:                        11.0   1.67  )-----------( 66       ( 27 Mar 2023 07:17 )             35.2     03-27    137  |  101  |  8   ----------------------------<  73  4.0   |  24  |  0.47<L>    Ca    8.8      27 Mar 2023 07:17  Phos  3.0     03-27  Mg     1.8     03-27    TPro  6.3  /  Alb  3.4  /  TBili  0.8  /  DBili  x   /  AST  21  /  ALT  18  /  AlkPhos  52  03-27    PT/INR - ( 27 Mar 2023 07:17 )   PT: 13.3 sec;   INR: 1.14 ratio         PTT - ( 27 Mar 2023 07:17 )  PTT:27.8 sec  CAPILLARY BLOOD GLUCOSE            Urinalysis Basic - ( 26 Mar 2023 14:32 )    Color: Yellow / Appearance: Clear / SG: >1.050 / pH: x  Gluc: x / Ketone: Negative  / Bili: Negative / Urobili: Negative   Blood: x / Protein: 30 mg/dL / Nitrite: Negative   Leuk Esterase: Negative / RBC: 7 /hpf / WBC 3 /HPF   Sq Epi: x / Non Sq Epi: 20 /hpf / Bacteria: Negative        RADIOLOGY & ADDITIONAL TESTS:    Imaging Personally Reviewed:  [x] YES  [ ] NO    Consultant(s) Notes Reviewed:  [x] YES  [ ] NO      MEDICATIONS  (STANDING):  cefTRIAXone   IVPB 1000 milliGRAM(s) IV Intermittent every 24 hours  heparin   Injectable 5000 Unit(s) SubCutaneous every 8 hours  lactated ringers. 1000 milliLiter(s) (100 mL/Hr) IV Continuous <Continuous>  levothyroxine Injectable 75 MICROGram(s) IV Push at bedtime  pantoprazole  Injectable 40 milliGRAM(s) IV Push every 24 hours    MEDICATIONS  (PRN):      Care Discussed with Consultants/Other Providers [x] YES  [ ] NO    HEALTH ISSUES - PROBLEM Dx:        
[facility] DIVISION OF INFECTIOUS DISEASES  SWAPNIL Rene S. Shah, Y. Patel, G. [facility]  [phone]  ([phone] - weekdays after 5pm and weekends)    CATHRYN LARA  89y, Female  [id number]    HPI:  Patient is a 89 year old female with PMH of pAF on eliquis and amiodarone, 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 Millard in 2018 treated with NGT then consult for Dr. Dae Murillo in 10/2020 treated conservatively without NGT, and in 2021 with NGT) presents with 2 days of epigastric pain, nausea and emesis. Last flatus ,and BM on Friday, was diarrhea. Denies fevers, chills, SOB. CT c/w with recurrent SBO.  (26 Mar 2023 12:41)  Patient seen and examined at bedside this morning. Patient reports she had abdominal pain mostly epigastric area with nausea and vomiting but unable to tell when it started or how many times she vomited. She reports being constipated and then having diarrhea, denies taking any laxatives or stool softeners at home. She denies fever or chills, denies dyspnea or chest pain and reports cough sometimes but does not feel it is has changed.   ROS: 14 point review of systems completed, pertinent positives and negatives as per HPI.    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 reported allergy (Unknown)  penicillin (Rash)    PMH -- Pulmonary TB  Gastritis  Afib  SBO (small bowel obstruction)  Hypothyroid  UTI (urinary tract infection)  HTN (hypertension)  TB (pulmonary tuberculosis)    PSH -- Afib  History of Small Bowel Obstruction  History of Bilateral Breast Biopsy  S/P Exploratory Laparotomy  H/O hemorrhoidectomy  S/P vein stripping    FH -- No pertinent family history in first degree relatives    Social History -- denies tobacco, alcohol or illicit drug use    Physical Exam--  Vital Signs Last 24 Hrs  T(F): 98.3 (27 Mar 2023 09:26), Max: 98.8 (27 Mar 2023 04:43)  HR: 66 (27 Mar 2023 09:26) (62 - 71)  BP: 146/74 (27 Mar 2023 09:26) (121/86 - 185/71)  RR: 18 (27 Mar 2023 09:26) (16 - 18)  SpO2: 98% (27 Mar 2023 09:26) (97% - 100%)  General: no acute distress  HEENT: NC/AT, EOMI, anicteric, NGT, neck supple  Lungs: decreased breath sounds b/l  Heart: S1, S2 present, normal rate   Abdomen: Soft. Nondistended. Nontender. BS present.   Neuro: AAOx3, no obvious focal deficits   Extremities: No cyanosis or clubbing. No edema.   Skin: Warm. Dry. Good turgor. No visible rash.   Psychiatric: Appropriate affect and mood for situation.   Lines: PIV    Laboratory & Imaging Data--  CBC:                       11.0   1.67  )-----------( 66       ( 27 Mar 2023 07:17 )             35.2     WBC Count: 1.67 K/uL (03-27-23 @ 07:17)  WBC Count: 2.20 K/uL (03-26-23 @ 06:55)    CMP: 03-27    137  |  101  |  8   ----------------------------<  73  4.0   |  24  |  0.47<L>    Ca    8.8      27 Mar 2023 07:17  Phos  3.0     03-27  Mg     1.8     03-27    TPro  6.3  /  Alb  3.4  /  TBili  0.8  /  DBili  x   /  AST  21  /  ALT  18  /  AlkPhos  52  03-27    LIVER FUNCTIONS - ( 27 Mar 2023 07:17 )  Alb: 3.4 g/dL / Pro: 6.3 g/dL / ALK PHOS: 52 U/L / ALT: 18 U/L / AST: 21 U/L / GGT: x           Urinalysis Basic - ( 26 Mar 2023 14:32 )  Color: Yellow / Appearance: Clear / SG: >1.050 / pH: x  Gluc: x / Ketone: Negative  / Bili: Negative / Urobili: Negative   Blood: x / Protein: 30 mg/dL / Nitrite: Negative   Leuk Esterase: Negative / RBC: 7 /hpf / WBC 3 /HPF   Sq Epi: x / Non Sq Epi: 20 /hpf / Bacteria: Negative    Microbiology: reviewed  SARS-CoV-2 Result: NotDetec (26 Mar 2023 11:45)    Radiology--reviewed    < from: Xray Chest 1 View- PORTABLE-Urgent (Xray Chest 1 View- PORTABLE-Urgent .) (03.26.23 @ 10:52) >  FINDINGS:  Support devices: Interval placement of enteric tube with tip projecting   over the stomach.  The cardiomediastinal silhouette is  normal in size.  Similar atelectasis at right lung base and opacities at left apex likely   representing calcifications.  There is no pneumothorax or pleural effusion.  No acute bony abnormality.    IMPRESSION:  Interval placement of enteric tube with tip projecting over the stomach.    < end of copied text >    < from: CT Abdomen and Pelvis w/ IV Cont (03.26.23 @ 08:39) >  IMPRESSION:    Small bowel obstruction with transition at the mid pelvis.    Multifocal pneumonia. Images lower thorax are followed to resolution with   repeat chest CT in one month, or sooner as clinically warranted.    Dr. Bonilla discussed these above findings with Dr. Solano on 3/26/2023   at 9:06AM, with read back.    Gallbladder filled with stones.    Dense aortic valve calcification and multivessel coronary artery   calcification. Correlate with echocardiogram.    Stable bilateral adnexal cystic lesions measuring 5 cm on the right and   3.5 cm on the left.    < end of copied text >  < from: Xray Chest 1 View AP/PA (03.26.23 @ 07:42) >  IMPRESSION:  Atelectasis at right lung base, and left apical pleural calcifications.    < end of copied text >      Active Medications--  cefTRIAXone   IVPB 1000 milliGRAM(s) IV Intermittent every 24 hours  heparin   Injectable 5000 Unit(s) SubCutaneous every 8 hours  lactated ringers. 1000 milliLiter(s) IV Continuous <Continuous>  levothyroxine Injectable 75 MICROGram(s) IV Push at bedtime  pantoprazole  Injectable 40 milliGRAM(s) IV Push every 24 hours    Current Antimicrobials:   cefTRIAXone   IVPB 1000 milliGRAM(s) IV Intermittent every 24 hours    Prior/Completed Antimicrobials:  azithromycin  IVPB  cefTRIAXone   IVPB

## 2023-03-28 NOTE — PROGRESS NOTE ADULT - ASSESSMENT
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 Millard in 2018 treated with NGT then consult for Dr. Dae Murillo in 10/2020 treated conservatively without NGT, and in 2021 with NGT) presents with 2 days of epigastric pain, nausea and emesis and last BM with diarrhea on Friday, was diarrhea.     Small bowel obstruction   - s/p NGT removal 3/27  Multifocal pneumonia - suspect aspiration in setting of n/v   - RVP/COVID negative   - CT C/A/P with multifocal pneumonia, small bowel obstruction with transition at mid pelvis   - Legionella urine ag negative  Leukopenia, thrombocytopenia - chronic per chart review, uptrend noted   H/o penicillin allergy (rash/hives)   - tolerating cephalosporins without issues     Recommendations:  Send sputum culture if able to expectorate  Continue ceftriaxone 1g IV Q24h -- plan to complete total 7d course - end 4/2/23   - if continues to improve, can transition to cefpodoxime 200mg PO BID   Surgery following for SBO, diet advanced as tolerated   Monitor temps/CBC      Lilli Polanco M.D.  [facility], Division of Infectious Diseases  [phone]  After 5pm on weekdays and all day on weekends - please call [phone]

## 2023-03-28 NOTE — CONSULT NOTE ADULT - ASSESSMENT
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 Millard in 2018 treated with NGT then consult for Dr. Dae Murillo in 10/2020 treated conservatively without NGT, and in 2021 with NGT) presents with 2 days of epigastric pain, nausea and emesis, CT c/w with recurrent SBO. additionally found to have multifocal pneumonia.      1. SBO   2. Leukopenia, Thrombocytopenia - chronic   3. Multifocal pneumonia    4. P afib on eliquis   5. B/L Adnexal cystic lesions incidentally found     Plan:   -SBO management per surgery- npo, ng, fluids, pain control.   -CXR/CT chest: with multifocal pneumonia   -given nausea vomiting, possible aspiration component  -F/u Urine legionella  -Started Ceftriaxone  -ID consulted   -c/w other meds as above     dvt ppx: hsq  gi ppx: npo, ivf     Mukesh White MD   [facility]  [phone]      
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 Millard in 2018 treated with NGT then consult for Dr. Dae Murillo in 10/2020 treated conservatively without NGT, and in 2021 with NGT) presents with 2 days of epigastric pain, nausea and emesis and last BM with diarrhea on Friday, was diarrhea.     Small bowel obstruction  Multifocal pneumonia - suspect aspiration in setting of n/v   - RVP/COVID negative   - CT C/A/P with multifocal pneumonia, small bowel obstruction with transition at mid pelvis  Leukopenia, thrombocytopenia - chronic per chart review but noted WBC lower  H/o penicillin allergy (rash/hives)    Recommendations:  Send sputum culture if able to expectorate  follow up Legionella urine ag, low suspicion  Continue ceftriaxone 1g IV Q24h  Surgery following for SBO, pt s/p NGT and NPO  Monitor temps/CBC    D/w Dr. Christopher Polanco M.D.  [facility], Division of Infectious Diseases  [phone]  After 5pm on weekdays and all day on weekends - please call [phone]
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 Millard in 2018 treated with NGT then consult for Dr. Dae Murillo in 10/2020 treated conservatively without NGT, and in 2021 with NGT) presents with 2 days of epigastric pain, nausea and emesis, found to have SBO and pna.    #pAFib  -Stable  -Continue amiodarone  -Continue apixaban    #Mod AS  -As noted on prior echo  -No evidence of volume overload on exam  -Recent CT with Dense aortic valve calcification and multivessel coronary artery calcification  -Can repeat echo  -If echo shows severe AS, she is unlikely to be candidate for surgical mgmt given her age    #SBO  -S/p NGT   -Surgery following  -Pt now tolerating regular diet    #Multifocal PNA  -Suspect 2/2 aspiration  -Abx per ID  -Rest of mgmt per med        Please call/text me with any questions/concerns between 8am-4pm  [phone]

## 2023-03-28 NOTE — DISCHARGE NOTE PROVIDER - CARE PROVIDER_API CALL
Rich Reynolds (MD)  Surgery; Surgical Critical Care  [address], [address]  [place] [address]  Phone: [phone]  Fax: [phone]  Follow Up Time: 1 week   Rich Reynolds (MD)  Surgery; Surgical Critical Care  [address], [address]  [place] [address]  Phone: [phone]  Fax: [phone]  Follow Up Time: Routine

## 2023-03-28 NOTE — PHYSICAL THERAPY INITIAL EVALUATION ADULT - NSPTDISCHREC_GEN_A_CORE
if DC to home would require home PT to inc strength and balance and assist for ALL functional mobility/Sub-acute Rehab

## 2023-03-28 NOTE — PROGRESS NOTE ADULT - ASSESSMENT
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 Millard in 2018 treated with NGT then consult for Dr. Dae Murillo in 10/2020 treated conservatively without NGT, and in 2021 with NGT) presents with 2 days of epigastric pain, nausea and emesis, CT c/w with recurrent SBO. additionally found to have multifocal pneumonia.      1. SBO   2. Leukopenia, Thrombocytopenia - chronic   3. Multifocal pneumonia    4. P afib on eliquis   5. B/L Adnexal cystic lesions incidentally found, plan per primary team    Plan:   -SBO management per surgery- clears, fluids, pain control. serial abd exam. abd xr with contrast in colon.   -oob to chair / walking with assistance  -CXR/CT chest: with multifocal pneumonia   -given nausea vomiting, possible aspiration component  -Ur legionella negative   -C/w Ceftriaxone  -ID consulted   -c/w other meds as above     dvt ppx: hsq  gi ppx:  eve White MD   [facility]  [phone]

## 2023-03-28 NOTE — DISCHARGE NOTE PROVIDER - HOSPITAL COURSE
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 Millard in 2018 treated with NGT then consult for Dr. Dae Murillo in 10/2020 treated conservatively without NGT, and in 2021 with NGT) presents with 2 days of epigastric pain, nausea and emesis. Last flatus ,and BM on Friday, was diarrhea. Denies fevers, chills, SOB. CT c/w with recurrent SBO.   Pt had NGT placed on 3/26. Pt passed Gastrografin study on 3/27, and NGT was subsequently removed.  Internal medicine was consulted for medical co-management.   Pt also with multifocal PNA; sputum culture (3/27) negative and urine legionella (3/27) negative. Infectious Diseases was consulted; pt was started on IV ceftriaxone, and will be discharged on PO Cefpodoxime 200mg BID for a total course of 7 days (End Date: 4/2/23)  Physical therapy saw the patient and recommended [facility], but patient and family wants patient to be discharged home.  On the day of discharge, the patient's vitals are stable, pain is controlled, voiding urine, passing gas/stool, tolerating a low fiber diet, and ambulating well. Pt will f/u with Dr. Reynolds in 1-2 weeks. Pt will f/u with PCP in 1-2 weeks. 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 Millard in 2018 treated with NGT then consult for Dr. Dae Murillo in 10/2020 treated conservatively without NGT, and in 2021 with NGT) presents with 2 days of epigastric pain, nausea and emesis. Last flatus ,and BM on Friday, was diarrhea. Denies fevers, chills, SOB. CT c/w with recurrent SBO.   Patient was admitted to surgical service for further monitoring of SBO. NGT was placed on 3/26. Patient was kept NPO with IV fluids. Patient passed Gastrografin study on 3/27, and NGT was subsequently removed. When GI function returned, patient was started on clear liquids, which they tolerated without nausea or vomiting.  Patient was then advanced to regular diet, which they tolerated.   Internal medicine was consulted for medical co-management.   Infectious Diseases was consulted for multifocal PNA. Sputum culture (3/27) negative and urine legionella (3/27) negative. Pt was started on IV ceftriaxone, and will be discharged on PO Cefpodoxime 200mg BID for a total course of 7 days (End Date: 4/2/23)  Cardiology was consulted for history of Afib, and recommended continuing with Eliquis and Amiodarone  Physical therapy saw the patient and recommended [facility], but patient and family wants patient to be discharged home.  On the day of discharge, the patient's vitals are stable, pain is controlled, voiding urine, passing gas/stool, tolerating a regular diet, and ambulating well. Patient is stable for discharge. Pt will f/u with Dr. Reynolds in 1-2 weeks. Pt will f/u with PCP in 1-2 weeks. 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 Millard in 2018 treated with NGT then consult for Dr. Dae Murillo in 10/2020 treated conservatively without NGT, and in 2021 with NGT) presents with 2 days of epigastric pain, nausea and emesis. Last flatus ,and BM on Friday, was diarrhea. Denies fevers, chills, SOB. CT c/w with recurrent SBO.   Patient was admitted to surgical service for further monitoring of SBO. NGT was placed on 3/26. Patient was kept NPO with IV fluids. Patient passed Gastrografin study on 3/27, and NGT was subsequently removed. When GI function returned, patient was started on clear liquids, which they tolerated without nausea or vomiting.  Patient was then advanced to regular diet, which they tolerated.   Internal medicine was consulted for medical co-management.   Infectious Diseases was consulted for multifocal PNA. Sputum culture (3/27) negative and urine legionella (3/27) negative. Pt was started on IV ceftriaxone, and will be discharged on PO Cefpodoxime 200mg BID for a total course of 7 days (End Date: 4/2/23)  Cardiology was consulted for history of Afib, and recommended continuing with Eliquis and Amiodarone  Physical therapy saw the patient and recommended [facility], but patient and family wants patient to be discharged home with home PT  On the day of discharge, the patient's vitals are stable, pain is controlled, voiding urine, passing gas/stool, tolerating a regular diet, and ambulating well. Patient is stable for discharge. Pt will f/u with Dr. Reynolds in 1-2 weeks. Pt will f/u with PCP in 1-2 weeks.

## 2023-03-29 LAB
ANION GAP SERPL CALC-SCNC: 9 MMOL/L — SIGNIFICANT CHANGE UP (ref 5–17)
ANISOCYTOSIS BLD QL: SLIGHT — SIGNIFICANT CHANGE UP
BASOPHILS # BLD AUTO: 0 K/UL — SIGNIFICANT CHANGE UP (ref 0–0.2)
BASOPHILS NFR BLD AUTO: 0 % — SIGNIFICANT CHANGE UP (ref 0–2)
BUN SERPL-MCNC: 11 MG/DL — SIGNIFICANT CHANGE UP (ref 7–23)
CALCIUM SERPL-MCNC: 8.7 MG/DL — SIGNIFICANT CHANGE UP (ref 8.4–10.5)
CHLORIDE SERPL-SCNC: 102 MMOL/L — SIGNIFICANT CHANGE UP (ref 96–108)
CO2 SERPL-SCNC: 28 MMOL/L — SIGNIFICANT CHANGE UP (ref 22–31)
CREAT SERPL-MCNC: 0.45 MG/DL — LOW (ref 0.5–1.3)
CULTURE RESULTS: SIGNIFICANT CHANGE UP
DACRYOCYTES BLD QL SMEAR: SLIGHT — SIGNIFICANT CHANGE UP
EGFR: 87 ML/MIN/1.73M2 — SIGNIFICANT CHANGE UP
ELLIPTOCYTES BLD QL SMEAR: SIGNIFICANT CHANGE UP
EOSINOPHIL # BLD AUTO: 0.05 K/UL — SIGNIFICANT CHANGE UP (ref 0–0.5)
EOSINOPHIL NFR BLD AUTO: 2.6 % — SIGNIFICANT CHANGE UP (ref 0–6)
GLUCOSE SERPL-MCNC: 79 MG/DL — SIGNIFICANT CHANGE UP (ref 70–99)
HCT VFR BLD CALC: 34.3 % — LOW (ref 34.5–45)
HGB BLD-MCNC: 10.6 G/DL — LOW (ref 11.5–15.5)
LYMPHOCYTES # BLD AUTO: 0.15 K/UL — LOW (ref 1–3.3)
LYMPHOCYTES # BLD AUTO: 8.6 % — LOW (ref 13–44)
MACROCYTES BLD QL: SLIGHT — SIGNIFICANT CHANGE UP
MAGNESIUM SERPL-MCNC: 2.1 MG/DL — SIGNIFICANT CHANGE UP (ref 1.6–2.6)
MANUAL SMEAR VERIFICATION: SIGNIFICANT CHANGE UP
MCHC RBC-ENTMCNC: 30.9 GM/DL — LOW (ref 32–36)
MCHC RBC-ENTMCNC: 32.2 PG — SIGNIFICANT CHANGE UP (ref 27–34)
MCV RBC AUTO: 104.3 FL — HIGH (ref 80–100)
MONOCYTES # BLD AUTO: 0.15 K/UL — SIGNIFICANT CHANGE UP (ref 0–0.9)
MONOCYTES NFR BLD AUTO: 8.6 % — SIGNIFICANT CHANGE UP (ref 2–14)
NEUTROPHILS # BLD AUTO: 1.44 K/UL — LOW (ref 1.8–7.4)
NEUTROPHILS NFR BLD AUTO: 80.2 % — HIGH (ref 43–77)
PHOSPHATE SERPL-MCNC: 2.1 MG/DL — LOW (ref 2.5–4.5)
PLAT MORPH BLD: NORMAL — SIGNIFICANT CHANGE UP
PLATELET # BLD AUTO: 73 K/UL — LOW (ref 150–400)
POIKILOCYTOSIS BLD QL AUTO: SIGNIFICANT CHANGE UP
POTASSIUM SERPL-MCNC: 4.4 MMOL/L — SIGNIFICANT CHANGE UP (ref 3.5–5.3)
POTASSIUM SERPL-SCNC: 4.4 MMOL/L — SIGNIFICANT CHANGE UP (ref 3.5–5.3)
RBC # BLD: 3.29 M/UL — LOW (ref 3.8–5.2)
RBC # FLD: 13.2 % — SIGNIFICANT CHANGE UP (ref 10.3–14.5)
RBC BLD AUTO: ABNORMAL
SODIUM SERPL-SCNC: 139 MMOL/L — SIGNIFICANT CHANGE UP (ref 135–145)
SPECIMEN SOURCE: SIGNIFICANT CHANGE UP
WBC # BLD: 1.8 K/UL — LOW (ref 3.8–10.5)
WBC # FLD AUTO: 1.8 K/UL — LOW (ref 3.8–10.5)

## 2023-03-29 PROCEDURE — 99232 SBSQ HOSP IP/OBS MODERATE 35: CPT

## 2023-03-29 PROCEDURE — 71045 X-RAY EXAM CHEST 1 VIEW: CPT | Mod: 26

## 2023-03-29 RX ORDER — CEFPODOXIME PROXETIL 100 MG
1 TABLET ORAL
Qty: 8 | Refills: 0
Start: 2023-03-29 | End: 2023-04-01

## 2023-03-29 RX ORDER — SODIUM,POTASSIUM PHOSPHATES 278-250MG
2 POWDER IN PACKET (EA) ORAL ONCE
Refills: 0 | Status: COMPLETED | OUTPATIENT
Start: 2023-03-29 | End: 2023-03-29

## 2023-03-29 RX ADMIN — Medication 1 DROP(S): at 05:59

## 2023-03-29 RX ADMIN — PANTOPRAZOLE SODIUM 40 MILLIGRAM(S): 20 TABLET, DELAYED RELEASE ORAL at 05:59

## 2023-03-29 RX ADMIN — Medication 100 MICROGRAM(S): at 05:59

## 2023-03-29 RX ADMIN — APIXABAN 2.5 MILLIGRAM(S): 2.5 TABLET, FILM COATED ORAL at 05:59

## 2023-03-29 RX ADMIN — Medication 2 PACKET(S): at 11:34

## 2023-03-29 RX ADMIN — Medication 1 DROP(S): at 11:33

## 2023-03-29 RX ADMIN — AMIODARONE HYDROCHLORIDE 200 MILLIGRAM(S): 400 TABLET ORAL at 05:59

## 2023-03-29 RX ADMIN — Medication 1 DROP(S): at 22:59

## 2023-03-29 RX ADMIN — Medication 1 DROP(S): at 18:03

## 2023-03-29 RX ADMIN — CEFTRIAXONE 100 MILLIGRAM(S): 500 INJECTION, POWDER, FOR SOLUTION INTRAMUSCULAR; INTRAVENOUS at 05:59

## 2023-03-29 RX ADMIN — SERTRALINE 50 MILLIGRAM(S): 25 TABLET, FILM COATED ORAL at 11:34

## 2023-03-29 RX ADMIN — APIXABAN 2.5 MILLIGRAM(S): 2.5 TABLET, FILM COATED ORAL at 18:03

## 2023-03-29 NOTE — PROGRESS NOTE ADULT - ASSESSMENT
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 Millard in 2018 treated with NGT then consult for Dr. Dae Murillo in 10/2020 treated conservatively without NGT, and in 2021 with NGT) presents with 2 days of epigastric pain, nausea and emesis, CT c/w with recurrent SBO. additionally found to have multifocal pneumonia.      1. SBO   2. Leukopenia, Thrombocytopenia - chronic   3. Multifocal pneumonia    4. P afib on eliquis   5. 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 Millard in 2018 treated with NGT then consult for Dr. Dae Murillo in 10/2020 treated conservatively without NGT, and in 2021 with NGT) presents with 2 days of epigastric pain, nausea and emesis, CT c/w with recurrent SBO. additionally found to have multifocal pneumonia.      1. SBO   2. Leukopenia, Thrombocytopenia - chronic   3. Multifocal pneumonia    4. P afib on eliquis   5. B/L Adnexal cystic lesions incidentally found, outpt fu    Plan:   appreciate surgery recs  pain control  oob to chair  C/w Ceftriaxone per ID  titrate o2 prn  speech and swallow eval, ro aspiration pna  if hypertensive, can start norvasc 5mg qd  cont home meds amiodarone, synthroid and sertraline    dvt ppx: eliquis    Maritza Andrew, DO  [facility]  [phone]

## 2023-03-29 NOTE — PROVIDER CONTACT NOTE (OTHER) - ASSESSMENT
Pt on 2L nc. Rn attempted to wean pt to room air and pt desatted to 83% on room air. Pt denies SOB or difficulty breathing. Pt put back on 2L nc and O2 increased to 96%. Md at bedside and attempted trial to wean pt to room air afterwards. Pt desatted to 86%. Pt on cont pulse ox. Pt oob to chair and encouraged to use incentive spirometer.

## 2023-03-29 NOTE — PROGRESS NOTE ADULT - ASSESSMENT
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 Millard in 2018 treated with NGT then consult for Dr. Dae Murillo in 10/2020 treated conservatively without NGT, and in 2021 with NGT) presents with 2 days of epigastric pain, nausea and emesis. CT c/w SBO with TP in mid abdomen. Passed GG trial on 3/27, passing gas and bowel movements.    Plan:  - Diet: Regular  - PNA abx coverage. pending sputum cx   - May restart cardiac medications for now - eliquis and amiodarone (no heparin gtt at this time)  - Exam before pain medications  - PT: home with home PT vs [facility]  - Anticipate discharge today on Cefpodoxime 200 PO Bid for pneumonia, through 4/2    ACS/Trauma Surgery  [id number]

## 2023-03-29 NOTE — SWALLOW BEDSIDE ASSESSMENT ADULT - SWALLOW EVAL: DIAGNOSIS
89 yo Female presenting with abdominal pain with n/v/d. Pt found to have SBO (treated conservatively) and multifocal PNA. Pt presents w/ an overtly functional oral/pharyngeal swallow. No overt s/s of laryngeal penetration/aspiration noted across this exam. Upon palpation, laryngeal excursion and onset of pharyngeal swallow are WNL. This service will follow up to monitor diet tolerance while in-house.

## 2023-03-29 NOTE — SWALLOW BEDSIDE ASSESSMENT ADULT - SLP PERTINENT HISTORY OF CURRENT PROBLEM
89 yo Female with PMhx Afib (on Eliquus), gastritis, TB, HTN, SBO, and hypothyroidism, presenting with abdominal pain with n/v/d. Pt found to have SBO and multifocal PNA (likely aspiration in the setting of N/V). Pt previously treated conservatively for SBO w/ success. NGT placed and Pt transitioned to CLD; diet advanced to regular 3/28. Swallow evaluation ordered to r/o aspiration PNA.

## 2023-03-29 NOTE — PROGRESS NOTE ADULT - ASSESSMENT
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 Millard in 2018 treated with NGT then consult for Dr. Dae Murillo in 10/2020 treated conservatively without NGT, and in 2021 with NGT) presents with 2 days of epigastric pain, nausea and emesis and last BM with diarrhea on Friday, was diarrhea.     Small bowel obstruction   - s/p NGT removal 3/27  Multifocal pneumonia - suspect aspiration in setting of n/v   - RVP/COVID negative   - CT C/A/P with multifocal pneumonia, small bowel obstruction with transition at mid pelvis   - Legionella urine ag negative   - sputum culture with normal resp kameron   Leukopenia, thrombocytopenia - chronic per chart review, trend noted   H/o penicillin allergy (rash/hives)   - tolerating cephalosporins without issues     Recommendations:  SLP evaluation to assess for aspiration   Continue ceftriaxone 1g IV Q24h -- plan to complete total 7d course - end 4/2/23   - if continues to improve, can transition to cefpodoxime 200mg PO BID   supplemental oxygen as needed, wean as tolerated   Surgery following for SBO, diet advanced as tolerated   Monitor temps/CBC  Aspiration precautions     D/w Dr. Kamran Polanco M.D.  [facility], Division of Infectious Diseases  [phone]  After 5pm on weekdays and all day on weekends - please call [phone]

## 2023-03-29 NOTE — SWALLOW BEDSIDE ASSESSMENT ADULT - SLP GENERAL OBSERVATIONS
Encountered Pt sitting semi-upright in bed on RA. Pt is A&Ox3-4, verbally responsive, and able to follow simple commands. +baseline cough

## 2023-03-29 NOTE — SWALLOW BEDSIDE ASSESSMENT ADULT - COMMENTS
IMAGING:    ***Pt is not previously known to this service and no previous swallow studies noted in PACS. IMAGING:  3/26 CXR: IMPRESSION: Atelectasis at right lung base, and left apical pleural calcifications.  3/26 CT A/P: IMPRESSION: Small bowel obstruction with transition at the mid pelvis. Multifocal pneumonia. Images lower thorax are followed to resolution with repeat chest CT in one month, or sooner as clinically warranted.    ***Pt is not previously known to this service and no previous swallow studies noted in PACS.

## 2023-03-29 NOTE — PROGRESS NOTE ADULT - ASSESSMENT
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 Millard in 2018 treated with NGT then consult for Dr. Dae Murillo in 10/2020 treated conservatively without NGT, and in 2021 with NGT) presents with 2 days of epigastric pain, nausea and emesis, found to have SBO and pna.    #pAFib  -Stable  -Continue amiodarone  -Continue apixaban    #Mod AS  -As noted on prior echo  -No evidence of volume overload on exam  -Recent CT with Dense aortic valve calcification and multivessel coronary artery calcification  -Repeat echo  -If echo shows severe AS, she is unlikely to be candidate for surgical mgmt given her age    #SBO  -S/p NGT   -Surgery following  -Pt now tolerating regular diet    #Multifocal PNA  -Suspect 2/2 aspiration  -Abx per ID  -Rest of mgmt per med        Please call/text me with any questions/concerns between 8am-4pm  [phone]

## 2023-03-30 ENCOUNTER — TRANSCRIPTION ENCOUNTER (OUTPATIENT)
Age: 88
End: 2023-03-30

## 2023-03-30 VITALS
RESPIRATION RATE: 18 BRPM | DIASTOLIC BLOOD PRESSURE: 78 MMHG | SYSTOLIC BLOOD PRESSURE: 132 MMHG | HEART RATE: 98 BPM | TEMPERATURE: 97 F | OXYGEN SATURATION: 92 %

## 2023-03-30 PROCEDURE — 85027 COMPLETE CBC AUTOMATED: CPT

## 2023-03-30 PROCEDURE — 71045 X-RAY EXAM CHEST 1 VIEW: CPT

## 2023-03-30 PROCEDURE — 84484 ASSAY OF TROPONIN QUANT: CPT

## 2023-03-30 PROCEDURE — 85025 COMPLETE CBC W/AUTO DIFF WBC: CPT

## 2023-03-30 PROCEDURE — 82330 ASSAY OF CALCIUM: CPT

## 2023-03-30 PROCEDURE — 81001 URINALYSIS AUTO W/SCOPE: CPT

## 2023-03-30 PROCEDURE — 96375 TX/PRO/DX INJ NEW DRUG ADDON: CPT

## 2023-03-30 PROCEDURE — 96374 THER/PROPH/DIAG INJ IV PUSH: CPT

## 2023-03-30 PROCEDURE — 82803 BLOOD GASES ANY COMBINATION: CPT

## 2023-03-30 PROCEDURE — 94640 AIRWAY INHALATION TREATMENT: CPT

## 2023-03-30 PROCEDURE — 83690 ASSAY OF LIPASE: CPT

## 2023-03-30 PROCEDURE — 74177 CT ABD & PELVIS W/CONTRAST: CPT | Mod: MA

## 2023-03-30 PROCEDURE — 85730 THROMBOPLASTIN TIME PARTIAL: CPT

## 2023-03-30 PROCEDURE — 97161 PT EVAL LOW COMPLEX 20 MIN: CPT

## 2023-03-30 PROCEDURE — 36415 COLL VENOUS BLD VENIPUNCTURE: CPT

## 2023-03-30 PROCEDURE — 92610 EVALUATE SWALLOWING FUNCTION: CPT

## 2023-03-30 PROCEDURE — 99285 EMERGENCY DEPT VISIT HI MDM: CPT | Mod: 25

## 2023-03-30 PROCEDURE — 84132 ASSAY OF SERUM POTASSIUM: CPT

## 2023-03-30 PROCEDURE — 93005 ELECTROCARDIOGRAM TRACING: CPT

## 2023-03-30 PROCEDURE — 83735 ASSAY OF MAGNESIUM: CPT

## 2023-03-30 PROCEDURE — 87637 SARSCOV2&INF A&B&RSV AMP PRB: CPT

## 2023-03-30 PROCEDURE — 82565 ASSAY OF CREATININE: CPT

## 2023-03-30 PROCEDURE — 85610 PROTHROMBIN TIME: CPT

## 2023-03-30 PROCEDURE — 80053 COMPREHEN METABOLIC PANEL: CPT

## 2023-03-30 PROCEDURE — 85014 HEMATOCRIT: CPT

## 2023-03-30 PROCEDURE — 87070 CULTURE OTHR SPECIMN AEROBIC: CPT

## 2023-03-30 PROCEDURE — 80048 BASIC METABOLIC PNL TOTAL CA: CPT

## 2023-03-30 PROCEDURE — 87086 URINE CULTURE/COLONY COUNT: CPT

## 2023-03-30 PROCEDURE — 97530 THERAPEUTIC ACTIVITIES: CPT

## 2023-03-30 PROCEDURE — 84100 ASSAY OF PHOSPHORUS: CPT

## 2023-03-30 PROCEDURE — 74018 RADEX ABDOMEN 1 VIEW: CPT

## 2023-03-30 PROCEDURE — 84295 ASSAY OF SERUM SODIUM: CPT

## 2023-03-30 PROCEDURE — 83605 ASSAY OF LACTIC ACID: CPT

## 2023-03-30 PROCEDURE — 97116 GAIT TRAINING THERAPY: CPT

## 2023-03-30 PROCEDURE — 82947 ASSAY GLUCOSE BLOOD QUANT: CPT

## 2023-03-30 PROCEDURE — 82435 ASSAY OF BLOOD CHLORIDE: CPT

## 2023-03-30 PROCEDURE — 85018 HEMOGLOBIN: CPT

## 2023-03-30 PROCEDURE — 87449 NOS EACH ORGANISM AG IA: CPT

## 2023-03-30 RX ORDER — INFLUENZA VIRUS VACCINE 15; 15; 15; 15 UG/.5ML; UG/.5ML; UG/.5ML; UG/.5ML
0.7 SUSPENSION INTRAMUSCULAR ONCE
Refills: 0 | Status: DISCONTINUED | OUTPATIENT
Start: 2023-03-30 | End: 2023-03-30

## 2023-03-30 RX ADMIN — Medication 1 DROP(S): at 17:49

## 2023-03-30 RX ADMIN — SERTRALINE 50 MILLIGRAM(S): 25 TABLET, FILM COATED ORAL at 11:31

## 2023-03-30 RX ADMIN — Medication 1 DROP(S): at 05:32

## 2023-03-30 RX ADMIN — CEFTRIAXONE 100 MILLIGRAM(S): 500 INJECTION, POWDER, FOR SOLUTION INTRAMUSCULAR; INTRAVENOUS at 05:32

## 2023-03-30 RX ADMIN — Medication 1 DROP(S): at 11:19

## 2023-03-30 RX ADMIN — APIXABAN 2.5 MILLIGRAM(S): 2.5 TABLET, FILM COATED ORAL at 05:31

## 2023-03-30 RX ADMIN — PANTOPRAZOLE SODIUM 40 MILLIGRAM(S): 20 TABLET, DELAYED RELEASE ORAL at 05:31

## 2023-03-30 RX ADMIN — AMIODARONE HYDROCHLORIDE 200 MILLIGRAM(S): 400 TABLET ORAL at 05:31

## 2023-03-30 RX ADMIN — Medication 100 MICROGRAM(S): at 05:31

## 2023-03-30 RX ADMIN — APIXABAN 2.5 MILLIGRAM(S): 2.5 TABLET, FILM COATED ORAL at 17:49

## 2023-03-30 RX ADMIN — ALBUTEROL 2 PUFF(S): 90 AEROSOL, METERED ORAL at 03:45

## 2023-03-30 NOTE — PROGRESS NOTE ADULT - ASSESSMENT
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 Millard in 2018 treated with NGT then consult for Dr. Dae Murillo in 10/2020 treated conservatively without NGT, and in 2021 with NGT) presents with 2 days of epigastric pain, nausea and emesis, CT c/w with recurrent SBO. additionally found to have multifocal pneumonia.      1. SBO   2. Leukopenia, Thrombocytopenia - chronic   3. Multifocal pneumonia    4. P afib on eliquis   5. B/L Adnexal cystic lesions incidentally found, outpt fu    Plan:   appreciate surgery recs  pain control  oob to chair  C/w Ceftriaxone per ID dc on PO  on RA  speech and swallow eval recs reviewed  cont home meds amiodarone, synthroid and sertraline    dvt ppx: eliquis    Maritza Andrew, DO  [facility]  [phone]

## 2023-03-30 NOTE — PROGRESS NOTE ADULT - ASSESSMENT
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 Millard in 2018 treated with NGT then consult for Dr. Dae Murillo in 10/2020 treated conservatively without NGT, and in 2021 with NGT) presents with 2 days of epigastric pain, nausea and emesis, found to have SBO and pna.    #pAFib  -Stable  -Continue amiodarone  -Continue apixaban    #Mod AS  -As noted on prior echo  -No evidence of volume overload on exam  -Recent CT with Dense aortic valve calcification and multivessel coronary artery calcification  -Repeat echo  -If echo shows severe AS, she is unlikely to be candidate for surgical mgmt given her age    #SBO  -S/p NGT   -Surgery following  -Pt now tolerating regular diet    #Multifocal PNA  -Suspect 2/2 aspiration  -Abx per ID  -Rest of mgmt per med        Please call/text me with any questions/concerns between 8am-4pm  [phone] Echo 3/7/22: Mod AS, Mild-Mod AR, Nml lv fxn, mild diastolic dysfxn    A/p  89F hx of pAF on eliquis and amiodarone, 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 Millard in 2018 treated with NGT then consult for Dr. Dae Murillo in 10/2020 treated conservatively without NGT, and in 2021 with NGT) presents with 2 days of epigastric pain, nausea and emesis, found to have SBO and pna.    #pAFib  -Stable  -Continue amiodarone  -Continue apixaban    #Mod AS  -As noted on prior echo  -No evidence of volume overload on exam  -Recent CT with Dense aortic valve calcification and multivessel coronary artery calcification  -Repeat echo  -If echo shows severe AS, she is unlikely to be candidate for invasive mgmt given her age, PNA    #SBO  -S/p NGT   -Surgery following  -Pt now tolerating regular diet    #Multifocal PNA  -Suspect 2/2 aspiration  -Abx per ID  -Rest of mgmt per med        Please call/text me with any questions/concerns between 8am-4pm  [phone]

## 2023-03-30 NOTE — PROGRESS NOTE ADULT - SUBJECTIVE AND OBJECTIVE BOX
CARDIOLOGY FOLLOW UP - Dr. Moeller  DATE OF SERVICE: 3/30/23    CC  No CV complaints    REVIEW OF SYSTEMS:  CONSTITUTIONAL: No fever, weight loss, or fatigue  RESPIRATORY: No cough, wheezing, chills or hemoptysis; No Shortness of Breath  CARDIOVASCULAR: No chest pain, palpitations, passing out, dizziness, or leg swelling  GASTROINTESTINAL: No abdominal or epigastric pain. No nausea, vomiting, or hematemesis; No diarrhea or constipation. No melena or hematochezia.  VASCULAR: No edema     PHYSICAL EXAM:  T(C): 36.7 (03-30-23 @ 08:55), Max: 37.6 (03-30-23 @ 01:25)  HR: 102 (03-30-23 @ 08:55) (71 - 102)  BP: 146/95 (03-30-23 @ 08:55) (144/85 - 156/77)  RR: 18 (03-30-23 @ 08:55) (18 - 20)  SpO2: 90% (03-30-23 @ 08:55) (86% - 94%)  Wt(kg): --  I&O's Summary    29 Mar 2023 07:01  -  30 Mar 2023 07:00  --------------------------------------------------------  IN: 230 mL / OUT: 450 mL / NET: -220 mL        Appearance: Elderly female	  Cardiovascular: Normal S1 S2,RRR, No JVD, No murmurs  Respiratory: Lungs clear to auscultation	  Gastrointestinal:  Soft, Non-tender, + BS	  Extremities: Normal range of motion, No clubbing, cyanosis or edema      Home Medications:  albuterol 90 mcg/inh inhalation aerosol: 2 puff(s) inhaled every 6 hours, As needed, Bronchospasm (03 Oct 2022 15:49)  amiodarone 200 mg oral tablet: 1 orally (26 Mar 2023 12:48)  apixaban 2.5 mg oral tablet: 1 tab(s) orally 2 times a day (03 Oct 2022 15:49)  bromfenac 0.09% ophthalmic solution: 1 drop(s) to each affected eye once a day (01 Oct 2022 16:28)  levothyroxine 100 mcg (0.1 mg) oral tablet: 1 orally (26 Mar 2023 12:50)  pantoprazole 40 mg oral delayed release tablet: 1 tab(s) orally once a day (before a meal) (03 Oct 2022 15:49)  prednisoLONE acetate 1% ophthalmic suspension: 1 drop(s) to each affected eye 4 times a day (01 Oct 2022 16:28)  Restasis 0.05% ophthalmic emulsion: 1 drop(s) to each affected eye every 12 hours (01 Oct 2022 16:28)  sertraline 50 mg oral tablet: 1 orally (26 Mar 2023 12:49)  sodium chloride, hypertonic 2% ophthalmic solution: 1 drop(s) to each affected eye 4 times a day (01 Oct 2022 16:28)      MEDICATIONS  (STANDING):  aMIOdarone    Tablet 200 milliGRAM(s) Oral daily  apixaban 2.5 milliGRAM(s) Oral every 12 hours  cefTRIAXone   IVPB 1000 milliGRAM(s) IV Intermittent every 24 hours  ketorolac 0.5% Ophthalmic Solution 1 Drop(s) Both EYES daily  levothyroxine 100 MICROGram(s) Oral daily  pantoprazole    Tablet 40 milliGRAM(s) Oral before breakfast  prednisoLONE acetate 1% Suspension 1 Drop(s) Both EYES four times a day  sertraline 50 milliGRAM(s) Oral daily  sodium chloride 2% Ophthalmic Solution 1 Drop(s) Both EYES four times a day      TELEMETRY: 	    ECG:  	  RADIOLOGY:   DIAGNOSTIC TESTING:  [ ] Echocardiogram:  [ ]  Catheterization:  [ ] Stress Test:    OTHER: 	    LABS:	 	    Troponin T, High Sensitivity Result: 13 ng/L [0 - 51] (03-26 @ 06:55)                          10.6   1.80  )-----------( 73       ( 29 Mar 2023 06:58 )             34.3     03-29    139  |  102  |  11  ----------------------------<  79  4.4   |  28  |  0.45<L>    Ca    8.7      29 Mar 2023 07:09  Phos  2.1     03-29  Mg     2.1     03-29              
[facility]  SWAPNIL Rene Y. Patel, S. Shah, G. Casimir  [phone]  ([phone] - weekdays after 5pm and weekends)    Name: CATHRYN LARA  Age/Gender: 89y Female  MRN: [id number]    Interval History:  Patient seen and examined this morning.   Patient coughing more, denies dyspnea or pain.   Denies fever, chills, difficulty swallowing.  Notes reviewed. Afebrile. PCA at bedside.     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 reported allergy (Unknown)  penicillin (Rash)    Objective:  Vitals:   T(F): 99.2 (03-29-23 @ 08:55), Max: 99.2 (03-29-23 @ 08:55)  HR: 73 (03-29-23 @ 08:55) (59 - 76)  BP: 161/84 (03-29-23 @ 08:55) (121/67 - 168/76)  RR: 20 (03-29-23 @ 08:55) (18 - 20)  SpO2: 94% (03-29-23 @ 08:55) (94% - 99%)  Physical Examination:  General: no acute distress, NC, coughing   HEENT: NC/AT, EOMI, anicteric, neck supple  Cardio: S1, S2 present, normal rate  Resp: coarse breath sounds, dec at bases  Abd: soft, NT, ND, + BS  Neuro: awake, alert, [place], no obvious focal deficits  Ext: no edema, no cyanosis, moving extremities  Skin: warm, dry, no visible rash  Psych: appropriate affect and mood for situation  Lines: PIV    Laboratory Studies:  CBC:                       10.6   1.80  )-----------( 73       ( 29 Mar 2023 06:58 )             34.3     WBC Trend:  1.80 03-29-23 @ 06:58  1.98 03-28-23 @ 06:28  1.67 03-27-23 @ 07:17  2.20 03-26-23 @ 06:55    CMP: 03-29    139  |  102  |  11  ----------------------------<  79  4.4   |  28  |  0.45<L>    Ca    8.7      29 Mar 2023 07:09  Phos  2.1     03-29  Mg     2.1     03-29    TPro  6.3  /  Alb  3.3  /  TBili  0.7  /  DBili  x   /  AST  15  /  ALT  14  /  AlkPhos  49  03-28    Creatinine, Serum: 0.45 mg/dL (03-29-23 @ 07:09)  Creatinine, Serum: 0.51 mg/dL (03-28-23 @ 06:27)  Creatinine, Serum: 0.47 mg/dL (03-27-23 @ 07:17)  Creatinine, Serum: 0.66 mg/dL (03-26-23 @ 06:55)    LIVER FUNCTIONS - ( 28 Mar 2023 06:27 )  Alb: 3.3 g/dL / Pro: 6.3 g/dL / ALK PHOS: 49 U/L / ALT: 14 U/L / AST: 15 U/L / GGT: x           Microbiology: reviewed   Culture - Sputum (collected 03-27-23 @ 13:03)  Source: .Sputum Sputum  Gram Stain (03-28-23 @ 07:36):    No polymorphonuclear leukocytes per low power field    Few Squamous epithelial cells per low power field    Few Gram Positive Rods seen per oil power field    Few Gram Negative Rods seen per oil power field  Preliminary Report (03-28-23 @ 20:23):    Normal Respiratory Kameron present    Culture - Urine (collected 03-26-23 @ 14:32)  Source: Clean Catch Clean Catch (Midstream)  Final Report (03-27-23 @ 23:04):    Normal Urogenital kameron present    SARS-CoV-2 Result: NotDete (26 Mar 2023 11:45)    Radiology: reviewed     Medications:  albuterol    90 MICROgram(s) HFA Inhaler 2 Puff(s) Inhalation every 6 hours PRN  aMIOdarone    Tablet 200 milliGRAM(s) Oral daily  apixaban 2.5 milliGRAM(s) Oral every 12 hours  cefTRIAXone   IVPB 1000 milliGRAM(s) IV Intermittent every 24 hours  ketorolac 0.5% Ophthalmic Solution 1 Drop(s) Both EYES daily  levothyroxine 100 MICROGram(s) Oral daily  pantoprazole    Tablet 40 milliGRAM(s) Oral before breakfast  prednisoLONE acetate 1% Suspension 1 Drop(s) Both EYES four times a day  sertraline 50 milliGRAM(s) Oral daily  sodium chloride 2% Ophthalmic Solution 1 Drop(s) Both EYES four times a day    Current Antimicrobials:  cefTRIAXone   IVPB 1000 milliGRAM(s) IV Intermittent every 24 hours    Prior/Completed Antimicrobials:  azithromycin  IVPB  cefTRIAXone   IVPB  
Patient is a 89y old  Female who presents with a chief complaint of SBO (28 Mar 2023 09:14)      SUBJECTIVE / OVERNIGHT EVENTS:  Patient seen and examined.   patient sleeping.   per chart had bm,      Vital Signs Last 24 Hrs  T(C): 37.2 (28 Mar 2023 09:10), Max: 37.8 (27 Mar 2023 20:47)  T(F): 98.9 (28 Mar 2023 09:10), Max: 100.1 (27 Mar 2023 20:47)  HR: 74 (28 Mar 2023 09:10) (66 - 99)  BP: 157/70 (28 Mar 2023 09:10) (131/74 - 173/80)  BP(mean): --  RR: 18 (28 Mar 2023 09:10) (18 - 18)  SpO2: 96% (28 Mar 2023 09:10) (96% - 99%)    Parameters below as of 28 Mar 2023 09:10  Patient On (Oxygen Delivery Method): nasal cannula  O2 Flow (L/min): 2    I&O's Summary    27 Mar 2023 07:01  -  28 Mar 2023 07:00  --------------------------------------------------------  IN: 1250 mL / OUT: 720 mL / NET: 530 mL    28 Mar 2023 07:01  -  28 Mar 2023 12:31  --------------------------------------------------------  IN: 120 mL / OUT: 50 mL / NET: 70 mL        PHYSICAL EXAM:  GENERAL: NAD, AAOx3  HEAD:  Atraumatic, Normocephalic  EYES: EOMI; conjunctiva and sclera clear  NECK: Supple, No JVD, No LAD  CHEST/LUNG: B/L air entry; No wheezes, rales or rhonci   HEART: Regular rate and rhythm; No murmurs, rubs, or gallops  ABDOMEN: Soft, Nontender, Nondistended; Bowel sounds present  EXTREMITIES:  2+ Peripheral Pulses, No clubbing, cyanosis, or edema  SKIN: No rashes or lesions    LABS:                        10.6   1.98  )-----------( 71       ( 28 Mar 2023 06:28 )             33.7     03-28    140  |  99  |  10  ----------------------------<  63<L>  3.6   |  29  |  0.51    Ca    8.5      28 Mar 2023 06:27  Phos  3.2     03-28  Mg     2.2     03-28    TPro  6.3  /  Alb  3.3  /  TBili  0.7  /  DBili  x   /  AST  15  /  ALT  14  /  AlkPhos  49  03-28    PT/INR - ( 27 Mar 2023 07:17 )   PT: 13.3 sec;   INR: 1.14 ratio         PTT - ( 27 Mar 2023 07:17 )  PTT:27.8 sec  CAPILLARY BLOOD GLUCOSE            Urinalysis Basic - ( 26 Mar 2023 14:32 )    Color: Yellow / Appearance: Clear / SG: >1.050 / pH: x  Gluc: x / Ketone: Negative  / Bili: Negative / Urobili: Negative   Blood: x / Protein: 30 mg/dL / Nitrite: Negative   Leuk Esterase: Negative / RBC: 7 /hpf / WBC 3 /HPF   Sq Epi: x / Non Sq Epi: 20 /hpf / Bacteria: Negative        RADIOLOGY & ADDITIONAL TESTS:    Imaging Personally Reviewed:  [x] YES  [ ] NO    Consultant(s) Notes Reviewed:  [x] YES  [ ] NO      MEDICATIONS  (STANDING):  aMIOdarone    Tablet 200 milliGRAM(s) Oral daily  apixaban 2.5 milliGRAM(s) Oral every 12 hours  cefTRIAXone   IVPB 1000 milliGRAM(s) IV Intermittent every 24 hours  ketorolac 0.5% Ophthalmic Solution 1 Drop(s) Both EYES daily  lactated ringers. 1000 milliLiter(s) (100 mL/Hr) IV Continuous <Continuous>  levothyroxine 100 MICROGram(s) Oral daily  pantoprazole    Tablet 40 milliGRAM(s) Oral before breakfast  prednisoLONE acetate 1% Suspension 1 Drop(s) Both EYES four times a day  sertraline 50 milliGRAM(s) Oral daily  sodium chloride 2% Ophthalmic Solution 1 Drop(s) Both EYES four times a day    MEDICATIONS  (PRN):  albuterol    90 MICROgram(s) HFA Inhaler 2 Puff(s) Inhalation every 6 hours PRN Bronchospasm      Care Discussed with Consultants/Other Providers [x] YES  [ ] NO    HEALTH ISSUES - PROBLEM Dx:      
TRAUMA SURGERY DAILY PROGRESS NOTE:     SUBJECTIVE/ROS: Patient feels well. Tolerated clears. Reports having a BM yesterday.   Denies nausea, vomiting, chest pain, shortness of breath     MEDICATIONS  (STANDING):  cefTRIAXone   IVPB 1000 milliGRAM(s) IV Intermittent every 24 hours  heparin   Injectable 5000 Unit(s) SubCutaneous every 8 hours  lactated ringers. 1000 milliLiter(s) (100 mL/Hr) IV Continuous <Continuous>  levothyroxine Injectable 75 MICROGram(s) IV Push at bedtime  pantoprazole  Injectable 40 milliGRAM(s) IV Push every 24 hours    MEDICATIONS  (PRN):    OBJECTIVE:  Vital Signs Last 24 Hrs  T(C): 36.9 (28 Mar 2023 04:42), Max: 37.8 (27 Mar 2023 20:47)  T(F): 98.4 (28 Mar 2023 04:42), Max: 100.1 (27 Mar 2023 20:47)  HR: 99 (28 Mar 2023 04:42) (66 - 99)  BP: 158/79 (28 Mar 2023 05:09) (131/74 - 173/80)  BP(mean): --  RR: 18 (28 Mar 2023 04:42) (18 - 18)  SpO2: 99% (28 Mar 2023 04:42) (96% - 99%)    Parameters below as of 28 Mar 2023 04:42  Patient On (Oxygen Delivery Method): nasal cannula  O2 Flow (L/min): 2    I&O's Detail  27 Mar 2023 07:01  -  28 Mar 2023 07:00  --------------------------------------------------------  IN:    IV PiggyBack: 50 mL    Lactated Ringers: 1200 mL  Total IN: 1250 mL    OUT:    Oral Fluid: 0 mL    Voided (mL): 720 mL  Total OUT: 720 mL  Total NET: 530 mL      LABS:                        10.6   1.98  )-----------( 71       ( 28 Mar 2023 06:28 )             33.7     03-28    140  |  99  |  10  ----------------------------<  63<L>  3.6   |  29  |  0.51    Ca    8.5      28 Mar 2023 06:27  Phos  3.2     03-28  Mg     2.2     03-28    TPro  6.3  /  Alb  3.3  /  TBili  0.7  /  DBili  x   /  AST  15  /  ALT  14  /  AlkPhos  49  03-28  PT/INR - ( 27 Mar 2023 07:17 )   PT: 13.3 sec;   INR: 1.14 ratio    PTT - ( 27 Mar 2023 07:17 )  PTT:27.8 sec    Urinalysis Basic - ( 26 Mar 2023 14:32 )  Color: Yellow / Appearance: Clear / SG: >1.050 / pH: x  Gluc: x / Ketone: Negative  / Bili: Negative / Urobili: Negative   Blood: x / Protein: 30 mg/dL / Nitrite: Negative   Leuk Esterase: Negative / RBC: 7 /hpf / WBC 3 /HPF   Sq Epi: x / Non Sq Epi: 20 /hpf / Bacteria: Negative      PHYSICAL EXAM:  Constitutional: A&Ox3, NAD, cooperative to examination.  Respiratory: CTA b/l, unlabored breathing, b/l equal chest rise.  Abdomen: Soft, non distended, non tender   Extremities: WWP, YTAES spontaneously    
ACS/Trauma SURGERY PROGRESS NOTE:    SUBJECTIVE: Patient seen and examined at bedside with surgical team. Denies fever, chills, CP.    OBJECTIVE:  Vital Signs Last 24 Hrs  T(C): 37.1 (27 Mar 2023 04:43), Max: 37.1 (27 Mar 2023 04:43)  T(F): 98.8 (27 Mar 2023 04:43), Max: 98.8 (27 Mar 2023 04:43)  HR: 68 (27 Mar 2023 04:43) (60 - 71)  BP: 146/73 (27 Mar 2023 04:43) (121/86 - 185/71)  BP(mean): --  RR: 18 (27 Mar 2023 04:43) (16 - 19)  SpO2: 97% (27 Mar 2023 04:43) (94% - 100%)  Parameters below as of 27 Mar 2023 04:43  Patient On (Oxygen Delivery Method): nasal cannula  O2 Flow (L/min): 2  I&O's Detail    26 Mar 2023 07:01  -  27 Mar 2023 07:00  --------------------------------------------------------  IN:    Lactated Ringers: 1200 mL  Total IN: 1200 mL    OUT:    Nasogastric/Oral tube (mL): 300 mL    Oral Fluid: 0 mL    Voided (mL): 800 mL  Total OUT: 1100 mL  Total NET: 100 mL    PHYSICAL EXAM:  Constitutional: A&Ox3, NAD, cooperative to examination.  Respiratory: CTA b/l, unlabored breathing, b/l equal chest rise.  Abdomen: Soft, mildly TTP, NGT LCWS  Extremities: WWP, YATES spontaneously    LABS:                     10.3   2.20  )-----------( 72       ( 26 Mar 2023 06:55 )             32.6     03-26    138  |  103  |  11  ----------------------------<  102<H>  4.4   |  24  |  0.66    Ca    8.3<L>      26 Mar 2023 06:55  Mg     1.9     03-26    TPro  6.1  /  Alb  3.6  /  TBili  0.8  /  DBili  x   /  AST  28  /  ALT  18  /  AlkPhos  49  03-26    PT/INR - ( 26 Mar 2023 06:55 )   PT: 14.1 sec;   INR: 1.22 ratio    PTT - ( 26 Mar 2023 06:55 )  PTT:25.2 sec    LIVER FUNCTIONS - ( 26 Mar 2023 06:55 )  Alb: 3.6 g/dL / Pro: 6.1 g/dL / ALK PHOS: 49 U/L / ALT: 18 U/L / AST: 28 U/L / GGT: x           Urinalysis Basic - ( 26 Mar 2023 14:32 )  Color: Yellow / Appearance: Clear / SG: >1.050 / pH: x  Gluc: x / Ketone: Negative  / Bili: Negative / Urobili: Negative   Blood: x / Protein: 30 mg/dL / Nitrite: Negative   Leuk Esterase: Negative / RBC: 7 /hpf / WBC 3 /HPF   Sq Epi: x / Non Sq Epi: 20 /hpf / Bacteria: Negative
CARDIOLOGY FOLLOW UP - Dr. Moeller  DATE OF SERVICE: 3/29/23    CC  No CV complaints    REVIEW OF SYSTEMS:  CONSTITUTIONAL: No fever, weight loss, or fatigue  RESPIRATORY: No cough, wheezing, chills or hemoptysis; No Shortness of Breath  CARDIOVASCULAR: No chest pain, palpitations, passing out, dizziness, or leg swelling  GASTROINTESTINAL: No abdominal or epigastric pain. No nausea, vomiting, or hematemesis; No diarrhea or constipation. No melena or hematochezia.  VASCULAR: No edema     PHYSICAL EXAM:  T(C): 37.3 (03-29-23 @ 08:55), Max: 37.3 (03-29-23 @ 08:55)  HR: 73 (03-29-23 @ 08:55) (59 - 76)  BP: 161/84 (03-29-23 @ 08:55) (121/67 - 168/76)  RR: 20 (03-29-23 @ 08:55) (18 - 20)  SpO2: 94% (03-29-23 @ 08:55) (94% - 99%)  Wt(kg): --  I&O's Summary    28 Mar 2023 07:01  -  29 Mar 2023 07:00  --------------------------------------------------------  IN: 230 mL / OUT: 350 mL / NET: -120 mL    29 Mar 2023 07:01  -  29 Mar 2023 10:44  --------------------------------------------------------  IN: 120 mL / OUT: 0 mL / NET: 120 mL        Appearance: Elderly female	  Cardiovascular: Normal S1 S2,RRR, No JVD, No murmurs  Respiratory: Lungs clear to auscultation b/l	  Gastrointestinal:  Soft, Non-tender, + BS	  Extremities: Normal range of motion, No clubbing, cyanosis or edema      Home Medications:  albuterol 90 mcg/inh inhalation aerosol: 2 puff(s) inhaled every 6 hours, As needed, Bronchospasm (03 Oct 2022 15:49)  amiodarone 200 mg oral tablet: 1 orally (26 Mar 2023 12:48)  apixaban 2.5 mg oral tablet: 1 tab(s) orally 2 times a day (03 Oct 2022 15:49)  bromfenac 0.09% ophthalmic solution: 1 drop(s) to each affected eye once a day (01 Oct 2022 16:28)  levothyroxine 100 mcg (0.1 mg) oral tablet: 1 orally (26 Mar 2023 12:50)  pantoprazole 40 mg oral delayed release tablet: 1 tab(s) orally once a day (before a meal) (03 Oct 2022 15:49)  prednisoLONE acetate 1% ophthalmic suspension: 1 drop(s) to each affected eye 4 times a day (01 Oct 2022 16:28)  Restasis 0.05% ophthalmic emulsion: 1 drop(s) to each affected eye every 12 hours (01 Oct 2022 16:28)  sertraline 50 mg oral tablet: 1 orally (26 Mar 2023 12:49)  sodium chloride, hypertonic 2% ophthalmic solution: 1 drop(s) to each affected eye 4 times a day (01 Oct 2022 16:28)      MEDICATIONS  (STANDING):  aMIOdarone    Tablet 200 milliGRAM(s) Oral daily  apixaban 2.5 milliGRAM(s) Oral every 12 hours  cefTRIAXone   IVPB 1000 milliGRAM(s) IV Intermittent every 24 hours  ketorolac 0.5% Ophthalmic Solution 1 Drop(s) Both EYES daily  levothyroxine 100 MICROGram(s) Oral daily  pantoprazole    Tablet 40 milliGRAM(s) Oral before breakfast  potassium phosphate / sodium phosphate Powder (PHOS-NaK) 2 Packet(s) Oral once  prednisoLONE acetate 1% Suspension 1 Drop(s) Both EYES four times a day  sertraline 50 milliGRAM(s) Oral daily  sodium chloride 2% Ophthalmic Solution 1 Drop(s) Both EYES four times a day      TELEMETRY: 	    ECG:  	  RADIOLOGY:   DIAGNOSTIC TESTING:  [ ] Echocardiogram:  [ ]  Catheterization:  [ ] Stress Test:    OTHER: 	    LABS:	 	    Troponin T, High Sensitivity Result: 13 ng/L [0 - 51] (03-26 @ 06:55)                          10.6   1.80  )-----------( 73       ( 29 Mar 2023 06:58 )             34.3     03-29    139  |  102  |  11  ----------------------------<  79  4.4   |  28  |  0.45<L>    Ca    8.7      29 Mar 2023 07:09  Phos  2.1     03-29  Mg     2.1     03-29    TPro  6.3  /  Alb  3.3  /  TBili  0.7  /  DBili  x   /  AST  15  /  ALT  14  /  AlkPhos  49  03-28            
GENERAL SURGERY PROGRESS NOTE    SUBJECTIVE  No acute events overnight. Feeling well on morning rounds.     10-point review of systems completed and negative except as noted above.    MEDICATIONS  albuterol    90 MICROgram(s) HFA Inhaler 2 Puff(s) Inhalation every 6 hours PRN  aMIOdarone    Tablet 200 milliGRAM(s) Oral daily  apixaban 2.5 milliGRAM(s) Oral every 12 hours  cefTRIAXone   IVPB 1000 milliGRAM(s) IV Intermittent every 24 hours  ketorolac 0.5% Ophthalmic Solution 1 Drop(s) Both EYES daily  levothyroxine 100 MICROGram(s) Oral daily  pantoprazole    Tablet 40 milliGRAM(s) Oral before breakfast  prednisoLONE acetate 1% Suspension 1 Drop(s) Both EYES four times a day  sertraline 50 milliGRAM(s) Oral daily  sodium chloride 2% Ophthalmic Solution 1 Drop(s) Both EYES four times a day      OBJECTIVE  Vital Signs Last 24 Hrs  T(C): 36.9 (30 Mar 2023 05:40), Max: 37.6 (30 Mar 2023 01:25)  T(F): 98.5 (30 Mar 2023 05:40), Max: 99.6 (30 Mar 2023 01:25)  HR: 97 (30 Mar 2023 05:40) (71 - 97)  BP: 144/85 (30 Mar 2023 05:40) (144/85 - 161/84)  BP(mean): --  RR: 18 (30 Mar 2023 05:40) (18 - 20)  SpO2: 94% (30 Mar 2023 05:40) (86% - 94%)    Parameters below as of 30 Mar 2023 05:40  Patient On (Oxygen Delivery Method): room air    I&O's Detail    29 Mar 2023 07:01  -  30 Mar 2023 07:00  --------------------------------------------------------  IN:    IV PiggyBack: 50 mL    Oral Fluid: 180 mL  Total IN: 230 mL    OUT:    Voided (mL): 450 mL  Total OUT: 450 mL    Total NET: -220 mL      LABS                        10.6   1.80  )-----------( 73       ( 29 Mar 2023 06:58 )             34.3     03-29    139  |  102  |  11  ----------------------------<  79  4.4   |  28  |  0.45<L>    Ca    8.7      29 Mar 2023 07:09  Phos  2.1     03-29  Mg     2.1     03-29      PHYSICAL EXAM  Constitutional: A&Ox3, NAD, cooperative to examination.  Respiratory: unlabored breathing on room air, b/l equal chest rise.  Abdomen: Soft, non distended, non tender   Extremities: grossly symmetric
[facility]  SWAPNIL Rene Y. Patel, S. Shah, G. Casimir  [phone]  ([phone] - weekdays after 5pm and weekends)    Name: CATHRYN LARA  Age/Gender: 89y Female  MRN: [id number]    Interval History:  Patient seen and examined this morning.   Feels better today, less cough, no dyspnea or pain.   Notes reviewed. No concerning overnight events.  Afebrile.     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 reported allergy (Unknown)  penicillin (Rash)    Objective:  Vitals:   T(F): 98 (03-30-23 @ 08:55), Max: 99.6 (03-30-23 @ 01:25)  HR: 102 (03-30-23 @ 08:55) (71 - 102)  BP: 146/95 (03-30-23 @ 08:55) (144/85 - 156/77)  RR: 18 (03-30-23 @ 08:55) (18 - 20)  SpO2: 90% (03-30-23 @ 08:55) (86% - 94%)  Physical Examination:  General: no acute distress, on RA sat 92-93%  HEENT: NC/AT, EOMI, anicteric, neck supple  Cardio: S1, S2 present, normal rate, no murmur  Resp: clear to auscultation bilaterally  Abd: soft, NT, ND, + BS  Neuro: AAOx3, no obvious focal deficits  Ext: no edema, no cyanosis, moving extremities  Skin: warm, dry, no visible rash  Psych: appropriate affect and mood for situation  Lines: PIV    Laboratory Studies:  CBC:                       10.6   1.80  )-----------( 73       ( 29 Mar 2023 06:58 )             34.3     WBC Trend:  1.80 03-29-23 @ 06:58  1.98 03-28-23 @ 06:28  1.67 03-27-23 @ 07:17  2.20 03-26-23 @ 06:55    CMP: 03-29    139  |  102  |  11  ----------------------------<  79  4.4   |  28  |  0.45<L>    Ca    8.7      29 Mar 2023 07:09  Phos  2.1     03-29  Mg     2.1     03-29    Creatinine, Serum: 0.45 mg/dL (03-29-23 @ 07:09)  Creatinine, Serum: 0.51 mg/dL (03-28-23 @ 06:27)  Creatinine, Serum: 0.47 mg/dL (03-27-23 @ 07:17)  Creatinine, Serum: 0.66 mg/dL (03-26-23 @ 06:55)    Microbiology: reviewed   Culture - Sputum (collected 03-27-23 @ 13:03)  Source: .Sputum Sputum  Gram Stain (03-28-23 @ 07:36):    No polymorphonuclear leukocytes per low power field    Few Squamous epithelial cells per low power field    Few Gram Positive Rods seen per oil power field    Few Gram Negative Rods seen per oil power field  Final Report (03-29-23 @ 16:54):    Normal Respiratory Kameron present    Culture - Urine (collected 03-26-23 @ 14:32)  Source: Clean Catch Clean Catch (Midstream)  Final Report (03-27-23 @ 23:04):    Normal Urogenital kameron present    SARS-CoV-2 Result: NotDetec (26 Mar 2023 11:45)    Radiology: reviewed     Medications:  albuterol    90 MICROgram(s) HFA Inhaler 2 Puff(s) Inhalation every 6 hours PRN  aMIOdarone    Tablet 200 milliGRAM(s) Oral daily  apixaban 2.5 milliGRAM(s) Oral every 12 hours  cefTRIAXone   IVPB 1000 milliGRAM(s) IV Intermittent every 24 hours  ketorolac 0.5% Ophthalmic Solution 1 Drop(s) Both EYES daily  levothyroxine 100 MICROGram(s) Oral daily  pantoprazole    Tablet 40 milliGRAM(s) Oral before breakfast  prednisoLONE acetate 1% Suspension 1 Drop(s) Both EYES four times a day  sertraline 50 milliGRAM(s) Oral daily  sodium chloride 2% Ophthalmic Solution 1 Drop(s) Both EYES four times a day    Current Antimicrobials:  cefTRIAXone   IVPB 1000 milliGRAM(s) IV Intermittent every 24 hours    Prior/Completed Antimicrobials:  azithromycin  IVPB  cefTRIAXone   IVPB  
GENERAL SURGERY PROGRESS NOTE    SUBJECTIVE  No acute events overnight. Feeling well on morning rounds.     10-point review of systems completed and negative except as noted above.      OBJECTIVE    MEDICATIONS  albuterol    90 MICROgram(s) HFA Inhaler 2 Puff(s) Inhalation every 6 hours PRN  aMIOdarone    Tablet 200 milliGRAM(s) Oral daily  apixaban 2.5 milliGRAM(s) Oral every 12 hours  cefTRIAXone   IVPB 1000 milliGRAM(s) IV Intermittent every 24 hours  ketorolac 0.5% Ophthalmic Solution 1 Drop(s) Both EYES daily  levothyroxine 100 MICROGram(s) Oral daily  pantoprazole    Tablet 40 milliGRAM(s) Oral before breakfast  prednisoLONE acetate 1% Suspension 1 Drop(s) Both EYES four times a day  sertraline 50 milliGRAM(s) Oral daily  sodium chloride 2% Ophthalmic Solution 1 Drop(s) Both EYES four times a day      PHYSICAL EXAM  T(C): 36.8 (03-29-23 @ 05:08), Max: 37.2 (03-28-23 @ 09:10)  HR: 60 (03-29-23 @ 05:08) (59 - 76)  BP: 160/77 (03-29-23 @ 05:08) (121/67 - 168/76)  RR: 18 (03-29-23 @ 05:08) (18 - 18)  SpO2: 97% (03-29-23 @ 05:08) (94% - 99%)    03-27-23 @ 07:01  -  03-28-23 @ 07:00  --------------------------------------------------------  IN: 1250 mL / OUT: 720 mL / NET: 530 mL    03-28-23 @ 07:01  -  03-29-23 @ 05:56  --------------------------------------------------------  IN: 180 mL / OUT: 50 mL / NET: 130 mL        Constitutional: A&Ox3, NAD, cooperative to examination.  Respiratory: unlabored breathing on room air, b/l equal chest rise.  Abdomen: Soft, non distended, non tender   Extremities: grossly symmetric    LABS                        10.6   1.98  )-----------( 71       ( 28 Mar 2023 06:28 )             33.7     03-28    140  |  99  |  10  ----------------------------<  63<L>  3.6   |  29  |  0.51    Ca    8.5      28 Mar 2023 06:27  Phos  3.2     03-28  Mg     2.2     03-28    TPro  6.3  /  Alb  3.3  /  TBili  0.7  /  DBili  x   /  AST  15  /  ALT  14  /  AlkPhos  49  03-28    PT/INR - ( 27 Mar 2023 07:17 )   PT: 13.3 sec;   INR: 1.14 ratio         PTT - ( 27 Mar 2023 07:17 )  PTT:27.8 sec      RADIOLOGY & ADDITIONAL STUDIES  
[facility] DIV[facility] OF INFECTIOUS DISEASES  SWAPNIL Rene Y. Patel, S. Shah, G. Mark  [phone]  ([phone] - weekdays after 5pm and weekends)    Name: CATHRYN LARA  Age/Gender: 89y Female  MRN: [id number]    Interval History:  Patient seen and examined this morning.   Resting comfortably, denies pain, NGT removed.   Notes reviewed. No concerning overnight events.  Afebrile. Nursing at bedside.     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 reported allergy (Unknown)  penicillin (Rash)      Objective:  Vitals:   T(F): 98.9 (03-28-23 @ 09:10), Max: 100.1 (03-27-23 @ 20:47)  HR: 74 (03-28-23 @ 09:10) (66 - 99)  BP: 157/70 (03-28-23 @ 09:10) (131/74 - 173/80)  RR: 18 (03-28-23 @ 09:10) (18 - 18)  SpO2: 96% (03-28-23 @ 09:10) (96% - 99%)  Physical Examination:  General: no acute distress  HEENT: NC/AT, EOMI, anicteric, neck supple  Cardio: S1, S2 present, normal rate  Resp: clear to auscultation bilaterally  Abd: soft, NT, ND, + BS  Neuro: awake, alert, no obvious focal deficits  Ext: no edema, no cyanosis, moving extremities  Skin: warm, dry, no visible rash  Psych: appropriate affect and mood for situation  Lines: PIV    Laboratory Studies:  CBC:                       10.6   1.98  )-----------( 71       ( 28 Mar 2023 06:28 )             33.7     WBC Trend:  1.98 03-28-23 @ 06:28  1.67 03-27-23 @ 07:17  2.20 03-26-23 @ 06:55    CMP: 03-28    140  |  99  |  10  ----------------------------<  63<L>  3.6   |  29  |  0.51    Ca    8.5      28 Mar 2023 06:27  Phos  3.2     03-28  Mg     2.2     03-28    TPro  6.3  /  Alb  3.3  /  TBili  0.7  /  DBili  x   /  AST  15  /  ALT  14  /  AlkPhos  49  03-28    Creatinine, Serum: 0.51 mg/dL (03-28-23 @ 06:27)  Creatinine, Serum: 0.47 mg/dL (03-27-23 @ 07:17)  Creatinine, Serum: 0.66 mg/dL (03-26-23 @ 06:55)    LIVER FUNCTIONS - ( 28 Mar 2023 06:27 )  Alb: 3.3 g/dL / Pro: 6.3 g/dL / ALK PHOS: 49 U/L / ALT: 14 U/L / AST: 15 U/L / GGT: x           Urinalysis Basic - ( 26 Mar 2023 14:32 )  Color: Yellow / Appearance: Clear / SG: >1.050 / pH: x  Gluc: x / Ketone: Negative  / Bili: Negative / Urobili: Negative   Blood: x / Protein: 30 mg/dL / Nitrite: Negative   Leuk Esterase: Negative / RBC: 7 /hpf / WBC 3 /HPF   Sq Epi: x / Non Sq Epi: 20 /hpf / Bacteria: Negative    Microbiology: reviewed   Legionella pneumophila Antigen, Urine (03.27.23 @ 14:12)    Legionella Antigen, Urine: Negative: Positive Testing method: Immunochromatographic Assay.  Presumptive detection of L. pneumophila serogroup 1 antigen in urine,  suggesting recent or current infection. Order “Culture –Legionella” as  recommended to confirm infection.  Negative Testing method: Immunochromatographic Assay.  L. pneumophila serogroup 1 antigen in urine NOT detected, suggesting NO  recent or current infection. Infection due to Legionella cannot be ruled  out: other serogroups and species may cause disease, antigen may not be  present in urine in early infection, or the level of antigens in urine  may be below the detection limit of the test.  Order “Culture  –Legionella” is recommended for uncommon cases of suspected Legionella  pneumonia due to organisms other thanL. pneumophila serogroup 1.      Culture - Sputum (collected 03-27-23 @ 13:03)  Source: .Sputum Sputum  Gram Stain (03-28-23 @ 07:36):    No polymorphonuclear leukocytes per low power field    Few Squamous epithelial cells per low power field    Few Gram Positive Rods seen per oil power field    Few Gram Negative Rods seen per oil power field    Culture - Urine (collected 03-26-23 @ 14:32)  Source: Clean Catch Clean Catch (Midstream)  Final Report (03-27-23 @ 23:04):    Normal Urogenital kameron present    SARS-CoV-2 Result: Camryn (26 Mar 2023 11:45)    Radiology: reviewed     Medications:  albuterol    90 MICROgram(s) HFA Inhaler 2 Puff(s) Inhalation every 6 hours PRN  aMIOdarone    Tablet 200 milliGRAM(s) Oral daily  apixaban 2.5 milliGRAM(s) Oral every 12 hours  cefTRIAXone   IVPB 1000 milliGRAM(s) IV Intermittent every 24 hours  ketorolac 0.5% Ophthalmic Solution 1 Drop(s) Both EYES daily  lactated ringers. 1000 milliLiter(s) IV Continuous <Continuous>  levothyroxine 100 MICROGram(s) Oral daily  pantoprazole    Tablet 40 milliGRAM(s) Oral before breakfast  prednisoLONE acetate 1% Suspension 1 Drop(s) Both EYES four times a day  sertraline 50 milliGRAM(s) Oral daily  sodium chloride 2% Ophthalmic Solution 1 Drop(s) Both EYES four times a day    Current Antimicrobials:  cefTRIAXone   IVPB 1000 milliGRAM(s) IV Intermittent every 24 hours    Prior/Completed Antimicrobials:  azithromycin  IVPB  cefTRIAXone   IVPB  
Patient is a 89y old  Female who presents with a chief complaint of SBO (29 Mar 2023 10:44)      SUBJECTIVE / OVERNIGHT EVENTS:    Patient seen and examined. co cough sob.       Vital Signs Last 24 Hrs  T(C): 37.3 (29 Mar 2023 08:55), Max: 37.3 (29 Mar 2023 08:55)  T(F): 99.2 (29 Mar 2023 08:55), Max: 99.2 (29 Mar 2023 08:55)  HR: 73 (29 Mar 2023 08:55) (59 - 76)  BP: 161/84 (29 Mar 2023 08:55) (121/67 - 168/76)  BP(mean): --  RR: 20 (29 Mar 2023 08:55) (18 - 20)  SpO2: 94% (29 Mar 2023 08:55) (94% - 99%)    Parameters below as of 29 Mar 2023 08:55  Patient On (Oxygen Delivery Method): nasal cannula  O2 Flow (L/min): 2    I&O's Summary    28 Mar 2023 07:01  -  29 Mar 2023 07:00  --------------------------------------------------------  IN: 230 mL / OUT: 350 mL / NET: -120 mL    29 Mar 2023 07:01  -  29 Mar 2023 11:49  --------------------------------------------------------  IN: 120 mL / OUT: 0 mL / NET: 120 mL        PE:  GENERAL: NAD, AAOx2  CHEST/LUNG: bl ronchi  HEART: Regular rate and rhythm; no murmur  ABDOMEN: Soft, Nontender, Nondistended; Bowel sounds present  EXTREMITIES:  2+ Peripheral Pulses, No edema  NEURO: No focal deficits    LABS:                        10.6   1.80  )-----------( 73       ( 29 Mar 2023 06:58 )             34.3     03-29    139  |  102  |  11  ----------------------------<  79  4.4   |  28  |  0.45<L>    Ca    8.7      29 Mar 2023 07:09  Phos  2.1     03-29  Mg     2.1     03-29    TPro  6.3  /  Alb  3.3  /  TBili  0.7  /  DBili  x   /  AST  15  /  ALT  14  /  AlkPhos  49  03-28      CAPILLARY BLOOD GLUCOSE                RADIOLOGY & ADDITIONAL TESTS:    Imaging Personally Reviewed:  [x] YES  [ ] NO    Consultant(s) Notes Reviewed:  [x] YES  [ ] NO    MEDICATIONS  (STANDING):  aMIOdarone    Tablet 200 milliGRAM(s) Oral daily  apixaban 2.5 milliGRAM(s) Oral every 12 hours  cefTRIAXone   IVPB 1000 milliGRAM(s) IV Intermittent every 24 hours  ketorolac 0.5% Ophthalmic Solution 1 Drop(s) Both EYES daily  levothyroxine 100 MICROGram(s) Oral daily  pantoprazole    Tablet 40 milliGRAM(s) Oral before breakfast  prednisoLONE acetate 1% Suspension 1 Drop(s) Both EYES four times a day  sertraline 50 milliGRAM(s) Oral daily  sodium chloride 2% Ophthalmic Solution 1 Drop(s) Both EYES four times a day    MEDICATIONS  (PRN):  albuterol    90 MICROgram(s) HFA Inhaler 2 Puff(s) Inhalation every 6 hours PRN Bronchospasm      Care Discussed with Consultants/Other Providers [x] YES  [ ] NO    HEALTH ISSUES - PROBLEM Dx:      
Patient is a 89y old  Female who presents with a chief complaint of SBO (30 Mar 2023 10:28)      SUBJECTIVE / OVERNIGHT EVENTS:    Patient seen and examined. on RA. no complaints.      Vital Signs Last 24 Hrs  T(C): 36.7 (30 Mar 2023 08:55), Max: 37.6 (30 Mar 2023 01:25)  T(F): 98 (30 Mar 2023 08:55), Max: 99.6 (30 Mar 2023 01:25)  HR: 102 (30 Mar 2023 08:55) (71 - 102)  BP: 146/95 (30 Mar 2023 08:55) (144/85 - 156/77)  BP(mean): --  RR: 18 (30 Mar 2023 08:55) (18 - 20)  SpO2: 90% (30 Mar 2023 08:55) (86% - 94%)    Parameters below as of 30 Mar 2023 08:55  Patient On (Oxygen Delivery Method): room air      I&O's Summary    29 Mar 2023 07:01  -  30 Mar 2023 07:00  --------------------------------------------------------  IN: 230 mL / OUT: 450 mL / NET: -220 mL        PE:  GENERAL: NAD, AAOx2  CHEST/LUNG: deecreased bs  HEART: Regular rate and rhythm; no murmur  ABDOMEN: Soft, Nontender, Nondistended; Bowel sounds present  EXTREMITIES:  2+ Peripheral Pulses, No edema  NEURO: No focal deficits    LABS:                        10.6   1.80  )-----------( 73       ( 29 Mar 2023 06:58 )             34.3     03-29    139  |  102  |  11  ----------------------------<  79  4.4   |  28  |  0.45<L>    Ca    8.7      29 Mar 2023 07:09  Phos  2.1     03-29  Mg     2.1     03-29        CAPILLARY BLOOD GLUCOSE                RADIOLOGY & ADDITIONAL TESTS:    Imaging Personally Reviewed:  [x] YES  [ ] NO    Consultant(s) Notes Reviewed:  [x] YES  [ ] NO    MEDICATIONS  (STANDING):  aMIOdarone    Tablet 200 milliGRAM(s) Oral daily  apixaban 2.5 milliGRAM(s) Oral every 12 hours  cefTRIAXone   IVPB 1000 milliGRAM(s) IV Intermittent every 24 hours  ketorolac 0.5% Ophthalmic Solution 1 Drop(s) Both EYES daily  levothyroxine 100 MICROGram(s) Oral daily  pantoprazole    Tablet 40 milliGRAM(s) Oral before breakfast  prednisoLONE acetate 1% Suspension 1 Drop(s) Both EYES four times a day  sertraline 50 milliGRAM(s) Oral daily  sodium chloride 2% Ophthalmic Solution 1 Drop(s) Both EYES four times a day    MEDICATIONS  (PRN):  albuterol    90 MICROgram(s) HFA Inhaler 2 Puff(s) Inhalation every 6 hours PRN Bronchospasm      Care Discussed with Consultants/Other Providers [x] YES  [ ] NO    HEALTH ISSUES - PROBLEM Dx:

## 2023-03-30 NOTE — PROGRESS NOTE ADULT - TIME BILLING
I saw and evaluated patient and agree with residents note  extreme of age  contrast passed to colon and reported bowel movement  at this stage can start on oral diet anticipating resolution of SBO
agree with above  cont amiodarone and apixiban  repeat TTE pending  cont iv abx
I saw and evaluated patient and agree with above note  sitting in chair today  reports having bowel function  started on regular diet today  disposition planning
Agree with above PA note.  cv stable  cont current tx  cont abx  f/u echo   cont a/c

## 2023-03-30 NOTE — PROGRESS NOTE ADULT - ASSESSMENT
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 Millard in 2018 treated with NGT then consult for Dr. Dae Murillo in 10/2020 treated conservatively without NGT, and in 2021 with NGT) presents with 2 days of epigastric pain, nausea and emesis and last BM with diarrhea on Friday, was diarrhea.     Small bowel obstruction   - resolved, s/p NGT removal 3/27  Multifocal pneumonia - suspect aspiration in setting of n/v   - RVP/COVID negative   - CT C/A/P with multifocal pneumonia, small bowel obstruction with transition at mid pelvis   - Legionella urine ag negative   - Sputum culture with normal resp kameron    - SLP evaluation appreciated, no overt s/s aspiration   Leukopenia, thrombocytopenia - chronic per chart review, trend noted   H/o penicillin allergy (rash/hives)   - tolerating cephalosporins without issues     Recommendations:  Continue ceftriaxone 1g IV Q24h -- plan to complete total 7d course - end 4/2/23   - if discharged, transition to cefpodoxime 200mg PO BID   supplemental oxygen as needed  Monitor temps/CBC  Aspiration precautions     D/w Dr. Kamran Polanco M.D.  [facility], Division of Infectious Diseases  [phone]  After 5pm on weekdays and all day on weekends - please call [phone]

## 2023-03-30 NOTE — PROGRESS NOTE ADULT - ASSESSMENT
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 Millard in 2018 treated with NGT then consult for Dr. Dae Murillo in 10/2020 treated conservatively without NGT, and in 2021 with NGT) presents with 2 days of epigastric pain, nausea and emesis. CT c/w SBO with TP in mid abdomen. Passed GG trial on 3/27, passing gas and bowel movements.    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 Millard in 2018 treated with NGT then consult for Dr. Dae Murillo in 10/2020 treated conservatively without NGT, and in 2021 with NGT) presents with 2 days of epigastric pain, nausea and emesis. CT c/w SBO with TP in mid abdomen. Passed GG trial on 3/27, passing gas and bowel movements.    Plan:  - Diet: Regular  - PNA abx coverage- IV ceftriaxone   - Sputum cx (3/27) neg  - May restart cardiac medications for now - Eliquis and Amiodarone (no heparin gtt at this time)  - Exam before pain medications  - PT: home with home PT  - Anticipate discharge today on Cefpodoxime 200 PO Bid for pneumonia, through 4/2    ACS/Trauma Surgery  [id number]

## 2023-03-30 NOTE — PROGRESS NOTE ADULT - PROVIDER SPECIALTY LIST ADULT
Infectious Disease
Surgery
Surgery
Cardiology
Infectious Disease
Infectious Disease
Internal Medicine
Internal Medicine
Surgery
Cardiology
Internal Medicine
Trauma Surgery

## 2023-03-30 NOTE — DISCHARGE NOTE NURSING/CASE MANAGEMENT/SOCIAL WORK - PATIENT PORTAL LINK FT
You can access the FollowMyHealth Patient Portal offered by [facility] by registering at the following website: [email or web address]. By joining [facility]’s FollowMyHealth portal, you will also be able to view your health information using other applications (apps) compatible with our system.

## 2023-04-25 NOTE — PATIENT PROFILE ADULT. - DOES PATIENT HAVE ADVANCE DIRECTIVE
To Dr Quinonez  Please see below pt message    Pt requesting labs prior to 5-1-23 appt. He would like to include a TSH and anything else that may help for reason for weight gain and lethargy   No

## 2023-04-25 NOTE — PATIENT PROFILE ADULT - FUNCTIONAL ASSESSMENT - BASIC MOBILITY 1.
[1] : T1 [c] : c [X] : MX [0-10] : 0 -10 ng/mL [Biopsy with Fusion] : Patient had a biopsy with fusion on [7(3+4)] : Fusion Biopsy Hampton Score: 7(3+4) [] : Patient had a Prostate MRI [5] : 5 [IIB] : IIB [BiopsyDate] : 12/20/22 [MeasuredProstateVolume] : 41 [TotalCores] : 3 [TotalPositiveCores] : 14 [MaxCoreInvolvement] : 15 2 = A lot of assistance

## 2023-05-09 NOTE — DISCHARGE NOTE PROVIDER - NSDCACTIVITY_GEN_ALL_CORE
Patient ambulatory to ED c/o abdominal pain \"for months\". Unable to tell this nurse what has worsened or what made it different today due to pain. States that she missed pain management visit and ran out of her medications.      Hx colitis Do not drive or operate machinery/No heavy lifting/straining

## 2023-05-23 NOTE — ED PROVIDER NOTE - CARE PLAN
[Mother] : mother
Principal Discharge DX:	Pneumonia of both lungs due to infectious organism, unspecified part of lung

## 2023-07-12 NOTE — ED PROVIDER NOTE - OBJECTIVE STATEMENT
Pt sat down on a chair and it broke he fell down and was hit in the face with a lawn ornament. He has a small laceration to his right cheek. Bleeding stopped prior to arrival.    89 yoF, PMHx of remote TB, spent several years in TB hospital, remote ex lap for adhesions from TB in 1960s, SBO since then, most recently March presenting for 1 day of "exhaustion" (generally weak). 1 episode of non bloody diarrhea. Felt well since discharge after admission for SBO managed medically in March. Denies cough, chest pain, abd pain, NV, urinary sx, wounds. No recent travel or abx. S/p Covid vaccines. Denying dyspnea (despite triage note). Lives alone. 89 yoF, PMHx of remote TB, spent several years in TB hospital, remote ex lap for adhesions from TB in 1960s, SBO since then, most recently March presenting for 1 day of "exhaustion" (generally weak). 1 episode of non bloody diarrhea. Felt well since discharge after admission for SBO managed medically in March. Denies cough, chest pain, abd pain, NV, urinary sx, wounds. No recent travel or abx. S/p Covid vaccines. Denying dyspnea (despite triage note). Lives alone.    Attendinyo female presents with weakness and exhaustion.  has mild cough.  feels very fatigued.  here found to have fever.  no vomiting.  had diarrhea yesterday.

## 2023-08-12 ENCOUNTER — INPATIENT (INPATIENT)
Facility: HOSPITAL | Age: 88
LOS: 10 days | Discharge: HOME | DRG: 312 | End: 2023-08-23
Attending: STUDENT IN AN ORGANIZED HEALTH CARE EDUCATION/TRAINING PROGRAM | Admitting: STUDENT IN AN ORGANIZED HEALTH CARE EDUCATION/TRAINING PROGRAM
Payer: MEDICARE

## 2023-08-12 VITALS
DIASTOLIC BLOOD PRESSURE: 70 MMHG | SYSTOLIC BLOOD PRESSURE: 142 MMHG | HEIGHT: 63 IN | WEIGHT: 141.98 LBS | TEMPERATURE: 98 F | RESPIRATION RATE: 20 BRPM | HEART RATE: 68 BPM | OXYGEN SATURATION: 96 %

## 2023-08-12 DIAGNOSIS — N39.0 URINARY TRACT INFECTION, SITE NOT SPECIFIED: ICD-10-CM

## 2023-08-12 DIAGNOSIS — Z98.89 OTHER SPECIFIED POSTPROCEDURAL STATES: Chronic | ICD-10-CM

## 2023-08-12 LAB
ALBUMIN SERPL ELPH-MCNC: 4.1 G/DL — SIGNIFICANT CHANGE UP (ref 3.3–5)
ALP SERPL-CCNC: 63 U/L — SIGNIFICANT CHANGE UP (ref 40–120)
ALT FLD-CCNC: 16 U/L — SIGNIFICANT CHANGE UP (ref 10–45)
ANION GAP SERPL CALC-SCNC: 11 MMOL/L — SIGNIFICANT CHANGE UP (ref 5–17)
ANISOCYTOSIS BLD QL: SLIGHT — SIGNIFICANT CHANGE UP
APPEARANCE UR: ABNORMAL
APTT BLD: 30.1 SEC — SIGNIFICANT CHANGE UP (ref 24.5–35.6)
AST SERPL-CCNC: 17 U/L — SIGNIFICANT CHANGE UP (ref 10–40)
BACTERIA # UR AUTO: ABNORMAL
BASOPHILS # BLD AUTO: 0 K/UL — SIGNIFICANT CHANGE UP (ref 0–0.2)
BASOPHILS NFR BLD AUTO: 0 % — SIGNIFICANT CHANGE UP (ref 0–2)
BILIRUB SERPL-MCNC: 0.5 MG/DL — SIGNIFICANT CHANGE UP (ref 0.2–1.2)
BILIRUB UR-MCNC: NEGATIVE — SIGNIFICANT CHANGE UP
BUN SERPL-MCNC: 15 MG/DL — SIGNIFICANT CHANGE UP (ref 7–23)
CALCIUM SERPL-MCNC: 9.2 MG/DL — SIGNIFICANT CHANGE UP (ref 8.4–10.5)
CHLORIDE SERPL-SCNC: 105 MMOL/L — SIGNIFICANT CHANGE UP (ref 96–108)
CO2 SERPL-SCNC: 23 MMOL/L — SIGNIFICANT CHANGE UP (ref 22–31)
COLOR SPEC: YELLOW — SIGNIFICANT CHANGE UP
CREAT SERPL-MCNC: 0.77 MG/DL — SIGNIFICANT CHANGE UP (ref 0.5–1.3)
DACRYOCYTES BLD QL SMEAR: SLIGHT — SIGNIFICANT CHANGE UP
DIFF PNL FLD: ABNORMAL
EGFR: 70 ML/MIN/1.73M2 — SIGNIFICANT CHANGE UP
ELLIPTOCYTES BLD QL SMEAR: SLIGHT — SIGNIFICANT CHANGE UP
EOSINOPHIL # BLD AUTO: 0.09 K/UL — SIGNIFICANT CHANGE UP (ref 0–0.5)
EOSINOPHIL NFR BLD AUTO: 4.4 % — SIGNIFICANT CHANGE UP (ref 0–6)
EPI CELLS # UR: 8 /HPF — HIGH
GLUCOSE SERPL-MCNC: 91 MG/DL — SIGNIFICANT CHANGE UP (ref 70–99)
GLUCOSE UR QL: NEGATIVE — SIGNIFICANT CHANGE UP
HCT VFR BLD CALC: 35.3 % — SIGNIFICANT CHANGE UP (ref 34.5–45)
HGB BLD-MCNC: 11.4 G/DL — LOW (ref 11.5–15.5)
HYALINE CASTS # UR AUTO: 1 /LPF — SIGNIFICANT CHANGE UP (ref 0–2)
INR BLD: 1.16 RATIO — SIGNIFICANT CHANGE UP (ref 0.85–1.18)
KETONES UR-MCNC: NEGATIVE — SIGNIFICANT CHANGE UP
LEUKOCYTE ESTERASE UR-ACNC: ABNORMAL
LYMPHOCYTES # BLD AUTO: 0.52 K/UL — LOW (ref 1–3.3)
LYMPHOCYTES # BLD AUTO: 25.2 % — SIGNIFICANT CHANGE UP (ref 13–44)
MACROCYTES BLD QL: SLIGHT — SIGNIFICANT CHANGE UP
MANUAL SMEAR VERIFICATION: SIGNIFICANT CHANGE UP
MCHC RBC-ENTMCNC: 32.3 GM/DL — SIGNIFICANT CHANGE UP (ref 32–36)
MCHC RBC-ENTMCNC: 33.4 PG — SIGNIFICANT CHANGE UP (ref 27–34)
MCV RBC AUTO: 103.5 FL — HIGH (ref 80–100)
MONOCYTES # BLD AUTO: 0.09 K/UL — SIGNIFICANT CHANGE UP (ref 0–0.9)
MONOCYTES NFR BLD AUTO: 4.3 % — SIGNIFICANT CHANGE UP (ref 2–14)
NEUTROPHILS # BLD AUTO: 1.37 K/UL — LOW (ref 1.8–7.4)
NEUTROPHILS NFR BLD AUTO: 66.1 % — SIGNIFICANT CHANGE UP (ref 43–77)
NITRITE UR-MCNC: POSITIVE
PH UR: 6.5 — SIGNIFICANT CHANGE UP (ref 5–8)
PLAT MORPH BLD: NORMAL — SIGNIFICANT CHANGE UP
PLATELET # BLD AUTO: 72 K/UL — LOW (ref 150–400)
POIKILOCYTOSIS BLD QL AUTO: SLIGHT — SIGNIFICANT CHANGE UP
POTASSIUM SERPL-MCNC: 4.7 MMOL/L — SIGNIFICANT CHANGE UP (ref 3.5–5.3)
POTASSIUM SERPL-SCNC: 4.7 MMOL/L — SIGNIFICANT CHANGE UP (ref 3.5–5.3)
PROT SERPL-MCNC: 6.7 G/DL — SIGNIFICANT CHANGE UP (ref 6–8.3)
PROT UR-MCNC: ABNORMAL
PROTHROM AB SERPL-ACNC: 12.1 SEC — SIGNIFICANT CHANGE UP (ref 9.5–13)
RBC # BLD: 3.41 M/UL — LOW (ref 3.8–5.2)
RBC # FLD: 13.8 % — SIGNIFICANT CHANGE UP (ref 10.3–14.5)
RBC BLD AUTO: ABNORMAL
RBC CASTS # UR COMP ASSIST: 6 /HPF — HIGH (ref 0–4)
SCHISTOCYTES BLD QL AUTO: SLIGHT — SIGNIFICANT CHANGE UP
SMUDGE CELLS # BLD: PRESENT — SIGNIFICANT CHANGE UP
SODIUM SERPL-SCNC: 139 MMOL/L — SIGNIFICANT CHANGE UP (ref 135–145)
SP GR SPEC: 1.02 — SIGNIFICANT CHANGE UP (ref 1.01–1.02)
UROBILINOGEN FLD QL: NEGATIVE — SIGNIFICANT CHANGE UP
WBC # BLD: 2.07 K/UL — LOW (ref 3.8–10.5)
WBC # FLD AUTO: 2.07 K/UL — LOW (ref 3.8–10.5)
WBC UR QL: 445 /HPF — HIGH (ref 0–5)

## 2023-08-12 PROCEDURE — 99223 1ST HOSP IP/OBS HIGH 75: CPT

## 2023-08-12 PROCEDURE — 70450 CT HEAD/BRAIN W/O DYE: CPT | Mod: 26,MA

## 2023-08-12 PROCEDURE — 71045 X-RAY EXAM CHEST 1 VIEW: CPT | Mod: 26

## 2023-08-12 PROCEDURE — 99285 EMERGENCY DEPT VISIT HI MDM: CPT | Mod: GC

## 2023-08-12 RX ORDER — CYCLOSPORINE 0.5 MG/ML
1 EMULSION OPHTHALMIC
Qty: 0 | Refills: 0 | DISCHARGE

## 2023-08-12 RX ORDER — BROMFENAC 0.76 MG/ML
1 SOLUTION/ DROPS OPHTHALMIC
Qty: 0 | Refills: 0 | DISCHARGE

## 2023-08-12 RX ORDER — AMIODARONE HYDROCHLORIDE 400 MG/1
1 TABLET ORAL
Refills: 0 | DISCHARGE

## 2023-08-12 RX ORDER — SERTRALINE 25 MG/1
1 TABLET, FILM COATED ORAL
Refills: 0 | DISCHARGE

## 2023-08-12 RX ORDER — LANOLIN ALCOHOL/MO/W.PET/CERES
3 CREAM (GRAM) TOPICAL AT BEDTIME
Refills: 0 | Status: DISCONTINUED | OUTPATIENT
Start: 2023-08-12 | End: 2023-08-23

## 2023-08-12 RX ORDER — LEVOTHYROXINE SODIUM 125 MCG
1 TABLET ORAL
Refills: 0 | DISCHARGE

## 2023-08-12 RX ORDER — SODIUM CHLORIDE 9 MG/ML
500 INJECTION, SOLUTION INTRAVENOUS ONCE
Refills: 0 | Status: COMPLETED | OUTPATIENT
Start: 2023-08-12 | End: 2023-08-12

## 2023-08-12 RX ORDER — PREDNISOLONE SODIUM PHOSPHATE 1 %
1 DROPS OPHTHALMIC (EYE)
Qty: 0 | Refills: 0 | DISCHARGE

## 2023-08-12 RX ORDER — CEFTRIAXONE 500 MG/1
1000 INJECTION, POWDER, FOR SOLUTION INTRAMUSCULAR; INTRAVENOUS ONCE
Refills: 0 | Status: COMPLETED | OUTPATIENT
Start: 2023-08-12 | End: 2023-08-12

## 2023-08-12 RX ORDER — SODIUM CHLORIDE 5 %
1 DROPS OPHTHALMIC (EYE)
Qty: 0 | Refills: 0 | DISCHARGE

## 2023-08-12 RX ORDER — ONDANSETRON 8 MG/1
4 TABLET, FILM COATED ORAL EVERY 8 HOURS
Refills: 0 | Status: DISCONTINUED | OUTPATIENT
Start: 2023-08-12 | End: 2023-08-23

## 2023-08-12 RX ORDER — ACETAMINOPHEN 500 MG
650 TABLET ORAL EVERY 6 HOURS
Refills: 0 | Status: DISCONTINUED | OUTPATIENT
Start: 2023-08-12 | End: 2023-08-23

## 2023-08-12 RX ADMIN — CEFTRIAXONE 100 MILLIGRAM(S): 500 INJECTION, POWDER, FOR SOLUTION INTRAMUSCULAR; INTRAVENOUS at 16:43

## 2023-08-12 RX ADMIN — SODIUM CHLORIDE 500 MILLILITER(S): 9 INJECTION, SOLUTION INTRAVENOUS at 14:36

## 2023-08-12 NOTE — ED PROVIDER NOTE - PHYSICAL EXAMINATION
GENERAL: no acute distress, mesomorphic body habitus  HEENT: atraumatic, normocephalic, vision grossly intact, EOMI, no conjunctivitis or discharge, hearing grossly intact, no nasal discharge or epistaxis, clear pharynx  CV: regular rate, normal rhythm, normal S1/S2, no murmurs/rubs, no cyanosis  PULM: normal work of breathing, clear breath sounds in b/l upper/lower lung fields, no crackles/rales/rhonchi/wheezing  GI: soft/non-tender/nondistended abdomen, no guarding or rebound tenderness, no palpable masses  NEURO: A&Ox4, follows commands, normal speech, no focal motor or sensory deficits  MSK: no joint tenderness/swelling/erythema, ranging all extremities with no appreciable loss of ROM  EXT: no peripheral edema, no calf tenderness, no redness or swelling  SKIN: warm, dry, and intact, no rashes  PSYCH: appropriate mood and affect

## 2023-08-12 NOTE — ED PROVIDER NOTE - OBJECTIVE STATEMENT
89 yo F w/ PMHx of A-fib on Eliquis/amiodarone, TB s/p ex lap for abdominal TB, and recurrent SBO presents for 1 day of head pressure and difficulty walking.  Patient has been having recurrent falls over the past few months with possible head trauma – most recent being a few weeks ago.  She denies any fever/chills/cough/sore throat, CP, SOB, abdominal pain, /GI symptoms.

## 2023-08-12 NOTE — ED PROVIDER NOTE - ATTENDING CONTRIBUTION TO CARE
Jyothi I performed a history and physical exam of the patient and discussed their management with the resident. I reviewed the resident's note and agree with the documented findings and plan of care, except as noted. My medical decision making and observations are as follows:    89 F with PMHx AFib on Eliquis, TB s/p remote ex-lap for abdominal TB, recurrent SBO presenting for a head pressure and difficulty ambulating since today.  Patient notes frequent falls over the past few months, most recently 1 week ago with associated head strike but no LOC.  Patient did not seek medical attention at this time and had been doing well until today.  Describes diffuse head pressure without change in hearing or vision, paresthesias or focal weakness.  Difficulty ambulating characterized as feeling off balance.  Per aide at bedside, patient is ambulatory with walker at baseline and today seemed to be stumbling while ambulating.     On exam, patient no acute distress. Heart and lungs clear to auscultation, abdomen soft NT. A&Ox3, CN2-12 intact. 5/5 strength and intact sensation x4 extremities. Normal finger-nose testing. Negative pronator drift. Gait testing deferred.     MDM patient presenting for head pressure and difficulty ambulating since today with history of fall 1 week ago.  Will obtain imaging to rule out intracranial injury as well as metabolic derangement or infection.     Labs with pancytopenia (leukopenia to 2.07, anemia with hemoglobin 11.4, platelets 72) all stable from prior values.  No significant electrolyte abnormality or evidence of SHEILA.  LFTs within normal limits.     1500- signed out to Dr. Cottrell pending UA, XR chest, CT head, and admission for frequent falls, inability to ambulate.

## 2023-08-12 NOTE — ED PROVIDER NOTE - CLINICAL SUMMARY MEDICAL DECISION MAKING FREE TEXT BOX
89 yo F w/ PMHx of A-fib on Eliquis/amiodarone, TB s/p ex lap for abdominal TB, and recurrent SBO presents for 1 day of head pressure and difficulty walking.  Vital signs are unremarkable.  Physical exam is unremarkable.  Concern for ICH vs difficulty walking secondary to infectious etiology.  Plan for labs, urine studies, CT head.

## 2023-08-12 NOTE — ED ADULT NURSE NOTE - OBJECTIVE STATEMENT
Female 89 years old with past medical history of Afib on Eliquis, brought in by EMS for dizziness. Pt reports pressure at top of her head associated with mild dizziness. Denies vision change, numbness or weakness. Pt walks with a walker, states she's been falling a week ago. This morning HHA states "it's hard for the patient to walk and she loss balance when walking". Denies fall, chest pain, sob, N/V. Safety and comfort maintained. Call bell within easy reach. Will continue to monitor.

## 2023-08-12 NOTE — ED ADULT NURSE NOTE - NSFALLHARMRISKINTERV_ED_ALL_ED
Assistance OOB with selected safe patient handling equipment if applicable/Assistance with ambulation/Communicate risk of Fall with Harm to all staff, patient, and family/Encourage patient to sit up slowly, dangle for a short time, stand at bedside before walking/Monitor gait and stability/Orthostatic vital signs/Provide patient with walking aids/Provide visual cue: red socks, yellow wristband, yellow gown, etc/Reinforce activity limits and safety measures with patient and family/Bed in lowest position, wheels locked, appropriate side rails in place/Call bell, personal items and telephone in reach/Instruct patient to call for assistance before getting out of bed/chair/stretcher/Non-slip footwear applied when patient is off stretcher/[place] to call system/Physically safe environment - no spills, clutter or unnecessary equipment/Purposeful Proactive Rounding/Room/bathroom lighting operational, light cord in reach

## 2023-08-12 NOTE — ED ADULT NURSE REASSESSMENT NOTE - NS ED NURSE REASSESS COMMENT FT1
As per ED MD Cottrell, pt okay to eat. Pt provided with sandwich and water. Pt resting in stretcher in position of comfort. Stretcher locked and lowered and call bell within reach. Daughter at bedside.
This patient was evaluated for sepsis.  At this time, a diagnosis of sepsis is not supported by the overall clinical picture.

## 2023-08-13 DIAGNOSIS — D61.818 OTHER PANCYTOPENIA: ICD-10-CM
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
## 2023-08-13 DIAGNOSIS — I48.0 PAROXYSMAL ATRIAL FIBRILLATION: ICD-10-CM

## 2023-08-13 DIAGNOSIS — N39.0 URINARY TRACT INFECTION, SITE NOT SPECIFIED: ICD-10-CM

## 2023-08-13 DIAGNOSIS — E03.9 HYPOTHYROIDISM, UNSPECIFIED: ICD-10-CM

## 2023-08-13 LAB
ANION GAP SERPL CALC-SCNC: 11 MMOL/L — SIGNIFICANT CHANGE UP (ref 5–17)
BASOPHILS # BLD AUTO: 0 K/UL — SIGNIFICANT CHANGE UP (ref 0–0.2)
BASOPHILS NFR BLD AUTO: 0 % — SIGNIFICANT CHANGE UP (ref 0–2)
BUN SERPL-MCNC: 12 MG/DL — SIGNIFICANT CHANGE UP (ref 7–23)
CALCIUM SERPL-MCNC: 8.9 MG/DL — SIGNIFICANT CHANGE UP (ref 8.4–10.5)
CHLORIDE SERPL-SCNC: 102 MMOL/L — SIGNIFICANT CHANGE UP (ref 96–108)
CO2 SERPL-SCNC: 25 MMOL/L — SIGNIFICANT CHANGE UP (ref 22–31)
CREAT SERPL-MCNC: 0.69 MG/DL — SIGNIFICANT CHANGE UP (ref 0.5–1.3)
EGFR: 78 ML/MIN/1.73M2 — SIGNIFICANT CHANGE UP
EOSINOPHIL # BLD AUTO: 0.04 K/UL — SIGNIFICANT CHANGE UP (ref 0–0.5)
EOSINOPHIL NFR BLD AUTO: 1.7 % — SIGNIFICANT CHANGE UP (ref 0–6)
FERRITIN SERPL-MCNC: 261 NG/ML — SIGNIFICANT CHANGE UP (ref 13–330)
GLUCOSE SERPL-MCNC: 94 MG/DL — SIGNIFICANT CHANGE UP (ref 70–99)
HCT VFR BLD CALC: 35.7 % — SIGNIFICANT CHANGE UP (ref 34.5–45)
HGB BLD-MCNC: 11.4 G/DL — LOW (ref 11.5–15.5)
IMM GRANULOCYTES NFR BLD AUTO: 1.7 % — HIGH (ref 0–0.9)
IRON SATN MFR SERPL: 21 % — SIGNIFICANT CHANGE UP (ref 14–50)
IRON SATN MFR SERPL: 48 UG/DL — SIGNIFICANT CHANGE UP (ref 30–160)
LYMPHOCYTES # BLD AUTO: 0.31 K/UL — LOW (ref 1–3.3)
LYMPHOCYTES # BLD AUTO: 12.9 % — LOW (ref 13–44)
MAGNESIUM SERPL-MCNC: 2 MG/DL — SIGNIFICANT CHANGE UP (ref 1.6–2.6)
MCHC RBC-ENTMCNC: 31.9 GM/DL — LOW (ref 32–36)
MCHC RBC-ENTMCNC: 32.8 PG — SIGNIFICANT CHANGE UP (ref 27–34)
MCV RBC AUTO: 102.6 FL — HIGH (ref 80–100)
MONOCYTES # BLD AUTO: 0.12 K/UL — SIGNIFICANT CHANGE UP (ref 0–0.9)
MONOCYTES NFR BLD AUTO: 5 % — SIGNIFICANT CHANGE UP (ref 2–14)
NEUTROPHILS # BLD AUTO: 1.89 K/UL — SIGNIFICANT CHANGE UP (ref 1.8–7.4)
NEUTROPHILS NFR BLD AUTO: 78.7 % — HIGH (ref 43–77)
NRBC # BLD: 0 /100 WBCS — SIGNIFICANT CHANGE UP (ref 0–0)
PHOSPHATE SERPL-MCNC: 3.5 MG/DL — SIGNIFICANT CHANGE UP (ref 2.5–4.5)
PLATELET # BLD AUTO: 73 K/UL — LOW (ref 150–400)
POTASSIUM SERPL-MCNC: 3.8 MMOL/L — SIGNIFICANT CHANGE UP (ref 3.5–5.3)
POTASSIUM SERPL-SCNC: 3.8 MMOL/L — SIGNIFICANT CHANGE UP (ref 3.5–5.3)
RBC # BLD: 3.48 M/UL — LOW (ref 3.8–5.2)
RBC # FLD: 13.6 % — SIGNIFICANT CHANGE UP (ref 10.3–14.5)
SODIUM SERPL-SCNC: 138 MMOL/L — SIGNIFICANT CHANGE UP (ref 135–145)
T4 FREE SERPL-MCNC: 1.5 NG/DL — SIGNIFICANT CHANGE UP (ref 0.9–1.8)
TIBC SERPL-MCNC: 227 UG/DL — SIGNIFICANT CHANGE UP (ref 220–430)
TSH SERPL-MCNC: 2.65 UIU/ML — SIGNIFICANT CHANGE UP (ref 0.27–4.2)
UIBC SERPL-MCNC: 180 UG/DL — SIGNIFICANT CHANGE UP (ref 110–370)
WBC # BLD: 2.4 K/UL — LOW (ref 3.8–10.5)
WBC # FLD AUTO: 2.4 K/UL — LOW (ref 3.8–10.5)

## 2023-08-13 RX ORDER — ALBUTEROL 90 UG/1
2 AEROSOL, METERED ORAL EVERY 6 HOURS
Refills: 0 | Status: DISCONTINUED | OUTPATIENT
Start: 2023-08-13 | End: 2023-08-23

## 2023-08-13 RX ORDER — CEFTRIAXONE 500 MG/1
1000 INJECTION, POWDER, FOR SOLUTION INTRAMUSCULAR; INTRAVENOUS EVERY 24 HOURS
Refills: 0 | Status: COMPLETED | OUTPATIENT
Start: 2023-08-13 | End: 2023-08-15

## 2023-08-13 RX ORDER — SERTRALINE 25 MG/1
50 TABLET, FILM COATED ORAL DAILY
Refills: 0 | Status: DISCONTINUED | OUTPATIENT
Start: 2023-08-13 | End: 2023-08-23

## 2023-08-13 RX ORDER — LEVOTHYROXINE SODIUM 125 MCG
100 TABLET ORAL DAILY
Refills: 0 | Status: DISCONTINUED | OUTPATIENT
Start: 2023-08-13 | End: 2023-08-23

## 2023-08-13 RX ORDER — APIXABAN 2.5 MG/1
2.5 TABLET, FILM COATED ORAL EVERY 12 HOURS
Refills: 0 | Status: DISCONTINUED | OUTPATIENT
Start: 2023-08-13 | End: 2023-08-23

## 2023-08-13 RX ORDER — AMIODARONE HYDROCHLORIDE 400 MG/1
200 TABLET ORAL DAILY
Refills: 0 | Status: DISCONTINUED | OUTPATIENT
Start: 2023-08-13 | End: 2023-08-23

## 2023-08-13 RX ADMIN — SERTRALINE 50 MILLIGRAM(S): 25 TABLET, FILM COATED ORAL at 11:01

## 2023-08-13 RX ADMIN — AMIODARONE HYDROCHLORIDE 200 MILLIGRAM(S): 400 TABLET ORAL at 05:46

## 2023-08-13 RX ADMIN — APIXABAN 2.5 MILLIGRAM(S): 2.5 TABLET, FILM COATED ORAL at 05:46

## 2023-08-13 RX ADMIN — Medication 100 MICROGRAM(S): at 05:46

## 2023-08-13 RX ADMIN — APIXABAN 2.5 MILLIGRAM(S): 2.5 TABLET, FILM COATED ORAL at 17:50

## 2023-08-13 RX ADMIN — CEFTRIAXONE 100 MILLIGRAM(S): 500 INJECTION, POWDER, FOR SOLUTION INTRAMUSCULAR; INTRAVENOUS at 16:22

## 2023-08-13 NOTE — PATIENT PROFILE ADULT - FALL HARM RISK - HARM RISK INTERVENTIONS

## 2023-08-13 NOTE — H&P ADULT - NSHPLABSRESULTS_GEN_ALL_CORE
I personally reviewed the CXR: no acute cardpulm process.   I personally reviewed the EKG: junctional rhythm, mildly tenisha (HR 56), no acute ischemic changes     CBC:                         11.4   2.07  )-----------( 72       ( 12 Aug 2023 14:26 )             35.3     Chem:   08-12    139  |  105  |  15  ----------------------------<  91  4.7   |  23  |  0.77    Ca    9.2      12 Aug 2023 14:26    TPro  6.7  /  Alb  4.1  /  TBili  0.5  /  DBili  x   /  AST  17  /  ALT  16  /  AlkPhos  63  08-12    Liver functions:  LIVER FUNCTIONS - ( 12 Aug 2023 14:26 )  Alb: 4.1 g/dL / Pro: 6.7 g/dL / ALK PHOS: 63 U/L / ALT: 16 U/L / AST: 17 U/L / GGT: x         Urinalysis Basic - ( 12 Aug 2023 15:16 )    Color: Yellow / Appearance: Slightly Turbid / S.019 / pH: x  Gluc: x / Ketone: Negative  / Bili: Negative / Urobili: Negative   Blood: x / Protein: 30 mg/dL / Nitrite: Positive   Leuk Esterase: Large / RBC: 6 /hpf /  /HPF   Sq Epi: x / Non Sq Epi: x / Bacteria: Many

## 2023-08-13 NOTE — H&P ADULT - HISTORY OF PRESENT ILLNESS
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 Presenting w/ 1 day of "head pressure" and difficulty walking as well as frequent at home. Pt states she doesn't have a good balance and falls frequently at home despite using walker at home. She lives alone but has someone who stays with her during the day but not at night.   She denies dizziness, CP, SOB, palpitation, abd pain, n/v/d. Reports she's been having increased urinary fq, going every 2-3 hrs; denies dysuria or hematuria.

## 2023-08-13 NOTE — H&P ADULT - PROBLEM SELECTOR PLAN 2
-may be 2/2 mild encephalopathy from UTI vs. loss of balance  -f/u PT eval  -uses walker at baseline

## 2023-08-13 NOTE — PATIENT PROFILE ADULT - NSPROGENPREVTRANSF_GEN_A_NUR
based on previous patient profile - pt poor historian at this time/no history of blood product transfusion

## 2023-08-13 NOTE — CONSULT NOTE ADULT - ASSESSMENT
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 Millard in 2018 treated with NGT then consult for Dr. Dae Murillo in 10/2020 treated conservatively without NGT, and in 2021 with NGT) presents with weakness, headache and falls    #Falls  -in setting of UTI  -check echo  -tele  -orthostatics if feasible    #pAFib  -Stable  -Continue amiodarone  -Continue apixaban    #Mod AS  -As noted on prior echo  -No evidence of volume overload on exam  -Prior CT with Dense aortic valve calcification and multivessel coronary artery calcification  -Repeat echo  -If echo shows severe AS, she is unlikely to be candidate for invasive mgmt given her age, PNA        81 minutes spent on total encounter; more than 50% of the visit was spent counseling and/or coordinating care by the attending physician.

## 2023-08-13 NOTE — PHYSICAL THERAPY INITIAL EVALUATION ADULT - PERTINENT HX OF CURRENT PROBLEM, REHAB EVAL
89 yo F w/ PMHx of A-fib on Eliquis/amiodarone, TB s/p ex lap for abdominal TB, and recurrent SBO presents for 1 day of head pressure and difficulty walking.  Patient has been having recurrent falls over the past few months with possible head trauma – most recent being a few weeks ago. CTH- No acute intracranial pathology. 89 yo F w/ PMHx of A-fib on Eliquis/amiodarone, TB s/p ex lap for abdominal TB, and recurrent SBO presents for 1 day of head pressure and difficulty walking.  Patient has been having recurrent falls over the past few months with possible head trauma – most recent being a few weeks ago. CTH- No acute intracranial pathology. x ray chest-  No acute abnormalities in the visualized osseous structures.

## 2023-08-13 NOTE — H&P ADULT - PROBLEM SELECTOR PLAN 5
-pt reports some remote h/o being on iron supplement  -per chart review, this has been chronic. Baseline WBC ~2, hgb ~10-11, plt .   -fall very unlikely d/t symptomatic anemia at this time  -f/u outpatient as needed -pt reports some remote h/o being on iron supplement  -per chart review, this has been chronic. Baseline WBC ~2, hgb ~10-11, plt .   -f/u outpatient as needed

## 2023-08-13 NOTE — H&P ADULT - PROBLEM SELECTOR PLAN 1
-UA positive and pt symptomatic. s/p CTX in the ED  -c/w CTX while inpatient, transition to oral when able. F/u Ucx

## 2023-08-13 NOTE — PHYSICAL THERAPY INITIAL EVALUATION ADULT - NSPTDISCHREC_GEN_A_CORE
Sub acute rehab. However patient prefers d/c home. If patient d/c home,would recommend home with Home PT and assist for all functional mobility./Sub-acute Rehab

## 2023-08-13 NOTE — H&P ADULT - NSHPPHYSICALEXAM_GEN_ALL_CORE
Vital Signs Last 24 Hrs  T(C): 36.7 (12 Aug 2023 20:00), Max: 36.8 (12 Aug 2023 18:00)  T(F): 98 (12 Aug 2023 20:00), Max: 98.2 (12 Aug 2023 18:00)  HR: 67 (12 Aug 2023 20:00) (57 - 83)  BP: 173/73 (12 Aug 2023 20:00) (132/71 - 173/73)  BP(mean): 101 (12 Aug 2023 20:00) (101 - 101)  RR: 18 (12 Aug 2023 20:00) (18 - 20)  SpO2: 97% (12 Aug 2023 20:00) (96% - 100%)    Parameters below as of 12 Aug 2023 20:00  Patient On (Oxygen Delivery Method): room air        CONSTITUTIONAL: Well-groomed, in no apparent distress  EYES: No conjunctival or scleral injection, non-icteric  ENMT: No external nasal lesions; MMM  RESPIRATORY: Breathing comfortably; lungs CTA without wheeze/rhonchi/rales  CARDIOVASCULAR: +S1S2, RRR,; pedal pulses full and symmetric; no lower extremity edema  GASTROINTESTINAL: No tenderness, +BS throughout, no rebound/guarding  SKIN: No rashes or ulcers noted  NEUROLOGIC: No gross focal neurological deficits, AAOX3  PSYCHIATRIC: mood and affect appropriate; appropriate insight and judgment

## 2023-08-13 NOTE — PHYSICAL THERAPY INITIAL EVALUATION ADULT - PHYSICAL ASSIST/NONPHYSICAL ASSIST: GAIT, REHAB EVAL
verbal cues/1 person assist vc req to maintain proper distance from walker. also proper sequencing while making turns. Pt makes abrupt turns./verbal cues/1 person assist

## 2023-08-13 NOTE — H&P ADULT - NSHPADDITIONALINFOADULT_GEN_ALL_CORE
Fluid: encourage PO intake   Electrolytes: replete potassium, phosphorus and magnesium to 4/3/2 respectively  Nutrition: DASH  Activity: as tolerated     VTE ppx: home eliquis       Pt didn't want her daughter to be called "at this time of the day". Please call daughter in the morning to update.

## 2023-08-13 NOTE — PHYSICAL THERAPY INITIAL EVALUATION ADULT - ADDITIONAL COMMENTS
Pt. lives alone in apt. 12 hrs A services.  Patient ambulated with rolling walker independent. pt owns rollator, transport chair, bed side commode at home.

## 2023-08-13 NOTE — H&P ADULT - ASSESSMENT
89F PMHx significant for pAfib on eliquis and amiodarone, h/o abd TB s/p ex lap and subsequent SBO episodes over the years, hypothyroid, chronic anemia.   Presenting w/ frequent falls at home and "head pressure" and increased urinary fq.   CTH in the ED neg for acute findings.

## 2023-08-13 NOTE — PHYSICAL THERAPY INITIAL EVALUATION ADULT - PERSONAL SAFETY AND JUDGMENT, REHAB EVAL
impaired vc req to maintain proper distance from walker. also proper sequencing while making turns. Pt makes abrupt turns./impaired

## 2023-08-13 NOTE — PATIENT PROFILE ADULT - NSPROPOAPRESSUREINJURY_GEN_A_NUR
Avoidance of nonsteroidal anti-inflammatory agents, continue adequate amounts of free water intake  Recheck some labs before her next visit   no

## 2023-08-14 LAB
HCT VFR BLD CALC: 34.5 % — SIGNIFICANT CHANGE UP (ref 34.5–45)
HEPARIN-PF4 AB RESULT: <0.6 U/ML — SIGNIFICANT CHANGE UP (ref 0–0.9)
HGB BLD-MCNC: 11.1 G/DL — LOW (ref 11.5–15.5)
MCHC RBC-ENTMCNC: 32.2 GM/DL — SIGNIFICANT CHANGE UP (ref 32–36)
MCHC RBC-ENTMCNC: 33.1 PG — SIGNIFICANT CHANGE UP (ref 27–34)
MCV RBC AUTO: 103 FL — HIGH (ref 80–100)
NRBC # BLD: 0 /100 WBCS — SIGNIFICANT CHANGE UP (ref 0–0)
PF4 HEPARIN CMPLX AB SER-ACNC: NEGATIVE — SIGNIFICANT CHANGE UP
PLATELET # BLD AUTO: 67 K/UL — LOW (ref 150–400)
RBC # BLD: 3.35 M/UL — LOW (ref 3.8–5.2)
RBC # FLD: 13.7 % — SIGNIFICANT CHANGE UP (ref 10.3–14.5)
WBC # BLD: 1.99 K/UL — LOW (ref 3.8–10.5)
WBC # FLD AUTO: 1.99 K/UL — LOW (ref 3.8–10.5)

## 2023-08-14 RX ADMIN — CEFTRIAXONE 100 MILLIGRAM(S): 500 INJECTION, POWDER, FOR SOLUTION INTRAMUSCULAR; INTRAVENOUS at 18:20

## 2023-08-14 RX ADMIN — Medication 100 MICROGRAM(S): at 05:35

## 2023-08-14 RX ADMIN — SERTRALINE 50 MILLIGRAM(S): 25 TABLET, FILM COATED ORAL at 11:37

## 2023-08-14 RX ADMIN — AMIODARONE HYDROCHLORIDE 200 MILLIGRAM(S): 400 TABLET ORAL at 05:35

## 2023-08-14 RX ADMIN — APIXABAN 2.5 MILLIGRAM(S): 2.5 TABLET, FILM COATED ORAL at 18:21

## 2023-08-14 RX ADMIN — APIXABAN 2.5 MILLIGRAM(S): 2.5 TABLET, FILM COATED ORAL at 05:35

## 2023-08-14 NOTE — CONSULT NOTE ADULT - SUBJECTIVE AND OBJECTIVE BOX
[facility] DIVISION OF INFECTIOUS DISEASES  SWAPNIL Rene, KYARA 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 Patient states she doesn't have good balance and falls frequently at home despite using walker at home. She lives alone but has someone who stays with her during the day but not at night. She denies dizziness, CP, SOB, palpitation, abd pain, n/v/d. Reports she's been having increased urinary frequency with going every 2-3 hrs; denies dysuria or hematuria.  (13 Aug 2023 00:07)  ROS: 14 point review of systems completed, pertinent positives and negatives as per HPI.    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 reported allergy (Unknown)    PMH -- Pulmonary TB  Gastritis  Afib  SBO (small bowel obstruction)  Hypothyroid  UTI (urinary tract infection)  HTN (hypertension)  TB (pulmonary tuberculosis)    PSH -- Afib  History of Small Bowel Obstruction  History of Bilateral Breast Biopsy  S/P Exploratory Laparotomy  H/O hemorrhoidectomy  S/P vein stripping    FH -- No pertinent family history in first degree relatives  Social History -- denies tobacco, alcohol or illicit drug use    Physical Exam--  Vital Signs Last 24 Hrs  T(F): 97.5 (14 Aug 2023 05:13), Max: 98.2 (13 Aug 2023 13:15)  HR: 57 (14 Aug 2023 05:13) (57 - 66)  BP: 145/88 (14 Aug 2023 05:13) (111/66 - 145/88)  RR: 17 (14 Aug 2023 05:13) (17 - 18)  SpO2: 95% (14 Aug 2023 05:13) (95% - 97%)  General: no acute distress  HEENT: NC/AT, EOMI, anicteric, neck supple  Lungs: Clear bilaterally without rales, wheezing or rhonchi  Heart: S1, S2 present, normal rate   Abdomen: Soft. Nondistended. Nontender. BS present.   Neuro: awake, alert, no obvious focal deficits   Extremities: No cyanosis. No edema.   Skin: Warm. Dry. No visible rash.   Lines: PIV    Laboratory & Imaging Data--  CBC:                       11.1   1.99  )-----------( 67       ( 14 Aug 2023 07:33 )             34.5     WBC Count: 1.99 K/uL (08-14-23 @ 07:33)  WBC Count: 2.40 K/uL (08-13-23 @ 06:53)  WBC Count: 2.07 K/uL (08-12-23 @ 14:26)    CMP: 08-13    138  |  102  |  12  ----------------------------<  94  3.8   |  25  |  0.69    Ca    8.9      13 Aug 2023 06:53  Phos  3.5     08-13  Mg     2.0     08-13    TPro  6.7  /  Alb  4.1  /  TBili  0.5  /  DBili  x   /  AST  17  /  ALT  16  /  AlkPhos  63  08-12    LIVER FUNCTIONS - ( 12 Aug 2023 14:26 )  Alb: 4.1 g/dL / Pro: 6.7 g/dL / ALK PHOS: 63 U/L / ALT: 16 U/L / AST: 17 U/L / GGT: x           Urinalysis + Microscopic Examination (08.12.23 @ 15:16)    pH Urine: 6.5   Urine Appearance: Slightly Turbid   Color: Yellow   Specific Gravity: 1.019   Protein, Urine: 30 mg/dL   Glucose Qualitative, Urine: Negative   Ketone - Urine: Negative   Blood, Urine: Small   Bilirubin: Negative   Urobilinogen: Negative   Leukocyte Esterase Concentration: Large   Nitrite: Positive   White Blood Cell - Urine: 445 /HPF   Red Blood Cell - Urine: 6 /hpf   Bacteria: Many   Hyaline Casts: 1 /lpf   Squamous Epithelial Cells: 8 /hpf    Microbiology: reviewed    Radiology--reviewed  < from: CT Head No Cont (08.12.23 @ 17:29) >  IMPRESSION:    No acute intracranial hemorrhage, brain edema, or mass effect.    < end of copied text >  < from: Xray Chest 1 View AP/PA (08.12.23 @ 15:46) >  IMPRESSION:  Clear lungs.    < end of copied text >    Active Medications--  acetaminophen     Tablet .. 650 milliGRAM(s) Oral every 6 hours PRN  albuterol    90 MICROgram(s) HFA Inhaler 2 Puff(s) Inhalation every 6 hours PRN  aluminum hydroxide/magnesium hydroxide/simethicone Suspension 30 milliLiter(s) Oral every 4 hours PRN  aMIOdarone    Tablet 200 milliGRAM(s) Oral daily  apixaban 2.5 milliGRAM(s) Oral every 12 hours  cefTRIAXone   IVPB 1000 milliGRAM(s) IV Intermittent every 24 hours  levothyroxine 100 MICROGram(s) Oral daily  melatonin 3 milliGRAM(s) Oral at bedtime PRN  ondansetron Injectable 4 milliGRAM(s) IV Push every 8 hours PRN  sertraline 50 milliGRAM(s) Oral daily    Current Antimicrobials:   cefTRIAXone   IVPB 1000 milliGRAM(s) IV Intermittent every 24 hours    Prior/Completed Antimicrobials:  cefTRIAXone   IVPB    Immunologic:     
  CARDIOLOGY CONSULT - Dr. 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 Presenting w/ 1 day of "head pressure" and difficulty walking as well as frequent at home. Pt states she doesn't have a good balance and falls frequently at home despite using walker at home. She lives alone but has someone who stays with her during the day but not at night.   She denies dizziness, CP, SOB, palpitation, abd pain, n/v/d. Reports she's been having increased urinary fq, going every 2-3 hrs; denies dysuria or hematuria.  (13 Aug 2023 00:07)      PAST MEDICAL & SURGICAL HISTORY:  Pulmonary TB      Gastritis      Afib      SBO (small bowel obstruction)      Hypothyroid      UTI (urinary tract infection)      HTN (hypertension)      TB (pulmonary tuberculosis)  TB of abdomen 1960      History of Bilateral Breast Biopsy      S/P Exploratory Laparotomy  TB in 1960s      H/O hemorrhoidectomy      S/P vein stripping              PREVIOUS DIAGNOSTIC TESTING:    [ ] Echocardiogram:  [ ]  Catheterization:  [ ] Stress Test:  	    MEDICATIONS:  MEDICATIONS  (STANDING):  aMIOdarone    Tablet 200 milliGRAM(s) Oral daily  apixaban 2.5 milliGRAM(s) Oral every 12 hours  cefTRIAXone   IVPB 1000 milliGRAM(s) IV Intermittent every 24 hours  levothyroxine 100 MICROGram(s) Oral daily  sertraline 50 milliGRAM(s) Oral daily      FAMILY HISTORY:  No pertinent family history in first degree relatives        SOCIAL HISTORY:    [ ] Non-smoker  [ ] Smoker  [ ] Alcohol    Allergies    penicillin (Rash)  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 reported allergy (Unknown)    Intolerances    	    REVIEW OF SYSTEMS:  CONSTITUTIONAL: No fever, weight loss, or fatigue  EYES: No eye pain, visual disturbances, or discharge  ENMT:  No difficulty hearing, tinnitus, vertigo; No sinus or throat pain  NECK: No pain or stiffness  RESPIRATORY: No cough, wheezing, chills or hemoptysis; No Shortness of Breath  CARDIOVASCULAR: No chest pain, palpitations, passing out, dizziness, or leg swelling  GASTROINTESTINAL: No abdominal or epigastric pain. No nausea, vomiting, or hematemesis; No diarrhea or constipation. No melena or hematochezia.  GENITOURINARY: No dysuria, frequency, hematuria, or incontinence  NEUROLOGICAL: No headaches, memory loss, loss of strength, numbness, or tremors  SKIN: No itching, burning, rashes, or lesions   	    [ ] All others negative	  [ ] Unable to obtain    PHYSICAL EXAM:  T(C): 36.8 (08-13-23 @ 05:14), Max: 36.8 (08-12-23 @ 18:00)  HR: 60 (08-13-23 @ 05:44) (54 - 83)  BP: 133/68 (08-13-23 @ 05:14) (131/71 - 173/73)  RR: 18 (08-13-23 @ 05:14) (18 - 20)  SpO2: 96% (08-13-23 @ 05:14) (96% - 100%)  Wt(kg): --  I&O's Summary      Appearance: Normal	  Psychiatry: A & O x 3, Mood & affect appropriate  HEENT:   Normal oral mucosa, PERRL, EOMI	  Lymphatic: No lymphadenopathy  Cardiovascular: Normal S1 S2,RRR, No JVD, No murmurs  Respiratory: Lungs clear to auscultation	  Gastrointestinal:  Soft, Non-tender, + BS	  Skin: No rashes, No ecchymoses, No cyanosis	  Neurologic: Non-focal  Extremities: Normal range of motion, No clubbing, cyanosis or edema  Vascular: Peripheral pulses palpable 2+ bilaterally    TELEMETRY: 	    ECG:  	  RADIOLOGY:  OTHER: 	  	  LABS:	 	    CARDIAC MARKERS:                                  11.4   2.40  )-----------( 73       ( 13 Aug 2023 06:53 )             35.7     08-13    138  |  102  |  12  ----------------------------<  94  3.8   |  25  |  0.69    Ca    8.9      13 Aug 2023 06:53  Phos  3.5     08-13  Mg     2.0     08-13    TPro  6.7  /  Alb  4.1  /  TBili  0.5  /  DBili  x   /  AST  17  /  ALT  16  /  AlkPhos  63  08-12    PT/INR - ( 12 Aug 2023 14:26 )   PT: 12.1 sec;   INR: 1.16 ratio         PTT - ( 12 Aug 2023 14:26 )  PTT:30.1 sec  proBNP:   Lipid Profile:   HgA1c:   TSH:     ASSESSMENT/PLAN: 	              70 minutes spent on total encounter; more than 50% of the visit was spent counseling and/or coordinating care by the attending physician.

## 2023-08-14 NOTE — PHARMACOTHERAPY INTERVENTION NOTE - COMMENTS
Modified penicillin allergy history to state that patient tolerated ceftriaxone during this admission.    Jeannette Maldonado, PharmD, [facility]  Clinical Pharmacy Specialist, Infectious Diseases  Tele-Antimicrobial Stewardship Program (Tele-ASP)  Tele-ASP Phone: [phone]

## 2023-08-14 NOTE — PROGRESS NOTE ADULT - ASSESSMENT
89F PMHx significant for pAfib on eliquis and amiodarone, h/o abd TB s/p ex lap and subsequent SBO episodes over the years, hypothyroid, chronic anemia.   Presenting w/ frequent falls at home and "head pressure" and increased urinary fq.   CTH in the ED neg for acute findings.     Plan:    # UTI:  - UA positive  - Monitor I/O's  - Trend Temps  - C/w IV abx    # Falls/ Unsteady gait 2/2 mild encephalopathy from UTI vs. loss of balance:  - CXR negative  - CTH negative  - Fall precautions  - PT eval  - Echo pending  - Monitor Orthos  - Cards following    # Afib/ Mod AS:  - C/w current meds    # Pancytopenia possible due to infection / chronic    - Serial cbc w/ diff   - Transfuse prn  - prior cbc reviewed from 03/27/2023, noticed similar trend during infection (had pneumonia at that time)  - will trend numbers for now, if worsening will consider heme onc consult  - HIT negative     # Hypothyroidism:  - C/w Synthroid    # Depression:  - C/w Zoloft    # GI ppx:  - Bowel regimen prn    # DVt ppx:  - IPC's  - Eliquis    [facility]  [phone]

## 2023-08-14 NOTE — CONSULT NOTE ADULT - ASSESSMENT
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 In the ER, noted with negative CTH for any acute findings, CXR negative.     UTI  Falls/unsteady gait  - positive UA with increased urinary frequency, no dysuria or hematuria   - WBC noted, remains afebrile   - follow urine culture - in process   - continue ceftriaxone 1g IV Q24h  (8/12--)   -- penicillin allergy noted- tolerating cephalosporins   - monitor temps/CBC  - continue rest of care per primary team       Lilli Polanco M.D.  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 In the ER, noted with negative CTH for any acute findings, CXR negative.     UTI  Falls/unsteady gait  - positive UA with increased urinary frequency, no dysuria or hematuria   - WBC noted-appears chronic on chart review, remains afebrile   - follow urine culture - in process   - continue ceftriaxone 1g IV Q24h  (8/12--)   -- penicillin allergy noted- tolerating cephalosporins   - monitor temps/CBC  - continue rest of care per primary team       Lilli Polanco M.D.  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 In the ER, noted with negative CTH for any acute findings, CXR negative.     UTI  Falls/unsteady gait  - positive UA with increased urinary frequency, no dysuria or hematuria   - WBC noted-appears chronic on chart review, remains afebrile   - prior cultures reviewed, h/o E.coli in Ucx in 2021, overall sensitive   - follow urine culture - in process   - continue ceftriaxone 1g IV Q24h  (8/12--)   -- penicillin allergy noted- tolerating cephalosporins   - monitor temps/CBC  - continue rest of care per primary team       Lilli Polanco M.D.  [facility], Division of Infectious Diseases  [phone]  After 5pm on weekdays and all day on weekends - please call [phone]

## 2023-08-14 NOTE — PROGRESS NOTE ADULT - ASSESSMENT
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 Millard in 2018 treated with NGT then consult for Dr. Dae Murillo in 10/2020 treated conservatively without NGT, and in 2021 with NGT) presents with weakness, headache and falls    #Falls  -in setting of UTI  -F/u echo  -tele  -Check orthostatics if feasible    #UTI  -Abx, mgmt per med    #pAFib  -Stable  -Continue amiodarone  -Continue apixaban    #Mod AS  -As noted on prior echo  -No evidence of volume overload on exam  -Prior CT with Dense aortic valve calcification and multivessel coronary artery calcification  -F/u echo  -If echo shows severe AS, she is unlikely to be candidate for invasive mgmt given her age, PNA     Echo 3/7/22: Mod AS, Mild-Mod AR, Nml lv fxn, mild diastolic dysfxn    A/p  89F hx of pAF on eliquis and amiodarone, 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 Millard in 2018 treated with NGT then consult for Dr. Dae Murillo in 10/2020 treated conservatively without NGT, and in 2021 with NGT) presents with weakness, headache and falls    #Falls  -in setting of UTI  -F/u echo  -tele  -Check orthostatics if feasible    #UTI  -Abx, mgmt per med    #pAFib  -Stable  -Continue amiodarone  -Continue apixaban    #Mod AS  -As noted on prior echo  -No evidence of volume overload on exam  -Prior CT with Dense aortic valve calcification and multivessel coronary artery calcification  -F/u echo  -If echo shows severe AS, she is unlikely to be candidate for invasive mgmt given her age

## 2023-08-15 LAB
-  AMIKACIN: SIGNIFICANT CHANGE UP
-  AMOXICILLIN/CLAVULANIC ACID: SIGNIFICANT CHANGE UP
-  AMPICILLIN/SULBACTAM: SIGNIFICANT CHANGE UP
-  AMPICILLIN: SIGNIFICANT CHANGE UP
-  AZTREONAM: SIGNIFICANT CHANGE UP
-  CEFAZOLIN: SIGNIFICANT CHANGE UP
-  CEFEPIME: SIGNIFICANT CHANGE UP
-  CEFOXITIN: SIGNIFICANT CHANGE UP
-  CEFTRIAXONE: SIGNIFICANT CHANGE UP
-  CIPROFLOXACIN: SIGNIFICANT CHANGE UP
-  ERTAPENEM: SIGNIFICANT CHANGE UP
-  GENTAMICIN: SIGNIFICANT CHANGE UP
-  IMIPENEM: SIGNIFICANT CHANGE UP
-  LEVOFLOXACIN: SIGNIFICANT CHANGE UP
-  MEROPENEM: SIGNIFICANT CHANGE UP
-  NITROFURANTOIN: SIGNIFICANT CHANGE UP
-  PIPERACILLIN/TAZOBACTAM: SIGNIFICANT CHANGE UP
-  TOBRAMYCIN: SIGNIFICANT CHANGE UP
-  TRIMETHOPRIM/SULFAMETHOXAZOLE: SIGNIFICANT CHANGE UP
ALBUMIN SERPL ELPH-MCNC: 3.8 G/DL — SIGNIFICANT CHANGE UP (ref 3.3–5)
ALP SERPL-CCNC: 66 U/L — SIGNIFICANT CHANGE UP (ref 40–120)
ALT FLD-CCNC: 17 U/L — SIGNIFICANT CHANGE UP (ref 10–45)
ANION GAP SERPL CALC-SCNC: 14 MMOL/L — SIGNIFICANT CHANGE UP (ref 5–17)
AST SERPL-CCNC: 18 U/L — SIGNIFICANT CHANGE UP (ref 10–40)
BASOPHILS # BLD AUTO: 0.01 K/UL — SIGNIFICANT CHANGE UP (ref 0–0.2)
BASOPHILS NFR BLD AUTO: 0.5 % — SIGNIFICANT CHANGE UP (ref 0–2)
BILIRUB SERPL-MCNC: 0.5 MG/DL — SIGNIFICANT CHANGE UP (ref 0.2–1.2)
BUN SERPL-MCNC: 14 MG/DL — SIGNIFICANT CHANGE UP (ref 7–23)
CALCIUM SERPL-MCNC: 9 MG/DL — SIGNIFICANT CHANGE UP (ref 8.4–10.5)
CHLORIDE SERPL-SCNC: 99 MMOL/L — SIGNIFICANT CHANGE UP (ref 96–108)
CO2 SERPL-SCNC: 25 MMOL/L — SIGNIFICANT CHANGE UP (ref 22–31)
CREAT SERPL-MCNC: 0.65 MG/DL — SIGNIFICANT CHANGE UP (ref 0.5–1.3)
CULTURE RESULTS: SIGNIFICANT CHANGE UP
EGFR: 80 ML/MIN/1.73M2 — SIGNIFICANT CHANGE UP
EOSINOPHIL # BLD AUTO: 0.05 K/UL — SIGNIFICANT CHANGE UP (ref 0–0.5)
EOSINOPHIL NFR BLD AUTO: 2.4 % — SIGNIFICANT CHANGE UP (ref 0–6)
GLUCOSE SERPL-MCNC: 86 MG/DL — SIGNIFICANT CHANGE UP (ref 70–99)
HCT VFR BLD CALC: 35.8 % — SIGNIFICANT CHANGE UP (ref 34.5–45)
HGB BLD-MCNC: 11.4 G/DL — LOW (ref 11.5–15.5)
IMM GRANULOCYTES NFR BLD AUTO: 0.5 % — SIGNIFICANT CHANGE UP (ref 0–0.9)
LYMPHOCYTES # BLD AUTO: 0.41 K/UL — LOW (ref 1–3.3)
LYMPHOCYTES # BLD AUTO: 19.6 % — SIGNIFICANT CHANGE UP (ref 13–44)
MCHC RBC-ENTMCNC: 31.8 GM/DL — LOW (ref 32–36)
MCHC RBC-ENTMCNC: 32.8 PG — SIGNIFICANT CHANGE UP (ref 27–34)
MCV RBC AUTO: 102.9 FL — HIGH (ref 80–100)
METHOD TYPE: SIGNIFICANT CHANGE UP
MONOCYTES # BLD AUTO: 0.12 K/UL — SIGNIFICANT CHANGE UP (ref 0–0.9)
MONOCYTES NFR BLD AUTO: 5.7 % — SIGNIFICANT CHANGE UP (ref 2–14)
NEUTROPHILS # BLD AUTO: 1.49 K/UL — LOW (ref 1.8–7.4)
NEUTROPHILS NFR BLD AUTO: 71.3 % — SIGNIFICANT CHANGE UP (ref 43–77)
NRBC # BLD: 0 /100 WBCS — SIGNIFICANT CHANGE UP (ref 0–0)
ORGANISM # SPEC MICROSCOPIC CNT: SIGNIFICANT CHANGE UP
ORGANISM # SPEC MICROSCOPIC CNT: SIGNIFICANT CHANGE UP
PLATELET # BLD AUTO: 73 K/UL — LOW (ref 150–400)
POTASSIUM SERPL-MCNC: 4.1 MMOL/L — SIGNIFICANT CHANGE UP (ref 3.5–5.3)
POTASSIUM SERPL-SCNC: 4.1 MMOL/L — SIGNIFICANT CHANGE UP (ref 3.5–5.3)
PROT SERPL-MCNC: 6.7 G/DL — SIGNIFICANT CHANGE UP (ref 6–8.3)
RBC # BLD: 3.48 M/UL — LOW (ref 3.8–5.2)
RBC # FLD: 13.5 % — SIGNIFICANT CHANGE UP (ref 10.3–14.5)
SODIUM SERPL-SCNC: 138 MMOL/L — SIGNIFICANT CHANGE UP (ref 135–145)
SPECIMEN SOURCE: SIGNIFICANT CHANGE UP
WBC # BLD: 2.09 K/UL — LOW (ref 3.8–10.5)
WBC # FLD AUTO: 2.09 K/UL — LOW (ref 3.8–10.5)

## 2023-08-15 PROCEDURE — 93306 TTE W/DOPPLER COMPLETE: CPT | Mod: 26

## 2023-08-15 PROCEDURE — 93010 ELECTROCARDIOGRAM REPORT: CPT

## 2023-08-15 RX ADMIN — Medication 100 MICROGRAM(S): at 05:21

## 2023-08-15 RX ADMIN — AMIODARONE HYDROCHLORIDE 200 MILLIGRAM(S): 400 TABLET ORAL at 05:21

## 2023-08-15 RX ADMIN — APIXABAN 2.5 MILLIGRAM(S): 2.5 TABLET, FILM COATED ORAL at 17:53

## 2023-08-15 RX ADMIN — APIXABAN 2.5 MILLIGRAM(S): 2.5 TABLET, FILM COATED ORAL at 05:21

## 2023-08-15 RX ADMIN — SERTRALINE 50 MILLIGRAM(S): 25 TABLET, FILM COATED ORAL at 14:15

## 2023-08-15 RX ADMIN — CEFTRIAXONE 100 MILLIGRAM(S): 500 INJECTION, POWDER, FOR SOLUTION INTRAMUSCULAR; INTRAVENOUS at 17:54

## 2023-08-15 NOTE — PROGRESS NOTE ADULT - ASSESSMENT
89F PMHx significant for pAfib on eliquis and amiodarone, h/o abd TB s/p ex lap and subsequent SBO episodes over the years, hypothyroid, chronic anemia.   Presenting w/ frequent falls at home and "head pressure" and increased urinary fq.   CTH in the ED neg for acute findings.     Plan:    # Klebsiella UTI  - UA positive  - Monitor I/O's  - Trend Temps  - C/w IV abx  - Await final cultures     # Falls/ Unsteady gait 2/2 mild encephalopathy from UTI vs. loss of balance:  - CXR negative  - CTH negative  - Fall precautions  - PT eval  - Echo pending  - Monitor Orthos  - Cards following    # Afib/ Mod AS:  - C/w current meds    # Pancytopenia possible due to infection / chronic    - Serial cbc w/ diff, improving    - Transfuse prn  - prior cbc reviewed from 03/27/2023, noticed similar trend during infection (had pneumonia at that time)  - will trend numbers for now, if worsening will consider heme onc consult  - HIT negative     # Hypothyroidism:  - C/w Synthroid    # Depression:  - C/w Zoloft    # GI ppx:  - Bowel regimen prn    # DVt ppx:  - IPC's  - Eliquis    [facility]  [phone]

## 2023-08-15 NOTE — PROVIDER CONTACT NOTE (CHANGE IN STATUS NOTIFICATION) - SITUATION
pt assisted to care with PCA, PCA notified RN re pt not responding to verbal command & pt not able to bare weight & slumping over in chair, at baseline pt A/Ox4, pt states she feels woozy, pt denies chest pain, dizziness or SOB

## 2023-08-15 NOTE — PROGRESS NOTE ADULT - ASSESSMENT
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 In the ER, noted with negative CTH for any acute findings, CXR negative.     UTI  Falls/unsteady gait  - positive UA with increased urinary frequency, no dysuria or hematuria   - WBC noted-appears chronic on chart review, remains afebrile   - prior cultures reviewed, h/o E.coli in Ucx in 2021, overall sensitive   - follow urine culture for Klebsiella variicola sensitivities     - continue ceftriaxone 1g IV Q24h  (8/12--)   -- penicillin allergy noted- tolerating cephalosporins   - monitor temps/CBC  - continue rest of care per primary team       Lilli Polanco M.D.  [facility], Division of Infectious Diseases  [phone]  After 5pm on weekdays and all day on weekends - please call [phone]

## 2023-08-16 LAB
ANION GAP SERPL CALC-SCNC: 12 MMOL/L — SIGNIFICANT CHANGE UP (ref 5–17)
BUN SERPL-MCNC: 18 MG/DL — SIGNIFICANT CHANGE UP (ref 7–23)
CALCIUM SERPL-MCNC: 9 MG/DL — SIGNIFICANT CHANGE UP (ref 8.4–10.5)
CHLORIDE SERPL-SCNC: 103 MMOL/L — SIGNIFICANT CHANGE UP (ref 96–108)
CO2 SERPL-SCNC: 24 MMOL/L — SIGNIFICANT CHANGE UP (ref 22–31)
CREAT SERPL-MCNC: 0.73 MG/DL — SIGNIFICANT CHANGE UP (ref 0.5–1.3)
EGFR: 74 ML/MIN/1.73M2 — SIGNIFICANT CHANGE UP
GLUCOSE SERPL-MCNC: 83 MG/DL — SIGNIFICANT CHANGE UP (ref 70–99)
HCT VFR BLD CALC: 33.5 % — LOW (ref 34.5–45)
HGB BLD-MCNC: 10.6 G/DL — LOW (ref 11.5–15.5)
MCHC RBC-ENTMCNC: 31.6 GM/DL — LOW (ref 32–36)
MCHC RBC-ENTMCNC: 32.8 PG — SIGNIFICANT CHANGE UP (ref 27–34)
MCV RBC AUTO: 103.7 FL — HIGH (ref 80–100)
NRBC # BLD: 0 /100 WBCS — SIGNIFICANT CHANGE UP (ref 0–0)
PLATELET # BLD AUTO: 69 K/UL — LOW (ref 150–400)
POTASSIUM SERPL-MCNC: 3.9 MMOL/L — SIGNIFICANT CHANGE UP (ref 3.5–5.3)
POTASSIUM SERPL-SCNC: 3.9 MMOL/L — SIGNIFICANT CHANGE UP (ref 3.5–5.3)
RBC # BLD: 3.23 M/UL — LOW (ref 3.8–5.2)
RBC # FLD: 13.6 % — SIGNIFICANT CHANGE UP (ref 10.3–14.5)
SODIUM SERPL-SCNC: 139 MMOL/L — SIGNIFICANT CHANGE UP (ref 135–145)
WBC # BLD: 2.14 K/UL — LOW (ref 3.8–10.5)
WBC # FLD AUTO: 2.14 K/UL — LOW (ref 3.8–10.5)

## 2023-08-16 RX ORDER — CEFTRIAXONE 500 MG/1
1000 INJECTION, POWDER, FOR SOLUTION INTRAMUSCULAR; INTRAVENOUS ONCE
Refills: 0 | Status: COMPLETED | OUTPATIENT
Start: 2023-08-16 | End: 2023-08-16

## 2023-08-16 RX ORDER — SODIUM CHLORIDE 9 MG/ML
1000 INJECTION INTRAMUSCULAR; INTRAVENOUS; SUBCUTANEOUS
Refills: 0 | Status: DISCONTINUED | OUTPATIENT
Start: 2023-08-16 | End: 2023-08-17

## 2023-08-16 RX ADMIN — CEFTRIAXONE 100 MILLIGRAM(S): 500 INJECTION, POWDER, FOR SOLUTION INTRAMUSCULAR; INTRAVENOUS at 18:43

## 2023-08-16 RX ADMIN — Medication 100 MICROGRAM(S): at 05:42

## 2023-08-16 RX ADMIN — SERTRALINE 50 MILLIGRAM(S): 25 TABLET, FILM COATED ORAL at 11:58

## 2023-08-16 RX ADMIN — APIXABAN 2.5 MILLIGRAM(S): 2.5 TABLET, FILM COATED ORAL at 05:42

## 2023-08-16 RX ADMIN — AMIODARONE HYDROCHLORIDE 200 MILLIGRAM(S): 400 TABLET ORAL at 05:42

## 2023-08-16 RX ADMIN — SODIUM CHLORIDE 75 MILLILITER(S): 9 INJECTION INTRAMUSCULAR; INTRAVENOUS; SUBCUTANEOUS at 14:43

## 2023-08-16 RX ADMIN — APIXABAN 2.5 MILLIGRAM(S): 2.5 TABLET, FILM COATED ORAL at 18:38

## 2023-08-16 NOTE — PROGRESS NOTE ADULT - ASSESSMENT
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 Millard in 2018 treated with NGT then consult for Dr. Dae Murillo in 10/2020 treated conservatively without NGT, and in 2021 with NGT) presents with weakness, headache and falls    #Falls  -in setting of UTI  -Echo noted with hyperdyanmic lvef, moderate AS  -Check orthostatics if feasible  -EKG from yesterday with junctional rhythm 56bpm - overall unchanged from prior EKGs    #UTI  -Abx, mgmt per med    #pAFib  -Stable  -Continue amiodarone  -Continue apixaban  -Avoid bb given intermittent bradycardia    #Mod AS  -As noted on prior echo  -No evidence of volume overload on exam  -Prior CT with Dense aortic valve calcification and multivessel coronary artery calcification  -Echo as above        Echo 3/7/22: Mod AS, Mild-Mod AR, Nml lv fxn, mild diastolic dysfxn    A/p  89F hx of pAF on eliquis and amiodarone, 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 Millard in 2018 treated with NGT then consult for Dr. Dae Murillo in 10/2020 treated conservatively without NGT, and in 2021 with NGT) presents with weakness, headache and falls    #Falls  -in setting of UTI  -Echo noted with hyperdynamic lvef, moderate AS  -Check orthostatics if feasible  -EKG from yesterday with junctional rhythm 56bpm - overall unchanged from prior EKGs    #UTI  -Abx, mgmt per med    #pAFib  -Stable  -Continue amiodarone  -Continue apixaban  -Avoid bb given intermittent bradycardia    #Mod AS  -As noted on prior echo  -No evidence of volume overload on exam  -Prior CT with Dense aortic valve calcification and multivessel coronary artery calcification  -Echo as above

## 2023-08-16 NOTE — PROGRESS NOTE ADULT - ASSESSMENT
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 In the ER, noted with negative CTH for any acute findings, CXR negative.     UTI  Falls/unsteady gait  - positive UA with increased urinary frequency, no dysuria or hematuria   - leukopenia/WBC noted-appears chronic on chart review, remains afebrile   - prior cultures reviewed, h/o E.coli in Ucx in 2021, overall sensitive   - Ucx with overall sensitive Klebsiella variicola  - continue ceftriaxone 1g IV Q24h  (8/12--) to complete 5d course today 8/16   -- penicillin allergy noted- tolerating cephalosporins   - monitor temps/CBC  - continue rest of care per primary team       Lilli Polanco M.D.  [facility], Division of Infectious Diseases  [phone]  After 5pm on weekdays and all day on weekends - please call [phone]

## 2023-08-16 NOTE — PROGRESS NOTE ADULT - ASSESSMENT
89F PMHx significant for pAfib on eliquis and amiodarone, h/o abd TB s/p ex lap and subsequent SBO episodes over the years, hypothyroid, chronic anemia. Presenting w/ frequent falls at home and "head pressure" and increased urinary fq.     # Klebsiella UTI  - sp 5 days IV ceftriaxone, finished abx    # Falls/ Unsteady gait 2/2 mild encephalopathy from UTI vs. loss of balance  - CXR negative  - CTH negative  - Fall precautions  - PT eval recs KALYANI  - Echo reviewed  - check Orthos  - Cards following    # Afib/ Mod AS  - C/w current meds    # Pancytopenia possible due to infection / chronic    - Serial cbc w/ diff, stable  - prior cbc reviewed from 03/27/2023, noticed similar trend during infection (had pneumonia at that time)  - HIT negative     # Hypothyroidism:  - C/w Synthroid    # Depression  - C/w Zoloft    # DVt ppx: Eliquis    dispo pending orthostatics, dc planning    [facility]

## 2023-08-17 ENCOUNTER — TRANSCRIPTION ENCOUNTER (OUTPATIENT)
Age: 88
End: 2023-08-17

## 2023-08-17 RX ORDER — ALBUTEROL 90 UG/1
2 AEROSOL, METERED ORAL
Qty: 0 | Refills: 0 | DISCHARGE
Start: 2023-08-17

## 2023-08-17 RX ORDER — LEVOTHYROXINE SODIUM 125 MCG
1 TABLET ORAL
Refills: 0 | DISCHARGE

## 2023-08-17 RX ORDER — AMIODARONE HYDROCHLORIDE 400 MG/1
1 TABLET ORAL
Refills: 0 | DISCHARGE

## 2023-08-17 RX ORDER — SERTRALINE 25 MG/1
1 TABLET, FILM COATED ORAL
Refills: 0 | DISCHARGE

## 2023-08-17 RX ORDER — LEVOTHYROXINE SODIUM 125 MCG
1 TABLET ORAL
Qty: 0 | Refills: 0 | DISCHARGE
Start: 2023-08-17

## 2023-08-17 RX ORDER — MIDODRINE HYDROCHLORIDE 2.5 MG/1
5 TABLET ORAL EVERY 8 HOURS
Refills: 0 | Status: DISCONTINUED | OUTPATIENT
Start: 2023-08-17 | End: 2023-08-21

## 2023-08-17 RX ORDER — APIXABAN 2.5 MG/1
1 TABLET, FILM COATED ORAL
Qty: 0 | Refills: 0 | DISCHARGE
Start: 2023-08-17

## 2023-08-17 RX ORDER — AMIODARONE HYDROCHLORIDE 400 MG/1
1 TABLET ORAL
Qty: 0 | Refills: 0 | DISCHARGE
Start: 2023-08-17

## 2023-08-17 RX ORDER — APIXABAN 2.5 MG/1
1 TABLET, FILM COATED ORAL
Refills: 0 | DISCHARGE

## 2023-08-17 RX ORDER — ALBUTEROL 90 UG/1
2 AEROSOL, METERED ORAL
Refills: 0 | DISCHARGE

## 2023-08-17 RX ORDER — ACETAMINOPHEN 500 MG
2 TABLET ORAL
Qty: 0 | Refills: 0 | DISCHARGE
Start: 2023-08-17

## 2023-08-17 RX ORDER — SERTRALINE 25 MG/1
1 TABLET, FILM COATED ORAL
Qty: 0 | Refills: 0 | DISCHARGE
Start: 2023-08-17

## 2023-08-17 RX ORDER — ACETAMINOPHEN 500 MG
2 TABLET ORAL
Refills: 0 | DISCHARGE

## 2023-08-17 RX ADMIN — AMIODARONE HYDROCHLORIDE 200 MILLIGRAM(S): 400 TABLET ORAL at 05:42

## 2023-08-17 RX ADMIN — SERTRALINE 50 MILLIGRAM(S): 25 TABLET, FILM COATED ORAL at 11:31

## 2023-08-17 RX ADMIN — MIDODRINE HYDROCHLORIDE 5 MILLIGRAM(S): 2.5 TABLET ORAL at 18:12

## 2023-08-17 RX ADMIN — Medication 100 MICROGRAM(S): at 05:42

## 2023-08-17 RX ADMIN — APIXABAN 2.5 MILLIGRAM(S): 2.5 TABLET, FILM COATED ORAL at 05:42

## 2023-08-17 RX ADMIN — APIXABAN 2.5 MILLIGRAM(S): 2.5 TABLET, FILM COATED ORAL at 18:17

## 2023-08-17 RX ADMIN — SODIUM CHLORIDE 75 MILLILITER(S): 9 INJECTION INTRAMUSCULAR; INTRAVENOUS; SUBCUTANEOUS at 06:37

## 2023-08-17 NOTE — DISCHARGE NOTE PROVIDER - CARE PROVIDER_API CALL
Goldberg, Steven M  Internal Medicine  [address] [place], [place] [address]  Phone: [phone]  Fax: [phone]  Follow Up Time:

## 2023-08-17 NOTE — DISCHARGE NOTE PROVIDER - NSDCMRMEDTOKEN_GEN_ALL_CORE_FT
acetaminophen 325 mg oral tablet: 2 tab(s) orally every 6 hours As needed Temp greater or equal to 38C (100.4F), Mild Pain (1 - 3)  albuterol 90 mcg/inh inhalation aerosol: 2 puff(s) inhaled every 6 hours As needed Shortness of Breath  amiodarone 200 mg oral tablet: 1 tab(s) orally once a day  apixaban 2.5 mg oral tablet: 1 tab(s) orally every 12 hours  levothyroxine 100 mcg (0.1 mg) oral tablet: 1 tab(s) orally once a day  sertraline 50 mg oral tablet: 1 tab(s) orally once a day  Xiidra 5% ophthalmic solution: 1 drop(s) in each eye 2 times a day   acetaminophen 325 mg oral tablet: 2 tab(s) orally every 6 hours As needed Temp greater or equal to 38C (100.4F), Mild Pain (1 - 3)  albuterol 90 mcg/inh inhalation aerosol: 2 puff(s) inhaled every 6 hours As needed Shortness of Breath  amiodarone 200 mg oral tablet: 1 tab(s) orally once a day  apixaban 2.5 mg oral tablet: 1 tab(s) orally every 12 hours  levothyroxine 100 mcg (0.1 mg) oral tablet: 1 tab(s) orally once a day  OP physical Therapy eval and treat: as directed  sertraline 50 mg oral tablet: 1 tab(s) orally once a day  Xiidra 5% ophthalmic solution: 1 drop(s) in each eye 2 times a day   acetaminophen 325 mg oral tablet: 2 tab(s) orally every 6 hours As needed Temp greater or equal to 38C (100.4F), Mild Pain (1 - 3)  albuterol 90 mcg/inh inhalation aerosol: 2 puff(s) inhaled every 6 hours As needed Shortness of Breath  amiodarone 200 mg oral tablet: 1 tab(s) orally once a day  apixaban 2.5 mg oral tablet: 1 tab(s) orally every 12 hours  fludrocortisone 0.1 mg oral tablet: 1 tab(s) orally once a day  levothyroxine 100 mcg (0.1 mg) oral tablet: 1 tab(s) orally once a day  meclizine 25 mg oral tablet: 1 tab(s) orally 3 times a day  midodrine 10 mg oral tablet: 1 tab(s) orally every 8 hours  sertraline 50 mg oral tablet: 1 tab(s) orally once a day  Xiidra 5% ophthalmic solution: 1 drop(s) in each eye 2 times a day

## 2023-08-17 NOTE — DISCHARGE NOTE PROVIDER - NSDCCPCAREPLAN_GEN_ALL_CORE_FT
PRINCIPAL DISCHARGE DIAGNOSIS  Diagnosis: Urinary tract infection  Assessment and Plan of Treatment: s/p abx course   Call you Health care provider upon arrival home to make a one week follow up appointment.  If you develop fever, chills, malaise, or change in mental status call your Health Care Provider or go to the Emergency Department.  Nutrition is important, eat small frequent meals to help ensure you get adequate calories.  Do not stay in bed all day!  Increase your activity daily as tolerated.        SECONDARY DISCHARGE DIAGNOSES  Diagnosis: Hypothyroid  Assessment and Plan of Treatment: c/w synthroid    Diagnosis: Pancytopenia  Assessment and Plan of Treatment: stable   Likley secondary to infection   please follow up with PCP in 1 week    Diagnosis: Paroxysmal atrial fibrillation  Assessment and Plan of Treatment: c/w Eliquis    Diagnosis: Fall  Assessment and Plan of Treatment: echo EF 75 Moderate  AR   Orthostatic  BP positive , added IVF   PT cathy recommends for  KALYANI , Pt refusing , d/c home with Home care  and Home PT     PRINCIPAL DISCHARGE DIAGNOSIS  Diagnosis: Urinary tract infection  Assessment and Plan of Treatment: You completed antibiotics in the hospital.     Call you Health care provider upon arrival home to make a one week follow up appointment.  If you develop fever, chills, malaise, or change in mental status call your Health Care Provider or go to the Emergency Department.  Nutrition is important, eat small frequent meals to help ensure you get adequate calories.  Do not stay in bed all day!  Increase your activity daily as tolerated.        SECONDARY DISCHARGE DIAGNOSES  Diagnosis: Fall  Assessment and Plan of Treatment: Echo EF 75 Moderate  AR   Orthostatic blood pressure positive.  You were given IV fluids in the hospital.  Continue with midodrine and Florinef.  Follow up with Dr. Goldberg.    Diagnosis: Paroxysmal atrial fibrillation  Assessment and Plan of Treatment: Continue with Eliquis.    Diagnosis: Pancytopenia  Assessment and Plan of Treatment: Stable   Likley secondary to infection    Diagnosis: Hypothyroid  Assessment and Plan of Treatment: c/w synthroid

## 2023-08-17 NOTE — DISCHARGE NOTE PROVIDER - CARE PROVIDERS DIRECT ADDRESSES
[email or web address] Statement Selected

## 2023-08-17 NOTE — DISCHARGE NOTE PROVIDER - HOSPITAL COURSE
89F PMHx significant for pAfib on eliquis and amiodarone, h/o abd TB s/p ex lap and subsequent SBO episodes over the years, hypothyroid, chronic anemia. Presenting w/ frequent falls at home and "head pressure" and increased urinary fq.     # Klebsiella UTI  - sp 5 days IV ceftriaxone, finished abx    # Falls/ Unsteady gait 2/2 mild encephalopathy from UTI vs. loss of balance  - CXR negative  - CTH negative  - Fall precautions  - PT eval recs KALYANI  - Echo reviewed  - check Orthos positive , s/p IVF   - Cards following    # Afib/ Mod AS  - C/w current meds    # Pancytopenia possible due to infection / chronic    - Serial cbc w/ diff, stable  - prior cbc reviewed from 03/27/2023, noticed similar trend during infection (had pneumonia at that time)  - HIT negative     # Hypothyroidism:  - C/w Synthroid    # Depression  - C/w Zoloft   PT eval recommended for  KALYANI , Pt and family prefers for Home , will be d/judah  home with Home care and Home PT with close follow up with cardiology and PCP 89F PMHx significant for pAfib on eliquis and amiodarone, h/o abd TB s/p ex lap and subsequent SBO episodes over the years, hypothyroid, chronic anemia. Presenting w/ frequent falls at home and "head pressure" and increased urinary fq.     # Klebsiella UTI  - sp 5 days IV ceftriaxone, finished abx    # Falls/ Unsteady gait 2/2 mild encephalopathy from UTI vs. loss of balance  - CXR negative  - CTH negative  - Fall precautions  - PT eval recs KALYANI  - Echo reviewed  - check Orthos positive , s/p IVF   - Cards following    # Afib/ Mod AS  - C/w current meds    # Pancytopenia possible due to infection / chronic    - Serial cbc w/ diff, stable  - prior cbc reviewed from 03/27/2023, noticed similar trend during infection (had pneumonia at that time)  - HIT negative     # Hypothyroidism:  - C/w Synthroid    # Depression  - C/w Zoloft   PT eval recommended for  KALYANI , Pt and family prefers for Home , will be d/judah  home with Home care and Home PT with close follow up with cardiology and PCP     Medically cleared by Dr. White for discharge home with outpatient physical therapy.

## 2023-08-17 NOTE — PROGRESS NOTE ADULT - ASSESSMENT
89F PMHx pAfib on eliquis and amiodarone, h/o abd TB s/p ex lap and subsequent SBO episodes over the years, hypothyroid, chronic anemia. Presenting w/ frequent falls at home and "head pressure" and increased urinary freq.     # Klebsiella UTI  - sp 5 days IV ceftriaxone, finished abx    # Falls/ Unsteady gait 2/2 orthostatic hypotension  - CXR negative, CTH negative  - Fall precautions  - PT eval recs KALYANI  - Echo reviewed  - orthos grossly pos, start midodrine 5mg TID, permissive hypertension  - Cards following    # Afib/ Mod AS  - C/w current meds    # Pancytopenia possible due to infection / chronic    - Serial cbc w/ diff, stable  - prior cbc reviewed from 03/27/2023, noticed similar trend during infection (had pneumonia at that time)  - outpt fu    # Hypothyroidism  - C/w Synthroid    # Depression  - C/w Zoloft    # DVt ppx: Eliquis    [facility]  [phone]

## 2023-08-17 NOTE — PROGRESS NOTE ADULT - ASSESSMENT
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 In the ER, noted with negative CTH for any acute findings, CXR negative.     Klebsiella UTI  Falls/unsteady gait  - positive UA with increased urinary frequency, no dysuria or hematuria   - leukopenia/WBC noted-appears chronic on chart review, remains afebrile   - prior cultures reviewed, h/o E.coli in Ucx in 2021, overall sensitive   - Ucx with overall sensitive Klebsiella variicola  - s/p ceftriaxone - completed 5d course (8/12-8/16)    -- penicillin allergy noted- tolerated cephalosporins   - monitor temps/CBC  - continue rest of care per primary team   Stable from ID standpoint at this time       Lilli Polanco M.D.  [facility], Division of Infectious Diseases  [phone]  After 5pm on weekdays and all day on weekends - please call [phone]

## 2023-08-17 NOTE — PROGRESS NOTE ADULT - ASSESSMENT
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 Millard in 2018 treated with NGT then consult for Dr. Dae Murillo in 10/2020 treated conservatively without NGT, and in 2021 with NGT) presents with weakness, headache and falls    #Falls  -in setting of UTI  -Echo noted with hyperdynamic lvef, moderate AS  -EKG with junctional rhythm 56bpm - overall unchanged from prior EKGs  -Orthostatics borderline  - continue to trend  -S/p IVF  -Rec compression stockings    #UTI  -Abx, mgmt per med    #pAFib  -Stable  -Continue amiodarone  -Continue apixaban  -Avoid bb given intermittent bradycardia    #Mod AS  -As noted on prior echo  -No evidence of volume overload on exam  -Prior CT with Dense aortic valve calcification and multivessel coronary artery calcification  -Echo as above

## 2023-08-18 RX ADMIN — AMIODARONE HYDROCHLORIDE 200 MILLIGRAM(S): 400 TABLET ORAL at 05:11

## 2023-08-18 RX ADMIN — SERTRALINE 50 MILLIGRAM(S): 25 TABLET, FILM COATED ORAL at 11:06

## 2023-08-18 RX ADMIN — APIXABAN 2.5 MILLIGRAM(S): 2.5 TABLET, FILM COATED ORAL at 05:11

## 2023-08-18 RX ADMIN — APIXABAN 2.5 MILLIGRAM(S): 2.5 TABLET, FILM COATED ORAL at 18:50

## 2023-08-18 RX ADMIN — MIDODRINE HYDROCHLORIDE 5 MILLIGRAM(S): 2.5 TABLET ORAL at 11:06

## 2023-08-18 RX ADMIN — ALBUTEROL 2 PUFF(S): 90 AEROSOL, METERED ORAL at 16:32

## 2023-08-18 RX ADMIN — MIDODRINE HYDROCHLORIDE 5 MILLIGRAM(S): 2.5 TABLET ORAL at 22:14

## 2023-08-18 RX ADMIN — Medication 100 MICROGRAM(S): at 05:11

## 2023-08-18 NOTE — PROGRESS NOTE ADULT - ASSESSMENT
89F PMHx pAfib on eliquis and amiodarone, h/o abd TB s/p ex lap and subsequent SBO episodes over the years, hypothyroid, chronic anemia. Presenting w/ frequent falls at home and "head pressure" and increased urinary freq.     # Klebsiella UTI  - sp 5 days IV ceftriaxone, finished abx    # Falls/ Unsteady gait 2/2 orthostatic hypotension  - CXR negative, CTH negative  - Fall precautions  - PT eval recs KALYANI  - Echo reviewed  - orthos grossly pos, started midodrine 5mg TID, permissive hypertension, IVF  - dw cardio    # Afib/ Mod AS  - C/w current meds    # Pancytopenia possible due to infection / chronic    - Serial cbc w/ diff, stable  - prior cbc reviewed from 03/27/2023, noticed similar trend during infection (had pneumonia at that time)  - outpt fu    # Hypothyroidism  - C/w Synthroid    # Depression  - C/w Zoloft    # DVt ppx: Eliquis    [facility]  [phone]

## 2023-08-18 NOTE — PROGRESS NOTE ADULT - ASSESSMENT
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 Millard in 2018 treated with NGT then consult for Dr. Dae Murillo in 10/2020 treated conservatively without NGT, and in 2021 with NGT) presents with weakness, headache and falls    #Falls  -in setting of UTI  -Echo noted with hyperdynamic lvef, moderate AS  -EKG with junctional rhythm 56bpm - overall unchanged from prior EKGs  -Orthostatics hypotension +-- on mido   -S/p IVF  -Rec compression stockings    #UTI  -Abx, mgmt per med    #pAFib  -Stable  -Continue amiodarone  -Continue apixaban  -Avoid bb given intermittent bradycardia    #Mod AS  -As noted on prior echo  -No evidence of volume overload on exam  -Prior CT with Dense aortic valve calcification and multivessel coronary artery calcification  -Echo as above   -continue medical management

## 2023-08-18 NOTE — PROGRESS NOTE ADULT - ASSESSMENT
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 In the ER, noted with negative CTH for any acute findings, CXR negative.     Klebsiella UTI  Falls/unsteady gait  - positive UA with increased urinary frequency, no dysuria or hematuria   - leukopenia/WBC noted-appears chronic on chart review, remains afebrile   - prior cultures reviewed, h/o E.coli in Ucx in 2021, overall sensitive   - Ucx with overall sensitive Klebsiella variicola  - s/p ceftriaxone - completed 5d course (8/12-8/16)    -- penicillin allergy noted- tolerated cephalosporins   - continue off antibiotics   - continue rest of care per primary team   Stable from ID standpoint at this time     Over the weekend Dr. Tiffanie Amador will be covering for our group. If you have any questions, concerns or new micro data, please reach out to them at [phone]    Lilli Polanco M.D.  [facility], Division of Infectious Diseases  [phone]  After 5pm on weekdays and all day on weekends - please call [phone]

## 2023-08-19 RX ORDER — MECLIZINE HCL 12.5 MG
25 TABLET ORAL THREE TIMES A DAY
Refills: 0 | Status: DISCONTINUED | OUTPATIENT
Start: 2023-08-19 | End: 2023-08-23

## 2023-08-19 RX ORDER — FLUDROCORTISONE ACETATE 0.1 MG/1
0.1 TABLET ORAL DAILY
Refills: 0 | Status: DISCONTINUED | OUTPATIENT
Start: 2023-08-19 | End: 2023-08-23

## 2023-08-19 RX ADMIN — FLUDROCORTISONE ACETATE 0.1 MILLIGRAM(S): 0.1 TABLET ORAL at 14:16

## 2023-08-19 RX ADMIN — Medication 650 MILLIGRAM(S): at 22:23

## 2023-08-19 RX ADMIN — APIXABAN 2.5 MILLIGRAM(S): 2.5 TABLET, FILM COATED ORAL at 17:17

## 2023-08-19 RX ADMIN — MIDODRINE HYDROCHLORIDE 5 MILLIGRAM(S): 2.5 TABLET ORAL at 22:22

## 2023-08-19 RX ADMIN — SERTRALINE 50 MILLIGRAM(S): 25 TABLET, FILM COATED ORAL at 14:17

## 2023-08-19 RX ADMIN — AMIODARONE HYDROCHLORIDE 200 MILLIGRAM(S): 400 TABLET ORAL at 05:10

## 2023-08-19 RX ADMIN — MIDODRINE HYDROCHLORIDE 5 MILLIGRAM(S): 2.5 TABLET ORAL at 05:10

## 2023-08-19 RX ADMIN — Medication 25 MILLIGRAM(S): at 22:22

## 2023-08-19 RX ADMIN — MIDODRINE HYDROCHLORIDE 5 MILLIGRAM(S): 2.5 TABLET ORAL at 14:17

## 2023-08-19 RX ADMIN — APIXABAN 2.5 MILLIGRAM(S): 2.5 TABLET, FILM COATED ORAL at 05:10

## 2023-08-19 RX ADMIN — Medication 100 MICROGRAM(S): at 05:10

## 2023-08-19 NOTE — PROGRESS NOTE ADULT - ASSESSMENT
89F PMHx pAfib on eliquis and amiodarone, h/o abd TB s/p ex lap and subsequent SBO episodes over the years, hypothyroid, chronic anemia. Presenting w/ frequent falls at home and "head pressure" and increased urinary freq.     # Klebsiella UTI  - sp 5 days IV ceftriaxone, finished abx    # Falls/ Unsteady gait 2/2 orthostatic hypotension  - CXR negative, CTH negative  - Fall precautions  - PT eval recs KALYANI  - Echo reviewed  - orthos last night improved, cont midodrine 5mg TID, permissive hypertension, recheck orthos today, ambulate patient  - cardio following    # Afib/ Mod AS  - C/w current meds    # Pancytopenia possible due to infection / chronic    - Serial cbc w/ diff, stable  - prior cbc reviewed from 03/27/2023, noticed similar trend during infection (had pneumonia at that time)  - outpt fu    # Hypothyroidism  - C/w Synthroid    # Depression  - C/w Zoloft    # DVt ppx: Eliquis    [facility]

## 2023-08-20 RX ADMIN — Medication 25 MILLIGRAM(S): at 13:35

## 2023-08-20 RX ADMIN — Medication 100 MICROGRAM(S): at 05:30

## 2023-08-20 RX ADMIN — FLUDROCORTISONE ACETATE 0.1 MILLIGRAM(S): 0.1 TABLET ORAL at 05:29

## 2023-08-20 RX ADMIN — MIDODRINE HYDROCHLORIDE 5 MILLIGRAM(S): 2.5 TABLET ORAL at 22:49

## 2023-08-20 RX ADMIN — SERTRALINE 50 MILLIGRAM(S): 25 TABLET, FILM COATED ORAL at 16:20

## 2023-08-20 RX ADMIN — Medication 25 MILLIGRAM(S): at 05:30

## 2023-08-20 RX ADMIN — APIXABAN 2.5 MILLIGRAM(S): 2.5 TABLET, FILM COATED ORAL at 17:18

## 2023-08-20 RX ADMIN — Medication 25 MILLIGRAM(S): at 22:49

## 2023-08-20 RX ADMIN — MIDODRINE HYDROCHLORIDE 5 MILLIGRAM(S): 2.5 TABLET ORAL at 05:30

## 2023-08-20 RX ADMIN — AMIODARONE HYDROCHLORIDE 200 MILLIGRAM(S): 400 TABLET ORAL at 05:30

## 2023-08-20 RX ADMIN — APIXABAN 2.5 MILLIGRAM(S): 2.5 TABLET, FILM COATED ORAL at 05:29

## 2023-08-20 RX ADMIN — MIDODRINE HYDROCHLORIDE 5 MILLIGRAM(S): 2.5 TABLET ORAL at 13:35

## 2023-08-20 NOTE — PROGRESS NOTE ADULT - ASSESSMENT
89F PMHx pAfib on eliquis and amiodarone, h/o abd TB s/p ex lap and subsequent SBO episodes over the years, hypothyroid, chronic anemia. Presenting w/ frequent falls at home and "head pressure" and increased urinary freq.     # Klebsiella UTI  - sp 5 days IV ceftriaxone, finished abx    # Falls/ Unsteady gait 2/2 orthostatic hypotension  - CXR negative, CTH negative  - Fall precautions  - PT eval recs KALYANI  - Echo reviewed  - orthos repeated yesterday improving, cont midodrine 5mg TID, added florinef 01.mg qd, allow permissive hypertension, recheck orthos today, ambulate patient  - cardio following    # Afib/ Mod AS  - C/w current meds    # Pancytopenia possible due to infection / chronic    - Serial cbc w/ diff, stable  - prior cbc reviewed from 03/27/2023, noticed similar trend during infection (had pneumonia at that time)  - outpt fu    # Hypothyroidism  - C/w Synthroid    # Depression  - C/w Zoloft    # DVt ppx: Eliquis    dispo: check orthos, OOB, if stable and asymptomatic then dc home    dw dtr on the phone    [facility]  [phone]

## 2023-08-20 NOTE — PROGRESS NOTE ADULT - ASSESSMENT
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 Millard in 2018 treated with NGT then consult for Dr. Dae Murillo in 10/2020 treated conservatively without NGT, and in 2021 with NGT) presents with weakness, headache and falls    #Falls  -in setting of UTI  -Echo noted with hyperdynamic lvef, moderate AS  -EKG with junctional rhythm 56bpm - overall unchanged from prior EKGs  -Orthostatics hypotension +-- on mido, fludro, & compression stockings  -S/p IVF  -Orthostatics improved     #UTI  -Abx, mgmt per med    #pAFib  -Stable  -Junctional & SR on tele  -Continue amiodarone  -Continue apixaban  -Avoid bb given intermittent bradycardia    #Mod AS  -As noted on prior echo  -No evidence of volume overload on exam  -Prior CT with Dense aortic valve calcification and multivessel coronary artery calcification  -Echo as above   -continue medical management

## 2023-08-21 RX ORDER — SODIUM CHLORIDE 9 MG/ML
1000 INJECTION INTRAMUSCULAR; INTRAVENOUS; SUBCUTANEOUS
Refills: 0 | Status: DISCONTINUED | OUTPATIENT
Start: 2023-08-21 | End: 2023-08-22

## 2023-08-21 RX ORDER — MIDODRINE HYDROCHLORIDE 2.5 MG/1
10 TABLET ORAL EVERY 8 HOURS
Refills: 0 | Status: DISCONTINUED | OUTPATIENT
Start: 2023-08-21 | End: 2023-08-22

## 2023-08-21 RX ADMIN — AMIODARONE HYDROCHLORIDE 200 MILLIGRAM(S): 400 TABLET ORAL at 05:31

## 2023-08-21 RX ADMIN — MIDODRINE HYDROCHLORIDE 10 MILLIGRAM(S): 2.5 TABLET ORAL at 13:58

## 2023-08-21 RX ADMIN — Medication 100 MICROGRAM(S): at 05:31

## 2023-08-21 RX ADMIN — FLUDROCORTISONE ACETATE 0.1 MILLIGRAM(S): 0.1 TABLET ORAL at 05:31

## 2023-08-21 RX ADMIN — SERTRALINE 50 MILLIGRAM(S): 25 TABLET, FILM COATED ORAL at 14:01

## 2023-08-21 RX ADMIN — Medication 25 MILLIGRAM(S): at 22:43

## 2023-08-21 RX ADMIN — APIXABAN 2.5 MILLIGRAM(S): 2.5 TABLET, FILM COATED ORAL at 05:31

## 2023-08-21 RX ADMIN — MIDODRINE HYDROCHLORIDE 5 MILLIGRAM(S): 2.5 TABLET ORAL at 05:31

## 2023-08-21 RX ADMIN — APIXABAN 2.5 MILLIGRAM(S): 2.5 TABLET, FILM COATED ORAL at 18:54

## 2023-08-21 RX ADMIN — MIDODRINE HYDROCHLORIDE 10 MILLIGRAM(S): 2.5 TABLET ORAL at 22:43

## 2023-08-21 RX ADMIN — Medication 25 MILLIGRAM(S): at 14:00

## 2023-08-21 RX ADMIN — SODIUM CHLORIDE 75 MILLILITER(S): 9 INJECTION INTRAMUSCULAR; INTRAVENOUS; SUBCUTANEOUS at 14:22

## 2023-08-21 RX ADMIN — Medication 25 MILLIGRAM(S): at 05:30

## 2023-08-21 NOTE — PROGRESS NOTE ADULT - ASSESSMENT
89F PMHx pAfib on eliquis and amiodarone, h/o abd TB s/p ex lap and subsequent SBO episodes over the years, hypothyroid, chronic anemia. Presenting w/ frequent falls at home and "head pressure" and increased urinary freq.     # Klebsiella UTI  - sp 5 days IV ceftriaxone, finished abx    # Falls/ Unsteady gait 2/2 orthostatic hypotension  - CXR negative, CTH negative  - Fall precautions  - PT eval recs KALYANI  - Echo reviewed  - orthos continue to be positive, will increase midodrine 10mg TID, cont florinef 01.mg qd, allow permissive hypertension, recheck orthos per shift, ambulate patient, compression stockings, IVF  - cardio following    # Afib/ Mod AS  - C/w current meds    # Pancytopenia possible due to infection / chronic    - Serial cbc w/ diff, stable  - prior cbc reviewed from 03/27/2023, noticed similar trend during infection (had pneumonia at that time)  - outpt fu    # Hypothyroidism  - C/w Synthroid    # Depression  - C/w Zoloft    # DVt ppx: Eliqupadmini dimas dtr on the phone 8/20/23, dc planning once pt asymptommatic    [facility]  [phone]

## 2023-08-21 NOTE — PROGRESS NOTE ADULT - ASSESSMENT
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 In the ER, noted with negative CTH for any acute findings, CXR negative.     Klebsiella UTI  Falls/unsteady gait  - positive UA with increased urinary frequency, no dysuria or hematuria   - leukopenia/WBC noted-appears chronic on chart review, remains afebrile   - prior cultures reviewed, h/o E.coli in Ucx in 2021, overall sensitive   - Ucx with overall sensitive Klebsiella variicola  - s/p ceftriaxone - completed 5d course (8/12-8/16)    -- penicillin allergy noted- tolerated cephalosporins   - continue off antibiotics   - continue rest of care per primary team     Stable from ID standpoint at this time   ID will sign off at this time but remains available for any further questions/concerns.    Lilli Polanco M.D.  [facility], Division of Infectious Diseases  [phone]  After 5pm on weekdays and all day on weekends - please call [phone]

## 2023-08-21 NOTE — PROGRESS NOTE ADULT - ASSESSMENT
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 Millard in 2018 treated with NGT then consult for Dr. Dae Murillo in 10/2020 treated conservatively without NGT, and in 2021 with NGT) presents with weakness, headache and falls    #Falls  -in setting of UTI  -Echo noted with hyperdynamic lvef, moderate AS  -EKG with junctional rhythm 56bpm - overall unchanged from prior EKGs  -Orthostatics hypotension +-- on mido, fludro, & compression stockings  -S/p IVF  -Orthostatics improving    #UTI  -Abx, mgmt per med    #pAFib  -Stable  -Junctional & SR on tele  -Continue amiodarone  -Continue apixaban  -Avoid bb given intermittent bradycardia    #Mod AS  -As noted on prior echo  -No evidence of volume overload on exam  -Prior CT with Dense aortic valve calcification and multivessel coronary artery calcification  -Echo as above   -continue medical management

## 2023-08-22 RX ORDER — SODIUM CHLORIDE 9 MG/ML
1000 INJECTION INTRAMUSCULAR; INTRAVENOUS; SUBCUTANEOUS
Refills: 0 | Status: DISCONTINUED | OUTPATIENT
Start: 2023-08-22 | End: 2023-08-23

## 2023-08-22 RX ORDER — MIDODRINE HYDROCHLORIDE 2.5 MG/1
10 TABLET ORAL EVERY 8 HOURS
Refills: 0 | Status: DISCONTINUED | OUTPATIENT
Start: 2023-08-22 | End: 2023-08-23

## 2023-08-22 RX ADMIN — SODIUM CHLORIDE 75 MILLILITER(S): 9 INJECTION INTRAMUSCULAR; INTRAVENOUS; SUBCUTANEOUS at 10:13

## 2023-08-22 RX ADMIN — ALBUTEROL 2 PUFF(S): 90 AEROSOL, METERED ORAL at 17:43

## 2023-08-22 RX ADMIN — SODIUM CHLORIDE 75 MILLILITER(S): 9 INJECTION INTRAMUSCULAR; INTRAVENOUS; SUBCUTANEOUS at 12:28

## 2023-08-22 RX ADMIN — MIDODRINE HYDROCHLORIDE 10 MILLIGRAM(S): 2.5 TABLET ORAL at 14:09

## 2023-08-22 RX ADMIN — Medication 25 MILLIGRAM(S): at 06:48

## 2023-08-22 RX ADMIN — SODIUM CHLORIDE 75 MILLILITER(S): 9 INJECTION INTRAMUSCULAR; INTRAVENOUS; SUBCUTANEOUS at 19:13

## 2023-08-22 RX ADMIN — APIXABAN 2.5 MILLIGRAM(S): 2.5 TABLET, FILM COATED ORAL at 06:48

## 2023-08-22 RX ADMIN — Medication 100 MICROGRAM(S): at 06:48

## 2023-08-22 RX ADMIN — Medication 25 MILLIGRAM(S): at 21:55

## 2023-08-22 RX ADMIN — APIXABAN 2.5 MILLIGRAM(S): 2.5 TABLET, FILM COATED ORAL at 17:43

## 2023-08-22 RX ADMIN — SERTRALINE 50 MILLIGRAM(S): 25 TABLET, FILM COATED ORAL at 12:12

## 2023-08-22 RX ADMIN — FLUDROCORTISONE ACETATE 0.1 MILLIGRAM(S): 0.1 TABLET ORAL at 06:48

## 2023-08-22 RX ADMIN — Medication 25 MILLIGRAM(S): at 14:08

## 2023-08-22 RX ADMIN — AMIODARONE HYDROCHLORIDE 200 MILLIGRAM(S): 400 TABLET ORAL at 06:48

## 2023-08-22 RX ADMIN — MIDODRINE HYDROCHLORIDE 10 MILLIGRAM(S): 2.5 TABLET ORAL at 21:56

## 2023-08-22 NOTE — PROVIDER CONTACT NOTE (OTHER) - ASSESSMENT
Patient became dizzy while having BM in bathroom (accompanied by PCA). Patient did not loose consciousness, was weak and hypotensive 86/50. Abd binder and anti embolism stockings in place.

## 2023-08-22 NOTE — PROGRESS NOTE ADULT - ASSESSMENT
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 Millard in 2018 treated with NGT then consult for Dr. Dae Murillo in 10/2020 treated conservatively without NGT, and in 2021 with NGT) presents with weakness, headache and falls    #Falls  -in setting of UTI  -Echo noted with hyperdynamic lvef, moderate AS  -EKG with junctional rhythm 56bpm - overall unchanged from prior EKGs  -Orthostatics hypotension +-- on mido, fludro, & compression stockings  -S/p IVF  -Orthostatics noted - cont to trend    #UTI  -Abx, mgmt per med    #pAFib  -Stable  -Junctional & SR on tele  -Continue amiodarone  -Continue apixaban  -Avoid bb given intermittent bradycardia    #Mod AS  -As noted on prior echo  -No evidence of volume overload on exam  -Prior CT with Dense aortic valve calcification and multivessel coronary artery calcification  -Echo as above   -continue medical management

## 2023-08-22 NOTE — PROGRESS NOTE ADULT - ASSESSMENT
89F PMHx pAfib on eliquis and amiodarone, h/o abd TB s/p ex lap and subsequent SBO episodes over the years, hypothyroid, chronic anemia. Presenting w/ frequent falls at home and "head pressure" and increased urinary freq.     # Klebsiella UTI  - sp 5 days IV ceftriaxone, finished abx    # Falls/ Unsteady gait 2/2 orthostatic hypotension  - CXR negative, CTH negative  - Fall precautions  - PT eval recs KALYANI  - Echo reviewed  - orthos continue to be positive, cont midodrine 10mg TID, cont florinef 01.mg qd, allow permissive hypertension do not hold, recheck orthos per shift, ambulate patient, compression stockings, IVF  - cardio following    # Afib/ Mod AS  - C/w current meds    # Pancytopenia possible due to infection / chronic    - Serial cbc w/ diff, stable  - prior cbc reviewed from 03/27/2023, noticed similar trend during infection (had pneumonia at that time)  - outpt fu    # Hypothyroidism  - C/w Synthroid    # Depression  - C/w Zoloft    # DVt ppx: Eliquis    dw dtr on the phone 8/20/23, dc planning once pt asymptommatic    Dr White will be covering me starting tomorrow.  Please contact with any questions or concerns [phone].

## 2023-08-23 ENCOUNTER — TRANSCRIPTION ENCOUNTER (OUTPATIENT)
Age: 88
End: 2023-08-23

## 2023-08-23 VITALS
SYSTOLIC BLOOD PRESSURE: 154 MMHG | RESPIRATION RATE: 17 BRPM | TEMPERATURE: 98 F | OXYGEN SATURATION: 94 % | DIASTOLIC BLOOD PRESSURE: 69 MMHG | HEART RATE: 93 BPM

## 2023-08-23 LAB
ANION GAP SERPL CALC-SCNC: 10 MMOL/L — SIGNIFICANT CHANGE UP (ref 5–17)
BUN SERPL-MCNC: 13 MG/DL — SIGNIFICANT CHANGE UP (ref 7–23)
CALCIUM SERPL-MCNC: 8.4 MG/DL — SIGNIFICANT CHANGE UP (ref 8.4–10.5)
CHLORIDE SERPL-SCNC: 109 MMOL/L — HIGH (ref 96–108)
CO2 SERPL-SCNC: 23 MMOL/L — SIGNIFICANT CHANGE UP (ref 22–31)
CREAT SERPL-MCNC: 0.57 MG/DL — SIGNIFICANT CHANGE UP (ref 0.5–1.3)
EGFR: 82 ML/MIN/1.73M2 — SIGNIFICANT CHANGE UP
GLUCOSE SERPL-MCNC: 78 MG/DL — SIGNIFICANT CHANGE UP (ref 70–99)
HCT VFR BLD CALC: 33.2 % — LOW (ref 34.5–45)
HGB BLD-MCNC: 10.5 G/DL — LOW (ref 11.5–15.5)
MCHC RBC-ENTMCNC: 31.6 GM/DL — LOW (ref 32–36)
MCHC RBC-ENTMCNC: 32.7 PG — SIGNIFICANT CHANGE UP (ref 27–34)
MCV RBC AUTO: 103.4 FL — HIGH (ref 80–100)
NRBC # BLD: 0 /100 WBCS — SIGNIFICANT CHANGE UP (ref 0–0)
PLATELET # BLD AUTO: 73 K/UL — LOW (ref 150–400)
POTASSIUM SERPL-MCNC: 3.8 MMOL/L — SIGNIFICANT CHANGE UP (ref 3.5–5.3)
POTASSIUM SERPL-SCNC: 3.8 MMOL/L — SIGNIFICANT CHANGE UP (ref 3.5–5.3)
RBC # BLD: 3.21 M/UL — LOW (ref 3.8–5.2)
RBC # FLD: 13.2 % — SIGNIFICANT CHANGE UP (ref 10.3–14.5)
SODIUM SERPL-SCNC: 142 MMOL/L — SIGNIFICANT CHANGE UP (ref 135–145)
WBC # BLD: 2.15 K/UL — LOW (ref 3.8–10.5)
WBC # FLD AUTO: 2.15 K/UL — LOW (ref 3.8–10.5)

## 2023-08-23 PROCEDURE — 84100 ASSAY OF PHOSPHORUS: CPT

## 2023-08-23 PROCEDURE — 87186 SC STD MICRODIL/AGAR DIL: CPT

## 2023-08-23 PROCEDURE — 97110 THERAPEUTIC EXERCISES: CPT

## 2023-08-23 PROCEDURE — 99285 EMERGENCY DEPT VISIT HI MDM: CPT

## 2023-08-23 PROCEDURE — 94640 AIRWAY INHALATION TREATMENT: CPT

## 2023-08-23 PROCEDURE — 96374 THER/PROPH/DIAG INJ IV PUSH: CPT

## 2023-08-23 PROCEDURE — 85610 PROTHROMBIN TIME: CPT

## 2023-08-23 PROCEDURE — 87086 URINE CULTURE/COLONY COUNT: CPT

## 2023-08-23 PROCEDURE — 83550 IRON BINDING TEST: CPT

## 2023-08-23 PROCEDURE — 36415 COLL VENOUS BLD VENIPUNCTURE: CPT

## 2023-08-23 PROCEDURE — 97161 PT EVAL LOW COMPLEX 20 MIN: CPT

## 2023-08-23 PROCEDURE — 87077 CULTURE AEROBIC IDENTIFY: CPT

## 2023-08-23 PROCEDURE — 71045 X-RAY EXAM CHEST 1 VIEW: CPT

## 2023-08-23 PROCEDURE — 80048 BASIC METABOLIC PNL TOTAL CA: CPT

## 2023-08-23 PROCEDURE — 93005 ELECTROCARDIOGRAM TRACING: CPT

## 2023-08-23 PROCEDURE — 84443 ASSAY THYROID STIM HORMONE: CPT

## 2023-08-23 PROCEDURE — 83540 ASSAY OF IRON: CPT

## 2023-08-23 PROCEDURE — 86022 PLATELET ANTIBODIES: CPT

## 2023-08-23 PROCEDURE — 83735 ASSAY OF MAGNESIUM: CPT

## 2023-08-23 PROCEDURE — 97116 GAIT TRAINING THERAPY: CPT

## 2023-08-23 PROCEDURE — 84439 ASSAY OF FREE THYROXINE: CPT

## 2023-08-23 PROCEDURE — 85730 THROMBOPLASTIN TIME PARTIAL: CPT

## 2023-08-23 PROCEDURE — 85025 COMPLETE CBC W/AUTO DIFF WBC: CPT

## 2023-08-23 PROCEDURE — 97530 THERAPEUTIC ACTIVITIES: CPT

## 2023-08-23 PROCEDURE — 85027 COMPLETE CBC AUTOMATED: CPT

## 2023-08-23 PROCEDURE — 82728 ASSAY OF FERRITIN: CPT

## 2023-08-23 PROCEDURE — 80053 COMPREHEN METABOLIC PANEL: CPT

## 2023-08-23 PROCEDURE — 93306 TTE W/DOPPLER COMPLETE: CPT

## 2023-08-23 PROCEDURE — 70450 CT HEAD/BRAIN W/O DYE: CPT | Mod: MA

## 2023-08-23 PROCEDURE — 81001 URINALYSIS AUTO W/SCOPE: CPT

## 2023-08-23 RX ORDER — MIDODRINE HYDROCHLORIDE 2.5 MG/1
1 TABLET ORAL
Qty: 90 | Refills: 0
Start: 2023-08-23 | End: 2023-09-21

## 2023-08-23 RX ORDER — FLUDROCORTISONE ACETATE 0.1 MG/1
1 TABLET ORAL
Qty: 30 | Refills: 0
Start: 2023-08-23 | End: 2023-09-21

## 2023-08-23 RX ORDER — MECLIZINE HCL 12.5 MG
1 TABLET ORAL
Qty: 90 | Refills: 0
Start: 2023-08-23 | End: 2023-09-21

## 2023-08-23 RX ADMIN — Medication 100 MICROGRAM(S): at 06:07

## 2023-08-23 RX ADMIN — Medication 25 MILLIGRAM(S): at 06:08

## 2023-08-23 RX ADMIN — APIXABAN 2.5 MILLIGRAM(S): 2.5 TABLET, FILM COATED ORAL at 06:08

## 2023-08-23 RX ADMIN — MIDODRINE HYDROCHLORIDE 10 MILLIGRAM(S): 2.5 TABLET ORAL at 06:07

## 2023-08-23 RX ADMIN — FLUDROCORTISONE ACETATE 0.1 MILLIGRAM(S): 0.1 TABLET ORAL at 06:08

## 2023-08-23 RX ADMIN — SERTRALINE 50 MILLIGRAM(S): 25 TABLET, FILM COATED ORAL at 11:31

## 2023-08-23 RX ADMIN — AMIODARONE HYDROCHLORIDE 200 MILLIGRAM(S): 400 TABLET ORAL at 06:07

## 2023-08-23 NOTE — PROGRESS NOTE ADULT - PROVIDER SPECIALTY LIST ADULT
Cardiology
Internal Medicine
Internal Medicine
Cardiology
Infectious Disease
Internal Medicine
Cardiology
Infectious Disease
Internal Medicine
Cardiology
Infectious Disease
Internal Medicine

## 2023-08-23 NOTE — PROGRESS NOTE ADULT - REASON FOR ADMISSION
fall

## 2023-08-23 NOTE — PROGRESS NOTE ADULT - ASSESSMENT
89F PMHx pAfib on eliquis and amiodarone, h/o abd TB s/p ex lap and subsequent SBO episodes over the years, hypothyroid, chronic anemia. Presenting w/ frequent falls at home and "head pressure" and increased urinary freq.     # Klebsiella UTI  - sp 5 days IV ceftriaxone, finished abx    # Falls/ Unsteady gait 2/2 orthostatic hypotension  - CXR negative, CTH negative  - Fall precautions  - PT eval recs KALYANI  - Echo reviewed  - orthos continue to be positive, cont midodrine 10mg TID, cont florinef 01.mg qd, allow permissive hypertension do not hold, recheck orthos per shift, ambulate patient, compression stockings  - cardio following  - patient is asymptomatic currently; she will follow up with her outpatient cardiologist/PCP-- Dr. Goldberg     # Afib/ Mod AS  - C/w current meds    # Pancytopenia possible due to infection / chronic    - Serial cbc w/ diff, stable  - prior cbc reviewed from 03/27/2023, noticed similar trend during infection (had pneumonia at that time)  - outpt fu    # Hypothyroidism  - C/w Synthroid    # Depression  - C/w Zoloft    # DVt ppx: Danuta    Spoke with daughter at bedside.    Please contact with any questions or concerns [phone].

## 2023-08-23 NOTE — DISCHARGE NOTE NURSING/CASE MANAGEMENT/SOCIAL WORK - PATIENT PORTAL LINK FT
You can access the FollowMyHealth Patient Portal offered by [facility] by registering at the following website: [email or web address]. By joining [facility]’s FollowMyHealth portal, you will also be able to view your health information using other applications (apps) compatible with our system.

## 2023-08-23 NOTE — DISCHARGE NOTE NURSING/CASE MANAGEMENT/SOCIAL WORK - NSDCPEFALRISK_GEN_ALL_CORE
For information on Fall & Injury Prevention, visit: [email or web address] OR  [email or web address] OR  https://www.cdc.gov/steadi/patient.html

## 2023-08-23 NOTE — PROGRESS NOTE ADULT - SUBJECTIVE AND OBJECTIVE BOX
CARDIOLOGY FOLLOW UP - Dr. Moeller  DATE OF SERVICE: 8/15/23    CC  No acute CV events    REVIEW OF SYSTEMS:  CONSTITUTIONAL: No fever, weight loss, or fatigue  RESPIRATORY: No cough, wheezing, chills or hemoptysis; No Shortness of Breath  CARDIOVASCULAR: No chest pain, palpitations, passing out, dizziness, or leg swelling  GASTROINTESTINAL: No abdominal or epigastric pain. No nausea, vomiting, or hematemesis; No diarrhea or constipation. No melena or hematochezia.  VASCULAR: No edema     PHYSICAL EXAM:  T(C): 36.9 (08-15-23 @ 05:00), Max: 36.9 (08-15-23 @ 05:00)  HR: 62 (08-15-23 @ 05:00) (58 - 62)  BP: 131/62 (08-15-23 @ 05:00) (101/60 - 137/68)  RR: 18 (08-15-23 @ 05:00) (18 - 18)  SpO2: 96% (08-15-23 @ 05:00) (95% - 96%)  Wt(kg): --  I&O's Summary    14 Aug 2023 07:01  -  15 Aug 2023 07:00  --------------------------------------------------------  IN: 840 mL / OUT: 500 mL / NET: 340 mL        Appearance: Elderly female	  Cardiovascular: Normal S1 S2,RRR, No JVD, No murmurs  Respiratory: Lungs clear to auscultation b/l  Gastrointestinal:  Soft, Non-tender, + BS	  Extremities: Normal range of motion, No clubbing, cyanosis or edema      Home Medications:  Albuterol (Eqv-ProAir HFA) 90 mcg/inh inhalation aerosol: 2 puff(s) inhaled every 6 hours as needed (12 Aug 2023 22:27)  amiodarone 200 mg oral tablet: 1 tab(s) orally once a day (12 Aug 2023 22:27)  Eliquis 2.5 mg oral tablet: 1 tab(s) orally once a day (12 Aug 2023 22:27)  levothyroxine 100 mcg (0.1 mg) oral tablet: 1 tab(s) orally once a day (12 Aug 2023 22:27)  sertraline 50 mg oral tablet: 1 tab(s) orally once a day (12 Aug 2023 22:27)  Tylenol 325 mg oral tablet: 2 tab(s) orally once a day (12 Aug 2023 22:26)  Xiidra 5% ophthalmic solution: 1 drop(s) in each eye 2 times a day (12 Aug 2023 22:27)      MEDICATIONS  (STANDING):  aMIOdarone    Tablet 200 milliGRAM(s) Oral daily  apixaban 2.5 milliGRAM(s) Oral every 12 hours  cefTRIAXone   IVPB 1000 milliGRAM(s) IV Intermittent every 24 hours  levothyroxine 100 MICROGram(s) Oral daily  sertraline 50 milliGRAM(s) Oral daily      TELEMETRY: 	    ECG:  	  RADIOLOGY:   DIAGNOSTIC TESTING:  [ ] Echocardiogram:  [ ]  Catheterization:  [ ] Stress Test:    OTHER: 	    LABS:	 	                            11.4   2.09  )-----------( 73       ( 15 Aug 2023 06:32 )             35.8     08-15    138  |  99  |  14  ----------------------------<  86  4.1   |  25  |  0.65    Ca    9.0      15 Aug 2023 06:32    TPro  6.7  /  Alb  3.8  /  TBili  0.5  /  DBili  x   /  AST  18  /  ALT  17  /  AlkPhos  66  08-15            
[facility]  [address] Rene Y. Patel, S. Shah, G. Mark  [phone]  ([phone] - weekdays after 5pm and weekends)    Name: CATHRYN LARA  Age/Gender: 89y Female  MRN: [id number]    Interval History:  Patient seen and examined this morning.   Feels better, states urinary frequency less.   Denies fever, pain or any new complaints  Notes reviewed.   No concerning overnight events.  Afebrile.     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 reported allergy (Unknown)      Objective:  Vitals:   T(F): 98.3 (08-15-23 @ 09:05), Max: 98.4 (08-15-23 @ 05:00)  HR: 63 (08-15-23 @ 09:05) (58 - 63)  BP: 140/74 (08-15-23 @ 09:05) (101/60 - 140/74)  RR: 18 (08-15-23 @ 09:05) (18 - 18)  SpO2: 95% (08-15-23 @ 09:05) (95% - 96%)  Physical Examination:  General: no acute distress  HEENT: NC/AT, EOMI, anicteric, neck supple  Cardio: S1, S2 present, normal rate  Resp: clear to auscultation bilaterally  Abd: soft, NT, ND, + BS  Neuro: awake, alert, no obvious focal deficits  Ext: no edema, no cyanosis, moving extremities  Skin: warm, dry, no visible rash  Psych: appropriate affect and mood for situation  Lines: PIV    Laboratory Studies:  CBC:                       11.4   2.09  )-----------( 73       ( 15 Aug 2023 06:32 )             35.8     WBC Trend:  2.09 08-15-23 @ 06:32  1.99 08-14-23 @ 07:33  2.40 08-13-23 @ 06:53  2.07 08-12-23 @ 14:26    CMP: 08-15    138  |  99  |  14  ----------------------------<  86  4.1   |  25  |  0.65    Ca    9.0      15 Aug 2023 06:32    TPro  6.7  /  Alb  3.8  /  TBili  0.5  /  DBili  x   /  AST  18  /  ALT  17  /  AlkPhos  66  08-15    Creatinine: 0.65 mg/dL (08-15-23 @ 06:32)  Creatinine: 0.69 mg/dL (08-13-23 @ 06:53)  Creatinine: 0.77 mg/dL (08-12-23 @ 14:26)      LIVER FUNCTIONS - ( 15 Aug 2023 06:32 )  Alb: 3.8 g/dL / Pro: 6.7 g/dL / ALK PHOS: 66 U/L / ALT: 17 U/L / AST: 18 U/L / GGT: x           Microbiology: reviewed   Culture - Urine (collected 08-12-23 @ 15:16)  Source: Clean Catch Clean Catch (Midstream)  Preliminary Report (08-14-23 @ 23:02):    >100,000 CFU/ml Klebsiella variicola    Radiology: reviewed     Medications:  acetaminophen     Tablet .. 650 milliGRAM(s) Oral every 6 hours PRN  albuterol    90 MICROgram(s) HFA Inhaler 2 Puff(s) Inhalation every 6 hours PRN  aluminum hydroxide/magnesium hydroxide/simethicone Suspension 30 milliLiter(s) Oral every 4 hours PRN  aMIOdarone    Tablet 200 milliGRAM(s) Oral daily  apixaban 2.5 milliGRAM(s) Oral every 12 hours  cefTRIAXone   IVPB 1000 milliGRAM(s) IV Intermittent every 24 hours  levothyroxine 100 MICROGram(s) Oral daily  melatonin 3 milliGRAM(s) Oral at bedtime PRN  ondansetron Injectable 4 milliGRAM(s) IV Push every 8 hours PRN  sertraline 50 milliGRAM(s) Oral daily    Current Antimicrobials:  cefTRIAXone   IVPB 1000 milliGRAM(s) IV Intermittent every 24 hours    Prior/Completed Antimicrobials:  cefTRIAXone   IVPB  
Patient is a 89y old  Female who presents with a chief complaint of fall (16 Aug 2023 11:01)      SUBJECTIVE / OVERNIGHT EVENTS:    Patient seen and examined. no complaints. no dysuria. [place].      Vital Signs Last 24 Hrs  T(C): 36.7 (16 Aug 2023 11:02), Max: 36.8 (16 Aug 2023 04:33)  T(F): 98.1 (16 Aug 2023 11:02), Max: 98.3 (16 Aug 2023 04:33)  HR: 61 (16 Aug 2023 04:33) (61 - 66)  BP: 123/65 (16 Aug 2023 04:33) (121/68 - 123/65)  BP(mean): --  RR: 18 (16 Aug 2023 11:02) (18 - 18)  SpO2: 94% (16 Aug 2023 11:02) (93% - 95%)    Parameters below as of 16 Aug 2023 11:02  Patient On (Oxygen Delivery Method): room air      I&O's Summary    15 Aug 2023 07:01  -  16 Aug 2023 07:00  --------------------------------------------------------  IN: 540 mL / OUT: 0 mL / NET: 540 mL        PE:  GENERAL: NAD, AAOx2  CHEST/LUNG: CTABL, No wheeze  HEART: Regular rate and rhythm; + murmur  ABDOMEN: Soft, Nontender, Nondistended; Bowel sounds present  EXTREMITIES:  2+ Peripheral Pulses, No edema  NEURO: No focal deficits    LABS:                        10.6   2.14  )-----------( 69       ( 16 Aug 2023 06:48 )             33.5     08-16    139  |  103  |  18  ----------------------------<  83  3.9   |  24  |  0.73    Ca    9.0      16 Aug 2023 06:50    TPro  6.7  /  Alb  3.8  /  TBili  0.5  /  DBili  x   /  AST  18  /  ALT  17  /  AlkPhos  66  08-15      CAPILLARY BLOOD GLUCOSE            Urinalysis Basic - ( 16 Aug 2023 06:50 )    Color: x / Appearance: x / SG: x / pH: x  Gluc: 83 mg/dL / Ketone: x  / Bili: x / Urobili: x   Blood: x / Protein: x / Nitrite: x   Leuk Esterase: x / RBC: x / WBC x   Sq Epi: x / Non Sq Epi: x / Bacteria: x        RADIOLOGY & ADDITIONAL TESTS:    Imaging Personally Reviewed:  [x] YES  [ ] NO    Consultant(s) Notes Reviewed:  [x] YES  [ ] NO    MEDICATIONS  (STANDING):  aMIOdarone    Tablet 200 milliGRAM(s) Oral daily  apixaban 2.5 milliGRAM(s) Oral every 12 hours  cefTRIAXone   IVPB 1000 milliGRAM(s) IV Intermittent once  levothyroxine 100 MICROGram(s) Oral daily  sertraline 50 milliGRAM(s) Oral daily    MEDICATIONS  (PRN):  acetaminophen     Tablet .. 650 milliGRAM(s) Oral every 6 hours PRN Temp greater or equal to 38C (100.4F), Mild Pain (1 - 3)  albuterol    90 MICROgram(s) HFA Inhaler 2 Puff(s) Inhalation every 6 hours PRN Shortness of Breath  aluminum hydroxide/magnesium hydroxide/simethicone Suspension 30 milliLiter(s) Oral every 4 hours PRN Dyspepsia  melatonin 3 milliGRAM(s) Oral at bedtime PRN Insomnia  ondansetron Injectable 4 milliGRAM(s) IV Push every 8 hours PRN Nausea and/or Vomiting      Care Discussed with Consultants/Other Providers [x] YES  [ ] NO    HEALTH ISSUES - PROBLEM Dx:  Acute UTI    Fall    Paroxysmal atrial fibrillation    Hypothyroid    Pancytopenia        
Patient is a 89y old  Female who presents with a chief complaint of fall (20 Aug 2023 08:05)      SUBJECTIVE / OVERNIGHT EVENTS:    Patient seen and examined. no cp sob.     Vital Signs Last 24 Hrs  T(C): 36.7 (20 Aug 2023 04:20), Max: 37.1 (19 Aug 2023 21:05)  T(F): 98.1 (20 Aug 2023 04:20), Max: 98.7 (19 Aug 2023 21:05)  HR: 66 (20 Aug 2023 04:20) (63 - 66)  BP: 137/69 (20 Aug 2023 04:20) (136/67 - 137/69)  BP(mean): --  RR: 18 (20 Aug 2023 04:20) (18 - 18)  SpO2: 94% (20 Aug 2023 04:20) (93% - 94%)    Parameters below as of 20 Aug 2023 04:20  Patient On (Oxygen Delivery Method): room air      I&O's Summary    19 Aug 2023 07:01  -  20 Aug 2023 07:00  --------------------------------------------------------  IN: 0 mL / OUT: 300 mL / NET: -300 mL        PE:  GENERAL: NAD, AAOx2, frail, [place]  CHEST/LUNG: CTABL, No wheeze  HEART: Regular rate and rhythm; + murmur  ABDOMEN: Soft, Nontender, Nondistended; Bowel sounds present  EXTREMITIES:  2+ Peripheral Pulses, No edema  NEURO: No focal deficits    LABS:            CAPILLARY BLOOD GLUCOSE                RADIOLOGY & ADDITIONAL TESTS:    Imaging Personally Reviewed:  [x] YES  [ ] NO    Consultant(s) Notes Reviewed:  [x] YES  [ ] NO    MEDICATIONS  (STANDING):  aMIOdarone    Tablet 200 milliGRAM(s) Oral daily  apixaban 2.5 milliGRAM(s) Oral every 12 hours  fludroCORTISONE 0.1 milliGRAM(s) Oral daily  levothyroxine 100 MICROGram(s) Oral daily  meclizine 25 milliGRAM(s) Oral three times a day  midodrine. 5 milliGRAM(s) Oral every 8 hours  sertraline 50 milliGRAM(s) Oral daily    MEDICATIONS  (PRN):  acetaminophen     Tablet .. 650 milliGRAM(s) Oral every 6 hours PRN Temp greater or equal to 38C (100.4F), Mild Pain (1 - 3)  albuterol    90 MICROgram(s) HFA Inhaler 2 Puff(s) Inhalation every 6 hours PRN Shortness of Breath  aluminum hydroxide/magnesium hydroxide/simethicone Suspension 30 milliLiter(s) Oral every 4 hours PRN Dyspepsia  melatonin 3 milliGRAM(s) Oral at bedtime PRN Insomnia  ondansetron Injectable 4 milliGRAM(s) IV Push every 8 hours PRN Nausea and/or Vomiting      Care Discussed with Consultants/Other Providers [x] YES  [ ] NO    HEALTH ISSUES - PROBLEM Dx:  Acute UTI    Fall    Paroxysmal atrial fibrillation    Hypothyroid    Pancytopenia        
Patient is a 89y old  Female who presents with a chief complaint of fall (22 Aug 2023 09:28)      SUBJECTIVE / OVERNIGHT EVENTS:    Patient seen and examined. does not remember if dizzy yesterday. states she thinks she is better today no dizziness sitting down.      Vital Signs Last 24 Hrs  T(C): 36.6 (22 Aug 2023 05:04), Max: 36.7 (21 Aug 2023 20:25)  T(F): 97.9 (22 Aug 2023 05:04), Max: 98 (21 Aug 2023 20:25)  HR: 59 (22 Aug 2023 05:04) (59 - 59)  BP: 161/64 (22 Aug 2023 05:04) (138/73 - 161/64)  BP(mean): --  RR: 18 (22 Aug 2023 05:04) (16 - 18)  SpO2: 95% (22 Aug 2023 05:04) (95% - 96%)    Parameters below as of 22 Aug 2023 05:04  Patient On (Oxygen Delivery Method): room air      I&O's Summary    21 Aug 2023 07:01  -  22 Aug 2023 07:00  --------------------------------------------------------  IN: 120 mL / OUT: 250 mL / NET: -130 mL        PE:  GENERAL: NAD, AAOx2, frail, [place]  CHEST/LUNG: CTABL, No wheeze  HEART: Regular rate and rhythm; + murmur  ABDOMEN: Soft, Nontender, Nondistended; Bowel sounds present  EXTREMITIES:  2+ Peripheral Pulses, No edema  NEURO: No focal deficits      LABS:            CAPILLARY BLOOD GLUCOSE                RADIOLOGY & ADDITIONAL TESTS:    Imaging Personally Reviewed:  [x] YES  [ ] NO    Consultant(s) Notes Reviewed:  [x] YES  [ ] NO    MEDICATIONS  (STANDING):  aMIOdarone    Tablet 200 milliGRAM(s) Oral daily  apixaban 2.5 milliGRAM(s) Oral every 12 hours  fludroCORTISONE 0.1 milliGRAM(s) Oral daily  levothyroxine 100 MICROGram(s) Oral daily  meclizine 25 milliGRAM(s) Oral three times a day  midodrine. 10 milliGRAM(s) Oral every 8 hours  sertraline 50 milliGRAM(s) Oral daily  sodium chloride 0.9%. 1000 milliLiter(s) (75 mL/Hr) IV Continuous <Continuous>    MEDICATIONS  (PRN):  acetaminophen     Tablet .. 650 milliGRAM(s) Oral every 6 hours PRN Temp greater or equal to 38C (100.4F), Mild Pain (1 - 3)  albuterol    90 MICROgram(s) HFA Inhaler 2 Puff(s) Inhalation every 6 hours PRN Shortness of Breath  aluminum hydroxide/magnesium hydroxide/simethicone Suspension 30 milliLiter(s) Oral every 4 hours PRN Dyspepsia  melatonin 3 milliGRAM(s) Oral at bedtime PRN Insomnia  ondansetron Injectable 4 milliGRAM(s) IV Push every 8 hours PRN Nausea and/or Vomiting      Care Discussed with Consultants/Other Providers [x] YES  [ ] NO    HEALTH ISSUES - PROBLEM Dx:  Acute UTI    Fall    Paroxysmal atrial fibrillation    Hypothyroid    Pancytopenia        
CARDIOLOGY FOLLOW UP - Dr. Moeller  DATE OF SERVICE: 8/16/23    CC  No cv complaints  Events from yesterday noted    REVIEW OF SYSTEMS:  CONSTITUTIONAL: No fever, weight loss, or fatigue  RESPIRATORY: No cough, wheezing, chills or hemoptysis; No Shortness of Breath  CARDIOVASCULAR: No chest pain, palpitations, passing out, dizziness, or leg swelling  GASTROINTESTINAL: No abdominal or epigastric pain. No nausea, vomiting, or hematemesis; No diarrhea or constipation. No melena or hematochezia.  VASCULAR: No edema     PHYSICAL EXAM:  T(C): 36.8 (08-16-23 @ 04:33), Max: 36.8 (08-16-23 @ 04:33)  HR: 61 (08-16-23 @ 04:33) (59 - 66)  BP: 123/65 (08-16-23 @ 04:33) (108/68 - 123/65)  RR: 18 (08-16-23 @ 04:33) (18 - 18)  SpO2: 93% (08-16-23 @ 04:33) (93% - 98%)  Wt(kg): --  I&O's Summary    15 Aug 2023 07:01  -  16 Aug 2023 07:00  --------------------------------------------------------  IN: 540 mL / OUT: 0 mL / NET: 540 mL        Appearance: Elderly female	  Cardiovascular: Normal S1 S2,RRR, No JVD, No murmurs  Respiratory: Lungs clear to auscultation b/l  Gastrointestinal:  Soft, Non-tender, + BS	  Extremities: Normal range of motion, No clubbing, cyanosis or edema      Home Medications:  Albuterol (Eqv-ProAir HFA) 90 mcg/inh inhalation aerosol: 2 puff(s) inhaled every 6 hours as needed (12 Aug 2023 22:27)  amiodarone 200 mg oral tablet: 1 tab(s) orally once a day (12 Aug 2023 22:27)  Eliquis 2.5 mg oral tablet: 1 tab(s) orally once a day (12 Aug 2023 22:27)  levothyroxine 100 mcg (0.1 mg) oral tablet: 1 tab(s) orally once a day (12 Aug 2023 22:27)  sertraline 50 mg oral tablet: 1 tab(s) orally once a day (12 Aug 2023 22:27)  Tylenol 325 mg oral tablet: 2 tab(s) orally once a day (12 Aug 2023 22:26)  Xiidra 5% ophthalmic solution: 1 drop(s) in each eye 2 times a day (12 Aug 2023 22:27)      MEDICATIONS  (STANDING):  aMIOdarone    Tablet 200 milliGRAM(s) Oral daily  apixaban 2.5 milliGRAM(s) Oral every 12 hours  levothyroxine 100 MICROGram(s) Oral daily  sertraline 50 milliGRAM(s) Oral daily      TELEMETRY: 	    ECG:  	  RADIOLOGY:   DIAGNOSTIC TESTING:  [x] Echocardiogram:  < from: TTE W or WO Ultrasound Enhancing Agent (08.15.23 @ 12:05) >    CONCLUSIONS:      1. Left ventricular systolic function is hyperdynamic.   2. DI 0.45 with PG 36 mm hg unlikely to represent severe aortic stenosis.   3. No pericardial effusion seen.   4. Parasternal widows are suboptimal Unable to measure LVOT therefore unable to calculate aortic valve area.   5. Aortic valve anatomy cannot be determined with reduced systolic excursion. moderate calcification of the aortic valve leaflets.   6. Compared to the transthoracic echocardiogram performed on 3/7/2022 the transaortic gradient has increased.   7. Mild-to-moderate aortic regurgitation.   8. 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    < end of copied text >    [ ]  Catheterization:  [ ] Stress Test:    OTHER: 	    LABS:	 	                            10.6   2.14  )-----------( 69       ( 16 Aug 2023 06:48 )             33.5     08-16    139  |  103  |  18  ----------------------------<  83  3.9   |  24  |  0.73    Ca    9.0      16 Aug 2023 06:50    TPro  6.7  /  Alb  3.8  /  TBili  0.5  /  DBili  x   /  AST  18  /  ALT  17  /  AlkPhos  66  08-15            
CARDIOLOGY FOLLOW UP - Dr. Moeller  DATE OF SERVICE: 8/18/23    CC no cp or sob        REVIEW OF SYSTEMS:  CONSTITUTIONAL: No fever, weight loss, or fatigue  RESPIRATORY: No cough, wheezing, chills or hemoptysis; No Shortness of Breath  CARDIOVASCULAR: No chest pain, palpitations, passing out, dizziness, or leg swelling  GASTROINTESTINAL: No abdominal or epigastric pain. No nausea, vomiting, or hematemesis; No diarrhea or constipation. No melena or hematochezia.  VASCULAR: No edema     PHYSICAL EXAM:  T(C): 36.7 (08-18-23 @ 04:36), Max: 36.9 (08-17-23 @ 20:48)  HR: 70 (08-18-23 @ 04:36) (65 - 70)  BP: 173/76 (08-18-23 @ 04:36) (173/76 - 181/69)  RR: 18 (08-18-23 @ 04:36) (18 - 18)  SpO2: 97% (08-18-23 @ 04:36) (94% - 97%)  Wt(kg): --  I&O's Summary    17 Aug 2023 07:01  -  18 Aug 2023 07:00  --------------------------------------------------------  IN: 0 mL / OUT: 825 mL / NET: -825 mL        Appearance: Normal	  Cardiovascular: Normal S1 S2,RR + murmurs  Respiratory: Lungs clear to auscultation	  Gastrointestinal:  Soft, Non-tender, + BS	  Extremities: Normal range of motion, No clubbing, cyanosis or edema      Home Medications:  acetaminophen 325 mg oral tablet: 2 tab(s) orally every 6 hours As needed Temp greater or equal to 38C (100.4F), Mild Pain (1 - 3) (17 Aug 2023 11:02)  albuterol 90 mcg/inh inhalation aerosol: 2 puff(s) inhaled every 6 hours As needed Shortness of Breath (17 Aug 2023 11:02)  amiodarone 200 mg oral tablet: 1 tab(s) orally once a day (17 Aug 2023 11:02)  apixaban 2.5 mg oral tablet: 1 tab(s) orally every 12 hours (17 Aug 2023 11:02)  levothyroxine 100 mcg (0.1 mg) oral tablet: 1 tab(s) orally once a day (17 Aug 2023 11:02)  sertraline 50 mg oral tablet: 1 tab(s) orally once a day (17 Aug 2023 11:02)  Xiidra 5% ophthalmic solution: 1 drop(s) in each eye 2 times a day (12 Aug 2023 22:27)      MEDICATIONS  (STANDING):  aMIOdarone    Tablet 200 milliGRAM(s) Oral daily  apixaban 2.5 milliGRAM(s) Oral every 12 hours  levothyroxine 100 MICROGram(s) Oral daily  midodrine. 5 milliGRAM(s) Oral every 8 hours  sertraline 50 milliGRAM(s) Oral daily      TELEMETRY: [facility] jnct 	    ECG:  	  RADIOLOGY:   DIAGNOSTIC TESTING:  [ ] Echocardiogram:  [ ]  Catheterization:  [ ] Stress Test:    OTHER: 	    LABS:	 	                      
CARDIOLOGY FOLLOW UP - Dr. Moeller  DATE OF SERVICE: 8/20/23    CC  Resting comfortably - no cv complaints    REVIEW OF SYSTEMS:  CONSTITUTIONAL: No fever, weight loss, or fatigue  RESPIRATORY: No cough, wheezing, chills or hemoptysis; No Shortness of Breath  CARDIOVASCULAR: No chest pain, palpitations, passing out, dizziness, or leg swelling  GASTROINTESTINAL: No abdominal or epigastric pain. No nausea, vomiting, or hematemesis; No diarrhea or constipation. No melena or hematochezia.  VASCULAR: No edema     PHYSICAL EXAM:  T(C): 36.7 (08-20-23 @ 04:20), Max: 37.1 (08-19-23 @ 21:05)  HR: 66 (08-20-23 @ 04:20) (63 - 66)  BP: 137/69 (08-20-23 @ 04:20) (136/67 - 137/69)  RR: 18 (08-20-23 @ 04:20) (18 - 18)  SpO2: 94% (08-20-23 @ 04:20) (93% - 94%)  Wt(kg): --  I&O's Summary    19 Aug 2023 07:01  -  20 Aug 2023 07:00  --------------------------------------------------------  IN: 0 mL / OUT: 300 mL / NET: -300 mL        Appearance: Elderly female	  Cardiovascular: Normal S1 S2,RRR, No JVD, No murmurs  Respiratory: Lungs clear to auscultation	  Gastrointestinal:  Soft, Non-tender, + BS	  Extremities: Normal range of motion, No clubbing, cyanosis or edema b/l      Home Medications:  acetaminophen 325 mg oral tablet: 2 tab(s) orally every 6 hours As needed Temp greater or equal to 38C (100.4F), Mild Pain (1 - 3) (17 Aug 2023 11:02)  albuterol 90 mcg/inh inhalation aerosol: 2 puff(s) inhaled every 6 hours As needed Shortness of Breath (17 Aug 2023 11:02)  amiodarone 200 mg oral tablet: 1 tab(s) orally once a day (17 Aug 2023 11:02)  apixaban 2.5 mg oral tablet: 1 tab(s) orally every 12 hours (17 Aug 2023 11:02)  levothyroxine 100 mcg (0.1 mg) oral tablet: 1 tab(s) orally once a day (17 Aug 2023 11:02)  sertraline 50 mg oral tablet: 1 tab(s) orally once a day (17 Aug 2023 11:02)  Xiidra 5% ophthalmic solution: 1 drop(s) in each eye 2 times a day (12 Aug 2023 22:27)      MEDICATIONS  (STANDING):  aMIOdarone    Tablet 200 milliGRAM(s) Oral daily  apixaban 2.5 milliGRAM(s) Oral every 12 hours  fludroCORTISONE 0.1 milliGRAM(s) Oral daily  levothyroxine 100 MICROGram(s) Oral daily  meclizine 25 milliGRAM(s) Oral three times a day  midodrine. 5 milliGRAM(s) Oral every 8 hours  sertraline 50 milliGRAM(s) Oral daily      TELEMETRY: Junctional 50-60s	    ECG:  	  RADIOLOGY:   DIAGNOSTIC TESTING:  [ ] Echocardiogram:  [ ]  Catheterization:  [ ] Stress Test:    OTHER: 	    LABS:	 	                      
Patient is a 89y old  Female who presents with a chief complaint of fall (18 Aug 2023 11:03)      SUBJECTIVE / OVERNIGHT EVENTS:    Patient seen and examined.  pt denies dizziness. states she does not remember.  per staff, could not stand up given dizziness.    Vital Signs Last 24 Hrs  T(C): 36.1 (18 Aug 2023 11:00), Max: 36.9 (17 Aug 2023 20:48)  T(F): 97 (18 Aug 2023 11:00), Max: 98.4 (17 Aug 2023 20:48)  HR: 70 (18 Aug 2023 04:36) (65 - 70)  BP: 173/76 (18 Aug 2023 04:36) (173/76 - 181/69)  BP(mean): 106 (17 Aug 2023 20:48) (106 - 106)  RR: 18 (18 Aug 2023 11:00) (18 - 18)  SpO2: 92% (18 Aug 2023 11:00) (92% - 97%)    Parameters below as of 18 Aug 2023 11:00  Patient On (Oxygen Delivery Method): room air      I&O's Summary    17 Aug 2023 07:01  -  18 Aug 2023 07:00  --------------------------------------------------------  IN: 0 mL / OUT: 825 mL / NET: -825 mL        PE:  GENERAL: NAD, AAOx2, frail, [place]  CHEST/LUNG: CTABL, No wheeze  HEART: Regular rate and rhythm; + murmur  ABDOMEN: Soft, Nontender, Nondistended; Bowel sounds present  EXTREMITIES:  2+ Peripheral Pulses, No edema  NEURO: No focal deficits    LABS:            CAPILLARY BLOOD GLUCOSE                RADIOLOGY & ADDITIONAL TESTS:    Imaging Personally Reviewed:  [x] YES  [ ] NO    Consultant(s) Notes Reviewed:  [x] YES  [ ] NO    MEDICATIONS  (STANDING):  aMIOdarone    Tablet 200 milliGRAM(s) Oral daily  apixaban 2.5 milliGRAM(s) Oral every 12 hours  levothyroxine 100 MICROGram(s) Oral daily  midodrine. 5 milliGRAM(s) Oral every 8 hours  sertraline 50 milliGRAM(s) Oral daily    MEDICATIONS  (PRN):  acetaminophen     Tablet .. 650 milliGRAM(s) Oral every 6 hours PRN Temp greater or equal to 38C (100.4F), Mild Pain (1 - 3)  albuterol    90 MICROgram(s) HFA Inhaler 2 Puff(s) Inhalation every 6 hours PRN Shortness of Breath  aluminum hydroxide/magnesium hydroxide/simethicone Suspension 30 milliLiter(s) Oral every 4 hours PRN Dyspepsia  melatonin 3 milliGRAM(s) Oral at bedtime PRN Insomnia  ondansetron Injectable 4 milliGRAM(s) IV Push every 8 hours PRN Nausea and/or Vomiting      Care Discussed with Consultants/Other Providers [x] YES  [ ] NO    HEALTH ISSUES - PROBLEM Dx:  Acute UTI    Fall    Paroxysmal atrial fibrillation    Hypothyroid    Pancytopenia        
CARDIOLOGY FOLLOW UP - Dr. Moeller  DATE OF SERVICE: 8/17/23    CC  No CV complaints    REVIEW OF SYSTEMS:  CONSTITUTIONAL: No fever, weight loss, or fatigue  RESPIRATORY: No cough, wheezing, chills or hemoptysis; No Shortness of Breath  CARDIOVASCULAR: No chest pain, palpitations, passing out, dizziness, or leg swelling  GASTROINTESTINAL: No abdominal or epigastric pain. No nausea, vomiting, or hematemesis; No diarrhea or constipation. No melena or hematochezia.  VASCULAR: No edema     PHYSICAL EXAM:  T(C): 37.2 (08-17-23 @ 04:43), Max: 37.2 (08-17-23 @ 04:43)  HR: 66 (08-17-23 @ 04:43) (65 - 66)  BP: 153/76 (08-17-23 @ 04:43) (121/61 - 153/76)  RR: 18 (08-17-23 @ 04:43) (18 - 18)  SpO2: 97% (08-17-23 @ 04:43) (94% - 97%)  Wt(kg): --  I&O's Summary    16 Aug 2023 07:01  -  17 Aug 2023 07:00  --------------------------------------------------------  IN: 750 mL / OUT: 0 mL / NET: 750 mL        Appearance: Elderly female	  Cardiovascular: Normal S1 S2,RRR, No JVD, No murmurs  Respiratory: Lungs clear to auscultation b/l  Gastrointestinal:  Soft, Non-tender, + BS	  Extremities: Normal range of motion, No clubbing, cyanosis or edema      Home Medications:  Albuterol (Eqv-ProAir HFA) 90 mcg/inh inhalation aerosol: 2 puff(s) inhaled every 6 hours as needed (12 Aug 2023 22:27)  amiodarone 200 mg oral tablet: 1 tab(s) orally once a day (12 Aug 2023 22:27)  Eliquis 2.5 mg oral tablet: 1 tab(s) orally once a day (12 Aug 2023 22:27)  levothyroxine 100 mcg (0.1 mg) oral tablet: 1 tab(s) orally once a day (12 Aug 2023 22:27)  sertraline 50 mg oral tablet: 1 tab(s) orally once a day (12 Aug 2023 22:27)  Tylenol 325 mg oral tablet: 2 tab(s) orally once a day (12 Aug 2023 22:26)  Xiidra 5% ophthalmic solution: 1 drop(s) in each eye 2 times a day (12 Aug 2023 22:27)      MEDICATIONS  (STANDING):  aMIOdarone    Tablet 200 milliGRAM(s) Oral daily  apixaban 2.5 milliGRAM(s) Oral every 12 hours  levothyroxine 100 MICROGram(s) Oral daily  sertraline 50 milliGRAM(s) Oral daily  sodium chloride 0.9%. 1000 milliLiter(s) (75 mL/Hr) IV Continuous <Continuous>      TELEMETRY: Junctional 50-60s & SR 60-70s    ECG:  	  RADIOLOGY:   DIAGNOSTIC TESTING:  [ ] Echocardiogram:  [ ]  Catheterization:  [ ] Stress Test:    OTHER: 	    LABS:	 	                            10.6   2.14  )-----------( 69       ( 16 Aug 2023 06:48 )             33.5     08-16    139  |  103  |  18  ----------------------------<  83  3.9   |  24  |  0.73    Ca    9.0      16 Aug 2023 06:50              
CARDIOLOGY FOLLOW UP - Dr. Moeller  DATE OF SERVICE: 8/23/23    CC no cp or sob   no dizziness when standing       REVIEW OF SYSTEMS:  CONSTITUTIONAL: No fever, weight loss, or fatigue  RESPIRATORY: No cough, wheezing, chills or hemoptysis; No Shortness of Breath  CARDIOVASCULAR: No chest pain, palpitations, passing out, dizziness, or leg swelling  GASTROINTESTINAL: No abdominal or epigastric pain. No nausea, vomiting, or hematemesis; No diarrhea or constipation. No melena or hematochezia.  VASCULAR: No edema     PHYSICAL EXAM:  T(C): 36.8 (08-22-23 @ 20:58), Max: 36.8 (08-22-23 @ 20:58)  HR: 62 (08-23-23 @ 09:37) (51 - 62)  BP: 178/76 (08-23-23 @ 09:37) (86/50 - 178/76)  RR: 18 (08-23-23 @ 05:12) (18 - 18)  SpO2: 96% (08-23-23 @ 05:12) (92% - 96%)  Wt(kg): --  I&O's Summary    22 Aug 2023 07:01  -  23 Aug 2023 07:00  --------------------------------------------------------  IN: 370 mL / OUT: 100 mL / NET: 270 mL        Appearance: Normal	  Cardiovascular: Normal S1 S2,RRR, + murmurs  Respiratory: Lungs clear to auscultation	  Gastrointestinal:  Soft, Non-tender, + BS	  Extremities: Normal range of motion, No clubbing, cyanosis or edema      Home Medications:  acetaminophen 325 mg oral tablet: 2 tab(s) orally every 6 hours As needed Temp greater or equal to 38C (100.4F), Mild Pain (1 - 3) (17 Aug 2023 11:02)  albuterol 90 mcg/inh inhalation aerosol: 2 puff(s) inhaled every 6 hours As needed Shortness of Breath (17 Aug 2023 11:02)  amiodarone 200 mg oral tablet: 1 tab(s) orally once a day (17 Aug 2023 11:02)  apixaban 2.5 mg oral tablet: 1 tab(s) orally every 12 hours (17 Aug 2023 11:02)  levothyroxine 100 mcg (0.1 mg) oral tablet: 1 tab(s) orally once a day (17 Aug 2023 11:02)  sertraline 50 mg oral tablet: 1 tab(s) orally once a day (17 Aug 2023 11:02)  Xiidra 5% ophthalmic solution: 1 drop(s) in each eye 2 times a day (12 Aug 2023 22:27)      MEDICATIONS  (STANDING):  aMIOdarone    Tablet 200 milliGRAM(s) Oral daily  apixaban 2.5 milliGRAM(s) Oral every 12 hours  fludroCORTISONE 0.1 milliGRAM(s) Oral daily  levothyroxine 100 MICROGram(s) Oral daily  meclizine 25 milliGRAM(s) Oral three times a day  midodrine. 10 milliGRAM(s) Oral every 8 hours  sertraline 50 milliGRAM(s) Oral daily  sodium chloride 0.9%. 1000 milliLiter(s) (75 mL/Hr) IV Continuous <Continuous>      TELEMETRY: 	    ECG:  	  RADIOLOGY:   DIAGNOSTIC TESTING:  [ ] Echocardiogram:  [ ]  Catheterization:  [ ] Stress Test:    OTHER: 	    LABS:	 	                            10.5   2.15  )-----------( 73       ( 23 Aug 2023 07:15 )             33.2     08-23    142  |  109<H>  |  13  ----------------------------<  78  3.8   |  23  |  0.57    Ca    8.4      23 Aug 2023 07:16              
[facility]  [facility] Rene Y. Patel, S. Shah, G. Casimir  [phone]  ([phone] - weekdays after 5pm and weekends)    Name: CATHRYN LARA  Age/Gender: 89y Female  MRN: [id number]    Interval History:  Patient seen and examined this morning.   Resting comfortably, no new complaints noted.   Notes reviewed. 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 reported allergy (Unknown)    Objective:  Vitals:   T(F): 98.6 (08-21-23 @ 04:11), Max: 98.7 (08-20-23 @ 21:16)  HR: 58 (08-21-23 @ 04:11) (58 - 62)  BP: 145/67 (08-21-23 @ 04:11) (117/70 - 145/67)  RR: 18 (08-21-23 @ 04:11) (18 - 18)  SpO2: 93% (08-21-23 @ 04:11) (93% - 94%)  Physical Examination:  General: no acute distress, nontoxic appearing   HEENT: NC/AT, anicteric, neck supple  Respiratory: no acc muscle use, breathing comfortably  Cardiovascular: S1 and S2 present  Gastrointestinal: normal appearing, nondistended  Extremities: no edema, no cyanosis  Skin: no visible rash    Laboratory Studies:  CBC:   WBC Trend:  2.14 08-16-23 @ 06:48  2.09 08-15-23 @ 06:32    CMP:   Creatinine: 0.73 mg/dL (08-16-23 @ 06:50)  Creatinine: 0.65 mg/dL (08-15-23 @ 06:32)    Microbiology: reviewed     Culture - Urine (collected 08-12-23 @ 15:16)  Source: Clean Catch Clean Catch (Midstream)  Final Report (08-15-23 @ 15:15):    >100,000 CFU/ml Klebsiella variicola  Organism: Klebsiella variicola (08-15-23 @ 15:15)  Organism: Klebsiella variicola (08-15-23 @ 15:15)      Method Type: RAFIA      -  Amikacin: S <=16      -  Amoxicillin/Clavulanic Acid: S <=8/4      -  Ampicillin: R <=8 These ampicillin results predict results for amoxicillin      -  Ampicillin/Sulbactam: S <=4/2 Enterobacter, Klebsiella aerogenes, Citrobacter, and Serratia may develop resistance during prolonged therapy (3-4 days)      -  Aztreonam: S <=4      -  Cefazolin: S <=2      -  Cefepime: S <=2      -  Cefoxitin: S <=8      -  Ceftriaxone: S <=1 Enterobacter, Klebsiella aerogenes, Citrobacter, and Serratia may develop resistance during prolonged therapy      -  Ciprofloxacin: S <=0.25      -  Ertapenem: S <=0.5      -  Gentamicin: S <=2      -  Imipenem: S <=1      -  Levofloxacin: S <=0.5      -  Meropenem: S <=1      -  Nitrofurantoin: S <=32 Should not be used to treat pyelonephritis      -  Piperacillin/Tazobactam: S <=8      -  Tobramycin: S <=2      -  Trimethoprim/Sulfamethoxazole: S <=0.5/9.5    Radiology: reviewed     Medications:  acetaminophen     Tablet .. 650 milliGRAM(s) Oral every 6 hours PRN  albuterol    90 MICROgram(s) HFA Inhaler 2 Puff(s) Inhalation every 6 hours PRN  aluminum hydroxide/magnesium hydroxide/simethicone Suspension 30 milliLiter(s) Oral every 4 hours PRN  aMIOdarone    Tablet 200 milliGRAM(s) Oral daily  apixaban 2.5 milliGRAM(s) Oral every 12 hours  fludroCORTISONE 0.1 milliGRAM(s) Oral daily  levothyroxine 100 MICROGram(s) Oral daily  meclizine 25 milliGRAM(s) Oral three times a day  melatonin 3 milliGRAM(s) Oral at bedtime PRN  midodrine. 10 milliGRAM(s) Oral every 8 hours  ondansetron Injectable 4 milliGRAM(s) IV Push every 8 hours PRN  sertraline 50 milliGRAM(s) Oral daily  sodium chloride 0.9%. 1000 milliLiter(s) IV Continuous <Continuous>    Current Antimicrobials:    Prior/Completed Antimicrobials:  cefTRIAXone   IVPB  cefTRIAXone   IVPB  cefTRIAXone   IVPB  
Patient is a 89y old  Female who presents with a chief complaint of fall (15 Aug 2023 09:57)      SUBJECTIVE / OVERNIGHT EVENTS:  Patient seen and examined.   Doing well.   No complaints.       Vital Signs Last 24 Hrs  T(C): 36.4 (15 Aug 2023 10:32), Max: 36.9 (15 Aug 2023 05:00)  T(F): 97.5 (15 Aug 2023 10:32), Max: 98.4 (15 Aug 2023 05:00)  HR: 59 (15 Aug 2023 10:32) (59 - 63)  BP: 108/68 (15 Aug 2023 10:32) (108/68 - 140/74)  BP(mean): --  RR: 18 (15 Aug 2023 10:32) (18 - 18)  SpO2: 98% (15 Aug 2023 10:32) (95% - 98%)    Parameters below as of 15 Aug 2023 10:32  Patient On (Oxygen Delivery Method): room air      I&O's Summary    14 Aug 2023 07:01  -  15 Aug 2023 07:00  --------------------------------------------------------  IN: 840 mL / OUT: 500 mL / NET: 340 mL    15 Aug 2023 07:01  -  15 Aug 2023 12:04  --------------------------------------------------------  IN: 320 mL / OUT: 0 mL / NET: 320 mL        PHYSICAL EXAM:  GENERAL: NAD, well-developed, comfortable  HEAD:  Atraumatic, Normocephalic  EYES: EOMI, PERRLA, conjunctiva and sclera clear  NECK: Supple, No JVD  CHEST/LUNG: Clear to auscultation bilaterally; No wheeze  HEART: Regular rate and rhythm; systolic murmur   ABDOMEN: Soft, Nontender, Nondistended; Bowel sounds present  NEURO: AAOx3, no focal weakness, 5/5 b/l extremity strength, b/l knee no arthritis, no effusion   EXTREMITIES:  2+ Peripheral Pulses, No clubbing, cyanosis, or edema  SKIN: No rashes or lesions    LABS:                        11.4   2.09  )-----------( 73       ( 15 Aug 2023 06:32 )             35.8     08-15    138  |  99  |  14  ----------------------------<  86  4.1   |  25  |  0.65    Ca    9.0      15 Aug 2023 06:32    TPro  6.7  /  Alb  3.8  /  TBili  0.5  /  DBili  x   /  AST  18  /  ALT  17  /  AlkPhos  66  08-15      CAPILLARY BLOOD GLUCOSE            Urinalysis Basic - ( 15 Aug 2023 06:32 )    Color: x / Appearance: x / SG: x / pH: x  Gluc: 86 mg/dL / Ketone: x  / Bili: x / Urobili: x   Blood: x / Protein: x / Nitrite: x   Leuk Esterase: x / RBC: x / WBC x   Sq Epi: x / Non Sq Epi: x / Bacteria: x        RADIOLOGY & ADDITIONAL TESTS:    Imaging Personally Reviewed:  [x] YES  [ ] NO    Consultant(s) Notes Reviewed:  [x] YES  [ ] NO      MEDICATIONS  (STANDING):  aMIOdarone    Tablet 200 milliGRAM(s) Oral daily  apixaban 2.5 milliGRAM(s) Oral every 12 hours  cefTRIAXone   IVPB 1000 milliGRAM(s) IV Intermittent every 24 hours  levothyroxine 100 MICROGram(s) Oral daily  sertraline 50 milliGRAM(s) Oral daily    MEDICATIONS  (PRN):  acetaminophen     Tablet .. 650 milliGRAM(s) Oral every 6 hours PRN Temp greater or equal to 38C (100.4F), Mild Pain (1 - 3)  albuterol    90 MICROgram(s) HFA Inhaler 2 Puff(s) Inhalation every 6 hours PRN Shortness of Breath  aluminum hydroxide/magnesium hydroxide/simethicone Suspension 30 milliLiter(s) Oral every 4 hours PRN Dyspepsia  melatonin 3 milliGRAM(s) Oral at bedtime PRN Insomnia  ondansetron Injectable 4 milliGRAM(s) IV Push every 8 hours PRN Nausea and/or Vomiting      Care Discussed with Consultants/Other Providers [x] YES  [ ] NO    HEALTH ISSUES - PROBLEM Dx:  Acute UTI    Fall    Paroxysmal atrial fibrillation    Hypothyroid    Pancytopenia        
Patient is a 89y old  Female who presents with a chief complaint of fall (17 Aug 2023 10:59)      SUBJECTIVE / OVERNIGHT EVENTS:    Patient seen and examined. positive orthos grossly, dizzy with standing.      Vital Signs Last 24 Hrs  T(C): 37.2 (17 Aug 2023 04:43), Max: 37.2 (17 Aug 2023 04:43)  T(F): 98.9 (17 Aug 2023 04:43), Max: 98.9 (17 Aug 2023 04:43)  HR: 66 (17 Aug 2023 04:43) (65 - 66)  BP: 153/76 (17 Aug 2023 04:43) (121/61 - 153/76)  BP(mean): --  RR: 18 (17 Aug 2023 04:43) (18 - 18)  SpO2: 97% (17 Aug 2023 04:43) (97% - 97%)    Parameters below as of 17 Aug 2023 04:43  Patient On (Oxygen Delivery Method): room air      I&O's Summary    16 Aug 2023 07:01  -  17 Aug 2023 07:00  --------------------------------------------------------  IN: 750 mL / OUT: 0 mL / NET: 750 mL        PE:  GENERAL: NAD, AAOx2  CHEST/LUNG: CTABL, No wheeze  HEART: Regular rate and rhythm; + murmur  ABDOMEN: Soft, Nontender, Nondistended; Bowel sounds present  EXTREMITIES:  2+ Peripheral Pulses, No edema  NEURO: No focal deficits    LABS:                        10.6   2.14  )-----------( 69       ( 16 Aug 2023 06:48 )             33.5     08-16    139  |  103  |  18  ----------------------------<  83  3.9   |  24  |  0.73    Ca    9.0      16 Aug 2023 06:50        CAPILLARY BLOOD GLUCOSE            Urinalysis Basic - ( 16 Aug 2023 06:50 )    Color: x / Appearance: x / SG: x / pH: x  Gluc: 83 mg/dL / Ketone: x  / Bili: x / Urobili: x   Blood: x / Protein: x / Nitrite: x   Leuk Esterase: x / RBC: x / WBC x   Sq Epi: x / Non Sq Epi: x / Bacteria: x        RADIOLOGY & ADDITIONAL TESTS:    Imaging Personally Reviewed:  [x] YES  [ ] NO    Consultant(s) Notes Reviewed:  [x] YES  [ ] NO    MEDICATIONS  (STANDING):  aMIOdarone    Tablet 200 milliGRAM(s) Oral daily  apixaban 2.5 milliGRAM(s) Oral every 12 hours  levothyroxine 100 MICROGram(s) Oral daily  midodrine. 5 milliGRAM(s) Oral every 8 hours  sertraline 50 milliGRAM(s) Oral daily  sodium chloride 0.9%. 1000 milliLiter(s) (75 mL/Hr) IV Continuous <Continuous>    MEDICATIONS  (PRN):  acetaminophen     Tablet .. 650 milliGRAM(s) Oral every 6 hours PRN Temp greater or equal to 38C (100.4F), Mild Pain (1 - 3)  albuterol    90 MICROgram(s) HFA Inhaler 2 Puff(s) Inhalation every 6 hours PRN Shortness of Breath  aluminum hydroxide/magnesium hydroxide/simethicone Suspension 30 milliLiter(s) Oral every 4 hours PRN Dyspepsia  melatonin 3 milliGRAM(s) Oral at bedtime PRN Insomnia  ondansetron Injectable 4 milliGRAM(s) IV Push every 8 hours PRN Nausea and/or Vomiting      Care Discussed with Consultants/Other Providers [x] YES  [ ] NO    HEALTH ISSUES - PROBLEM Dx:  Acute UTI    Fall    Paroxysmal atrial fibrillation    Hypothyroid    Pancytopenia        
CARDIOLOGY FOLLOW UP - Dr. Moeller  DATE OF SERVICE: 8/14/23    CC  Endorsing urinary frequency  No CV complaints    REVIEW OF SYSTEMS:  CONSTITUTIONAL: No fever, weight loss, or fatigue  RESPIRATORY: No cough, wheezing, chills or hemoptysis; No Shortness of Breath  CARDIOVASCULAR: No chest pain, palpitations, passing out, dizziness, or leg swelling  GASTROINTESTINAL: No abdominal or epigastric pain. No nausea, vomiting, or hematemesis; No diarrhea or constipation. No melena or hematochezia.  : + Urinary frequency  VASCULAR: No edema     PHYSICAL EXAM:  T(C): 36.4 (08-14-23 @ 05:13), Max: 36.8 (08-13-23 @ 13:15)  HR: 57 (08-14-23 @ 05:13) (57 - 66)  BP: 145/88 (08-14-23 @ 05:13) (111/66 - 145/88)  RR: 17 (08-14-23 @ 05:13) (17 - 18)  SpO2: 95% (08-14-23 @ 05:13) (95% - 97%)  Wt(kg): --  I&O's Summary    13 Aug 2023 07:01  -  14 Aug 2023 07:00  --------------------------------------------------------  IN: 640 mL / OUT: 301 mL / NET: 339 mL        Appearance: Elderly female	  Cardiovascular: Normal S1 S2,RRR, No JVD, +Systolic murmur  Respiratory: Lungs clear to auscultation b/l  Gastrointestinal:  Soft, Non-tender, + BS	  Extremities: Normal range of motion, No clubbing, cyanosis or edema      Home Medications:  Albuterol (Eqv-ProAir HFA) 90 mcg/inh inhalation aerosol: 2 puff(s) inhaled every 6 hours as needed (12 Aug 2023 22:27)  amiodarone 200 mg oral tablet: 1 tab(s) orally once a day (12 Aug 2023 22:27)  Eliquis 2.5 mg oral tablet: 1 tab(s) orally once a day (12 Aug 2023 22:27)  levothyroxine 100 mcg (0.1 mg) oral tablet: 1 tab(s) orally once a day (12 Aug 2023 22:27)  sertraline 50 mg oral tablet: 1 tab(s) orally once a day (12 Aug 2023 22:27)  Tylenol 325 mg oral tablet: 2 tab(s) orally once a day (12 Aug 2023 22:26)  Xiidra 5% ophthalmic solution: 1 drop(s) in each eye 2 times a day (12 Aug 2023 22:27)      MEDICATIONS  (STANDING):  aMIOdarone    Tablet 200 milliGRAM(s) Oral daily  apixaban 2.5 milliGRAM(s) Oral every 12 hours  cefTRIAXone   IVPB 1000 milliGRAM(s) IV Intermittent every 24 hours  levothyroxine 100 MICROGram(s) Oral daily  sertraline 50 milliGRAM(s) Oral daily      TELEMETRY: 	    ECG:  	  RADIOLOGY:   DIAGNOSTIC TESTING:  [ ] Echocardiogram:  [ ]  Catheterization:  [ ] Stress Test:    OTHER: 	    LABS:	 	                            11.1   1.99  )-----------( 67       ( 14 Aug 2023 07:33 )             34.5     08-13    138  |  102  |  12  ----------------------------<  94  3.8   |  25  |  0.69    Ca    8.9      13 Aug 2023 06:53  Phos  3.5     08-13  Mg     2.0     08-13    TPro  6.7  /  Alb  4.1  /  TBili  0.5  /  DBili  x   /  AST  17  /  ALT  16  /  AlkPhos  63  08-12    PT/INR - ( 12 Aug 2023 14:26 )   PT: 12.1 sec;   INR: 1.16 ratio         PTT - ( 12 Aug 2023 14:26 )  PTT:30.1 sec        
CARDIOLOGY FOLLOW UP - Dr. Moeller  DATE OF SERVICE: 8/19/23    CC  No CV complaints    REVIEW OF SYSTEMS:  CONSTITUTIONAL: No fever, weight loss, or fatigue  RESPIRATORY: No cough, wheezing, chills or hemoptysis; No Shortness of Breath  CARDIOVASCULAR: No chest pain, palpitations, passing out, dizziness, or leg swelling  GASTROINTESTINAL: No abdominal or epigastric pain. No nausea, vomiting, or hematemesis; No diarrhea or constipation. No melena or hematochezia.  VASCULAR: No edema     PHYSICAL EXAM:  T(C): 36.7 (08-19-23 @ 04:18), Max: 36.8 (08-18-23 @ 21:02)  HR: 54 (08-19-23 @ 04:18) (54 - 61)  BP: 149/74 (08-19-23 @ 04:18) (104/67 - 149/74)  RR: 18 (08-19-23 @ 04:18) (18 - 19)  SpO2: 95% (08-19-23 @ 04:18) (92% - 95%)  Wt(kg): --  I&O's Summary    18 Aug 2023 07:01  -  19 Aug 2023 07:00  --------------------------------------------------------  IN: 560 mL / OUT: 100 mL / NET: 460 mL        Appearance: Elderly female	  Cardiovascular: Normal S1 S2,RRR, No JVD, No murmurs  Respiratory: Lungs clear to auscultation b/l  Gastrointestinal:  Soft, Non-tender, + BS	  Extremities: Normal range of motion, No clubbing, cyanosis or edema      Home Medications:  acetaminophen 325 mg oral tablet: 2 tab(s) orally every 6 hours As needed Temp greater or equal to 38C (100.4F), Mild Pain (1 - 3) (17 Aug 2023 11:02)  albuterol 90 mcg/inh inhalation aerosol: 2 puff(s) inhaled every 6 hours As needed Shortness of Breath (17 Aug 2023 11:02)  amiodarone 200 mg oral tablet: 1 tab(s) orally once a day (17 Aug 2023 11:02)  apixaban 2.5 mg oral tablet: 1 tab(s) orally every 12 hours (17 Aug 2023 11:02)  levothyroxine 100 mcg (0.1 mg) oral tablet: 1 tab(s) orally once a day (17 Aug 2023 11:02)  sertraline 50 mg oral tablet: 1 tab(s) orally once a day (17 Aug 2023 11:02)  Xiidra 5% ophthalmic solution: 1 drop(s) in each eye 2 times a day (12 Aug 2023 22:27)      MEDICATIONS  (STANDING):  aMIOdarone    Tablet 200 milliGRAM(s) Oral daily  apixaban 2.5 milliGRAM(s) Oral every 12 hours  levothyroxine 100 MICROGram(s) Oral daily  midodrine. 5 milliGRAM(s) Oral every 8 hours  sertraline 50 milliGRAM(s) Oral daily      TELEMETRY: Junctional > SR 50-60s	    ECG:  	  RADIOLOGY:   DIAGNOSTIC TESTING:  [ ] Echocardiogram:  [ ]  Catheterization:  [ ] Stress Test:    OTHER: 	    LABS:	 	                      
CARDIOLOGY FOLLOW UP - Dr. Moeller  DATE OF SERVICE: 8/21/23    CC  Endorsing dizziness  No other cv complaints    REVIEW OF SYSTEMS:  CONSTITUTIONAL: No fever, weight loss, or fatigue  RESPIRATORY: No cough, wheezing, chills or hemoptysis; No Shortness of Breath  CARDIOVASCULAR: No chest pain, palpitations, passing out, or leg swelling. +Dizziness  GASTROINTESTINAL: No abdominal or epigastric pain. No nausea, vomiting, or hematemesis; No diarrhea or constipation. No melena or hematochezia.  VASCULAR: No edema     PHYSICAL EXAM:  T(C): 37 (08-21-23 @ 04:11), Max: 37.1 (08-20-23 @ 21:16)  HR: 58 (08-21-23 @ 04:11) (56 - 62)  BP: 145/67 (08-21-23 @ 04:11) (117/70 - 145/67)  RR: 18 (08-21-23 @ 04:11) (18 - 18)  SpO2: 93% (08-21-23 @ 04:11) (93% - 94%)  Wt(kg): --  I&O's Summary    20 Aug 2023 07:01  -  21 Aug 2023 07:00  --------------------------------------------------------  IN: 0 mL / OUT: 800 mL / NET: -800 mL        Appearance: Elderly female	  Cardiovascular: Normal S1 S2,RRR, No JVD, +Systolic murmur  Respiratory: Lungs clear to auscultation b/l  Gastrointestinal:  Soft, Non-tender, + BS	  Extremities: Normal range of motion, No clubbing, cyanosis or edema      Home Medications:  acetaminophen 325 mg oral tablet: 2 tab(s) orally every 6 hours As needed Temp greater or equal to 38C (100.4F), Mild Pain (1 - 3) (17 Aug 2023 11:02)  albuterol 90 mcg/inh inhalation aerosol: 2 puff(s) inhaled every 6 hours As needed Shortness of Breath (17 Aug 2023 11:02)  amiodarone 200 mg oral tablet: 1 tab(s) orally once a day (17 Aug 2023 11:02)  apixaban 2.5 mg oral tablet: 1 tab(s) orally every 12 hours (17 Aug 2023 11:02)  levothyroxine 100 mcg (0.1 mg) oral tablet: 1 tab(s) orally once a day (17 Aug 2023 11:02)  sertraline 50 mg oral tablet: 1 tab(s) orally once a day (17 Aug 2023 11:02)  Xiidra 5% ophthalmic solution: 1 drop(s) in each eye 2 times a day (12 Aug 2023 22:27)      MEDICATIONS  (STANDING):  aMIOdarone    Tablet 200 milliGRAM(s) Oral daily  apixaban 2.5 milliGRAM(s) Oral every 12 hours  fludroCORTISONE 0.1 milliGRAM(s) Oral daily  levothyroxine 100 MICROGram(s) Oral daily  meclizine 25 milliGRAM(s) Oral three times a day  midodrine. 10 milliGRAM(s) Oral every 8 hours  sertraline 50 milliGRAM(s) Oral daily  sodium chloride 0.9%. 1000 milliLiter(s) (75 mL/Hr) IV Continuous <Continuous>      TELEMETRY: SR/Junctional 50-60    ECG:  	  RADIOLOGY:   DIAGNOSTIC TESTING:  [ ] Echocardiogram:  [ ]  Catheterization:  [ ] Stress Test:    OTHER: 	    LABS:	 	                      
CARDIOLOGY FOLLOW UP - Dr. Moeller  DATE OF SERVICE: 8/22/23    CC  Dizziness with standing yesterday  No other CV complaints    REVIEW OF SYSTEMS:  CONSTITUTIONAL: No fever, weight loss, or fatigue  RESPIRATORY: No cough, wheezing, chills or hemoptysis; No Shortness of Breath  CARDIOVASCULAR: No chest pain, palpitations, passing out, dizziness, or leg swelling  GASTROINTESTINAL: No abdominal or epigastric pain. No nausea, vomiting, or hematemesis; No diarrhea or constipation. No melena or hematochezia.  VASCULAR: No edema     PHYSICAL EXAM:  T(C): 36.6 (08-22-23 @ 05:04), Max: 36.7 (08-21-23 @ 20:25)  HR: 59 (08-22-23 @ 05:04) (59 - 59)  BP: 161/64 (08-22-23 @ 05:04) (138/73 - 161/64)  RR: 18 (08-22-23 @ 05:04) (16 - 18)  SpO2: 95% (08-22-23 @ 05:04) (95% - 96%)  Wt(kg): --  I&O's Summary    21 Aug 2023 07:01  -  22 Aug 2023 07:00  --------------------------------------------------------  IN: 120 mL / OUT: 250 mL / NET: -130 mL        Appearance: Elderly female	  Cardiovascular: Normal S1 S2,RRR, No JVD, No murmurs  Respiratory: Lungs clear to auscultation b/l  Gastrointestinal:  Soft, Non-tender, + BS	  Extremities: Normal range of motion, No clubbing, cyanosis or edema      Home Medications:  acetaminophen 325 mg oral tablet: 2 tab(s) orally every 6 hours As needed Temp greater or equal to 38C (100.4F), Mild Pain (1 - 3) (17 Aug 2023 11:02)  albuterol 90 mcg/inh inhalation aerosol: 2 puff(s) inhaled every 6 hours As needed Shortness of Breath (17 Aug 2023 11:02)  amiodarone 200 mg oral tablet: 1 tab(s) orally once a day (17 Aug 2023 11:02)  apixaban 2.5 mg oral tablet: 1 tab(s) orally every 12 hours (17 Aug 2023 11:02)  levothyroxine 100 mcg (0.1 mg) oral tablet: 1 tab(s) orally once a day (17 Aug 2023 11:02)  sertraline 50 mg oral tablet: 1 tab(s) orally once a day (17 Aug 2023 11:02)  Xiidra 5% ophthalmic solution: 1 drop(s) in each eye 2 times a day (12 Aug 2023 22:27)      MEDICATIONS  (STANDING):  aMIOdarone    Tablet 200 milliGRAM(s) Oral daily  apixaban 2.5 milliGRAM(s) Oral every 12 hours  fludroCORTISONE 0.1 milliGRAM(s) Oral daily  levothyroxine 100 MICROGram(s) Oral daily  meclizine 25 milliGRAM(s) Oral three times a day  midodrine. 10 milliGRAM(s) Oral every 8 hours  sertraline 50 milliGRAM(s) Oral daily  sodium chloride 0.9%. 1000 milliLiter(s) (75 mL/Hr) IV Continuous <Continuous>      TELEMETRY: SR/Junctional 50-60s	    ECG:  	  RADIOLOGY:   DIAGNOSTIC TESTING:  [ ] Echocardiogram:  [ ]  Catheterization:  [ ] Stress Test:    OTHER: 	    LABS:	 	                      
[facility]  SWAPNIL Rene Y. Patel, S. Shah, G. Casimir  [phone]  ([phone] - weekdays after 5pm and weekends)    Name: CATHRYN LARA  Age/Gender: 89y Female  MRN: [id number]    Interval History:  Patient seen and examined this morning.   No new complaints noted.  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 reported allergy (Unknown)      Objective:  Vitals:   T(F): 97 (08-18-23 @ 11:00), Max: 98.4 (08-17-23 @ 20:48)  HR: 70 (08-18-23 @ 04:36) (65 - 70)  BP: 173/76 (08-18-23 @ 04:36) (173/76 - 181/69)  RR: 18 (08-18-23 @ 11:00) (18 - 18)  SpO2: 92% (08-18-23 @ 11:00) (92% - 97%)  Physical Examination:  General: no acute distress  HEENT: NC/AT, anicteric, neck supple  Respiratory: no acc muscle use, breathing comfortably  Cardiovascular: S1 and S2 present  Gastrointestinal: normal appearing, nondistended  Extremities: no edema, no cyanosis  Skin: no visible rash    Laboratory Studies:  CBC:   WBC Trend:  2.14 08-16-23 @ 06:48  2.09 08-15-23 @ 06:32  1.99 08-14-23 @ 07:33  2.40 08-13-23 @ 06:53  2.07 08-12-23 @ 14:26    CMP:   Creatinine: 0.73 mg/dL (08-16-23 @ 06:50)  Creatinine: 0.65 mg/dL (08-15-23 @ 06:32)  Creatinine: 0.69 mg/dL (08-13-23 @ 06:53)  Creatinine: 0.77 mg/dL (08-12-23 @ 14:26)      Microbiology: reviewed   Culture - Urine (collected 08-12-23 @ 15:16)  Source: Clean Catch Clean Catch (Midstream)  Final Report (08-15-23 @ 15:15):    >100,000 CFU/ml Klebsiella variicola  Organism: Klebsiella variicola (08-15-23 @ 15:15)  Organism: Klebsiella variicola (08-15-23 @ 15:15)      Method Type: RAFIA      -  Amikacin: S <=16      -  Amoxicillin/Clavulanic Acid: S <=8/4      -  Ampicillin: R <=8 These ampicillin results predict results for amoxicillin      -  Ampicillin/Sulbactam: S <=4/2 Enterobacter, Klebsiella aerogenes, Citrobacter, and Serratia may develop resistance during prolonged therapy (3-4 days)      -  Aztreonam: S <=4      -  Cefazolin: S <=2      -  Cefepime: S <=2      -  Cefoxitin: S <=8      -  Ceftriaxone: S <=1 Enterobacter, Klebsiella aerogenes, Citrobacter, and Serratia may develop resistance during prolonged therapy      -  Ciprofloxacin: S <=0.25      -  Ertapenem: S <=0.5      -  Gentamicin: S <=2      -  Imipenem: S <=1      -  Levofloxacin: S <=0.5      -  Meropenem: S <=1      -  Nitrofurantoin: S <=32 Should not be used to treat pyelonephritis      -  Piperacillin/Tazobactam: S <=8      -  Tobramycin: S <=2      -  Trimethoprim/Sulfamethoxazole: S <=0.5/9.5    Radiology: reviewed     Medications:  acetaminophen     Tablet .. 650 milliGRAM(s) Oral every 6 hours PRN  albuterol    90 MICROgram(s) HFA Inhaler 2 Puff(s) Inhalation every 6 hours PRN  aluminum hydroxide/magnesium hydroxide/simethicone Suspension 30 milliLiter(s) Oral every 4 hours PRN  aMIOdarone    Tablet 200 milliGRAM(s) Oral daily  apixaban 2.5 milliGRAM(s) Oral every 12 hours  levothyroxine 100 MICROGram(s) Oral daily  melatonin 3 milliGRAM(s) Oral at bedtime PRN  midodrine. 5 milliGRAM(s) Oral every 8 hours  ondansetron Injectable 4 milliGRAM(s) IV Push every 8 hours PRN  sertraline 50 milliGRAM(s) Oral daily    Current Antimicrobials:    Prior/Completed Antimicrobials:  cefTRIAXone   IVPB  cefTRIAXone   IVPB  cefTRIAXone   IVPB  
[facility]  [facility] Rene Y. Patel, S. Shah, G. Mark  [phone]  ([phone] - weekdays after 5pm and weekends)    Name: CATHRYN LARA  Age/Gender: 89y Female  MRN: [id number]    Interval History:  Patient seen and examined this morning.   Feels urinary frequency better. No new complaints  Notes reviewed. No concerning overnight events.  Afebrile.   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 reported allergy (Unknown)      Objective:  Vitals:   T(F): 98.3 (08-16-23 @ 04:33), Max: 98.3 (08-16-23 @ 04:33)  HR: 61 (08-16-23 @ 04:33) (61 - 66)  BP: 123/65 (08-16-23 @ 04:33) (121/68 - 123/65)  RR: 18 (08-16-23 @ 04:33) (18 - 18)  SpO2: 93% (08-16-23 @ 04:33) (93% - 95%)  Physical Examination:  General: no acute distress  HEENT: NC/AT, EOMI, anicteric, neck supple  Cardio: S1, S2 present, normal rate  Resp: clear to auscultation bilaterally  Abd: soft, NT, ND, + BS  Neuro: AAOx3, no obvious focal deficits  Ext: no edema, no cyanosis, moving extremities  Skin: warm, dry, no visible rash  Psych: appropriate affect and mood for situation  Lines: PIV    Laboratory Studies:  CBC:                       10.6   2.14  )-----------( 69       ( 16 Aug 2023 06:48 )             33.5     WBC Trend:  2.14 08-16-23 @ 06:48  2.09 08-15-23 @ 06:32  1.99 08-14-23 @ 07:33  2.40 08-13-23 @ 06:53  2.07 08-12-23 @ 14:26    CMP: 08-16    139  |  103  |  18  ----------------------------<  83  3.9   |  24  |  0.73    Ca    9.0      16 Aug 2023 06:50    TPro  6.7  /  Alb  3.8  /  TBili  0.5  /  DBili  x   /  AST  18  /  ALT  17  /  AlkPhos  66  08-15    Creatinine: 0.73 mg/dL (08-16-23 @ 06:50)  Creatinine: 0.65 mg/dL (08-15-23 @ 06:32)  Creatinine: 0.69 mg/dL (08-13-23 @ 06:53)  Creatinine: 0.77 mg/dL (08-12-23 @ 14:26)    LIVER FUNCTIONS - ( 15 Aug 2023 06:32 )  Alb: 3.8 g/dL / Pro: 6.7 g/dL / ALK PHOS: 66 U/L / ALT: 17 U/L / AST: 18 U/L / GGT: x           Microbiology: reviewed   Culture - Urine (collected 08-12-23 @ 15:16)  Source: Clean Catch Clean Catch (Midstream)  Final Report (08-15-23 @ 15:15):    >100,000 CFU/ml Klebsiella variicola  Organism: Klebsiella variicola (08-15-23 @ 15:15)  Organism: Klebsiella variicola (08-15-23 @ 15:15)      -  Levofloxacin: S <=0.5      -  Tobramycin: S <=2      -  Nitrofurantoin: S <=32 Should not be used to treat pyelonephritis      -  Aztreonam: S <=4      -  Gentamicin: S <=2      -  Cefazolin: S <=2      -  Cefepime: S <=2      -  Piperacillin/Tazobactam: S <=8      -  Ciprofloxacin: S <=0.25      -  Imipenem: S <=1      -  Ceftriaxone: S <=1 Enterobacter, Klebsiella aerogenes, Citrobacter, and Serratia may develop resistance during prolonged therapy      -  Ampicillin: R <=8 These ampicillin results predict results for amoxicillin      Method Type: RAFIA      -  Meropenem: S <=1      -  Ampicillin/Sulbactam: S <=4/2 Enterobacter, Klebsiella aerogenes, Citrobacter, and Serratia may develop resistance during prolonged therapy (3-4 days)      -  Cefoxitin: S <=8      -  Amikacin: S <=16      -  Amoxicillin/Clavulanic Acid: S <=8/4      -  Trimethoprim/Sulfamethoxazole: S <=0.5/9.5      -  Ertapenem: S <=0.5    Radiology: reviewed     Medications:  acetaminophen     Tablet .. 650 milliGRAM(s) Oral every 6 hours PRN  albuterol    90 MICROgram(s) HFA Inhaler 2 Puff(s) Inhalation every 6 hours PRN  aluminum hydroxide/magnesium hydroxide/simethicone Suspension 30 milliLiter(s) Oral every 4 hours PRN  aMIOdarone    Tablet 200 milliGRAM(s) Oral daily  apixaban 2.5 milliGRAM(s) Oral every 12 hours  levothyroxine 100 MICROGram(s) Oral daily  melatonin 3 milliGRAM(s) Oral at bedtime PRN  ondansetron Injectable 4 milliGRAM(s) IV Push every 8 hours PRN  sertraline 50 milliGRAM(s) Oral daily    Prior/Completed Antimicrobials:  cefTRIAXone   IVPB  cefTRIAXone   IVPB  
[facility]  [facility] Rene Y. Patel, S. Shah, [place]. [place]  [phone]  ([phone] - weekdays after 5pm and weekends)    Name: CATHRYN LARA  Age/Gender: 89y Female  MRN: [id number]    Interval History:  Patient seen and examined this morning.   No new complaints noted.  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 reported allergy (Unknown)      Objective:  Vitals:   T(F): 98.9 (08-17-23 @ 04:43), Max: 98.9 (08-17-23 @ 04:43)  HR: 66 (08-17-23 @ 04:43) (65 - 66)  BP: 153/76 (08-17-23 @ 04:43) (121/61 - 153/76)  RR: 18 (08-17-23 @ 04:43) (18 - 18)  SpO2: 97% (08-17-23 @ 04:43) (94% - 97%)  Physical Examination:  General: no acute distress  HEENT: NC/AT, anicteric, neck supple  Respiratory: no acc muscle use, breathing comfortably  Cardiovascular: S1 and S2 present  Gastrointestinal: normal appearing, nondistended  Extremities: no edema, no cyanosis  Skin: no visible rash    Laboratory Studies:  CBC:                       10.6   2.14  )-----------( 69       ( 16 Aug 2023 06:48 )             33.5     WBC Trend:  2.14 08-16-23 @ 06:48  2.09 08-15-23 @ 06:32  1.99 08-14-23 @ 07:33  2.40 08-13-23 @ 06:53  2.07 08-12-23 @ 14:26    CMP: 08-16    139  |  103  |  18  ----------------------------<  83  3.9   |  24  |  0.73    Ca    9.0      16 Aug 2023 06:50      Creatinine: 0.73 mg/dL (08-16-23 @ 06:50)  Creatinine: 0.65 mg/dL (08-15-23 @ 06:32)  Creatinine: 0.69 mg/dL (08-13-23 @ 06:53)  Creatinine: 0.77 mg/dL (08-12-23 @ 14:26)    Microbiology: reviewed   Culture - Urine (collected 08-12-23 @ 15:16)  Source: Clean Catch Clean Catch (Midstream)  Final Report (08-15-23 @ 15:15):    >100,000 CFU/ml Klebsiella variicola  Organism: Klebsiella variicola (08-15-23 @ 15:15)  Organism: Klebsiella variicola (08-15-23 @ 15:15)      Method Type: RAFIA      -  Amikacin: S <=16      -  Amoxicillin/Clavulanic Acid: S <=8/4      -  Ampicillin: R <=8 These ampicillin results predict results for amoxicillin      -  Ampicillin/Sulbactam: S <=4/2 Enterobacter, Klebsiella aerogenes, Citrobacter, and Serratia may develop resistance during prolonged therapy (3-4 days)      -  Aztreonam: S <=4      -  Cefazolin: S <=2      -  Cefepime: S <=2      -  Cefoxitin: S <=8      -  Ceftriaxone: S <=1 Enterobacter, Klebsiella aerogenes, Citrobacter, and Serratia may develop resistance during prolonged therapy      -  Ciprofloxacin: S <=0.25      -  Ertapenem: S <=0.5      -  Gentamicin: S <=2      -  Imipenem: S <=1      -  Levofloxacin: S <=0.5      -  Meropenem: S <=1      -  Nitrofurantoin: S <=32 Should not be used to treat pyelonephritis      -  Piperacillin/Tazobactam: S <=8      -  Tobramycin: S <=2      -  Trimethoprim/Sulfamethoxazole: S <=0.5/9.5    Radiology: reviewed     Medications:  acetaminophen     Tablet .. 650 milliGRAM(s) Oral every 6 hours PRN  albuterol    90 MICROgram(s) HFA Inhaler 2 Puff(s) Inhalation every 6 hours PRN  aluminum hydroxide/magnesium hydroxide/simethicone Suspension 30 milliLiter(s) Oral every 4 hours PRN  aMIOdarone    Tablet 200 milliGRAM(s) Oral daily  apixaban 2.5 milliGRAM(s) Oral every 12 hours  levothyroxine 100 MICROGram(s) Oral daily  melatonin 3 milliGRAM(s) Oral at bedtime PRN  ondansetron Injectable 4 milliGRAM(s) IV Push every 8 hours PRN  sertraline 50 milliGRAM(s) Oral daily  sodium chloride 0.9%. 1000 milliLiter(s) IV Continuous <Continuous>    Current Antimicrobials:    Prior/Completed Antimicrobials:  cefTRIAXone   IVPB  cefTRIAXone   IVPB  cefTRIAXone   IVPB  
Patient is a 89y old  Female who presents with a chief complaint of fall (14 Aug 2023 11:07)      SUBJECTIVE / OVERNIGHT EVENTS:  Patient seen and examined.   Still has some urinary frequency.      Vital Signs Last 24 Hrs  T(C): 36.4 (14 Aug 2023 05:13), Max: 36.8 (13 Aug 2023 13:15)  T(F): 97.5 (14 Aug 2023 05:13), Max: 98.2 (13 Aug 2023 13:15)  HR: 57 (14 Aug 2023 05:13) (57 - 66)  BP: 145/88 (14 Aug 2023 05:13) (111/66 - 145/88)  BP(mean): --  RR: 17 (14 Aug 2023 05:13) (17 - 18)  SpO2: 95% (14 Aug 2023 05:13) (95% - 97%)    Parameters below as of 14 Aug 2023 05:13  Patient On (Oxygen Delivery Method): room air      I&O's Summary    13 Aug 2023 07:01  -  14 Aug 2023 07:00  --------------------------------------------------------  IN: 640 mL / OUT: 301 mL / NET: 339 mL    14 Aug 2023 07:01  -  14 Aug 2023 11:43  --------------------------------------------------------  IN: 240 mL / OUT: 0 mL / NET: 240 mL      PHYSICAL EXAM:  GENERAL: NAD, well-developed, comfortable  HEAD:  Atraumatic, Normocephalic  EYES: EOMI, PERRLA, conjunctiva and sclera clear  NECK: Supple, No JVD  CHEST/LUNG: Clear to auscultation bilaterally; No wheeze  HEART: Regular rate and rhythm; systolic murmur   ABDOMEN: Soft, Nontender, Nondistended; Bowel sounds present  NEURO: AAOx3, no focal weakness, 5/5 b/l extremity strength, b/l knee no arthritis, no effusion   EXTREMITIES:  2+ Peripheral Pulses, No clubbing, cyanosis, or edema  SKIN: No rashes or lesions    LABS:                        11.1   1.99  )-----------( 67       ( 14 Aug 2023 07:33 )             34.5     08-13    138  |  102  |  12  ----------------------------<  94  3.8   |  25  |  0.69    Ca    8.9      13 Aug 2023 06:53  Phos  3.5     08-13  Mg     2.0     08-13    TPro  6.7  /  Alb  4.1  /  TBili  0.5  /  DBili  x   /  AST  17  /  ALT  16  /  AlkPhos  63  08-12    PT/INR - ( 12 Aug 2023 14:26 )   PT: 12.1 sec;   INR: 1.16 ratio         PTT - ( 12 Aug 2023 14:26 )  PTT:30.1 sec  CAPILLARY BLOOD GLUCOSE            Urinalysis Basic - ( 13 Aug 2023 06:53 )    Color: x / Appearance: x / SG: x / pH: x  Gluc: 94 mg/dL / Ketone: x  / Bili: x / Urobili: x   Blood: x / Protein: x / Nitrite: x   Leuk Esterase: x / RBC: x / WBC x   Sq Epi: x / Non Sq Epi: x / Bacteria: x        RADIOLOGY & ADDITIONAL TESTS:    Imaging Personally Reviewed:  [x] YES  [ ] NO    Consultant(s) Notes Reviewed:  [x] YES  [ ] NO      MEDICATIONS  (STANDING):  aMIOdarone    Tablet 200 milliGRAM(s) Oral daily  apixaban 2.5 milliGRAM(s) Oral every 12 hours  cefTRIAXone   IVPB 1000 milliGRAM(s) IV Intermittent every 24 hours  levothyroxine 100 MICROGram(s) Oral daily  sertraline 50 milliGRAM(s) Oral daily    MEDICATIONS  (PRN):  acetaminophen     Tablet .. 650 milliGRAM(s) Oral every 6 hours PRN Temp greater or equal to 38C (100.4F), Mild Pain (1 - 3)  albuterol    90 MICROgram(s) HFA Inhaler 2 Puff(s) Inhalation every 6 hours PRN Shortness of Breath  aluminum hydroxide/magnesium hydroxide/simethicone Suspension 30 milliLiter(s) Oral every 4 hours PRN Dyspepsia  melatonin 3 milliGRAM(s) Oral at bedtime PRN Insomnia  ondansetron Injectable 4 milliGRAM(s) IV Push every 8 hours PRN Nausea and/or Vomiting      Care Discussed with Consultants/Other Providers [x] YES  [ ] NO    HEALTH ISSUES - PROBLEM Dx:  Acute UTI    Fall    Paroxysmal atrial fibrillation    Hypothyroid    Pancytopenia        
Patient is a 89y old  Female who presents with a chief complaint of fall (18 Aug 2023 12:22)      SUBJECTIVE / OVERNIGHT EVENTS:    Patient seen and examined. repeat orthos yesterday evening mildly improved.      Vital Signs Last 24 Hrs  T(C): 36.7 (19 Aug 2023 04:18), Max: 36.8 (18 Aug 2023 21:02)  T(F): 98.1 (19 Aug 2023 04:18), Max: 98.3 (18 Aug 2023 21:02)  HR: 54 (19 Aug 2023 04:18) (54 - 61)  BP: 149/74 (19 Aug 2023 04:18) (104/67 - 149/74)  BP(mean): --  RR: 18 (19 Aug 2023 04:18) (18 - 19)  SpO2: 95% (19 Aug 2023 04:18) (92% - 95%)    Parameters below as of 19 Aug 2023 04:18  Patient On (Oxygen Delivery Method): room air      I&O's Summary    18 Aug 2023 07:01  -  19 Aug 2023 07:00  --------------------------------------------------------  IN: 560 mL / OUT: 100 mL / NET: 460 mL        PE:  GENERAL: NAD, AAOx2, frail, [place]  CHEST/LUNG: CTABL, No wheeze  HEART: Regular rate and rhythm; + murmur  ABDOMEN: Soft, Nontender, Nondistended; Bowel sounds present  EXTREMITIES:  2+ Peripheral Pulses, No edema  NEURO: No focal deficits    LABS:            CAPILLARY BLOOD GLUCOSE                RADIOLOGY & ADDITIONAL TESTS:    Imaging Personally Reviewed:  [x] YES  [ ] NO    Consultant(s) Notes Reviewed:  [x] YES  [ ] NO    MEDICATIONS  (STANDING):  aMIOdarone    Tablet 200 milliGRAM(s) Oral daily  apixaban 2.5 milliGRAM(s) Oral every 12 hours  levothyroxine 100 MICROGram(s) Oral daily  midodrine. 5 milliGRAM(s) Oral every 8 hours  sertraline 50 milliGRAM(s) Oral daily    MEDICATIONS  (PRN):  acetaminophen     Tablet .. 650 milliGRAM(s) Oral every 6 hours PRN Temp greater or equal to 38C (100.4F), Mild Pain (1 - 3)  albuterol    90 MICROgram(s) HFA Inhaler 2 Puff(s) Inhalation every 6 hours PRN Shortness of Breath  aluminum hydroxide/magnesium hydroxide/simethicone Suspension 30 milliLiter(s) Oral every 4 hours PRN Dyspepsia  melatonin 3 milliGRAM(s) Oral at bedtime PRN Insomnia  ondansetron Injectable 4 milliGRAM(s) IV Push every 8 hours PRN Nausea and/or Vomiting      Care Discussed with Consultants/Other Providers [x] YES  [ ] NO    HEALTH ISSUES - PROBLEM Dx:  Acute UTI    Fall    Paroxysmal atrial fibrillation    Hypothyroid    Pancytopenia        
Patient is a 89y old  Female who presents with a chief complaint of fall (21 Aug 2023 10:46)      SUBJECTIVE / OVERNIGHT EVENTS:    Patient seen and examined. co dizziness sitting. denies room spinning. dizzy yesterday with standing.      Vital Signs Last 24 Hrs  T(C): 37 (21 Aug 2023 04:11), Max: 37.1 (20 Aug 2023 21:16)  T(F): 98.6 (21 Aug 2023 04:11), Max: 98.7 (20 Aug 2023 21:16)  HR: 58 (21 Aug 2023 04:11) (56 - 62)  BP: 145/67 (21 Aug 2023 04:11) (117/70 - 145/67)  BP(mean): --  RR: 18 (21 Aug 2023 04:11) (18 - 18)  SpO2: 93% (21 Aug 2023 04:11) (93% - 94%)    Parameters below as of 21 Aug 2023 04:11  Patient On (Oxygen Delivery Method): room air      I&O's Summary    20 Aug 2023 07:01  -  21 Aug 2023 07:00  --------------------------------------------------------  IN: 0 mL / OUT: 800 mL / NET: -800 mL        PE:  GENERAL: NAD, AAOx2, frail, [place]  CHEST/LUNG: CTABL, No wheeze  HEART: Regular rate and rhythm; + murmur  ABDOMEN: Soft, Nontender, Nondistended; Bowel sounds present  EXTREMITIES:  2+ Peripheral Pulses, No edema  NEURO: No focal deficits    LABS:            CAPILLARY BLOOD GLUCOSE                RADIOLOGY & ADDITIONAL TESTS:    Imaging Personally Reviewed:  [x] YES  [ ] NO    Consultant(s) Notes Reviewed:  [x] YES  [ ] NO    MEDICATIONS  (STANDING):  aMIOdarone    Tablet 200 milliGRAM(s) Oral daily  apixaban 2.5 milliGRAM(s) Oral every 12 hours  fludroCORTISONE 0.1 milliGRAM(s) Oral daily  levothyroxine 100 MICROGram(s) Oral daily  meclizine 25 milliGRAM(s) Oral three times a day  midodrine. 10 milliGRAM(s) Oral every 8 hours  sertraline 50 milliGRAM(s) Oral daily  sodium chloride 0.9%. 1000 milliLiter(s) (75 mL/Hr) IV Continuous <Continuous>    MEDICATIONS  (PRN):  acetaminophen     Tablet .. 650 milliGRAM(s) Oral every 6 hours PRN Temp greater or equal to 38C (100.4F), Mild Pain (1 - 3)  albuterol    90 MICROgram(s) HFA Inhaler 2 Puff(s) Inhalation every 6 hours PRN Shortness of Breath  aluminum hydroxide/magnesium hydroxide/simethicone Suspension 30 milliLiter(s) Oral every 4 hours PRN Dyspepsia  melatonin 3 milliGRAM(s) Oral at bedtime PRN Insomnia  ondansetron Injectable 4 milliGRAM(s) IV Push every 8 hours PRN Nausea and/or Vomiting      Care Discussed with Consultants/Other Providers [x] YES  [ ] NO    HEALTH ISSUES - PROBLEM Dx:  Acute UTI    Fall    Paroxysmal atrial fibrillation    Hypothyroid    Pancytopenia        
Patient is a 89y old  Female who presents with a chief complaint of fall (23 Aug 2023 10:51)      SUBJECTIVE / OVERNIGHT EVENTS:  Patient seen and examined.  Doing well.   BP orthostatic,  but completely asymptomatic.          Vital Signs Last 24 Hrs  T(C): 36.8 (22 Aug 2023 20:58), Max: 36.8 (22 Aug 2023 20:58)  T(F): 98.2 (22 Aug 2023 20:58), Max: 98.2 (22 Aug 2023 20:58)  HR: 62 (23 Aug 2023 09:37) (51 - 62)  BP: 178/76 (23 Aug 2023 09:37) (86/50 - 178/76)  BP(mean): --  RR: 18 (23 Aug 2023 05:12) (18 - 18)  SpO2: 96% (23 Aug 2023 05:12) (92% - 96%)    Parameters below as of 22 Aug 2023 20:58  Patient On (Oxygen Delivery Method): room air      I&O's Summary    22 Aug 2023 07:01  -  23 Aug 2023 07:00  --------------------------------------------------------  IN: 370 mL / OUT: 100 mL / NET: 270 mL    23 Aug 2023 07:01  -  23 Aug 2023 12:50  --------------------------------------------------------  IN: 240 mL / OUT: 550 mL / NET: -310 mL        PE:  GENERAL: NAD, AAOx2, frail, [place]  CHEST/LUNG: CTABL, No wheeze  HEART: Regular rate and rhythm; + murmur  ABDOMEN: Soft, Nontender, Nondistended; Bowel sounds present  EXTREMITIES:  2+ Peripheral Pulses, No edema  NEURO: No focal deficits    LABS:                        10.5   2.15  )-----------( 73       ( 23 Aug 2023 07:15 )             33.2     08-23    142  |  109<H>  |  13  ----------------------------<  78  3.8   |  23  |  0.57    Ca    8.4      23 Aug 2023 07:16        CAPILLARY BLOOD GLUCOSE            Urinalysis Basic - ( 23 Aug 2023 07:16 )    Color: x / Appearance: x / SG: x / pH: x  Gluc: 78 mg/dL / Ketone: x  / Bili: x / Urobili: x   Blood: x / Protein: x / Nitrite: x   Leuk Esterase: x / RBC: x / WBC x   Sq Epi: x / Non Sq Epi: x / Bacteria: x        RADIOLOGY & ADDITIONAL TESTS:    Imaging Personally Reviewed:  [x] YES  [ ] NO    Consultant(s) Notes Reviewed:  [x] YES  [ ] NO      MEDICATIONS  (STANDING):  aMIOdarone    Tablet 200 milliGRAM(s) Oral daily  apixaban 2.5 milliGRAM(s) Oral every 12 hours  fludroCORTISONE 0.1 milliGRAM(s) Oral daily  levothyroxine 100 MICROGram(s) Oral daily  meclizine 25 milliGRAM(s) Oral three times a day  midodrine. 10 milliGRAM(s) Oral every 8 hours  sertraline 50 milliGRAM(s) Oral daily  sodium chloride 0.9%. 1000 milliLiter(s) (75 mL/Hr) IV Continuous <Continuous>    MEDICATIONS  (PRN):  acetaminophen     Tablet .. 650 milliGRAM(s) Oral every 6 hours PRN Temp greater or equal to 38C (100.4F), Mild Pain (1 - 3)  albuterol    90 MICROgram(s) HFA Inhaler 2 Puff(s) Inhalation every 6 hours PRN Shortness of Breath  aluminum hydroxide/magnesium hydroxide/simethicone Suspension 30 milliLiter(s) Oral every 4 hours PRN Dyspepsia  melatonin 3 milliGRAM(s) Oral at bedtime PRN Insomnia  ondansetron Injectable 4 milliGRAM(s) IV Push every 8 hours PRN Nausea and/or Vomiting      Care Discussed with Consultants/Other Providers [x] YES  [ ] NO    HEALTH ISSUES - PROBLEM Dx:  Acute UTI    Fall    Paroxysmal atrial fibrillation    Hypothyroid    Pancytopenia        
SUBJECTIVE / OVERNIGHT EVENTS:      Patient seen and examined at bedside. No events noted overnight. Resting comfortably in bed    --------------------------------------------------------------------------------------------  LABS:                        11.4   2.40  )-----------( 73       ( 13 Aug 2023 06:53 )             35.7     08-13    138  |  102  |  12  ----------------------------<  94  3.8   |  25  |  0.69    Ca    8.9      13 Aug 2023 06:53  Phos  3.5     08-13  Mg     2.0     08-13    TPro  6.7  /  Alb  4.1  /  TBili  0.5  /  DBili  x   /  AST  17  /  ALT  16  /  AlkPhos  63  08-12    PT/INR - ( 12 Aug 2023 14:26 )   PT: 12.1 sec;   INR: 1.16 ratio         PTT - ( 12 Aug 2023 14:26 )  PTT:30.1 sec  CAPILLARY BLOOD GLUCOSE            Urinalysis Basic - ( 13 Aug 2023 06:53 )    Color: x / Appearance: x / SG: x / pH: x  Gluc: 94 mg/dL / Ketone: x  / Bili: x / Urobili: x   Blood: x / Protein: x / Nitrite: x   Leuk Esterase: x / RBC: x / WBC x   Sq Epi: x / Non Sq Epi: x / Bacteria: x        RADIOLOGY & ADDITIONAL TESTS:< from: Xray Chest 1 View AP/PA (08.12.23 @ 15:46) >    IMPRESSION:  Clear lungs.    < end of copied text >  < from: CT Head No Cont (08.12.23 @ 17:29) >    IMPRESSION:    No acute intracranial hemorrhage, brain edema, or mass effect.    --- End of Report ---    < end of copied text >      Imaging Personally Reviewed:  [x] YES  [ ] NO    Consultant(s) Notes Reviewed:  [x] YES  [ ] NO    MEDICATIONS  (STANDING):  aMIOdarone    Tablet 200 milliGRAM(s) Oral daily  apixaban 2.5 milliGRAM(s) Oral every 12 hours  cefTRIAXone   IVPB 1000 milliGRAM(s) IV Intermittent every 24 hours  levothyroxine 100 MICROGram(s) Oral daily  sertraline 50 milliGRAM(s) Oral daily    MEDICATIONS  (PRN):  acetaminophen     Tablet .. 650 milliGRAM(s) Oral every 6 hours PRN Temp greater or equal to 38C (100.4F), Mild Pain (1 - 3)  albuterol    90 MICROgram(s) HFA Inhaler 2 Puff(s) Inhalation every 6 hours PRN Shortness of Breath  aluminum hydroxide/magnesium hydroxide/simethicone Suspension 30 milliLiter(s) Oral every 4 hours PRN Dyspepsia  melatonin 3 milliGRAM(s) Oral at bedtime PRN Insomnia  ondansetron Injectable 4 milliGRAM(s) IV Push every 8 hours PRN Nausea and/or Vomiting      Care Discussed with Consultants/Other Providers [x] YES  [ ] NO    Vital Signs Last 24 Hrs  T(C): 36.8 (13 Aug 2023 05:14), Max: 36.8 (12 Aug 2023 18:00)  T(F): 98.2 (13 Aug 2023 05:14), Max: 98.2 (12 Aug 2023 18:00)  HR: 66 (13 Aug 2023 12:20) (54 - 83)  BP: 112/70 (13 Aug 2023 12:20) (112/70 - 173/73)  BP(mean): 101 (12 Aug 2023 20:00) (101 - 101)  RR: 18 (13 Aug 2023 12:20) (18 - 19)  SpO2: 97% (13 Aug 2023 12:20) (96% - 100%)    Parameters below as of 13 Aug 2023 12:20  Patient On (Oxygen Delivery Method): room air      I&O's Summary      PHYSICAL EXAM:  GENERAL: NAD, well-developed, comfortable  HEAD:  Atraumatic, Normocephalic  EYES: EOMI, PERRLA, conjunctiva and sclera clear  NECK: Supple, No JVD  CHEST/LUNG: Clear to auscultation bilaterally; No wheeze  HEART: Regular rate and rhythm; No murmurs, rubs, or gallops  ABDOMEN: Soft, Nontender, Nondistended; Bowel sounds present  NEURO: AAOx3, no focal weakness, 5/5 b/l extremity strength, b/l knee no arthritis, no effusion   EXTREMITIES:  2+ Peripheral Pulses, No clubbing, cyanosis, or edema  SKIN: No rashes or lesions

## 2023-08-23 NOTE — PROGRESS NOTE ADULT - ASSESSMENT
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 Millard in 2018 treated with NGT then consult for Dr. Dae Murillo in 10/2020 treated conservatively without NGT, and in 2021 with NGT) presents with weakness, headache and falls    #Falls  -in setting of UTI, orthostatic Hypotension   -Echo noted with hyperdynamic lvef, moderate AS  -EKG with junctional rhythm 56bpm - overall unchanged from prior EKGs  -Orthostatics hypotension  improving +  -c/w  mido, fludro, & compression stockings  -S/p IVF    #UTI  -sp Abx, mgmt per med    #pAFib  -Stable  -Junctional & SR on tele  -Continue amiodarone  -Continue apixaban  -Avoid bb given intermittent bradycardia    #Mod AS  -As noted on prior echo  -No evidence of volume overload on exam  -Prior CT with Dense aortic valve calcification and multivessel coronary artery calcification  -Echo as above   -continue medical management     dcp per primary team

## 2023-08-23 NOTE — PROGRESS NOTE ADULT - TIME BILLING
Agree with above NP note.  cv stable for DCP  cont current tx
Agree with above PA note.  cv stable  cont current tx
Agree with above PA note.  significant orthostatic hypotension  hydrate  off htn meds  if persists or becomes symptomatic --> mido, permissive htn
Agree with above PA note.  significant orthostatic hypotension  off htn meds  cont low dose mido, adjust as needed  permissive htn
agree with above  cv stable  cont current mgmt  follouwp echo  cont IV abx
Agree with above PA note.  cv stable  cont current tx  med f/u
Agree with above PA note.  significant orthostatic hypotension  hydrate  off htn meds  cont low dose mido, permissive htn
Agree with above PA note.  significant orthostatic hypotension  improved  off htn meds  cont low dose mido, adjust as needed  permissive htn
agree with above  cv stable  cont current mgmt  Echo noted with hyperdynamic lvef, moderate AS  cont IV abx
agree with above  cv stable  cont current mgmt  follouwp echo  cont IV abx

## 2023-08-31 ENCOUNTER — INPATIENT (INPATIENT)
Facility: HOSPITAL | Age: 88
LOS: 5 days | Discharge: HOME HEALTH | DRG: 312 | End: 2023-09-06
Attending: INTERNAL MEDICINE | Admitting: STUDENT IN AN ORGANIZED HEALTH CARE EDUCATION/TRAINING PROGRAM
Payer: MEDICARE

## 2023-08-31 VITALS
OXYGEN SATURATION: 91 % | RESPIRATION RATE: 19 BRPM | HEIGHT: 63 IN | DIASTOLIC BLOOD PRESSURE: 106 MMHG | SYSTOLIC BLOOD PRESSURE: 188 MMHG | HEART RATE: 68 BPM | TEMPERATURE: 98 F | WEIGHT: 130.07 LBS

## 2023-08-31 DIAGNOSIS — Z98.89 OTHER SPECIFIED POSTPROCEDURAL STATES: Chronic | ICD-10-CM
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
## 2023-08-31 LAB
ALBUMIN SERPL ELPH-MCNC: 3.5 G/DL — SIGNIFICANT CHANGE UP (ref 3.3–5)
ALBUMIN SERPL ELPH-MCNC: 3.7 G/DL — SIGNIFICANT CHANGE UP (ref 3.3–5)
ALP SERPL-CCNC: 63 U/L — SIGNIFICANT CHANGE UP (ref 40–120)
ALP SERPL-CCNC: 67 U/L — SIGNIFICANT CHANGE UP (ref 40–120)
ALT FLD-CCNC: 13 U/L — SIGNIFICANT CHANGE UP (ref 10–45)
ALT FLD-CCNC: 15 U/L — SIGNIFICANT CHANGE UP (ref 10–45)
ANION GAP SERPL CALC-SCNC: 12 MMOL/L — SIGNIFICANT CHANGE UP (ref 5–17)
ANION GAP SERPL CALC-SCNC: 9 MMOL/L — SIGNIFICANT CHANGE UP (ref 5–17)
APPEARANCE UR: CLEAR — SIGNIFICANT CHANGE UP
APTT BLD: 31.4 SEC — SIGNIFICANT CHANGE UP (ref 24.5–35.6)
AST SERPL-CCNC: 13 U/L — SIGNIFICANT CHANGE UP (ref 10–40)
AST SERPL-CCNC: 16 U/L — SIGNIFICANT CHANGE UP (ref 10–40)
BACTERIA # UR AUTO: NEGATIVE — SIGNIFICANT CHANGE UP
BASOPHILS # BLD AUTO: 0.03 K/UL — SIGNIFICANT CHANGE UP (ref 0–0.2)
BASOPHILS NFR BLD AUTO: 1.7 % — SIGNIFICANT CHANGE UP (ref 0–2)
BILIRUB SERPL-MCNC: 0.7 MG/DL — SIGNIFICANT CHANGE UP (ref 0.2–1.2)
BILIRUB SERPL-MCNC: 0.7 MG/DL — SIGNIFICANT CHANGE UP (ref 0.2–1.2)
BILIRUB UR-MCNC: NEGATIVE — SIGNIFICANT CHANGE UP
BUN SERPL-MCNC: 10 MG/DL — SIGNIFICANT CHANGE UP (ref 7–23)
BUN SERPL-MCNC: 11 MG/DL — SIGNIFICANT CHANGE UP (ref 7–23)
CALCIUM SERPL-MCNC: 8.4 MG/DL — SIGNIFICANT CHANGE UP (ref 8.4–10.5)
CALCIUM SERPL-MCNC: 8.9 MG/DL — SIGNIFICANT CHANGE UP (ref 8.4–10.5)
CHLORIDE SERPL-SCNC: 104 MMOL/L — SIGNIFICANT CHANGE UP (ref 96–108)
CHLORIDE SERPL-SCNC: 105 MMOL/L — SIGNIFICANT CHANGE UP (ref 96–108)
CO2 SERPL-SCNC: 25 MMOL/L — SIGNIFICANT CHANGE UP (ref 22–31)
CO2 SERPL-SCNC: 27 MMOL/L — SIGNIFICANT CHANGE UP (ref 22–31)
COLOR SPEC: YELLOW — SIGNIFICANT CHANGE UP
CREAT SERPL-MCNC: 0.53 MG/DL — SIGNIFICANT CHANGE UP (ref 0.5–1.3)
CREAT SERPL-MCNC: 0.6 MG/DL — SIGNIFICANT CHANGE UP (ref 0.5–1.3)
DIFF PNL FLD: ABNORMAL
EGFR: 81 ML/MIN/1.73M2 — SIGNIFICANT CHANGE UP
EGFR: 84 ML/MIN/1.73M2 — SIGNIFICANT CHANGE UP
EOSINOPHIL # BLD AUTO: 0.1 K/UL — SIGNIFICANT CHANGE UP (ref 0–0.5)
EOSINOPHIL NFR BLD AUTO: 5.3 % — SIGNIFICANT CHANGE UP (ref 0–6)
EPI CELLS # UR: 7 /HPF — HIGH
GLUCOSE SERPL-MCNC: 91 MG/DL — SIGNIFICANT CHANGE UP (ref 70–99)
GLUCOSE SERPL-MCNC: 99 MG/DL — SIGNIFICANT CHANGE UP (ref 70–99)
GLUCOSE UR QL: NEGATIVE — SIGNIFICANT CHANGE UP
HCT VFR BLD CALC: 29.7 % — LOW (ref 34.5–45)
HGB BLD-MCNC: 9.3 G/DL — LOW (ref 11.5–15.5)
HYALINE CASTS # UR AUTO: 0 /LPF — SIGNIFICANT CHANGE UP (ref 0–2)
INR BLD: 1.23 RATIO — HIGH (ref 0.85–1.18)
KETONES UR-MCNC: NEGATIVE — SIGNIFICANT CHANGE UP
LEUKOCYTE ESTERASE UR-ACNC: ABNORMAL
LIDOCAIN IGE QN: 22 U/L — SIGNIFICANT CHANGE UP (ref 7–60)
LYMPHOCYTES # BLD AUTO: 0.28 K/UL — LOW (ref 1–3.3)
LYMPHOCYTES # BLD AUTO: 14.9 % — SIGNIFICANT CHANGE UP (ref 13–44)
MAGNESIUM SERPL-MCNC: 2.1 MG/DL — SIGNIFICANT CHANGE UP (ref 1.6–2.6)
MANUAL SMEAR VERIFICATION: SIGNIFICANT CHANGE UP
MCHC RBC-ENTMCNC: 31.3 GM/DL — LOW (ref 32–36)
MCHC RBC-ENTMCNC: 32.5 PG — SIGNIFICANT CHANGE UP (ref 27–34)
MCV RBC AUTO: 103.8 FL — HIGH (ref 80–100)
METAMYELOCYTES # FLD: 0.9 % — HIGH (ref 0–0)
MONOCYTES # BLD AUTO: 0.06 K/UL — SIGNIFICANT CHANGE UP (ref 0–0.9)
MONOCYTES NFR BLD AUTO: 3.5 % — SIGNIFICANT CHANGE UP (ref 2–14)
NEUTROPHILS # BLD AUTO: 1.36 K/UL — LOW (ref 1.8–7.4)
NEUTROPHILS NFR BLD AUTO: 73.7 % — SIGNIFICANT CHANGE UP (ref 43–77)
NITRITE UR-MCNC: NEGATIVE — SIGNIFICANT CHANGE UP
NT-PROBNP SERPL-SCNC: 2118 PG/ML — HIGH (ref 0–300)
PH UR: 6.5 — SIGNIFICANT CHANGE UP (ref 5–8)
PLAT MORPH BLD: NORMAL — SIGNIFICANT CHANGE UP
PLATELET # BLD AUTO: 84 K/UL — LOW (ref 150–400)
POTASSIUM SERPL-MCNC: 3.5 MMOL/L — SIGNIFICANT CHANGE UP (ref 3.5–5.3)
POTASSIUM SERPL-MCNC: 3.7 MMOL/L — SIGNIFICANT CHANGE UP (ref 3.5–5.3)
POTASSIUM SERPL-SCNC: 3.5 MMOL/L — SIGNIFICANT CHANGE UP (ref 3.5–5.3)
POTASSIUM SERPL-SCNC: 3.7 MMOL/L — SIGNIFICANT CHANGE UP (ref 3.5–5.3)
PROT SERPL-MCNC: 6.3 G/DL — SIGNIFICANT CHANGE UP (ref 6–8.3)
PROT SERPL-MCNC: 6.6 G/DL — SIGNIFICANT CHANGE UP (ref 6–8.3)
PROT UR-MCNC: SIGNIFICANT CHANGE UP
PROTHROM AB SERPL-ACNC: 13.5 SEC — HIGH (ref 9.5–13)
RBC # BLD: 2.86 M/UL — LOW (ref 3.8–5.2)
RBC # FLD: 13.4 % — SIGNIFICANT CHANGE UP (ref 10.3–14.5)
RBC BLD AUTO: SIGNIFICANT CHANGE UP
RBC CASTS # UR COMP ASSIST: 4 /HPF — SIGNIFICANT CHANGE UP (ref 0–4)
SODIUM SERPL-SCNC: 141 MMOL/L — SIGNIFICANT CHANGE UP (ref 135–145)
SODIUM SERPL-SCNC: 141 MMOL/L — SIGNIFICANT CHANGE UP (ref 135–145)
SP GR SPEC: 1.02 — SIGNIFICANT CHANGE UP (ref 1.01–1.02)
TROPONIN T, HIGH SENSITIVITY RESULT: 17 NG/L — SIGNIFICANT CHANGE UP (ref 0–51)
UROBILINOGEN FLD QL: NEGATIVE — SIGNIFICANT CHANGE UP
WBC # BLD: 1.85 K/UL — LOW (ref 3.8–10.5)
WBC # FLD AUTO: 1.85 K/UL — LOW (ref 3.8–10.5)
WBC UR QL: 2 /HPF — SIGNIFICANT CHANGE UP (ref 0–5)

## 2023-08-31 PROCEDURE — 72125 CT NECK SPINE W/O DYE: CPT | Mod: 26,MA

## 2023-08-31 PROCEDURE — 70450 CT HEAD/BRAIN W/O DYE: CPT | Mod: 26,MA

## 2023-08-31 PROCEDURE — 99285 EMERGENCY DEPT VISIT HI MDM: CPT

## 2023-08-31 PROCEDURE — 71250 CT THORAX DX C-: CPT | Mod: 26,MA

## 2023-08-31 PROCEDURE — 71045 X-RAY EXAM CHEST 1 VIEW: CPT | Mod: 26

## 2023-08-31 PROCEDURE — 72170 X-RAY EXAM OF PELVIS: CPT | Mod: 26

## 2023-08-31 NOTE — ED PROVIDER NOTE - PROGRESS NOTE DETAILS
MD Ammy (PGY-2) patient's labs reviewed.  Leukopenic which appears to be baseline.  Labs are unremarkable.  Will repeat troponin.  Patient to be admitted for falls.

## 2023-08-31 NOTE — ED PROVIDER NOTE - HIV OFFER
TIMEOUT performed prior to extubation on [facility] with RT, primary RN, and charge RN present on 4/14/2022 at 6:20 PM.     Per anesthesia team on admission, patient's intubation was Normal    ABG results as follows:    Lab Results   Component Value Date/Time    pH (POC) 7.34 (L) 04/14/2022 05:29 PM    pCO2 (POC) 44.2 04/14/2022 05:29 PM    pO2 (POC) 67 (L) 04/14/2022 05:29 PM    HCO3 (POC) 23.6 04/14/2022 05:29 PM    sO2 (POC) 91.7 (L) 04/14/2022 05:29 PM    Base deficit (POC) 2.4 04/14/2022 05:29 PM         The patient is hemodynamically stable and can demonstrate the following on a consistent basis:  follow commands , elevate head off the pillow, nods appropriately to questions. Dr. Duarte Overall notified of weaning parameters and order to extubate obtained. Patient extubated by (RT name) Ashley Marshall to (Type of O2 Device) airvo at (Amount of of O2) 40L 30% without complication. Previously Declined (within the last year)

## 2023-08-31 NOTE — ED PROVIDER NOTE - CLINICAL SUMMARY MEDICAL DECISION MAKING FREE TEXT BOX
89-year-old female with PMHx paroxysmal A-fib on Eliquis and amiodarone, history of abdominal TB s/p ex lap and subsequent SBO episodes over the years, hypothyroidism, chronic anemia, presenting with falls at home and head pressure. Vitals blood pressure elevated to 188/106.  Physical exam with heart regular rate and rhythm, lungs with crackles in the right base, right chest wall tenderness, abdomen soft nondistended nontender.  Concern for fractures, contusion, ICH.  Will get labs, UA/UC, CT of the head neck and chest.  Patient likely to be admitted for recurrent falls.

## 2023-08-31 NOTE — ED PROVIDER NOTE - OBJECTIVE STATEMENT
89-year-old female with PMHx paroxysmal A-fib on Eliquis and amiodarone, history of abdominal TB s/p ex lap and subsequent SBO episodes over the years, hypothyroidism, chronic anemia, presenting with falls at home and head pressure.  Of note patient was recently admitted and discharged on August 23 for similar complaints.  At that time patient was found to have UTI.  Patient was also had orthostatic hypotension, patient was started on midodrine and Florinef.  Patient states that she is still falling at home.  Patient denies any preceding symptoms.  Patient denies hitting her head, but is unsure.  Denies LOC.  Patient reports some right lower rib pain.  Patient also reports nausea that started around an hour ago, when riding in the ambulance.  Patient denies chest pain, shortness of breath, vomiting, abdominal pain, dysuria.

## 2023-08-31 NOTE — ED PROVIDER NOTE - ATTENDING CONTRIBUTION TO CARE
Private Physician Steven Goldberg [facility]  89y female pmh orhtostatic hypotension, Afib, HTN, Hypothyrodism, TP abd/lung, SBO, UTI,AS, Pt was admitted last week and dc home Now returns w similar symptoms of "Head pressure" and frequent falls. Has fallen #3 today without prodrome. No Abd, nvdc, dysuria, cp, cough Private Physician Steven Goldberg [facility]  89y female pmh orhtostatic hypotension, Afib, HTN, Hypothyrodism, TP abd/lung, SBO, UTI,AS, Pt was admitted last week and dc home Now returns w similar symptoms of "Head pressure" and frequent falls. Has fallen #3 today without prodrome. No Abd, nvdc, dysuria, cp, cough PE heent normocephalic atraumatic neck supple chest  +crackles rt chset post w wheeze and prolonged exp phase cv no rubs, gallops or murmurs abd soft +bs no mass guarding neuro gcs 15 Speech fluent  power 5/5 all ext  Donald Curiel MD, Facep

## 2023-08-31 NOTE — ED ADULT NURSE NOTE - OBJECTIVE STATEMENT
89 year old female. A&OX4, pmh of HTN, afib on eliquis and amiodarone, SBO, hypothyroidism, anemia, BIB EMS s/p fall. According to EMS patient has been having multiple falls at home. Also complaining of head pressure. Lives at home with aide who is frequently catching her when she falls. According to EMS, there were 2 episodes that patient fell and hit the floor, denies LOC but unsure about hitting her head. Patient also endorsing rib pain and nausea. Denies CP, SOB, , abdominal pain, difficulty urinating, fever and chills at this time. Heart rate regular. Respirations clear and equal bilaterally. Abdomen soft, nontender and nondistended. Peripheral pulses strong and equal bilaterally. IV placed and labs drawn. Safety and comfort measures maintained.      .

## 2023-09-01 DIAGNOSIS — I10 ESSENTIAL (PRIMARY) HYPERTENSION: ICD-10-CM

## 2023-09-01 DIAGNOSIS — D70.9 NEUTROPENIA, UNSPECIFIED: ICD-10-CM

## 2023-09-01 DIAGNOSIS — I48.20 CHRONIC ATRIAL FIBRILLATION, UNSPECIFIED: ICD-10-CM

## 2023-09-01 DIAGNOSIS — R09.02 HYPOXEMIA: ICD-10-CM

## 2023-09-01 DIAGNOSIS — R29.6 REPEATED FALLS: ICD-10-CM

## 2023-09-01 LAB
ANION GAP SERPL CALC-SCNC: 10 MMOL/L — SIGNIFICANT CHANGE UP (ref 5–17)
BASOPHILS # BLD AUTO: 0.01 K/UL — SIGNIFICANT CHANGE UP (ref 0–0.2)
BASOPHILS NFR BLD AUTO: 0.6 % — SIGNIFICANT CHANGE UP (ref 0–2)
BUN SERPL-MCNC: 8 MG/DL — SIGNIFICANT CHANGE UP (ref 7–23)
CALCIUM SERPL-MCNC: 8.4 MG/DL — SIGNIFICANT CHANGE UP (ref 8.4–10.5)
CHLORIDE SERPL-SCNC: 102 MMOL/L — SIGNIFICANT CHANGE UP (ref 96–108)
CO2 SERPL-SCNC: 30 MMOL/L — SIGNIFICANT CHANGE UP (ref 22–31)
CREAT SERPL-MCNC: 0.58 MG/DL — SIGNIFICANT CHANGE UP (ref 0.5–1.3)
EGFR: 82 ML/MIN/1.73M2 — SIGNIFICANT CHANGE UP
EOSINOPHIL # BLD AUTO: 0.12 K/UL — SIGNIFICANT CHANGE UP (ref 0–0.5)
EOSINOPHIL NFR BLD AUTO: 7.3 % — HIGH (ref 0–6)
GLUCOSE SERPL-MCNC: 79 MG/DL — SIGNIFICANT CHANGE UP (ref 70–99)
HCT VFR BLD CALC: 29.6 % — LOW (ref 34.5–45)
HGB BLD-MCNC: 9.5 G/DL — LOW (ref 11.5–15.5)
LYMPHOCYTES # BLD AUTO: 0.37 K/UL — LOW (ref 1–3.3)
LYMPHOCYTES # BLD AUTO: 22.4 % — SIGNIFICANT CHANGE UP (ref 13–44)
MAGNESIUM SERPL-MCNC: 1.9 MG/DL — SIGNIFICANT CHANGE UP (ref 1.6–2.6)
MCHC RBC-ENTMCNC: 32.1 GM/DL — SIGNIFICANT CHANGE UP (ref 32–36)
MCHC RBC-ENTMCNC: 33.3 PG — SIGNIFICANT CHANGE UP (ref 27–34)
MCV RBC AUTO: 103.9 FL — HIGH (ref 80–100)
MONOCYTES # BLD AUTO: 0.12 K/UL — SIGNIFICANT CHANGE UP (ref 0–0.9)
MONOCYTES NFR BLD AUTO: 7.3 % — SIGNIFICANT CHANGE UP (ref 2–14)
NEUTROPHILS # BLD AUTO: 1.03 K/UL — LOW (ref 1.8–7.4)
NEUTROPHILS NFR BLD AUTO: 62.4 % — SIGNIFICANT CHANGE UP (ref 43–77)
NRBC # BLD: 0 /100 WBCS — SIGNIFICANT CHANGE UP (ref 0–0)
PHOSPHATE SERPL-MCNC: 3.4 MG/DL — SIGNIFICANT CHANGE UP (ref 2.5–4.5)
PLATELET # BLD AUTO: 79 K/UL — LOW (ref 150–400)
POTASSIUM SERPL-MCNC: 3.1 MMOL/L — LOW (ref 3.5–5.3)
POTASSIUM SERPL-SCNC: 3.1 MMOL/L — LOW (ref 3.5–5.3)
RBC # BLD: 2.85 M/UL — LOW (ref 3.8–5.2)
RBC # FLD: 13.3 % — SIGNIFICANT CHANGE UP (ref 10.3–14.5)
SODIUM SERPL-SCNC: 142 MMOL/L — SIGNIFICANT CHANGE UP (ref 135–145)
WBC # BLD: 1.65 K/UL — LOW (ref 3.8–10.5)
WBC # FLD AUTO: 1.65 K/UL — LOW (ref 3.8–10.5)

## 2023-09-01 PROCEDURE — 99223 1ST HOSP IP/OBS HIGH 75: CPT

## 2023-09-01 RX ORDER — ALBUTEROL 90 UG/1
2 AEROSOL, METERED ORAL EVERY 6 HOURS
Refills: 0 | Status: DISCONTINUED | OUTPATIENT
Start: 2023-09-01 | End: 2023-09-06

## 2023-09-01 RX ORDER — ONDANSETRON 8 MG/1
4 TABLET, FILM COATED ORAL EVERY 8 HOURS
Refills: 0 | Status: DISCONTINUED | OUTPATIENT
Start: 2023-09-01 | End: 2023-09-06

## 2023-09-01 RX ORDER — MECLIZINE HCL 12.5 MG
25 TABLET ORAL THREE TIMES A DAY
Refills: 0 | Status: DISCONTINUED | OUTPATIENT
Start: 2023-09-01 | End: 2023-09-01

## 2023-09-01 RX ORDER — FUROSEMIDE 40 MG
20 TABLET ORAL ONCE
Refills: 0 | Status: COMPLETED | OUTPATIENT
Start: 2023-09-01 | End: 2023-09-01

## 2023-09-01 RX ORDER — POTASSIUM CHLORIDE 20 MEQ
20 PACKET (EA) ORAL
Refills: 0 | Status: COMPLETED | OUTPATIENT
Start: 2023-09-01 | End: 2023-09-01

## 2023-09-01 RX ORDER — LEVOTHYROXINE SODIUM 125 MCG
100 TABLET ORAL DAILY
Refills: 0 | Status: DISCONTINUED | OUTPATIENT
Start: 2023-09-01 | End: 2023-09-06

## 2023-09-01 RX ORDER — APIXABAN 2.5 MG/1
2.5 TABLET, FILM COATED ORAL EVERY 12 HOURS
Refills: 0 | Status: DISCONTINUED | OUTPATIENT
Start: 2023-09-01 | End: 2023-09-06

## 2023-09-01 RX ORDER — ACETAMINOPHEN 500 MG
650 TABLET ORAL EVERY 6 HOURS
Refills: 0 | Status: DISCONTINUED | OUTPATIENT
Start: 2023-09-01 | End: 2023-09-06

## 2023-09-01 RX ORDER — LANOLIN ALCOHOL/MO/W.PET/CERES
3 CREAM (GRAM) TOPICAL AT BEDTIME
Refills: 0 | Status: DISCONTINUED | OUTPATIENT
Start: 2023-09-01 | End: 2023-09-06

## 2023-09-01 RX ORDER — SERTRALINE 25 MG/1
50 TABLET, FILM COATED ORAL DAILY
Refills: 0 | Status: DISCONTINUED | OUTPATIENT
Start: 2023-09-01 | End: 2023-09-06

## 2023-09-01 RX ORDER — AMIODARONE HYDROCHLORIDE 400 MG/1
200 TABLET ORAL DAILY
Refills: 0 | Status: DISCONTINUED | OUTPATIENT
Start: 2023-09-01 | End: 2023-09-06

## 2023-09-01 RX ADMIN — APIXABAN 2.5 MILLIGRAM(S): 2.5 TABLET, FILM COATED ORAL at 17:42

## 2023-09-01 RX ADMIN — Medication 100 MICROGRAM(S): at 06:33

## 2023-09-01 RX ADMIN — Medication 20 MILLIEQUIVALENT(S): at 13:53

## 2023-09-01 RX ADMIN — Medication 20 MILLIEQUIVALENT(S): at 17:41

## 2023-09-01 RX ADMIN — AMIODARONE HYDROCHLORIDE 200 MILLIGRAM(S): 400 TABLET ORAL at 06:33

## 2023-09-01 RX ADMIN — APIXABAN 2.5 MILLIGRAM(S): 2.5 TABLET, FILM COATED ORAL at 06:33

## 2023-09-01 RX ADMIN — Medication 20 MILLIGRAM(S): at 00:40

## 2023-09-01 RX ADMIN — SERTRALINE 50 MILLIGRAM(S): 25 TABLET, FILM COATED ORAL at 13:53

## 2023-09-01 NOTE — PHYSICAL THERAPY INITIAL EVALUATION ADULT - PERTINENT HX OF CURRENT PROBLEM, REHAB EVAL
89F PMHx significant for pAfib on eliquis and amiodarone, h/o abd TB s/p ex lap and subsequent SBO episodes over the years, hypothyroid, chronic anemia. Presenting w/ recurrent falls at home. She was recently admitted to this hospital for similar complaints, was tx'd for UTI and orthostatic hypotension and was dc'd home (was rec [facility] but family declined). CT CHEST: No acute intrathoracic trauma.  Interlobular septal thickening and scattered groundglass airspace opacities suggestive of interstitial and airspace pulmonary edema. New trace bilateral pleural effusions with adjacent compressive atelectasis.  Scattered nodular opacities in the right upper and middle lobes which could be infectious in etiology. CHEST XRAY: Mild pulmonary vascular congestion. CT HEAD/CT CERVICAL SPINE/PELVIC XRAY: (-).

## 2023-09-01 NOTE — OCCUPATIONAL THERAPY INITIAL EVALUATION ADULT - NSOTDISCHREC_GEN_A_CORE
If pt were to return home she would require 24 hour supervision sec to decreased standing balance and unsafe functional ambulation/Sub-acute Rehab

## 2023-09-01 NOTE — PHYSICAL THERAPY INITIAL EVALUATION ADULT - ADDITIONAL COMMENTS
per previous PT eval last month. Pt. lives alone in apt. 12 hrs HHA services.  Patient ambulated with rolling walker independent. pt owns rollator, transport chair, bed side commode at home.

## 2023-09-01 NOTE — H&P ADULT - PROBLEM SELECTOR PLAN 1
-likely 2/2 deconditioning. She states she always uses walker but falls sometimes d/t weakness without tripping on objects; no prodromal sx and no syncope.   -PT/OT consult.

## 2023-09-01 NOTE — H&P ADULT - NSHPADDITIONALINFOADULT_GEN_ALL_CORE
Fluid: encourage PO intake  Electrolytes: replete potassium, phosphorus and magnesium to 4/3/2 respectively  Nutrition: regular   Activity: as tolerated     VTE ppx: home eliquis   GI ppx: not indicated

## 2023-09-01 NOTE — H&P ADULT - NSHPPHYSICALEXAM_GEN_ALL_CORE
Vital Signs Last 24 Hrs  T(C): 36.7 (01 Sep 2023 00:20), Max: 36.7 (01 Sep 2023 00:20)  T(F): 98.1 (01 Sep 2023 00:20), Max: 98.1 (01 Sep 2023 00:20)  HR: 60 (01 Sep 2023 00:20) (60 - 68)  BP: 172/76 (01 Sep 2023 00:20) (172/76 - 188/106)  BP(mean): --  RR: 17 (01 Sep 2023 00:20) (17 - 19)  SpO2: 94% (01 Sep 2023 00:20) (91% - 94%)    Parameters below as of 01 Sep 2023 00:20  Patient On (Oxygen Delivery Method): nasal cannula  O2 Flow (L/min): 2      CONSTITUTIONAL: Well-groomed, in no apparent distress  EYES: No conjunctival or scleral injection, non-icteric  ENMT: No external nasal lesions; MMM  RESPIRATORY: Breathing comfortably; exp wheezing; rales at b/l bases  CARDIOVASCULAR: +S1S2, regular, mildly tenisha, pedal pulses full and symmetric; no lower extremity edema  GASTROINTESTINAL: No tenderness, +BS throughout, no rebound/guarding  SKIN: No rashes or ulcers noted  NEUROLOGIC: No gross focal neurological deficits, AAOX3  PSYCHIATRIC: mood and affect appropriate; appropriate insight and judgment

## 2023-09-01 NOTE — H&P ADULT - PROBLEM SELECTOR PLAN 4
-mild exp wheezing w/ rales at bases b/l.   -mild hypoxia to 91% on RA, improved w/ 2LNC  -at last admission, echo showed hyperdynamic LV w/ some AR.  -wean off o2 as tolerated   -will order for lasix 20mg ivp. monitor.

## 2023-09-01 NOTE — PHYSICAL THERAPY INITIAL EVALUATION ADULT - IMPAIRMENTS CONTRIBUTING TO GAIT DEVIATIONS, PT EVAL
posterior postural lean; will require chair follow for safety with ambulation/impaired balance/impaired postural control/decreased strength

## 2023-09-01 NOTE — H&P ADULT - ASSESSMENT
89F PMHx significant for  pAfib on eliquis and amiodarone, h/o abd TB s/p ex lap and subsequent SBO episodes over the years, hypothyroid, chronic anemia.   Presenting w/ recurrent falls at home. She was recently [facility] for simliar complaints, was tx'd for UTI and orthostatic hypotension and was dc'd home (was rec [facility] but family declined).
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
## 2023-09-01 NOTE — H&P ADULT - NSHPLABSRESULTS_GEN_ALL_CORE
I personally reviewed the CXR: mild vasc congestion; no PTX or focal consolidation.   I personally reviewed the EKG: juntional rhythm at HR 56, mildly prolonged QTC, no acute ischemic changes.     CBC:                         9.3    1.85  )-----------( 84       ( 31 Aug 2023 20:17 )             29.7     Chem:       141  |  105  |  10  ----------------------------<  91  3.5   |  27  |  0.53    Ca    8.4      31 Aug 2023 23:30  Mg     2.1         TPro  6.3  /  Alb  3.5  /  TBili  0.7  /  DBili  x   /  AST  13  /  ALT  13  /  AlkPhos  63        Urinalysis Basic - ( 31 Aug 2023 23:30 )    Color: Yellow / Appearance: Clear / S.019 / pH: x  Gluc: 91 mg/dL / Ketone: Negative  / Bili: Negative / Urobili: Negative   Blood: x / Protein: Trace / Nitrite: Negative   Leuk Esterase: Small / RBC: 4 /hpf / WBC 2 /HPF   Sq Epi: x / Non Sq Epi: x / Bacteria: Negative

## 2023-09-01 NOTE — H&P ADULT - HISTORY OF PRESENT ILLNESS
89F PMHx significant for pAfib on eliquis and amiodarone, h/o abd TB s/p ex lap and subsequent SBO episodes over the years, hypothyroid, chronic anemia.   Pt was recently hospitalized for falls and urinary sx, was tx'd for klepsiella UTI and orthostatic hypotension w/ IVF. PT rec [facility] dc but pt and family wanted to dc home.   Pt returns to ED today for another fall.  89F PMHx significant for pAfib on eliquis and amiodarone, h/o abd TB s/p ex lap and subsequent SBO episodes over the years, hypothyroid, chronic anemia.   Pt was recently hospitalized for falls and urinary sx, was tx'd for klepsiella UTI and orthostatic hypotension w/ IVF. PT rec [facility] dc but pt and family wanted to dc home.   Pt returns to ED today for another fall. Pt reports that she falls without prodromal sx. She always uses walker but still feels weak and falls sometimes. Reports some back pain, but denies other pain. Denies HA, dizziness, CP, SOB, abd pain, n/v/d, dysuria, hematuria, or leg swelling recently (although she's not using compression stockings as what she has doesn't fit well).     On presentation to ED, pt found to be hypertensive to SBP 170s. O2 sat 91% on RA; she was put on 2LNC.   Labs significant for stable leukopenia, unremarkable CMP, and unremarkable UA.

## 2023-09-01 NOTE — OCCUPATIONAL THERAPY INITIAL EVALUATION ADULT - ADDITIONAL COMMENTS
Pt. lives alone in apt. 12 hrs A services.  Patient ambulated with rolling walker independent. pt owns rollator, transport chair, bed side commode at home

## 2023-09-01 NOTE — PHYSICAL THERAPY INITIAL EVALUATION ADULT - GENERAL OBSERVATIONS, REHAB EVAL
pt elbert 30 min initial eval fair. pt rec'd in stretcher, 1L NC, NAD, agreed to session, [place], following all 1 step commands.

## 2023-09-01 NOTE — H&P ADULT - PROBLEM SELECTOR PLAN 2
-likely iatrogenic as pt on both midodrine and fludrocortisone  -will order for lasix 20ivp x1 and monitor.   -obtain repeat orthostatics in the morning  -will hold both for now

## 2023-09-01 NOTE — H&P ADULT - PROBLEM SELECTOR PROBLEM 5
Chronic neutropenia -patient refused PHP or Chemical Dependency Services at this time, however, please provide with referral for latter should patient change her mind: [facility]; [phone]; [address] [place], [place] [id number]

## 2023-09-01 NOTE — CONSULT NOTE ADULT - ASSESSMENT
A/P    89F PMHx significant for  pAfib on eliquis and amiodarone, moderate AS, h/o abd TB s/p ex lap and subsequent SBO episodes over the years, hypothyroid, chronic anemia.   Presenting w/ recurrent falls at home. She was recently admitted to this [facility] for simliar complaints, was tx'd for UTI and orthostatic hypotension and was dc'd home (was rec [facility] but family declined).     #Recurrent falls.   -likely mechanical and sec to deconditioning  -PT    #Hypertension.   -on midodrine and fludrocortisone for orthostatic hypotension  -trend orthostatics  -hold mido, fludro    #Chronic atrial fibrillation.   -cont amio  -remains off a/c for rec fall risk   -Continue apixaban, will need to d/w fam and pt again regarding stopping in light of rec falls  -Avoid bb given intermittent bradycardia prev admit     #Hypoxia.   -s/p iv lasix   -ct chest noted  -will give lasix prn, avoid aggressive diuresis in light of hx of orthostatic hypo  -recent echo with hyperdynamic lv fxn    #Mod AS  -As noted on prior echo  -Prior CT with Dense aortic valve calcification and multivessel coronary artery calcification  -continue medical management         77 minutes spent on total encounter; more than 50% of the visit was spent counseling and/or coordinating care by the attending physician.

## 2023-09-02 LAB
CULTURE RESULTS: SIGNIFICANT CHANGE UP
SPECIMEN SOURCE: SIGNIFICANT CHANGE UP

## 2023-09-02 RX ORDER — POTASSIUM CHLORIDE 20 MEQ
40 PACKET (EA) ORAL EVERY 4 HOURS
Refills: 0 | Status: COMPLETED | OUTPATIENT
Start: 2023-09-02 | End: 2023-09-02

## 2023-09-02 RX ADMIN — APIXABAN 2.5 MILLIGRAM(S): 2.5 TABLET, FILM COATED ORAL at 05:37

## 2023-09-02 RX ADMIN — AMIODARONE HYDROCHLORIDE 200 MILLIGRAM(S): 400 TABLET ORAL at 05:37

## 2023-09-02 RX ADMIN — Medication 40 MILLIEQUIVALENT(S): at 15:29

## 2023-09-02 RX ADMIN — Medication 100 MICROGRAM(S): at 05:37

## 2023-09-02 RX ADMIN — SERTRALINE 50 MILLIGRAM(S): 25 TABLET, FILM COATED ORAL at 15:30

## 2023-09-02 RX ADMIN — Medication 40 MILLIEQUIVALENT(S): at 18:26

## 2023-09-02 RX ADMIN — APIXABAN 2.5 MILLIGRAM(S): 2.5 TABLET, FILM COATED ORAL at 18:25

## 2023-09-03 LAB
ANION GAP SERPL CALC-SCNC: 8 MMOL/L — SIGNIFICANT CHANGE UP (ref 5–17)
BUN SERPL-MCNC: 10 MG/DL — SIGNIFICANT CHANGE UP (ref 7–23)
CALCIUM SERPL-MCNC: 8.9 MG/DL — SIGNIFICANT CHANGE UP (ref 8.4–10.5)
CHLORIDE SERPL-SCNC: 105 MMOL/L — SIGNIFICANT CHANGE UP (ref 96–108)
CO2 SERPL-SCNC: 29 MMOL/L — SIGNIFICANT CHANGE UP (ref 22–31)
CREAT SERPL-MCNC: 0.58 MG/DL — SIGNIFICANT CHANGE UP (ref 0.5–1.3)
EGFR: 82 ML/MIN/1.73M2 — SIGNIFICANT CHANGE UP
GLUCOSE SERPL-MCNC: 88 MG/DL — SIGNIFICANT CHANGE UP (ref 70–99)
HCT VFR BLD CALC: 31.9 % — LOW (ref 34.5–45)
HGB BLD-MCNC: 9.9 G/DL — LOW (ref 11.5–15.5)
MAGNESIUM SERPL-MCNC: 2.1 MG/DL — SIGNIFICANT CHANGE UP (ref 1.6–2.6)
MCHC RBC-ENTMCNC: 31 GM/DL — LOW (ref 32–36)
MCHC RBC-ENTMCNC: 33 PG — SIGNIFICANT CHANGE UP (ref 27–34)
MCV RBC AUTO: 106.3 FL — HIGH (ref 80–100)
NRBC # BLD: 0 /100 WBCS — SIGNIFICANT CHANGE UP (ref 0–0)
PLATELET # BLD AUTO: 86 K/UL — LOW (ref 150–400)
POTASSIUM SERPL-MCNC: 4.8 MMOL/L — SIGNIFICANT CHANGE UP (ref 3.5–5.3)
POTASSIUM SERPL-SCNC: 4.8 MMOL/L — SIGNIFICANT CHANGE UP (ref 3.5–5.3)
RBC # BLD: 3 M/UL — LOW (ref 3.8–5.2)
RBC # FLD: 13.3 % — SIGNIFICANT CHANGE UP (ref 10.3–14.5)
SODIUM SERPL-SCNC: 142 MMOL/L — SIGNIFICANT CHANGE UP (ref 135–145)
WBC # BLD: 1.83 K/UL — LOW (ref 3.8–10.5)
WBC # FLD AUTO: 1.83 K/UL — LOW (ref 3.8–10.5)

## 2023-09-03 RX ADMIN — Medication 100 MICROGRAM(S): at 05:11

## 2023-09-03 RX ADMIN — APIXABAN 2.5 MILLIGRAM(S): 2.5 TABLET, FILM COATED ORAL at 05:12

## 2023-09-03 RX ADMIN — AMIODARONE HYDROCHLORIDE 200 MILLIGRAM(S): 400 TABLET ORAL at 05:11

## 2023-09-03 RX ADMIN — APIXABAN 2.5 MILLIGRAM(S): 2.5 TABLET, FILM COATED ORAL at 17:09

## 2023-09-03 RX ADMIN — SERTRALINE 50 MILLIGRAM(S): 25 TABLET, FILM COATED ORAL at 11:36

## 2023-09-03 NOTE — PROVIDER CONTACT NOTE (OTHER) - ACTION/TREATMENT ORDERED:
SHANICE Velazquez ordered to do orthostatic BP check  Orthostatic BP-  check done.   Lying- 114/58, HR- 64  Sittting- 109/61, hr_ 70  sTANDING- 102/52, HR_ 71.  Notified PA.  No new orders now. Will continu SHANICE Velazquez ordered to do orthostatic BP check . . Lying- 114/58, HR- 64Sittting- 109/61, hr_ 70Standing- 102/52, HR_ 71.  Notified PA. No new orders now. Will continue to monitor the patient.

## 2023-09-03 NOTE — CONSULT NOTE ADULT - SUBJECTIVE AND OBJECTIVE BOX
[facility] [place]([facility]        Patient is a 89y old  Female who presents with a chief complaint of fall (03 Sep 2023 08:06)    Excerpt from H&P,"   89F PMHx significant for pAfib on eliquis and amiodarone, h/o abd TB s/p ex lap and subsequent SBO episodes over the years, hypothyroid, chronic anemia.   Pt was recently hospitalized for falls and urinary sx, was tx'd for klepsiella UTI and orthostatic hypotension w/ IVF. PT rec [facility] dc but pt and family wanted to dc home.   Pt returns to ED today for another fall. Pt reports that she falls without prodromal sx. She always uses walker but still feels weak and falls sometimes. Reports some back pain, but denies other pain. Denies HA, dizziness, CP, SOB, abd pain, n/v/d, dysuria, hematuria, or leg swelling recently (although she's not using compression stockings as what she has doesn't fit well).     On presentation to ED, pt found to be hypertensive to SBP 170s. O2 sat 91% on RA; she was put on 2LNC.   Labs significant for stable leukopenia, unremarkable CMP, and unremarkable UA.  (01 Sep 2023 00:05)           *****PAST MEDICAL / Surgical  HISTORY:  PAST MEDICAL & SURGICAL HISTORY:  Pulmonary TB      Gastritis      Afib      SBO (small bowel obstruction)      Hypothyroid      UTI (urinary tract infection)      HTN (hypertension)      TB (pulmonary tuberculosis)  TB of abdomen       History of Bilateral Breast Biopsy      S/P Exploratory Laparotomy  TB in 1960s      H/O hemorrhoidectomy      S/P vein stripping               *****FAMILY HISTORY:  FAMILY HISTORY:           *****SOCIAL HISTORY:  Alcohol: None  Smoking: None         *****ALLERGIES:   Allergies    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 reported allergy (Unknown)  penicillin (Rash)    Intolerances             *****MEDICATIONS: current medication reviewed and documented.   MEDICATIONS  (STANDING):  aMIOdarone    Tablet 200 milliGRAM(s) Oral daily  apixaban 2.5 milliGRAM(s) Oral every 12 hours  levothyroxine 100 MICROGram(s) Oral daily  sertraline 50 milliGRAM(s) Oral daily    MEDICATIONS  (PRN):  acetaminophen     Tablet .. 650 milliGRAM(s) Oral every 6 hours PRN Temp greater or equal to 38C (100.4F), Mild Pain (1 - 3)  albuterol    90 MICROgram(s) HFA Inhaler 2 Puff(s) Inhalation every 6 hours PRN Shortness of Breath  aluminum hydroxide/magnesium hydroxide/simethicone Suspension 30 milliLiter(s) Oral every 4 hours PRN Dyspepsia  melatonin 3 milliGRAM(s) Oral at bedtime PRN Insomnia  ondansetron Injectable 4 milliGRAM(s) IV Push every 8 hours PRN Nausea and/or Vomiting           *****REVIEW OF SYSTEM:  GEN: no fever, no chills, no pain  RESP: no SOB, no cough, no sputum  CVS: no chest pain, no palpitations, no edema  GI: no abdominal pain, no nausea, no vomiting, no constipation, no diarrhea  : no dysurea, no frequency, no hematurea  Neuro: no headache, no dizziness  PSYCH: no anxiety, no depression  Derm : no itching, no rash         *****VITAL SIGNS:  T(C): 36.6 (23 @ 12:20), Max: 36.7 (23 @ 20:43)  HR: 64 (23 @ 13:37) (64 - 78)  BP: 114/58 (23 @ 13:37) (110/65 - 166/87)  RR: 18 (23 @ 13:37) (18 - 18)  SpO2: 98% (23 @ 13:37) (94% - 98%)  Wt(kg): --     @ 07:  -   @ 07:00  --------------------------------------------------------  IN: 400 mL / OUT: 701 mL / NET: -301 mL     @ 07:01  -   @ 16:02  --------------------------------------------------------  IN: 400 mL / OUT: 150 mL / NET: 250 mL             *****PHYSICAL EXAM:   Alert oriented x 3   Attention comprehension are fair. Able to name, repeat, read without any difficulty.   Able to follow 3 step commands.     EOMI fundi not visualized,  VFF to confrontration  No facial asymmetry   Tongue is midline   Palate elevates symmetrically   Moving all 4 ext symmetrically no pronator drift   Reflexes are symmetric throughout   sensation is grossly symmetric  Gait : not assessed.  B/L down going toes               *****LAB AND IMAGIN.9    1.83  )-----------( 86       ( 03 Sep 2023 06:53 )             31.9                   142  |  105  |  10  ----------------------------<  88  4.8   |  29  |  0.58    Ca    8.9      03 Sep 2023 06:53  Mg     2.1                                   Urinalysis Basic - ( 03 Sep 2023 06:53 )    Color: x / Appearance: x / SG: x / pH: x  Gluc: 88 mg/dL / Ketone: x  / Bili: x / Urobili: x   Blood: x / Protein: x / Nitrite: x   Leuk Esterase: x / RBC: x / WBC x   Sq Epi: x / Non Sq Epi: x / Bacteria: x        [All pertinent recent Imaging reports reviewed]         *****A S S E S S M E N T   A N D   P L A N :        Problem/Recommendations 1:falls likely multifactorial related to orthostatic hypotension and deconditioning   possible microvascular disease and cognitive impairment contributing to this as well     pt eval   orthostatics          Problem/Recommendations 2:         pt at risk for developing delirium, therefore please institute the following preventative measures if possible          - initiating early mobilization          -minimizing "tethers" - IV, oxygen, catheters, etc          -avoiding   sedatives          -maintaining hydration/nutrition          -avoid anticholinergics - diphenhydramine, etc          -pain control          -sleep wake cycle regulation; avoid day time somnolence           -supportive environment    ___________________________  Will follow with you.  Thank you,  Aissatou Guzmán MD  Diplomate of the American Board of Neurology and Psychiatry.  Diplomate of the American Board of Vascular Neurology.   [facility]   Ph: [phone]    Differential diagnosis and plan of care discussed with patient after the evaluation.   Advanced care planning options discussed.   Pain assessed and judicious use of narcotics when appropriate was discussed.  Importance of Fall prevention discussed.  Counseling on Smoking and Alcohol cessation was offered when appropriate.  Counseling on Diet, exercise, and medication compliance was done.   83 minutes spent on the total encounter;  more than 50 % of the visit was spent on counseling  and or coordinating care by the attending physician.    Thank you for allowing me to participate in the care of this mariano patient. Please do not hesitate to call me if you have any questions.     This and subsequent notes  will  inherently be subject to errors including those of syntax and substitutions which may escape proofreading. In such instances original meaning may be extrapolated by contextual derivation.   
CARDIOLOGY CONSULT NOTE - DR. ESPOSITO        Date of Service: 09-01-23 @ 11:07      HPI:  89F PMHx significant for pAfib on eliquis and amiodarone, h/o abd TB s/p ex lap and subsequent SBO episodes over the years, hypothyroid, chronic anemia.   Pt was recently hospitalized for falls and urinary sx, was tx'd for klepsiella UTI and orthostatic hypotension w/ IVF. PT rec [facility] dc but pt and family wanted to dc home.   Pt returns to ED today for another fall. Pt reports that she falls without prodromal sx. She always uses walker but still feels weak and falls sometimes. Reports some back pain, but denies other pain. Denies HA, dizziness, CP, SOB, abd pain, n/v/d, dysuria, hematuria, or leg swelling recently (although she's not using compression stockings as what she has doesn't fit well).     On presentation to ED, pt found to be hypertensive to SBP 170s. O2 sat 91% on RA; she was put on 2LNC.   Labs significant for stable leukopenia, unremarkable CMP, and unremarkable UA.  (01 Sep 2023 00:05)  no active chest pain, no inc dyspnea        PAST MEDICAL & SURGICAL HISTORY:  Pulmonary TB      Gastritis      Afib      SBO (small bowel obstruction)      Hypothyroid      UTI (urinary tract infection)      HTN (hypertension)      TB (pulmonary tuberculosis)  TB of abdomen 1960      History of Bilateral Breast Biopsy      S/P Exploratory Laparotomy  TB in 1960s      H/O hemorrhoidectomy      S/P vein stripping            PREVIOUS DIAGNOSTIC TESTING:    [ ] Echocardiogram:  [ ]  Catheterization:  [ ] Stress Test:  	    MEDICATIONS:    Home Medications:  acetaminophen 325 mg oral tablet: 2 tab(s) orally every 6 hours As needed Temp greater or equal to 38C (100.4F), Mild Pain (1 - 3) (01 Sep 2023 01:01)  albuterol 90 mcg/inh inhalation aerosol: 2 puff(s) inhaled every 6 hours As needed Shortness of Breath (01 Sep 2023 01:01)  amiodarone 200 mg oral tablet: 1 tab(s) orally once a day (01 Sep 2023 01:01)  apixaban 2.5 mg oral tablet: 1 tab(s) orally every 12 hours (01 Sep 2023 01:01)  levothyroxine 100 mcg (0.1 mg) oral tablet: 1 tab(s) orally once a day (01 Sep 2023 01:01)  sertraline 50 mg oral tablet: 1 tab(s) orally once a day (01 Sep 2023 01:01)  Xiidra 5% ophthalmic solution: 1 drop(s) in each eye 2 times a day (01 Sep 2023 01:01)      MEDICATIONS  (STANDING):  aMIOdarone    Tablet 200 milliGRAM(s) Oral daily  apixaban 2.5 milliGRAM(s) Oral every 12 hours  levothyroxine 100 MICROGram(s) Oral daily  sertraline 50 milliGRAM(s) Oral daily      FAMILY HISTORY:  No pertinent family history in first degree relatives        SOCIAL HISTORY:    x[ ] Non-smoker  [ ] Smoker  [ ] Alcohol    Allergies    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 reported allergy (Unknown)  penicillin (Rash)    Intolerances    	    REVIEW OF SYSTEMS:  CONSTITUTIONAL: No fever, weight loss, or fatigue  EYES: No eye pain, visual disturbances, or discharge  ENMT:  No difficulty hearing, tinnitus, vertigo; No sinus or throat pain  NECK: No pain or stiffness  RESPIRATORY: No cough, wheezing, chills or hemoptysis; + Shortness of Breath  CARDIOVASCULAR: as HPI  GASTROINTESTINAL: No abdominal or epigastric pain. No nausea, vomiting, or hematemesis; No diarrhea or constipation. No melena or hematochezia.  GENITOURINARY: No dysuria, frequency, hematuria, or incontinence  NEUROLOGICAL: No headaches, memory loss, loss of strength, numbness, or tremors  SKIN: No itching, burning, rashes, or lesions   	  [ ] All others negative	  [ ] Unable to obtain    PHYSICAL EXAM:    T(C): 36.7 (09-01-23 @ 06:05), Max: 36.7 (09-01-23 @ 00:20)  HR: 60 (09-01-23 @ 06:05) (52 - 68)  BP: 162/76 (09-01-23 @ 06:05) (161/77 - 188/106)  RR: 18 (09-01-23 @ 06:05) (17 - 19)  SpO2: 97% (09-01-23 @ 06:05) (91% - 99%)  Wt(kg): --  I&O's Summary    Daily Height in cm: 160.02 (31 Aug 2023 19:10)    Daily     Appearance: Normal	  Psychiatry: A & O x 3, Mood & affect appropriate  HEENT:   Normal oral mucosa, PERRL, EOMI	  Lymphatic: No lymphadenopathy  Cardiovascular: Normal S1 S2,RRR, sm  Respiratory: Lungs clear to auscultation	  Gastrointestinal:  Soft, Non-tender, + BS	  Skin: No rashes, No ecchymoses, No cyanosis	  Neurologic: Non-focal  Extremities: Normal range of motion, No clubbing, cyanosis or edema  Vascular: Peripheral pulses palpable 2+ bilaterally    TELEMETRY: 	    ECG:  	not in chart  RADIOLOGY:  OTHER: 	  	  LABS:	 	    CARDIAC MARKERS:        proBNP:     Lipid Profile:   HgA1c:   TSH:                           9.5    1.65  )-----------( 79       ( 01 Sep 2023 06:39 )             29.6     09-01    142  |  102  |  8   ----------------------------<  79  3.1<L>   |  30  |  0.58    Ca    8.4      01 Sep 2023 06:39  Phos  3.4     09-01  Mg     1.9     09-01    TPro  6.3  /  Alb  3.5  /  TBili  0.7  /  DBili  x   /  AST  13  /  ALT  13  /  AlkPhos  63  08-31    PT/INR - ( 31 Aug 2023 20:17 )   PT: 13.5 sec;   INR: 1.23 ratio         PTT - ( 31 Aug 2023 20:17 )  PTT:31.4 sec    Creatinine: 0.58 mg/dL (09-01-23 @ 06:39)  Creatinine: 0.53 mg/dL (08-31-23 @ 23:30)  Creatinine: 0.60 mg/dL (08-31-23 @ 20:17)        ASSESSMENT/PLAN: 	              
not applicable

## 2023-09-04 RX ADMIN — SERTRALINE 50 MILLIGRAM(S): 25 TABLET, FILM COATED ORAL at 11:10

## 2023-09-04 RX ADMIN — AMIODARONE HYDROCHLORIDE 200 MILLIGRAM(S): 400 TABLET ORAL at 05:23

## 2023-09-04 RX ADMIN — APIXABAN 2.5 MILLIGRAM(S): 2.5 TABLET, FILM COATED ORAL at 05:23

## 2023-09-04 RX ADMIN — Medication 100 MICROGRAM(S): at 05:23

## 2023-09-04 RX ADMIN — APIXABAN 2.5 MILLIGRAM(S): 2.5 TABLET, FILM COATED ORAL at 17:24

## 2023-09-05 RX ORDER — CHLORHEXIDINE GLUCONATE 213 G/1000ML
1 SOLUTION TOPICAL
Refills: 0 | Status: DISCONTINUED | OUTPATIENT
Start: 2023-09-05 | End: 2023-09-06

## 2023-09-05 RX ADMIN — CHLORHEXIDINE GLUCONATE 1 APPLICATION(S): 213 SOLUTION TOPICAL at 12:17

## 2023-09-05 RX ADMIN — APIXABAN 2.5 MILLIGRAM(S): 2.5 TABLET, FILM COATED ORAL at 05:06

## 2023-09-05 RX ADMIN — SERTRALINE 50 MILLIGRAM(S): 25 TABLET, FILM COATED ORAL at 12:23

## 2023-09-05 RX ADMIN — APIXABAN 2.5 MILLIGRAM(S): 2.5 TABLET, FILM COATED ORAL at 18:38

## 2023-09-05 RX ADMIN — AMIODARONE HYDROCHLORIDE 200 MILLIGRAM(S): 400 TABLET ORAL at 05:05

## 2023-09-05 RX ADMIN — Medication 100 MICROGRAM(S): at 05:06

## 2023-09-06 ENCOUNTER — TRANSCRIPTION ENCOUNTER (OUTPATIENT)
Age: 88
End: 2023-09-06

## 2023-09-06 VITALS
RESPIRATION RATE: 17 BRPM | OXYGEN SATURATION: 96 % | SYSTOLIC BLOOD PRESSURE: 146 MMHG | HEART RATE: 66 BPM | DIASTOLIC BLOOD PRESSURE: 75 MMHG | TEMPERATURE: 98 F

## 2023-09-06 LAB
MRSA PCR RESULT.: SIGNIFICANT CHANGE UP
S AUREUS DNA NOSE QL NAA+PROBE: SIGNIFICANT CHANGE UP

## 2023-09-06 PROCEDURE — 83735 ASSAY OF MAGNESIUM: CPT

## 2023-09-06 PROCEDURE — 80053 COMPREHEN METABOLIC PANEL: CPT

## 2023-09-06 PROCEDURE — 72125 CT NECK SPINE W/O DYE: CPT | Mod: MA

## 2023-09-06 PROCEDURE — 80048 BASIC METABOLIC PNL TOTAL CA: CPT

## 2023-09-06 PROCEDURE — 36415 COLL VENOUS BLD VENIPUNCTURE: CPT

## 2023-09-06 PROCEDURE — 81001 URINALYSIS AUTO W/SCOPE: CPT

## 2023-09-06 PROCEDURE — 85027 COMPLETE CBC AUTOMATED: CPT

## 2023-09-06 PROCEDURE — 99285 EMERGENCY DEPT VISIT HI MDM: CPT

## 2023-09-06 PROCEDURE — 97162 PT EVAL MOD COMPLEX 30 MIN: CPT

## 2023-09-06 PROCEDURE — 87640 STAPH A DNA AMP PROBE: CPT

## 2023-09-06 PROCEDURE — 84484 ASSAY OF TROPONIN QUANT: CPT

## 2023-09-06 PROCEDURE — 87641 MR-STAPH DNA AMP PROBE: CPT

## 2023-09-06 PROCEDURE — 83880 ASSAY OF NATRIURETIC PEPTIDE: CPT

## 2023-09-06 PROCEDURE — 97116 GAIT TRAINING THERAPY: CPT

## 2023-09-06 PROCEDURE — 84100 ASSAY OF PHOSPHORUS: CPT

## 2023-09-06 PROCEDURE — 87086 URINE CULTURE/COLONY COUNT: CPT

## 2023-09-06 PROCEDURE — 97166 OT EVAL MOD COMPLEX 45 MIN: CPT

## 2023-09-06 PROCEDURE — 72170 X-RAY EXAM OF PELVIS: CPT

## 2023-09-06 PROCEDURE — 85730 THROMBOPLASTIN TIME PARTIAL: CPT

## 2023-09-06 PROCEDURE — 71250 CT THORAX DX C-: CPT | Mod: MA

## 2023-09-06 PROCEDURE — 70450 CT HEAD/BRAIN W/O DYE: CPT | Mod: MA

## 2023-09-06 PROCEDURE — 97110 THERAPEUTIC EXERCISES: CPT

## 2023-09-06 PROCEDURE — 85025 COMPLETE CBC W/AUTO DIFF WBC: CPT

## 2023-09-06 PROCEDURE — 85610 PROTHROMBIN TIME: CPT

## 2023-09-06 PROCEDURE — 83690 ASSAY OF LIPASE: CPT

## 2023-09-06 PROCEDURE — 71045 X-RAY EXAM CHEST 1 VIEW: CPT

## 2023-09-06 RX ADMIN — APIXABAN 2.5 MILLIGRAM(S): 2.5 TABLET, FILM COATED ORAL at 05:03

## 2023-09-06 RX ADMIN — AMIODARONE HYDROCHLORIDE 200 MILLIGRAM(S): 400 TABLET ORAL at 05:03

## 2023-09-06 RX ADMIN — CHLORHEXIDINE GLUCONATE 1 APPLICATION(S): 213 SOLUTION TOPICAL at 05:03

## 2023-09-06 RX ADMIN — APIXABAN 2.5 MILLIGRAM(S): 2.5 TABLET, FILM COATED ORAL at 18:19

## 2023-09-06 RX ADMIN — Medication 100 MICROGRAM(S): at 05:03

## 2023-09-06 RX ADMIN — SERTRALINE 50 MILLIGRAM(S): 25 TABLET, FILM COATED ORAL at 18:20

## 2023-09-06 NOTE — DISCHARGE NOTE NURSING/CASE MANAGEMENT/SOCIAL WORK - PATIENT PORTAL LINK FT
You can access the FollowMyHealth Patient Portal offered by [facility] by registering at the following website: [email or web address]. By joining [facility]’s FollowMyHealth portal, you will also be able to view your health information using other applications (apps) compatible with our system.

## 2023-09-06 NOTE — PROGRESS NOTE ADULT - SUBJECTIVE AND OBJECTIVE BOX
CARDIOLOGY FOLLOW UP - Dr. Moeller  Date of Service: 9/5/23  CC: no events    Review of Systems:  Constitutional: No fever, weight loss, or fatigue  Respiratory: No cough, wheezing, or hemoptysis, no shortness of breath  Cardiovascular: No chest pain, palpitations, passing out, dizziness, or leg swelling  Gastrointestinal: No abd or epigastric pain. No nausea, vomiting, or hematemesis; no diarrhea or consiptaiton, no melena or hematochezia  Vascular: No edema     TELEMETRY:    PHYSICAL EXAM:  T(C): 36.8 (09-05-23 @ 04:00), Max: 36.8 (09-05-23 @ 04:00)  HR: 67 (09-05-23 @ 04:00) (62 - 67)  BP: 161/81 (09-05-23 @ 04:00) (114/69 - 161/81)  RR: 17 (09-05-23 @ 04:00) (17 - 18)  SpO2: 98% (09-05-23 @ 04:00) (96% - 98%)  Wt(kg): --  I&O's Summary    04 Sep 2023 07:01  -  05 Sep 2023 07:00  --------------------------------------------------------  IN: 400 mL / OUT: 1120 mL / NET: -720 mL        Appearance: Normal	  Cardiovascular: Normal S1 S2,RRR, No JVD, No murmurs  Respiratory: Lungs clear to auscultation	  Gastrointestinal:  Soft, Non-tender, + BS	  Extremities: Normal range of motion, No clubbing, cyanosis or edema  Vascular: Peripheral pulses palpable 2+ bilaterally       Home Medications:  acetaminophen 325 mg oral tablet: 2 tab(s) orally every 6 hours As needed Temp greater or equal to 38C (100.4F), Mild Pain (1 - 3) (01 Sep 2023 01:01)  albuterol 90 mcg/inh inhalation aerosol: 2 puff(s) inhaled every 6 hours As needed Shortness of Breath (01 Sep 2023 01:01)  amiodarone 200 mg oral tablet: 1 tab(s) orally once a day (01 Sep 2023 01:01)  apixaban 2.5 mg oral tablet: 1 tab(s) orally every 12 hours (01 Sep 2023 01:01)  levothyroxine 100 mcg (0.1 mg) oral tablet: 1 tab(s) orally once a day (01 Sep 2023 01:01)  sertraline 50 mg oral tablet: 1 tab(s) orally once a day (01 Sep 2023 01:01)  Xiidra 5% ophthalmic solution: 1 drop(s) in each eye 2 times a day (01 Sep 2023 01:01)        MEDICATIONS  (STANDING):  aMIOdarone    Tablet 200 milliGRAM(s) Oral daily  apixaban 2.5 milliGRAM(s) Oral every 12 hours  levothyroxine 100 MICROGram(s) Oral daily  sertraline 50 milliGRAM(s) Oral daily        EKG:  RADIOLOGY:  DIAGNOSTIC TESTING:  [ ] Echocardiogram:  [ ] Catherterization:  [ ] Stress Test:  OTHER:     LABS:	 	                  CARDIAC MARKERS:                  
CARDIOLOGY FOLLOW UP - Dr. Moeller  Date of Service: 9/6/2023  CC: no events    Review of Systems:  Constitutional: No fever, weight loss, or fatigue  Respiratory: No cough, wheezing, or hemoptysis, no shortness of breath  Cardiovascular: No chest pain, palpitations, passing out, dizziness, or leg swelling  Gastrointestinal: No abd or epigastric pain. No nausea, vomiting, or hematemesis; no diarrhea or consiptaiton, no melena or hematochezia  Vascular: No edema     TELEMETRY:    PHYSICAL EXAM:  T(C): 36.9 (09-06-23 @ 11:30), Max: 36.9 (09-06-23 @ 11:30)  HR: 60 (09-06-23 @ 11:30) (60 - 74)  BP: 117/70 (09-06-23 @ 11:30) (117/70 - 172/94)  RR: 18 (09-06-23 @ 11:30) (18 - 18)  SpO2: 94% (09-06-23 @ 11:30) (86% - 97%)  Wt(kg): --  I&O's Summary    05 Sep 2023 07:01  -  06 Sep 2023 07:00  --------------------------------------------------------  IN: 400 mL / OUT: 701 mL / NET: -301 mL        Appearance: Normal	  Cardiovascular: Normal S1 S2,RRR, No JVD, No murmurs  Respiratory: Lungs clear to auscultation	  Gastrointestinal:  Soft, Non-tender, + BS	  Extremities: Normal range of motion, No clubbing, cyanosis or edema  Vascular: Peripheral pulses palpable 2+ bilaterally       Home Medications:  acetaminophen 325 mg oral tablet: 2 tab(s) orally every 6 hours As needed Temp greater or equal to 38C (100.4F), Mild Pain (1 - 3) (01 Sep 2023 01:01)  albuterol 90 mcg/inh inhalation aerosol: 2 puff(s) inhaled every 6 hours As needed Shortness of Breath (01 Sep 2023 01:01)  amiodarone 200 mg oral tablet: 1 tab(s) orally once a day (01 Sep 2023 01:01)  apixaban 2.5 mg oral tablet: 1 tab(s) orally every 12 hours (01 Sep 2023 01:01)  levothyroxine 100 mcg (0.1 mg) oral tablet: 1 tab(s) orally once a day (01 Sep 2023 01:01)  sertraline 50 mg oral tablet: 1 tab(s) orally once a day (01 Sep 2023 01:01)  Xiidra 5% ophthalmic solution: 1 drop(s) in each eye 2 times a day (01 Sep 2023 01:01)        MEDICATIONS  (STANDING):  aMIOdarone    Tablet 200 milliGRAM(s) Oral daily  apixaban 2.5 milliGRAM(s) Oral every 12 hours  chlorhexidine 2% Cloths 1 Application(s) Topical <User Schedule>  levothyroxine 100 MICROGram(s) Oral daily  sertraline 50 milliGRAM(s) Oral daily        EKG:  RADIOLOGY:  DIAGNOSTIC TESTING:  [ ] Echocardiogram:  [ ] Catherterization:  [ ] Stress Test:  OTHER:     LABS:	 	                  CARDIAC MARKERS:                  
CARDIOLOGY FOLLOW UP NOTE - DR. ESPOSITO    Patient Name: CATHRYN LARA    Date of Service: 09-02-23 @ 09:26    Patient seen and examined    Subjective:    cv: denies chest pain, dyspnea, palpitations, dizziness  pulmonary: denies cough  GI: denies abdominal pain, nausea, vomiting  vascular/legs: no edema   skin: no rash  ROS: otherwise negative   overnight events:      PHYSICAL EXAM:  T(C): 36.7 (09-02-23 @ 04:38), Max: 36.7 (09-01-23 @ 14:30)  HR: 59 (09-02-23 @ 04:38) (59 - 106)  BP: 157/80 (09-02-23 @ 04:38) (105/64 - 159/69)  RR: 18 (09-02-23 @ 04:38) (17 - 18)  SpO2: 98% (09-02-23 @ 04:38) (94% - 98%)  Wt(kg): --  I&O's Summary    01 Sep 2023 07:01  -  02 Sep 2023 07:00  --------------------------------------------------------  IN: 0 mL / OUT: 100 mL / NET: -100 mL      Daily     Daily     Appearance: Normal	  Cardiovascular: Normal S1 S2,RRR, No JVD, No murmurs  Respiratory: Lungs clear to auscultation	  Gastrointestinal:  Soft, Non-tender, + BS	  Extremities: Normal range of motion, No clubbing, cyanosis or edema      Home Medications:  acetaminophen 325 mg oral tablet: 2 tab(s) orally every 6 hours As needed Temp greater or equal to 38C (100.4F), Mild Pain (1 - 3) (01 Sep 2023 01:01)  albuterol 90 mcg/inh inhalation aerosol: 2 puff(s) inhaled every 6 hours As needed Shortness of Breath (01 Sep 2023 01:01)  amiodarone 200 mg oral tablet: 1 tab(s) orally once a day (01 Sep 2023 01:01)  apixaban 2.5 mg oral tablet: 1 tab(s) orally every 12 hours (01 Sep 2023 01:01)  levothyroxine 100 mcg (0.1 mg) oral tablet: 1 tab(s) orally once a day (01 Sep 2023 01:01)  sertraline 50 mg oral tablet: 1 tab(s) orally once a day (01 Sep 2023 01:01)  Xiidra 5% ophthalmic solution: 1 drop(s) in each eye 2 times a day (01 Sep 2023 01:01)      MEDICATIONS  (STANDING):  aMIOdarone    Tablet 200 milliGRAM(s) Oral daily  apixaban 2.5 milliGRAM(s) Oral every 12 hours  levothyroxine 100 MICROGram(s) Oral daily  sertraline 50 milliGRAM(s) Oral daily      TELEMETRY: 	    ECG:  	  RADIOLOGY:   DIAGNOSTIC TESTING:  [ ] Echocardiogram:  [ ] Catheterization:  [ ] Stress Test:    OTHER: 	    LABS:	 	    CARDIAC MARKERS:        Troponin T, High Sensitivity Result: 17 ng/L (08-31 @ 20:17)                                9.5    1.65  )-----------( 79       ( 01 Sep 2023 06:39 )             29.6     09-01    142  |  102  |  8   ----------------------------<  79  3.1<L>   |  30  |  0.58    Ca    8.4      01 Sep 2023 06:39  Phos  3.4     09-01  Mg     1.9     09-01    TPro  6.3  /  Alb  3.5  /  TBili  0.7  /  DBili  x   /  AST  13  /  ALT  13  /  AlkPhos  63  08-31    proBNP:   PT/INR - ( 31 Aug 2023 20:17 )   PT: 13.5 sec;   INR: 1.23 ratio         PTT - ( 31 Aug 2023 20:17 )  PTT:31.4 sec  Lipid Profile:   HgA1c:     Creatinine: 0.58 mg/dL (09-01-23 @ 06:39)  Creatinine: 0.53 mg/dL (08-31-23 @ 23:30)  Creatinine: 0.60 mg/dL (08-31-23 @ 20:17)            
SUBJECTIVE / OVERNIGHT EVENTS:       No events noted overnight.       --------------------------------------------------------------------------------------------  LABS:                        9.5    1.65  )-----------( 79       ( 01 Sep 2023 06:39 )             29.6     09-01    142  |  102  |  8   ----------------------------<  79  3.1<L>   |  30  |  0.58    Ca    8.4      01 Sep 2023 06:39  Phos  3.4     09-01  Mg     1.9     09-01    TPro  6.3  /  Alb  3.5  /  TBili  0.7  /  DBili  x   /  AST  13  /  ALT  13  /  AlkPhos  63  08-31    PT/INR - ( 31 Aug 2023 20:17 )   PT: 13.5 sec;   INR: 1.23 ratio         PTT - ( 31 Aug 2023 20:17 )  PTT:31.4 sec  CAPILLARY BLOOD GLUCOSE            Urinalysis Basic - ( 01 Sep 2023 06:39 )    Color: x / Appearance: x / SG: x / pH: x  Gluc: 79 mg/dL / Ketone: x  / Bili: x / Urobili: x   Blood: x / Protein: x / Nitrite: x   Leuk Esterase: x / RBC: x / WBC x   Sq Epi: x / Non Sq Epi: x / Bacteria: x        RADIOLOGY & ADDITIONAL TESTS:    Imaging Personally Reviewed:  [x] YES  [ ] NO    Consultant(s) Notes Reviewed:  [x] YES  [ ] NO    MEDICATIONS  (STANDING):  aMIOdarone    Tablet 200 milliGRAM(s) Oral daily  apixaban 2.5 milliGRAM(s) Oral every 12 hours  levothyroxine 100 MICROGram(s) Oral daily  sertraline 50 milliGRAM(s) Oral daily    MEDICATIONS  (PRN):  acetaminophen     Tablet .. 650 milliGRAM(s) Oral every 6 hours PRN Temp greater or equal to 38C (100.4F), Mild Pain (1 - 3)  albuterol    90 MICROgram(s) HFA Inhaler 2 Puff(s) Inhalation every 6 hours PRN Shortness of Breath  aluminum hydroxide/magnesium hydroxide/simethicone Suspension 30 milliLiter(s) Oral every 4 hours PRN Dyspepsia  melatonin 3 milliGRAM(s) Oral at bedtime PRN Insomnia  ondansetron Injectable 4 milliGRAM(s) IV Push every 8 hours PRN Nausea and/or Vomiting      Care Discussed with Consultants/Other Providers [x] YES  [ ] NO    Vital Signs Last 24 Hrs  T(C): 36.7 (02 Sep 2023 04:38), Max: 36.7 (01 Sep 2023 14:30)  T(F): 98 (02 Sep 2023 04:38), Max: 98.1 (01 Sep 2023 14:30)  HR: 59 (02 Sep 2023 04:38) (59 - 106)  BP: 157/80 (02 Sep 2023 04:38) (105/64 - 159/69)  BP(mean): --  RR: 18 (02 Sep 2023 04:38) (17 - 18)  SpO2: 98% (02 Sep 2023 04:38) (94% - 98%)    Parameters below as of 02 Sep 2023 04:38  Patient On (Oxygen Delivery Method): nasal cannula  O2 Flow (L/min): 2    I&O's Summary    01 Sep 2023 07:01  -  02 Sep 2023 07:00  --------------------------------------------------------  IN: 0 mL / OUT: 100 mL / NET: -100 mL      PHYSICAL EXAM:  GENERAL: NAD, well-developed, comfortable  HEAD:  Atraumatic, Normocephalic  EYES: EOMI, PERRLA, conjunctiva and sclera clear  NECK: Supple, No JVD  CHEST/LUNG: Diminished bilaterally; Slight wheeze  HEART: Regular rate and rhythm; No murmurs, rubs, or gallops  ABDOMEN: Soft, Nontender, Nondistended; Bowel sounds present  NEURO: AAOx3, no focal weakness, 5/5 b/l extremity strength, b/l knee no arthritis, no effusion   EXTREMITIES:  2+ Peripheral Pulses, No clubbing, cyanosis, or edema  SKIN: No rashes or lesions        
CARDIOLOGY FOLLOW UP - Dr. Moeller  Date of Service: 9/4/2023  CC: no events    Review of Systems:  Constitutional: No fever, weight loss, or fatigue  Respiratory: No cough, wheezing, or hemoptysis, no shortness of breath  Cardiovascular: No chest pain, palpitations, passing out, dizziness, or leg swelling  Gastrointestinal: No abd or epigastric pain. No nausea, vomiting, or hematemesis; no diarrhea or consiptaiton, no melena or hematochezia  Vascular: No edema     TELEMETRY:    PHYSICAL EXAM:  T(C): 36.9 (09-04-23 @ 04:26), Max: 36.9 (09-04-23 @ 04:26)  HR: 70 (09-04-23 @ 04:26) (62 - 70)  BP: 162/74 (09-04-23 @ 04:26) (110/65 - 162/74)  RR: 18 (09-04-23 @ 04:26) (18 - 18)  SpO2: 99% (09-04-23 @ 04:26) (95% - 99%)  Wt(kg): --  I&O's Summary    03 Sep 2023 07:01  -  04 Sep 2023 07:00  --------------------------------------------------------  IN: 400 mL / OUT: 950 mL / NET: -550 mL        Appearance: Normal	  Cardiovascular: Normal S1 S2,RRR, No JVD, No murmurs  Respiratory: Lungs clear to auscultation	  Gastrointestinal:  Soft, Non-tender, + BS	  Extremities: Normal range of motion, No clubbing, cyanosis or edema  Vascular: Peripheral pulses palpable 2+ bilaterally       Home Medications:  acetaminophen 325 mg oral tablet: 2 tab(s) orally every 6 hours As needed Temp greater or equal to 38C (100.4F), Mild Pain (1 - 3) (01 Sep 2023 01:01)  albuterol 90 mcg/inh inhalation aerosol: 2 puff(s) inhaled every 6 hours As needed Shortness of Breath (01 Sep 2023 01:01)  amiodarone 200 mg oral tablet: 1 tab(s) orally once a day (01 Sep 2023 01:01)  apixaban 2.5 mg oral tablet: 1 tab(s) orally every 12 hours (01 Sep 2023 01:01)  levothyroxine 100 mcg (0.1 mg) oral tablet: 1 tab(s) orally once a day (01 Sep 2023 01:01)  sertraline 50 mg oral tablet: 1 tab(s) orally once a day (01 Sep 2023 01:01)  Xiidra 5% ophthalmic solution: 1 drop(s) in each eye 2 times a day (01 Sep 2023 01:01)        MEDICATIONS  (STANDING):  aMIOdarone    Tablet 200 milliGRAM(s) Oral daily  apixaban 2.5 milliGRAM(s) Oral every 12 hours  levothyroxine 100 MICROGram(s) Oral daily  sertraline 50 milliGRAM(s) Oral daily        EKG:  RADIOLOGY:  DIAGNOSTIC TESTING:  [ ] Echocardiogram:  [ ] Catherterization:  [ ] Stress Test:  OTHER:     LABS:	 	                          9.9    1.83  )-----------( 86       ( 03 Sep 2023 06:53 )             31.9     09-03    142  |  105  |  10  ----------------------------<  88  4.8   |  29  |  0.58    Ca    8.9      03 Sep 2023 06:53  Mg     2.1     09-03            CARDIAC MARKERS:                  
[facility]      Seen earlier today [Please note that date of entry above  is the actual  DATE OF SERVICE] No new neurological symptoms reported. Remains stable neurologically.   - Today, patient is without complaints.          *****MEDICATIONS: Current medication reviewed and documented.    MEDICATIONS  (STANDING):  aMIOdarone    Tablet 200 milliGRAM(s) Oral daily  apixaban 2.5 milliGRAM(s) Oral every 12 hours  chlorhexidine 2% Cloths 1 Application(s) Topical <User Schedule>  levothyroxine 100 MICROGram(s) Oral daily  sertraline 50 milliGRAM(s) Oral daily    MEDICATIONS  (PRN):  acetaminophen     Tablet .. 650 milliGRAM(s) Oral every 6 hours PRN Temp greater or equal to 38C (100.4F), Mild Pain (1 - 3)  albuterol    90 MICROgram(s) HFA Inhaler 2 Puff(s) Inhalation every 6 hours PRN Shortness of Breath  aluminum hydroxide/magnesium hydroxide/simethicone Suspension 30 milliLiter(s) Oral every 4 hours PRN Dyspepsia  melatonin 3 milliGRAM(s) Oral at bedtime PRN Insomnia  ondansetron Injectable 4 milliGRAM(s) IV Push every 8 hours PRN Nausea and/or Vomiting          ***** VITAL SIGNS:   Vital Signs Last 24 Hrs      I&O's Summary    05 Sep 2023 07:01  -  06 Sep 2023 07:00  --------------------------------------------------------  IN: 400 mL / OUT: 701 mL / NET: -301 mL             *****PHYSICAL EXAM:   alert oriented x 2 redirectable   attention comprehension are  limited     Able to name, repeat.   EOmi fundi not visualized      Tongue is midline  Palate elevates symmetrically   Moving all 4 ext spontaneously no drift appreciated    Gait not assessed.            *****LAB AND IMAGING:                                         [All pertinent recent Imaging/Reports reviewed]            *****A S S E S S M E N T   A N D   P L A N :89F PMHx significant for pAfib on eliquis and amiodarone, h/o abd TB s/p ex lap and subsequent SBO episodes over the years, hypothyroid, chronic anemia.   Pt was recently hospitalized for falls and urinary sx, was tx'd for klepsiella UTI and orthostatic hypotension w/ IVF. PT rec [facility] dc but pt and family wanted to dc home.   Pt returns to ED today for another fall. Pt reports that she falls without prodromal sx. She always uses walker but still feels weak and falls sometimes. Reports some back pain, but denies other pain. Denies HA, dizziness, CP, SOB, abd pain, n/v/d, dysuria, hematuria, or leg swelling recently (although she's not using compression stockings as what she has doesn't fit well).        Problem/Recommendations 1:falls likely multifactorial related to orthostatic hypotension and deconditioning   possible microvascular disease and cognitive impairment contributing to this as well     pt eval   orthostatics  consider compression stockings            continue supportive care  pt with poor po intake, encourage po intake      pt at risk for developing delirium, therefore please institute the following preventative measures if possible          - initiating early mobilization          -minimizing "tethers" - IV, oxygen, catheters, etc          -avoiding   sedatives          -maintaining hydration/nutrition          -avoid anticholinergics - diphenhydramine, etc          -pain control          -sleep wake cycle regulation; avoid day time somnolence           -supportive environment     Thank you for allowing me to participate in the care of this patient. Will continue to follow patient periodically. Please do not hesitate to call me if you have any  questions or if there has been a change in patients neurological status     ________________  Aissatou Guzmán MD  [facility]  [phone]      33 minutes spent on total encounter; more than 50 % of the visit was  spent counseling about plan of care, compliance to diet/exercise and medication regimen and or  coordinating care by the attending physician.      It is advised that stroke patients follow up with [facility] @ [phone] in 1- 2 weeks.   Others please follow up with Dr. Michael Nissenbaum [phone]
SUBJECTIVE / OVERNIGHT EVENTS:      No events noted overnight.       --------------------------------------------------------------------------------------------  LABS:                        9.9    1.83  )-----------( 86       ( 03 Sep 2023 06:53 )             31.9     09-03    142  |  105  |  10  ----------------------------<  88  4.8   |  29  |  0.58    Ca    8.9      03 Sep 2023 06:53  Mg     2.1     09-03        CAPILLARY BLOOD GLUCOSE            Urinalysis Basic - ( 03 Sep 2023 06:53 )    Color: x / Appearance: x / SG: x / pH: x  Gluc: 88 mg/dL / Ketone: x  / Bili: x / Urobili: x   Blood: x / Protein: x / Nitrite: x   Leuk Esterase: x / RBC: x / WBC x   Sq Epi: x / Non Sq Epi: x / Bacteria: x        RADIOLOGY & ADDITIONAL TESTS:    Imaging Personally Reviewed:  [x] YES  [ ] NO    Consultant(s) Notes Reviewed:  [x] YES  [ ] NO    MEDICATIONS  (STANDING):  aMIOdarone    Tablet 200 milliGRAM(s) Oral daily  apixaban 2.5 milliGRAM(s) Oral every 12 hours  levothyroxine 100 MICROGram(s) Oral daily  sertraline 50 milliGRAM(s) Oral daily    MEDICATIONS  (PRN):  acetaminophen     Tablet .. 650 milliGRAM(s) Oral every 6 hours PRN Temp greater or equal to 38C (100.4F), Mild Pain (1 - 3)  albuterol    90 MICROgram(s) HFA Inhaler 2 Puff(s) Inhalation every 6 hours PRN Shortness of Breath  aluminum hydroxide/magnesium hydroxide/simethicone Suspension 30 milliLiter(s) Oral every 4 hours PRN Dyspepsia  melatonin 3 milliGRAM(s) Oral at bedtime PRN Insomnia  ondansetron Injectable 4 milliGRAM(s) IV Push every 8 hours PRN Nausea and/or Vomiting      Care Discussed with Consultants/Other Providers [x] YES  [ ] NO    Vital Signs Last 24 Hrs  T(C): 36.9 (04 Sep 2023 04:26), Max: 36.9 (04 Sep 2023 04:26)  T(F): 98.4 (04 Sep 2023 04:26), Max: 98.4 (04 Sep 2023 04:26)  HR: 70 (04 Sep 2023 04:26) (62 - 70)  BP: 162/74 (04 Sep 2023 04:26) (110/65 - 162/74)  BP(mean): --  RR: 18 (04 Sep 2023 04:26) (18 - 18)  SpO2: 99% (04 Sep 2023 04:26) (95% - 99%)    Parameters below as of 04 Sep 2023 04:26  Patient On (Oxygen Delivery Method): nasal cannula  O2 Flow (L/min): 2    I&O's Summary    02 Sep 2023 07:01  -  03 Sep 2023 07:00  --------------------------------------------------------  IN: 400 mL / OUT: 701 mL / NET: -301 mL    03 Sep 2023 07:01  -  04 Sep 2023 06:43  --------------------------------------------------------  IN: 400 mL / OUT: 550 mL / NET: -150 mL      PHYSICAL EXAM:  GENERAL: NAD,  HEAD:  Atraumatic, Normocephalic  EYES: EOMI, PERRLA, conjunctiva and sclera clear  NECK: Supple, No JVD  CHEST/LUNG: Diminished bilaterally; Slight wheeze  HEART: Regular rate and rhythm; No murmurs, rubs, or gallops  ABDOMEN: Soft, Nontender, Nondistended; Bowel sounds present  NEURO: AAOx3, no focal weakness, 5/5 b/l extremity strength, b/l knee no arthritis, no effusion   EXTREMITIES:  2+ Peripheral Pulses, No clubbing, cyanosis, or edema  SKIN: No rashes or lesions      
SUBJECTIVE / OVERNIGHT EVENTS:      Patient seen and examined at bedside. No events noted overnight. Resting comfortably in the chair    --------------------------------------------------------------------------------------------  LABS:            CAPILLARY BLOOD GLUCOSE                RADIOLOGY & ADDITIONAL TESTS:    Imaging Personally Reviewed:  [x] YES  [ ] NO    Consultant(s) Notes Reviewed:  [x] YES  [ ] NO    MEDICATIONS  (STANDING):  aMIOdarone    Tablet 200 milliGRAM(s) Oral daily  apixaban 2.5 milliGRAM(s) Oral every 12 hours  levothyroxine 100 MICROGram(s) Oral daily  sertraline 50 milliGRAM(s) Oral daily    MEDICATIONS  (PRN):  acetaminophen     Tablet .. 650 milliGRAM(s) Oral every 6 hours PRN Temp greater or equal to 38C (100.4F), Mild Pain (1 - 3)  albuterol    90 MICROgram(s) HFA Inhaler 2 Puff(s) Inhalation every 6 hours PRN Shortness of Breath  aluminum hydroxide/magnesium hydroxide/simethicone Suspension 30 milliLiter(s) Oral every 4 hours PRN Dyspepsia  melatonin 3 milliGRAM(s) Oral at bedtime PRN Insomnia  ondansetron Injectable 4 milliGRAM(s) IV Push every 8 hours PRN Nausea and/or Vomiting      Care Discussed with Consultants/Other Providers [x] YES  [ ] NO    Vital Signs Last 24 Hrs  T(C): 36.8 (05 Sep 2023 04:00), Max: 36.8 (04 Sep 2023 11:15)  T(F): 98.3 (05 Sep 2023 04:00), Max: 98.3 (04 Sep 2023 11:15)  HR: 67 (05 Sep 2023 04:00) (62 - 67)  BP: 161/81 (05 Sep 2023 04:00) (114/69 - 161/81)  BP(mean): --  RR: 17 (05 Sep 2023 04:00) (17 - 18)  SpO2: 98% (05 Sep 2023 04:00) (92% - 98%)    Parameters below as of 05 Sep 2023 04:00  Patient On (Oxygen Delivery Method): nasal cannula  O2 Flow (L/min): 2    I&O's Summary    04 Sep 2023 07:01  -  05 Sep 2023 07:00  --------------------------------------------------------  IN: 400 mL / OUT: 1120 mL / NET: -720 mL        PHYSICAL EXAM:  GENERAL: NAD,  HEAD:  Atraumatic, Normocephalic  EYES: EOMI, PERRLA, conjunctiva and sclera clear  NECK: Supple, No JVD  CHEST/LUNG: Diminished bilaterally; Slight wheeze  HEART: Regular rate and rhythm; No murmurs, rubs, or gallops  ABDOMEN: Soft, Nontender, Nondistended; Bowel sounds present  NEURO: AAOx3, no focal weakness, 5/5 b/l extremity strength, b/l knee no arthritis, no effusion   EXTREMITIES:  2+ Peripheral Pulses, No clubbing, cyanosis, or edema  SKIN: No rashes or lesions  
CARDIOLOGY FOLLOW UP - Dr. Moeller  Date of Service: 9/3/23  CC: no events    Review of Systems:  Constitutional: No fever, weight loss, or fatigue  Respiratory: No cough, wheezing, or hemoptysis, no shortness of breath  Cardiovascular: No chest pain, palpitations, passing out, dizziness, or leg swelling  Gastrointestinal: No abd or epigastric pain. No nausea, vomiting, or hematemesis; no diarrhea or consiptaiton, no melena or hematochezia  Vascular: No edema     TELEMETRY:    PHYSICAL EXAM:  T(C): 36.6 (09-03-23 @ 04:41), Max: 36.7 (09-02-23 @ 20:43)  HR: 78 (09-03-23 @ 04:41) (58 - 78)  BP: 166/87 (09-03-23 @ 04:41) (136/76 - 166/87)  RR: 18 (09-03-23 @ 04:41) (18 - 18)  SpO2: 94% (09-03-23 @ 04:41) (94% - 97%)  Wt(kg): --  I&O's Summary    02 Sep 2023 07:01  -  03 Sep 2023 07:00  --------------------------------------------------------  IN: 400 mL / OUT: 701 mL / NET: -301 mL        Appearance: Normal	  Cardiovascular: Normal S1 S2,RRR, No JVD, No murmurs  Respiratory: Lungs clear to auscultation	  Gastrointestinal:  Soft, Non-tender, + BS	  Extremities: Normal range of motion, No clubbing, cyanosis or edema  Vascular: Peripheral pulses palpable 2+ bilaterally       Home Medications:  acetaminophen 325 mg oral tablet: 2 tab(s) orally every 6 hours As needed Temp greater or equal to 38C (100.4F), Mild Pain (1 - 3) (01 Sep 2023 01:01)  albuterol 90 mcg/inh inhalation aerosol: 2 puff(s) inhaled every 6 hours As needed Shortness of Breath (01 Sep 2023 01:01)  amiodarone 200 mg oral tablet: 1 tab(s) orally once a day (01 Sep 2023 01:01)  apixaban 2.5 mg oral tablet: 1 tab(s) orally every 12 hours (01 Sep 2023 01:01)  levothyroxine 100 mcg (0.1 mg) oral tablet: 1 tab(s) orally once a day (01 Sep 2023 01:01)  sertraline 50 mg oral tablet: 1 tab(s) orally once a day (01 Sep 2023 01:01)  Xiidra 5% ophthalmic solution: 1 drop(s) in each eye 2 times a day (01 Sep 2023 01:01)        MEDICATIONS  (STANDING):  aMIOdarone    Tablet 200 milliGRAM(s) Oral daily  apixaban 2.5 milliGRAM(s) Oral every 12 hours  levothyroxine 100 MICROGram(s) Oral daily  sertraline 50 milliGRAM(s) Oral daily        EKG:  RADIOLOGY:  DIAGNOSTIC TESTING:  [ ] Echocardiogram:  [ ] Catherterization:  [ ] Stress Test:  OTHER:     LABS:	 	                          9.9    1.83  )-----------( 86       ( 03 Sep 2023 06:53 )             31.9     09-03    142  |  105  |  10  ----------------------------<  88  4.8   |  29  |  0.58    Ca    8.9      03 Sep 2023 06:53  Mg     2.1     09-03            CARDIAC MARKERS:                  
SUBJECTIVE / OVERNIGHT EVENTS:       No events noted overnight.       --------------------------------------------------------------------------------------------  LABS:            CAPILLARY BLOOD GLUCOSE                RADIOLOGY & ADDITIONAL TESTS:    Imaging Personally Reviewed:  [x] YES  [ ] NO    Consultant(s) Notes Reviewed:  [x] YES  [ ] NO    MEDICATIONS  (STANDING):  aMIOdarone    Tablet 200 milliGRAM(s) Oral daily  apixaban 2.5 milliGRAM(s) Oral every 12 hours  levothyroxine 100 MICROGram(s) Oral daily  sertraline 50 milliGRAM(s) Oral daily    MEDICATIONS  (PRN):  acetaminophen     Tablet .. 650 milliGRAM(s) Oral every 6 hours PRN Temp greater or equal to 38C (100.4F), Mild Pain (1 - 3)  albuterol    90 MICROgram(s) HFA Inhaler 2 Puff(s) Inhalation every 6 hours PRN Shortness of Breath  aluminum hydroxide/magnesium hydroxide/simethicone Suspension 30 milliLiter(s) Oral every 4 hours PRN Dyspepsia  melatonin 3 milliGRAM(s) Oral at bedtime PRN Insomnia  ondansetron Injectable 4 milliGRAM(s) IV Push every 8 hours PRN Nausea and/or Vomiting      Care Discussed with Consultants/Other Providers [x] YES  [ ] NO    Vital Signs Last 24 Hrs  T(C): 36.6 (03 Sep 2023 04:41), Max: 36.7 (02 Sep 2023 20:43)  T(F): 97.9 (03 Sep 2023 04:41), Max: 98 (02 Sep 2023 20:43)  HR: 78 (03 Sep 2023 04:41) (58 - 78)  BP: 166/87 (03 Sep 2023 04:41) (136/76 - 166/87)  BP(mean): --  RR: 18 (03 Sep 2023 04:41) (18 - 18)  SpO2: 94% (03 Sep 2023 04:41) (94% - 97%)    Parameters below as of 03 Sep 2023 04:41  Patient On (Oxygen Delivery Method): nasal cannula  O2 Flow (L/min): 2    I&O's Summary    01 Sep 2023 07:01  -  02 Sep 2023 07:00  --------------------------------------------------------  IN: 0 mL / OUT: 100 mL / NET: -100 mL    02 Sep 2023 07:01  -  03 Sep 2023 06:57  --------------------------------------------------------  IN: 400 mL / OUT: 401 mL / NET: -1 mL      PHYSICAL EXAM:  GENERAL: NAD, well-developed,   HEAD:  Atraumatic, Normocephalic  EYES: EOMI, PERRLA, conjunctiva and sclera clear  NECK: Supple, No JVD  CHEST/LUNG: Diminished bilaterally; Slight wheeze  HEART: Regular rate and rhythm; No murmurs, rubs, or gallops  ABDOMEN: Soft, Nontender, Nondistended; Bowel sounds present  NEURO: AAOx3, no focal weakness, 5/5 b/l extremity strength, b/l knee no arthritis, no effusion   EXTREMITIES:  2+ Peripheral Pulses, No clubbing, cyanosis, or edema  SKIN: No rashes or lesions                
SUBJECTIVE / OVERNIGHT EVENTS:      Patient seen and examined at bedside in ED [place]. No events noted overnight. Resting comfortably in bed eating breakfast      --------------------------------------------------------------------------------------------  LABS:                        9.5    1.65  )-----------( 79       ( 01 Sep 2023 06:39 )             29.6     09-01    142  |  102  |  8   ----------------------------<  79  3.1<L>   |  30  |  0.58    Ca    8.4      01 Sep 2023 06:39  Phos  3.4     09-01  Mg     1.9     09-01    TPro  6.3  /  Alb  3.5  /  TBili  0.7  /  DBili  x   /  AST  13  /  ALT  13  /  AlkPhos  63  08-31    PT/INR - ( 31 Aug 2023 20:17 )   PT: 13.5 sec;   INR: 1.23 ratio         PTT - ( 31 Aug 2023 20:17 )  PTT:31.4 sec  CAPILLARY BLOOD GLUCOSE            Urinalysis Basic - ( 01 Sep 2023 06:39 )    Color: x / Appearance: x / SG: x / pH: x  Gluc: 79 mg/dL / Ketone: x  / Bili: x / Urobili: x   Blood: x / Protein: x / Nitrite: x   Leuk Esterase: x / RBC: x / WBC x   Sq Epi: x / Non Sq Epi: x / Bacteria: x        RADIOLOGY & ADDITIONAL TESTS:< from: Xray Chest 1 View AP/PA (08.31.23 @ 20:28) >  IMPRESSION:  Mild pulmonary vascular congestion.    --- End of Report ---    < end of copied text >  < from: Xray Pelvis AP only (08.31.23 @ 20:29) >    IMPRESSION:  No acute fracture or dislocation.    < end of copied text >  < from: CT Chest No Cont (08.31.23 @ 22:09) >  IMPRESSION:    1.  No acute intrathoracic trauma.  2.  Interlobular septal thickening and scattered groundglass airspace   opacities suggestive of interstitial and airspace pulmonary edema.  3.  New trace bilateral pleural effusions with adjacent compressive   atelectasis.  4.  Scattered nodular opacities in the right upper and middle lobes which   could be infectious in etiology.    --- End of Report ---    < end of copied text >  < from: CT Head No Cont (08.31.23 @ 22:10) >  IMPRESSION:    Head CT: No acute intracranial hemorrhage, territorial infarct, mass   effect or calvarial fracture.    Cervical spine CT: Multilevel degenerative changes without evidence of a   fracture.    --- End of Report ---    < end of copied text >      Imaging Personally Reviewed:  [x] YES  [ ] NO    Consultant(s) Notes Reviewed:  [x] YES  [ ] NO    MEDICATIONS  (STANDING):  aMIOdarone    Tablet 200 milliGRAM(s) Oral daily  apixaban 2.5 milliGRAM(s) Oral every 12 hours  levothyroxine 100 MICROGram(s) Oral daily  sertraline 50 milliGRAM(s) Oral daily    MEDICATIONS  (PRN):  acetaminophen     Tablet .. 650 milliGRAM(s) Oral every 6 hours PRN Temp greater or equal to 38C (100.4F), Mild Pain (1 - 3)  albuterol    90 MICROgram(s) HFA Inhaler 2 Puff(s) Inhalation every 6 hours PRN Shortness of Breath  aluminum hydroxide/magnesium hydroxide/simethicone Suspension 30 milliLiter(s) Oral every 4 hours PRN Dyspepsia  meclizine 25 milliGRAM(s) Oral three times a day PRN Dizziness  melatonin 3 milliGRAM(s) Oral at bedtime PRN Insomnia  ondansetron Injectable 4 milliGRAM(s) IV Push every 8 hours PRN Nausea and/or Vomiting      Care Discussed with Consultants/Other Providers [x] YES  [ ] NO    Vital Signs Last 24 Hrs  T(C): 36.7 (01 Sep 2023 06:05), Max: 36.7 (01 Sep 2023 00:20)  T(F): 98.1 (01 Sep 2023 06:05), Max: 98.1 (01 Sep 2023 00:20)  HR: 60 (01 Sep 2023 06:05) (52 - 68)  BP: 162/76 (01 Sep 2023 06:05) (161/77 - 188/106)  BP(mean): --  RR: 18 (01 Sep 2023 06:05) (17 - 19)  SpO2: 97% (01 Sep 2023 06:05) (91% - 99%)    Parameters below as of 01 Sep 2023 06:05  Patient On (Oxygen Delivery Method): nasal cannula  O2 Flow (L/min): 2    I&O's Summary        PHYSICAL EXAM:  GENERAL: NAD, well-developed, comfortable  HEAD:  Atraumatic, Normocephalic  EYES: EOMI, PERRLA, conjunctiva and sclera clear  NECK: Supple, No JVD  CHEST/LUNG: Diminished bilaterally; Slight wheeze  HEART: Regular rate and rhythm; No murmurs, rubs, or gallops  ABDOMEN: Soft, Nontender, Nondistended; Bowel sounds present  NEURO: AAOx3, no focal weakness, 5/5 b/l extremity strength, b/l knee no arthritis, no effusion   EXTREMITIES:  2+ Peripheral Pulses, No clubbing, cyanosis, or edema  SKIN: No rashes or lesions  
SUBJECTIVE / OVERNIGHT EVENTS:    Patient seen and examined at bedside. No events noted overnight. Resting comfortably in bed    --------------------------------------------------------------------------------------------  LABS:            CAPILLARY BLOOD GLUCOSE                RADIOLOGY & ADDITIONAL TESTS:    Imaging Personally Reviewed:  [x] YES  [ ] NO    Consultant(s) Notes Reviewed:  [x] YES  [ ] NO    MEDICATIONS  (STANDING):  aMIOdarone    Tablet 200 milliGRAM(s) Oral daily  apixaban 2.5 milliGRAM(s) Oral every 12 hours  chlorhexidine 2% Cloths 1 Application(s) Topical <User Schedule>  levothyroxine 100 MICROGram(s) Oral daily  sertraline 50 milliGRAM(s) Oral daily    MEDICATIONS  (PRN):  acetaminophen     Tablet .. 650 milliGRAM(s) Oral every 6 hours PRN Temp greater or equal to 38C (100.4F), Mild Pain (1 - 3)  albuterol    90 MICROgram(s) HFA Inhaler 2 Puff(s) Inhalation every 6 hours PRN Shortness of Breath  aluminum hydroxide/magnesium hydroxide/simethicone Suspension 30 milliLiter(s) Oral every 4 hours PRN Dyspepsia  melatonin 3 milliGRAM(s) Oral at bedtime PRN Insomnia  ondansetron Injectable 4 milliGRAM(s) IV Push every 8 hours PRN Nausea and/or Vomiting      Care Discussed with Consultants/Other Providers [x] YES  [ ] NO    Vital Signs Last 24 Hrs  T(C): 36.6 (06 Sep 2023 04:16), Max: 36.8 (05 Sep 2023 20:21)  T(F): 97.9 (06 Sep 2023 04:16), Max: 98.2 (05 Sep 2023 20:21)  HR: 67 (06 Sep 2023 04:16) (66 - 74)  BP: 161/77 (06 Sep 2023 04:16) (121/67 - 172/94)  BP(mean): --  RR: 18 (06 Sep 2023 04:16) (18 - 18)  SpO2: 96% (06 Sep 2023 04:16) (86% - 98%)    Parameters below as of 06 Sep 2023 04:16  Patient On (Oxygen Delivery Method): nasal cannula  O2 Flow (L/min): 2    I&O's Summary    05 Sep 2023 07:01  -  06 Sep 2023 07:00  --------------------------------------------------------  IN: 400 mL / OUT: 701 mL / NET: -301 mL        PHYSICAL EXAM:  GENERAL: NAD,  HEAD:  Atraumatic, Normocephalic  EYES: EOMI, PERRLA, conjunctiva and sclera clear  NECK: Supple, No JVD  CHEST/LUNG: Diminished bilaterally; Slight wheeze  HEART: Regular rate and rhythm; No murmurs, rubs, or gallops  ABDOMEN: Soft, Nontender, Nondistended; Bowel sounds present  NEURO: AAOx3, no focal weakness, 5/5 b/l extremity strength, b/l knee no arthritis, no effusion   EXTREMITIES:  2+ Peripheral Pulses, No clubbing, cyanosis, or edema  SKIN: No rashes or lesions

## 2023-09-06 NOTE — PROGRESS NOTE ADULT - PROVIDER SPECIALTY LIST ADULT
Cardiology
Internal Medicine
Cardiology
Internal Medicine
Neurology
Cardiology
Internal Medicine

## 2023-09-06 NOTE — DISCHARGE NOTE PROVIDER - NSDCMRMEDTOKEN_GEN_ALL_CORE_FT
acetaminophen 325 mg oral tablet: 2 tab(s) orally every 6 hours As needed Temp greater or equal to 38C (100.4F), Mild Pain (1 - 3)  albuterol 90 mcg/inh inhalation aerosol: 2 puff(s) inhaled every 6 hours As needed Shortness of Breath  amiodarone 200 mg oral tablet: 1 tab(s) orally once a day  apixaban 2.5 mg oral tablet: 1 tab(s) orally every 12 hours  fludrocortisone 0.1 mg oral tablet: 1 tab(s) orally once a day  levothyroxine 100 mcg (0.1 mg) oral tablet: 1 tab(s) orally once a day  meclizine 25 mg oral tablet: 1 tab(s) orally 3 times a day  midodrine 10 mg oral tablet: 1 tab(s) orally every 8 hours  sertraline 50 mg oral tablet: 1 tab(s) orally once a day  Xiidra 5% ophthalmic solution: 1 drop(s) in each eye 2 times a day

## 2023-09-06 NOTE — DISCHARGE NOTE PROVIDER - NSDCCPCAREPLAN_GEN_ALL_CORE_FT
PRINCIPAL DISCHARGE DIAGNOSIS  Diagnosis: Recurrent falls  Assessment and Plan of Treatment: You were admitted into the hospital because you fell. You feel likely due to a mechanical reason. You did not have any injuries During your hospital coure you did not have any arrthymias on EKG andCT imaging of your brain did not show any signs of fracture or acute bleeding. Physical therapy evaluated you and stated you would benefit from subacute rehab, but you are choosing to be discharged home. Please follow up with your primary care doctor afterwards. Thank you.

## 2023-09-06 NOTE — PROGRESS NOTE ADULT - ASSESSMENT
89F PMHx significant for  pAfib on eliquis and amiodarone, h/o abd TB s/p ex lap and subsequent SBO episodes over the years, hypothyroid, chronic anemia.   Presenting w/ recurrent falls at home. She was recently admitted to [facility] for simliar complaints, was tx'd for UTI and orthostatic hypotension and was dc'd home (was rec [facility] but family declined).     Plan:    # Recurrent falls/ FTT/ Hypoxia:  - Elevated BNP 2118  - CTH and CT C spine negative  - CXR w/ Mild pulmonary vascular congestion  - CT Chest with interstitial and airspace pulmonary edema. New trace bilateral pleural effusions with adjacent compressive atelectasis. Scattered nodular opacities in the right upper and middle lobes which could be infectious in etiology  - Maintain FiO2>92%  - Fall precautions  - PT eval  - Monitor orthostatics  - Diuretics prn  - C/w inhalers and nebs  - cards and Pulm consults pending    # HTN/ Afib:  - Trend BP's  - C/w current meds    # Hypothyroidism:  - C/w Synthroid    # Chronic neutropenia/ Anemia:  - Serial cbc's  - Transfuse prn  - Monitor for s/s of infection    # Hypokalemia:  - Serial BMP's  - Supplement lytes prn  # Depression:  - C/w Zoloft    # GI ppx:  - Bowel regimen prn    # DVT ppx:  - IPC's  - Eliquis    [facility]  [phone]    
89F PMHx significant for  pAfib on eliquis and amiodarone, h/o abd TB s/p ex lap and subsequent SBO episodes over the years, hypothyroid, chronic anemia.   Presenting w/ recurrent falls at home. She was recently admitted to [facility] for simliar complaints, was tx'd for UTI and orthostatic hypotension and was dc'd home (was rec [facility] but family declined).     Plan:    # Recurrent falls/ FTT/ Hypoxia:  - Elevated BNP 2118  - CTH and CT C spine negative  - CXR w/ Mild pulmonary vascular congestion  - CT Chest with interstitial and airspace pulmonary edema. New trace bilateral pleural effusions with adjacent compressive atelectasis. Scattered nodular opacities in the right upper and middle lobes which could be infectious in etiology  - Maintain FiO2>92%  - Fall precautions  - PT eval  - Monitor orthostatics-> improving  - Diuretics prn  - C/w inhalers and nebs  - Cards following    # HTN/ Afib/ Mod AS:  - Trend BP's  - C/w current meds    # Hypothyroidism:  - C/w Synthroid    # Chronic neutropenia/ Anemia:  - Serial cbc's  - Transfuse prn  - Monitor for s/s of infection    # Hypokalemia:  - Serial BMP's  - Supplement lytes prn    # Depression:  - C/w Zoloft    # GI ppx:  - Bowel regimen prn    # DVT ppx:  - IPC's  - Eliquis    * Discharge planning    [facility]  [phone]    
89F PMHx significant for  pAfib on eliquis and amiodarone, h/o abd TB s/p ex lap and subsequent SBO episodes over the years, hypothyroid, chronic anemia.   Presenting w/ recurrent falls at home. She was recently admitted to [facility] for simliar complaints, was tx'd for UTI and orthostatic hypotension and was dc'd home (was rec [facility] but family declined).     Plan:    # Recurrent falls/ FTT/ Hypoxia:  - Elevated BNP 2118  - CTH and CT C spine negative  - CXR w/ Mild pulmonary vascular congestion  - CT Chest with interstitial and airspace pulmonary edema. New trace bilateral pleural effusions with adjacent compressive atelectasis. Scattered nodular opacities in the right upper and middle lobes which could be infectious in etiology  - Maintain FiO2>92%  - Fall precautions  - PT eval  - Monitor orthostatics-> improving  - Diuretics prn  - C/w inhalers and nebs  - Cards following    # HTN/ Afib/ Mod AS:  - Trend BP's  - C/w current meds    # Hypothyroidism:  - C/w Synthroid    # Chronic neutropenia/ Anemia:  - Serial cbc's  - Transfuse prn  - Monitor for s/s of infection    # Hypokalemia:  - Serial BMP's  - Supplement lytes prn    # Depression:  - C/w Zoloft    # GI ppx:  - Bowel regimen prn    # DVT ppx:  - IPC's  - Eliquis    * Discharge planning    [facility]  [phone]    
89F PMHx significant for  pAfib on eliquis and amiodarone, h/o abd TB s/p ex lap and subsequent SBO episodes over the years, hypothyroid, chronic anemia.   Presenting w/ recurrent falls at home. She was recently admitted to [facility] for simliar complaints, was tx'd for UTI and orthostatic hypotension and was dc'd home (was rec [facility] but family declined).     Plan:    # Recurrent falls/ FTT/ Hypoxia:  - Elevated BNP 2118  - CTH and CT C spine negative  - CXR w/ Mild pulmonary vascular congestion  - CT Chest with interstitial and airspace pulmonary edema. New trace bilateral pleural effusions with adjacent compressive atelectasis. Scattered nodular opacities in the right upper and middle lobes which could be infectious in etiology  - Maintain FiO2>92%  - Fall precautions  - PT eval  - Monitor orthostatics-> improving  - Diuretics prn  - C/w inhalers and nebs  - Cards following    # HTN/ Afib/ Mod AS:  - Trend BP's  - C/w current meds    # Hypothyroidism:  - C/w Synthroid    # Chronic neutropenia/ Anemia:  - Serial cbc's  - Transfuse prn  - Monitor for s/s of infection    # Hypokalemia:  - Serial BMP's  - Supplement lytes prn    # Depression:  - C/w Zoloft    # GI ppx:  - Bowel regimen prn    # DVT ppx:  - IPC's  - Eliquis    [facility]  [phone]    
A/P    89F PMHx significant for  pAfib on eliquis and amiodarone, moderate AS, h/o abd TB s/p ex lap and subsequent SBO episodes over the years, hypothyroid, chronic anemia.   Presenting w/ recurrent falls at home. She was recently admitted to [facility] for simliar complaints, was tx'd for UTI and orthostatic hypotension and was dc'd home (was rec [facility] but family declined).     #Recurrent falls.   -likely mechanical and sec to deconditioning  -PT    #Hypertension.   -on midodrine and fludrocortisone for orthostatic hypotension  -orthostatics improved  -hold mido, fludro for now     #Chronic atrial fibrillation.   -cont amio  -Continue apixaban, will need to d/w fam and pt again regarding stopping in light of rec falls  -Avoid bb given intermittent bradycardia prev admit     #Hypoxia.   -s/p iv lasix   -ct chest noted  -will give lasix prn for now, avoid aggressive diuresis in light of hx of orthostatic hypo  -recent echo with hyperdynamic lv fxn    #Mod AS  -As noted on prior echo  -Prior CT with Dense aortic valve calcification and multivessel coronary artery calcification  -continue medical management         42 minutes spent on total encounter; more than 50% of the visit was spent counseling and/or coordinating care by the attending physician.        
89F PMHx significant for  pAfib on eliquis and amiodarone, h/o abd TB s/p ex lap and subsequent SBO episodes over the years, hypothyroid, chronic anemia.   Presenting w/ recurrent falls at home. She was recently admitted to [facility] for simliar complaints, was tx'd for UTI and orthostatic hypotension and was dc'd home (was rec [facility] but family declined).     Plan:    # Recurrent falls/ FTT/ Hypoxia:  - Elevated BNP 2118  - CTH and CT C spine negative  - CXR w/ Mild pulmonary vascular congestion  - CT Chest with interstitial and airspace pulmonary edema. New trace bilateral pleural effusions with adjacent compressive atelectasis. Scattered nodular opacities in the right upper and middle lobes which could be infectious in etiology  - Maintain FiO2>92%  - Fall precautions  - PT eval  - Monitor orthostatics  - Diuretics prn  - C/w inhalers and nebs  - Cards and Pulm following    # HTN/ Afib:  - Trend BP's  - C/w current meds    # Hypothyroidism:  - C/w Synthroid    # Chronic neutropenia/ Anemia:  - Serial cbc's  - Transfuse prn  - Monitor for s/s of infection    # Hypokalemia:  - Serial BMP's  - Supplement lytes prn    # Depression:  - C/w Zoloft    # GI ppx:  - Bowel regimen prn    # DVT ppx:  - IPC's  - Eliquis    [facility]  [phone]    
89F PMHx significant for  pAfib on eliquis and amiodarone, h/o abd TB s/p ex lap and subsequent SBO episodes over the years, hypothyroid, chronic anemia.   Presenting w/ recurrent falls at home. She was recently admitted to [facility] for simliar complaints, was tx'd for UTI and orthostatic hypotension and was dc'd home (was rec [facility] but family declined).     Plan:    # Recurrent falls/ FTT/ Hypoxia:  - Elevated BNP 2118  - CTH and CT C spine negative  - CXR w/ Mild pulmonary vascular congestion  - CT Chest with interstitial and airspace pulmonary edema. New trace bilateral pleural effusions with adjacent compressive atelectasis. Scattered nodular opacities in the right upper and middle lobes which could be infectious in etiology  - Maintain FiO2>92%  - Fall precautions  - PT eval  - Monitor orthostatics-> improving  - Diuretics prn  - C/w inhalers and nebs  - Cards following    # HTN/ Afib/ Mod AS:  - Trend BP's  - C/w current meds    # Hypothyroidism:  - C/w Synthroid    # Chronic neutropenia/ Anemia:  - Serial cbc's  - Transfuse prn  - Monitor for s/s of infection    # Hypokalemia:  - Serial BMP's  - Supplement lytes prn    # Depression:  - C/w Zoloft    # GI ppx:  - Bowel regimen prn    # DVT ppx:  - IPC's  - Eliquis    * Discharge planning    [facility]  [phone]    
A/P    89F PMHx significant for  pAfib on eliquis and amiodarone, moderate AS, h/o abd TB s/p ex lap and subsequent SBO episodes over the years, hypothyroid, chronic anemia.   Presenting w/ recurrent falls at home. She was recently admitted to [facility] for simliar complaints, was tx'd for UTI and orthostatic hypotension and was dc'd home (was rec [facility] but family declined).     #Recurrent falls.   -likely mechanical and sec to deconditioning  -PT    #Hypertension.   -on midodrine and fludrocortisone for orthostatic hypotension  -orthostatics improved  -hold mido, fludro for now     #Chronic atrial fibrillation.   -cont amio  -Continue apixaban, will need to d/w fam and pt again regarding stopping in light of rec falls  -Avoid bb given intermittent bradycardia prev admit     #Hypoxia.   -s/p iv lasix   -ct chest noted  -will give lasix prn for now, avoid aggressive diuresis in light of hx of orthostatic hypo  -recent echo with hyperdynamic lv fxn    #Mod AS  -As noted on prior echo  -Prior CT with Dense aortic valve calcification and multivessel coronary artery calcification  -continue medical management         42 minutes spent on total encounter; more than 50% of the visit was spent counseling and/or coordinating care by the attending physician.

## 2023-09-06 NOTE — DISCHARGE NOTE PROVIDER - PROVIDER TOKENS
PROVIDER:[TOKEN:[[id number]],FOLLOWUP:[1 week]],PROVIDER:[TOKEN:[[id number]],FOLLOWUP:[Routine]]

## 2023-09-06 NOTE — DISCHARGE NOTE NURSING/CASE MANAGEMENT/SOCIAL WORK - NSDCPEFALRISK_GEN_ALL_CORE
For information on Fall & Injury Prevention, visit: [email or web address] OR  [email or web address] OR  https://www.cdc.gov/steadi/patient.html

## 2023-09-06 NOTE — DISCHARGE NOTE PROVIDER - CARE PROVIDER_API CALL
Goldberg, Steven M  Internal Medicine  [address] [place] [id number]  Phone: [phone]  Fax: [phone]  Follow Up Time: 1 week    Jason Moeller  Cardiovascular Disease  [address] [facility], [address]  [place] [address]  Phone: [phone]  Fax: [phone]  Follow Up Time: Routine

## 2023-09-06 NOTE — DISCHARGE NOTE PROVIDER - HOSPITAL COURSE
89F PMHx significant for  pAfib on eliquis and amiodarone, h/o abd TB s/p ex lap and subsequent SBO episodes over the years, hypothyroid, chronic anemia.   Presenting w/ recurrent falls at home. She was recently admitted to this [facility] for simliar complaints, was tx'd for UTI and orthostatic hypotension and was dc'd home (was rec [facility] but family declined).     Plan:    # Recurrent falls/ FTT/ Hypoxia:  - Elevated BNP 2118  - CTH and CT C spine negative  - CXR w/ Mild pulmonary vascular congestion  - CT Chest with interstitial and airspace pulmonary edema. New trace bilateral pleural effusions with adjacent compressive atelectasis. Scattered nodular opacities in the right upper and middle lobes which could be infectious in etiology  - Maintain FiO2>92%  - Fall precautions  - PT eval  - Monitor orthostatics-> improving  - Diuretics prn  - C/w inhalers and nebs  - Cards following    # HTN/ Afib/ Mod AS:  - Trend BP's  - C/w current meds    # Hypothyroidism:  - C/w Synthroid    # Chronic neutropenia/ Anemia:  - Serial cbc's  - Transfuse prn  - Monitor for s/s of infection    # Hypokalemia:  - Serial BMP's  - Supplement lytes prn    # Depression:  - C/w Zoloft    # GI ppx:  - Bowel regimen prn    # DVT ppx:  - IPC's  - Eliquis

## 2023-10-16 ENCOUNTER — EMERGENCY (EMERGENCY)
Facility: HOSPITAL | Age: 88
LOS: 1 days | Discharge: HOME | End: 2023-10-16
Attending: EMERGENCY MEDICINE
Payer: MEDICARE

## 2023-10-16 VITALS
OXYGEN SATURATION: 95 % | HEART RATE: 67 BPM | SYSTOLIC BLOOD PRESSURE: 153 MMHG | RESPIRATION RATE: 20 BRPM | DIASTOLIC BLOOD PRESSURE: 64 MMHG

## 2023-10-16 VITALS
SYSTOLIC BLOOD PRESSURE: 147 MMHG | OXYGEN SATURATION: 95 % | WEIGHT: 119.93 LBS | RESPIRATION RATE: 20 BRPM | HEART RATE: 62 BPM | TEMPERATURE: 97 F | DIASTOLIC BLOOD PRESSURE: 77 MMHG | HEIGHT: 63 IN

## 2023-10-16 DIAGNOSIS — Z98.89 OTHER SPECIFIED POSTPROCEDURAL STATES: Chronic | ICD-10-CM

## 2023-10-16 LAB
ALBUMIN SERPL ELPH-MCNC: 3.7 G/DL — SIGNIFICANT CHANGE UP (ref 3.3–5)
ALP SERPL-CCNC: 65 U/L — SIGNIFICANT CHANGE UP (ref 40–120)
ALT FLD-CCNC: 15 U/L — SIGNIFICANT CHANGE UP (ref 10–45)
ANION GAP SERPL CALC-SCNC: 11 MMOL/L — SIGNIFICANT CHANGE UP (ref 5–17)
ANISOCYTOSIS BLD QL: SIGNIFICANT CHANGE UP
APTT BLD: 29.3 SEC — SIGNIFICANT CHANGE UP (ref 24.5–35.6)
AST SERPL-CCNC: 18 U/L — SIGNIFICANT CHANGE UP (ref 10–40)
BASOPHILS # BLD AUTO: 0 K/UL — SIGNIFICANT CHANGE UP (ref 0–0.2)
BASOPHILS NFR BLD AUTO: 0 % — SIGNIFICANT CHANGE UP (ref 0–2)
BILIRUB SERPL-MCNC: 0.5 MG/DL — SIGNIFICANT CHANGE UP (ref 0.2–1.2)
BUN SERPL-MCNC: 13 MG/DL — SIGNIFICANT CHANGE UP (ref 7–23)
CALCIUM SERPL-MCNC: 9.2 MG/DL — SIGNIFICANT CHANGE UP (ref 8.4–10.5)
CHLORIDE SERPL-SCNC: 107 MMOL/L — SIGNIFICANT CHANGE UP (ref 96–108)
CO2 SERPL-SCNC: 25 MMOL/L — SIGNIFICANT CHANGE UP (ref 22–31)
CREAT SERPL-MCNC: 0.74 MG/DL — SIGNIFICANT CHANGE UP (ref 0.5–1.3)
DACRYOCYTES BLD QL SMEAR: SLIGHT — SIGNIFICANT CHANGE UP
EGFR: 73 ML/MIN/1.73M2 — SIGNIFICANT CHANGE UP
ELLIPTOCYTES BLD QL SMEAR: SLIGHT — SIGNIFICANT CHANGE UP
EOSINOPHIL # BLD AUTO: 0.02 K/UL — SIGNIFICANT CHANGE UP (ref 0–0.5)
EOSINOPHIL NFR BLD AUTO: 0.9 % — SIGNIFICANT CHANGE UP (ref 0–6)
GLUCOSE SERPL-MCNC: 80 MG/DL — SIGNIFICANT CHANGE UP (ref 70–99)
HCT VFR BLD CALC: 33.5 % — LOW (ref 34.5–45)
HGB BLD-MCNC: 10.6 G/DL — LOW (ref 11.5–15.5)
INR BLD: 1.05 RATIO — SIGNIFICANT CHANGE UP (ref 0.85–1.18)
LACTATE SERPL-SCNC: 1.1 MMOL/L — SIGNIFICANT CHANGE UP (ref 0.5–2)
LIDOCAIN IGE QN: 25 U/L — SIGNIFICANT CHANGE UP (ref 7–60)
LYMPHOCYTES # BLD AUTO: 0.36 K/UL — LOW (ref 1–3.3)
LYMPHOCYTES # BLD AUTO: 16.7 % — SIGNIFICANT CHANGE UP (ref 13–44)
MACROCYTES BLD QL: SIGNIFICANT CHANGE UP
MANUAL SMEAR VERIFICATION: SIGNIFICANT CHANGE UP
MCHC RBC-ENTMCNC: 31.6 GM/DL — LOW (ref 32–36)
MCHC RBC-ENTMCNC: 32.5 PG — SIGNIFICANT CHANGE UP (ref 27–34)
MCV RBC AUTO: 102.8 FL — HIGH (ref 80–100)
MONOCYTES # BLD AUTO: 0.15 K/UL — SIGNIFICANT CHANGE UP (ref 0–0.9)
MONOCYTES NFR BLD AUTO: 7 % — SIGNIFICANT CHANGE UP (ref 2–14)
NEUTROPHILS # BLD AUTO: 1.59 K/UL — LOW (ref 1.8–7.4)
NEUTROPHILS NFR BLD AUTO: 74.5 % — SIGNIFICANT CHANGE UP (ref 43–77)
NRBC # BLD: 1 /100 — HIGH (ref 0–0)
OVALOCYTES BLD QL SMEAR: SLIGHT — SIGNIFICANT CHANGE UP
PLAT MORPH BLD: NORMAL — SIGNIFICANT CHANGE UP
PLATELET # BLD AUTO: 64 K/UL — LOW (ref 150–400)
POIKILOCYTOSIS BLD QL AUTO: SIGNIFICANT CHANGE UP
POTASSIUM SERPL-MCNC: 4.2 MMOL/L — SIGNIFICANT CHANGE UP (ref 3.5–5.3)
POTASSIUM SERPL-SCNC: 4.2 MMOL/L — SIGNIFICANT CHANGE UP (ref 3.5–5.3)
PROT SERPL-MCNC: 6.6 G/DL — SIGNIFICANT CHANGE UP (ref 6–8.3)
PROTHROM AB SERPL-ACNC: 11 SEC — SIGNIFICANT CHANGE UP (ref 9.5–13)
RBC # BLD: 3.26 M/UL — LOW (ref 3.8–5.2)
RBC # FLD: 13 % — SIGNIFICANT CHANGE UP (ref 10.3–14.5)
RBC BLD AUTO: ABNORMAL
SCHISTOCYTES BLD QL AUTO: SLIGHT — SIGNIFICANT CHANGE UP
SODIUM SERPL-SCNC: 143 MMOL/L — SIGNIFICANT CHANGE UP (ref 135–145)
VARIANT LYMPHS # BLD: 0.9 % — SIGNIFICANT CHANGE UP (ref 0–6)
WBC # BLD: 2.14 K/UL — LOW (ref 3.8–10.5)
WBC # FLD AUTO: 2.14 K/UL — LOW (ref 3.8–10.5)

## 2023-10-16 PROCEDURE — 72125 CT NECK SPINE W/O DYE: CPT | Mod: MA

## 2023-10-16 PROCEDURE — 36415 COLL VENOUS BLD VENIPUNCTURE: CPT

## 2023-10-16 PROCEDURE — 82803 BLOOD GASES ANY COMBINATION: CPT

## 2023-10-16 PROCEDURE — 80053 COMPREHEN METABOLIC PANEL: CPT

## 2023-10-16 PROCEDURE — 82947 ASSAY GLUCOSE BLOOD QUANT: CPT

## 2023-10-16 PROCEDURE — 85730 THROMBOPLASTIN TIME PARTIAL: CPT

## 2023-10-16 PROCEDURE — 84132 ASSAY OF SERUM POTASSIUM: CPT

## 2023-10-16 PROCEDURE — 70450 CT HEAD/BRAIN W/O DYE: CPT | Mod: 26,MA

## 2023-10-16 PROCEDURE — 82435 ASSAY OF BLOOD CHLORIDE: CPT

## 2023-10-16 PROCEDURE — 99285 EMERGENCY DEPT VISIT HI MDM: CPT | Mod: 25

## 2023-10-16 PROCEDURE — 85018 HEMOGLOBIN: CPT

## 2023-10-16 PROCEDURE — 71045 X-RAY EXAM CHEST 1 VIEW: CPT

## 2023-10-16 PROCEDURE — 82330 ASSAY OF CALCIUM: CPT

## 2023-10-16 PROCEDURE — 72170 X-RAY EXAM OF PELVIS: CPT

## 2023-10-16 PROCEDURE — 71260 CT THORAX DX C+: CPT | Mod: MA

## 2023-10-16 PROCEDURE — 71045 X-RAY EXAM CHEST 1 VIEW: CPT | Mod: 26

## 2023-10-16 PROCEDURE — 70450 CT HEAD/BRAIN W/O DYE: CPT | Mod: MA

## 2023-10-16 PROCEDURE — 85610 PROTHROMBIN TIME: CPT

## 2023-10-16 PROCEDURE — 74177 CT ABD & PELVIS W/CONTRAST: CPT | Mod: MA

## 2023-10-16 PROCEDURE — 99285 EMERGENCY DEPT VISIT HI MDM: CPT

## 2023-10-16 PROCEDURE — 72125 CT NECK SPINE W/O DYE: CPT | Mod: 26,MA

## 2023-10-16 PROCEDURE — 83690 ASSAY OF LIPASE: CPT

## 2023-10-16 PROCEDURE — 85025 COMPLETE CBC W/AUTO DIFF WBC: CPT

## 2023-10-16 PROCEDURE — 83605 ASSAY OF LACTIC ACID: CPT

## 2023-10-16 PROCEDURE — 74177 CT ABD & PELVIS W/CONTRAST: CPT | Mod: 26,MA

## 2023-10-16 PROCEDURE — 85014 HEMATOCRIT: CPT

## 2023-10-16 PROCEDURE — 84295 ASSAY OF SERUM SODIUM: CPT

## 2023-10-16 PROCEDURE — 71260 CT THORAX DX C+: CPT | Mod: 26,MA

## 2023-10-16 PROCEDURE — 96374 THER/PROPH/DIAG INJ IV PUSH: CPT | Mod: XU

## 2023-10-16 PROCEDURE — 72170 X-RAY EXAM OF PELVIS: CPT | Mod: 26

## 2023-10-16 RX ORDER — ACETAMINOPHEN 500 MG
1000 TABLET ORAL ONCE
Refills: 0 | Status: COMPLETED | OUTPATIENT
Start: 2023-10-16 | End: 2023-10-16

## 2023-10-16 RX ORDER — LIDOCAINE 4 G/100G
1 CREAM TOPICAL ONCE
Refills: 0 | Status: COMPLETED | OUTPATIENT
Start: 2023-10-16 | End: 2023-10-16

## 2023-10-16 RX ORDER — SODIUM CHLORIDE 9 MG/ML
250 INJECTION INTRAMUSCULAR; INTRAVENOUS; SUBCUTANEOUS ONCE
Refills: 0 | Status: COMPLETED | OUTPATIENT
Start: 2023-10-16 | End: 2023-10-16

## 2023-10-16 RX ADMIN — SODIUM CHLORIDE 250 MILLILITER(S): 9 INJECTION INTRAMUSCULAR; INTRAVENOUS; SUBCUTANEOUS at 17:04

## 2023-10-16 RX ADMIN — LIDOCAINE 1 PATCH: 4 CREAM TOPICAL at 17:03

## 2023-10-16 RX ADMIN — Medication 400 MILLIGRAM(S): at 17:03

## 2023-10-16 NOTE — ED ADULT NURSE NOTE - OBJECTIVE STATEMENT
Patient presents to Ed with c/o fall via amb. Per Ems patient has an aide at home during the day only and last night at 9:30 patient  reached under a low cabinet and fell hitting her lt chest on her walker. Patient presents to Ed with c/o fall via amb. Per Ems patient has an aide at home during the day only and last night at 9:30 patient  reached under a low cabinet and fell hitting her lt chest on her walker. Patient A&Ox3 amb with a walker denies head injury no loc  c/o pain under lt breast / rib denies chest pain no sob nausea cough fever chills headache or dizziness. RAC 20g iv lock placed labs  drawn and sent.

## 2023-10-16 NOTE — ED PROVIDER NOTE - PROGRESS NOTE DETAILS
Attending MD Villavicencio: CT is without traumatic injury.  Patient has been able to bear weight on hip without significant pain so will call hip fracture not suspected.  Patient appropriate for discharge after discussion with patient's daughter given patient has supervision at home with home health aide. Nonemergent form submitted.  Patient to be discharged home with nonemergent transport.

## 2023-10-16 NOTE — ED PROVIDER NOTE - OBJECTIVE STATEMENT
89-year-old female with history of A-fib on Eliquis, HTN presents with history of recurrent falls.  Patient has fallen multiple times over the last week.  Most recently yesterday. Unknown head strike, no LOC, on AC. Patient is now having left-sided chest and hip pain.  Patient is concerned about broken ribs.  Patient otherwise has no headache, neck pain, shortness of breath, N/V/D/abdominal pain, dysuria, peripheral edema, fever/chills.

## 2023-10-16 NOTE — ED ADULT NURSE NOTE - NSFALLRISKFACTORS_ED_ALL_ED
Frequent falls/Surgery: Recent surgery, recent lower limb amputation, major abdominal or thoracic surgery/Other

## 2023-10-16 NOTE — ED PROVIDER NOTE - CLINICAL SUMMARY MEDICAL DECISION MAKING FREE TEXT BOX
89-year-old female with a history of A-fib on Eliquis, HTN presents with history of recurrent falls.  Concern for left-sided rib fractures/left hip fracture.  Labs, EKG, CXR, XR pelvis, CT head/C-spine/chest/abdomen/pelvis ordered 89-year-old female with a history of A-fib on Eliquis, HTN presents with history of recurrent falls.  Concern for left-sided rib fractures/left hip fracture.  Labs, EKG, CXR, XR pelvis, CT head/C-spine/chest/abdomen/pelvis ordered.    Dr. Skaggs (Attending Physician)

## 2023-10-16 NOTE — ED PROVIDER NOTE - PHYSICAL EXAMINATION
GENERAL: NAD  HEAD: normocephalic, atraumatic  HEENT: normal conjunctiva, oral mucosa moist, uvula midline, neck supple  CARDIAC: regular rate and rhythm, normal S1S2, no appreciable murmurs, Left chest wall TTP  PULM: speaking in full sentences, normal breath sounds, clear to ascultation bilaterally, no rales, rhonchi, wheezing  GI: abdomen nondistended, soft, nontender  : no CVA tenderness b/l, no suprapubic tenderness  NEURO: moving all 4 extremities, no focal deficits, normal speech, AOx3  MSK: no peripheral edema, no calf tenderness b/l, Pain with extension and flexion of left hip  SKIN: well-perfused, extremities warm  PSYCH: appropriate mood and affect

## 2023-10-16 NOTE — ED PROVIDER NOTE - PATIENT PORTAL LINK FT
You can access the FollowMyHealth Patient Portal offered by [facility] by registering at the following website: [email or web address]. By joining [employer]’s FollowMyHealth portal, you will also be able to view your health information using other applications (apps) compatible with our system.

## 2023-10-16 NOTE — ED ADULT NURSE NOTE - NSFALLHARMRISKINTERV_ED_ALL_ED

## 2023-10-16 NOTE — ED PROVIDER NOTE - NSFOLLOWUPINSTRUCTIONS_ED_ALL_ED_FT
Today in the emergency department you were evaluated after a fall.  You are found to have no fractures of your ribs or hips or neck.  He had no bleeding in your head.  Please do the best of your ability to refrain from walking around home at nighttime.  Falls are very high risk at your age and any strategies that you have to prevent them should be taken.  Please continue take all your medications as prescribed.    Please follow up with your primary care physician within 1-2 weeks of discharge from the emergency department.  Please bring a copy of your results with you.  Please return to the emergency department for worsening of your symptoms.    You may take Acetaminophen over the counter as needed for pain and/or fever. Use as directed and see medication warnings.    DISCHARGE INSTRUCTIONS:    Call 911 or have someone else call if:  You have fallen and are unconscious.  You have fallen and cannot move part of your body.    Contact your healthcare provider if:  You have fallen and have pain or a headache.  You have questions or concerns about your condition or care.      Fall prevention tips:  Stand or sit up slowly. This may help you keep your balance and prevent falls.  Use assistive devices as directed. Your healthcare provider may suggest that you use a cane or walker to help you keep your balance. You may need to have grab bars put in your bathroom near the toilet or in the shower.  Wear shoes that fit well and have soles that . Wear shoes both inside and outside. Use slippers with good . Do not wear shoes with high heels.  Wear a personal alarm. This is a device that allows you to call 911 if you fall and need help. Ask your healthcare provider for more information.  Stay active. Exercise can help strengthen your muscles and improve your balance. Your healthcare provider may recommend water aerobics or walking. He or she may also recommend physical therapy to improve your coordination. Never start an exercise program without talking to your healthcare provider first.  Walking for Exercise  Manage your medical conditions. Keep all appointments with your healthcare providers. Visit your eye doctor as directed.  Home safety tips:  Fall Prevention for Adults  Add items to prevent falls in the bathroom. Put nonslip strips on your bath or shower floor to prevent you from slipping. Use a bath mat if you do not have carpet in the bathroom. This will prevent you from falling when you step out of the bath or shower. Use a shower seat so you do not need to stand while you shower. Sit on the toilet or a chair in your bathroom to dry yourself and put on clothing. This will prevent you from losing your balance from drying or dressing yourself while you are standing.  Keep paths clear. Remove books, shoes, and other objects from walkways and stairs. Place cords for telephones and lamps out of the way so that you do not need to walk over them. Tape them down if you cannot move them. Remove small rugs. If you cannot remove a rug, secure it with double-sided tape. This will prevent you from tripping.  Install bright lights in your home. Use night lights to help light paths to the bathroom or kitchen. Always turn on the light before you start walking.  Keep items you use often on shelves within reach. Do not use a step stool to help you reach an item.  Paint or place reflective tape on the edges of your stairs. This will help you see the stairs better.  Follow up with your healthcare provider as directed: Write down your questions so you remember to ask them during your visits.

## 2023-11-29 NOTE — PHYSICAL THERAPY INITIAL EVALUATION ADULT - PERTINENT HX OF CURRENT PROBLEM, REHAB EVAL
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 Millard in 2018 treated with NGT then consult for Dr. Dae Murillo in 10/2020 treated conservatively without NGT, and in 2021 with NGT) presents with 2 days of epigastric pain, nausea and emesis. XRay abd 3/27: Dilated loop of small bowel in the upper abdomen. Contrast is reached the colon within 5 hours. These findings are consistent with low-grade obstruction. CT Abd 3/26: Small bowel obstruction with transition at the mid pelvis. Multifocal pneumonia. Images lower thorax are followed to resolution with repeat chest CT in one month, or sooner as clinically warranted. Dr. Bonilla discussed these above findings with Dr. Solano on 3/26/2023 at 9:06AM, with read back. Gallbladder filled with stones. Dense aortic valve calcification and multivessel coronary artery calcification. Correlate with echocardiogram. Stable bilateral adnexal cystic lesions measuring 5 cm on the right and 3.5 cm on the left. Vaginal Delivery

## 2023-12-30 ENCOUNTER — INPATIENT (INPATIENT)
Facility: HOSPITAL | Age: 88
LOS: 4 days | Discharge: HOME | DRG: 149 | End: 2024-01-04
Attending: STUDENT IN AN ORGANIZED HEALTH CARE EDUCATION/TRAINING PROGRAM | Admitting: STUDENT IN AN ORGANIZED HEALTH CARE EDUCATION/TRAINING PROGRAM
Payer: MEDICARE

## 2023-12-30 VITALS
OXYGEN SATURATION: 95 % | SYSTOLIC BLOOD PRESSURE: 129 MMHG | TEMPERATURE: 97 F | HEIGHT: 61 IN | DIASTOLIC BLOOD PRESSURE: 72 MMHG | RESPIRATION RATE: 16 BRPM | HEART RATE: 61 BPM | WEIGHT: 130.07 LBS

## 2023-12-30 DIAGNOSIS — Z98.89 OTHER SPECIFIED POSTPROCEDURAL STATES: Chronic | ICD-10-CM

## 2023-12-30 DIAGNOSIS — R51.9 HEADACHE, UNSPECIFIED: ICD-10-CM

## 2023-12-30 DIAGNOSIS — I10 ESSENTIAL (PRIMARY) HYPERTENSION: ICD-10-CM

## 2023-12-30 DIAGNOSIS — R42 DIZZINESS AND GIDDINESS: ICD-10-CM

## 2023-12-30 DIAGNOSIS — E03.9 HYPOTHYROIDISM, UNSPECIFIED: ICD-10-CM

## 2023-12-30 DIAGNOSIS — J45.909 UNSPECIFIED ASTHMA, UNCOMPLICATED: ICD-10-CM

## 2023-12-30 DIAGNOSIS — I48.20 CHRONIC ATRIAL FIBRILLATION, UNSPECIFIED: ICD-10-CM

## 2023-12-30 DIAGNOSIS — I35.0 NONRHEUMATIC AORTIC (VALVE) STENOSIS: ICD-10-CM

## 2023-12-30 LAB
ALBUMIN SERPL ELPH-MCNC: 3.6 G/DL — SIGNIFICANT CHANGE UP (ref 3.3–5)
ALBUMIN SERPL ELPH-MCNC: 3.6 G/DL — SIGNIFICANT CHANGE UP (ref 3.3–5)
ALP SERPL-CCNC: 64 U/L — SIGNIFICANT CHANGE UP (ref 40–120)
ALP SERPL-CCNC: 64 U/L — SIGNIFICANT CHANGE UP (ref 40–120)
ALT FLD-CCNC: 11 U/L — SIGNIFICANT CHANGE UP (ref 10–45)
ALT FLD-CCNC: 11 U/L — SIGNIFICANT CHANGE UP (ref 10–45)
ANION GAP SERPL CALC-SCNC: 8 MMOL/L — SIGNIFICANT CHANGE UP (ref 5–17)
ANION GAP SERPL CALC-SCNC: 8 MMOL/L — SIGNIFICANT CHANGE UP (ref 5–17)
ANISOCYTOSIS BLD QL: SLIGHT — SIGNIFICANT CHANGE UP
ANISOCYTOSIS BLD QL: SLIGHT — SIGNIFICANT CHANGE UP
AST SERPL-CCNC: 16 U/L — SIGNIFICANT CHANGE UP (ref 10–40)
AST SERPL-CCNC: 16 U/L — SIGNIFICANT CHANGE UP (ref 10–40)
BASOPHILS # BLD AUTO: 0 K/UL — SIGNIFICANT CHANGE UP (ref 0–0.2)
BASOPHILS # BLD AUTO: 0 K/UL — SIGNIFICANT CHANGE UP (ref 0–0.2)
BASOPHILS NFR BLD AUTO: 0 % — SIGNIFICANT CHANGE UP (ref 0–2)
BASOPHILS NFR BLD AUTO: 0 % — SIGNIFICANT CHANGE UP (ref 0–2)
BILIRUB SERPL-MCNC: 0.4 MG/DL — SIGNIFICANT CHANGE UP (ref 0.2–1.2)
BILIRUB SERPL-MCNC: 0.4 MG/DL — SIGNIFICANT CHANGE UP (ref 0.2–1.2)
BUN SERPL-MCNC: 9 MG/DL — SIGNIFICANT CHANGE UP (ref 7–23)
BUN SERPL-MCNC: 9 MG/DL — SIGNIFICANT CHANGE UP (ref 7–23)
CALCIUM SERPL-MCNC: 9.2 MG/DL — SIGNIFICANT CHANGE UP (ref 8.4–10.5)
CALCIUM SERPL-MCNC: 9.2 MG/DL — SIGNIFICANT CHANGE UP (ref 8.4–10.5)
CHLORIDE SERPL-SCNC: 105 MMOL/L — SIGNIFICANT CHANGE UP (ref 96–108)
CHLORIDE SERPL-SCNC: 105 MMOL/L — SIGNIFICANT CHANGE UP (ref 96–108)
CO2 SERPL-SCNC: 27 MMOL/L — SIGNIFICANT CHANGE UP (ref 22–31)
CO2 SERPL-SCNC: 27 MMOL/L — SIGNIFICANT CHANGE UP (ref 22–31)
CREAT SERPL-MCNC: 0.71 MG/DL — SIGNIFICANT CHANGE UP (ref 0.5–1.3)
CREAT SERPL-MCNC: 0.71 MG/DL — SIGNIFICANT CHANGE UP (ref 0.5–1.3)
EGFR: 76 ML/MIN/1.73M2 — SIGNIFICANT CHANGE UP
EGFR: 76 ML/MIN/1.73M2 — SIGNIFICANT CHANGE UP
ELLIPTOCYTES BLD QL SMEAR: SIGNIFICANT CHANGE UP
ELLIPTOCYTES BLD QL SMEAR: SIGNIFICANT CHANGE UP
EOSINOPHIL # BLD AUTO: 0.09 K/UL — SIGNIFICANT CHANGE UP (ref 0–0.5)
EOSINOPHIL # BLD AUTO: 0.09 K/UL — SIGNIFICANT CHANGE UP (ref 0–0.5)
EOSINOPHIL NFR BLD AUTO: 2.6 % — SIGNIFICANT CHANGE UP (ref 0–6)
EOSINOPHIL NFR BLD AUTO: 2.6 % — SIGNIFICANT CHANGE UP (ref 0–6)
GLUCOSE SERPL-MCNC: 91 MG/DL — SIGNIFICANT CHANGE UP (ref 70–99)
GLUCOSE SERPL-MCNC: 91 MG/DL — SIGNIFICANT CHANGE UP (ref 70–99)
HCT VFR BLD CALC: 33.6 % — LOW (ref 34.5–45)
HCT VFR BLD CALC: 33.6 % — LOW (ref 34.5–45)
HGB BLD-MCNC: 10.3 G/DL — LOW (ref 11.5–15.5)
HGB BLD-MCNC: 10.3 G/DL — LOW (ref 11.5–15.5)
LYMPHOCYTES # BLD AUTO: 0.43 K/UL — LOW (ref 1–3.3)
LYMPHOCYTES # BLD AUTO: 0.43 K/UL — LOW (ref 1–3.3)
LYMPHOCYTES # BLD AUTO: 12.8 % — LOW (ref 13–44)
LYMPHOCYTES # BLD AUTO: 12.8 % — LOW (ref 13–44)
MACROCYTES BLD QL: SLIGHT — SIGNIFICANT CHANGE UP
MACROCYTES BLD QL: SLIGHT — SIGNIFICANT CHANGE UP
MANUAL SMEAR VERIFICATION: SIGNIFICANT CHANGE UP
MANUAL SMEAR VERIFICATION: SIGNIFICANT CHANGE UP
MCHC RBC-ENTMCNC: 30.7 GM/DL — LOW (ref 32–36)
MCHC RBC-ENTMCNC: 30.7 GM/DL — LOW (ref 32–36)
MCHC RBC-ENTMCNC: 31.3 PG — SIGNIFICANT CHANGE UP (ref 27–34)
MCHC RBC-ENTMCNC: 31.3 PG — SIGNIFICANT CHANGE UP (ref 27–34)
MCV RBC AUTO: 102.1 FL — HIGH (ref 80–100)
MCV RBC AUTO: 102.1 FL — HIGH (ref 80–100)
MONOCYTES # BLD AUTO: 0.12 K/UL — SIGNIFICANT CHANGE UP (ref 0–0.9)
MONOCYTES # BLD AUTO: 0.12 K/UL — SIGNIFICANT CHANGE UP (ref 0–0.9)
MONOCYTES NFR BLD AUTO: 3.4 % — SIGNIFICANT CHANGE UP (ref 2–14)
MONOCYTES NFR BLD AUTO: 3.4 % — SIGNIFICANT CHANGE UP (ref 2–14)
NEUTROPHILS # BLD AUTO: 2.75 K/UL — SIGNIFICANT CHANGE UP (ref 1.8–7.4)
NEUTROPHILS # BLD AUTO: 2.75 K/UL — SIGNIFICANT CHANGE UP (ref 1.8–7.4)
NEUTROPHILS NFR BLD AUTO: 81.2 % — HIGH (ref 43–77)
NEUTROPHILS NFR BLD AUTO: 81.2 % — HIGH (ref 43–77)
PLAT MORPH BLD: NORMAL — SIGNIFICANT CHANGE UP
PLAT MORPH BLD: NORMAL — SIGNIFICANT CHANGE UP
PLATELET # BLD AUTO: 103 K/UL — LOW (ref 150–400)
PLATELET # BLD AUTO: 103 K/UL — LOW (ref 150–400)
POIKILOCYTOSIS BLD QL AUTO: SIGNIFICANT CHANGE UP
POIKILOCYTOSIS BLD QL AUTO: SIGNIFICANT CHANGE UP
POTASSIUM SERPL-MCNC: 3.9 MMOL/L — SIGNIFICANT CHANGE UP (ref 3.5–5.3)
POTASSIUM SERPL-MCNC: 3.9 MMOL/L — SIGNIFICANT CHANGE UP (ref 3.5–5.3)
POTASSIUM SERPL-SCNC: 3.9 MMOL/L — SIGNIFICANT CHANGE UP (ref 3.5–5.3)
POTASSIUM SERPL-SCNC: 3.9 MMOL/L — SIGNIFICANT CHANGE UP (ref 3.5–5.3)
PROT SERPL-MCNC: 6.7 G/DL — SIGNIFICANT CHANGE UP (ref 6–8.3)
PROT SERPL-MCNC: 6.7 G/DL — SIGNIFICANT CHANGE UP (ref 6–8.3)
RBC # BLD: 3.29 M/UL — LOW (ref 3.8–5.2)
RBC # BLD: 3.29 M/UL — LOW (ref 3.8–5.2)
RBC # FLD: 13.8 % — SIGNIFICANT CHANGE UP (ref 10.3–14.5)
RBC # FLD: 13.8 % — SIGNIFICANT CHANGE UP (ref 10.3–14.5)
RBC BLD AUTO: ABNORMAL
RBC BLD AUTO: ABNORMAL
SODIUM SERPL-SCNC: 140 MMOL/L — SIGNIFICANT CHANGE UP (ref 135–145)
SODIUM SERPL-SCNC: 140 MMOL/L — SIGNIFICANT CHANGE UP (ref 135–145)
TROPONIN T, HIGH SENSITIVITY RESULT: 15 NG/L — SIGNIFICANT CHANGE UP (ref 0–51)
TROPONIN T, HIGH SENSITIVITY RESULT: 15 NG/L — SIGNIFICANT CHANGE UP (ref 0–51)
WBC # BLD: 3.39 K/UL — LOW (ref 3.8–10.5)
WBC # BLD: 3.39 K/UL — LOW (ref 3.8–10.5)
WBC # FLD AUTO: 3.39 K/UL — LOW (ref 3.8–10.5)
WBC # FLD AUTO: 3.39 K/UL — LOW (ref 3.8–10.5)

## 2023-12-30 PROCEDURE — 99223 1ST HOSP IP/OBS HIGH 75: CPT

## 2023-12-30 PROCEDURE — 70450 CT HEAD/BRAIN W/O DYE: CPT | Mod: 26,MA

## 2023-12-30 PROCEDURE — 71045 X-RAY EXAM CHEST 1 VIEW: CPT | Mod: 26

## 2023-12-30 PROCEDURE — 99285 EMERGENCY DEPT VISIT HI MDM: CPT | Mod: GC

## 2023-12-30 RX ORDER — APIXABAN 2.5 MG/1
2.5 TABLET, FILM COATED ORAL EVERY 12 HOURS
Refills: 0 | Status: DISCONTINUED | OUTPATIENT
Start: 2023-12-30 | End: 2024-01-04

## 2023-12-30 RX ORDER — LEVOTHYROXINE SODIUM 125 MCG
100 TABLET ORAL DAILY
Refills: 0 | Status: DISCONTINUED | OUTPATIENT
Start: 2023-12-30 | End: 2024-01-04

## 2023-12-30 RX ORDER — SODIUM CHLORIDE 9 MG/ML
1000 INJECTION, SOLUTION INTRAVENOUS
Refills: 0 | Status: DISCONTINUED | OUTPATIENT
Start: 2023-12-30 | End: 2024-01-01

## 2023-12-30 RX ORDER — AMIODARONE HYDROCHLORIDE 400 MG/1
200 TABLET ORAL
Refills: 0 | Status: DISCONTINUED | OUTPATIENT
Start: 2023-12-30 | End: 2024-01-04

## 2023-12-30 RX ORDER — SERTRALINE 25 MG/1
50 TABLET, FILM COATED ORAL DAILY
Refills: 0 | Status: DISCONTINUED | OUTPATIENT
Start: 2023-12-30 | End: 2024-01-04

## 2023-12-30 RX ORDER — ALBUTEROL 90 UG/1
2 AEROSOL, METERED ORAL EVERY 6 HOURS
Refills: 0 | Status: DISCONTINUED | OUTPATIENT
Start: 2023-12-30 | End: 2024-01-04

## 2023-12-30 RX ORDER — ACETAMINOPHEN 500 MG
650 TABLET ORAL EVERY 6 HOURS
Refills: 0 | Status: DISCONTINUED | OUTPATIENT
Start: 2023-12-30 | End: 2024-01-04

## 2023-12-30 RX ADMIN — SODIUM CHLORIDE 75 MILLILITER(S): 9 INJECTION, SOLUTION INTRAVENOUS at 18:48

## 2023-12-30 RX ADMIN — APIXABAN 2.5 MILLIGRAM(S): 2.5 TABLET, FILM COATED ORAL at 18:48

## 2023-12-30 NOTE — H&P ADULT - NSICDXPASTMEDICALHX_GEN_ALL_CORE_FT
PAST MEDICAL HISTORY:  Afib     Aortic stenosis     Asthma     Gastritis     HTN (hypertension)     Hypothyroid     Orthostatic hypotension     Pulmonary TB     SBO (small bowel obstruction)     TB (pulmonary tuberculosis) TB of abdomen 1960

## 2023-12-30 NOTE — H&P ADULT - ASSESSMENT
89 yo f with h/o Afib on eliquis, amio, HTN, severe AS , hypothyroid, anemia, h/o SBO ex-lap. recurrent hosp stay sec to falls/dizziness/syncope in setting of orthostasis presented again with dizziness

## 2023-12-30 NOTE — H&P ADULT - PROBLEM SELECTOR PLAN 6
h/o HTN though not on any BP meds.   Previously on midodrine and florinef but stopped due to supine HTN.   Would allow some degree of hypertension given current orthostatic issues.

## 2023-12-30 NOTE — ED PROVIDER NOTE - PROGRESS NOTE DETAILS
Adri Vicente, PGY-2 DO:   Labs with no acute findings. CT scan of head no acute findings. Attempted to ambulate patient. Patient feels like she is going to collapse. Patient TBA for PT and head pressure.

## 2023-12-30 NOTE — H&P ADULT - HISTORY OF PRESENT ILLNESS
89 yo f with h/o Afib on eliquis, amio, HTN, severe AS , hypothyroid, anemia, h/o SBO ex-lap. recurrent hosp stay sec to falls/dizziness/syncope in setting of orthostasis presented again with dizziness after going to bathroom (urinate) yesterday. Prior to using the bathroom pt felt well without any symptoms but immediately after she felt dizzy with pressure around the top of her head. Had to stay in bed rest of day unable to move or stand due to worry she might fall. In the ED still feeling some pressure on top of her head and when sitting up during exam feels dizziness. Denies vertigo like symptoms but more "dizziness" . denies any chest pain, SOB, or palpitations.      Of note, patient was recent admitted at OSH 3 weeks ago with SOB thought to be sec to asthma exacerbation received steroid and discharged 3 days later on oral steroid. was followed up with Dr. Goldberg (PCP/card) and continued on steroid as outpt. Patient does report having freq urination at baseline . doesn feel that has changed with steroid. denies any dysuria or change in urine color, smell or discharge. Currently without overt cough, congestion or fevers at home. takes albuterol inhalers intermittently for many years doesnt think she has been using more as recent.     In the ED, VS afebrile, 14.72, 60, 16, 95% RA. resting comfortably without acute symptoms. hard of hearing but able to communicate and appropriately.

## 2023-12-30 NOTE — H&P ADULT - NSHPPHYSICALEXAM_GEN_ALL_CORE
T(C): 36.5 (12-30-23 @ 17:19), Max: 36.6 (12-30-23 @ 11:10)  HR: 66 (12-30-23 @ 17:19) (55 - 66)  BP: 129/79 (12-30-23 @ 17:19) (129/72 - 147/78)  RR: 15 (12-30-23 @ 17:19) (15 - 16)  SpO2: 96% (12-30-23 @ 17:19) (95% - 96%)    CONSTITUTIONAL: Well groomed, no apparent distress  EYES: PERRLA and symmetric, EOMI, No conjunctival or scleral injection, non-icteric  ENMT: Oral mucosa with moist membranes. Normal dentition; no pharyngeal injection or exudates  NECK: Supple, symmetric and without tracheal deviation   RESP: No respiratory distress, no use of accessory muscles; CTA b/l, no WRR  CV: RRR, +S1S2, no MRG; no JVD; no peripheral edema  GI: Soft, NT, ND, no rebound, no guarding; no palpable masses   MSK: Normal ROM without pain, no spinal tenderness, normal muscle strength/tone  SKIN: No rashes or ulcers noted; no subcutaneous nodules or induration palpable  NEURO: sensation intact in upper and lower extremities , strength grossly intact   PSYCH: Appropriate insight/judgment; A+O x 3, mood and affect appropriate, recent/remote memory intact

## 2023-12-30 NOTE — ED ADULT NURSE NOTE - NSFALLHARMRISKINTERV_ED_ALL_ED
Assistance OOB with selected safe patient handling equipment if applicable/Assistance with ambulation/Communicate risk of Fall with Harm to all staff, patient, and family/Encourage patient to sit up slowly, dangle for a short time, stand at bedside before walking/Monitor gait and stability/Orthostatic vital signs/Provide patient with walking aids/Provide visual cue: red socks, yellow wristband, yellow gown, etc/Reinforce activity limits and safety measures with patient and family/Bed in lowest position, wheels locked, appropriate side rails in place/Call bell, personal items and telephone in reach/Instruct patient to call for assistance before getting out of bed/chair/stretcher/Non-slip footwear applied when patient is off stretcher/[place] to call system/Physically safe environment - no spills, clutter or unnecessary equipment/Purposeful Proactive Rounding/Room/bathroom lighting operational, light cord in reach Assistance OOB with selected safe patient handling equipment if applicable/Assistance with ambulation/Communicate risk of Fall with Harm to all staff, patient, and family/Encourage patient to sit up slowly, dangle for a short time, stand at bedside before walking/Monitor gait and stability/Orthostatic vital signs/Provide patient with walking aids/Provide visual cue: red socks, yellow wristband, yellow gown, etc/Reinforce activity limits and safety measures with patient and family/Bed in lowest position, wheels locked, appropriate side rails in place/Call bell, personal items and telephone in reach/Instruct patient to call for assistance before getting out of bed/chair/stretcher/Non-slip footwear applied when patient is off stretcher/[place] to call system/Physically safe environment - no spills, clutter or unnecessary equipment/Purposeful Proactive Rounding/Room/bathroom lighting operational, light cord in reach

## 2023-12-30 NOTE — ED PROVIDER NOTE - OBJECTIVE STATEMENT
89-year-old female, medical history significant for A-fib, HTN, presenting for head pressure.  The patient reports that her symptoms began yesterday.  The patient reports that she feels dizzy with the head pressure but does not feel like she is going to pass out or and she is not having any room spinning sensation.  The patient states that after she used the bathroom yesterday her symptoms began and she had to lay down for the rest of the day because she thought she was going to collapse secondary to the head pressure.  The patient reports that she does not feel like there is any pain so she has not taken any medication.  The patient has been seen in the hospital in the past for multiple falls and was diagnosed with orthostatic hypotension at that time but has not had any falls since then.  The patient denies fevers, chills, chest pain, shortness of breath, headache, visual changes, nausea or vomiting.

## 2023-12-30 NOTE — ED ADULT NURSE NOTE - OBJECTIVE STATEMENT
89 y/o female, A&O x3, PMH asthma, hypothyroidism, and afib presents to ED by EMS c/o head pressure and dizziness. Pt endorses yesterday around 5pm getting up from the toilet when she became dizzy and felt head pressure. Pt states she did not fall but felt like she was going to pass out. Pt endorses head pressure has not subsided since yesterday evening. Pt affect calm and appropriate, spontaneous unlabored breathing, strong peripheral pulses, PERRLA, equal strength and sensation bilaterally, ambulated w/ assistance of walker, abdomen soft nondistended, skin clean dry and intact. Pt denies chest pain, SOB/difficulty breathing, fever/chills, HA, abd pain, N/V/D, numbness/tingling, blurred vision. Safety and comfort measures maintained. 89 y/o female, A&O x3, PMH asthma, hypothyroidism, and afib presents to ED by EMS c/o head pressure and dizziness. Pt endorses yesterday around 5pm getting up from the toilet when she became dizzy and felt head pressure, on the top of her head. Pt states she did not fall but felt like she was going to pass out. Pt endorses head pressure has not subsided since yesterday evening. Pt affect calm and appropriate, spontaneous unlabored breathing, strong peripheral pulses, PERRLA, equal strength and sensation bilaterally, ambulated w/ assistance of walker, abdomen soft nondistended, skin clean dry and intact. Pt denies chest pain, SOB/difficulty breathing, fever/chills, HA, abd pain, N/V/D, numbness/tingling, blurred vision. Safety and comfort measures maintained.

## 2023-12-30 NOTE — ED PROVIDER NOTE - CLINICAL SUMMARY MEDICAL DECISION MAKING FREE TEXT BOX
89-year-old female, medical history significant for A-fib, HTN, presenting for head pressure. The patient denies fevers, chills, chest pain, shortness of breath, headache, visual changes, nausea or vomiting. VSS. PE. no acute finding. No focal neurological deficits.     Differential is not limited to intracranial mass, subdural hematoma, electrolyte/hematological derangements.  Will obtain basic labs, CT head, the patient does not want anything for pain at the moment.  Dispo pending labs imaging and reassessment. 89-year-old female, medical history significant for A-fib, HTN, presenting for head pressure. The patient denies fevers, chills, chest pain, shortness of breath, headache, visual changes, nausea or vomiting. VSS. PE. no acute finding. No focal neurological deficits.     Differential is not limited to intracranial mass, subdural hematoma, electrolyte/hematological derangements.  Will obtain basic labs, CT head, the patient does not want anything for pain at the moment.  Dispo pending labs imaging and reassessment.    Dr. Skaggs (Attending Physician)

## 2023-12-30 NOTE — PATIENT PROFILE ADULT - FALL HARM RISK - HARM RISK INTERVENTIONS
Assistance with ambulation/Assistance OOB with selected safe patient handling equipment/Communicate Risk of Fall with Harm to all staff/Discuss with provider need for PT consult/Monitor gait and stability/Provide patient with walking aids - walker, cane, crutches/Reinforce activity limits and safety measures with patient and family/Tailored Fall Risk Interventions/Visual Cue: Yellow wristband and red socks/Bed in lowest position, wheels locked, appropriate side rails in place/Call bell, personal items and telephone in reach/Instruct patient to call for assistance before getting out of bed or chair/Non-slip footwear when patient is out of bed/[place] to call system/Physically safe environment - no spills, clutter or unnecessary equipment/Purposeful Proactive Rounding/Room/bathroom lighting operational, light cord in reach Assistance with ambulation/Assistance OOB with selected safe patient handling equipment/Communicate Risk of Fall with Harm to all staff/Discuss with provider need for PT consult/Monitor gait and stability/Provide patient with walking aids - walker, cane, crutches/Reinforce activity limits and safety measures with patient and family/Tailored Fall Risk Interventions/Visual Cue: Yellow wristband and red socks/Bed in lowest position, wheels locked, appropriate side rails in place/Call bell, personal items and telephone in reach/Instruct patient to call for assistance before getting out of bed or chair/Non-slip footwear when patient is out of bed/[place] to call system/Physically safe environment - no spills, clutter or unnecessary equipment/Purposeful Proactive Rounding/Room/bathroom lighting operational, light cord in reach

## 2023-12-30 NOTE — H&P ADULT - PROBLEM SELECTOR PLAN 2
Previous echo in Aug 2023 showing severe AS with hyperdynamic function. unable to calculate LVOT.  Possible may be contributing to recurrent hosp stays with dizziness, falls. though unclear pt is a candidate for any intervention given age and other co morbidity.   Will defer to card re: options.   Avoid dehydration

## 2023-12-30 NOTE — ED ADULT TRIAGE NOTE - HEIGHT IN FEET
Patient here with dental pain, has had this for over a week and medications prescribed by dentist not helping.   5

## 2023-12-30 NOTE — H&P ADULT - PROBLEM SELECTOR PLAN 3
recent hosp stay 3 weeks ago and started on oral steroid.   Per daughter still taking low dose steroid unsure what dose.   Today's lung exam clear without wheezing.   Would monitor off further steroid   cont with inhaler/neb PRN

## 2023-12-30 NOTE — ED PROVIDER NOTE - CARE PLAN
1 Principal Discharge DX:	Pressure in head   Principal Discharge DX:	Pressure in head  Secondary Diagnosis:	Near syncope

## 2023-12-30 NOTE — ED PROVIDER NOTE - NSICDXPASTSURGICALHX_GEN_ALL_CORE_FT
Family PAST SURGICAL HISTORY:  H/O hemorrhoidectomy     History of Bilateral Breast Biopsy     S/P Exploratory Laparotomy TB in 1960s    S/P vein stripping

## 2023-12-30 NOTE — PATIENT PROFILE ADULT - FUNCTIONAL ASSESSMENT - BASIC MOBILITY 6.
3-calculated by average/Not able to assess (calculate score using [facility] averaging method) 3-calculated by average/Not able to assess (calculate score using [facility] averaging method)

## 2023-12-30 NOTE — H&P ADULT - PROBLEM SELECTOR PLAN 1
overall clinical picture concerning for recurrent orthostatic hypotension/vasovagal in etiology. (post micturition near-syncope with some degree of dehydration)   Overall cell lines on CBC relatively elevated compared to her baseline values suggestive dehydration.   Would give gentle IVF x 10 hours and reeval symptoms. check orthostatic BP.   Previously pt was on midodrine and Florinef but taken off due to supine hypertension.   Pt also recent started on low dose steroid due to asthma symptoms may contribute to freq urination.  will check UA and CXR given freq urination (previously UTI and pleural effusion causing simila presentation)  encouraged LE ACE wrap and abd binder (pt has supplies at home but doesn't use)  monitor on tele for events

## 2023-12-31 LAB
APPEARANCE UR: ABNORMAL
APPEARANCE UR: ABNORMAL
BACTERIA # UR AUTO: ABNORMAL /HPF
BACTERIA # UR AUTO: ABNORMAL /HPF
BILIRUB UR-MCNC: NEGATIVE — SIGNIFICANT CHANGE UP
BILIRUB UR-MCNC: NEGATIVE — SIGNIFICANT CHANGE UP
CAST: 0 /LPF — SIGNIFICANT CHANGE UP (ref 0–4)
CAST: 0 /LPF — SIGNIFICANT CHANGE UP (ref 0–4)
COLOR SPEC: YELLOW — SIGNIFICANT CHANGE UP
COLOR SPEC: YELLOW — SIGNIFICANT CHANGE UP
DIFF PNL FLD: NEGATIVE — SIGNIFICANT CHANGE UP
DIFF PNL FLD: NEGATIVE — SIGNIFICANT CHANGE UP
GLUCOSE UR QL: NEGATIVE MG/DL — SIGNIFICANT CHANGE UP
GLUCOSE UR QL: NEGATIVE MG/DL — SIGNIFICANT CHANGE UP
HCT VFR BLD CALC: 36.2 % — SIGNIFICANT CHANGE UP (ref 34.5–45)
HCT VFR BLD CALC: 36.2 % — SIGNIFICANT CHANGE UP (ref 34.5–45)
HGB BLD-MCNC: 11.2 G/DL — LOW (ref 11.5–15.5)
HGB BLD-MCNC: 11.2 G/DL — LOW (ref 11.5–15.5)
KETONES UR-MCNC: NEGATIVE MG/DL — SIGNIFICANT CHANGE UP
KETONES UR-MCNC: NEGATIVE MG/DL — SIGNIFICANT CHANGE UP
LEUKOCYTE ESTERASE UR-ACNC: NEGATIVE — SIGNIFICANT CHANGE UP
LEUKOCYTE ESTERASE UR-ACNC: NEGATIVE — SIGNIFICANT CHANGE UP
MCHC RBC-ENTMCNC: 30.9 GM/DL — LOW (ref 32–36)
MCHC RBC-ENTMCNC: 30.9 GM/DL — LOW (ref 32–36)
MCHC RBC-ENTMCNC: 31.9 PG — SIGNIFICANT CHANGE UP (ref 27–34)
MCHC RBC-ENTMCNC: 31.9 PG — SIGNIFICANT CHANGE UP (ref 27–34)
MCV RBC AUTO: 103.1 FL — HIGH (ref 80–100)
MCV RBC AUTO: 103.1 FL — HIGH (ref 80–100)
NITRITE UR-MCNC: NEGATIVE — SIGNIFICANT CHANGE UP
NITRITE UR-MCNC: NEGATIVE — SIGNIFICANT CHANGE UP
NRBC # BLD: 0 /100 WBCS — SIGNIFICANT CHANGE UP (ref 0–0)
NRBC # BLD: 0 /100 WBCS — SIGNIFICANT CHANGE UP (ref 0–0)
PH UR: 6.5 — SIGNIFICANT CHANGE UP (ref 5–8)
PH UR: 6.5 — SIGNIFICANT CHANGE UP (ref 5–8)
PLATELET # BLD AUTO: 104 K/UL — LOW (ref 150–400)
PLATELET # BLD AUTO: 104 K/UL — LOW (ref 150–400)
PROT UR-MCNC: NEGATIVE MG/DL — SIGNIFICANT CHANGE UP
PROT UR-MCNC: NEGATIVE MG/DL — SIGNIFICANT CHANGE UP
RBC # BLD: 3.51 M/UL — LOW (ref 3.8–5.2)
RBC # BLD: 3.51 M/UL — LOW (ref 3.8–5.2)
RBC # FLD: 13.7 % — SIGNIFICANT CHANGE UP (ref 10.3–14.5)
RBC # FLD: 13.7 % — SIGNIFICANT CHANGE UP (ref 10.3–14.5)
RBC CASTS # UR COMP ASSIST: 2 /HPF — SIGNIFICANT CHANGE UP (ref 0–4)
RBC CASTS # UR COMP ASSIST: 2 /HPF — SIGNIFICANT CHANGE UP (ref 0–4)
SP GR SPEC: 1.01 — SIGNIFICANT CHANGE UP (ref 1–1.03)
SP GR SPEC: 1.01 — SIGNIFICANT CHANGE UP (ref 1–1.03)
SQUAMOUS # UR AUTO: 4 /HPF — SIGNIFICANT CHANGE UP (ref 0–5)
SQUAMOUS # UR AUTO: 4 /HPF — SIGNIFICANT CHANGE UP (ref 0–5)
UROBILINOGEN FLD QL: 0.2 MG/DL — SIGNIFICANT CHANGE UP (ref 0.2–1)
UROBILINOGEN FLD QL: 0.2 MG/DL — SIGNIFICANT CHANGE UP (ref 0.2–1)
WBC # BLD: 3.13 K/UL — LOW (ref 3.8–10.5)
WBC # BLD: 3.13 K/UL — LOW (ref 3.8–10.5)
WBC # FLD AUTO: 3.13 K/UL — LOW (ref 3.8–10.5)
WBC # FLD AUTO: 3.13 K/UL — LOW (ref 3.8–10.5)
WBC UR QL: 3 /HPF — SIGNIFICANT CHANGE UP (ref 0–5)
WBC UR QL: 3 /HPF — SIGNIFICANT CHANGE UP (ref 0–5)

## 2023-12-31 RX ADMIN — Medication 100 MICROGRAM(S): at 06:02

## 2023-12-31 RX ADMIN — APIXABAN 2.5 MILLIGRAM(S): 2.5 TABLET, FILM COATED ORAL at 06:02

## 2023-12-31 RX ADMIN — SERTRALINE 50 MILLIGRAM(S): 25 TABLET, FILM COATED ORAL at 08:58

## 2023-12-31 RX ADMIN — APIXABAN 2.5 MILLIGRAM(S): 2.5 TABLET, FILM COATED ORAL at 17:20

## 2023-12-31 NOTE — PHYSICAL THERAPY INITIAL EVALUATION ADULT - ADDITIONAL COMMENTS
she resides alone, in an apartment building, with elevator access. Pt reports that she is able to provide for some of her needs independently, however, receives HHA services from [facility], 7 days/week x 6 hrs/day, she owns and ambulates with a walker and owns w/c and bedside commode.

## 2023-12-31 NOTE — PROGRESS NOTE ADULT - ASSESSMENT
89 yo f with h/o Afib on eliquis, amio, HTN, severe AS , hypothyroid, anemia, h/o SBO ex-lap. recurrent hosp stay sec to falls/dizziness/syncope in setting of orthostasis presented again with dizziness      Plan:    # Dizziness/ syncope r/o orthostatic hypotension/vasovagal:  - Was on midodrine and fludrocortisone prior - held on prior admission  - CTH negative  - Monitor orthostatic's  - Fall precautions  - Echo pending  - Monitor on tele  - Cards following    # AS/ Afib/HTN:  - Trend Bp's  - C/w current meds  - Echo pending    # Asthma:  - recent hosp stay 3 weeks ago and started on oral steroid  - monitor off steroids  - C/w inhaler/neb PRN    # Hypothyroidism:  - C/w Synthroid    # Depression:  - C/w current meds    # GI ppx:  - Bowel regimen prn    # DVT ppx:  - IPC's  - Eliquis    [employer]

## 2023-12-31 NOTE — PHYSICAL THERAPY INITIAL EVALUATION ADULT - PERTINENT HX OF CURRENT PROBLEM, REHAB EVAL
Pt is 89 yo f with h/o Afib on eliquis, amio, HTN, severe AS , hypothyroid, anemia, h/o SBO ex-lap. recurrent hosp stay sec to falls/dizziness/syncope in setting of orthostasis presented again with dizziness.   12/30 CT head - No acute intracranial hemorrhage, mass effect, or shift of the midline structures. Similar-appearing mild chronic white matter microvascular type changes.

## 2023-12-31 NOTE — PROGRESS NOTE ADULT - SUBJECTIVE AND OBJECTIVE BOX
SUBJECTIVE / OVERNIGHT EVENTS:      Patient seen and examined at bedside. No events noted overnight. Resting comfortably in bed      --------------------------------------------------------------------------------------------  LABS:                        11.2   3.13  )-----------( 104      ( 31 Dec 2023 11:30 )             36.2     12-30    140  |  105  |  9   ----------------------------<  91  3.9   |  27  |  0.71    Ca    9.2      30 Dec 2023 11:48    TPro  6.7  /  Alb  3.6  /  TBili  0.4  /  DBili  x   /  AST  16  /  ALT  11  /  AlkPhos  64  12-30      CAPILLARY BLOOD GLUCOSE            Urinalysis Basic - ( 31 Dec 2023 04:13 )    Color: Yellow / Appearance: Cloudy / S.012 / pH: x  Gluc: x / Ketone: Negative mg/dL  / Bili: Negative / Urobili: 0.2 mg/dL   Blood: x / Protein: Negative mg/dL / Nitrite: Negative   Leuk Esterase: Negative / RBC: 2 /HPF / WBC 3 /HPF   Sq Epi: x / Non Sq Epi: 4 /HPF / Bacteria: Many /HPF        RADIOLOGY & ADDITIONAL TESTS: < from: CT Head No Cont (23 @ 14:11) >  IMPRESSION: No acute intracranial hemorrhage, mass effect, or shift of   the midline structures.    Similar-appearing mild chronic white matter microvascular type changes.    --- End of Report ---    < end of copied text >      Imaging Personally Reviewed:  [x] YES  [ ] NO    Consultant(s) Notes Reviewed:  [x] YES  [ ] NO    MEDICATIONS  (STANDING):  aMIOdarone    Tablet 200 milliGRAM(s) Oral <User Schedule>  apixaban 2.5 milliGRAM(s) Oral every 12 hours  lactated ringers. 1000 milliLiter(s) (75 mL/Hr) IV Continuous <Continuous>  levothyroxine 100 MICROGram(s) Oral daily  sertraline 50 milliGRAM(s) Oral daily    MEDICATIONS  (PRN):  acetaminophen     Tablet .. 650 milliGRAM(s) Oral every 6 hours PRN Temp greater or equal to 38C (100.4F), Mild Pain (1 - 3)  albuterol    90 MICROgram(s) HFA Inhaler 2 Puff(s) Inhalation every 6 hours PRN Shortness of Breath      Care Discussed with Consultants/Other Providers [x] YES  [ ] NO    Vital Signs Last 24 Hrs  T(C): 36.4 (31 Dec 2023 11:11), Max: 36.8 (30 Dec 2023 20:00)  T(F): 97.5 (31 Dec 2023 11:11), Max: 98.2 (30 Dec 2023 20:00)  HR: 61 (31 Dec 2023 11:11) (57 - 70)  BP: 114/62 (31 Dec 2023 11:11) (109/64 - 177/92)  BP(mean): --  RR: 18 (31 Dec 2023 11:11) (15 - 18)  SpO2: 97% (31 Dec 2023 11:11) (92% - 97%)    Parameters below as of 31 Dec 2023 11:11  Patient On (Oxygen Delivery Method): room air      I&O's Summary      PHYSICAL EXAM:  GENERAL: NAD, well-developed, comfortable  HEAD:  Atraumatic, Normocephalic  EYES: EOMI, PERRLA, conjunctiva and sclera clear  NECK: Supple, No JVD  CHEST/LUNG: Clear to auscultation bilaterally; No wheeze  HEART: Regular rate and rhythm; No murmurs, rubs, or gallops  ABDOMEN: Soft, Nontender, Nondistended; Bowel sounds present  NEURO: AAOx3, no focal weakness, 5/5 b/l extremity strength, b/l knee no arthritis, no effusion   EXTREMITIES:  2+ Peripheral Pulses, No clubbing, cyanosis, or edema  SKIN: No rashes or lesions

## 2023-12-31 NOTE — CONSULT NOTE ADULT - TIME BILLING
Patient seen and examined, agree with the above assessment and plan by SAIRA Badillo    89 yo f with h/o Afib on eliquis, amio, HTN, severe AS , hypothyroid, anemia, h/o SBO ex-lap. recurrent hosp stay sec to falls/dizziness/syncope in setting of orthostasis presented again with dizziness after going to bathroom (urinate) yesterday.      #Dizziness  -CTH no significant findings  -Has hx of orthostatic hypotension  -Was on midodrine and fludrocortisone prior - held on prior admission  -Continue with ivf  -Check orthostatics  -recent echo with hyperdynamic lv fxn - can repeat echo     #Chronic atrial fibrillation  -Sinus on tele   -cont amio  -Continue apixaban  -Avoid bb given intermittent bradycardia    #Mod AS  -Repeat echo   -Prior CT with Dense aortic valve calcification and multivessel coronary artery calcification  -continue medical management

## 2023-12-31 NOTE — CONSULT NOTE ADULT - SUBJECTIVE AND OBJECTIVE BOX
CARDIOLOGY CONSULT - Dr. Moeller         HPI:  89 yo f with h/o Afib on eliquis, amio, HTN, severe AS , hypothyroid, anemia, h/o SBO ex-lap. recurrent hosp stay sec to falls/dizziness/syncope in setting of orthostasis presented again with dizziness after going to bathroom (urinate) yesterday. Prior to using the bathroom pt felt well without any symptoms but immediately after she felt dizzy with pressure around the top of her head. Had to stay in bed rest of day unable to move or stand due to worry she might fall. In the ED still feeling some pressure on top of her head and when sitting up during exam feels dizziness. Denies vertigo like symptoms but more "dizziness" . denies any chest pain, SOB, or palpitations.      Of note, patient was recent admitted at OSH 3 weeks ago with SOB thought to be sec to asthma exacerbation received steroid and discharged 3 days later on oral steroid. was followed up with Dr. Goldberg (PCP/card) and continued on steroid as outpt. Patient does report having freq urination at baseline . doesn feel that has changed with steroid. denies any dysuria or change in urine color, smell or discharge. Currently without overt cough, congestion or fevers at home. takes albuterol inhalers intermittently for many years doesnt think she has been using more as recent.     In the ED, VS afebrile, 14.72, 60, 16, 95% RA. resting comfortably without acute symptoms. hard of hearing but able to communicate and appropriately.  (30 Dec 2023 17:21)      PAST MEDICAL & SURGICAL HISTORY:  Pulmonary TB      Gastritis      Afib      SBO (small bowel obstruction)      Hypothyroid      HTN (hypertension)      TB (pulmonary tuberculosis)  TB of abdomen 1960      Orthostatic hypotension      Asthma      Aortic stenosis      History of Bilateral Breast Biopsy      S/P Exploratory Laparotomy  TB in 1960s      H/O hemorrhoidectomy      S/P vein stripping      PREVIOUS DIAGNOSTIC TESTING:    [x] Echocardiogram:  < from: TTE W or WO Ultrasound Enhancing Agent (08.15.23 @ 12:05) >  CONCLUSIONS:      1. Left ventricular systolic function is hyperdynamic.   2. DI 0.45 with PG 36 mm hg unlikely to represent severe aortic stenosis.   3. No pericardial effusion seen.   4. Parasternal widows are suboptimal Unable to measure LVOT therefore unable to calculate aortic valve area.   5. Aortic valve anatomy cannot be determined with reduced systolic excursion. moderate calcification of the aortic valve leaflets.   6. Compared to the transthoracic echocardiogram performed on 3/7/2022 the transaortic gradient has increased.   7. Mild-to-moderate aortic regurgitation.   8. 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    < end of copied text >    [ ]  Catheterization:  [ ] Stress Test:  	    MEDICATIONS:  Home Medications:  albuterol 90 mcg/inh inhalation aerosol: 2 puff(s) inhaled every 6 hours As needed Shortness of Breath (30 Dec 2023 17:24)  amiodarone 200 mg oral tablet: 1 tab(s) orally 5 times a week Mon thru Friday only (30 Dec 2023 17:24)  apixaban 2.5 mg oral tablet: 1 tab(s) orally every 12 hours (30 Dec 2023 17:24)  levothyroxine 100 mcg (0.1 mg) oral tablet: 1 tab(s) orally once a day (30 Dec 2023 17:24)  sertraline 50 mg oral tablet: 1 tab(s) orally once a day (30 Dec 2023 17:24)  Xiidra 5% ophthalmic solution: 1 drop(s) in each eye 2 times a day (30 Dec 2023 17:24)      MEDICATIONS  (STANDING):  aMIOdarone    Tablet 200 milliGRAM(s) Oral <User Schedule>  apixaban 2.5 milliGRAM(s) Oral every 12 hours  lactated ringers. 1000 milliLiter(s) (75 mL/Hr) IV Continuous <Continuous>  levothyroxine 100 MICROGram(s) Oral daily  sertraline 50 milliGRAM(s) Oral daily      FAMILY HISTORY:  No pertinent family history in first degree relatives        SOCIAL HISTORY:    [x] Non-smoker  [ ] Smoker  [ ] Alcohol    Allergies    penicillin (Rash)  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 reported allergy (Unknown)    Intolerances    	    REVIEW OF SYSTEMS:  CONSTITUTIONAL: No fever, weight loss, or fatigue  EYES: No eye pain, visual disturbances, or discharge  ENMT:  No difficulty hearing, tinnitus, vertigo; No sinus or throat pain  NECK: No pain or stiffness  RESPIRATORY: No cough, wheezing, chills or hemoptysis; No Shortness of Breath  CARDIOVASCULAR: No chest pain, palpitations, passing out, dizziness, or leg swelling  GASTROINTESTINAL: No abdominal or epigastric pain. No nausea, vomiting, or hematemesis; No diarrhea or constipation. No melena or hematochezia.  GENITOURINARY: No dysuria, frequency, hematuria, or incontinence  NEUROLOGICAL: No headaches, memory loss, loss of strength, numbness, or tremors  SKIN: No itching, burning, rashes, or lesions   	    [x] All others negative	  [ ] Unable to obtain    PHYSICAL EXAM:  T(C): 36.7 (12-31-23 @ 04:40), Max: 36.8 (12-30-23 @ 20:00)  HR: 57 (12-31-23 @ 04:40) (55 - 70)  BP: 175/67 (12-31-23 @ 04:40) (129/72 - 177/92)  RR: 18 (12-31-23 @ 04:40) (15 - 18)  SpO2: 96% (12-31-23 @ 04:40) (92% - 96%)  Wt(kg): --  I&O's Summary      Appearance: Elderly female 	  Psychiatry: A & O x 3, Mood & affect appropriate  HEENT:   Normal oral mucosa, PERRL, EOMI	  Lymphatic: No lymphadenopathy  Cardiovascular: Normal S1 S2,RRR, No JVD, No murmurs  Respiratory: Lungs clear to auscultation b/l   Gastrointestinal:  Soft, Non-tender, + BS	  Skin: No rashes, No ecchymoses, No cyanosis	  Neurologic: Non-focal  Extremities: Normal range of motion, No clubbing, cyanosis or edema  Vascular: Peripheral pulses palpable 2+ bilaterally    TELEMETRY: Sinus bradycardia 59bpm	    ECG:  	  RADIOLOGY:  < from: CT Head No Cont (12.30.23 @ 14:11) >    IMPRESSION: No acute intracranial hemorrhage, mass effect, or shift of   the midline structures.    Similar-appearing mild chronic white matter microvascular type changes.    --- End of Report ---      < end of copied text >    OTHER: 	  	  LABS:	 	    CARDIAC MARKERS:  Troponin T, High Sensitivity Result: 15 ng/L (12-30 @ 14:29)  Troponin T, High Sensitivity Result: 16 ng/L (12-30 @ 11:48)                                  10.3   3.39  )-----------( 103      ( 30 Dec 2023 11:48 )             33.6     12-30    140  |  105  |  9   ----------------------------<  91  3.9   |  27  |  0.71    Ca    9.2      30 Dec 2023 11:48    TPro  6.7  /  Alb  3.6  /  TBili  0.4  /  DBili  x   /  AST  16  /  ALT  11  /  AlkPhos  64  12-30      proBNP:   Lipid Profile:   HgA1c:   TSH:

## 2023-12-31 NOTE — CONSULT NOTE ADULT - ASSESSMENT
A/p  89 yo f with h/o Afib on eliquis, amio, HTN, severe AS , hypothyroid, anemia, h/o SBO ex-lap. recurrent hosp stay sec to falls/dizziness/syncope in setting of orthostasis presented again with dizziness after going to bathroom (urinate) yesterday.      #Dizziness  -CTH no significant findings  -Has hx of orthostatic hypotension  -Was on midodrine and fludrocortisone prior - held on prior admission  -Continue with ivf  -Check orthostatics  -recent echo with hyperdynamic lv fxn - can repeat echo     #Chronic atrial fibrillation  -Sinus on tele   -cont amio  -Continue apixaban  -Avoid bb given intermittent bradycardia    #Mod AS  -Repeat echo   -Prior CT with Dense aortic valve calcification and multivessel coronary artery calcification  -continue medical management

## 2024-01-01 LAB
ANISOCYTOSIS BLD QL: SLIGHT — SIGNIFICANT CHANGE UP
ANISOCYTOSIS BLD QL: SLIGHT — SIGNIFICANT CHANGE UP
BASOPHILS # BLD AUTO: 0 K/UL — SIGNIFICANT CHANGE UP (ref 0–0.2)
BASOPHILS # BLD AUTO: 0 K/UL — SIGNIFICANT CHANGE UP (ref 0–0.2)
BASOPHILS NFR BLD AUTO: 0 % — SIGNIFICANT CHANGE UP (ref 0–2)
BASOPHILS NFR BLD AUTO: 0 % — SIGNIFICANT CHANGE UP (ref 0–2)
DACRYOCYTES BLD QL SMEAR: SLIGHT — SIGNIFICANT CHANGE UP
DACRYOCYTES BLD QL SMEAR: SLIGHT — SIGNIFICANT CHANGE UP
ELLIPTOCYTES BLD QL SMEAR: SIGNIFICANT CHANGE UP
ELLIPTOCYTES BLD QL SMEAR: SIGNIFICANT CHANGE UP
EOSINOPHIL # BLD AUTO: 0.21 K/UL — SIGNIFICANT CHANGE UP (ref 0–0.5)
EOSINOPHIL # BLD AUTO: 0.21 K/UL — SIGNIFICANT CHANGE UP (ref 0–0.5)
EOSINOPHIL NFR BLD AUTO: 6 % — SIGNIFICANT CHANGE UP (ref 0–6)
EOSINOPHIL NFR BLD AUTO: 6 % — SIGNIFICANT CHANGE UP (ref 0–6)
HCT VFR BLD CALC: 35.2 % — SIGNIFICANT CHANGE UP (ref 34.5–45)
HCT VFR BLD CALC: 35.2 % — SIGNIFICANT CHANGE UP (ref 34.5–45)
HGB BLD-MCNC: 11.1 G/DL — LOW (ref 11.5–15.5)
HGB BLD-MCNC: 11.1 G/DL — LOW (ref 11.5–15.5)
LYMPHOCYTES # BLD AUTO: 0.39 K/UL — LOW (ref 1–3.3)
LYMPHOCYTES # BLD AUTO: 0.39 K/UL — LOW (ref 1–3.3)
LYMPHOCYTES # BLD AUTO: 11.1 % — LOW (ref 13–44)
LYMPHOCYTES # BLD AUTO: 11.1 % — LOW (ref 13–44)
MACROCYTES BLD QL: SLIGHT — SIGNIFICANT CHANGE UP
MACROCYTES BLD QL: SLIGHT — SIGNIFICANT CHANGE UP
MANUAL SMEAR VERIFICATION: SIGNIFICANT CHANGE UP
MANUAL SMEAR VERIFICATION: SIGNIFICANT CHANGE UP
MCHC RBC-ENTMCNC: 31.5 GM/DL — LOW (ref 32–36)
MCHC RBC-ENTMCNC: 31.5 GM/DL — LOW (ref 32–36)
MCHC RBC-ENTMCNC: 32.2 PG — SIGNIFICANT CHANGE UP (ref 27–34)
MCHC RBC-ENTMCNC: 32.2 PG — SIGNIFICANT CHANGE UP (ref 27–34)
MCV RBC AUTO: 102 FL — HIGH (ref 80–100)
MCV RBC AUTO: 102 FL — HIGH (ref 80–100)
MONOCYTES # BLD AUTO: 0.21 K/UL — SIGNIFICANT CHANGE UP (ref 0–0.9)
MONOCYTES # BLD AUTO: 0.21 K/UL — SIGNIFICANT CHANGE UP (ref 0–0.9)
MONOCYTES NFR BLD AUTO: 6 % — SIGNIFICANT CHANGE UP (ref 2–14)
MONOCYTES NFR BLD AUTO: 6 % — SIGNIFICANT CHANGE UP (ref 2–14)
NEUTROPHILS # BLD AUTO: 2.68 K/UL — SIGNIFICANT CHANGE UP (ref 1.8–7.4)
NEUTROPHILS # BLD AUTO: 2.68 K/UL — SIGNIFICANT CHANGE UP (ref 1.8–7.4)
NEUTROPHILS NFR BLD AUTO: 76.1 % — SIGNIFICANT CHANGE UP (ref 43–77)
NEUTROPHILS NFR BLD AUTO: 76.1 % — SIGNIFICANT CHANGE UP (ref 43–77)
NEUTS BAND # BLD: 0.8 % — SIGNIFICANT CHANGE UP (ref 0–8)
NEUTS BAND # BLD: 0.8 % — SIGNIFICANT CHANGE UP (ref 0–8)
PLAT MORPH BLD: NORMAL — SIGNIFICANT CHANGE UP
PLAT MORPH BLD: NORMAL — SIGNIFICANT CHANGE UP
PLATELET # BLD AUTO: 92 K/UL — LOW (ref 150–400)
PLATELET # BLD AUTO: 92 K/UL — LOW (ref 150–400)
POIKILOCYTOSIS BLD QL AUTO: SIGNIFICANT CHANGE UP
POIKILOCYTOSIS BLD QL AUTO: SIGNIFICANT CHANGE UP
POLYCHROMASIA BLD QL SMEAR: SLIGHT — SIGNIFICANT CHANGE UP
POLYCHROMASIA BLD QL SMEAR: SLIGHT — SIGNIFICANT CHANGE UP
RBC # BLD: 3.45 M/UL — LOW (ref 3.8–5.2)
RBC # BLD: 3.45 M/UL — LOW (ref 3.8–5.2)
RBC # FLD: 13.7 % — SIGNIFICANT CHANGE UP (ref 10.3–14.5)
RBC # FLD: 13.7 % — SIGNIFICANT CHANGE UP (ref 10.3–14.5)
RBC BLD AUTO: ABNORMAL
RBC BLD AUTO: ABNORMAL
WBC # BLD: 3.49 K/UL — LOW (ref 3.8–10.5)
WBC # BLD: 3.49 K/UL — LOW (ref 3.8–10.5)
WBC # FLD AUTO: 3.49 K/UL — LOW (ref 3.8–10.5)
WBC # FLD AUTO: 3.49 K/UL — LOW (ref 3.8–10.5)

## 2024-01-01 RX ADMIN — Medication 100 MICROGRAM(S): at 05:33

## 2024-01-01 RX ADMIN — APIXABAN 2.5 MILLIGRAM(S): 2.5 TABLET, FILM COATED ORAL at 05:42

## 2024-01-01 RX ADMIN — APIXABAN 2.5 MILLIGRAM(S): 2.5 TABLET, FILM COATED ORAL at 17:24

## 2024-01-01 RX ADMIN — AMIODARONE HYDROCHLORIDE 200 MILLIGRAM(S): 400 TABLET ORAL at 08:35

## 2024-01-01 RX ADMIN — SERTRALINE 50 MILLIGRAM(S): 25 TABLET, FILM COATED ORAL at 08:35

## 2024-01-01 NOTE — DIETITIAN INITIAL EVALUATION ADULT - PERTINENT LABORATORY DATA
12-30    140  |  105  |  9   ----------------------------<  91  3.9   |  27  |  0.71    Ca    9.2      30 Dec 2023 11:48    TPro  6.7  /  Alb  3.6  /  TBili  0.4  /  DBili  x   /  AST  16  /  ALT  11  /  AlkPhos  64  12-30

## 2024-01-01 NOTE — DIETITIAN INITIAL EVALUATION ADULT - REASON INDICATOR FOR ASSESSMENT
Nutrition Consult for Assessment / Education.   Source: Pt, chart review, Electronic Medical Record.   Chart reviewed, events noted.

## 2024-01-01 NOTE — DIETITIAN INITIAL EVALUATION ADULT - PERTINENT MEDS FT
MEDICATIONS  (STANDING):  aMIOdarone    Tablet 200 milliGRAM(s) Oral <User Schedule>  apixaban 2.5 milliGRAM(s) Oral every 12 hours  lactated ringers. 1000 milliLiter(s) (75 mL/Hr) IV Continuous <Continuous>  levothyroxine 100 MICROGram(s) Oral daily  sertraline 50 milliGRAM(s) Oral daily    MEDICATIONS  (PRN):  acetaminophen     Tablet .. 650 milliGRAM(s) Oral every 6 hours PRN Temp greater or equal to 38C (100.4F), Mild Pain (1 - 3)  albuterol    90 MICROgram(s) HFA Inhaler 2 Puff(s) Inhalation every 6 hours PRN Shortness of Breath

## 2024-01-01 NOTE — DIETITIAN INITIAL EVALUATION ADULT - NSPROEDAABILITYLEARN_GEN_A_NUR
Pt with some confusion. Education not feasible/warranted at this time. Pt made aware RD remains available PRN.

## 2024-01-01 NOTE — DIETITIAN INITIAL EVALUATION ADULT - REASON
Nutrition Focused Physical Assessment deferred at this time; Pt with stable weight hx and good PO intake.

## 2024-01-01 NOTE — DIETITIAN INITIAL EVALUATION ADULT - ENERGY INTAKE
In house, pt reports good appetite and PO intake but dislikes institutional foods. RD observed pt consuming breakfast during time of visit. Flowsheets indicate fair (50-75%) PO intake. No current food preferences stated.  Fair (50-75%)

## 2024-01-01 NOTE — DIETITIAN INITIAL EVALUATION ADULT - OTHER INFO
- UBW: unknown to pt, but reports no weight changes.   - Dosing wt: 130 pounds (12/30)  - Wt hx per chart: 129.4 pounds (1/01, bed scale)  - Wt hx per [facility] HIE in pounds: 119 (10/17), 129 (9/15), 130 (8/31), 131 (7/15).    - RD to continue to monitor weight trends as able.   - Nutritionally Pertinent Meds in-house: IVF, synthroid.   - Cardio: chronic A fib.  - UBW: unknown to pt, but reports no weight changes.   - Dosing wt: 130 pounds (12/30)  - Wt hx per chart: 129.4 pounds (1/01, bed scale)  - Wt hx per [facility] HIE in pounds: 119 (10/17), 129 (9/15), 130 (8/31), 131 (7/15).    - RD to continue to monitor weight trends as able.   - Nutritionally Pertinent Meds in-house: IVF, synthroid.   - Cardio: chronic A fib.

## 2024-01-01 NOTE — DIETITIAN INITIAL EVALUATION ADULT - ADD RECOMMEND
1) Continue current Regular diet free of therapeutic restrictions as tolerated.   	- Defer texture/consistency to SLP/team.   2) Recommend Multivitamin daily to prevent micronutrient deficiencies pending no medical contraindications.  3) Continue to monitor PO intake, weight, labs, skin, GI status, and diet.  4) Nutrition care plan to remain consistent with pt GOC.  5) RD remains available for diet education PRN.

## 2024-01-01 NOTE — PROGRESS NOTE ADULT - ASSESSMENT
89 yo f with h/o Afib on eliquis, amio, HTN, severe AS , hypothyroid, anemia, h/o SBO ex-lap. recurrent hosp stay sec to falls/dizziness/syncope in setting of orthostasis presented again with dizziness      Plan:    # Dizziness/ syncope r/o orthostatic hypotension/vasovagal:  - Was on midodrine and fludrocortisone prior - held on prior admission  - CTH negative  - Monitor orthostatic's  - Fall precautions  - Echo pending  - Monitor on tele  - Cards following    # AS/ Afib/HTN:  - Trend Bp's  - C/w current meds  - Echo pending    # Asthma:  - recent hosp stay 3 weeks ago and started on oral steroid  - monitor off steroids  - C/w inhaler/neb PRN    # Hypothyroidism:  - C/w Synthroid    # Depression:  - C/w current meds    # GI ppx:  - Bowel regimen prn    #Pancytopenia   - chronic   - outpatient follow up     # DVT ppx:  - IPC's  - Eliquis    [employer]

## 2024-01-01 NOTE — PROGRESS NOTE ADULT - SUBJECTIVE AND OBJECTIVE BOX
Patient is a 89y old  Female who presents with a chief complaint of Headache     (2024 10:18)      SUBJECTIVE / OVERNIGHT EVENTS:  Patient seen and examined.   Doing well.       Vital Signs Last 24 Hrs  T(C): 36.8 (2024 11:20), Max: 36.8 (2024 11:20)  T(F): 98.3 (2024 11:20), Max: 98.3 (2024 11:20)  HR: 59 (2024 04:00) (59 - 63)  BP: 119/71 (2024 04:00) (119/71 - 125/59)  BP(mean): --  RR: 18 (2024 11:20) (18 - 18)  SpO2: 94% (2024 11:20) (93% - 95%)    Parameters below as of 2024 11:20  Patient On (Oxygen Delivery Method): room air      I&O's Summary    31 Dec 2023 07:01  -  2024 07:00  --------------------------------------------------------  IN: 240 mL / OUT: 1 mL / NET: 239 mL    2024 07:01  -  2024 13:02  --------------------------------------------------------  IN: 180 mL / OUT: 0 mL / NET: 180 mL        PHYSICAL EXAM:  GENERAL: NAD, AAOx3  HEAD:  Atraumatic, Normocephalic  EYES: EOMI; conjunctiva and sclera clear  NECK: Supple, No JVD, No LAD  CHEST/LUNG: B/L air entry; No wheezes, rales or rhonci   HEART: Regular rate and rhythm; No murmurs, rubs, or gallops  ABDOMEN: Soft, Nontender, Nondistended; Bowel sounds present  EXTREMITIES:  2+ Peripheral Pulses, No clubbing, cyanosis, or edema  SKIN: No rashes or lesions    LABS:                        11.1   3.49  )-----------( 92       ( 2024 07:38 )             35.2             CAPILLARY BLOOD GLUCOSE            Urinalysis Basic - ( 31 Dec 2023 04:13 )    Color: Yellow / Appearance: Cloudy / S.012 / pH: x  Gluc: x / Ketone: Negative mg/dL  / Bili: Negative / Urobili: 0.2 mg/dL   Blood: x / Protein: Negative mg/dL / Nitrite: Negative   Leuk Esterase: Negative / RBC: 2 /HPF / WBC 3 /HPF   Sq Epi: x / Non Sq Epi: 4 /HPF / Bacteria: Many /HPF        RADIOLOGY & ADDITIONAL TESTS:    Imaging Personally Reviewed:  [x] YES  [ ] NO    Consultant(s) Notes Reviewed:  [x] YES  [ ] NO      MEDICATIONS  (STANDING):  aMIOdarone    Tablet 200 milliGRAM(s) Oral <User Schedule>  apixaban 2.5 milliGRAM(s) Oral every 12 hours  levothyroxine 100 MICROGram(s) Oral daily  sertraline 50 milliGRAM(s) Oral daily    MEDICATIONS  (PRN):  acetaminophen     Tablet .. 650 milliGRAM(s) Oral every 6 hours PRN Temp greater or equal to 38C (100.4F), Mild Pain (1 - 3)  albuterol    90 MICROgram(s) HFA Inhaler 2 Puff(s) Inhalation every 6 hours PRN Shortness of Breath      Care Discussed with Consultants/Other Providers [x] YES  [ ] NO    HEALTH ISSUES - PROBLEM Dx:  Dizziness    Aortic stenosis    Chronic atrial fibrillation    Hypothyroidism    Essential hypertension    Asthma         Yes

## 2024-01-01 NOTE — DIETITIAN INITIAL EVALUATION ADULT - OTHER CALCULATIONS
Fluid needs deferred to team.  Estimated needs using dosing weight with consideration for advanced age.

## 2024-01-01 NOTE — PROGRESS NOTE ADULT - SUBJECTIVE AND OBJECTIVE BOX
CARDIOLOGY FOLLOW UP - Dr. Moeller  Date of Service: 01/01/2023  CC: no events    Review of Systems:  Constitutional: No fever, weight loss, or fatigue  Respiratory: No cough, wheezing, or hemoptysis, no shortness of breath  Cardiovascular: No chest pain, palpitations, passing out, dizziness, or leg swelling  Gastrointestinal: No abd or epigastric pain. No nausea, vomiting, or hematemesis; no diarrhea or consiptaiton, no melena or hematochezia  Vascular: No edema     TELEMETRY:    PHYSICAL EXAM:  T(C): 36.6 (01-01-24 @ 04:00), Max: 36.7 (12-31-23 @ 22:38)  HR: 59 (01-01-24 @ 04:00) (59 - 64)  BP: 119/71 (01-01-24 @ 04:00) (109/64 - 125/59)  RR: 18 (01-01-24 @ 04:00) (18 - 18)  SpO2: 93% (01-01-24 @ 04:00) (93% - 97%)  Wt(kg): --  I&O's Summary    31 Dec 2023 07:01  -  01 Jan 2024 07:00  --------------------------------------------------------  IN: 240 mL / OUT: 1 mL / NET: 239 mL        Appearance: Normal	  Cardiovascular: Normal S1 S2,RRR, No JVD, No murmurs  Respiratory: Lungs clear to auscultation	  Gastrointestinal:  Soft, Non-tender, + BS	  Extremities: Normal range of motion, No clubbing, cyanosis or edema  Vascular: Peripheral pulses palpable 2+ bilaterally       Home Medications:  albuterol 90 mcg/inh inhalation aerosol: 2 puff(s) inhaled every 6 hours As needed Shortness of Breath (30 Dec 2023 17:24)  amiodarone 200 mg oral tablet: 1 tab(s) orally 5 times a week Mon thru Friday only (30 Dec 2023 17:24)  apixaban 2.5 mg oral tablet: 1 tab(s) orally every 12 hours (30 Dec 2023 17:24)  levothyroxine 100 mcg (0.1 mg) oral tablet: 1 tab(s) orally once a day (30 Dec 2023 17:24)  sertraline 50 mg oral tablet: 1 tab(s) orally once a day (30 Dec 2023 17:24)  Xiidra 5% ophthalmic solution: 1 drop(s) in each eye 2 times a day (30 Dec 2023 17:24)        MEDICATIONS  (STANDING):  aMIOdarone    Tablet 200 milliGRAM(s) Oral <User Schedule>  apixaban 2.5 milliGRAM(s) Oral every 12 hours  lactated ringers. 1000 milliLiter(s) (75 mL/Hr) IV Continuous <Continuous>  levothyroxine 100 MICROGram(s) Oral daily  sertraline 50 milliGRAM(s) Oral daily        EKG:  RADIOLOGY:  DIAGNOSTIC TESTING:  [ ] Echocardiogram:  [ ] Catherterization:  [ ] Stress Test:  OTHER:     LABS:	 	                          11.2   3.13  )-----------( 104      ( 31 Dec 2023 11:30 )             36.2     12-30    140  |  105  |  9   ----------------------------<  91  3.9   |  27  |  0.71    Ca    9.2      30 Dec 2023 11:48    TPro  6.7  /  Alb  3.6  /  TBili  0.4  /  DBili  x   /  AST  16  /  ALT  11  /  AlkPhos  64  12-30          CARDIAC MARKERS:                   7 y/o girl presenting with two episodes of unresponsiveness, choking, and convulsions. Neurological exam and labwork are unremarkable.   Based on history these events are highly likely to be seizures. 9 y/o girl presenting with two episodes of unresponsiveness, choking, and convulsions. Neurological exam and labwork are unremarkable.   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       - Routine EEG in ED to assess for presence of central temporal spikes which would confirm diagnosis  - IF EEG normal, then will need to be admitted for further EEG monitoring.   - seizure precautions  - D/W family at bedside who are in agreement. 7 y/o girl presenting with two episodes of unresponsiveness, choking, and convulsions. Neurological exam and labwork are unremarkable.   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       - EEG consistent with L sided centrotemporal spikes  - Begin Trileptal 150mg BID x 1 week, then maintenance dose of 150mg in AM and 300mg PM  - Please follow up with our pediatric neurology clinic on August 24th at 3:00PM. Our office is located at [address] [place] [address]. Please call the office to schedule an appointment, [phone]. 7 y/o girl presenting with two episodes of unresponsiveness, choking, and convulsions. Neurological exam and labwork are unremarkable.   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       - EEG : L sided centrotemporal spikes  - Begin Trileptal 150mg BID x 1 week, then maintenance dose of 150mg in AM and 300mg PM  - Please follow up with our pediatric neurology clinic on August 24th at 3:00PM. Our office is located at [address] [place] [address]. Please call the office to schedule an appointment, [phone]. CARDIOLOGY FOLLOW UP - Dr. Moeller  Date of Service: 01/01/2024  CC: no events    Review of Systems:  Constitutional: No fever, weight loss, or fatigue  Respiratory: No cough, wheezing, or hemoptysis, no shortness of breath  Cardiovascular: No chest pain, palpitations, passing out, dizziness, or leg swelling  Gastrointestinal: No abd or epigastric pain. No nausea, vomiting, or hematemesis; no diarrhea or consiptaiton, no melena or hematochezia  Vascular: No edema     TELEMETRY:    PHYSICAL EXAM:  T(C): 36.6 (01-01-24 @ 04:00), Max: 36.7 (12-31-23 @ 22:38)  HR: 59 (01-01-24 @ 04:00) (59 - 64)  BP: 119/71 (01-01-24 @ 04:00) (109/64 - 125/59)  RR: 18 (01-01-24 @ 04:00) (18 - 18)  SpO2: 93% (01-01-24 @ 04:00) (93% - 97%)  Wt(kg): --  I&O's Summary    31 Dec 2023 07:01  -  01 Jan 2024 07:00  --------------------------------------------------------  IN: 240 mL / OUT: 1 mL / NET: 239 mL        Appearance: Normal	  Cardiovascular: Normal S1 S2,RRR, No JVD, No murmurs  Respiratory: Lungs clear to auscultation	  Gastrointestinal:  Soft, Non-tender, + BS	  Extremities: Normal range of motion, No clubbing, cyanosis or edema  Vascular: Peripheral pulses palpable 2+ bilaterally       Home Medications:  albuterol 90 mcg/inh inhalation aerosol: 2 puff(s) inhaled every 6 hours As needed Shortness of Breath (30 Dec 2023 17:24)  amiodarone 200 mg oral tablet: 1 tab(s) orally 5 times a week Mon thru Friday only (30 Dec 2023 17:24)  apixaban 2.5 mg oral tablet: 1 tab(s) orally every 12 hours (30 Dec 2023 17:24)  levothyroxine 100 mcg (0.1 mg) oral tablet: 1 tab(s) orally once a day (30 Dec 2023 17:24)  sertraline 50 mg oral tablet: 1 tab(s) orally once a day (30 Dec 2023 17:24)  Xiidra 5% ophthalmic solution: 1 drop(s) in each eye 2 times a day (30 Dec 2023 17:24)        MEDICATIONS  (STANDING):  aMIOdarone    Tablet 200 milliGRAM(s) Oral <User Schedule>  apixaban 2.5 milliGRAM(s) Oral every 12 hours  lactated ringers. 1000 milliLiter(s) (75 mL/Hr) IV Continuous <Continuous>  levothyroxine 100 MICROGram(s) Oral daily  sertraline 50 milliGRAM(s) Oral daily        EKG:  RADIOLOGY:  DIAGNOSTIC TESTING:  [ ] Echocardiogram:  [ ] Catherterization:  [ ] Stress Test:  OTHER:     LABS:	 	                          11.2   3.13  )-----------( 104      ( 31 Dec 2023 11:30 )             36.2     12-30    140  |  105  |  9   ----------------------------<  91  3.9   |  27  |  0.71    Ca    9.2      30 Dec 2023 11:48    TPro  6.7  /  Alb  3.6  /  TBili  0.4  /  DBili  x   /  AST  16  /  ALT  11  /  AlkPhos  64  12-30          CARDIAC MARKERS:

## 2024-01-01 NOTE — DIETITIAN INITIAL EVALUATION ADULT - ORAL INTAKE PTA/DIET HISTORY
Limited interview obtained, pt noted to be slightly confused/disoriented during interview. Pt reports having a good appetite and PO intake at baseline PTA; consuming ~3 meals/day typically. Pt denies any known food allergies or intolerances. No micronutrient supplementation PTA noted per outpatient medications list. No difficulty chewing/swallowing reported at this time.

## 2024-01-01 NOTE — PROGRESS NOTE ADULT - ASSESSMENT
A/p  89 yo f with h/o Afib on eliquis, amio, HTN, severe AS , hypothyroid, anemia, h/o SBO ex-lap. recurrent hosp stay sec to falls/dizziness/syncope in setting of orthostasis presented again with dizziness after going to bathroom (urinate) yesterday.      #Dizziness  -CTH no significant findings  -Has hx of orthostatic hypotension  -Was on midodrine and fludrocortisone prior - held on prior admission  -Continue with ivf  -Check orthostatics  -recent echo with hyperdynamic lv fxn - can repeat echo     #Chronic atrial fibrillation  -Sinus on tele   -cont amio  -Continue apixaban  -Avoid bb given intermittent bradycardia    #Mod AS  -Repeat echo   -Prior CT with Dense aortic valve calcification and multivessel coronary artery calcification  -continue medical management         35 minutes spent on total encounter; more than 50% of the visit was spent counseling and/or coordinating care by the attending physician.

## 2024-01-01 NOTE — DIETITIAN INITIAL EVALUATION ADULT - NSFNSGIIOFT_GEN_A_CORE
No current N/V/D/C reported. Per chart, no Bm documented x admission. No current bowel regimen in place.

## 2024-01-02 LAB
BASOPHILS # BLD AUTO: 0.01 K/UL — SIGNIFICANT CHANGE UP (ref 0–0.2)
BASOPHILS # BLD AUTO: 0.01 K/UL — SIGNIFICANT CHANGE UP (ref 0–0.2)
BASOPHILS NFR BLD AUTO: 0.3 % — SIGNIFICANT CHANGE UP (ref 0–2)
BASOPHILS NFR BLD AUTO: 0.3 % — SIGNIFICANT CHANGE UP (ref 0–2)
EOSINOPHIL # BLD AUTO: 0.11 K/UL — SIGNIFICANT CHANGE UP (ref 0–0.5)
EOSINOPHIL # BLD AUTO: 0.11 K/UL — SIGNIFICANT CHANGE UP (ref 0–0.5)
EOSINOPHIL NFR BLD AUTO: 3.3 % — SIGNIFICANT CHANGE UP (ref 0–6)
EOSINOPHIL NFR BLD AUTO: 3.3 % — SIGNIFICANT CHANGE UP (ref 0–6)
HCT VFR BLD CALC: 35.2 % — SIGNIFICANT CHANGE UP (ref 34.5–45)
HCT VFR BLD CALC: 35.2 % — SIGNIFICANT CHANGE UP (ref 34.5–45)
HGB BLD-MCNC: 11.1 G/DL — LOW (ref 11.5–15.5)
HGB BLD-MCNC: 11.1 G/DL — LOW (ref 11.5–15.5)
IMM GRANULOCYTES NFR BLD AUTO: 0.3 % — SIGNIFICANT CHANGE UP (ref 0–0.9)
IMM GRANULOCYTES NFR BLD AUTO: 0.3 % — SIGNIFICANT CHANGE UP (ref 0–0.9)
LYMPHOCYTES # BLD AUTO: 0.57 K/UL — LOW (ref 1–3.3)
LYMPHOCYTES # BLD AUTO: 0.57 K/UL — LOW (ref 1–3.3)
LYMPHOCYTES # BLD AUTO: 17.2 % — SIGNIFICANT CHANGE UP (ref 13–44)
LYMPHOCYTES # BLD AUTO: 17.2 % — SIGNIFICANT CHANGE UP (ref 13–44)
MCHC RBC-ENTMCNC: 31.5 GM/DL — LOW (ref 32–36)
MCHC RBC-ENTMCNC: 31.5 GM/DL — LOW (ref 32–36)
MCHC RBC-ENTMCNC: 32.5 PG — SIGNIFICANT CHANGE UP (ref 27–34)
MCHC RBC-ENTMCNC: 32.5 PG — SIGNIFICANT CHANGE UP (ref 27–34)
MCV RBC AUTO: 102.9 FL — HIGH (ref 80–100)
MCV RBC AUTO: 102.9 FL — HIGH (ref 80–100)
MONOCYTES # BLD AUTO: 0.16 K/UL — SIGNIFICANT CHANGE UP (ref 0–0.9)
MONOCYTES # BLD AUTO: 0.16 K/UL — SIGNIFICANT CHANGE UP (ref 0–0.9)
MONOCYTES NFR BLD AUTO: 4.8 % — SIGNIFICANT CHANGE UP (ref 2–14)
MONOCYTES NFR BLD AUTO: 4.8 % — SIGNIFICANT CHANGE UP (ref 2–14)
NEUTROPHILS # BLD AUTO: 2.46 K/UL — SIGNIFICANT CHANGE UP (ref 1.8–7.4)
NEUTROPHILS # BLD AUTO: 2.46 K/UL — SIGNIFICANT CHANGE UP (ref 1.8–7.4)
NEUTROPHILS NFR BLD AUTO: 74.1 % — SIGNIFICANT CHANGE UP (ref 43–77)
NEUTROPHILS NFR BLD AUTO: 74.1 % — SIGNIFICANT CHANGE UP (ref 43–77)
NRBC # BLD: 0 /100 WBCS — SIGNIFICANT CHANGE UP (ref 0–0)
NRBC # BLD: 0 /100 WBCS — SIGNIFICANT CHANGE UP (ref 0–0)
PLATELET # BLD AUTO: 89 K/UL — LOW (ref 150–400)
PLATELET # BLD AUTO: 89 K/UL — LOW (ref 150–400)
RBC # BLD: 3.42 M/UL — LOW (ref 3.8–5.2)
RBC # BLD: 3.42 M/UL — LOW (ref 3.8–5.2)
RBC # FLD: 13.5 % — SIGNIFICANT CHANGE UP (ref 10.3–14.5)
RBC # FLD: 13.5 % — SIGNIFICANT CHANGE UP (ref 10.3–14.5)
WBC # BLD: 3.32 K/UL — LOW (ref 3.8–10.5)
WBC # BLD: 3.32 K/UL — LOW (ref 3.8–10.5)
WBC # FLD AUTO: 3.32 K/UL — LOW (ref 3.8–10.5)
WBC # FLD AUTO: 3.32 K/UL — LOW (ref 3.8–10.5)

## 2024-01-02 PROCEDURE — 93306 TTE W/DOPPLER COMPLETE: CPT | Mod: 26

## 2024-01-02 RX ADMIN — APIXABAN 2.5 MILLIGRAM(S): 2.5 TABLET, FILM COATED ORAL at 17:40

## 2024-01-02 RX ADMIN — Medication 100 MICROGRAM(S): at 05:16

## 2024-01-02 RX ADMIN — AMIODARONE HYDROCHLORIDE 200 MILLIGRAM(S): 400 TABLET ORAL at 08:47

## 2024-01-02 RX ADMIN — Medication 1 TABLET(S): at 12:22

## 2024-01-02 RX ADMIN — SERTRALINE 50 MILLIGRAM(S): 25 TABLET, FILM COATED ORAL at 12:22

## 2024-01-02 RX ADMIN — APIXABAN 2.5 MILLIGRAM(S): 2.5 TABLET, FILM COATED ORAL at 05:15

## 2024-01-02 NOTE — PROGRESS NOTE ADULT - ASSESSMENT
A/p  89 yo f with h/o Afib on eliquis, amio, HTN, severe AS , hypothyroid, anemia, h/o SBO ex-lap. recurrent hosp stay sec to falls/dizziness/syncope in setting of orthostasis presented again with dizziness after going to bathroom (urinate) yesterday.      #Dizziness  -CTH no significant findings  -Has hx of orthostatic hypotension  -Was on midodrine and fludrocortisone prior - held on prior admission  -Orthostatics negative   -recent echo with hyperdynamic lv fxn - can repeat echo     #Chronic atrial fibrillation  -Sinus on tele   -cont amio  -Continue apixaban  -Avoid bb given intermittent bradycardia    #Mod AS  -Repeat echo   -Prior CT with Dense aortic valve calcification and multivessel coronary artery calcification  -continue medical management

## 2024-01-02 NOTE — PROGRESS NOTE ADULT - SUBJECTIVE AND OBJECTIVE BOX
CARDIOLOGY FOLLOW UP - Dr. Moeller  DATE OF SERVICE: 1/2/24    CC  No CV complaints     REVIEW OF SYSTEMS:  CONSTITUTIONAL: No fever, weight loss, or fatigue  RESPIRATORY: No cough, wheezing, chills or hemoptysis; No Shortness of Breath  CARDIOVASCULAR: No chest pain, palpitations, passing out, dizziness, or leg swelling  GASTROINTESTINAL: No abdominal or epigastric pain. No nausea, vomiting, or hematemesis; No diarrhea or constipation. No melena or hematochezia.  VASCULAR: No edema     PHYSICAL EXAM:  T(C): 36.6 (01-02-24 @ 05:07), Max: 36.8 (01-01-24 @ 11:20)  HR: 61 (01-02-24 @ 05:07) (61 - 65)  BP: 150/75 (01-02-24 @ 05:07) (126/72 - 150/75)  RR: 18 (01-02-24 @ 05:07) (18 - 18)  SpO2: 97% (01-02-24 @ 05:07) (94% - 97%)  Wt(kg): --  I&O's Summary    01 Jan 2024 07:01  -  02 Jan 2024 07:00  --------------------------------------------------------  IN: 540 mL / OUT: 400 mL / NET: 140 mL        Appearance: Elderly female 	  Cardiovascular: Normal S1 S2,RRR, No JVD, No murmurs  Respiratory: Lungs clear to auscultation	  Gastrointestinal:  Soft, Non-tender, + BS	  Extremities: Normal range of motion, No clubbing, cyanosis or edema      Home Medications:  albuterol 90 mcg/inh inhalation aerosol: 2 puff(s) inhaled every 6 hours As needed Shortness of Breath (30 Dec 2023 17:24)  amiodarone 200 mg oral tablet: 1 tab(s) orally 5 times a week Mon thru Friday only (30 Dec 2023 17:24)  apixaban 2.5 mg oral tablet: 1 tab(s) orally every 12 hours (30 Dec 2023 17:24)  levothyroxine 100 mcg (0.1 mg) oral tablet: 1 tab(s) orally once a day (30 Dec 2023 17:24)  sertraline 50 mg oral tablet: 1 tab(s) orally once a day (30 Dec 2023 17:24)  Xiidra 5% ophthalmic solution: 1 drop(s) in each eye 2 times a day (30 Dec 2023 17:24)      MEDICATIONS  (STANDING):  aMIOdarone    Tablet 200 milliGRAM(s) Oral <User Schedule>  apixaban 2.5 milliGRAM(s) Oral every 12 hours  levothyroxine 100 MICROGram(s) Oral daily  multivitamin 1 Tablet(s) Oral daily  sertraline 50 milliGRAM(s) Oral daily      TELEMETRY: SR/1st degree AVB 50-60	    ECG:  	  RADIOLOGY:   DIAGNOSTIC TESTING:  [ ] Echocardiogram:  [ ]  Catheterization:  [ ] Stress Test:    OTHER: 	    LABS:	 	    Troponin T, High Sensitivity Result: 15 ng/L [0 - 51] (12-30 @ 14:29)  Troponin T, High Sensitivity Result: 16 ng/L [0 - 51] (12-30 @ 11:48)                          11.1   3.32  )-----------( 89       ( 02 Jan 2024 07:29 )             35.2

## 2024-01-02 NOTE — PROGRESS NOTE ADULT - SUBJECTIVE AND OBJECTIVE BOX
Patient is a 89y old  Female who presents with a chief complaint of dizziness (02 Jan 2024 09:36)      SUBJECTIVE / OVERNIGHT EVENTS:  Patient seen and examined.   Doing well.   TTE pending.       Vital Signs Last 24 Hrs  T(C): 36.3 (02 Jan 2024 11:11), Max: 36.6 (01 Jan 2024 21:42)  T(F): 97.4 (02 Jan 2024 11:11), Max: 97.9 (02 Jan 2024 05:07)  HR: 61 (02 Jan 2024 11:11) (61 - 65)  BP: 103/58 (02 Jan 2024 11:11) (103/58 - 150/75)  BP(mean): --  RR: 18 (02 Jan 2024 11:11) (18 - 18)  SpO2: 93% (02 Jan 2024 11:11) (93% - 97%)    Parameters below as of 02 Jan 2024 11:11  Patient On (Oxygen Delivery Method): room air      I&O's Summary    01 Jan 2024 07:01  -  02 Jan 2024 07:00  --------------------------------------------------------  IN: 540 mL / OUT: 400 mL / NET: 140 mL    02 Jan 2024 07:01  -  02 Jan 2024 12:45  --------------------------------------------------------  IN: 180 mL / OUT: 0 mL / NET: 180 mL        PHYSICAL EXAM:  GENERAL: NAD, AAOx3  HEAD:  Atraumatic, Normocephalic  EYES: EOMI; conjunctiva and sclera clear  NECK: Supple, No JVD, No LAD  CHEST/LUNG: B/L air entry; No wheezes, rales or rhonci   HEART: Regular rate and rhythm; No murmurs, rubs, or gallops  ABDOMEN: Soft, Nontender, Nondistended; Bowel sounds present  EXTREMITIES:  2+ Peripheral Pulses, No clubbing, cyanosis, or edema  SKIN: No rashes or lesions  LABS:                        11.1   3.32  )-----------( 89       ( 02 Jan 2024 07:29 )             35.2             CAPILLARY BLOOD GLUCOSE                RADIOLOGY & ADDITIONAL TESTS:    Imaging Personally Reviewed:  [x] YES  [ ] NO    Consultant(s) Notes Reviewed:  [x] YES  [ ] NO      MEDICATIONS  (STANDING):  aMIOdarone    Tablet 200 milliGRAM(s) Oral <User Schedule>  apixaban 2.5 milliGRAM(s) Oral every 12 hours  levothyroxine 100 MICROGram(s) Oral daily  multivitamin 1 Tablet(s) Oral daily  sertraline 50 milliGRAM(s) Oral daily    MEDICATIONS  (PRN):  acetaminophen     Tablet .. 650 milliGRAM(s) Oral every 6 hours PRN Temp greater or equal to 38C (100.4F), Mild Pain (1 - 3)  albuterol    90 MICROgram(s) HFA Inhaler 2 Puff(s) Inhalation every 6 hours PRN Shortness of Breath      Care Discussed with Consultants/Other Providers [x] YES  [ ] NO    HEALTH ISSUES - PROBLEM Dx:  Dizziness    Aortic stenosis    Chronic atrial fibrillation    Hypothyroidism    Essential hypertension    Asthma

## 2024-01-02 NOTE — PROVIDER CONTACT NOTE (OTHER) - ASSESSMENT
After emesis, nausea went away. Assisted pt back into bed and pt stated she felt fine now. VSS /79, HR 64

## 2024-01-03 ENCOUNTER — TRANSCRIPTION ENCOUNTER (OUTPATIENT)
Age: 89
End: 2024-01-03

## 2024-01-03 RX ADMIN — Medication 100 MICROGRAM(S): at 05:32

## 2024-01-03 RX ADMIN — APIXABAN 2.5 MILLIGRAM(S): 2.5 TABLET, FILM COATED ORAL at 05:32

## 2024-01-03 RX ADMIN — AMIODARONE HYDROCHLORIDE 200 MILLIGRAM(S): 400 TABLET ORAL at 08:03

## 2024-01-03 RX ADMIN — Medication 1 TABLET(S): at 11:46

## 2024-01-03 RX ADMIN — SERTRALINE 50 MILLIGRAM(S): 25 TABLET, FILM COATED ORAL at 11:46

## 2024-01-03 RX ADMIN — APIXABAN 2.5 MILLIGRAM(S): 2.5 TABLET, FILM COATED ORAL at 18:02

## 2024-01-03 NOTE — PROGRESS NOTE ADULT - ASSESSMENT
89 yo f with h/o Afib on eliquis, amio, HTN, severe AS , hypothyroid, anemia, h/o SBO ex-lap. recurrent hosp stay sec to falls/dizziness/syncope in setting of orthostasis presented again with dizziness      # Dizziness / syncope r/o orthostatic hypotension/vasovagal  - Was on midodrine and fludrocortisone prior - held on prior admission  - CTH negative  - orthostatics neg  - Fall precautions  - Monitor on tele  - Cards following    # AS/ Afib/HTN  - C/w current meds, cont amio, no BB for bradycardia  - TTE EF 59%, mod AR mod AS    # Asthma  - recent hosp stay 3 weeks ago and started on oral steroid, monitor off steroids  - C/w inhaler/neb PRN    # Hypothyroidism  - C/w Synthroid    # Depression  - C/w current meds    # Pancytopenia   - chronic, outpatient follow up     DVT ppx Eliquis    [facility]   89 yo f with h/o Afib on eliquis, amio, HTN, severe AS , hypothyroid, anemia, h/o SBO ex-lap. recurrent hosp stay sec to falls/dizziness/syncope in setting of orthostasis presented again with dizziness      # Dizziness / syncope r/o orthostatic hypotension/vasovagal  - Was on midodrine and fludrocortisone prior - held on prior admission  - CTH negative  - orthostatics neg  - Fall precautions  - Monitor on tele  - Cards following    # AS/ Afib/HTN  - C/w current meds, cont amio, no BB for bradycardia  - TTE EF 59%, mod AR mod AS    # Asthma  - recent hosp stay 3 weeks ago and started on oral steroid, monitor off steroids  - C/w inhaler/neb PRN    # Hypothyroidism  - C/w Synthroid    # Depression  - C/w current meds    # Pancytopenia   - chronic, outpatient follow up     DVT ppx Eliquis    dispo PT    [facility]  [phone] 89 yo f with h/o Afib on eliquis, amio, HTN, severe AS , hypothyroid, anemia, h/o SBO ex-lap. recurrent hosp stay sec to falls/dizziness/syncope in setting of orthostasis presented again with dizziness      # Dizziness / syncope r/o orthostatic hypotension/vasovagal  - Was on midodrine and fludrocortisone prior - held on prior admission  - CTH negative  - orthostatics neg  - Fall precautions  - Monitor on tele  - Cards following    # AS/ Afib/HTN  - C/w current meds, cont amio, no BB for bradycardia  - TTE EF 59%, mod AR mod AS    # Asthma  - recent hosp stay 3 weeks ago and started on oral steroid, monitor off steroids  - C/w inhaler/neb PRN    # Hypothyroidism  - C/w Synthroid    # Depression  - C/w current meds    # Pancytopenia   - chronic, outpatient follow up     DVT ppx Eliquis    dispo PT    [facility]  [phone]

## 2024-01-03 NOTE — PROGRESS NOTE ADULT - SUBJECTIVE AND OBJECTIVE BOX
CARDIOLOGY FOLLOW UP - Dr. Moeller  DATE OF SERVICE: 1/3/24    CC  No CV complaints    REVIEW OF SYSTEMS:  CONSTITUTIONAL: No fever, weight loss, or fatigue  RESPIRATORY: No cough, wheezing, chills or hemoptysis; No Shortness of Breath  CARDIOVASCULAR: No chest pain, palpitations, passing out, dizziness, or leg swelling  GASTROINTESTINAL: No abdominal or epigastric pain. No nausea, vomiting, or hematemesis; No diarrhea or constipation. No melena or hematochezia.  VASCULAR: No edema     PHYSICAL EXAM:  T(C): 36.4 (01-03-24 @ 04:47), Max: 36.4 (01-02-24 @ 20:39)  HR: 60 (01-03-24 @ 04:47) (60 - 63)  BP: 136/80 (01-03-24 @ 04:47) (103/58 - 159/80)  RR: 18 (01-03-24 @ 04:47) (18 - 18)  SpO2: 92% (01-03-24 @ 04:47) (92% - 95%)  Wt(kg): --  I&O's Summary    02 Jan 2024 07:01  -  03 Jan 2024 07:00  --------------------------------------------------------  IN: 420 mL / OUT: 800 mL / NET: -380 mL        Appearance: Normal	  Cardiovascular: Normal S1 S2,RRR, No JVD, No murmurs  Respiratory: Lungs clear to auscultation	  Gastrointestinal:  Soft, Non-tender, + BS	  Extremities: Normal range of motion, No clubbing, cyanosis or edema      Home Medications:  albuterol 90 mcg/inh inhalation aerosol: 2 puff(s) inhaled every 6 hours As needed Shortness of Breath (30 Dec 2023 17:24)  amiodarone 200 mg oral tablet: 1 tab(s) orally 5 times a week Mon thru Friday only (30 Dec 2023 17:24)  apixaban 2.5 mg oral tablet: 1 tab(s) orally every 12 hours (30 Dec 2023 17:24)  levothyroxine 100 mcg (0.1 mg) oral tablet: 1 tab(s) orally once a day (30 Dec 2023 17:24)  sertraline 50 mg oral tablet: 1 tab(s) orally once a day (30 Dec 2023 17:24)  Xiidra 5% ophthalmic solution: 1 drop(s) in each eye 2 times a day (30 Dec 2023 17:24)      MEDICATIONS  (STANDING):  aMIOdarone    Tablet 200 milliGRAM(s) Oral <User Schedule>  apixaban 2.5 milliGRAM(s) Oral every 12 hours  levothyroxine 100 MICROGram(s) Oral daily  multivitamin 1 Tablet(s) Oral daily  sertraline 50 milliGRAM(s) Oral daily      TELEMETRY: NSR, sinus tenisha 50-60    ECG:  	  RADIOLOGY:   DIAGNOSTIC TESTING:  [x] Echocardiogram:  < from: TTE W or WO Ultrasound Enhancing Agent (01.02.24 @ 14:39) >  CONCLUSIONS:      1. Left ventricular cavity is normal. Left ventricular wall thickness is normal. Left ventricular systolic function is normal with an ejection fraction of 59 % by Mireles's method of disks. There are no regional wall motion abnormalities seen.   2. No pericardial effusion seen.   3. Moderate aortic stenosis.   4. Moderate aortic regurgitation.    < end of copied text >    [ ]  Catheterization:  [ ] Stress Test:    OTHER: 	    LABS:	 	    Troponin T, High Sensitivity Result: 15 ng/L [0 - 51] (12-30 @ 14:29)  Troponin T, High Sensitivity Result: 16 ng/L [0 - 51] (12-30 @ 11:48)                          11.1   3.32  )-----------( 89       ( 02 Jan 2024 07:29 )             35.2

## 2024-01-03 NOTE — DISCHARGE NOTE PROVIDER - DISCHARGE SERVICE FOR PATIENT
Pt is running out of Rx given at the time of her delivery. She needs refill pls.  [address] on file on the discharge service for the patient. I have reviewed and made amendments to the documentation where necessary.

## 2024-01-03 NOTE — DISCHARGE NOTE PROVIDER - NSDCCPCAREPLAN_GEN_ALL_CORE_FT
PRINCIPAL DISCHARGE DIAGNOSIS  Diagnosis: Dizziness  Assessment and Plan of Treatment: cardiac workup negative. No orthostatic hypotension. Dizziness resolved.      SECONDARY DISCHARGE DIAGNOSES  Diagnosis: Aortic stenosis  Assessment and Plan of Treatment: Echocardiogram stable, moderate aortic stenosis. May cause dizziness, continue monitoring with your cardiologist outpatient.    Diagnosis: Essential hypertension  Assessment and Plan of Treatment: continue current medication    Diagnosis: Hypothyroidism  Assessment and Plan of Treatment: continue current medication     PRINCIPAL DISCHARGE DIAGNOSIS  Diagnosis: Dizziness  Assessment and Plan of Treatment: cardiac workup negative. No orthostatic hypotension. Dizziness resolved.      SECONDARY DISCHARGE DIAGNOSES  Diagnosis: Aortic stenosis  Assessment and Plan of Treatment: Echocardiogram stable, moderate aortic stenosis. May cause dizziness, continue monitoring with your cardiologist outpatient.    Diagnosis: Hypothyroidism  Assessment and Plan of Treatment: continue current medication    Diagnosis: Essential hypertension  Assessment and Plan of Treatment: continue current medication

## 2024-01-03 NOTE — DISCHARGE NOTE PROVIDER - HOSPITAL COURSE
89 yo f with h/o Afib on eliquis, amio, HTN, severe AS , hypothyroid, anemia, h/o SBO ex-lap. recurrent hosp stay sec to falls/dizziness/syncope in setting of orthostasis presented again with dizziness      # Dizziness / syncope r/o orthostatic hypotension/vasovagal  - Was on midodrine and fludrocortisone prior - held on prior admission, will continue to hold  - CTH negative  - orthostatics neg  - Cards following -echo at baseline    # AS/ Afib/HTN  - C/w current meds, cont amio, no BB for bradycardia  - TTE EF 59%, mod AR mod AS    # Asthma  - recent hosp stay 3 weeks ago and started on oral steroid, monitor off steroids  - C/w inhaler/neb PRN    # Hypothyroidism  - C/w Synthroid    # Depression  - C/w current meds    # Pancytopenia   - chronic, outpatient follow up     DVT ppx Eliquis    pt clear for dc    89 yo f with h/o Afib on eliquis, amio, HTN, severe AS , hypothyroid, anemia, h/o SBO ex-lap. recurrent hosp stay sec to falls/dizziness/syncope in setting of orthostasis presented again with dizziness      # Dizziness / syncope r/o orthostatic hypotension/vasovagal  - Was on midodrine and fludrocortisone prior - held on prior admission, will continue to hold  - CTH negative  - orthostatics neg  - Cards following -echo at baseline    # AS/ Afib/HTN  - C/w current meds, cont amio, no BB for bradycardia  - TTE EF 59%, mod AR mod AS    # Asthma  - recent hosp stay 3 weeks ago and started on oral steroid, monitor off steroids  - C/w inhaler/neb PRN    # Hypothyroidism  - C/w Synthroid    # Depression  - C/w current meds    # Pancytopenia   - chronic, outpatient follow up     DVT ppx Eliquis    pt clear for dc for home with aides. Pt to follow up with her Cardiologist

## 2024-01-03 NOTE — PROGRESS NOTE ADULT - SUBJECTIVE AND OBJECTIVE BOX
Patient is a 89y old  Female who presents with a chief complaint of dizziness (03 Jan 2024 10:56)      SUBJECTIVE / OVERNIGHT EVENTS:    Patient seen and examined. no complaints. no nausea or dizziness currently.      Vital Signs Last 24 Hrs  T(C): 36.5 (03 Jan 2024 11:07), Max: 36.5 (03 Jan 2024 11:07)  T(F): 97.7 (03 Jan 2024 11:07), Max: 97.7 (03 Jan 2024 11:07)  HR: 66 (03 Jan 2024 11:07) (60 - 66)  BP: 126/72 (03 Jan 2024 11:07) (126/72 - 159/80)  BP(mean): 90 (03 Jan 2024 11:07) (90 - 90)  RR: 18 (03 Jan 2024 11:07) (18 - 18)  SpO2: 93% (03 Jan 2024 11:07) (92% - 93%)    Parameters below as of 03 Jan 2024 11:07  Patient On (Oxygen Delivery Method): room air      I&O's Summary    02 Jan 2024 07:01  -  03 Jan 2024 07:00  --------------------------------------------------------  IN: 420 mL / OUT: 800 mL / NET: -380 mL        PE:  GENERAL: NAD, AAOx2  CHEST/LUNG: CTABL, No wheeze  HEART: Regular rate and rhythm; + murmur  ABDOMEN: Soft, Nontender, Nondistended; Bowel sounds present  EXTREMITIES:  2+ Peripheral Pulses, No edema  NEURO: No focal deficits    LABS:                        11.1   3.32  )-----------( 89       ( 02 Jan 2024 07:29 )             35.2             CAPILLARY BLOOD GLUCOSE                RADIOLOGY & ADDITIONAL TESTS:    Imaging Personally Reviewed:  [x] YES  [ ] NO    Consultant(s) Notes Reviewed:  [x] YES  [ ] NO    MEDICATIONS  (STANDING):  aMIOdarone    Tablet 200 milliGRAM(s) Oral <User Schedule>  apixaban 2.5 milliGRAM(s) Oral every 12 hours  levothyroxine 100 MICROGram(s) Oral daily  multivitamin 1 Tablet(s) Oral daily  sertraline 50 milliGRAM(s) Oral daily    MEDICATIONS  (PRN):  acetaminophen     Tablet .. 650 milliGRAM(s) Oral every 6 hours PRN Temp greater or equal to 38C (100.4F), Mild Pain (1 - 3)  albuterol    90 MICROgram(s) HFA Inhaler 2 Puff(s) Inhalation every 6 hours PRN Shortness of Breath      Care Discussed with Consultants/Other Providers [x] YES  [ ] NO    HEALTH ISSUES - PROBLEM Dx:  Dizziness    Aortic stenosis    Chronic atrial fibrillation    Hypothyroidism    Essential hypertension    Asthma

## 2024-01-03 NOTE — PROGRESS NOTE ADULT - ASSESSMENT
A/p  89 yo f with h/o Afib on eliquis, amio, HTN, severe AS , hypothyroid, anemia, h/o SBO ex-lap. recurrent hosp stay sec to falls/dizziness/syncope in setting of orthostasis presented again with dizziness after going to bathroom (urinate) yesterday.      #Dizziness  -CTH no significant findings  -Has hx of orthostatic hypotension  -Was on midodrine and fludrocortisone prior - held on prior admission  -Orthostatics negative   -Echo with nml lv fxn, no wma, mod AS, mod AR    #Chronic atrial fibrillation  -Sinus on tele   -cont amio  -Continue apixaban  -Avoid bb given intermittent bradycardia    #Mod AS  -Echo as above  -Euvolemic on exam   -continue medical management

## 2024-01-03 NOTE — PHARMACOTHERAPY INTERVENTION NOTE - COMMENTS
Confirmed home medications with patient and pharmacy, updated in Outpatient Medication Review.    albuterol 90 mcg/inh inhalation aerosol: 2 puff(s) inhaled every 6 hours As needed Shortness of Breath  amiodarone 200 mg oral tablet: 1 tab(s) orally 5 times a week Mon thru Friday only  apixaban 2.5 mg oral tablet: 1 tab(s) orally every 12 hours  levothyroxine 100 mcg (0.1 mg) oral tablet: 1 tab(s) orally once a day  sertraline 50 mg oral tablet: 1 tab(s) orally once a day  Xiidra 5% ophthalmic solution: 1 drop(s) in each eye 2 times a day    Patient received prescriptions for midodrine 10mg oral tablet every 8 hours and fludrocortisone 0.1mg oral tablet once a day for a 30-day supply on 8/23/2023.     Current medications appropriately reconciled.     Bridger IslasD  Transitions of Care Pharmacist  Available on [employer] Teams  Spectra #[phone]   Confirmed home medications with patient and pharmacy, updated in Outpatient Medication Review.    albuterol 90 mcg/inh inhalation aerosol: 2 puff(s) inhaled every 6 hours As needed Shortness of Breath  amiodarone 200 mg oral tablet: 1 tab(s) orally 5 times a week Mon thru Friday only  apixaban 2.5 mg oral tablet: 1 tab(s) orally every 12 hours  levothyroxine 100 mcg (0.1 mg) oral tablet: 1 tab(s) orally once a day  sertraline 50 mg oral tablet: 1 tab(s) orally once a day  Xiidra 5% ophthalmic solution: 1 drop(s) in each eye 2 times a day    Patient received prescriptions for midodrine 10mg oral tablet every 8 hours and fludrocortisone 0.1mg oral tablet once a day for a 30-day supply on 8/23/2023.     Current medications appropriately reconciled.     Bridger IslasD  Transitions of Care Pharmacist  Available on [employer] Teams  Spectra #[phone]

## 2024-01-03 NOTE — DISCHARGE NOTE PROVIDER - CARE PROVIDERS DIRECT ADDRESSES
[email or web address]

## 2024-01-03 NOTE — DISCHARGE NOTE PROVIDER - NSDCFUADDAPPT_GEN_ALL_CORE_FT
APPTS ARE READY TO BE MADE: [ x] YES    Best Family or Patient Contact (if needed):    Additional Information about above appointments (if needed):    1:   2:   3:     Other comments or requests:    APPTS ARE READY TO BE MADE: [ x] YES    Best Family or Patient Contact (if needed):    Additional Information about above appointments (if needed):    1:   2:   3:     Other comments or requests:       Patient/Caregiver declined scheduling assistance

## 2024-01-03 NOTE — DISCHARGE NOTE PROVIDER - NSDCMRMEDTOKEN_GEN_ALL_CORE_FT
albuterol 90 mcg/inh inhalation aerosol: 2 puff(s) inhaled every 6 hours As needed Shortness of Breath  amiodarone 200 mg oral tablet: 1 tab(s) orally 5 times a week Mon thru Friday only  apixaban 2.5 mg oral tablet: 1 tab(s) orally every 12 hours  levothyroxine 100 mcg (0.1 mg) oral tablet: 1 tab(s) orally once a day  sertraline 50 mg oral tablet: 1 tab(s) orally once a day  Xiidra 5% ophthalmic solution: 1 drop(s) in each eye 2 times a day

## 2024-01-03 NOTE — DISCHARGE NOTE PROVIDER - CARE PROVIDER_API CALL
Goldberg, Steven M  Cardiovascular Disease  [address] [place] [id number]  Phone: [phone]  Fax: [phone]  Follow Up Time: 2 weeks   Goldberg, Steven M  Cardiovascular Disease  [address] [place] [id number]  Phone: [phone]  Fax: [phone]  Follow Up Time: 2 weeks

## 2024-01-04 ENCOUNTER — TRANSCRIPTION ENCOUNTER (OUTPATIENT)
Age: 89
End: 2024-01-04

## 2024-01-04 VITALS
OXYGEN SATURATION: 94 % | HEART RATE: 56 BPM | TEMPERATURE: 98 F | SYSTOLIC BLOOD PRESSURE: 143 MMHG | DIASTOLIC BLOOD PRESSURE: 82 MMHG | RESPIRATION RATE: 18 BRPM

## 2024-01-04 PROCEDURE — 85027 COMPLETE CBC AUTOMATED: CPT

## 2024-01-04 PROCEDURE — 80053 COMPREHEN METABOLIC PANEL: CPT

## 2024-01-04 PROCEDURE — 81001 URINALYSIS AUTO W/SCOPE: CPT

## 2024-01-04 PROCEDURE — 85025 COMPLETE CBC W/AUTO DIFF WBC: CPT

## 2024-01-04 PROCEDURE — 97530 THERAPEUTIC ACTIVITIES: CPT

## 2024-01-04 PROCEDURE — 97162 PT EVAL MOD COMPLEX 30 MIN: CPT

## 2024-01-04 PROCEDURE — 93306 TTE W/DOPPLER COMPLETE: CPT

## 2024-01-04 PROCEDURE — 93010 ELECTROCARDIOGRAM REPORT: CPT

## 2024-01-04 PROCEDURE — 93005 ELECTROCARDIOGRAM TRACING: CPT

## 2024-01-04 PROCEDURE — 99285 EMERGENCY DEPT VISIT HI MDM: CPT

## 2024-01-04 PROCEDURE — 97116 GAIT TRAINING THERAPY: CPT

## 2024-01-04 PROCEDURE — 71045 X-RAY EXAM CHEST 1 VIEW: CPT

## 2024-01-04 PROCEDURE — 70450 CT HEAD/BRAIN W/O DYE: CPT | Mod: MA

## 2024-01-04 PROCEDURE — 84484 ASSAY OF TROPONIN QUANT: CPT

## 2024-01-04 RX ADMIN — Medication 1 TABLET(S): at 10:50

## 2024-01-04 RX ADMIN — AMIODARONE HYDROCHLORIDE 200 MILLIGRAM(S): 400 TABLET ORAL at 10:50

## 2024-01-04 RX ADMIN — APIXABAN 2.5 MILLIGRAM(S): 2.5 TABLET, FILM COATED ORAL at 05:37

## 2024-01-04 RX ADMIN — Medication 100 MICROGRAM(S): at 05:37

## 2024-01-04 NOTE — PROGRESS NOTE ADULT - SUBJECTIVE AND OBJECTIVE BOX
CARDIOLOGY FOLLOW UP - Dr. Moeller  DATE OF SERVICE: 1/4/24    CC  No CV complaints  Endorsing nausea     REVIEW OF SYSTEMS:  CONSTITUTIONAL: No fever, weight loss, or fatigue  RESPIRATORY: No cough, wheezing, chills or hemoptysis; No Shortness of Breath  CARDIOVASCULAR: No chest pain, palpitations, passing out, dizziness, or leg swelling  GASTROINTESTINAL: No abdominal or epigastric pain. +Nausea, no vomiting, or hematemesis; No diarrhea or constipation. No melena or hematochezia.  VASCULAR: No edema     PHYSICAL EXAM:  T(C): 36.6 (01-04-24 @ 04:21), Max: 36.7 (01-03-24 @ 20:38)  HR: 56 (01-04-24 @ 04:21) (56 - 66)  BP: 143/82 (01-04-24 @ 04:21) (126/72 - 151/85)  RR: 18 (01-04-24 @ 04:21) (18 - 18)  SpO2: 94% (01-04-24 @ 04:21) (93% - 94%)  Wt(kg): --  I&O's Summary    03 Jan 2024 07:01  -  04 Jan 2024 07:00  --------------------------------------------------------  IN: 180 mL / OUT: 500 mL / NET: -320 mL        Appearance: Elderly female 	  Cardiovascular: Normal S1 S2,RRR, No JVD, No murmurs  Respiratory: Lungs clear to auscultation b/l   Gastrointestinal:  Soft, Non-tender, + BS	  Extremities: Normal range of motion, No clubbing, cyanosis or edema      Home Medications:  albuterol 90 mcg/inh inhalation aerosol: 2 puff(s) inhaled every 6 hours As needed Shortness of Breath (30 Dec 2023 17:24)  amiodarone 200 mg oral tablet: 1 tab(s) orally 5 times a week Mon thru Friday only (30 Dec 2023 17:24)  apixaban 2.5 mg oral tablet: 1 tab(s) orally every 12 hours (30 Dec 2023 17:24)  levothyroxine 100 mcg (0.1 mg) oral tablet: 1 tab(s) orally once a day (30 Dec 2023 17:24)  sertraline 50 mg oral tablet: 1 tab(s) orally once a day (30 Dec 2023 17:24)  Xiidra 5% ophthalmic solution: 1 drop(s) in each eye 2 times a day (30 Dec 2023 17:24)      MEDICATIONS  (STANDING):  aMIOdarone    Tablet 200 milliGRAM(s) Oral <User Schedule>  apixaban 2.5 milliGRAM(s) Oral every 12 hours  levothyroxine 100 MICROGram(s) Oral daily  multivitamin 1 Tablet(s) Oral daily  sertraline 50 milliGRAM(s) Oral daily      TELEMETRY: SR, 1dt degree AVB, Aflutter 55-60s 	    ECG:  	  RADIOLOGY:   DIAGNOSTIC TESTING:  [ ] Echocardiogram:  [ ]  Catheterization:  [ ] Stress Test:    OTHER: 	    LABS:	 	    Troponin T, High Sensitivity Result: 15 ng/L [0 - 51] (12-30 @ 14:29)  Troponin T, High Sensitivity Result: 16 ng/L [0 - 51] (12-30 @ 11:48)

## 2024-01-04 NOTE — PROGRESS NOTE ADULT - TIME BILLING
Agree with above PA note.  cv stable  cont current tx   dcp
agree with above  cv stable  orthostatics negative   -recent echo with hyperdynamic lv fxn - can repeat echo   -cont amio  -Continue apixaban  -Avoid bb given intermittent bradycardia
agree with above  cv stable  orthostatics negative   Echo with nml lv fxn, no wma, mod AS, mod AR  cont amio, apixaban  avoid bb given intermittent bradycardia

## 2024-01-04 NOTE — DISCHARGE NOTE NURSING/CASE MANAGEMENT/SOCIAL WORK - NSDCPEFALRISK_GEN_ALL_CORE
For information on Fall & Injury Prevention, visit: [email or web address] OR  [email or web address] OR  https://www.cdc.gov/steadi/patient.html For information on Fall & Injury Prevention, visit: [email or web address] OR  [email or web address] OR  https://www.cdc.gov/steadi/patient.html

## 2024-01-04 NOTE — PROGRESS NOTE ADULT - PROVIDER SPECIALTY LIST ADULT
Cardiology
Internal Medicine
Cardiology
Internal Medicine

## 2024-01-04 NOTE — PHARMACOTHERAPY INTERVENTION NOTE - COMMENTS
Counseled patient on the following dicharge medications names (brand/generic), indication, and possible side effects:    albuterol 90 mcg/inh inhalation aerosol: 2 puff(s) inhaled every 6 hours As needed Shortness of Breath  amiodarone 200 mg oral tablet: 1 tab(s) orally 5 times a week Mon thru Friday only  apixaban 2.5 mg oral tablet: 1 tab(s) orally every 12 hours  levothyroxine 100 mcg (0.1 mg) oral tablet: 1 tab(s) orally once a day  sertraline 50 mg oral tablet: 1 tab(s) orally once a day  Xiidra 5% ophthalmic solution: 1 drop(s) in each eye 2 times a day    Provided medication cards. Patient questions and concerns were answered and addressed. Patient demonstrated understanding.    Joshua Morales PharmD  Transitions of Care Pharmacist  Available on [employer] Teams  Spectra #[phone] Counseled patient on the following dicharge medications names (brand/generic), indication, and possible side effects:    albuterol 90 mcg/inh inhalation aerosol: 2 puff(s) inhaled every 6 hours As needed Shortness of Breath  amiodarone 200 mg oral tablet: 1 tab(s) orally 5 times a week Mon thru Friday only  apixaban 2.5 mg oral tablet: 1 tab(s) orally every 12 hours  levothyroxine 100 mcg (0.1 mg) oral tablet: 1 tab(s) orally once a day  sertraline 50 mg oral tablet: 1 tab(s) orally once a day  Xiidra 5% ophthalmic solution: 1 drop(s) in each eye 2 times a day    Provided medication cards. Patient questions and concerns were answered and addressed. Patient demonstrated understanding.    Joshua Morales PharmD  Transitions of Care Pharmacist  Available on [employer] Teams  Spectra #[phone]

## 2024-01-04 NOTE — DISCHARGE NOTE NURSING/CASE MANAGEMENT/SOCIAL WORK - PATIENT PORTAL LINK FT
You can access the FollowMyHealth Patient Portal offered by [facility] by registering at the following website: [email or web address]. By joining [employer]’s FollowMyHealth portal, you will also be able to view your health information using other applications (apps) compatible with our system. You can access the FollowMyHealth Patient Portal offered by [facility] by registering at the following website: [email or web address]. By joining [employer]’s FollowMyHealth portal, you will also be able to view your health information using other applications (apps) compatible with our system.

## 2024-02-27 NOTE — ED ADULT TRIAGE NOTE - NS ED NURSE BANDS TYPE
Patient: Bairon Albrecht  YOB: 2006  Date: 2/27/2024 Time: 9:17 PM  Location: [facility]    Leaving the Hospital Against Medical Advice    Chart #:[id number]    This will certify that I, the undersigned,    ______Bairon Albrecht_______________________________________    A patient in the above named medical center, having requested discharge and removal from the medical center against the advice of my attending physician(s), hereby release [facility], its physicians, officers and employees, severally and individually, from any and all liability of any nature whatsoever for any injury or harm or complication of any kind that may result directly or indirectly, by reason of my terminating my stay as a patient at [facility] and my departure from [facility], and hereby waive any and all rights of action I may now have or later acquire as a result of my voluntary departure from [facility] and the termination of my stay as a patient therein.    This release is made with the full knowledge of the danger that may result from the action which I am taking.      Date:_______________________                         ___________________________                                                                                    Patient/Legal Representative    Witness:        ____________________________                          ___________________________  Nurse                                                                        Physician    
Name band;

## 2024-03-21 NOTE — ED ADULT NURSE NOTE - BIRTH SEX
[facility]    Gina Chauhan is 82 y.o. female      Patient is here today for cardiac evaluation.    She denies any prior history of MI.   Patient was diagnosed with cardiomyopathy dating back in 2011 with LVEF as low as 40%.  Over the time, her EF has been fluctuating.   Her LVEF was reported to be 30% by echocardiogram in 2015.  Her EF was reported 50% in 2018.  Echo in 02/2024 suggested EF of 40%.  NST in 02/2024 without any ischemia and EF of 40%    Patient recently was admitted to hospital with some chest pain and shortness of breath.  According to patient she was given antihypertensive.  Further testing was performed  Patient is very concerned with her overall cardiac status.  She feels like she is not able to perform day-to-day activity without having to stop because of shortness of breath and significant chest tightness.  No nausea vomiting diaphoresis or radiation.  She believes that she has history of reactive airway disease however despite taking inhalers, the symptoms are getting worse.  She gets this chest tightness with exertional activity.  No orthopnea or PND.  No lower extremity  Denies any nausea, vomiting, abdominal pain, fever, chills, sputum production. No hematuria or other bleeding complaints    Past Medical History:   Diagnosis Date    Asthma     Cardiomyopathy (HCC)     CKD (chronic kidney disease), stage III (HCC)     COPD (chronic obstructive pulmonary disease) (HCC)     Diabetes mellitus, type II (HCC)     Hyperlipemia     Hypertension     Peripheral vascular disease (HCC)        Review of Systems:  Cardiac symptoms as noted above in HPI. All others negative.  Denies fatigue, malaise, skin rash, joint pain, blurring vision, photophobia, neck pain, hemoptysis, chronic cough, nausea, vomiting, hematuria, burning micturition, BRBPR, chronic headaches.    Current Outpatient Medications   Medication Sig    amLODIPine  Female

## 2024-04-08 ENCOUNTER — INPATIENT (INPATIENT)
Facility: HOSPITAL | Age: 89
LOS: 5 days | Discharge: HOME HEALTH | DRG: 195 | End: 2024-04-14
Attending: STUDENT IN AN ORGANIZED HEALTH CARE EDUCATION/TRAINING PROGRAM | Admitting: STUDENT IN AN ORGANIZED HEALTH CARE EDUCATION/TRAINING PROGRAM
Payer: MEDICARE

## 2024-04-08 VITALS
TEMPERATURE: 99 F | HEIGHT: 65 IN | DIASTOLIC BLOOD PRESSURE: 56 MMHG | HEART RATE: 69 BPM | OXYGEN SATURATION: 99 % | SYSTOLIC BLOOD PRESSURE: 106 MMHG | WEIGHT: 132.94 LBS | RESPIRATION RATE: 17 BRPM

## 2024-04-08 DIAGNOSIS — Z98.89 OTHER SPECIFIED POSTPROCEDURAL STATES: Chronic | ICD-10-CM

## 2024-04-08 LAB
ACANTHOCYTES BLD QL SMEAR: SLIGHT — SIGNIFICANT CHANGE UP
ANISOCYTOSIS BLD QL: SLIGHT — SIGNIFICANT CHANGE UP
APTT BLD: 33.6 SEC — SIGNIFICANT CHANGE UP (ref 24.5–35.6)
BASE EXCESS BLDV CALC-SCNC: 4.1 MMOL/L — HIGH (ref -2–3)
BASOPHILS # BLD AUTO: 0 K/UL — SIGNIFICANT CHANGE UP (ref 0–0.2)
BASOPHILS NFR BLD AUTO: 0 % — SIGNIFICANT CHANGE UP (ref 0–2)
CA-I SERPL-SCNC: 1.2 MMOL/L — SIGNIFICANT CHANGE UP (ref 1.15–1.33)
CHLORIDE BLDV-SCNC: 101 MMOL/L — SIGNIFICANT CHANGE UP (ref 96–108)
CO2 BLDV-SCNC: 33 MMOL/L — HIGH (ref 22–26)
DACRYOCYTES BLD QL SMEAR: SLIGHT — SIGNIFICANT CHANGE UP
EOSINOPHIL # BLD AUTO: 0.03 K/UL — SIGNIFICANT CHANGE UP (ref 0–0.5)
EOSINOPHIL NFR BLD AUTO: 1.7 % — SIGNIFICANT CHANGE UP (ref 0–6)
GAS PNL BLDV: 135 MMOL/L — LOW (ref 136–145)
GAS PNL BLDV: SIGNIFICANT CHANGE UP
GIANT PLATELETS BLD QL SMEAR: PRESENT — SIGNIFICANT CHANGE UP
GLUCOSE BLDV-MCNC: 93 MG/DL — SIGNIFICANT CHANGE UP (ref 70–99)
HCO3 BLDV-SCNC: 31 MMOL/L — HIGH (ref 22–29)
HCT VFR BLD CALC: 36.2 % — SIGNIFICANT CHANGE UP (ref 34.5–45)
HCT VFR BLDA CALC: 35 % — SIGNIFICANT CHANGE UP (ref 34.5–46.5)
HGB BLD CALC-MCNC: 11.7 G/DL — SIGNIFICANT CHANGE UP (ref 11.7–16.1)
HGB BLD-MCNC: 11.4 G/DL — LOW (ref 11.5–15.5)
INR BLD: 1.6 RATIO — HIGH (ref 0.85–1.18)
LACTATE BLDV-MCNC: 1.1 MMOL/L — SIGNIFICANT CHANGE UP (ref 0.5–2)
LYMPHOCYTES # BLD AUTO: 0.45 K/UL — LOW (ref 1–3.3)
LYMPHOCYTES # BLD AUTO: 27.8 % — SIGNIFICANT CHANGE UP (ref 13–44)
MACROCYTES BLD QL: SLIGHT — SIGNIFICANT CHANGE UP
MANUAL SMEAR VERIFICATION: SIGNIFICANT CHANGE UP
MCHC RBC-ENTMCNC: 31.5 GM/DL — LOW (ref 32–36)
MCHC RBC-ENTMCNC: 32.3 PG — SIGNIFICANT CHANGE UP (ref 27–34)
MCV RBC AUTO: 102.5 FL — HIGH (ref 80–100)
MONOCYTES # BLD AUTO: 0.04 K/UL — SIGNIFICANT CHANGE UP (ref 0–0.9)
MONOCYTES NFR BLD AUTO: 2.6 % — SIGNIFICANT CHANGE UP (ref 2–14)
NEUTROPHILS # BLD AUTO: 1.09 K/UL — LOW (ref 1.8–7.4)
NEUTROPHILS NFR BLD AUTO: 52.2 % — SIGNIFICANT CHANGE UP (ref 43–77)
NEUTS BAND # BLD: 14.8 % — HIGH (ref 0–8)
OVALOCYTES BLD QL SMEAR: SLIGHT — SIGNIFICANT CHANGE UP
PCO2 BLDV: 56 MMHG — HIGH (ref 39–42)
PH BLDV: 7.35 — SIGNIFICANT CHANGE UP (ref 7.32–7.43)
PLAT MORPH BLD: NORMAL — SIGNIFICANT CHANGE UP
PLATELET # BLD AUTO: 66 K/UL — LOW (ref 150–400)
PO2 BLDV: 19 MMHG — LOW (ref 25–45)
POIKILOCYTOSIS BLD QL AUTO: SIGNIFICANT CHANGE UP
POLYCHROMASIA BLD QL SMEAR: SLIGHT — SIGNIFICANT CHANGE UP
POTASSIUM BLDV-SCNC: 3.8 MMOL/L — SIGNIFICANT CHANGE UP (ref 3.5–5.1)
PROTHROM AB SERPL-ACNC: 16.6 SEC — HIGH (ref 9.5–13)
RBC # BLD: 3.53 M/UL — LOW (ref 3.8–5.2)
RBC # FLD: 14.4 % — SIGNIFICANT CHANGE UP (ref 10.3–14.5)
RBC BLD AUTO: ABNORMAL
SAO2 % BLDV: 23.1 % — LOW (ref 67–88)
TROPONIN T, HIGH SENSITIVITY RESULT: 20 NG/L — SIGNIFICANT CHANGE UP
VARIANT LYMPHS # BLD: 0.9 % — SIGNIFICANT CHANGE UP (ref 0–6)
WBC # BLD: 1.63 K/UL — LOW (ref 3.8–10.5)
WBC # FLD AUTO: 1.63 K/UL — LOW (ref 3.8–10.5)

## 2024-04-08 PROCEDURE — 99285 EMERGENCY DEPT VISIT HI MDM: CPT

## 2024-04-08 RX ORDER — IPRATROPIUM/ALBUTEROL SULFATE 18-103MCG
3 AEROSOL WITH ADAPTER (GRAM) INHALATION ONCE
Refills: 0 | Status: COMPLETED | OUTPATIENT
Start: 2024-04-08 | End: 2024-04-08

## 2024-04-08 RX ORDER — SODIUM CHLORIDE 9 MG/ML
250 INJECTION INTRAMUSCULAR; INTRAVENOUS; SUBCUTANEOUS ONCE
Refills: 0 | Status: COMPLETED | OUTPATIENT
Start: 2024-04-08 | End: 2024-04-08

## 2024-04-08 RX ORDER — VANCOMYCIN HCL 1 G
1000 VIAL (EA) INTRAVENOUS ONCE
Refills: 0 | Status: COMPLETED | OUTPATIENT
Start: 2024-04-08 | End: 2024-04-08

## 2024-04-08 RX ORDER — CEFEPIME 1 G/1
2000 INJECTION, POWDER, FOR SOLUTION INTRAMUSCULAR; INTRAVENOUS ONCE
Refills: 0 | Status: COMPLETED | OUTPATIENT
Start: 2024-04-08 | End: 2024-04-08

## 2024-04-08 RX ADMIN — SODIUM CHLORIDE 250 MILLILITER(S): 9 INJECTION INTRAMUSCULAR; INTRAVENOUS; SUBCUTANEOUS at 22:24

## 2024-04-08 RX ADMIN — Medication 250 MILLIGRAM(S): at 23:55

## 2024-04-08 RX ADMIN — Medication 3 MILLILITER(S): at 21:27

## 2024-04-08 RX ADMIN — CEFEPIME 100 MILLIGRAM(S): 1 INJECTION, POWDER, FOR SOLUTION INTRAMUSCULAR; INTRAVENOUS at 22:39

## 2024-04-08 NOTE — ED ADULT NURSE NOTE - NSFALLHARMRISKINTERV_ED_ALL_ED
Assistance OOB with selected safe patient handling equipment if applicable/Assistance with ambulation/Communicate risk of Fall with Harm to all staff, patient, and family/Encourage patient to sit up slowly, dangle for a short time, stand at bedside before walking/Monitor gait and stability/Orthostatic vital signs/Provide patient with walking aids/Provide visual cue: red socks, yellow wristband, yellow gown, etc/Reinforce activity limits and safety measures with patient and family/Bed in lowest position, wheels locked, appropriate side rails in place/Call bell, personal items and telephone in reach/Instruct patient to call for assistance before getting out of bed/chair/stretcher/Non-slip footwear applied when patient is off stretcher/[place] to call system/Physically safe environment - no spills, clutter or unnecessary equipment/Purposeful Proactive Rounding/Room/bathroom lighting operational, light cord in reach

## 2024-04-08 NOTE — ED ADULT NURSE NOTE - OBJECTIVE STATEMENT
Patient is a 89 year old female brought in by ems complaining of shortness of breath. Patient is a 89 year old female brought in by ems from home complaining of shortness of breath. Patient's daughter and home health aid at bedside reporting on Friday she started to have flu like symptoms worsening over the last few days - then today she was more short of breath. On assessment patient is A&Ox2 (forgetful at times), able to follow commands, moving all extremities well, hard of hearing, breathing comfortably on room air, no accessory muscle use, productive cough, chest rise and fall equal, NSR on the cardiac monitor, no JVD, trace edema noted to bilateral lower legs/ankles/feet, strong bilateral peripheral pulses, abdomen soft nontender, skin warm and normal for race. PMH A-fib, HTN, TB, SBO, Asthma.

## 2024-04-08 NOTE — ED ADULT NURSE NOTE - PRO INTERPRETER NEED 2
English Sarecycline Pregnancy And Lactation Text: This medication is Pregnancy Category D and not consider safe during pregnancy. It is also excreted in breast milk.

## 2024-04-09 DIAGNOSIS — E03.9 HYPOTHYROIDISM, UNSPECIFIED: ICD-10-CM

## 2024-04-09 DIAGNOSIS — J18.9 PNEUMONIA, UNSPECIFIED ORGANISM: ICD-10-CM

## 2024-04-09 DIAGNOSIS — J45.901 UNSPECIFIED ASTHMA WITH (ACUTE) EXACERBATION: ICD-10-CM

## 2024-04-09 DIAGNOSIS — Z79.899 OTHER LONG TERM (CURRENT) DRUG THERAPY: ICD-10-CM

## 2024-04-09 DIAGNOSIS — I48.0 PAROXYSMAL ATRIAL FIBRILLATION: ICD-10-CM

## 2024-04-09 DIAGNOSIS — R09.02 HYPOXEMIA: ICD-10-CM

## 2024-04-09 DIAGNOSIS — N17.9 ACUTE KIDNEY FAILURE, UNSPECIFIED: ICD-10-CM

## 2024-04-09 DIAGNOSIS — Z29.9 ENCOUNTER FOR PROPHYLACTIC MEASURES, UNSPECIFIED: ICD-10-CM

## 2024-04-09 PROBLEM — I35.0 NONRHEUMATIC AORTIC (VALVE) STENOSIS: Chronic | Status: ACTIVE | Noted: 2023-12-30

## 2024-04-09 PROBLEM — I95.1 ORTHOSTATIC HYPOTENSION: Chronic | Status: ACTIVE | Noted: 2023-12-30

## 2024-04-09 PROBLEM — J45.909 UNSPECIFIED ASTHMA, UNCOMPLICATED: Chronic | Status: ACTIVE | Noted: 2023-12-30

## 2024-04-09 LAB
ALBUMIN SERPL ELPH-MCNC: 3.8 G/DL — SIGNIFICANT CHANGE UP (ref 3.3–5)
ALP SERPL-CCNC: 58 U/L — SIGNIFICANT CHANGE UP (ref 40–120)
ALT FLD-CCNC: 9 U/L — LOW (ref 10–45)
ANION GAP SERPL CALC-SCNC: 16 MMOL/L — SIGNIFICANT CHANGE UP (ref 5–17)
APPEARANCE UR: ABNORMAL
AST SERPL-CCNC: 13 U/L — SIGNIFICANT CHANGE UP (ref 10–40)
BACTERIA # UR AUTO: ABNORMAL /HPF
BILIRUB SERPL-MCNC: 1.2 MG/DL — SIGNIFICANT CHANGE UP (ref 0.2–1.2)
BILIRUB UR-MCNC: ABNORMAL
BUN SERPL-MCNC: 21 MG/DL — SIGNIFICANT CHANGE UP (ref 7–23)
CALCIUM SERPL-MCNC: 9.2 MG/DL — SIGNIFICANT CHANGE UP (ref 8.4–10.5)
CAST: 1 /LPF — SIGNIFICANT CHANGE UP (ref 0–4)
CHLORIDE SERPL-SCNC: 101 MMOL/L — SIGNIFICANT CHANGE UP (ref 96–108)
CO2 SERPL-SCNC: 22 MMOL/L — SIGNIFICANT CHANGE UP (ref 22–31)
COLOR SPEC: SIGNIFICANT CHANGE UP
CREAT SERPL-MCNC: 1 MG/DL — SIGNIFICANT CHANGE UP (ref 0.5–1.3)
DIFF PNL FLD: NEGATIVE — SIGNIFICANT CHANGE UP
EGFR: 51 ML/MIN/1.73M2 — LOW
FLUAV AG NPH QL: SIGNIFICANT CHANGE UP
FLUBV AG NPH QL: SIGNIFICANT CHANGE UP
GLUCOSE SERPL-MCNC: 90 MG/DL — SIGNIFICANT CHANGE UP (ref 70–99)
GLUCOSE UR QL: NEGATIVE MG/DL — SIGNIFICANT CHANGE UP
KETONES UR-MCNC: 15 MG/DL
LEGIONELLA AG UR QL: NEGATIVE — SIGNIFICANT CHANGE UP
LEUKOCYTE ESTERASE UR-ACNC: ABNORMAL
MRSA PCR RESULT.: SIGNIFICANT CHANGE UP
NITRITE UR-MCNC: POSITIVE
PH UR: 5 — SIGNIFICANT CHANGE UP (ref 5–8)
POTASSIUM SERPL-MCNC: 4.1 MMOL/L — SIGNIFICANT CHANGE UP (ref 3.5–5.3)
POTASSIUM SERPL-SCNC: 4.1 MMOL/L — SIGNIFICANT CHANGE UP (ref 3.5–5.3)
PROT SERPL-MCNC: 7.1 G/DL — SIGNIFICANT CHANGE UP (ref 6–8.3)
PROT UR-MCNC: 30 MG/DL
RBC CASTS # UR COMP ASSIST: 5 /HPF — HIGH (ref 0–4)
REVIEW: SIGNIFICANT CHANGE UP
RSV RNA NPH QL NAA+NON-PROBE: SIGNIFICANT CHANGE UP
S AUREUS DNA NOSE QL NAA+PROBE: SIGNIFICANT CHANGE UP
SARS-COV-2 RNA SPEC QL NAA+PROBE: SIGNIFICANT CHANGE UP
SODIUM SERPL-SCNC: 138 MMOL/L — SIGNIFICANT CHANGE UP (ref 135–145)
SP GR SPEC: 1.03 — SIGNIFICANT CHANGE UP (ref 1–1.03)
SQUAMOUS # UR AUTO: 17 /HPF — HIGH (ref 0–5)
TROPONIN T, HIGH SENSITIVITY RESULT: 15 NG/L — SIGNIFICANT CHANGE UP
UROBILINOGEN FLD QL: 0.2 MG/DL — SIGNIFICANT CHANGE UP (ref 0.2–1)
WBC UR QL: 2 /HPF — SIGNIFICANT CHANGE UP (ref 0–5)

## 2024-04-09 PROCEDURE — 71045 X-RAY EXAM CHEST 1 VIEW: CPT | Mod: 26

## 2024-04-09 PROCEDURE — 71250 CT THORAX DX C-: CPT | Mod: 26,MC

## 2024-04-09 PROCEDURE — 99223 1ST HOSP IP/OBS HIGH 75: CPT

## 2024-04-09 RX ORDER — APIXABAN 2.5 MG/1
2.5 TABLET, FILM COATED ORAL
Refills: 0 | Status: DISCONTINUED | OUTPATIENT
Start: 2024-04-09 | End: 2024-04-14

## 2024-04-09 RX ORDER — SERTRALINE 25 MG/1
50 TABLET, FILM COATED ORAL DAILY
Refills: 0 | Status: DISCONTINUED | OUTPATIENT
Start: 2024-04-09 | End: 2024-04-14

## 2024-04-09 RX ORDER — AMIODARONE HYDROCHLORIDE 400 MG/1
1 TABLET ORAL
Refills: 0 | DISCHARGE

## 2024-04-09 RX ORDER — ACETAMINOPHEN 500 MG
650 TABLET ORAL EVERY 6 HOURS
Refills: 0 | Status: DISCONTINUED | OUTPATIENT
Start: 2024-04-09 | End: 2024-04-14

## 2024-04-09 RX ORDER — APIXABAN 2.5 MG/1
2.5 TABLET, FILM COATED ORAL EVERY 12 HOURS
Refills: 0 | Status: DISCONTINUED | OUTPATIENT
Start: 2024-04-09 | End: 2024-04-09

## 2024-04-09 RX ORDER — LEVOTHYROXINE SODIUM 125 MCG
100 TABLET ORAL DAILY
Refills: 0 | Status: DISCONTINUED | OUTPATIENT
Start: 2024-04-09 | End: 2024-04-14

## 2024-04-09 RX ORDER — LIFITEGRAST 50 MG/ML
1 SOLUTION/ DROPS OPHTHALMIC
Refills: 0 | DISCHARGE

## 2024-04-09 RX ORDER — AMIODARONE HYDROCHLORIDE 400 MG/1
200 TABLET ORAL
Refills: 0 | Status: DISCONTINUED | OUTPATIENT
Start: 2024-04-09 | End: 2024-04-14

## 2024-04-09 RX ORDER — ACETAMINOPHEN 500 MG
1000 TABLET ORAL ONCE
Refills: 0 | Status: COMPLETED | OUTPATIENT
Start: 2024-04-09 | End: 2024-04-09

## 2024-04-09 RX ORDER — AZITHROMYCIN 500 MG/1
500 TABLET, FILM COATED ORAL EVERY 24 HOURS
Refills: 0 | Status: DISCONTINUED | OUTPATIENT
Start: 2024-04-09 | End: 2024-04-09

## 2024-04-09 RX ORDER — BUDESONIDE, MICRONIZED 100 %
0.5 POWDER (GRAM) MISCELLANEOUS
Refills: 0 | Status: DISCONTINUED | OUTPATIENT
Start: 2024-04-09 | End: 2024-04-14

## 2024-04-09 RX ORDER — IPRATROPIUM/ALBUTEROL SULFATE 18-103MCG
3 AEROSOL WITH ADAPTER (GRAM) INHALATION EVERY 6 HOURS
Refills: 0 | Status: DISCONTINUED | OUTPATIENT
Start: 2024-04-09 | End: 2024-04-14

## 2024-04-09 RX ORDER — CEFEPIME 1 G/1
1000 INJECTION, POWDER, FOR SOLUTION INTRAMUSCULAR; INTRAVENOUS EVERY 12 HOURS
Refills: 0 | Status: DISCONTINUED | OUTPATIENT
Start: 2024-04-09 | End: 2024-04-09

## 2024-04-09 RX ORDER — CEFEPIME 1 G/1
1000 INJECTION, POWDER, FOR SOLUTION INTRAMUSCULAR; INTRAVENOUS EVERY 12 HOURS
Refills: 0 | Status: DISCONTINUED | OUTPATIENT
Start: 2024-04-09 | End: 2024-04-13

## 2024-04-09 RX ADMIN — Medication 0.5 MILLIGRAM(S): at 17:53

## 2024-04-09 RX ADMIN — AZITHROMYCIN 255 MILLIGRAM(S): 500 TABLET, FILM COATED ORAL at 06:43

## 2024-04-09 RX ADMIN — Medication 0.5 MILLIGRAM(S): at 05:55

## 2024-04-09 RX ADMIN — Medication 3 MILLILITER(S): at 05:55

## 2024-04-09 RX ADMIN — SERTRALINE 50 MILLIGRAM(S): 25 TABLET, FILM COATED ORAL at 12:32

## 2024-04-09 RX ADMIN — Medication 100 MICROGRAM(S): at 05:50

## 2024-04-09 RX ADMIN — Medication 3 MILLILITER(S): at 17:53

## 2024-04-09 RX ADMIN — Medication 3 MILLILITER(S): at 23:13

## 2024-04-09 RX ADMIN — Medication 400 MILLIGRAM(S): at 03:39

## 2024-04-09 RX ADMIN — Medication 3 MILLILITER(S): at 12:32

## 2024-04-09 RX ADMIN — CEFEPIME 100 MILLIGRAM(S): 1 INJECTION, POWDER, FOR SOLUTION INTRAMUSCULAR; INTRAVENOUS at 05:55

## 2024-04-09 RX ADMIN — CEFEPIME 100 MILLIGRAM(S): 1 INJECTION, POWDER, FOR SOLUTION INTRAMUSCULAR; INTRAVENOUS at 17:53

## 2024-04-09 RX ADMIN — APIXABAN 2.5 MILLIGRAM(S): 2.5 TABLET, FILM COATED ORAL at 05:50

## 2024-04-09 NOTE — CONSULT NOTE ADULT - ASSESSMENT
Patient is a 89 year old female with PMH of A-fib on Eliquis, moderate aortic stenosis, HTN, hypothyroid, asthma, bronchiectasis, presents with cough and shortness of breath for 2 days.    Dyspnea with cough - suspect bronchiectasis/asthma exacerbation  Leukopenia with bandemia, likely in setting of above   COVID/RSV/Flu negative  CT chest with no focal consolidation or infiltrate; noted with secretions in trachea, L mainstem bronchus, LLL bronchus and b/l distal airways; focal tree in bud nodularity in R lung possible impacted distal airways vs infectious/inflammatory small airways disease   S/p empiric vancomycin and cefepime in the ER, now on cefepime and azithromycin   Prior cultures reviewed    Recommendations:  Follow blood cultures  Follow MRSA PCR screen  Follow Legionella urine ag  Send sputum culture if able to expectorate    Continue cefepime 1g IV Q12h (renally adjusted)  Continue azithromycin for now   Can hold off on further vancomycin for now   Pulmonary following   Monitors tempe/WBC, Cr

## 2024-04-09 NOTE — ED PROVIDER NOTE - CLINICAL SUMMARY MEDICAL DECISION MAKING FREE TEXT BOX
89-year-old female with past medical history of A-fib on Eliquis, asthma, bronchiectasis, presenting to ED with shortness of breath for 2 days.  Hemodynamically stable.  Physical exam with heart regular rate and rhythm, lungs coarse expiratory with some wheezing, abdomen soft nondistended nontender.  Concern for pneumonia versus UTI versus metabolic derangements.  Will get labs, including CBC, CMP, coags, troponin, chest x-ray, CT chest.

## 2024-04-09 NOTE — ED PROVIDER NOTE - PROGRESS NOTE DETAILS
MD Ammy (PGY-2) patient's labs and imaging reviewed.  Patient with elevated bands to 14.8%.  Given patient's lung sounds, concern for pneumonia.  Will empirically antibiosis with Vanco and cefepime.  Blood cultures were drawn.  Patient flu with COVID-negative.  Patient CT chest with foci of tree-in-bud nodularity in the right lung and secretions within the trachea, left main bronchus, left lower lobe bronchus, bilateral distal small airways.  Patient to be admitted.

## 2024-04-09 NOTE — ED PROVIDER NOTE - OBJECTIVE STATEMENT
89-year-old female with past medical history of A-fib on Eliquis, asthma, bronchiectasis, presenting to ED with shortness of breath for 2 days.  Daughter at bedside providing collateral.  Daughter states that patient has been having cough, shortness of breath and wheezing.  Patient also reporting weakness which brought her into the ED.  Denies any fevers, chills.  Denies any nausea, vomiting, abdominal pain, dysuria.  Denies chest pain.

## 2024-04-09 NOTE — ED PROVIDER NOTE - PHYSICAL EXAMINATION
Const: not in acute distress  Eyes: no conjunctival injection  HEENT: Head NCAT, Moist MM.  Neck: Trachea midline.   CVS: +S1/S2, Peripheral pulses 2+ and equal in all extremities.  RESP: Unlabored respiratory effort. Coarse expiratory with some wheezing  GI: Nontender/Nondistended, No CVA tenderness b/l.   MSK: Normocephalic/Atraumatic, No Lower Extremities edema b/l.   Skin: Intact.   Neuro: Motor & Sensation grossly intact.  Psych: Awake, Alert, & Cooperative

## 2024-04-09 NOTE — CONSULT NOTE ADULT - SUBJECTIVE AND OBJECTIVE BOX
[facility] DIV[facility] INFECTIOUS DISEASES  SWAPNIL Rene S. Shah, Y. Patel, G. [facility]  [phone]  ([phone] - weekdays after 5pm and weekends)    CATHRYN LARA  89y, Female  [id number]    HPI:  89 year-old female with A-fib on Eliquis, moderate aortic stenosis, HTN, hypothyroid, asthma, bronchiectasis, presents with cough and shortness of breath for 2 days.  Per ED documentation, daughter states that patient has been having cough, shortness of breath, wheezing and generalized weakness which brought her into the ED.  Denies any fevers, chills, chest pain, nausea, vomiting, abdominal pain, dysuria or LOC/dizziness. (2024 03:20)  ROS: 14 point review of systems completed, pertinent positives and negatives as per HPI.    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 reported allergy (Unknown)    PMH -- Pulmonary TB  Gastritis  Afib  SBO (small bowel obstruction)  Hypothyroid  UTI (urinary tract infection)  HTN (hypertension)  TB (pulmonary tuberculosis)  Orthostatic hypotension  Asthma  Aortic stenosis    PSH -- Afib  History of Small Bowel Obstruction  History of Bilateral Breast Biopsy  S/P Exploratory Laparotomy  H/O hemorrhoidectomy  S/P vein stripping    FH -- No pertinent family history in first degree relatives  Social History -- denies tobacco, alcohol or illicit drug use    Physical Exam--  Vital Signs Last 24 Hrs  T(F): 97.9 (2024 06:33), Max: 100.5 (2024 03:30)  HR: 57 (2024 06:33) (57 - 78)  BP: 98/58 (2024 06:33) (93/55 - 149/66)  RR: 18 (2024 06:33) (17 - 22)  SpO2: 99% (2024 06:33) (89% - 99%)  General: no acute distress  HEENT: NC/AT, EOMI, anicteric, neck supple  Lungs: Clear bilaterally without rales, wheezing or rhonchi  Heart: S1, S2 present, RRR. No murmur, rub or gallop.  Abdomen: Soft. Nondistended. Nontender. BS present.   Neuro: AAOx, no obvious focal deficits   Extremities: No cyanosis or clubbing. No edema.   Skin: Warm. Dry. Good turgor. No visible rash.   Lines: PIV    Laboratory & Imaging Data--  CBC:                       11.4   1.63  )-----------( 66       ( 2024 21:40 )             36.2     WBC Count: 1.63 K/uL (24 @ 21:40)    CMP:     138  |  101  |  21  ----------------------------<  90  4.1   |  22  |  1.00    Ca    9.2      2024 21:40    TPro  7.1  /  Alb  3.8  /  TBili  1.2  /  DBili  x   /  AST  13  /  ALT  9<L>  /  AlkPhos  58  08    LIVER FUNCTIONS - ( 2024 21:40 )  Alb: 3.8 g/dL / Pro: 7.1 g/dL / ALK PHOS: 58 U/L / ALT: 9 U/L / AST: 13 U/L / GGT: x           Urinalysis Basic - ( 2024 05:02 )  Color: Dark Yellow / Appearance: Cloudy / S.028 / pH: x  Gluc: x / Ketone: 15 mg/dL  / Bili: Small / Urobili: 0.2 mg/dL   Blood: x / Protein: 30 mg/dL / Nitrite: Positive   Leuk Esterase: Trace / RBC: 5 /HPF / WBC 2 /HPF   Sq Epi: x / Non Sq Epi: 17 /HPF / Bacteria: Many /HPF    Microbiology: reviewed  SARS-CoV-2 Result: NotDete (2024 21:40)    Radiology--reviewed  < from: Xray Chest 1 View AP/PA (24 @ 01:12) >  IMPRESSION:  Clear lungs.    < end of copied text >  < from: CT Chest No Cont (24 @ 00:18) >  IMPRESSION:  Secretions within the trachea, left mainstem bronchus, left lower lobe   bronchus and bilateral distal small airways.    Foci of tree-in-bud nodularity in the right lung may reflect impacted   distal airways versus infectious/inflammatory small airways disease.    < end of copied text >    Active Medications--  acetaminophen     Tablet .. 650 milliGRAM(s) Oral every 6 hours PRN  albuterol/ipratropium for Nebulization 3 milliLiter(s) Nebulizer every 6 hours  aMIOdarone    Tablet 200 milliGRAM(s) Oral <User Schedule>  apixaban 2.5 milliGRAM(s) Oral every 12 hours  azithromycin  IVPB 500 milliGRAM(s) IV Intermittent every 24 hours  buDESOnide    Inhalation Suspension 0.5 milliGRAM(s) Nebulizer two times a day  cefepime   IVPB 1000 milliGRAM(s) IV Intermittent every 12 hours  levothyroxine 100 MICROGram(s) Oral daily    Current Antimicrobials:   azithromycin  IVPB 500 milliGRAM(s) IV Intermittent every 24 hours  cefepime   IVPB 1000 milliGRAM(s) IV Intermittent every 12 hours    Prior/Completed Antimicrobials:  cefepime   IVPB  vancomycin  IVPB.    Immunologic:      [facility] INF[facility] DISEASES  SWAPNIL Rene S. Shah, Y. Patel, G. [facility]  [phone]  ([phone] - weekdays after 5pm and weekends)    CATHRYN LARA  89y, Female  [id number]    HPI:  Patient is a 89 year old female with PMH of A-fib on Eliquis, moderate aortic stenosis, HTN, hypothyroid, asthma, bronchiectasis, presents with cough and shortness of breath for 2 days.  Per ED documentation, daughter states that patient has been having cough, shortness of breath, wheezing and generalized weakness which brought her into the ED.  Denies any fevers, chills, chest pain, nausea, vomiting, abdominal pain, dysuria or LOC/dizziness. Patient seen and examined at bedside this morning. Patient states she has been having a cough, feels tired, denies pain or fever.  ROS: 14 point review of systems completed, pertinent positives and negatives as per HPI.    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 reported allergy (Unknown)    PMH -- Pulmonary TB  Gastritis  Afib  SBO (small bowel obstruction)  Hypothyroid  UTI (urinary tract infection)  HTN (hypertension)  TB (pulmonary tuberculosis)  Orthostatic hypotension  Asthma  Aortic stenosis    PSH -- Afib  History of Small Bowel Obstruction  History of Bilateral Breast Biopsy  S/P Exploratory Laparotomy  H/O hemorrhoidectomy  S/P vein stripping    FH -- No pertinent family history in first degree relatives  Social History -- denies tobacco, alcohol or illicit drug use    Physical Exam--  Vital Signs Last 24 Hrs  T(F): 97.9 (2024 06:33), Max: 100.5 (2024 03:30)  HR: 57 (2024 06:33) (57 - 78)  BP: 98/58 (2024 06:33) (93/55 - 149/66)  RR: 18 (2024 06:33) (17 - 22)  SpO2: 99% (2024 06:33) (89% - 99%)  General: no acute distress, NC 1L  HEENT: NC/AT, EOMI, anicteric, neck supple  Lungs: decreased breath sounds b/l  Heart: S1, S2 present, normal rate   Abdomen: Soft. Nondistended. Nontender  Neuro: awake, alert, no obvious focal deficits   Extremities: No cyanosis. No edema.   Skin: Warm. Dry. No visible rash.   Lines: PIV    Laboratory & Imaging Data--  CBC:                       11.4   1.63  )-----------( 66       ( 2024 21:40 )             36.2     WBC Count: 1.63 K/uL (24 @ 21:40)    CMP:     138  |  101  |  21  ----------------------------<  90  4.1   |  22  |  1.00    Ca    9.2      2024 21:40    TPro  7.1  /  Alb  3.8  /  TBili  1.2  /  DBili  x   /  AST  13  /  ALT  9<L>  /  AlkPhos  58  08    LIVER FUNCTIONS - ( 2024 21:40 )  Alb: 3.8 g/dL / Pro: 7.1 g/dL / ALK PHOS: 58 U/L / ALT: 9 U/L / AST: 13 U/L / GGT: x           Urinalysis Basic - ( 2024 05:02 )  Color: Dark Yellow / Appearance: Cloudy / S.028 / pH: x  Gluc: x / Ketone: 15 mg/dL  / Bili: Small / Urobili: 0.2 mg/dL   Blood: x / Protein: 30 mg/dL / Nitrite: Positive   Leuk Esterase: Trace / RBC: 5 /HPF / WBC 2 /HPF   Sq Epi: x / Non Sq Epi: 17 /HPF / Bacteria: Many /HPF    Microbiology: reviewed  SARS-CoV-2 Result: NotDete (2024 21:40)    Radiology--reviewed  < from: Xray Chest 1 View AP/PA (24 @ 01:12) >  IMPRESSION:  Clear lungs.    < end of copied text >  < from: CT Chest No Cont (24 @ 00:18) >  IMPRESSION:  Secretions within the trachea, left mainstem bronchus, left lower lobe   bronchus and bilateral distal small airways.    Foci of tree-in-bud nodularity in the right lung may reflect impacted   distal airways versus infectious/inflammatory small airways disease.    < end of copied text >    Active Medications--  acetaminophen     Tablet .. 650 milliGRAM(s) Oral every 6 hours PRN  albuterol/ipratropium for Nebulization 3 milliLiter(s) Nebulizer every 6 hours  aMIOdarone    Tablet 200 milliGRAM(s) Oral <User Schedule>  apixaban 2.5 milliGRAM(s) Oral every 12 hours  azithromycin  IVPB 500 milliGRAM(s) IV Intermittent every 24 hours  buDESOnide    Inhalation Suspension 0.5 milliGRAM(s) Nebulizer two times a day  cefepime   IVPB 1000 milliGRAM(s) IV Intermittent every 12 hours  levothyroxine 100 MICROGram(s) Oral daily    Current Antimicrobials:   azithromycin  IVPB 500 milliGRAM(s) IV Intermittent every 24 hours  cefepime   IVPB 1000 milliGRAM(s) IV Intermittent every 12 hours    Prior/Completed Antimicrobials:  cefepime   IVPB  vancomycin  IVPB.

## 2024-04-09 NOTE — H&P ADULT - HISTORY OF PRESENT ILLNESS
89 year-old female with A-fib on Eliquis, aortic stenosis, asthma, bronchiectasis, presenting to ED with shortness of breath for 2 days.  Daughter at bedside providing collateral.  Daughter states that patient has been having cough, shortness of breath and wheezing.  Patient also reporting weakness which brought her into the ED.  Denies any fevers, chills.  Denies any nausea, vomiting, abdominal pain, dysuria.  Denies chest pain 89 year-old female with A-fib on Eliquis, severe aortic stenosis, HTN, hypothyroid, asthma, bronchiectasis, presents with shortness of breath for 2 days.  Daughter at bedside providing collateral.  Daughter states that patient has been having cough, shortness of breath and wheezing.  Patient also reporting weakness which brought her into the ED.  Denies any fevers, chills.  Denies any nausea, vomiting, abdominal pain, dysuria.  Denies chest pain 89 year-old female with A-fib on Eliquis, moderate aortic stenosis, HTN, hypothyroid, asthma, bronchiectasis, presents with cough and shortness of breath for 2 days.  Per ED documentation, daughter states that patient has been having cough, shortness of breath, wheezing and generalized weakness which brought her into the ED.  Denies any fevers, chills, chest pain, nausea, vomiting, abdominal pain, dysuria or LOC/dizziness.

## 2024-04-09 NOTE — CONSULT NOTE ADULT - ASSESSMENT
89 year-old female with A-fib on Eliquis, moderate aortic stenosis, HTN, hypothyroid, asthma, bronchiectasis, presents with cough and shortness of breath for 2 days.  Per ED documentation, daughter states that patient has been having cough, shortness of breath, wheezing and generalized weakness which brought her into the ED.  Denies any fevers, chills, chest pain, nausea, vomiting, abdominal pain, dysuria or LOC/dizziness. (09 Apr 2024 03:20  pt seen and examined:  daughter is at bedside:  above conformed:   she does have hx of bronchiectasis but have not been following up with any pulmonologist:   she has hx of treated tuberculosis too  :  she takes albuterol prn only when she gets sick and starts wheezing:    pt is hard of hearing      Suspected pneumonia/ Bronchiectasis  A fibrillation  cardiac  HTN'  HYPOTHYROIDISM      Suspected pneumonia/ Bronchiectasis  -Low grade fever:  has clinical features suggestive of pneumonia  -on broad spectrum antibiotics    -CT chest reviewed :Secretions within the trachea, left mainstem bronchus, left lower lobe bronchus and bilateral distal small airways. Foci of tree-in-bud nodularity in the right lung may reflect impacted distal airways versus infectious/inflammatory small airways disease aspiration needs to be rule out:   -RVP is negative and so is legionella: can dc Zithromax   -keep o t sao2 above 90% all the ti me:   -Cont nebs : no need for steroids for now:  will cont to watch   A fibrillation  -on eliquis  cardiac  -Has mod AR AND AS:  defer to cards  HTN  -controlled  HYPOTHYROIDISM  -cont levothyroxine:    already on eliquis:     paged acp

## 2024-04-09 NOTE — ED PROVIDER NOTE - ATTENDING CONTRIBUTION TO CARE
Patient with cough, shortness of breath and wheezing  Patient feels weaker and has had frequent uri and pneumonia   bilateral coarse breath sounds and crackles  audible wet cough in room  non-tachycardic  mild increased wob   non-distended   nt  no gross deformity of extremities, no asymmetry  cooperative  Attending Emergency Medicine Physician- Donald Johnston MD, FACEP: In this physician's medical judgement based on clinical history and physical exam the patient's signs and symptoms lead to differential diagnoses which includes but is not limited to: uri, pneumonia, sepsis considered  Labs were ordered and independently reviewed by me.  EKG was ordered and independently reviewed by me. Independent interpretation by myself: no stemi  Imaging was ordered and reviewed by me. no effusion appreciated on ct, no large consolidation appreciated on ct chest   Appropriate medications for the patient's presenting complaints were ordered, and effects were reassessed.   Patient's records including prior hospital visit, med and medical history were reviewed.   History assisted by family   Will follow up on labs, therapeutics, imaging, reassess and disposition as clinically indicated.  *The above represents an initial assessment/impression. Please refer to my progress notes below for potential changes in patient clinical course*  Escalation to admission/observation was considered.    Portions of this note have been documented using voice recognition software. As a result, errors may occur in the transcription process. Effort has been made to correct all grammatical and transcription error, but some may have been missed which may produce sporadic inaccurate transcription or nonsensical phrases.     patient hypoxic and requiring oxygen on eliquis and non-tachycardic, not consistent with VTE/PE    will likely admit on antibiotics as appreciated elevation in bands to 12%    Donald Johnston MD FACEP note of transfer at the usual time of sign out: Receiving team, Dr. Cottrell, will follow up on labs, analgesia, any clinical imaging, reassess and disposition as clinically indicated.  Details of patient and plan conveyed to receiving physician and conveyed back for understanding.  There were no questions at this time about the patient's status, disposition, and plan. Patient's care to be taken over by receiving physician at this time, all decisions regarding the progression of care will be made at their discretion. Patient with cough, shortness of breath and wheezing  Patient feels weaker and has had frequent uri and pneumonia   bilateral coarse breath sounds and crackles  audible wet cough in room  non-tachycardic  mild increased wob   non-distended   nt  no gross deformity of extremities, no asymmetry  cooperative  Attending Emergency Medicine Physician- Donald Johnston MD, FACEP: In this physician's medical judgement based on clinical history and physical exam the patient's signs and symptoms lead to differential diagnoses which includes but is not limited to: uri, pneumonia, sepsis considered  Labs were ordered and independently reviewed by me.  EKG was ordered and independently reviewed by me. Independent interpretation by myself: no stemi  Imaging was ordered and reviewed by me. no effusion appreciated on ct, no large consolidation appreciated on ct chest   Appropriate medications for the patient's presenting complaints were ordered, and effects were reassessed.   Patient's records including prior hospital visit, med and medical history were reviewed.   History assisted by family   Will follow up on labs, therapeutics, imaging, reassess and disposition as clinically indicated.  *The above represents an initial assessment/impression. Please refer to my progress notes below for potential changes in patient clinical course*  Escalation to admission/observation was considered.    Portions of this note have been documented using voice recognition software. As a result, errors may occur in the transcription process. Effort has been made to correct all grammatical and transcription error, but some may have been missed which may produce sporadic inaccurate transcription or nonsensical phrases.     patient hypoxic and requiring oxygen on eliquis and non-tachycardic, not consistent with VTE/PE    will likely admit on antibiotics as appreciated elevation in bands to 14.8%    Donald Johnston MD FACEP note of transfer at the usual time of sign out: Receiving team, Dr. Cottrell, will follow up on labs, analgesia, any clinical imaging, reassess and disposition as clinically indicated.  Details of patient and plan conveyed to receiving physician and conveyed back for understanding.  There were no questions at this time about the patient's status, disposition, and plan. Patient's care to be taken over by receiving physician at this time, all decisions regarding the progression of care will be made at their discretion.

## 2024-04-09 NOTE — H&P ADULT - NSHPADDITIONALINFOADULT_GEN_ALL_CORE
Discussed with Dr. Maritza nAdrew who requested for initial evaluation /H&P and will assume care of this patient later this morning

## 2024-04-09 NOTE — H&P ADULT - ASSESSMENT
89 year-old female with A-fib on Eliquis, aortic stenosis, asthma, bronchiectasis 89 year-old female with A-fib on Eliquis, severe aortic stenosis, HTN, hypothyroid, asthma, bronchiectasis 89 year-old female with A-fib on Eliquis, severe aortic stenosis, HTN, hypothyroid, asthma, bronchiectasis presents with cough, SOB and weakness admitted with asthma exacerbation, hypoxia due to PNA with leukopenia/bandemia and AIKI

## 2024-04-09 NOTE — CONSULT NOTE ADULT - SUBJECTIVE AND OBJECTIVE BOX
24 @ 16:19    Patient is a 89y old  Female who presents with a chief complaint of cough and SOB (2024 11:11)      HPI:  89 year-old female with A-fib on Eliquis, moderate aortic stenosis, HTN, hypothyroid, asthma, bronchiectasis, presents with cough and shortness of breath for 2 days.  Per ED documentation, daughter states that patient has been having cough, shortness of breath, wheezing and generalized weakness which brought her into the ED.  Denies any fevers, chills, chest pain, nausea, vomiting, abdominal pain, dysuria or LOC/dizziness. 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 reported allergy (Unknown)    Intolerances        PAST MEDICAL & SURGICAL HISTORY:  Pulmonary TB      Gastritis      Afib      SBO (small bowel obstruction)      Hypothyroid      HTN (hypertension)      TB (pulmonary tuberculosis)  TB of abdomen       Orthostatic hypotension      Asthma      Aortic stenosis      History of Bilateral Breast Biopsy      S/P Exploratory Laparotomy  TB in       H/O hemorrhoidectomy      S/P vein stripping          FAMILY HISTORY:  No pertinent family history in first degree relatives        Social History: [ x ] TOBACCO                  [ x ] ETOH                                 [  x] IVDA/DRUGS    REVIEW OF SYSTEMS      General:	x    Skin/Breast:x  	  Ophthalmologic:x  	  ENMT:	x    Respiratory and Thorax:  sob,  wheezing , cough   	  Cardiovascular:	x    Gastrointestinal:	x    Genitourinary:	x    Musculoskeletal:	x    Neurological:	x    Psychiatric:	x    Hematology/Lymphatics:	x    Endocrine:	x    Allergic/Immunologic:	x    MEDICATIONS  (STANDING):  albuterol/ipratropium for Nebulization 3 milliLiter(s) Nebulizer every 6 hours  aMIOdarone    Tablet 200 milliGRAM(s) Oral <User Schedule>  apixaban 2.5 milliGRAM(s) Oral <User Schedule>  azithromycin  IVPB 500 milliGRAM(s) IV Intermittent every 24 hours  buDESOnide    Inhalation Suspension 0.5 milliGRAM(s) Nebulizer two times a day  cefepime   IVPB 1000 milliGRAM(s) IV Intermittent every 12 hours  levothyroxine 100 MICROGram(s) Oral daily  sertraline 50 milliGRAM(s) Oral daily    MEDICATIONS  (PRN):  acetaminophen     Tablet .. 650 milliGRAM(s) Oral every 6 hours PRN Temp greater or equal to 38C (100.4F), Mild Pain (1 - 3)       Vital Signs Last 24 Hrs  T(C): 36.4 (2024 12:32), Max: 38.1 (2024 03:30)  T(F): 97.5 (2024 12:32), Max: 100.5 (2024 03:30)  HR: 55 (2024 12:32) (55 - 78)  BP: 116/66 (2024 12:32) (93/55 - 149/66)  BP(mean): 84 (2024 03:30) (84 - 84)  RR: 18 (2024 12:32) (17 - 22)  SpO2: 98% (2024 12:32) (89% - 99%)    Parameters below as of 2024 12:32  Patient On (Oxygen Delivery Method): nasal cannula  O2 Flow (L/min): 1  Orthostatic VS          I&O's Summary    2024 07:01  -  2024 16:19  --------------------------------------------------------  IN: 200 mL / OUT: 600 mL / NET: -400 mL        Physical Exam:   GENERAL: NAD, well-groomed, well-developed  HEENT: KOLBY/   Atraumatic, Normocephalic  ENMT: No tonsillar erythema, exudates, or enlargement; Moist mucous membranes, Good dentition, No lesions  NECK: Supple, No JVD, Normal thyroid  CHEST/LUNG: sierra coarse rhonchi +  CVS: Regular rate and rhythm; No murmurs, rubs, or gallops  GI: : Soft, Nontender, Nondistended; Bowel sounds present  NERVOUS SYSTEM:  Alert & Oriented X3  EXTREMITIES: - edema  LYMPH: No lymphadenopathy noted  SKIN: No rashes or lesions  ENDOCRINOLOGY: No Thyromegaly  PSYCH:calm     Labs:  4.1<56<4>>19<<7.355>>4.1<<3><<4><<5<<199>>                            11.4   1.63  )-----------( 66       ( 2024 21:40 )             36.2     04-08    138  |  101  |  21  ----------------------------<  90  4.1   |  22  |  1.00    Ca    9.2      2024 21:40    TPro  7.1  /  Alb  3.8  /  TBili  1.2  /  DBili  x   /  AST  13  /  ALT  9<L>  /  AlkPhos  58  04-08    CAPILLARY BLOOD GLUCOSE        LIVER FUNCTIONS - ( 2024 21:40 )  Alb: 3.8 g/dL / Pro: 7.1 g/dL / ALK PHOS: 58 U/L / ALT: 9 U/L / AST: 13 U/L / GGT: x           PT/INR - ( 2024 21:40 )   PT: 16.6 sec;   INR: 1.60 ratio         PTT - ( 2024 21:40 )  PTT:33.6 sec  Urinalysis Basic - ( 2024 05:02 )    Color: Dark Yellow / Appearance: Cloudy / S.028 / pH: x  Gluc: x / Ketone: 15 mg/dL  / Bili: Small / Urobili: 0.2 mg/dL   Blood: x / Protein: 30 mg/dL / Nitrite: Positive   Leuk Esterase: Trace / RBC: 5 /HPF / WBC 2 /HPF   Sq Epi: x / Non Sq Epi: 17 /HPF / Bacteria: Many /HPF      D DImer      Studies  Chest X-RAY  CT SCAN Chest   CT Abdomen  Venous Dopplers: LE:   Others        rad< from: Xray Chest 1 View AP/PA (24 @ 01:12) >  CLINICAL INDICATION: Shortness of breath, concerning for pneumonia    TECHNIQUE: Single frontal, portable view of the chest was obtained.    COMPARISON: Chest x-ray 2023.    FINDINGS:  The heart size is not accurately assessed in this projection.  There is minimal patchy opacity at the left lung base. Bronchiectasis is   noted in the left upper lung.  No pneumothorax or pleural effusion.  No acute osseous abnormalities.    IMPRESSION:  Patchy opacity in the left lung base which may represent atelectasis or   pneumonia.  Bronchiectasis in the left upper lung.    --- End of Report ---           MARGE GONZALEZ MD; Resident Radiologist  This document has been electronically signed.  DESHAUN BARRON MD; Attending Radiologist  This document has been electronically signed. 2024 10:07AM    < end of copied text >  < from: CT Chest No Cont (24 @ 00:18) >  BONES: Degenerative changes.    IMPRESSION:  Secretions within the trachea, left mainstem bronchus, left lower lobe   bronchus and bilateral distal small airways.    Foci of tree-in-bud nodularity in the right lung may reflect impacted   distal airways versus infectious/inflammatory small airways disease.    --- End of Report ---           MARGE GONZALEZ MD; Resident Radiologist  This document has been electronically signed.  WINSTON DEGROOT MD; Attending Radiologist  This document has been electronically signed. 2024  1:25AM    < end of copied text >      < from: TTE W or WO Ultrasound Enhancing Agent (24 @ 14:39) >      1. Left ventricular cavity is normal. Left ventricular wall thickness is normal. Left ventricular systolic function is normal with an ejection fraction of 59 % by Mireles's method of disks. There are no regional wall motion abnormalities seen.   2. No pericardial effusion seen.   3. Moderate aortic stenosis.   4. Moderate aortic regurgitation.    < end of copied text >

## 2024-04-09 NOTE — H&P ADULT - ADDITIONAL PE
T(F): 100.5 (09 Apr 2024 03:30), Max: 100.5 (09 Apr 2024 03:30)  HR: 78 (09 Apr 2024 03:30) (69 - 78)  BP: 125/64 (09 Apr 2024 03:30) (106/56 - 149/66)  RR: 20 (09 Apr 2024 03:30) (17 - 22)  SpO2: 96% (09 Apr 2024 03:30) (89% - 99%) 2L NC

## 2024-04-09 NOTE — H&P ADULT - PROBLEM SELECTOR PLAN 1
with leukopenia/bandemia 15% and worsening thrombocytopenia concerning for overwhelming infection  - s/p Vanco and Cefepime  - will continue Cefepime for possible resistant Strep Pneumo and Azithromycin for atypical PNA  - will check MRSA swab, Urine legionella and Mycoplasma  - will follow up blood cultures  - s/p 250cc NS due to moderate AS

## 2024-04-09 NOTE — PROGRESS NOTE ADULT - SUBJECTIVE AND OBJECTIVE BOX
SUBJECTIVE / OVERNIGHT EVENTS:    patient seen and examined  resting comfortably in bed  unable to obtain ROS    --------------------------------------------------------------------------------------------  LABS:                        11.4   1.63  )-----------( 66       ( 2024 21:40 )             36.2     04-08    138  |  101  |  21  ----------------------------<  90  4.1   |  22  |  1.00    Ca    9.2      2024 21:40    TPro  7.1  /  Alb  3.8  /  TBili  1.2  /  DBili  x   /  AST  13  /  ALT  9<L>  /  AlkPhos  58  04-08    PT/INR - ( 2024 21:40 )   PT: 16.6 sec;   INR: 1.60 ratio         PTT - ( 2024 21:40 )  PTT:33.6 sec  CAPILLARY BLOOD GLUCOSE            Urinalysis Basic - ( 2024 05:02 )    Color: Dark Yellow / Appearance: Cloudy / S.028 / pH: x  Gluc: x / Ketone: 15 mg/dL  / Bili: Small / Urobili: 0.2 mg/dL   Blood: x / Protein: 30 mg/dL / Nitrite: Positive   Leuk Esterase: Trace / RBC: 5 /HPF / WBC 2 /HPF   Sq Epi: x / Non Sq Epi: 17 /HPF / Bacteria: Many /HPF        RADIOLOGY & ADDITIONAL TESTS:    Imaging Personally Reviewed:  [x] YES  [ ] NO    Consultant(s) Notes Reviewed:  [x] YES  [ ] NO    MEDICATIONS  (STANDING):  albuterol/ipratropium for Nebulization 3 milliLiter(s) Nebulizer every 6 hours  aMIOdarone    Tablet 200 milliGRAM(s) Oral <User Schedule>  apixaban 2.5 milliGRAM(s) Oral <User Schedule>  azithromycin  IVPB 500 milliGRAM(s) IV Intermittent every 24 hours  buDESOnide    Inhalation Suspension 0.5 milliGRAM(s) Nebulizer two times a day  cefepime   IVPB 1000 milliGRAM(s) IV Intermittent every 12 hours  levothyroxine 100 MICROGram(s) Oral daily  sertraline 50 milliGRAM(s) Oral daily    MEDICATIONS  (PRN):  acetaminophen     Tablet .. 650 milliGRAM(s) Oral every 6 hours PRN Temp greater or equal to 38C (100.4F), Mild Pain (1 - 3)      Care Discussed with Consultants/Other Providers [x] YES  [ ] NO    Vital Signs Last 24 Hrs  T(C): 36.6 (2024 06:33), Max: 38.1 (2024 03:30)  T(F): 97.9 (2024 06:33), Max: 100.5 (2024 03:30)  HR: 57 (2024 08:40) (57 - 78)  BP: 109/57 (2024 08:40) (93/55 - 149/66)  BP(mean): 84 (2024 03:30) (84 - 84)  RR: 18 (2024 08:40) (17 - 22)  SpO2: 99% (2024 08:40) (89% - 99%)    Parameters below as of 2024 08:40  Patient On (Oxygen Delivery Method): nasal cannula  O2 Flow (L/min): 1    I&O's Summary    PHYSICAL EXAM:  GENERAL: NAD, well-developed, comfortable  HEAD:  Atraumatic, Normocephalic  EYES: EOMI, PERRLA, conjunctiva and sclera clear  NECK: Supple, No JVD  CHEST/LUNG: Decreased BS; + wheeze  HEART: Regular rate and rhythm; No murmurs, rubs, or gallops  ABDOMEN: Soft, Nontender, Nondistended; Bowel sounds present  NEURO: AAOx1-2, no focal weakness, 5/5 b/l extremity strength, b/l knee no arthritis, no effusion   EXTREMITIES:  2+ Peripheral Pulses, No clubbing, cyanosis, or edema  SKIN: Ecchymotic

## 2024-04-09 NOTE — PATIENT PROFILE ADULT - FALL HARM RISK - HARM RISK INTERVENTIONS

## 2024-04-09 NOTE — PROGRESS NOTE ADULT - ASSESSMENT
Patient is a 89 year old female with PMHx of A-fib on Eliquis, Asthma, Bronchiectasis, Moderate Aortic Stenosis, HTN and Hypothyroid. Presents to [facility] for progressively worsening cough and SOB x2 days.     PLAN:    # SOB/Cough 2/2 Acute Asthma Exacerbation vs CAP:  - Leukopenia/bandemia 15% and worsening thrombocytopenia concerning for overwhelming infection  - Fu MRSA swab, Urine legionella, BCx/UCx  - s/p Vanco/Cefepime in ED  - IV abx mgmt per ID  - Duoneb and inhaler  - Supplemental O2 PRN  - Monitor temps/WBC  - PT consult  - ID/Pulm consults pending    # SHEILA:  - Baseline 0.6-0.7, now 1  - Monitor I/O's  - Serial Cr  - Avoid nephrotoxins  - Continue to monitor, if worsening obtain renal consult    # Afib/HTN/Moderate AS:  - C/w Eliquis and CV meds    # Hypothyroidism:  - C/w Synthroid     # DVT ppx:  - Eliquis    [facility]

## 2024-04-10 LAB
ALBUMIN SERPL ELPH-MCNC: 3.3 G/DL — SIGNIFICANT CHANGE UP (ref 3.3–5)
ALP SERPL-CCNC: 46 U/L — SIGNIFICANT CHANGE UP (ref 40–120)
ALT FLD-CCNC: 6 U/L — LOW (ref 10–45)
ANION GAP SERPL CALC-SCNC: 11 MMOL/L — SIGNIFICANT CHANGE UP (ref 5–17)
AST SERPL-CCNC: 9 U/L — LOW (ref 10–40)
BILIRUB SERPL-MCNC: 0.8 MG/DL — SIGNIFICANT CHANGE UP (ref 0.2–1.2)
BUN SERPL-MCNC: 14 MG/DL — SIGNIFICANT CHANGE UP (ref 7–23)
CALCIUM SERPL-MCNC: 8.8 MG/DL — SIGNIFICANT CHANGE UP (ref 8.4–10.5)
CHLORIDE SERPL-SCNC: 102 MMOL/L — SIGNIFICANT CHANGE UP (ref 96–108)
CO2 SERPL-SCNC: 26 MMOL/L — SIGNIFICANT CHANGE UP (ref 22–31)
CREAT SERPL-MCNC: 0.69 MG/DL — SIGNIFICANT CHANGE UP (ref 0.5–1.3)
EGFR: 78 ML/MIN/1.73M2 — SIGNIFICANT CHANGE UP
GLUCOSE SERPL-MCNC: 113 MG/DL — HIGH (ref 70–99)
HCT VFR BLD CALC: 29.9 % — LOW (ref 34.5–45)
HGB BLD-MCNC: 9.8 G/DL — LOW (ref 11.5–15.5)
MCHC RBC-ENTMCNC: 32.8 GM/DL — SIGNIFICANT CHANGE UP (ref 32–36)
MCHC RBC-ENTMCNC: 33.2 PG — SIGNIFICANT CHANGE UP (ref 27–34)
MCV RBC AUTO: 101.4 FL — HIGH (ref 80–100)
NRBC # BLD: 0 /100 WBCS — SIGNIFICANT CHANGE UP (ref 0–0)
PLATELET # BLD AUTO: 69 K/UL — LOW (ref 150–400)
POTASSIUM SERPL-MCNC: 3.7 MMOL/L — SIGNIFICANT CHANGE UP (ref 3.5–5.3)
POTASSIUM SERPL-SCNC: 3.7 MMOL/L — SIGNIFICANT CHANGE UP (ref 3.5–5.3)
PROT SERPL-MCNC: 6.2 G/DL — SIGNIFICANT CHANGE UP (ref 6–8.3)
RBC # BLD: 2.95 M/UL — LOW (ref 3.8–5.2)
RBC # FLD: 14 % — SIGNIFICANT CHANGE UP (ref 10.3–14.5)
SODIUM SERPL-SCNC: 139 MMOL/L — SIGNIFICANT CHANGE UP (ref 135–145)
WBC # BLD: 2.02 K/UL — LOW (ref 3.8–10.5)
WBC # FLD AUTO: 2.02 K/UL — LOW (ref 3.8–10.5)

## 2024-04-10 RX ADMIN — Medication 0.5 MILLIGRAM(S): at 05:12

## 2024-04-10 RX ADMIN — Medication 0.5 MILLIGRAM(S): at 18:43

## 2024-04-10 RX ADMIN — Medication 3 MILLILITER(S): at 05:11

## 2024-04-10 RX ADMIN — Medication 100 MICROGRAM(S): at 05:12

## 2024-04-10 RX ADMIN — Medication 3 MILLILITER(S): at 12:07

## 2024-04-10 RX ADMIN — CEFEPIME 100 MILLIGRAM(S): 1 INJECTION, POWDER, FOR SOLUTION INTRAMUSCULAR; INTRAVENOUS at 18:43

## 2024-04-10 RX ADMIN — APIXABAN 2.5 MILLIGRAM(S): 2.5 TABLET, FILM COATED ORAL at 10:49

## 2024-04-10 RX ADMIN — SERTRALINE 50 MILLIGRAM(S): 25 TABLET, FILM COATED ORAL at 12:07

## 2024-04-10 RX ADMIN — CEFEPIME 100 MILLIGRAM(S): 1 INJECTION, POWDER, FOR SOLUTION INTRAMUSCULAR; INTRAVENOUS at 05:10

## 2024-04-10 RX ADMIN — AMIODARONE HYDROCHLORIDE 200 MILLIGRAM(S): 400 TABLET ORAL at 10:49

## 2024-04-10 RX ADMIN — Medication 3 MILLILITER(S): at 18:43

## 2024-04-10 RX ADMIN — Medication 3 MILLILITER(S): at 23:37

## 2024-04-10 NOTE — PROGRESS NOTE ADULT - ASSESSMENT
89 year-old female with A-fib on Eliquis, moderate aortic stenosis, HTN, hypothyroid, asthma, bronchiectasis, presents with cough and shortness of breath for 2 days.  Per ED documentation, daughter states that patient has been having cough, shortness of breath, wheezing and generalized weakness which brought her into the ED.  Denies any fevers, chills, chest pain, nausea, vomiting, abdominal pain, dysuria or LOC/dizziness. (09 Apr 2024 03:20  pt seen and examined:  daughter is at bedside:  above conformed:   she does have hx of bronchiectasis but have not been following up with any pulmonologist:   she has hx of treated tuberculosis too  :  she takes albuterol prn only when she gets sick and starts wheezing:    pt is hard of hearing      Suspected pneumonia/ Bronchiectasis  A fibrillation  cardiac  HTN'  HYPOTHYROIDISM      Suspected pneumonia/ Bronchiectasis  -Low grade fever:  has clinical features suggestive of pneumonia  -on broad spectrum antibiotics    -CT chest reviewed :Secretions within the trachea, left mainstem bronchus, left lower lobe bronchus and bilateral distal small airways. Foci of tree-in-bud nodularity in the right lung may reflect impacted distal airways versus infectious/inflammatory small airways disease aspiration needs to be rule out:   -RVP is negative and so is legionella: can dc Zithromax   -keep o t sao2 above 90% all the ti me:   -Cont nebs : no need for steroids for now:  will cont to watch   4/10: she looks pretty good to me:  only on 1 L:  I think we can take off oxygen :     A fibrillation  -on eliquis    cardiac  -Has mod AR AND AS:  defer to cards    HTN  -controlled  HYPOTHYROIDISM  -cont levothyroxine:    already on eliquis:     AVIS  acp

## 2024-04-10 NOTE — PROGRESS NOTE ADULT - ASSESSMENT
Patient is a 89 year old female with PMHx of A-fib on Eliquis, Asthma, Bronchiectasis, Moderate Aortic Stenosis, HTN and Hypothyroid. Presents to [facility] for progressively worsening cough and SOB x2 days.     PLAN:    # SOB/Cough 2/2 Acute Asthma Exacerbation vs CAP:  - Leukopenia/bandemia 15% and worsening thrombocytopenia concerning for overwhelming infection  - CT Chest with no focal consolidation or infiltrate; noted with secretions in trachea, L mainstem bronchus, LLL bronchus and b/l distal airways; focal tree in bud nodularity in R lung possible impacted distal airways vs infectious/inflammatory small airways disease   - MRSA swab/Urine legionella negative  - BCx NGTD x24H, Fu UCx  - s/p Vanco/Cefepime in ED  - IV abx mgmt per ID  - Duoneb and inhaler  - Supplemental O2 PRN  - Monitor temps/WBC  - Fu PT consult  - ID/Pulm following    # SHEILA:  - Baseline 0.6-0.7, now 1  - Monitor I/O's  - Serial Cr  - Avoid nephrotoxins  - Continue to monitor, if worsening obtain renal consult    # Afib/HTN/Moderate AS:  - C/w Eliquis and CV meds    # Hypothyroidism:  - C/w Synthroid     # DVT ppx:  - Eliquis    [facility]

## 2024-04-10 NOTE — PHYSICAL THERAPY INITIAL EVALUATION ADULT - PERTINENT HX OF CURRENT PROBLEM, REHAB EVAL
89 year-old female with A-fib on Eliquis, moderate aortic stenosis, HTN, hypothyroid, asthma, bronchiectasis, presents with cough and shortness of breath for 2 days.  Per ED documentation, daughter states that patient has been having cough, shortness of breath, wheezing and generalized weakness which brought her into the ED.  Denies any fevers, chills, chest pain, nausea, vomiting, abdominal pain, dysuria or LOC/dizziness. COVID/RSV/Flu negative. CT Chest 4/9: Secretions within the trachea, left mainstem bronchus, left lower lobe bronchus and bilateral distal small airways. Foci of tree-in-bud nodularity in the right lung may reflect impacted distal airways versus infectious/inflammatory small airways disease. XRay Chest 4/9: Patchy opacity in the left lung base which may represent atelectasis or pneumonia. Bronchiectasis in the left upper lung.

## 2024-04-10 NOTE — PROGRESS NOTE ADULT - ASSESSMENT
Patient is a 89 year old female with PMH of A-fib on Eliquis, moderate aortic stenosis, HTN, hypothyroid, asthma, bronchiectasis, presents with cough and shortness of breath for 2 days.    Dyspnea with cough - suspect bronchiectasis exacerbation, possible pneumonia   Leukopenia with bandemia, likely in setting of above   COVID/RSV/Flu negative  CT chest with no focal consolidation or infiltrate; noted with secretions in trachea, L mainstem bronchus, LLL bronchus and b/l distal airways; focal tree in bud nodularity in R lung possible impacted distal airways vs infectious/inflammatory small airways disease   S/p empiric vancomycin and cefepime in the ER  Legionella urine ag negative, s/p azithromycin  MRSA PCR screen negative, off vancomycin    Afebrile, WBC improving, has productive cough     Recommendations:  Follow blood cultures - NGTD x2   Send sputum culture - ordered   Continue cefepime 1g IV Q12h (renally adjusted)  Pulmonary following   Monitors temp/WBC, Cr

## 2024-04-10 NOTE — PHYSICAL THERAPY INITIAL EVALUATION ADULT - ADDITIONAL COMMENTS
Pt lives in elevator apt with 24/7 HHA. Pt is ambulatory with rolling walker. Pt has h/o frequent falls from passing out, per daughter.

## 2024-04-10 NOTE — PROGRESS NOTE ADULT - SUBJECTIVE AND OBJECTIVE BOX
[facility] DIVISION OF INFECTIOUS DISEASES  SWAPNIL Rene Y. Patel, S. Shah, G. [facility]  [phone]  ([phone] - weekdays after 5pm and weekends)    Name: CATHRYN LARA  Age/Gender: 89y Female  MRN: [id number]    Interval History:  Patient seen and examined this morning.   Complains of productive cough, no fevers/chills.   Denies chest pain or any new complaints.   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 reported allergy (Unknown)      Objective:  Vitals:   T(F): 99.7 (04-10-24 @ 04:54), Max: 99.7 (04-10-24 @ 04:54)  HR: 72 (04-10-24 @ 10:26) (65 - 75)  BP: 109/61 (04-10-24 @ 10:26) (95/57 - 123/62)  RR: 18 (04-10-24 @ 04:54) (18 - 18)  SpO2: 93% (04-10-24 @ 10:15) (93% - 98%)  Physical Examination:  General: no acute distress  HEENT: NC/AT, anicteric, neck supple  Respiratory: decreased breath sounds b/l  Cardiovascular: S1 and S2 present, normal rate   Gastrointestinal: soft, nontender, nondistended  Extremities: no edema, no cyanosis  Skin: no visible rash    Laboratory Studies:  CBC:                       9.8    2.02  )-----------( 69       ( 10 Apr 2024 11:08 )             29.9     WBC Trend:  2.02 04-10-24 @ 11:08  1.63 04-08-24 @ 21:40    CMP: 04-10    139  |  102  |  14  ----------------------------<  113<H>  3.7   |  26  |  0.69    Ca    8.8      10 Apr 2024 11:08    TPro  6.2  /  Alb  3.3  /  TBili  0.8  /  DBili  x   /  AST  9<L>  /  ALT  6<L>  /  AlkPhos  46  04-10    Creatinine: 0.69 mg/dL (04-10-24 @ 11:08)  Creatinine: 1.00 mg/dL (04-08-24 @ 21:40)    LIVER FUNCTIONS - ( 10 Apr 2024 11:08 )  Alb: 3.3 g/dL / Pro: 6.2 g/dL / ALK PHOS: 46 U/L / ALT: 6 U/L / AST: 9 U/L / GGT: x           Microbiology: reviewed   Culture - Blood (collected 04-08-24 @ 21:25)  Source: .Blood Blood-Peripheral  Preliminary Report (04-10-24 @ 07:02):    No growth at 24 hours    Culture - Blood (collected 04-08-24 @ 21:15)  Source: .Blood Blood-Peripheral  Preliminary Report (04-10-24 @ 07:02):    No growth at 24 hours    SARS-CoV-2 Result: NotDetec (08 Apr 2024 21:40)    Radiology: reviewed     Medications:  acetaminophen     Tablet .. 650 milliGRAM(s) Oral every 6 hours PRN  albuterol/ipratropium for Nebulization 3 milliLiter(s) Nebulizer every 6 hours  aMIOdarone    Tablet 200 milliGRAM(s) Oral <User Schedule>  apixaban 2.5 milliGRAM(s) Oral <User Schedule>  buDESOnide    Inhalation Suspension 0.5 milliGRAM(s) Nebulizer two times a day  cefepime   IVPB 1000 milliGRAM(s) IV Intermittent every 12 hours  levothyroxine 100 MICROGram(s) Oral daily  sertraline 50 milliGRAM(s) Oral daily    Current Antimicrobials:  cefepime   IVPB 1000 milliGRAM(s) IV Intermittent every 12 hours    Prior/Completed Antimicrobials:  cefepime   IVPB  vancomycin  IVPB.   (1) More than 48 hours/None

## 2024-04-10 NOTE — PROGRESS NOTE ADULT - SUBJECTIVE AND OBJECTIVE BOX
SUBJECTIVE / OVERNIGHT EVENTS:    AM labs pending - ordered      --------------------------------------------------------------------------------------------  LABS:                        11.4   1.63  )-----------( 66       ( 2024 21:40 )             36.2     04    138  |  101  |  21  ----------------------------<  90  4.1   |  22  |  1.00    Ca    9.2      2024 21:40    TPro  7.1  /  Alb  3.8  /  TBili  1.2  /  DBili  x   /  AST  13  /  ALT  9<L>  /  AlkPhos  58  04-08    PT/INR - ( 2024 21:40 )   PT: 16.6 sec;   INR: 1.60 ratio         PTT - ( 2024 21:40 )  PTT:33.6 sec  CAPILLARY BLOOD GLUCOSE            Urinalysis Basic - ( 2024 05:02 )    Color: Dark Yellow / Appearance: Cloudy / S.028 / pH: x  Gluc: x / Ketone: 15 mg/dL  / Bili: Small / Urobili: 0.2 mg/dL   Blood: x / Protein: 30 mg/dL / Nitrite: Positive   Leuk Esterase: Trace / RBC: 5 /HPF / WBC 2 /HPF   Sq Epi: x / Non Sq Epi: 17 /HPF / Bacteria: Many /HPF        RADIOLOGY & ADDITIONAL TESTS:    Imaging Personally Reviewed:  [x] YES  [ ] NO    Consultant(s) Notes Reviewed:  [x] YES  [ ] NO    MEDICATIONS  (STANDING):  albuterol/ipratropium for Nebulization 3 milliLiter(s) Nebulizer every 6 hours  aMIOdarone    Tablet 200 milliGRAM(s) Oral <User Schedule>  apixaban 2.5 milliGRAM(s) Oral <User Schedule>  buDESOnide    Inhalation Suspension 0.5 milliGRAM(s) Nebulizer two times a day  cefepime   IVPB 1000 milliGRAM(s) IV Intermittent every 12 hours  levothyroxine 100 MICROGram(s) Oral daily  sertraline 50 milliGRAM(s) Oral daily    MEDICATIONS  (PRN):  acetaminophen     Tablet .. 650 milliGRAM(s) Oral every 6 hours PRN Temp greater or equal to 38C (100.4F), Mild Pain (1 - 3)      Care Discussed with Consultants/Other Providers [x] YES  [ ] NO    Vital Signs Last 24 Hrs  T(C): 37.6 (10 Apr 2024 04:54), Max: 37.6 (10 Apr 2024 04:54)  T(F): 99.7 (10 Apr 2024 04:54), Max: 99.7 (10 Apr 2024 04:54)  HR: 65 (10 Apr 2024 04:54) (55 - 71)  BP: 123/62 (10 Apr 2024 04:54) (116/64 - 123/62)  BP(mean): 4 (2024 20:34) (4 - 4)  RR: 18 (10 Apr 2024 04:54) (18 - 18)  SpO2: 95% (10 Apr 2024 04:54) (95% - 98%)    Parameters below as of 10 Apr 2024 04:54  Patient On (Oxygen Delivery Method): nasal cannula  O2 Flow (L/min): 1    I&O's Summary    2024 07:01  -  10 Apr 2024 07:00  --------------------------------------------------------  IN: 200 mL / OUT: 1000 mL / NET: -800 mL    PHYSICAL EXAM:  GENERAL: NAD, well-developed, comfortable  HEAD:  Atraumatic, Normocephalic  EYES: EOMI, PERRLA, conjunctiva and sclera clear  NECK: Supple, No JVD  CHEST/LUNG: Decreased BS; + wheeze  HEART: Regular rate and rhythm; No murmurs, rubs, or gallops  ABDOMEN: Soft, Nontender, Nondistended; Bowel sounds present  NEURO: AAOx1-2, no focal weakness, 5/5 b/l extremity strength, b/l knee no arthritis, no effusion   EXTREMITIES:  2+ Peripheral Pulses, No clubbing, cyanosis, or edema  SKIN: Ecchymotic        SUBJECTIVE / OVERNIGHT EVENTS:    patient seen and examined  resting comfortably in bed  no events overnight  AM labs pending - ordered    --------------------------------------------------------------------------------------------  LABS:                        11.4   1.63  )-----------( 66       ( 2024 21:40 )             36.2     04-08    138  |  101  |  21  ----------------------------<  90  4.1   |  22  |  1.00    Ca    9.2      2024 21:40    TPro  7.1  /  Alb  3.8  /  TBili  1.2  /  DBili  x   /  AST  13  /  ALT  9<L>  /  AlkPhos  58  04-08    PT/INR - ( 2024 21:40 )   PT: 16.6 sec;   INR: 1.60 ratio         PTT - ( 2024 21:40 )  PTT:33.6 sec  CAPILLARY BLOOD GLUCOSE            Urinalysis Basic - ( 2024 05:02 )    Color: Dark Yellow / Appearance: Cloudy / S.028 / pH: x  Gluc: x / Ketone: 15 mg/dL  / Bili: Small / Urobili: 0.2 mg/dL   Blood: x / Protein: 30 mg/dL / Nitrite: Positive   Leuk Esterase: Trace / RBC: 5 /HPF / WBC 2 /HPF   Sq Epi: x / Non Sq Epi: 17 /HPF / Bacteria: Many /HPF        RADIOLOGY & ADDITIONAL TESTS:    Imaging Personally Reviewed:  [x] YES  [ ] NO    Consultant(s) Notes Reviewed:  [x] YES  [ ] NO    MEDICATIONS  (STANDING):  albuterol/ipratropium for Nebulization 3 milliLiter(s) Nebulizer every 6 hours  aMIOdarone    Tablet 200 milliGRAM(s) Oral <User Schedule>  apixaban 2.5 milliGRAM(s) Oral <User Schedule>  buDESOnide    Inhalation Suspension 0.5 milliGRAM(s) Nebulizer two times a day  cefepime   IVPB 1000 milliGRAM(s) IV Intermittent every 12 hours  levothyroxine 100 MICROGram(s) Oral daily  sertraline 50 milliGRAM(s) Oral daily    MEDICATIONS  (PRN):  acetaminophen     Tablet .. 650 milliGRAM(s) Oral every 6 hours PRN Temp greater or equal to 38C (100.4F), Mild Pain (1 - 3)      Care Discussed with Consultants/Other Providers [x] YES  [ ] NO    Vital Signs Last 24 Hrs  T(C): 37.6 (10 Apr 2024 04:54), Max: 37.6 (10 Apr 2024 04:54)  T(F): 99.7 (10 Apr 2024 04:54), Max: 99.7 (10 Apr 2024 04:54)  HR: 65 (10 Apr 2024 04:54) (55 - 71)  BP: 123/62 (10 Apr 2024 04:54) (116/64 - 123/62)  BP(mean): 4 (2024 20:34) (4 - 4)  RR: 18 (10 Apr 2024 04:54) (18 - 18)  SpO2: 95% (10 Apr 2024 04:54) (95% - 98%)    Parameters below as of 10 Apr 2024 04:54  Patient On (Oxygen Delivery Method): nasal cannula  O2 Flow (L/min): 1    I&O's Summary    2024 07:01  -  10 Apr 2024 07:00  --------------------------------------------------------  IN: 200 mL / OUT: 1000 mL / NET: -800 mL    PHYSICAL EXAM:  GENERAL: NAD, well-developed, comfortable  HEAD:  Atraumatic, Normocephalic  EYES: EOMI, PERRLA, conjunctiva and sclera clear  NECK: Supple, No JVD  CHEST/LUNG: Decreased BS; + wheeze  HEART: Regular rate and rhythm; No murmurs, rubs, or gallops  ABDOMEN: Soft, Nontender, Nondistended; Bowel sounds present  NEURO: AAOx1-2, no focal weakness, 5/5 b/l extremity strength, b/l knee no arthritis, no effusion   EXTREMITIES:  2+ Peripheral Pulses, No clubbing, cyanosis, or edema  SKIN: Ecchymotic

## 2024-04-10 NOTE — PROGRESS NOTE ADULT - SUBJECTIVE AND OBJECTIVE BOX
Date of Service: 04-10-24 @ 17:48    Patient is a 89y old  Female who presents with a chief complaint of cough and SOB (10 Apr 2024 09:40)      Any change in ROS: Alert and awake : hard of hearing:     MEDICATIONS  (STANDING):  albuterol/ipratropium for Nebulization 3 milliLiter(s) Nebulizer every 6 hours  aMIOdarone    Tablet 200 milliGRAM(s) Oral <User Schedule>  apixaban 2.5 milliGRAM(s) Oral <User Schedule>  buDESOnide    Inhalation Suspension 0.5 milliGRAM(s) Nebulizer two times a day  cefepime   IVPB 1000 milliGRAM(s) IV Intermittent every 12 hours  levothyroxine 100 MICROGram(s) Oral daily  sertraline 50 milliGRAM(s) Oral daily    MEDICATIONS  (PRN):  acetaminophen     Tablet .. 650 milliGRAM(s) Oral every 6 hours PRN Temp greater or equal to 38C (100.4F), Mild Pain (1 - 3)    Vital Signs Last 24 Hrs  T(C): 37.2 (10 Apr 2024 13:12), Max: 37.6 (10 Apr 2024 04:54)  T(F): 99 (10 Apr 2024 13:12), Max: 99.7 (10 Apr 2024 04:54)  HR: 69 (10 Apr 2024 13:12) (65 - 75)  BP: 112/66 (10 Apr 2024 13:12) (95/57 - 123/62)  BP(mean): 4 (09 Apr 2024 20:34) (4 - 4)  RR: 18 (10 Apr 2024 13:12) (18 - 18)  SpO2: 99% (10 Apr 2024 13:12) (93% - 99%)    Parameters below as of 10 Apr 2024 13:12  Patient On (Oxygen Delivery Method): room air        I&O's Summary    09 Apr 2024 07:01  -  10 Apr 2024 07:00  --------------------------------------------------------  IN: 200 mL / OUT: 1000 mL / NET: -800 mL          Physical Exam:   GENERAL: NAD, well-groomed, well-developed  HEENT: KOLBY/   Atraumatic, Normocephalic  ENMT: No tonsillar erythema, exudates, or enlargement; Moist mucous membranes, Good dentition, No lesions  NECK: Supple, No JVD, Normal thyroid  CHEST/LUNG: Clear to auscultaion-  CVS: Regular rate and rhythm; No murmurs, rubs, or gallops  GI: : Soft, Nontender, Nondistended; Bowel sounds present  NERVOUS SYSTEM:  Alert & awake  EXTREMITIES:-edema  LYMPH: No lymphadenopathy noted  SKIN: No rashes or lesions  ENDOCRINOLOGY: No Thyromegaly  PSYCH: Appropriate    Labs:  31                            9.8    2.02  )-----------( 69       ( 10 Apr 2024 11:08 )             29.9                         11.4   1.63  )-----------( 66       ( 08 Apr 2024 21:40 )             36.2     04-10    139  |  102  |  14  ----------------------------<  113<H>  3.7   |  26  |  0.69  04-08    138  |  101  |  21  ----------------------------<  90  4.1   |  22  |  1.00    Ca    8.8      10 Apr 2024 11:08  Ca    9.2      08 Apr 2024 21:40    TPro  6.2  /  Alb  3.3  /  TBili  0.8  /  DBili  x   /  AST  9<L>  /  ALT  6<L>  /  AlkPhos  46  04-10  TPro  7.1  /  Alb  3.8  /  TBili  1.2  /  DBili  x   /  AST  13  /  ALT  9<L>  /  AlkPhos  58  04-08    CAPILLARY BLOOD GLUCOSE          LIVER FUNCTIONS - ( 10 Apr 2024 11:08 )  Alb: 3.3 g/dL / Pro: 6.2 g/dL / ALK PHOS: 46 U/L / ALT: 6 U/L / AST: 9 U/L / GGT: x           PT/INR - ( 08 Apr 2024 21:40 )   PT: 16.6 sec;   INR: 1.60 ratio         PTT - ( 08 Apr 2024 21:40 )  PTT:33.6 sec  Urinalysis Basic - ( 10 Apr 2024 11:08 )    Color: x / Appearance: x / SG: x / pH: x  Gluc: 113 mg/dL / Ketone: x  / Bili: x / Urobili: x   Blood: x / Protein: x / Nitrite: x   Leuk Esterase: x / RBC: x / WBC x   Sq Epi: x / Non Sq Epi: x / Bacteria: x            RECENT CULTURES:  04-08 @ 21:25 .Blood Blood-Peripheral       rad< from: Xray Chest 1 View AP/PA (04.09.24 @ 01:12) >    COMPARISON: Chest x-ray 12/30/2023.    FINDINGS:  The heart size is not accurately assessed in this projection.  There is minimal patchy opacity at the left lung base. Bronchiectasis is   noted in the left upper lung.  No pneumothorax or pleural effusion.  No acute osseous abnormalities.    IMPRESSION:  Patchy opacity in the left lung base which may represent atelectasis or   pneumonia.  Bronchiectasis in the left upper lung.    --- End of Report ---           MARGE GONZALEZ MD; Resident Radiologist  This document has been electronically signed.  DESHAUN BARRON MD; Attending Radiologist  This document has been electronically signed. Apr 9 2024 10:07AM    < end of copied text >           No growth at 24 hours    04-08 @ 21:15 .Blood Blood-Peripheral                No growth at 24 hours          RESPIRATORY CULTURES:          Studies  Chest X-RAY  CT SCAN Chest   Venous Dopplers: LE:   CT Abdomen  Others

## 2024-04-11 DIAGNOSIS — J18.9 PNEUMONIA, UNSPECIFIED ORGANISM: ICD-10-CM

## 2024-04-11 DIAGNOSIS — D65 DISSEMINATED INTRAVASCULAR COAGULATION [DEFIBRINATION SYNDROME]: ICD-10-CM

## 2024-04-11 DIAGNOSIS — J45.909 UNSPECIFIED ASTHMA, UNCOMPLICATED: ICD-10-CM

## 2024-04-11 DIAGNOSIS — I35.0 NONRHEUMATIC AORTIC (VALVE) STENOSIS: ICD-10-CM

## 2024-04-11 DIAGNOSIS — I48.91 UNSPECIFIED ATRIAL FIBRILLATION: ICD-10-CM

## 2024-04-11 DIAGNOSIS — J47.9 BRONCHIECTASIS, UNCOMPLICATED: ICD-10-CM

## 2024-04-11 RX ADMIN — Medication 0.5 MILLIGRAM(S): at 06:38

## 2024-04-11 RX ADMIN — Medication 0.5 MILLIGRAM(S): at 17:22

## 2024-04-11 RX ADMIN — Medication 3 MILLILITER(S): at 11:19

## 2024-04-11 RX ADMIN — Medication 3 MILLILITER(S): at 17:22

## 2024-04-11 RX ADMIN — Medication 3 MILLILITER(S): at 06:37

## 2024-04-11 RX ADMIN — Medication 100 MICROGRAM(S): at 06:37

## 2024-04-11 RX ADMIN — CEFEPIME 100 MILLIGRAM(S): 1 INJECTION, POWDER, FOR SOLUTION INTRAMUSCULAR; INTRAVENOUS at 17:21

## 2024-04-11 RX ADMIN — AMIODARONE HYDROCHLORIDE 200 MILLIGRAM(S): 400 TABLET ORAL at 08:52

## 2024-04-11 RX ADMIN — APIXABAN 2.5 MILLIGRAM(S): 2.5 TABLET, FILM COATED ORAL at 08:53

## 2024-04-11 RX ADMIN — Medication 1200 MILLIGRAM(S): at 17:22

## 2024-04-11 RX ADMIN — CEFEPIME 100 MILLIGRAM(S): 1 INJECTION, POWDER, FOR SOLUTION INTRAMUSCULAR; INTRAVENOUS at 06:37

## 2024-04-11 RX ADMIN — SERTRALINE 50 MILLIGRAM(S): 25 TABLET, FILM COATED ORAL at 08:53

## 2024-04-11 NOTE — PROGRESS NOTE ADULT - ASSESSMENT
Patient is a 89 year old female with PMHx of A-fib on Eliquis, Asthma, Bronchiectasis, Moderate Aortic Stenosis, HTN and Hypothyroid. Presents to [facility] for progressively worsening cough and SOB x2 days.     PLAN:    # Acute Asthma Exacerbation vs CAP:  - Leukopenia/bandemia 15% and worsening thrombocytopenia concerning for overwhelming infection  - CT Chest with no focal consolidation or infiltrate; noted with secretions in trachea, L mainstem bronchus, LLL bronchus and b/l distal airways; focal tree in bud nodularity in R lung possible impacted distal airways vs infectious/inflammatory small airways disease   - MRSA swab/Urine legionella negative  - BCx NGTD x24H, UCx w/ Ecoli-> abx per ID  - IV abx mgmt per ID  - Duoneb and inhaler  - Supplemental O2 PRN  - Monitor temps/WBC  - PT eval  - ID/Pulm following    # SHEILA: Improved  - Baseline 0.6-0.7, now 1  - Monitor I/O's  - Serial Cr  - Avoid nephrotoxins    # Anemia:  - Serial cbc's  - Transfuse prn    # Afib/HTN/Moderate AS:  - C/w Eliquis and CV meds    # Hypothyroidism:  - C/w Synthroid     # DVT ppx:  - Eliquis    [facility]  [phone]

## 2024-04-11 NOTE — PROGRESS NOTE ADULT - SUBJECTIVE AND OBJECTIVE BOX
[facility] DIV[facility] OF INFECTIOUS DISEASES  SWAPNIL Rene Y. Patel, S. Shah, G. [facility]  [phone]  ([phone] - weekdays after 5pm and weekends)    Name: CATHRYN LARA  Age/Gender: 89y Female  MRN: [id number]    Interval History:  Patient seen and examined this morning.   Feels better, has cough, no fever, dyspnea or other new complaints  Notes reviewed. 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 reported allergy (Unknown)      Objective:  Vitals:   T(F): 98.7 (04-11-24 @ 10:35), Max: 99.6 (04-11-24 @ 04:36)  HR: 68 (04-11-24 @ 10:35) (68 - 74)  BP: 103/64 (04-11-24 @ 10:35) (103/64 - 119/70)  RR: 18 (04-11-24 @ 10:35) (18 - 18)  SpO2: 92% (04-11-24 @ 10:35) (92% - 99%)  Physical Examination:  General: no acute distress, on RA  HEENT: NC/AT, anicteric, neck supple  Respiratory: decreased breath sounds b/l  Cardiovascular: S1 and S2 present, normal rate   Gastrointestinal: soft, nontender, nondistended  Extremities: no edema, no cyanosis  Skin: no visible rash    Laboratory Studies:  CBC:                       9.8    2.02  )-----------( 69       ( 10 Apr 2024 11:08 )             29.9     WBC Trend:  2.02 04-10-24 @ 11:08  1.63 04-08-24 @ 21:40    CMP: 04-10    139  |  102  |  14  ----------------------------<  113<H>  3.7   |  26  |  0.69    Ca    8.8      10 Apr 2024 11:08    TPro  6.2  /  Alb  3.3  /  TBili  0.8  /  DBili  x   /  AST  9<L>  /  ALT  6<L>  /  AlkPhos  46  04-10    Creatinine: 0.69 mg/dL (04-10-24 @ 11:08)  Creatinine: 1.00 mg/dL (04-08-24 @ 21:40)      LIVER FUNCTIONS - ( 10 Apr 2024 11:08 )  Alb: 3.3 g/dL / Pro: 6.2 g/dL / ALK PHOS: 46 U/L / ALT: 6 U/L / AST: 9 U/L / GGT: x           Microbiology: reviewed   Culture - Urine (collected 04-09-24 @ 05:03)  Source: Clean Catch Clean Catch (Midstream)  Preliminary Report (04-11-24 @ 08:02):    >100,000 CFU/ml Escherichia coli    50,000 - 99,000 CFU/mL Gram positive organisms    Culture - Blood (collected 04-08-24 @ 21:25)  Source: .Blood Blood-Peripheral  Preliminary Report (04-11-24 @ 07:02):    No growth at 48 Hours    Culture - Blood (collected 04-08-24 @ 21:15)  Source: .Blood Blood-Peripheral  Preliminary Report (04-11-24 @ 07:02):    No growth at 48 Hours    SARS-CoV-2 Result: NotDetec (08 Apr 2024 21:40)    Radiology: reviewed     Medications:  acetaminophen     Tablet .. 650 milliGRAM(s) Oral every 6 hours PRN  albuterol/ipratropium for Nebulization 3 milliLiter(s) Nebulizer every 6 hours  aMIOdarone    Tablet 200 milliGRAM(s) Oral <User Schedule>  apixaban 2.5 milliGRAM(s) Oral <User Schedule>  buDESOnide    Inhalation Suspension 0.5 milliGRAM(s) Nebulizer two times a day  cefepime   IVPB 1000 milliGRAM(s) IV Intermittent every 12 hours  levothyroxine 100 MICROGram(s) Oral daily  sertraline 50 milliGRAM(s) Oral daily    Current Antimicrobials:  cefepime   IVPB 1000 milliGRAM(s) IV Intermittent every 12 hours    Prior/Completed Antimicrobials:  cefepime   IVPB  vancomycin  IVPB.

## 2024-04-11 NOTE — PROGRESS NOTE ADULT - SUBJECTIVE AND OBJECTIVE BOX
SUBJECTIVE / OVERNIGHT EVENTS:       No events noted overnight. Resting in bed      --------------------------------------------------------------------------------------------  LABS:                        9.8    2.02  )-----------( 69       ( 10 Apr 2024 11:08 )             29.9     04-10    139  |  102  |  14  ----------------------------<  113<H>  3.7   |  26  |  0.69    Ca    8.8      10 Apr 2024 11:08    TPro  6.2  /  Alb  3.3  /  TBili  0.8  /  DBili  x   /  AST  9<L>  /  ALT  6<L>  /  AlkPhos  46  04-10      CAPILLARY BLOOD GLUCOSE            Urinalysis Basic - ( 10 Apr 2024 11:08 )    Color: x / Appearance: x / SG: x / pH: x  Gluc: 113 mg/dL / Ketone: x  / Bili: x / Urobili: x   Blood: x / Protein: x / Nitrite: x   Leuk Esterase: x / RBC: x / WBC x   Sq Epi: x / Non Sq Epi: x / Bacteria: x        RADIOLOGY & ADDITIONAL TESTS:     Imaging Personally Reviewed:  [x] YES  [ ] NO    Consultant(s) Notes Reviewed:  [x] YES  [ ] NO    MEDICATIONS  (STANDING):  albuterol/ipratropium for Nebulization 3 milliLiter(s) Nebulizer every 6 hours  aMIOdarone    Tablet 200 milliGRAM(s) Oral <User Schedule>  apixaban 2.5 milliGRAM(s) Oral <User Schedule>  buDESOnide    Inhalation Suspension 0.5 milliGRAM(s) Nebulizer two times a day  cefepime   IVPB 1000 milliGRAM(s) IV Intermittent every 12 hours  levothyroxine 100 MICROGram(s) Oral daily  sertraline 50 milliGRAM(s) Oral daily    MEDICATIONS  (PRN):  acetaminophen     Tablet .. 650 milliGRAM(s) Oral every 6 hours PRN Temp greater or equal to 38C (100.4F), Mild Pain (1 - 3)      Care Discussed with Consultants/Other Providers [x] YES  [ ] NO    Vital Signs Last 24 Hrs  T(C): 37.6 (11 Apr 2024 04:36), Max: 37.6 (11 Apr 2024 04:36)  T(F): 99.6 (11 Apr 2024 04:36), Max: 99.6 (11 Apr 2024 04:36)  HR: 68 (11 Apr 2024 04:36) (68 - 74)  BP: 119/70 (11 Apr 2024 04:36) (109/61 - 119/70)  BP(mean): --  RR: 18 (11 Apr 2024 04:36) (18 - 18)  SpO2: 94% (11 Apr 2024 04:36) (94% - 99%)    Parameters below as of 11 Apr 2024 04:36  Patient On (Oxygen Delivery Method): room air      I&O's Summary    10 Apr 2024 07:01  -  11 Apr 2024 07:00  --------------------------------------------------------  IN: 120 mL / OUT: 500 mL / NET: -380 mL        PHYSICAL EXAM:  GENERAL: NAD, well-developed, comfortable  HEAD:  Atraumatic, Normocephalic  EYES: EOMI, PERRLA, conjunctiva and sclera clear  NECK: Supple, No JVD  CHEST/LUNG: Decreased BS; + slight wheeze  HEART: Regular rate and rhythm; No murmurs, rubs, or gallops  ABDOMEN: Soft, Nontender, Nondistended; Bowel sounds present  NEURO: AAOx2/3, no focal weakness, 5/5 b/l extremity strength, b/l knee no arthritis, no effusion   EXTREMITIES:  2+ Peripheral Pulses, No clubbing, cyanosis, or edema  SKIN: Ecchymotic

## 2024-04-11 NOTE — PROGRESS NOTE ADULT - SUBJECTIVE AND OBJECTIVE BOX
Date of Service: 04-11-24 @ 12:18    Patient is a 89y old  Female who presents with a chief complaint of cough and SOB (11 Apr 2024 11:21)    Any change in ROS:   Denies CP, SOB  +cough, at times difficult to expectorate secretions       MEDICATIONS  (STANDING):  albuterol/ipratropium for Nebulization 3 milliLiter(s) Nebulizer every 6 hours  aMIOdarone    Tablet 200 milliGRAM(s) Oral <User Schedule>  apixaban 2.5 milliGRAM(s) Oral <User Schedule>  buDESOnide    Inhalation Suspension 0.5 milliGRAM(s) Nebulizer two times a day  cefepime   IVPB 1000 milliGRAM(s) IV Intermittent every 12 hours  guaiFENesin ER 1200 milliGRAM(s) Oral every 12 hours  levothyroxine 100 MICROGram(s) Oral daily  sertraline 50 milliGRAM(s) Oral daily    MEDICATIONS  (PRN):  acetaminophen     Tablet .. 650 milliGRAM(s) Oral every 6 hours PRN Temp greater or equal to 38C (100.4F), Mild Pain (1 - 3)    Vital Signs Last 24 Hrs  T(C): 37.1 (11 Apr 2024 10:35), Max: 37.6 (11 Apr 2024 04:36)  T(F): 98.7 (11 Apr 2024 10:35), Max: 99.6 (11 Apr 2024 04:36)  HR: 68 (11 Apr 2024 10:35) (68 - 74)  BP: 103/64 (11 Apr 2024 10:35) (103/64 - 119/70)  BP(mean): --  RR: 18 (11 Apr 2024 10:35) (18 - 18)  SpO2: 92% (11 Apr 2024 10:35) (92% - 99%)    Parameters below as of 11 Apr 2024 10:35  Patient On (Oxygen Delivery Method): room air        I&O's Summary    10 Apr 2024 07:01  -  11 Apr 2024 07:00  --------------------------------------------------------  IN: 120 mL / OUT: 500 mL / NET: -380 mL          Physical Exam:   GENERAL: NAD  HEENT: KOLBY  ENMT: No tonsillar erythema, exudates, or enlargement  NECK: Supple, No JVD  CHEST/LUNG: b/l rhonchi   CVS: Regular rate and rhythm  GI: : Soft, Nontender, Nondistended  NERVOUS SYSTEM:  Alert & Oriented X3  EXTREMITIES:  2+ Peripheral Pulses, No clubbing, cyanosis, or edema  SKIN: No rashes or lesions  PSYCH: Appropriate    Labs:  31                            9.8    2.02  )-----------( 69       ( 10 Apr 2024 11:08 )             29.9                         11.4   1.63  )-----------( 66       ( 08 Apr 2024 21:40 )             36.2     04-10    139  |  102  |  14  ----------------------------<  113<H>  3.7   |  26  |  0.69  04-08    138  |  101  |  21  ----------------------------<  90  4.1   |  22  |  1.00    Ca    8.8      10 Apr 2024 11:08    TPro  6.2  /  Alb  3.3  /  TBili  0.8  /  DBili  x   /  AST  9<L>  /  ALT  6<L>  /  AlkPhos  46  04-10  TPro  7.1  /  Alb  3.8  /  TBili  1.2  /  DBili  x   /  AST  13  /  ALT  9<L>  /  AlkPhos  58  04-08    CAPILLARY BLOOD GLUCOSE          LIVER FUNCTIONS - ( 10 Apr 2024 11:08 )  Alb: 3.3 g/dL / Pro: 6.2 g/dL / ALK PHOS: 46 U/L / ALT: 6 U/L / AST: 9 U/L / GGT: x             Urinalysis Basic - ( 10 Apr 2024 11:08 )    Color: x / Appearance: x / SG: x / pH: x  Gluc: 113 mg/dL / Ketone: x  / Bili: x / Urobili: x   Blood: x / Protein: x / Nitrite: x   Leuk Esterase: x / RBC: x / WBC x   Sq Epi: x / Non Sq Epi: x / Bacteria: x            RECENT CULTURES:  04-09 @ 05:03 Clean Catch Clean Catch (Midstream)                >100,000 CFU/ml Escherichia coli  50,000 - 99,000 CFU/mL Gram positive organisms    04-08 @ 21:25 .Blood Blood-Peripheral                No growth at 48 Hours    04-08 @ 21:15 .Blood Blood-Peripheral                No growth at 48 Hours      Studies    CT SCAN Chest   < from: CT Chest No Cont (04.09.24 @ 00:18) >    FINDINGS:    LUNGS AND AIRWAYS: Trace linear secretions in the trachea. Secretions   within the leftmainstem bronchus, left lower lobe atelectasis and   bilateral small airways, most pronounced in the left lower lobe.   Redemonstrated left upper lobe and superior left lower lobe   bronchiectasis and foci of calcification.  Tree-in-bud opacities in the   right lung, most pronounced in the upper lobe. Left lower lobe   subsegmental atelectasis.  PLEURA: No pleural effusion.  MEDIASTINUM AND TUNG: No lymphadenopathy.  VESSELS: Aortic and coronary artery calcifications. Dilated ascending   aorta and aortic arch up to 4.3 cm, stable. Descending thoracic aorta,   measuring up to 3.9 cm, unchanged.  HEART: Heart size is normal. No pericardial effusion.  CHEST WALL AND LOWER NECK: Within normal limits.  VISUALIZED UPPER ABDOMEN: Within normal limits.  BONES: Degenerative changes.    IMPRESSION:  Secretions within the trachea, left mainstem bronchus, left lower lobe   bronchus and bilateral distal small airways.    Foci of tree-in-bud nodularity in the right lung may reflect impacted   distal airways versus infectious/inflammatory small airways disease.    --- End of Report ---        < end of copied text >

## 2024-04-11 NOTE — PROGRESS NOTE ADULT - ASSESSMENT
Patient is a 89 year old female with PMH of A-fib on Eliquis, moderate aortic stenosis, HTN, hypothyroid, asthma, bronchiectasis, presents with cough and shortness of breath for 2 days.    Dyspnea with cough - suspect bronchiectasis exacerbation, possible pneumonia   Leukopenia with bandemia, likely in setting of above   COVID/RSV/Flu negative  CT chest with no focal consolidation or infiltrate; noted with secretions in trachea, L mainstem bronchus, LLL bronchus and b/l distal airways; focal tree in bud nodularity in R lung possible impacted distal airways vs infectious/inflammatory small airways disease   S/p empiric vancomycin and cefepime in the ER  Legionella urine ag negative, s/p azithromycin  MRSA PCR screen negative, off vancomycin    UA and Ucx noted, no gu sx, likely asx bacteruria, no need to treat   Afebrile, WBC improved, off NC, has cough but overall feels better     Recommendations:  Follow blood cultures - NGTD x2   Follow Mycoplasma pneumoniae IgM  Continue cefepime 1g IV Q12h (renally adjusted)- complete total 5d course on 4/13  Pulmonary following   Monitors temp/WBC, Cr

## 2024-04-11 NOTE — PROGRESS NOTE ADULT - ASSESSMENT
89 y/o F with PMH of AFib on Eliquis, AS, HTN, hypothyroidism, asthma, bronchiectasis. Presents with cough, SOB x few days. Pulmonary called to consult for CT chest findings, r/o PNA.

## 2024-04-12 ENCOUNTER — TRANSCRIPTION ENCOUNTER (OUTPATIENT)
Age: 89
End: 2024-04-12

## 2024-04-12 LAB
-  AMOXICILLIN/CLAVULANIC ACID: SIGNIFICANT CHANGE UP
-  AMPICILLIN/SULBACTAM: SIGNIFICANT CHANGE UP
-  AMPICILLIN: SIGNIFICANT CHANGE UP
-  AMPICILLIN: SIGNIFICANT CHANGE UP
-  AZTREONAM: SIGNIFICANT CHANGE UP
-  CEFAZOLIN: SIGNIFICANT CHANGE UP
-  CEFEPIME: SIGNIFICANT CHANGE UP
-  CEFOXITIN: SIGNIFICANT CHANGE UP
-  CEFTRIAXONE: SIGNIFICANT CHANGE UP
-  CEFUROXIME: SIGNIFICANT CHANGE UP
-  CIPROFLOXACIN: SIGNIFICANT CHANGE UP
-  CIPROFLOXACIN: SIGNIFICANT CHANGE UP
-  ERTAPENEM: SIGNIFICANT CHANGE UP
-  GENTAMICIN: SIGNIFICANT CHANGE UP
-  IMIPENEM: SIGNIFICANT CHANGE UP
-  LEVOFLOXACIN: SIGNIFICANT CHANGE UP
-  LEVOFLOXACIN: SIGNIFICANT CHANGE UP
-  MEROPENEM: SIGNIFICANT CHANGE UP
-  NITROFURANTOIN: SIGNIFICANT CHANGE UP
-  NITROFURANTOIN: SIGNIFICANT CHANGE UP
-  PIPERACILLIN/TAZOBACTAM: SIGNIFICANT CHANGE UP
-  TETRACYCLINE: SIGNIFICANT CHANGE UP
-  TOBRAMYCIN: SIGNIFICANT CHANGE UP
-  TRIMETHOPRIM/SULFAMETHOXAZOLE: SIGNIFICANT CHANGE UP
-  VANCOMYCIN: SIGNIFICANT CHANGE UP
ANION GAP SERPL CALC-SCNC: 12 MMOL/L — SIGNIFICANT CHANGE UP (ref 5–17)
ANISOCYTOSIS BLD QL: SLIGHT — SIGNIFICANT CHANGE UP
BASOPHILS # BLD AUTO: 0 K/UL — SIGNIFICANT CHANGE UP (ref 0–0.2)
BASOPHILS NFR BLD AUTO: 0 % — SIGNIFICANT CHANGE UP (ref 0–2)
BUN SERPL-MCNC: 13 MG/DL — SIGNIFICANT CHANGE UP (ref 7–23)
CALCIUM SERPL-MCNC: 9 MG/DL — SIGNIFICANT CHANGE UP (ref 8.4–10.5)
CHLORIDE SERPL-SCNC: 103 MMOL/L — SIGNIFICANT CHANGE UP (ref 96–108)
CO2 SERPL-SCNC: 25 MMOL/L — SIGNIFICANT CHANGE UP (ref 22–31)
CREAT SERPL-MCNC: 0.62 MG/DL — SIGNIFICANT CHANGE UP (ref 0.5–1.3)
CULTURE RESULTS: ABNORMAL
DACRYOCYTES BLD QL SMEAR: SLIGHT — SIGNIFICANT CHANGE UP
EGFR: 80 ML/MIN/1.73M2 — SIGNIFICANT CHANGE UP
ELLIPTOCYTES BLD QL SMEAR: SLIGHT — SIGNIFICANT CHANGE UP
EOSINOPHIL # BLD AUTO: 0.08 K/UL — SIGNIFICANT CHANGE UP (ref 0–0.5)
EOSINOPHIL NFR BLD AUTO: 4.4 % — SIGNIFICANT CHANGE UP (ref 0–6)
GLUCOSE SERPL-MCNC: 104 MG/DL — HIGH (ref 70–99)
HCT VFR BLD CALC: 29.8 % — LOW (ref 34.5–45)
HGB BLD-MCNC: 9.6 G/DL — LOW (ref 11.5–15.5)
LYMPHOCYTES # BLD AUTO: 0.12 K/UL — LOW (ref 1–3.3)
LYMPHOCYTES # BLD AUTO: 6.2 % — LOW (ref 13–44)
MAGNESIUM SERPL-MCNC: 2.2 MG/DL — SIGNIFICANT CHANGE UP (ref 1.6–2.6)
MANUAL SMEAR VERIFICATION: SIGNIFICANT CHANGE UP
MCHC RBC-ENTMCNC: 32.2 GM/DL — SIGNIFICANT CHANGE UP (ref 32–36)
MCHC RBC-ENTMCNC: 32.7 PG — SIGNIFICANT CHANGE UP (ref 27–34)
MCV RBC AUTO: 101.4 FL — HIGH (ref 80–100)
METHOD TYPE: SIGNIFICANT CHANGE UP
METHOD TYPE: SIGNIFICANT CHANGE UP
MONOCYTES # BLD AUTO: 0.12 K/UL — SIGNIFICANT CHANGE UP (ref 0–0.9)
MONOCYTES NFR BLD AUTO: 6.2 % — SIGNIFICANT CHANGE UP (ref 2–14)
MYELOCYTES NFR BLD: 1.8 % — HIGH (ref 0–0)
NEUTROPHILS # BLD AUTO: 1.53 K/UL — LOW (ref 1.8–7.4)
NEUTROPHILS NFR BLD AUTO: 77 % — SIGNIFICANT CHANGE UP (ref 43–77)
NEUTS BAND # BLD: 4.4 % — SIGNIFICANT CHANGE UP (ref 0–8)
ORGANISM # SPEC MICROSCOPIC CNT: ABNORMAL
OVALOCYTES BLD QL SMEAR: SLIGHT — SIGNIFICANT CHANGE UP
PLAT MORPH BLD: NORMAL — SIGNIFICANT CHANGE UP
PLATELET # BLD AUTO: 59 K/UL — LOW (ref 150–400)
POIKILOCYTOSIS BLD QL AUTO: SLIGHT — SIGNIFICANT CHANGE UP
POTASSIUM SERPL-MCNC: 3.5 MMOL/L — SIGNIFICANT CHANGE UP (ref 3.5–5.3)
POTASSIUM SERPL-SCNC: 3.5 MMOL/L — SIGNIFICANT CHANGE UP (ref 3.5–5.3)
RBC # BLD: 2.94 M/UL — LOW (ref 3.8–5.2)
RBC # FLD: 13.6 % — SIGNIFICANT CHANGE UP (ref 10.3–14.5)
RBC BLD AUTO: ABNORMAL
SODIUM SERPL-SCNC: 140 MMOL/L — SIGNIFICANT CHANGE UP (ref 135–145)
SPECIMEN SOURCE: SIGNIFICANT CHANGE UP
TOXIC GRANULES BLD QL SMEAR: PRESENT — SIGNIFICANT CHANGE UP
WBC # BLD: 1.88 K/UL — LOW (ref 3.8–10.5)
WBC # FLD AUTO: 1.88 K/UL — LOW (ref 3.8–10.5)

## 2024-04-12 RX ADMIN — Medication 3 MILLILITER(S): at 00:06

## 2024-04-12 RX ADMIN — CEFEPIME 100 MILLIGRAM(S): 1 INJECTION, POWDER, FOR SOLUTION INTRAMUSCULAR; INTRAVENOUS at 17:42

## 2024-04-12 RX ADMIN — CEFEPIME 100 MILLIGRAM(S): 1 INJECTION, POWDER, FOR SOLUTION INTRAMUSCULAR; INTRAVENOUS at 06:13

## 2024-04-12 RX ADMIN — Medication 0.5 MILLIGRAM(S): at 06:14

## 2024-04-12 RX ADMIN — SERTRALINE 50 MILLIGRAM(S): 25 TABLET, FILM COATED ORAL at 11:56

## 2024-04-12 RX ADMIN — AMIODARONE HYDROCHLORIDE 200 MILLIGRAM(S): 400 TABLET ORAL at 08:18

## 2024-04-12 RX ADMIN — APIXABAN 2.5 MILLIGRAM(S): 2.5 TABLET, FILM COATED ORAL at 08:18

## 2024-04-12 RX ADMIN — Medication 3 MILLILITER(S): at 11:54

## 2024-04-12 RX ADMIN — Medication 1200 MILLIGRAM(S): at 06:08

## 2024-04-12 RX ADMIN — Medication 3 MILLILITER(S): at 06:07

## 2024-04-12 RX ADMIN — Medication 100 MICROGRAM(S): at 06:08

## 2024-04-12 RX ADMIN — Medication 1200 MILLIGRAM(S): at 17:42

## 2024-04-12 RX ADMIN — Medication 3 MILLILITER(S): at 23:10

## 2024-04-12 RX ADMIN — Medication 0.5 MILLIGRAM(S): at 17:42

## 2024-04-12 RX ADMIN — Medication 3 MILLILITER(S): at 17:42

## 2024-04-12 NOTE — PROGRESS NOTE ADULT - SUBJECTIVE AND OBJECTIVE BOX
[facility] DISEASES  SWAPNIL Rene Y. Patel, S. Shah, G. [facility]  [phone]  ([phone] - weekdays after 5pm and weekends)    Name: CATHRYN LARA  Age/Gender: 89y Female  MRN: [id number]    Interval History:  Patient seen and examined this morning.   Feels better overall, no new complaints.   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 reported allergy (Unknown)      Objective:  Vitals:   T(F): 98.3 (04-12-24 @ 04:42), Max: 98.4 (04-11-24 @ 20:30)  HR: 65 (04-12-24 @ 11:18) (65 - 78)  BP: 100/62 (04-12-24 @ 11:18) (100/62 - 123/76)  RR: 18 (04-12-24 @ 04:42) (18 - 18)  SpO2: 93% (04-12-24 @ 11:18) (93% - 93%)  Physical Examination:  General: no acute distress, RA< nontoxic   HEENT: NC/AT, anicteric, neck supple  Respiratory: no acc muscle use, breathing comfortably  Cardiovascular: S1 and S2 present  Gastrointestinal: normal appearing, nondistended  Extremities: no edema, no cyanosis  Skin: no visible rash    Laboratory Studies:  CBC:                       9.6    1.88  )-----------( 59       ( 12 Apr 2024 07:11 )             29.8     WBC Trend:  1.88 04-12-24 @ 07:11  2.02 04-10-24 @ 11:08  1.63 04-08-24 @ 21:40    CMP: 04-12    140  |  103  |  13  ----------------------------<  104<H>  3.5   |  25  |  0.62    Ca    9.0      12 Apr 2024 07:11  Mg     2.2     04-12      Creatinine: 0.62 mg/dL (04-12-24 @ 07:11)  Creatinine: 0.69 mg/dL (04-10-24 @ 11:08)  Creatinine: 1.00 mg/dL (04-08-24 @ 21:40)    Microbiology: reviewed   Culture - Urine (collected 04-09-24 @ 05:03)  Source: Clean Catch Clean Catch (Midstream)  Preliminary Report (04-12-24 @ 11:21):    >100,000 CFU/ml Escherichia coli    50,000 - 99,000 CFU/mL Enterococcus faecalis Susceptibility to follow.  Organism: Escherichia coli (04-12-24 @ 11:21)  Organism: Escherichia coli (04-12-24 @ 11:21)      Method Type: RAFIA      -  Amoxicillin/Clavulanic Acid: S <=8/4      -  Ampicillin: S <=8 These ampicillin results predict results for amoxicillin      -  Ampicillin/Sulbactam: S <=4/2      -  Aztreonam: S <=4      -  Cefazolin: S <=2 For uncomplicated UTI with K. pneumoniae, E. coli, or P. mirablis: RAFIA <=16 is sensitive and RAFIA >=32 is resistant. This also predicts results for oral agents cefaclor, cefdinir, cefpodoxime, cefprozil, cefuroxime axetil, cephalexin and locarbef for uncomplicated UTI. Note that some isolates may be susceptible to these agents while testing resistant to cefazolin.      -  Cefepime: S <=2      -  Cefoxitin: S <=8      -  Ceftriaxone: S <=1      -  Cefuroxime: I 16      -  Ciprofloxacin: R >2      -  Ertapenem: S <=0.5      -  Gentamicin: S <=2      -  Imipenem: S <=1      -  Levofloxacin: R >4      -  Meropenem: S <=1      -  Nitrofurantoin: S <=32 Should not be used to treat pyelonephritis      -  Piperacillin/Tazobactam: S <=8      -  Tobramycin: S <=2      -  Trimethoprim/Sulfamethoxazole: R >2/38    Culture - Blood (collected 04-08-24 @ 21:25)  Source: .Blood Blood-Peripheral  Preliminary Report (04-12-24 @ 07:01):    No growth at 72 Hours    Culture - Blood (collected 04-08-24 @ 21:15)  Source: .Blood Blood-Peripheral  Preliminary Report (04-12-24 @ 07:01):    No growth at 72 Hours    SARS-CoV-2 Result: NotDetec (08 Apr 2024 21:40)    Radiology: reviewed     Medications:  acetaminophen     Tablet .. 650 milliGRAM(s) Oral every 6 hours PRN  albuterol/ipratropium for Nebulization 3 milliLiter(s) Nebulizer every 6 hours  aMIOdarone    Tablet 200 milliGRAM(s) Oral <User Schedule>  apixaban 2.5 milliGRAM(s) Oral <User Schedule>  buDESOnide    Inhalation Suspension 0.5 milliGRAM(s) Nebulizer two times a day  cefepime   IVPB 1000 milliGRAM(s) IV Intermittent every 12 hours  guaiFENesin ER 1200 milliGRAM(s) Oral every 12 hours  levothyroxine 100 MICROGram(s) Oral daily  sertraline 50 milliGRAM(s) Oral daily    Current Antimicrobials:  cefepime   IVPB 1000 milliGRAM(s) IV Intermittent every 12 hours    Prior/Completed Antimicrobials:  cefepime   IVPB  vancomycin  IVPB.

## 2024-04-12 NOTE — DISCHARGE NOTE NURSING/CASE MANAGEMENT/SOCIAL WORK - PATIENT PORTAL LINK FT
You can access the FollowMyHealth Patient Portal offered by [facility] by registering at the following website: [email or web address]. By joining [employer]’s FollowMyHealth portal, you will also be able to view your health information using other applications (apps) compatible with our system.

## 2024-04-12 NOTE — PROGRESS NOTE ADULT - SUBJECTIVE AND OBJECTIVE BOX
Date of Service: 04-12-24 @ 15:53    Patient is a 89y old  Female who presents with a chief complaint of cough and SOB (12 Apr 2024 09:21)      Any change in ROS:   OOB to chair  Denies CP/SOB  +Cough    MEDICATIONS  (STANDING):  albuterol/ipratropium for Nebulization 3 milliLiter(s) Nebulizer every 6 hours  aMIOdarone    Tablet 200 milliGRAM(s) Oral <User Schedule>  apixaban 2.5 milliGRAM(s) Oral <User Schedule>  buDESOnide    Inhalation Suspension 0.5 milliGRAM(s) Nebulizer two times a day  cefepime   IVPB 1000 milliGRAM(s) IV Intermittent every 12 hours  guaiFENesin ER 1200 milliGRAM(s) Oral every 12 hours  levothyroxine 100 MICROGram(s) Oral daily  sertraline 50 milliGRAM(s) Oral daily    MEDICATIONS  (PRN):  acetaminophen     Tablet .. 650 milliGRAM(s) Oral every 6 hours PRN Temp greater or equal to 38C (100.4F), Mild Pain (1 - 3)    Vital Signs Last 24 Hrs  T(C): 36.9 (12 Apr 2024 13:14), Max: 36.9 (11 Apr 2024 20:30)  T(F): 98.4 (12 Apr 2024 13:14), Max: 98.4 (11 Apr 2024 20:30)  HR: 73 (12 Apr 2024 13:14) (65 - 78)  BP: 96/58 (12 Apr 2024 13:14) (96/58 - 123/76)  BP(mean): --  RR: 18 (12 Apr 2024 13:14) (18 - 18)  SpO2: 94% (12 Apr 2024 13:14) (93% - 94%)    Parameters below as of 12 Apr 2024 13:14  Patient On (Oxygen Delivery Method): room air        I&O's Summary    11 Apr 2024 07:01  -  12 Apr 2024 07:00  --------------------------------------------------------  IN: 0 mL / OUT: 200 mL / NET: -200 mL          Physical Exam:   GENERAL: NAD, well-groomed, well-developed  HEENT: KOLBY/   Atraumatic, Normocephalic  ENMT: No tonsillar erythema, exudates, or enlargement; Moist mucous membranes, Good dentition, No lesions  NECK: Supple, No JVD, Normal thyroid  CHEST/LUNG: Few scattered rhonchi, clears with cough   CVS: Regular rate and rhythm; No murmurs, rubs, or gallops  GI: : Soft, Nontender, Nondistended; Bowel sounds present  NERVOUS SYSTEM:  Alert & Oriented X3, Good concentration; Motor Strength 5/5 B/L upper and lower extremities; DTRs 2+ intact and symmetric  EXTREMITIES:  2+ Peripheral Pulses, No clubbing, cyanosis, or edema  LYMPH: No lymphadenopathy noted  SKIN: No rashes or lesions  ENDOCRINOLOGY: No Thyromegaly  PSYCH: Appropriate    Labs:  31                            9.6    1.88  )-----------( 59       ( 12 Apr 2024 07:11 )             29.8                         9.8    2.02  )-----------( 69       ( 10 Apr 2024 11:08 )             29.9                         11.4   1.63  )-----------( 66       ( 08 Apr 2024 21:40 )             36.2     04-12    140  |  103  |  13  ----------------------------<  104<H>  3.5   |  25  |  0.62  04-10    139  |  102  |  14  ----------------------------<  113<H>  3.7   |  26  |  0.69  04-08    138  |  101  |  21  ----------------------------<  90  4.1   |  22  |  1.00    Ca    9.0      12 Apr 2024 07:11  Mg     2.2     04-12    TPro  6.2  /  Alb  3.3  /  TBili  0.8  /  DBili  x   /  AST  9<L>  /  ALT  6<L>  /  AlkPhos  46  04-10  TPro  7.1  /  Alb  3.8  /  TBili  1.2  /  DBili  x   /  AST  13  /  ALT  9<L>  /  AlkPhos  58  04-08    CAPILLARY BLOOD GLUCOSE              Urinalysis Basic - ( 12 Apr 2024 07:11 )    Color: x / Appearance: x / SG: x / pH: x  Gluc: 104 mg/dL / Ketone: x  / Bili: x / Urobili: x   Blood: x / Protein: x / Nitrite: x   Leuk Esterase: x / RBC: x / WBC x   Sq Epi: x / Non Sq Epi: x / Bacteria: x            RECENT CULTURES:  04-09 @ 05:03 Clean Catch Clean Catch (Midstream)   RAFIA      Escherichia coli  Escherichia coli     >100,000 CFU/ml Escherichia coli  50,000 - 99,000 CFU/mL Enterococcus faecalis Susceptibility to follow.    04-08 @ 21:25 .Blood Blood-Peripheral                No growth at 72 Hours    04-08 @ 21:15 .Blood Blood-Peripheral                No growth at 72 Hours          RESPIRATORY CULTURES:          Studies    CT SCAN Chest   < from: CT Chest No Cont (04.09.24 @ 00:18) >  INTERPRETATION:  CLINICAL INFORMATION: Hypoxia. Evaluate for pneumonia.    COMPARISON: CT chest 10/16/2023.    CONTRAST/COMPLICATIONS:  IV Contrast: None.  Oral Contrast: None.  Complications: None reported.    PROCEDURE:  CT of the Chest was performed.  Sagittal and coronal reformats were performed.    FINDINGS:    LUNGS AND AIRWAYS: Trace linear secretions in the trachea. Secretions   within the leftmainstem bronchus, left lower lobe atelectasis and   bilateral small airways, most pronounced in the left lower lobe.   Redemonstrated left upper lobe and superior left lower lobe   bronchiectasis and foci of calcification.  Tree-in-bud opacities in the   right lung, most pronounced in the upper lobe. Left lower lobe   subsegmental atelectasis.  PLEURA: No pleural effusion.  MEDIASTINUM AND TUNG: No lymphadenopathy.  VESSELS: Aortic and coronary artery calcifications. Dilated ascending   aorta and aortic arch up to 4.3 cm, stable. Descending thoracic aorta,   measuring up to 3.9 cm, unchanged.  HEART: Heart size is normal. No pericardial effusion.  CHEST WALL AND LOWER NECK: Within normal limits.  VISUALIZED UPPER ABDOMEN: Within normal limits.  BONES: Degenerative changes.    IMPRESSION:  Secretions within the trachea, left mainstem bronchus, left lower lobe   bronchus and bilateral distal small airways.    Foci of tree-in-bud nodularity in the right lung may reflect impacted   distal airways versus infectious/inflammatory small airways disease.    --- End of Report ---      < end of copied text >

## 2024-04-12 NOTE — DISCHARGE NOTE PROVIDER - NSDCMRMEDTOKEN_GEN_ALL_CORE_FT
Albuterol (Eqv-ProAir HFA) 90 mcg/inh inhalation aerosol: 2 puff(s) inhaled every 6 hours as needed  amiodarone 200 mg oral tablet: 1 tab(s) orally 5 times a week (Monday - Friday)  Eliquis 2.5 mg oral tablet: 1 tab(s) orally once a day  levothyroxine 100 mcg (0.1 mg) oral tablet: 1 tab(s) orally once a day  pantoprazole 40 mg oral delayed release tablet: 1 tab(s) orally once a day  Restasis 0.05% ophthalmic emulsion: 1 drop(s) in each eye once a day as needed  sertraline 50 mg oral tablet: 1 tab(s) orally once a day   Albuterol (Eqv-ProAir HFA) 90 mcg/inh inhalation aerosol: 2 puff(s) inhaled every 6 hours as needed  amiodarone 200 mg oral tablet: 1 tab(s) orally 5 times a week (Monday - Friday)  Eliquis 2.5 mg oral tablet: 1 tab(s) orally once a day  guaiFENesin 1200 mg oral tablet, extended release: 1 tab(s) orally every 12 hours  levothyroxine 100 mcg (0.1 mg) oral tablet: 1 tab(s) orally once a day  pantoprazole 40 mg oral delayed release tablet: 1 tab(s) orally once a day  Restasis 0.05% ophthalmic emulsion: 1 drop(s) in each eye once a day as needed  sertraline 50 mg oral tablet: 1 tab(s) orally once a day

## 2024-04-12 NOTE — PROGRESS NOTE ADULT - SUBJECTIVE AND OBJECTIVE BOX
SUBJECTIVE / OVERNIGHT EVENTS:          --------------------------------------------------------------------------------------------  LABS:                        9.6    1.88  )-----------( 59       ( 12 Apr 2024 07:11 )             29.8     04-12    140  |  103  |  13  ----------------------------<  104<H>  3.5   |  25  |  0.62    Ca    9.0      12 Apr 2024 07:11  Mg     2.2     04-12    TPro  6.2  /  Alb  3.3  /  TBili  0.8  /  DBili  x   /  AST  9<L>  /  ALT  6<L>  /  AlkPhos  46  04-10      CAPILLARY BLOOD GLUCOSE            Urinalysis Basic - ( 12 Apr 2024 07:11 )    Color: x / Appearance: x / SG: x / pH: x  Gluc: 104 mg/dL / Ketone: x  / Bili: x / Urobili: x   Blood: x / Protein: x / Nitrite: x   Leuk Esterase: x / RBC: x / WBC x   Sq Epi: x / Non Sq Epi: x / Bacteria: x        RADIOLOGY & ADDITIONAL TESTS:    Imaging Personally Reviewed:  [x] YES  [ ] NO    Consultant(s) Notes Reviewed:  [x] YES  [ ] NO    MEDICATIONS  (STANDING):  albuterol/ipratropium for Nebulization 3 milliLiter(s) Nebulizer every 6 hours  aMIOdarone    Tablet 200 milliGRAM(s) Oral <User Schedule>  apixaban 2.5 milliGRAM(s) Oral <User Schedule>  buDESOnide    Inhalation Suspension 0.5 milliGRAM(s) Nebulizer two times a day  cefepime   IVPB 1000 milliGRAM(s) IV Intermittent every 12 hours  guaiFENesin ER 1200 milliGRAM(s) Oral every 12 hours  levothyroxine 100 MICROGram(s) Oral daily  sertraline 50 milliGRAM(s) Oral daily    MEDICATIONS  (PRN):  acetaminophen     Tablet .. 650 milliGRAM(s) Oral every 6 hours PRN Temp greater or equal to 38C (100.4F), Mild Pain (1 - 3)      Care Discussed with Consultants/Other Providers [x] YES  [ ] NO    Vital Signs Last 24 Hrs  T(C): 36.8 (12 Apr 2024 04:42), Max: 37.1 (11 Apr 2024 10:35)  T(F): 98.3 (12 Apr 2024 04:42), Max: 98.7 (11 Apr 2024 10:35)  HR: 66 (12 Apr 2024 08:25) (66 - 78)  BP: 105/57 (12 Apr 2024 08:25) (103/64 - 123/76)  BP(mean): --  RR: 18 (12 Apr 2024 04:42) (18 - 18)  SpO2: 93% (12 Apr 2024 04:42) (92% - 93%)    Parameters below as of 12 Apr 2024 04:42  Patient On (Oxygen Delivery Method): room air      I&O's Summary    11 Apr 2024 07:01  -  12 Apr 2024 07:00  --------------------------------------------------------  IN: 0 mL / OUT: 200 mL / NET: -200 mL    PHYSICAL EXAM:  GENERAL: NAD, well-developed, comfortable, [place]  HEAD:  Atraumatic, Normocephalic  EYES: EOMI, PERRLA, conjunctiva and sclera clear  NECK: Supple, No JVD  CHEST/LUNG: Decreased BS; + slight wheeze  HEART: Regular rate and rhythm; No murmurs, rubs, or gallops  ABDOMEN: Soft, Nontender, Nondistended; Bowel sounds present  NEURO: AAOx3, no focal weakness, 5/5 b/l extremity strength, b/l knee no arthritis, no effusion   EXTREMITIES:  2+ Peripheral Pulses, No clubbing, cyanosis, or edema  SKIN: Ecchymotic        SUBJECTIVE / OVERNIGHT EVENTS:    patient seen and examined  resting comfortably in bed  no events overnight     --------------------------------------------------------------------------------------------  LABS:                        9.6    1.88  )-----------( 59       ( 12 Apr 2024 07:11 )             29.8     04-12    140  |  103  |  13  ----------------------------<  104<H>  3.5   |  25  |  0.62    Ca    9.0      12 Apr 2024 07:11  Mg     2.2     04-12    TPro  6.2  /  Alb  3.3  /  TBili  0.8  /  DBili  x   /  AST  9<L>  /  ALT  6<L>  /  AlkPhos  46  04-10      CAPILLARY BLOOD GLUCOSE            Urinalysis Basic - ( 12 Apr 2024 07:11 )    Color: x / Appearance: x / SG: x / pH: x  Gluc: 104 mg/dL / Ketone: x  / Bili: x / Urobili: x   Blood: x / Protein: x / Nitrite: x   Leuk Esterase: x / RBC: x / WBC x   Sq Epi: x / Non Sq Epi: x / Bacteria: x        RADIOLOGY & ADDITIONAL TESTS:    Imaging Personally Reviewed:  [x] YES  [ ] NO    Consultant(s) Notes Reviewed:  [x] YES  [ ] NO    MEDICATIONS  (STANDING):  albuterol/ipratropium for Nebulization 3 milliLiter(s) Nebulizer every 6 hours  aMIOdarone    Tablet 200 milliGRAM(s) Oral <User Schedule>  apixaban 2.5 milliGRAM(s) Oral <User Schedule>  buDESOnide    Inhalation Suspension 0.5 milliGRAM(s) Nebulizer two times a day  cefepime   IVPB 1000 milliGRAM(s) IV Intermittent every 12 hours  guaiFENesin ER 1200 milliGRAM(s) Oral every 12 hours  levothyroxine 100 MICROGram(s) Oral daily  sertraline 50 milliGRAM(s) Oral daily    MEDICATIONS  (PRN):  acetaminophen     Tablet .. 650 milliGRAM(s) Oral every 6 hours PRN Temp greater or equal to 38C (100.4F), Mild Pain (1 - 3)      Care Discussed with Consultants/Other Providers [x] YES  [ ] NO    Vital Signs Last 24 Hrs  T(C): 36.8 (12 Apr 2024 04:42), Max: 37.1 (11 Apr 2024 10:35)  T(F): 98.3 (12 Apr 2024 04:42), Max: 98.7 (11 Apr 2024 10:35)  HR: 66 (12 Apr 2024 08:25) (66 - 78)  BP: 105/57 (12 Apr 2024 08:25) (103/64 - 123/76)  BP(mean): --  RR: 18 (12 Apr 2024 04:42) (18 - 18)  SpO2: 93% (12 Apr 2024 04:42) (92% - 93%)    Parameters below as of 12 Apr 2024 04:42  Patient On (Oxygen Delivery Method): room air      I&O's Summary    11 Apr 2024 07:01  -  12 Apr 2024 07:00  --------------------------------------------------------  IN: 0 mL / OUT: 200 mL / NET: -200 mL    PHYSICAL EXAM:  GENERAL: NAD, well-developed, comfortable, [place]  HEAD:  Atraumatic, Normocephalic  EYES: EOMI, PERRLA, conjunctiva and sclera clear  NECK: Supple, No JVD  CHEST/LUNG: Decreased BS; + slight wheeze  HEART: Regular rate and rhythm; No murmurs, rubs, or gallops  ABDOMEN: Soft, Nontender, Nondistended; Bowel sounds present  NEURO: AAOx3, no focal weakness, 5/5 b/l extremity strength, b/l knee no arthritis, no effusion   EXTREMITIES:  2+ Peripheral Pulses, No clubbing, cyanosis, or edema  SKIN: Ecchymotic

## 2024-04-12 NOTE — DISCHARGE NOTE NURSING/CASE MANAGEMENT/SOCIAL WORK - NSDCPEFALRISK_GEN_ALL_CORE
For information on Fall & Injury Prevention, visit: [email or web address] OR  [email or web address] OR  https://www.cdc.gov/steadi/patient.html

## 2024-04-12 NOTE — PROGRESS NOTE ADULT - ASSESSMENT
Patient is a 89 year old female with PMH of A-fib on Eliquis, moderate aortic stenosis, HTN, hypothyroid, asthma, bronchiectasis, presents with cough and shortness of breath for 2 days.    Dyspnea with cough - suspect bronchiectasis exacerbation, possible pneumonia   Leukopenia with bandemia, likely in setting of above   COVID/RSV/Flu negative  CT chest with no focal consolidation or infiltrate; noted with secretions in trachea, L mainstem bronchus, LLL bronchus and b/l distal airways; focal tree in bud nodularity in R lung possible impacted distal airways vs infectious/inflammatory small airways disease   Legionella urine ag negative, s/p azithromycin  MRSA PCR screen negative, off vancomycin    Bcx NGTD x2   UA and Ucx noted, no gu sx, likely asx bacteruria, no need to treat   Heme/Onc following for pancytopenia, recs appreciated   Afebrile, on RA, nontoxic, overall improved     Recommendations:  Follow Mycoplasma pneumoniae IgM  Continue cefepime 1g IV Q12h (renally adjusted)- complete total 5d course on 4/13  If planned for discharge -- can transition to cefuroxime 500mg PO BID to complete course   Pulmonary following   Monitors temp/WBC, Cr     Over the weekend Dr. Jhonathan Gambino will be covering for our group. If you have any questions, concerns or new micro data, please reach out to them at [phone].    Lilli Polanco M.D.  [facility], Division of Infectious Diseases  [phone]  After 5pm on weekdays and all day on weekends - please call [phone]

## 2024-04-12 NOTE — PROGRESS NOTE ADULT - ASSESSMENT
Patient is a 89 year old female with PMHx of A-fib on Eliquis, Asthma, Bronchiectasis, Moderate Aortic Stenosis, HTN and Hypothyroid. Presents to [facility] for progressively worsening cough and SOB x2 days.     PLAN:    # Acute Asthma Exacerbation vs CAP:  - Leukopenia/bandemia 15% and worsening thrombocytopenia concerning for overwhelming infection  - CT Chest with no focal consolidation or infiltrate; noted with secretions in trachea, L mainstem bronchus, LLL bronchus and b/l distal airways; focal tree in bud nodularity in R lung possible impacted distal airways vs infectious/inflammatory small airways disease   - MRSA swab/Urine legionella negative  - BCx NGTD, UA and UCx noted, no gu sx, likely asx bacteruria, no need to treat per ID   - C/w IV abx till 4/13 per ID  - Duoneb and inhaler  - Supplemental O2 PRN  - Monitor temps/WBC  - PT eval -> home PT  - ID/Pulm following    # SHEILA: Improved  - Baseline 0.6-0.7, now 1  - Monitor I/O's  - Serial Cr  - Avoid nephrotoxins    # Anemia:  - Serial cbc's  - Transfuse prn  - Heme following    # Afib/HTN/Moderate AS:  - C/w Eliquis and CV meds    # Hypothyroidism:  - C/w Synthroid     # DVT ppx:  - Eliquis    [facility]  [phone]

## 2024-04-12 NOTE — PROGRESS NOTE ADULT - NS ATTEND AMEND GEN_ALL_CORE FT
doing pretty good:  no sob:  on room air:  alert and awake:  dc planning
to me she looks pretty good:  no sob:  no resp distress:  agree with above rx: on 1 L of oxygen : should try to wean it down:

## 2024-04-12 NOTE — DISCHARGE NOTE PROVIDER - NSDCCPCAREPLAN_GEN_ALL_CORE_FT
PRINCIPAL DISCHARGE DIAGNOSIS  Diagnosis: Pneumonia  Assessment and Plan of Treatment: Pneumonia is a lung infection that can cause a fever, cough, and trouble breathing.  Completed antibiotics  Nutrition is important, eat small frequent meals.  Get lots of rest and drink fluids.  Call your health care provider upon arrival home from hospital and make a follow up appointment for one week.  If your cough worsens, you develop fever greater than 101', you have shaking chills, a fast heartbeat, trouble breathing and/or feel your are breathing much faster than usual, call your healthcare provider.  Make sure you wash your hands frequently.        SECONDARY DISCHARGE DIAGNOSES  Diagnosis: Bronchiectasis  Assessment and Plan of Treatment: Completed antibiotics

## 2024-04-12 NOTE — CONSULT NOTE ADULT - ASSESSMENT
Ms. Slaetr is a 89y Female with PMHx of asthma, HTN, essential tremor, bronchiectasis, GERD, hypothyroidism, paroxysmal A.fib on Eliquis 2.5mg BID, Vit D deficiency, tuberculous peritonitis, anemia moderate AS, hx of falls, aortic regurgitation who is admitted with pneumonia with CT Chest 04/09/2024 showing secretions within the trachea, left mainstem bronchus, left lower lobe bronchus and bilateral distal small airways with foci of tree-in-bud nodularity in the right lung may reflect impacted distal airways versus infectious/inflammatory small airways disease and is being treated with antibiotics per ID and primary team.     Pt has a history of pancytopenia as outpatient with Leukopenia with since about 3y prior initially lymphopenia and then with neutropenia, progressive, anemia over the past year gradually worsening, with progressive macrocytosis, and thrombocytopenia over the past two years.     # Pancytopenia  - Outpatient labs show gradual progressive leukopenia x 3y, initiall lymphopenia now both lymphopenia and neutropenia   -- Most recent outpatient WBC ranging 1.9-4.6   - Thrombocytopenia outpatient ranging 85-100s x 2y  - Anemia ranging 9.5-11 over past year  - MCV with progressive macrocytosis over past year  - Admission labs 04/08/2024 with Hb 11.4, .5, WBC 1.63 with ANC 1090 and , Plt 66  - CBC 04/12/2024 Hb 9.8, WBC 2.02, Plt 69k, normal renal function, bilirubin, coags on eliquis   - Obtain Ferritin, Iron panel, B12, Folate, Copper   - Pts peripheral labs suspicious for primary hematological disorder, will rule out nutritional deficiencies as above   - Transfuse PRBC to maintain Hb > 7   - Transfuse Plt to maintain Plt > 10k  - Trend CBC daily with Diff for now     # Pneumonia  - CT Chest 04/09/2024 showing secretions within the trachea, left mainstem bronchus, left lower lobe bronchus and bilateral distal small airways with foci of tree-in-bud nodularity in the right lung may reflect impacted distal airways versus infectious/inflammatory small airways disease  - Antibiotics per ID and primary team.     #A.fib  - On Eliquis 2.5mg per cardiology  - Monitor platelet count, noted risk for falls, would have ongoing discussion with cardiology regarding risks vs benefits, no active or past hx of bleeding complications noted at this time     # History of Hypothyroidism  - Obtain TSH, understanding interpretation will be based on age and acute illness, given anemia   - Continue levothyroxine     # Coagulopathy  - Likely due to Eliquis  - Do not need to monitor regularly unless active bleeding noted      Thank you for allowing me to participate in the care of this patient, please do  not hesitate to call or text me if you have further questions or concerns.     Roni Polanco MD  [facility]   Division of Hematology/Oncology  [facility], [address]  [place] [address]  P: [phone]  F: [phone]    Attestation:   Total time spent on the encounter: >60 minutes   ----Including face-to-face interaction in addition to chart review, reviewing treatment plan, and managing the patient’s chronic diagnoses as listed in the assessment----     1.	I have reviewed, analyzed and interpreted the following labs: CBC, CMP, Coags imaging:  CT Chest impressions as above.   2.	I have reviewed notes stating pts current admission, consultants and follow ups.   3.	I have reviewed the past medical, family, and surgical history and confirmed with outpatient records    Ms. Slater is a 89y Female with PMHx of asthma, HTN, essential tremor, bronchiectasis, GERD, hypothyroidism, paroxysmal A.fib on Eliquis 2.5mg BID, Vit D deficiency, tuberculous peritonitis, anemia moderate AS, hx of falls, aortic regurgitation who is admitted with pneumonia with CT Chest 04/09/2024 showing secretions within the trachea, left mainstem bronchus, left lower lobe bronchus and bilateral distal small airways with foci of tree-in-bud nodularity in the right lung may reflect impacted distal airways versus infectious/inflammatory small airways disease and is being treated with antibiotics per ID and primary team.     Pt has a history of pancytopenia as outpatient with Leukopenia with since about 3y prior initially lymphopenia and then with neutropenia, progressive, anemia over the past year gradually worsening, with progressive macrocytosis, and thrombocytopenia over the past two years.     # Pancytopenia  - Outpatient labs show gradual progressive leukopenia x 3y, initiall lymphopenia now both lymphopenia and neutropenia   -- Most recent outpatient WBC ranging 1.9-4.6   - Thrombocytopenia outpatient ranging 85-100s x 2y  - Anemia ranging 9.5-11 over past year  - MCV with progressive macrocytosis over past year  - Admission labs 04/08/2024 with Hb 11.4, .5, WBC 1.63 with ANC 1090 and , Plt 66  - CBC 04/12/2024 Hb 9.8, WBC 2.02, Plt 69k, normal renal function, bilirubin, coags on eliquis   - Obtain Ferritin, Iron panel, B12, Folate, Copper   - Obtain SPEP/MOOKIE, Serum immunoglobulins, serum FLCs, LDH, Beta-2 Mcg,   - Pts peripheral labs suspicious for primary hematological disorder, will rule out nutritional deficiencies as above   - Transfuse PRBC to maintain Hb > 7   - Transfuse Plt to maintain Plt > 10k  - Trend CBC daily with Diff for now     # Pneumonia  - CT Chest 04/09/2024 showing secretions within the trachea, left mainstem bronchus, left lower lobe bronchus and bilateral distal small airways with foci of tree-in-bud nodularity in the right lung may reflect impacted distal airways versus infectious/inflammatory small airways disease  - Antibiotics per ID and primary team.     #A.fib  - On Eliquis 2.5mg per cardiology  - Monitor platelet count, noted risk for falls, would have ongoing discussion with cardiology regarding risks vs benefits, no active or past hx of bleeding complications noted at this time     # History of Hypothyroidism  - Obtain TSH, understanding interpretation will be based on age and acute illness, given anemia   - Continue levothyroxine     # Coagulopathy  - Likely due to Eliquis  - Do not need to monitor regularly unless active bleeding noted      Thank you for allowing me to participate in the care of this patient, please do  not hesitate to call or text me if you have further questions or concerns.     Roni Polanco MD  [facility]   Division of Hematology/Oncology  [address] [place] [address], [address]  [place] [address]  P: [phone]  F: [phone]    Attestation:   Total time spent on the encounter: >60 minutes   ----Including face-to-face interaction in addition to chart review, reviewing treatment plan, and managing the patient’s chronic diagnoses as listed in the assessment----     1.	I have reviewed, analyzed and interpreted the following labs: CBC, CMP, Coags imaging:  CT Chest impressions as above.   2.	I have reviewed notes stating pts current admission, consultants and follow ups.   3.	I have reviewed the past medical, family, and surgical history and confirmed with outpatient records    Ms. Slater is a 89y Female with PMHx of asthma, HTN, essential tremor, bronchiectasis, GERD, hypothyroidism, paroxysmal A.fib on Eliquis 2.5mg BID, Vit D deficiency, tuberculous peritonitis, anemia moderate AS, hx of falls, aortic regurgitation who is admitted with pneumonia with CT Chest 04/09/2024 showing secretions within the trachea, left mainstem bronchus, left lower lobe bronchus and bilateral distal small airways with foci of tree-in-bud nodularity in the right lung may reflect impacted distal airways versus infectious/inflammatory small airways disease and is being treated with antibiotics per ID and primary team.     Pt has a history of pancytopenia as outpatient with Leukopenia with since about 3y prior initially lymphopenia and then with neutropenia, progressive, anemia over the past year gradually worsening, with progressive macrocytosis, and thrombocytopenia over the past two years.     # Pancytopenia  - Outpatient labs show gradual progressive leukopenia x 3y, initial lymphopenia now both lymphopenia and neutropenia   -- Most recent outpatient WBC ranging 1.9-4.6   - Thrombocytopenia outpatient ranging 85-100s x 2y  - Anemia ranging 9.5-11 over past year  - MCV with progressive macrocytosis over past year  - Admission labs 04/08/2024 with Hb 11.4, .5, WBC 1.63 with ANC 1090 and , Plt 66  - CBC 04/12/2024 Hb 9.8, WBC 2.02, Plt 69k, normal renal function, bilirubin, coags on Eliquis   - Obtain Ferritin, Iron panel, B12, Folate, Copper   - Obtain SPEP/MOOKIE, Serum immunoglobulins, serum FLCs, LDH, Beta-2 Mcg,   - Pts peripheral labs suspicious for primary hematological disorder, will rule out nutritional deficiencies as above   - Transfuse PRBC to maintain Hb > 7   - Transfuse Plt to maintain Plt > 10k  - Trend CBC daily with Diff for now     # Pneumonia  - CT Chest 04/09/2024 showing secretions within the trachea, left mainstem bronchus, left lower lobe bronchus and bilateral distal small airways with foci of tree-in-bud nodularity in the right lung may reflect impacted distal airways versus infectious/inflammatory small airways disease  - Antibiotics per ID and primary team.     #A.fib  - On Eliquis 2.5mg per cardiology  - Monitor platelet count, noted risk for falls, would have ongoing discussion with cardiology regarding risks vs benefits, no active or past hx of bleeding complications noted at this time     # History of Hypothyroidism  - Obtain TSH, understanding interpretation will be based on age and acute illness, given anemia   - Continue levothyroxine     # Coagulopathy  - Likely due to Eliquis  - Do not need to monitor regularly unless active bleeding noted      Thank you for allowing me to participate in the care of this patient, please do  not hesitate to call or text me if you have further questions or concerns.     Roni Polanco MD  [facility]   Division of Hematology/Oncology  [address] [place] [address], [address]  [place] [address]  P: [phone]  F: [phone]    Attestation:   Total time spent on the encounter: >60 minutes   ----Including face-to-face interaction in addition to chart review, reviewing treatment plan, and managing the patient’s chronic diagnoses as listed in the assessment----     1.	I have reviewed, analyzed and interpreted the following labs: CBC, CMP, Coags imaging:  CT Chest impressions as above.   2.	I have reviewed notes stating pts current admission, consultants and follow ups.   3.	I have reviewed the past medical, family, and surgical history and confirmed with outpatient records

## 2024-04-12 NOTE — DISCHARGE NOTE PROVIDER - HOSPITAL COURSE
Patient is a 89 year old female with PMHx of A-fib on Eliquis, Asthma, Bronchiectasis, Moderate Aortic Stenosis, HTN and Hypothyroid. Presents to [facility] for progressively worsening cough and SOB x2 days.         Acute Asthma Exacerbation vs CAP:  - Leukopenia/bandemia 15% and worsening thrombocytopenia concerning for overwhelming infection  - CT Chest with no focal consolidation or infiltrate; noted with secretions in trachea, L mainstem bronchus, LLL bronchus and b/l distal airways; focal tree in bud nodularity in R lung possible impacted distal airways vs infectious/inflammatory small airways disease   - MRSA swab/Urine legionella negative  - BCx NGTD, UA and UCx noted, no gu sx, likely asx bacteruria, no need to treat per ID   - Completed course of IV abx  - Duoneb and inhaler  - Supplemental O2 PRN  - Monitor temps/WBC  - PT eval -> home PT  - ID/Pulm following    Bronchiectasis.   -history of  -on cefepime:  id following.    SHEILA: Improved  - Baseline 0.6-0.7, now 1  - Monitor I/O's  - Serial Cr  - Avoid nephrotoxins    Anemia:  - Serial cbc's  - Transfuse prn  - Heme following    Afib/HTN/Moderate AS:  - C/w Eliquis and CV meds    Hypothyroidism:  - C/w Synthroid

## 2024-04-12 NOTE — CONSULT NOTE ADULT - SUBJECTIVE AND OBJECTIVE BOX
[facility] HEMATOLOGY/ONCOLOGY CONSULT NOTE     HPI:  Ms. Slater is a 89y Female with PMHx of asthma, HTN, essential tremor, bronchiectasis, GERD, hypothyroidism, paroxysmal A.fib on Eliquis, Vit D deficiency, tuberculous peritonitis, anemia moderate AS, hx of falls, aortic regurgitation who is admitted with pneumonia with CT Chest 04/09/2024 showing secretions within the trachea, left mainstem bronchus, left lower lobe   bronchus and bilateral distal small airways with foci of tree-in-bud nodularity in the right lung may reflect impacted   distal airways versus infectious/inflammatory small airways disease and is being treated with antibiotics per ID and primary team.     Pt has a history of pancytopenia as outpatient with Leukopenia with since about 3y prior initially lymphopenia and then with neutropenia, progressive, anemia over the past year gradually worsening, with progressive macrocytosis, and thrombocytopenia over the past two years.     She denied history of blood disorders, bleeding complications, or personal history of malignancy. She denied smoking, ETOH use. She lives in her apt with a 24hr aide and her daughter visits regularly.       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 reported allergy (Unknown)      PMHx:  Pulmonary TB  Gastritis  Afib  SBO (small bowel obstruction)  Hypothyroid  UTI (urinary tract infection)  HTN (hypertension)  TB (pulmonary tuberculosis)  Orthostatic hypotension  Asthma  Aortic stenosis    SurgHx:   History of Small Bowel Obstruction  History of Bilateral Breast Biopsy  S/P Exploratory Laparotomy  H/O hemorrhoidectomy  S/P vein stripping    SocHx:   Denied smoking, ETOH  Lives at home iw 24hr aide and daughter visits     Meds:   MEDICATIONS  (STANDING):  albuterol/ipratropium for Nebulization 3 milliLiter(s) Nebulizer every 6 hours  aMIOdarone    Tablet 200 milliGRAM(s) Oral <User Schedule>  apixaban 2.5 milliGRAM(s) Oral <User Schedule>  buDESOnide    Inhalation Suspension 0.5 milliGRAM(s) Nebulizer two times a day  cefepime   IVPB 1000 milliGRAM(s) IV Intermittent every 12 hours  guaiFENesin ER 1200 milliGRAM(s) Oral every 12 hours  levothyroxine 100 MICROGram(s) Oral daily  sertraline 50 milliGRAM(s) Oral daily    MEDICATIONS  (PRN):  acetaminophen     Tablet .. 650 milliGRAM(s) Oral every 6 hours PRN Temp greater or equal to 38C (100.4F), Mild Pain (1 - 3)    Vital Signs  T(C): 36.8 (04-12-24 @ 04:42), Max: 37.1 (04-11-24 @ 10:35)  T(F): 98.3 (04-12-24 @ 04:42), Max: 98.7 (04-11-24 @ 10:35)  HR: 78 (04-12-24 @ 04:42) (67 - 78)  BP: 106/68 (04-12-24 @ 04:42) (103/64 - 123/76)  RR: 18 (04-12-24 @ 04:42) (18 - 18)  SpO2: 93% (04-12-24 @ 04:42) (92% - 93%)    Physical Exam:  Gen:  NAD  HEENT: EOMI, MMM  Chest: equal chest rise, speaking full sentences, rhonchus cough   Cardiac: irregular  Abd: soft, non-tender, no hepatomegaly or splenomegaly  Ext: No edema   Neuro: AAOx3, normal mood and affect, hard of hearing   Integument: RUE distal bruising near wrist     Labs:                        9.8    2.02  )-----------( 69       ( 10 Apr 2024 11:08 )             29.9     CBC Full  -  ( 10 Apr 2024 11:08 )  WBC Count : 2.02 K/uL  RBC Count : 2.95 M/uL  Hemoglobin : 9.8 g/dL  Hematocrit : 29.9 %  Platelet Count - Automated : 69 K/uL  Mean Cell Volume : 101.4 fl  Mean Cell Hemoglobin : 33.2 pg  Mean Cell Hemoglobin Concentration : 32.8 gm/dL    04-10    139  |  102  |  14  ----------------------------<  113<H>  3.7   |  26  |  0.69    Ca    8.8      10 Apr 2024 11:08    TPro  6.2  /  Alb  3.3  /  TBili  0.8  /  DBili  x   /  AST  9<L>  /  ALT  6<L>  /  AlkPhos  46  04-10       [facility] HEMATOLOGY/ONCOLOGY CONSULT NOTE     HPI:  Ms. Slater is a 89y Female with PMHx of asthma, HTN, essential tremor, bronchiectasis, GERD, hypothyroidism, paroxysmal A.fib on Eliquis 2.5mg BID, Vit D deficiency, tuberculous peritonitis, anemia moderate AS, hx of falls, aortic regurgitation who is admitted with pneumonia with CT Chest 04/09/2024 showing secretions within the trachea, left mainstem bronchus, left lower lobe bronchus and bilateral distal small airways with foci of tree-in-bud nodularity in the right lung may reflect impacted distal airways versus infectious/inflammatory small airways disease and is being treated with antibiotics per ID and primary team.     Pt has a history of pancytopenia as outpatient with Leukopenia with since about 3y prior initially lymphopenia and then with neutropenia, progressive, anemia over the past year gradually worsening, with progressive macrocytosis, and thrombocytopenia over the past two years.     She denied history of blood disorders, bleeding complications, or personal history of malignancy. She denied smoking, ETOH use. She lives in her apt with a 24hr aide and her daughter visits regularly.       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 reported allergy (Unknown)      PMHx:  Pulmonary TB  Gastritis  Afib  SBO (small bowel obstruction)  Hypothyroid  UTI (urinary tract infection)  HTN (hypertension)  TB (pulmonary tuberculosis)  Orthostatic hypotension  Asthma  Aortic stenosis    SurgHx:   History of Small Bowel Obstruction  History of Bilateral Breast Biopsy  S/P Exploratory Laparotomy  H/O hemorrhoidectomy  S/P vein stripping    SocHx:   Denied smoking, ETOH  Lives at home iw 24hr aide and daughter visits     Meds:   MEDICATIONS  (STANDING):  albuterol/ipratropium for Nebulization 3 milliLiter(s) Nebulizer every 6 hours  aMIOdarone    Tablet 200 milliGRAM(s) Oral <User Schedule>  apixaban 2.5 milliGRAM(s) Oral <User Schedule>  buDESOnide    Inhalation Suspension 0.5 milliGRAM(s) Nebulizer two times a day  cefepime   IVPB 1000 milliGRAM(s) IV Intermittent every 12 hours  guaiFENesin ER 1200 milliGRAM(s) Oral every 12 hours  levothyroxine 100 MICROGram(s) Oral daily  sertraline 50 milliGRAM(s) Oral daily    MEDICATIONS  (PRN):  acetaminophen     Tablet .. 650 milliGRAM(s) Oral every 6 hours PRN Temp greater or equal to 38C (100.4F), Mild Pain (1 - 3)    Vital Signs  T(C): 36.8 (04-12-24 @ 04:42), Max: 37.1 (04-11-24 @ 10:35)  T(F): 98.3 (04-12-24 @ 04:42), Max: 98.7 (04-11-24 @ 10:35)  HR: 78 (04-12-24 @ 04:42) (67 - 78)  BP: 106/68 (04-12-24 @ 04:42) (103/64 - 123/76)  RR: 18 (04-12-24 @ 04:42) (18 - 18)  SpO2: 93% (04-12-24 @ 04:42) (92% - 93%)    Physical Exam:  Gen:  NAD  HEENT: EOMI, MMM  Chest: equal chest rise, speaking full sentences, rhonchus cough   Cardiac: irregular  Abd: soft, non-tender, no hepatomegaly or splenomegaly  Ext: No edema   Neuro: AAOx3, normal mood and affect, hard of hearing   Integument: RUE distal bruising near wrist     Labs:                        9.8    2.02  )-----------( 69       ( 10 Apr 2024 11:08 )             29.9     CBC Full  -  ( 10 Apr 2024 11:08 )  WBC Count : 2.02 K/uL  RBC Count : 2.95 M/uL  Hemoglobin : 9.8 g/dL  Hematocrit : 29.9 %  Platelet Count - Automated : 69 K/uL  Mean Cell Volume : 101.4 fl  Mean Cell Hemoglobin : 33.2 pg  Mean Cell Hemoglobin Concentration : 32.8 gm/dL    04-10    139  |  102  |  14  ----------------------------<  113<H>  3.7   |  26  |  0.69    Ca    8.8      10 Apr 2024 11:08    TPro  6.2  /  Alb  3.3  /  TBili  0.8  /  DBili  x   /  AST  9<L>  /  ALT  6<L>  /  AlkPhos  46  04-10

## 2024-04-13 LAB
B2 MICROGLOB SERPL-MCNC: 2.6 MG/L — HIGH (ref 0.8–2.2)
FERRITIN SERPL-MCNC: 547 NG/ML — HIGH (ref 13–330)
FOLATE SERPL-MCNC: 11.7 NG/ML — SIGNIFICANT CHANGE UP
IRON SATN MFR SERPL: 23 % — SIGNIFICANT CHANGE UP (ref 14–50)
IRON SATN MFR SERPL: 39 UG/DL — SIGNIFICANT CHANGE UP (ref 30–160)
LDH SERPL L TO P-CCNC: 148 U/L — SIGNIFICANT CHANGE UP (ref 50–242)
TIBC SERPL-MCNC: 169 UG/DL — LOW (ref 220–430)
TSH SERPL-MCNC: 1.41 UIU/ML — SIGNIFICANT CHANGE UP (ref 0.27–4.2)
UIBC SERPL-MCNC: 131 UG/DL — SIGNIFICANT CHANGE UP (ref 110–370)
VIT B12 SERPL-MCNC: 249 PG/ML — SIGNIFICANT CHANGE UP (ref 232–1245)

## 2024-04-13 RX ADMIN — CEFEPIME 100 MILLIGRAM(S): 1 INJECTION, POWDER, FOR SOLUTION INTRAMUSCULAR; INTRAVENOUS at 05:09

## 2024-04-13 RX ADMIN — Medication 3 MILLILITER(S): at 11:23

## 2024-04-13 RX ADMIN — Medication 3 MILLILITER(S): at 17:33

## 2024-04-13 RX ADMIN — Medication 100 MICROGRAM(S): at 05:09

## 2024-04-13 RX ADMIN — Medication 3 MILLILITER(S): at 23:06

## 2024-04-13 RX ADMIN — Medication 0.5 MILLIGRAM(S): at 17:33

## 2024-04-13 RX ADMIN — Medication 1200 MILLIGRAM(S): at 05:09

## 2024-04-13 RX ADMIN — SERTRALINE 50 MILLIGRAM(S): 25 TABLET, FILM COATED ORAL at 11:23

## 2024-04-13 RX ADMIN — Medication 1200 MILLIGRAM(S): at 17:33

## 2024-04-13 RX ADMIN — Medication 3 MILLILITER(S): at 05:08

## 2024-04-13 RX ADMIN — APIXABAN 2.5 MILLIGRAM(S): 2.5 TABLET, FILM COATED ORAL at 08:56

## 2024-04-13 RX ADMIN — CEFEPIME 100 MILLIGRAM(S): 1 INJECTION, POWDER, FOR SOLUTION INTRAMUSCULAR; INTRAVENOUS at 17:33

## 2024-04-13 RX ADMIN — Medication 0.5 MILLIGRAM(S): at 06:53

## 2024-04-13 NOTE — PHARMACOTHERAPY INTERVENTION NOTE - COMMENTS
Consistent with ID note, recommended to discontinue the cefepime order (after the evening dose) since today is the last day of therapy.    Jeannette Maldonado, PharmD, BCIDP  Clinical Pharmacy Specialist, Infectious Diseases  Tele-Antimicrobial Stewardship Program (Tele-[facility])  Tele[facility] Phone: [phone]

## 2024-04-13 NOTE — PROGRESS NOTE ADULT - ASSESSMENT
Patient is a 89 year old female with PMHx of A-fib on Eliquis, Asthma, Bronchiectasis, Moderate Aortic Stenosis, HTN and Hypothyroid. Presents to [facility] for progressively worsening cough and SOB x2 days.     PLAN:    # Acute Asthma Exacerbation vs CAP:  - Leukopenia/bandemia 15% and worsening thrombocytopenia concerning for overwhelming infection  - CT Chest with no focal consolidation or infiltrate; noted with secretions in trachea, L mainstem bronchus, LLL bronchus and b/l distal airways; focal tree in bud nodularity in R lung possible impacted distal airways vs infectious/inflammatory small airways disease   - MRSA swab/Urine legionella negative  - BCx NGTD, UA and UCx noted, no gu sx, likely asx bacteruria, no need to treat per ID   - C/w IV abx till 4/13 per ID  - Duoneb and inhaler  - Supplemental O2 PRN  - Monitor temps/WBC  - PT eval -> home PT  - ID/Pulm following    # SHEILA: Improved  - Baseline 0.6-0.7, now 1  - Monitor I/O's  - Serial Cr  - Avoid nephrotoxins    # Anemia:  - Serial cbc's  - Transfuse prn  - Heme following    # Afib/HTN/Moderate AS:  - C/w Eliquis and CV meds    # Hypothyroidism:  - C/w Synthroid     # DVT ppx:  - Eliquis    [facility]  [phone]    Consultation Requested by:    Patient is a 4y8m old  Male who presents with a chief complaint of   HPI:  Romario is a 4 year old male with hx of "fluid in the kidneys" which resolved in infancy presenting with fever for 5 days and diarrhea for 2 days.  According to the patients mother, 5 days prior to presentation patient began having fever tmax 103.8, treated with Tylenol and motrin around the clock.  Patient started having non-bloody diarrhea 2 days prior to presentation with 12 episodes on 5/3 and two episodes in the ED.  Mother states that the patient has been drinking, however has had decreased PO intake and decreased urinary output.  One episode of dysuria which has resolved.  4 days prior to presentation patients PMD prescribed famotidine with little relief of symptoms. Mother states patient had dried crack lips for 4 days and red eyes for 2 days.  Mother denies peeling of hands and feet, denies swelling of extremities and denies rashes.  Mother denies cough, congestion or any other associated symptoms.  Denies covid exposure.  No travel history.      PMHx/BHx: "water in kidneys" - resolved   PSHx: None  Meds: Vitamins  Vaccinations: UTD  PMD: Hang Bennett (04 May 2020 02:47)      REVIEW OF SYSTEMS  All review of systems negative, except for those marked:  General:		[] Abnormal:  	[] Night Sweats		[] Fever		[] Weight Loss  Pulmonary/Cough:	[] Abnormal:  Cardiac/Chest Pain:	[] Abnormal:  Gastrointestinal:	[] Abnormal:  Eyes:			[] Abnormal:  ENT:			[] Abnormal:  Dysuria:		[] Abnormal:  Musculoskeletal	:	[] Abnormal:  Endocrine:		[] Abnormal:  Lymph Nodes:		[] Abnormal:  Headache:		[] Abnormal:  Skin:			[] Abnormal:  Allergy/Immune:	[] Abnormal:  Psychiatric:		[] Abnormal:  [] All other review of systems negative  [] Unable to obtain (explain):    Recent Ill Contacts:	[] No	[] Yes:  Recent Travel History:	[] No	[] Yes:  Recent Animal/Insect Exposure/Tick Bites:	[] No	[] Yes:    Allergies    No Known Allergies    Intolerances      Antimicrobials:      Other Medications:  acetaminophen   Oral Liquid - Peds. 240 milliGRAM(s) Oral every 6 hours PRN  dextrose 5% + sodium chloride 0.9%. - Pediatric 1000 milliLiter(s) IV Continuous <Continuous>  diphenhydrAMINE IV Intermittent - Peds 9 milliGRAM(s) IV Intermittent once  famotidine IV Intermittent - Peds 8.6 milliGRAM(s) IV Intermittent every 12 hours  immune globulin 10% IV Intermittent (GAMMAGARD) - Peds 34.4 Gram(s) IV Intermittent daily  methylPREDNISolone sodium succinate IV Intermittent - Peds 17 milliGRAM(s) IV Intermittent every 12 hours  norepinephrine Infusion - Peds 0.09 MICROgram(s)/kG/Min IV Continuous <Continuous>      FAMILY HISTORY:  No pertinent family history in first degree relatives    PAST MEDICAL & SURGICAL HISTORY:  No pertinent past medical history  No significant past surgical history    SOCIAL HISTORY:    IMMUNIZATIONS  [] Up to Date		[] Not Up to Date:  Recent Immunizations:	[] No	[] Yes:    Daily Height/Length in cm: 96 (04 May 2020 01:53)    Daily Weight in Gm: 84640 (04 May 2020 01:53)  Head Circumference:  Vital Signs Last 24 Hrs  T(C): 36.9 (04 May 2020 08:00), Max: 38.7 (04 May 2020 05:00)  T(F): 98.4 (04 May 2020 08:00), Max: 101.6 (04 May 2020 05:00)  HR: 157 (04 May 2020 09:20) (137 - 159)  BP: 76/41 (04 May 2020 09:20) (68/46 - 94/45)  BP(mean): 50 (04 May 2020 09:20) (42 - 56)  RR: 31 (04 May 2020 09:20) (24 - 37)  SpO2: 100% (04 May 2020 09:20) (97% - 100%)    PHYSICAL EXAM  All physical exam findings normal, except for those marked:  General:	Normal: alert, neither acutely nor chronically ill-appearing, well developed/well   		nourished, no respiratory distress    Eyes		Normal: no conjunctival injection, no discharge, no photophobia, intact   		extraocular movements, sclera not icteric    ENT:		Normal: normal tympanic membranes; external ear normal, nares normal without   		discharge, no pharyngeal erythema or exudates, no oral mucosal lesions, normal   		tongue and lips    Neck		Normal: supple, full range of motion, no nuchal rigidity  	  Lymph Nodes	Normal: normal size and consistency, non-tender    Cardiovascular	Normal: regular rate and variability; Normal S1, S2; No murmur    Respiratory	Normal: no wheezing or crackles, bilateral audible breath sounds, no retractions  	  Abdominal	Normal: soft; non-distended; non-tender; no hepatosplenomegaly or masses  	  		Normal: normal external genitalia, no rash    Extremities	Normal: FROM x4, no cyanosis or edema, symmetric pulses    Skin		Normal: skin intact and not indurated; no rash, no desquamation    Neurologic	Normal: alert, oriented as age-appropriate, affect appropriate; no weakness, no   		facial asymmetry, moves all extremities, normal gait-child older than 18 months    Musculoskeletal		Normal: no joint swelling, erythema, or tenderness; full range of motion   			with no contractures; no muscle tenderness; no clubbing; no cyanosis;    		no edema      Respiratory Support:		[] No	[] Yes:  Vasoactive medication infusion:	[] No	[] Yes:  Venous catheters:		[] No	[] Yes:  Bladder catheter:		[] No	[] Yes:  Other catheters or tubes:	[] No	[] Yes:    Lab Results:                        10.2   7.55  )-----------( 132      ( 03 May 2020 22:15 )             29.0   Ba53.2  N88.2  L9.0   M1.2   E1.1      C-Reactive Protein, Serum: 287.1 mg/L (20 @ 22:15)    Sedimentation Rate, Erythrocyte: 53 mm/hr (20 @ 22:15)        132<L>  |  99  |  19  ----------------------------<  89  3.7   |  17<L>  |  0.49    Ca    8.5      03 May 2020 22:15  Phos  3.4     -  Mg     2.4         TPro  6.2  /  Alb  3.2<L>  /  TBili  1.1  /  DBili  x   /  AST  111<H>  /  ALT  132<H>  /  AlkPhos  293  -        Urinalysis Basic - ( 03 May 2020 22:20 )    Color: DARK YELLOW / Appearance: CLEAR / S.026 / pH: 6.0  Gluc: NEGATIVE / Ketone: TRACE  / Bili: TRACE / Urobili: TRACE   Blood: NEGATIVE / Protein: 50 / Nitrite: NEGATIVE   Leuk Esterase: NEGATIVE / RBC: 3-5 / WBC 3-5   Sq Epi: OCC / Non Sq Epi: x / Bacteria: NEGATIVE        MICROBIOLOGY      CSF:                        RVP  --  --  --  Not Detected  --  Not Detected  Not Detected  --  --  Not Detected  Not Detected  Not Detected  Not Detected  Not Detected  Not Detected  Not Detected  --  --  Not Detected  Not Detected  Not Detected  Not Detected  Not Detected      IMAGING        [] Pathology slides reviewed and/or discussed with pathologist  [] Microbiology findings discussed with microbiologist or slides reviewed  [] Images erviewed with radiologist  [] Case discussed with an attending physician in addition to the patient's primary physician  [] Records, reports from outside [facility] reviewed    [] Patient requires continued monitoring for:  [] Total critical care time spent by attending physician: __ minutes, excluding procedure time. Consultation Requested by:    Patient is a 4y8m old  Male who presents with a chief complaint of   HPI:  Romario is a 4 year old male with hx of "fluid in the kidneys" which resolved in infancy presenting with fever for 5 days and diarrhea for 2 days.  According to the patients mother, 5 days prior to presentation patient began having fever tmax 103.8, treated with Tylenol and motrin around the clock.  Patient started having non-bloody diarrhea 2 days prior to presentation with 12 episodes on 5/3 and two episodes in the ED.  Mother states that the patient has been drinking, however has had decreased PO intake and decreased urinary output.  One episode of dysuria which has resolved.  4 days prior to presentation patients PMD prescribed famotidine with little relief of symptoms. Mother states patient had dried crack lips for 4 days and red eyes for 2 days.  Mother denies peeling of hands and feet, denies swelling of extremities and denies rashes.  Mother denies cough, congestion or any other associated symptoms.  Denies covid exposure.  No travel history.        Spoke to mom and obtained history. As noted above Romario has no significant medical hx. Traveled to [facility] in Feb. Has been home since mid march.   Lives with mom, dad and older sib and MGF. Dad  and has not been working. Has not gone out of home.   Had 1 temp to 102 on  No fever until 4.29. Since then fever with stomach pain. No URI symptoms. Mostly tired and staying in bed. No vomitng.   RAsh 5.1 - on arms. Red eyes 5.2. Also developed diarrhoea 5/3 - 12 times a day. No blood or mucus. Foul smelling.   spoke to PMD on phne and prescribed famotidine on . No improvement.   Seen in [facility] 5/3 and sent to ER. Decreased appetite, able to tolerate small amount of soup.     PMHx/BHx: "water in kidneys" - resolved   PSHx: None  Meds: Vitamins  Vaccinations: UTD  PMD: Hang Bennett (04 May 2020 02:47)      REVIEW OF SYSTEMS  All review of systems negative, except for those marked:  General:		[] Abnormal:  	[] Night Sweats		[x] Fever		[] Weight Loss  Pulmonary/Cough:	[] Abnormal:  Cardiac/Chest Pain:	[] Abnormal:  Gastrointestinal:	[] Abnormal:  Eyes:			[x] Abnormal:  ENT:			[] Abnormal:  Dysuria:		[] Abnormal:  Musculoskeletal	:	[x] Abnormal: R knee pain  Endocrine:		[] Abnormal:  Lymph Nodes:		[] Abnormal:  Headache:		[x] Abnormal:  Skin:			[x] Abnormal:  Allergy/Immune:	[] Abnormal:  Psychiatric:		[] Abnormal:  [] All other review of systems negative  [] Unable to obtain (explain):    Recent Ill Contacts:	[x] No	[] Yes:  Recent Travel History:	[x] No	[] Yes:  Recent Animal/Insect Exposure/Tick Bites:	[x] No	[] Yes:    Allergies    No Known Allergies    Intolerances      Antimicrobials:      Other Medications:  acetaminophen   Oral Liquid - Peds. 240 milliGRAM(s) Oral every 6 hours PRN  dextrose 5% + sodium chloride 0.9%. - Pediatric 1000 milliLiter(s) IV Continuous <Continuous>  diphenhydrAMINE IV Intermittent - Peds 9 milliGRAM(s) IV Intermittent once  famotidine IV Intermittent - Peds 8.6 milliGRAM(s) IV Intermittent every 12 hours  immune globulin 10% IV Intermittent (GAMMAGARD) - Peds 34.4 Gram(s) IV Intermittent daily  methylPREDNISolone sodium succinate IV Intermittent - Peds 17 milliGRAM(s) IV Intermittent every 12 hours  norepinephrine Infusion - Peds 0.09 MICROgram(s)/kG/Min IV Continuous <Continuous>      FAMILY HISTORY:  No pertinent family history in first degree relatives    PAST MEDICAL & SURGICAL HISTORY:  No pertinent past medical history  No significant past surgical history    SOCIAL HISTORY:    IMMUNIZATIONS  [] Up to Date		[] Not Up to Date:  Recent Immunizations:	[] No	[] Yes:    Daily Height/Length in cm: 96 (04 May 2020 01:53)    Daily Weight in Gm: 67535 (04 May 2020 01:53)  Head Circumference:  Vital Signs Last 24 Hrs  T(C): 36.9 (04 May 2020 08:00), Max: 38.7 (04 May 2020 05:00)  T(F): 98.4 (04 May 2020 08:00), Max: 101.6 (04 May 2020 05:00)  HR: 157 (04 May 2020 09:20) (137 - 159)  BP: 76/41 (04 May 2020 09:20) (68/46 - 94/45)  BP(mean): 50 (04 May 2020 09:20) (42 - 56)  RR: 31 (04 May 2020 09:20) (24 - 37)  SpO2: 100% (04 May 2020 09:20) (97% - 100%)    PHYSICAL EXAM  All physical exam findings normal, except for those marked:  General:	Ill appearing. Watching TV    Eyes		bilateral conjunctival injection with yenyn limbic sparing    ENT:		Normal: normal tympanic membranes; external ear normal, nares normal without   		discharge, no pharyngeal erythema or exudates, no oral mucosal lesions, normal   		tongue and lips    Neck		Normal: supple, full range of motion, no nuchal rigidity  	  Lymph Nodes	Normal: normal size and consistency, non-tender    Cardiovascular	Normal: regular rate and variability; Normal S1, S2; No murmur    Respiratory	Normal: no wheezing or crackles, bilateral audible breath sounds, no retractions  	  Abdominal	soft; non-distended; tender; no hepatosplenomegaly or masses  	  		Normal: normal external genitalia, no rash    Extremities	Normal: FROM x4, no cyanosis or edema, symmetric pulses    Skin		Faint erythematous rash on Left arm. 2 discoid, flat, macular lesions about 2 cm     Neurologic	Normal: alert, oriented as age-appropriate, affect appropriate; no weakness, no   		facial asymmetry, moves all extremities, normal gait-child older than 18 months    Musculoskeletal		Normal: no joint swelling, erythema, or tenderness; full range of motion   			with no contractures; no muscle tenderness; no clubbing; no cyanosis;    		no edema      Respiratory Support:		[x] No	[] Yes:  Vasoactive medication infusion:	[] No	[x] Yes: Nor epi  Venous catheters:		[] No	[x] Yes:  Bladder catheter:		[x] No	[] Yes:  Other catheters or tubes:	[x] No	[] Yes:    Lab Results:                        10.2   7.55  )-----------( 132      ( 03 May 2020 22:15 )             29.0   Ba53.2  N88.2  L9.0   M1.2   E1.1      C-Reactive Protein, Serum: 287.1 mg/L (20 @ 22:15)    Sedimentation Rate, Erythrocyte: 53 mm/hr (20 @ 22:15)        132<L>  |  99  |  19  ----------------------------<  89  3.7   |  17<L>  |  0.49    Ca    8.5      03 May 2020 22:15  Phos  3.4     05-03  Mg     2.4     05-03    TPro  6.2  /  Alb  3.2<L>  /  TBili  1.1  /  DBili  x   /  AST  111<H>  /  ALT  132<H>  /  AlkPhos  293  05-03        Urinalysis Basic - ( 03 May 2020 22:20 )    Color: DARK YELLOW / Appearance: CLEAR / S.026 / pH: 6.0  Gluc: NEGATIVE / Ketone: TRACE  / Bili: TRACE / Urobili: TRACE   Blood: NEGATIVE / Protein: 50 / Nitrite: NEGATIVE   Leuk Esterase: NEGATIVE / RBC: 3-5 / WBC 3-5   Sq Epi: OCC / Non Sq Epi: x / Bacteria: NEGATIVE        MICROBIOLOGY      CSF:                        RVP  --  --  --  Not Detected  --  Not Detected  Not Detected  --  --  Not Detected  Not Detected  Not Detected  Not Detected  Not Detected  Not Detected  Not Detected  --  --  Not Detected  Not Detected  Not Detected  Not Detected  Not Detected      IMAGING        [] Pathology slides reviewed and/or discussed with pathologist  [] Microbiology findings discussed with microbiologist or slides reviewed  [] Images erviewed with radiologist  [] Case discussed with an attending physician in addition to the patient's primary physician  [] Records, reports from outside [facility] reviewed    [] Patient requires continued monitoring for:  [] Total critical care time spent by attending physician: __ minutes, excluding procedure time.

## 2024-04-13 NOTE — PROGRESS NOTE ADULT - ASSESSMENT
Ms. Slater is a 89y Female with PMHx of asthma, HTN, essential tremor, bronchiectasis, GERD, hypothyroidism, paroxysmal A.fib on Eliquis 2.5mg BID, Vit D deficiency, tuberculous peritonitis, anemia moderate AS, hx of falls, aortic regurgitation who is admitted with pneumonia with CT Chest 04/09/2024 showing secretions within the trachea, left mainstem bronchus, left lower lobe bronchus and bilateral distal small airways with foci of tree-in-bud nodularity in the right lung may reflect impacted distal airways versus infectious/inflammatory small airways disease and is being treated with antibiotics per ID and primary team.     Pt has a history of pancytopenia as outpatient with Leukopenia with since about 3y prior initially lymphopenia and then with neutropenia, progressive, anemia over the past year gradually worsening, with progressive macrocytosis, and thrombocytopenia over the past two years.     # Pancytopenia  - Outpatient labs show gradual progressive leukopenia x 3y, initial lymphopenia now both lymphopenia and neutropenia   -- Most recent outpatient WBC ranging 1.9-4.6   - Thrombocytopenia outpatient ranging 85-100s x 2y  - Anemia ranging 9.5-11 over past year  - MCV with progressive macrocytosis over past year  - Admission labs 04/08/2024 with Hb 11.4, .5, WBC 1.63 with ANC 1090 and , Plt 66  - CBC 04/12/2024 Hb 9.8, WBC 2.02, Plt 69k, normal renal function, bilirubin, coags on Eliquis   - Obtain Ferritin, Iron panel, B12, Folate, Copper   - Obtain SPEP/MOOKIE, Serum immunoglobulins, serum FLCs, LDH, Beta-2 Mcg,   - Pts peripheral labs suspicious for primary hematological disorder, will rule out nutritional deficiencies as above   - Transfuse PRBC to maintain Hb > 7   - Transfuse Plt to maintain Plt > 10k  - Trend CBC daily with Diff for now     # Pneumonia  - CT Chest 04/09/2024 showing secretions within the trachea, left mainstem bronchus, left lower lobe bronchus and bilateral distal small airways with foci of tree-in-bud nodularity in the right lung may reflect impacted distal airways versus infectious/inflammatory small airways disease  - Antibiotics per ID and primary team.     #A.fib  - On Eliquis 2.5mg per cardiology  - Monitor platelet count, noted risk for falls, would have ongoing discussion with cardiology regarding risks vs benefits, no active or past hx of bleeding complications noted at this time     # History of Hypothyroidism  - Obtain TSH, understanding interpretation will be based on age and acute illness, given anemia   - Continue levothyroxine     # Coagulopathy  - Likely due to Eliquis  - Do not need to monitor regularly unless active bleeding noted      Thank you for allowing me to participate in the care of this patient, please do  not hesitate to call or text me if you have further questions or concerns.     Roni Polanco MD  [facility] [place]   Division of Hematology/Oncology  [address] [place] [address], [address]  [place] [address]  P: [phone]  F: [phone]    Attestation:   Total time spent on the encounter: >60 minutes   ----Including face-to-face interaction in addition to chart review, reviewing treatment plan, and managing the patient’s chronic diagnoses as listed in the assessment----     1.	I have reviewed, analyzed and interpreted the following labs: CBC, CMP, Coags imaging:  CT Chest impressions as above.   2.	I have reviewed notes stating pts current admission, consultants and follow ups.   3.	I have reviewed the past medical, family, and surgical history and confirmed with outpatient records    Ms. Slater is a 89y Female with PMHx of asthma, HTN, essential tremor, bronchiectasis, GERD, hypothyroidism, paroxysmal A.fib on Eliquis 2.5mg BID, Vit D deficiency, tuberculous peritonitis, anemia moderate AS, hx of falls, aortic regurgitation who is admitted with pneumonia with CT Chest 04/09/2024 showing secretions within the trachea, left mainstem bronchus, left lower lobe bronchus and bilateral distal small airways with foci of tree-in-bud nodularity in the right lung may reflect impacted distal airways versus infectious/inflammatory small airways disease and is being treated with antibiotics per ID and primary team.     Pt has a history of pancytopenia as outpatient with Leukopenia with since about 3y prior initially lymphopenia and then with neutropenia, progressive, anemia over the past year gradually worsening, with progressive macrocytosis, and thrombocytopenia over the past two years. Discussed pancytopenia, progressively worsening over past two years, pt aware stated she would like to await prelim testing as ordered and discuss next steps with her daughter     # Pancytopenia  - Outpatient labs show gradual progressive leukopenia x 3y, initial lymphopenia now both lymphopenia and neutropenia   -- Most recent outpatient WBC ranging 1.9-4.6   - Thrombocytopenia outpatient ranging 85-100s x 2y  - Anemia ranging 9.5-11 over past year  - MCV with progressive macrocytosis over past year  - Admission labs 04/08/2024 with Hb 11.4, .5, WBC 1.63 with ANC 1090 and , Plt 66  - CBC 04/12/2024 Hb 9.8, WBC 2.02, Plt 69k, normal renal function, bilirubin, coags on Eliquis   - Obtain Ferritin, Iron panel, B12, Folate, Copper   - Obtain SPEP/MOOKIE, Serum immunoglobulins, serum FLCs, LDH, Beta-2 Mcg,   - Pts peripheral labs suspicious for primary hematological disorder, will rule out nutritional deficiencies as above   - Transfuse PRBC to maintain Hb > 7   - Transfuse Plt to maintain Plt > 10k  - Trend CBC daily with Diff for now   - Discussed pancytopenia, progressively worsening over past two years, pt aware stated she would like to await prelim testing as ordered and discuss next steps with her daughter     # Pneumonia  - CT Chest 04/09/2024 showing secretions within the trachea, left mainstem bronchus, left lower lobe bronchus and bilateral distal small airways with foci of tree-in-bud nodularity in the right lung may reflect impacted distal airways versus infectious/inflammatory small airways disease  - Antibiotics per ID and primary team.     #A.fib  - On Eliquis 2.5mg per cardiology  - Monitor platelet count, noted risk for falls, would have ongoing discussion with cardiology regarding risks vs benefits, no active or past hx of bleeding complications noted at this time     # History of Hypothyroidism  - Obtain TSH, understanding interpretation will be based on age and acute illness, given anemia   - Continue levothyroxine     # Coagulopathy  - Likely due to Eliquis  - Do not need to monitor regularly unless active bleeding noted      Thank you for allowing me to participate in the care of Ms. Slater, please do not hesitate to call or text me if you have further questions or concerns.     Roni Polanco MD  [facility] [place]   Division of Hematology/Oncology  [address] [place] [address], [address]  [place] [address]  P: [phone]  F: [phone]    Attestation:    ---- Pt evaluated including face-to-face interaction in addition to chart review, reviewing treatment plan, and managing the patient’s chronic diagnoses as listed in the assessment----

## 2024-04-13 NOTE — PROGRESS NOTE ADULT - PROBLEM SELECTOR PLAN 1
CT chest with tracheal & bronchial secretions, R sided TIB opacities likely distal impacted airways  -Pt with hx of bronchiectasis, favor treating for PNA given secretions on exam & low grade temps  -RVP negative  -Urine legionella negative, Azithromycin d/c'd  -ABX per ID  -Duoneb q6h, can likely stop on discharge   -Mucinex 1200 mg PO BID  -Normoxic, no c/o dyspnea at this time. Keep sats >90% with O2 PRN  -seems to be doing great : off oxygen : no sob:
CT chest with tracheal & bronchial secretions, R sided TIB opacities likely distal impacted airways  -Pt with hx of bronchiectasis, favor treating for PNA given secretions on exam & low grade temps  -RVP negative  -Urine legionella negative, Azithromycin d/c'd  -ABX per ID  -Duoneb q6h, can likely stop on discharge   -Mucinex 1200 mg PO BID  -Normoxic, no c/o dyspnea at this time. Keep sats >90% with O2 PRN
CT chest with tracheal & bronchial secretions, R sided TIB opacities likely distal impacted airways  -Pt with hx of bronchiectasis, favor treating for PNA given secretions on exam & low grade temps  -RVP negative  -Urine legionella negative, Azithromycin d/c'd  -ABX per ID  -Duoneb q6h  -Mucinex 1200 mg PO ID  -Normoxic, no c/o dyspnea at this time. Keep sats >90% with O2 PRN

## 2024-04-13 NOTE — PROGRESS NOTE ADULT - PROBLEM SELECTOR PLAN 3
-By hx  -Does not appear exacerbated, no wheezing on exam  -Continue bronchodilators.

## 2024-04-13 NOTE — PROGRESS NOTE ADULT - PROBLEM/PLAN-4
DISPLAY PLAN FREE TEXT
Winlevi Pregnancy And Lactation Text: This medication is considered safe during pregnancy and breastfeeding.

## 2024-04-13 NOTE — PROGRESS NOTE ADULT - SUBJECTIVE AND OBJECTIVE BOX
Date of Service: 04-13-24 @ 13:51    Patient is a 89y old  Female who presents with a chief complaint of cough and SOB (13 Apr 2024 06:23)      Any change in ROS: seems OK:    no sob:  no cough : on room air     MEDICATIONS  (STANDING):  albuterol/ipratropium for Nebulization 3 milliLiter(s) Nebulizer every 6 hours  aMIOdarone    Tablet 200 milliGRAM(s) Oral <User Schedule>  apixaban 2.5 milliGRAM(s) Oral <User Schedule>  buDESOnide    Inhalation Suspension 0.5 milliGRAM(s) Nebulizer two times a day  cefepime   IVPB 1000 milliGRAM(s) IV Intermittent every 12 hours  guaiFENesin ER 1200 milliGRAM(s) Oral every 12 hours  levothyroxine 100 MICROGram(s) Oral daily  sertraline 50 milliGRAM(s) Oral daily    MEDICATIONS  (PRN):  acetaminophen     Tablet .. 650 milliGRAM(s) Oral every 6 hours PRN Temp greater or equal to 38C (100.4F), Mild Pain (1 - 3)    Vital Signs Last 24 Hrs  T(C): 37 (13 Apr 2024 11:09), Max: 37.3 (13 Apr 2024 04:47)  T(F): 98.6 (13 Apr 2024 11:09), Max: 99.1 (13 Apr 2024 04:47)  HR: 68 (13 Apr 2024 11:09) (67 - 68)  BP: 111/68 (13 Apr 2024 11:09) (111/68 - 125/73)  BP(mean): --  RR: 18 (13 Apr 2024 11:09) (18 - 18)  SpO2: 93% (13 Apr 2024 11:09) (92% - 93%)    Parameters below as of 13 Apr 2024 11:09  Patient On (Oxygen Delivery Method): room air        I&O's Summary    12 Apr 2024 07:01  -  13 Apr 2024 07:00  --------------------------------------------------------  IN: 0 mL / OUT: 300 mL / NET: -300 mL    13 Apr 2024 07:01  -  13 Apr 2024 13:51  --------------------------------------------------------  IN: 240 mL / OUT: 0 mL / NET: 240 mL          Physical Exam:   GENERAL: NAD, well-groomed, well-developed  HEENT: KOLBY/   Atraumatic, Normocephalic  ENMT: No tonsillar erythema, exudates, or enlargement; Moist mucous membranes, Good dentition, No lesions  NECK: Supple, No JVD, Normal thyroid  CHEST/LUNG: Clear to auscultaion  CVS: Regular rate and rhythm; No murmurs, rubs, or gallops  GI: : Soft, Nontender, Nondistended; Bowel sounds present  NERVOUS SYSTEM:  Alert & awake and is on room air  EXTREMITIES:  2+ Peripheral Pulses, No clubbing, cyanosis, or edema  LYMPH: No lymphadenopathy noted  SKIN: No rashes or lesions  ENDOCRINOLOGY: No Thyromegaly  PSYCH: Appropriate    Labs:  31                            9.6    1.88  )-----------( 59       ( 12 Apr 2024 07:11 )             29.8                         9.8    2.02  )-----------( 69       ( 10 Apr 2024 11:08 )             29.9     04-12    140  |  103  |  13  ----------------------------<  104<H>  3.5   |  25  |  0.62  04-10    139  |  102  |  14  ----------------------------<  113<H>  3.7   |  26  |  0.69    Ca    9.0      12 Apr 2024 07:11  Mg     2.2     04-12    TPro  6.2  /  Alb  3.3  /  TBili  0.8  /  DBili  x   /  AST  9<L>  /  ALT  6<L>  /  AlkPhos  46  04-10    CAPILLARY BLOOD GLUCOSE              Urinalysis Basic - ( 12 Apr 2024 07:11 )    Color: x / Appearance: x / SG: x / pH: x  Gluc: 104 mg/dL / Ketone: x  / Bili: x / Urobili: x   Blood: x / Protein: x / Nitrite: x   Leuk Esterase: x / RBC: x / WBC x   Sq Epi: x / Non Sq Epi: x / Bacteria: x            RECENT CULTURES:  04-09 @ 05:03 Clean Catch Clean Catch (Midstream)   RAFIA      Escherichia coli  Enterococcus faecalis  Escherichia coli     >100,000 CFU/ml Escherichia coli  50,000 - 99,000 CFU/mL Enterococcus faecalis    04-08 @ 21:25 .Blood Blood-Peripheral                No growth at 4 days    04-08 @ 21:15 .Blood Blood-Peripheral                No growth at 4 days          RESPIRATORY CULTURES:        rad< from: Xray Chest 1 View AP/PA (04.09.24 @ 01:12) >    PROCEDURE DATE:  04/09/2024          INTERPRETATION:  EXAMINATION: XR CHEST    CLINICAL INDICATION: Shortness of breath, concerning for pneumonia    TECHNIQUE: Single frontal, portable view of the chest was obtained.    COMPARISON: Chest x-ray 12/30/2023.    FINDINGS:  The heart size is not accurately assessed in this projection.  There is minimal patchy opacity at the left lung base. Bronchiectasis is   noted in the left upper lung.  No pneumothorax or pleural effusion.  No acute osseous abnormalities.    IMPRESSION:  Patchy opacity in the left lung base which may represent atelectasis or   pneumonia.  Bronchiectasis in the left upper lung.    --- End of Report ---           MARGE GONZALEZ MD; Resident Radiologist  This document has been electronically signed.  DESHAUN BARRON MD; Attending Radiologist  This document has been electronically signed. Apr 9 2024 10:07AM    < end of copied text >  < from: CT Chest No Cont (04.09.24 @ 00:18) >  COMPARISON: CT chest 10/16/2023.    CONTRAST/COMPLICATIONS:  IV Contrast: None.  Oral Contrast: None.  Complications: None reported.    PROCEDURE:  CT of the Chest was performed.  Sagittal and coronal reformats were performed.    FINDINGS:    LUNGS AND AIRWAYS: Trace linear secretions in the trachea. Secretions   within the leftmainstem bronchus, left lower lobe atelectasis and   bilateral small airways, most pronounced in the left lower lobe.   Redemonstrated left upper lobe and superior left lower lobe   bronchiectasis and foci of calcification.  Tree-in-bud opacities in the   right lung, most pronounced in the upper lobe. Left lower lobe   subsegmental atelectasis.  PLEURA: No pleural effusion.  MEDIASTINUM AND TUNG: No lymphadenopathy.  VESSELS: Aortic and coronary artery calcifications. Dilated ascending   aorta and aortic arch up to 4.3 cm, stable. Descending thoracic aorta,   measuring up to 3.9 cm, unchanged.  HEART: Heart size is normal. No pericardial effusion.  CHEST WALL AND LOWER NECK: Within normal limits.  VISUALIZED UPPER ABDOMEN: Within normal limits.  BONES: Degenerative changes.    IMPRESSION:  Secretions within the trachea, left mainstem bronchus, left lower lobe   bronchus and bilateral distal small airways.    Foci of tree-in-bud nodularity in the right lung may reflect impacted   distal airways versus infectious/inflammatory small airways disease.    --- End of Report ---           MARGE GONZALEZ MD; Resident Radiologist  This document has been electronically signed.  WINSTON DEGROOT MD; Attending Radiologist  This document has been electronically signed. Apr 9 2024  1:25AM    < end of copied text >    Studies  Chest X-RAY  CT SCAN Chest   Venous Dopplers: LE:   CT Abdomen  Others

## 2024-04-13 NOTE — PROGRESS NOTE ADULT - SUBJECTIVE AND OBJECTIVE BOX
SUBJECTIVE / OVERNIGHT EVENTS:     No events noted overnight.         --------------------------------------------------------------------------------------------  LABS:                        9.6    1.88  )-----------( 59       ( 12 Apr 2024 07:11 )             29.8     04-12    140  |  103  |  13  ----------------------------<  104<H>  3.5   |  25  |  0.62    Ca    9.0      12 Apr 2024 07:11  Mg     2.2     04-12        CAPILLARY BLOOD GLUCOSE            Urinalysis Basic - ( 12 Apr 2024 07:11 )    Color: x / Appearance: x / SG: x / pH: x  Gluc: 104 mg/dL / Ketone: x  / Bili: x / Urobili: x   Blood: x / Protein: x / Nitrite: x   Leuk Esterase: x / RBC: x / WBC x   Sq Epi: x / Non Sq Epi: x / Bacteria: x        RADIOLOGY & ADDITIONAL TESTS:     Imaging Personally Reviewed:  [x] YES  [ ] NO    Consultant(s) Notes Reviewed:  [x] YES  [ ] NO    MEDICATIONS  (STANDING):  albuterol/ipratropium for Nebulization 3 milliLiter(s) Nebulizer every 6 hours  aMIOdarone    Tablet 200 milliGRAM(s) Oral <User Schedule>  apixaban 2.5 milliGRAM(s) Oral <User Schedule>  buDESOnide    Inhalation Suspension 0.5 milliGRAM(s) Nebulizer two times a day  cefepime   IVPB 1000 milliGRAM(s) IV Intermittent every 12 hours  guaiFENesin ER 1200 milliGRAM(s) Oral every 12 hours  levothyroxine 100 MICROGram(s) Oral daily  sertraline 50 milliGRAM(s) Oral daily    MEDICATIONS  (PRN):  acetaminophen     Tablet .. 650 milliGRAM(s) Oral every 6 hours PRN Temp greater or equal to 38C (100.4F), Mild Pain (1 - 3)      Care Discussed with Consultants/Other Providers [x] YES  [ ] NO    Vital Signs Last 24 Hrs  T(C): 37.3 (13 Apr 2024 04:47), Max: 37.3 (13 Apr 2024 04:47)  T(F): 99.1 (13 Apr 2024 04:47), Max: 99.1 (13 Apr 2024 04:47)  HR: 67 (13 Apr 2024 04:47) (65 - 73)  BP: 122/65 (13 Apr 2024 04:47) (96/58 - 125/73)  BP(mean): --  RR: 18 (13 Apr 2024 04:47) (18 - 18)  SpO2: 92% (13 Apr 2024 04:47) (92% - 94%)    Parameters below as of 13 Apr 2024 04:47  Patient On (Oxygen Delivery Method): room air      I&O's Summary    11 Apr 2024 07:01  -  12 Apr 2024 07:00  --------------------------------------------------------  IN: 0 mL / OUT: 200 mL / NET: -200 mL    12 Apr 2024 07:01  -  13 Apr 2024 06:24  --------------------------------------------------------  IN: 0 mL / OUT: 200 mL / NET: -200 mL    PHYSICAL EXAM:  GENERAL: NAD, well-developed, comfortable, [place]  HEAD:  Atraumatic, Normocephalic  EYES: EOMI, PERRLA, conjunctiva and sclera clear  NECK: Supple, No JVD  CHEST/LUNG: Decreased BS; + slight wheeze  HEART: Regular rate and rhythm; No murmurs, rubs, or gallops  ABDOMEN: Soft, Nontender, Nondistended; Bowel sounds present  NEURO: AAOx3, no focal weakness, 5/5 b/l extremity strength, b/l knee no arthritis, no effusion   EXTREMITIES:  2+ Peripheral Pulses, No clubbing, cyanosis, or edema  SKIN: Ecchymotic              - - -

## 2024-04-13 NOTE — PROGRESS NOTE ADULT - SUBJECTIVE AND OBJECTIVE BOX
[facility] HEMATOLOGY/ONCOLOGY INPATIENT PROGRESS NOTE     Interval Hx:   04-13-24: Ms. Slater was seen at bedside today.    Meds:   MEDICATIONS  (STANDING):  albuterol/ipratropium for Nebulization 3 milliLiter(s) Nebulizer every 6 hours  aMIOdarone    Tablet 200 milliGRAM(s) Oral <User Schedule>  apixaban 2.5 milliGRAM(s) Oral <User Schedule>  buDESOnide    Inhalation Suspension 0.5 milliGRAM(s) Nebulizer two times a day  cefepime   IVPB 1000 milliGRAM(s) IV Intermittent every 12 hours  guaiFENesin ER 1200 milliGRAM(s) Oral every 12 hours  levothyroxine 100 MICROGram(s) Oral daily  sertraline 50 milliGRAM(s) Oral daily    MEDICATIONS  (PRN):  acetaminophen     Tablet .. 650 milliGRAM(s) Oral every 6 hours PRN Temp greater or equal to 38C (100.4F), Mild Pain (1 - 3)    Vital Signs Last 24 Hrs  T(C): 37.3 (13 Apr 2024 04:47), Max: 37.3 (13 Apr 2024 04:47)  T(F): 99.1 (13 Apr 2024 04:47), Max: 99.1 (13 Apr 2024 04:47)  HR: 67 (13 Apr 2024 04:47) (65 - 73)  BP: 122/65 (13 Apr 2024 04:47) (96/58 - 125/73)  BP(mean): --  RR: 18 (13 Apr 2024 04:47) (18 - 18)  SpO2: 92% (13 Apr 2024 04:47) (92% - 94%)    Parameters below as of 13 Apr 2024 04:47  Patient On (Oxygen Delivery Method): room air    Physical Exam:  Gen:  NAD  HEENT: EOMI, MMM  Chest: equal chest rise, speaking full sentences, rhonchus cough   Cardiac: irregular  Abd: soft, non-tender, no hepatomegaly or splenomegaly  Ext: No edema   Neuro: AAOx3, normal mood and affect, hard of hearing   Integument: RUE distal bruising near wrist    Labs:                        9.6    1.88  )-----------( 59       ( 12 Apr 2024 07:11 )             29.8     CBC Full  -  ( 12 Apr 2024 07:11 )  WBC Count : 1.88 K/uL  RBC Count : 2.94 M/uL  Hemoglobin : 9.6 g/dL  Hematocrit : 29.8 %  Platelet Count - Automated : 59 K/uL  Mean Cell Volume : 101.4 fl  Mean Cell Hemoglobin : 32.7 pg  Mean Cell Hemoglobin Concentration : 32.2 gm/dL  Auto Neutrophil # : 1.53 K/uL  Auto Lymphocyte # : 0.12 K/uL  Auto Monocyte # : 0.12 K/uL  Auto Eosinophil # : 0.08 K/uL  Auto Basophil # : 0.00 K/uL  Auto Neutrophil % : 77.0 %  Auto Lymphocyte % : 6.2 %  Auto Monocyte % : 6.2 %  Auto Eosinophil % : 4.4 %  Auto Basophil % : 0.0 %    04-12    140  |  103  |  13  ----------------------------<  104<H>  3.5   |  25  |  0.62    Ca    9.0      12 Apr 2024 07:11  Mg     2.2     04-12         [facility] HEMATOLOGY/ONCOLOGY INPATIENT PROGRESS NOTE     Interval Hx:   04-13-24: Ms. Slater was seen at bedside today, discussed pancytopenia, progressively worsening over past two years, pt aware stated she would like to await prelim testing as ordered and discuss next steps with her daughter     Meds:   MEDICATIONS  (STANDING):  albuterol/ipratropium for Nebulization 3 milliLiter(s) Nebulizer every 6 hours  aMIOdarone    Tablet 200 milliGRAM(s) Oral <User Schedule>  apixaban 2.5 milliGRAM(s) Oral <User Schedule>  buDESOnide    Inhalation Suspension 0.5 milliGRAM(s) Nebulizer two times a day  cefepime   IVPB 1000 milliGRAM(s) IV Intermittent every 12 hours  guaiFENesin ER 1200 milliGRAM(s) Oral every 12 hours  levothyroxine 100 MICROGram(s) Oral daily  sertraline 50 milliGRAM(s) Oral daily    MEDICATIONS  (PRN):  acetaminophen     Tablet .. 650 milliGRAM(s) Oral every 6 hours PRN Temp greater or equal to 38C (100.4F), Mild Pain (1 - 3)    Vital Signs Last 24 Hrs  T(C): 37.3 (13 Apr 2024 04:47), Max: 37.3 (13 Apr 2024 04:47)  T(F): 99.1 (13 Apr 2024 04:47), Max: 99.1 (13 Apr 2024 04:47)  HR: 67 (13 Apr 2024 04:47) (65 - 73)  BP: 122/65 (13 Apr 2024 04:47) (96/58 - 125/73)  BP(mean): --  RR: 18 (13 Apr 2024 04:47) (18 - 18)  SpO2: 92% (13 Apr 2024 04:47) (92% - 94%)    Parameters below as of 13 Apr 2024 04:47  Patient On (Oxygen Delivery Method): room air    Physical Exam:  Gen:  NAD  HEENT: EOMI, MMM  Chest: equal chest rise, speaking full sentences, rhonchus cough   Cardiac: irregular  Abd: soft, non-tender, no hepatomegaly or splenomegaly  Ext: No edema   Neuro: AAOx3, normal mood and affect, hard of hearing   Integument: RUE distal bruising near wrist    Labs:                        9.6    1.88  )-----------( 59       ( 12 Apr 2024 07:11 )             29.8     CBC Full  -  ( 12 Apr 2024 07:11 )  WBC Count : 1.88 K/uL  RBC Count : 2.94 M/uL  Hemoglobin : 9.6 g/dL  Hematocrit : 29.8 %  Platelet Count - Automated : 59 K/uL  Mean Cell Volume : 101.4 fl  Mean Cell Hemoglobin : 32.7 pg  Mean Cell Hemoglobin Concentration : 32.2 gm/dL  Auto Neutrophil # : 1.53 K/uL  Auto Lymphocyte # : 0.12 K/uL  Auto Monocyte # : 0.12 K/uL  Auto Eosinophil # : 0.08 K/uL  Auto Basophil # : 0.00 K/uL  Auto Neutrophil % : 77.0 %  Auto Lymphocyte % : 6.2 %  Auto Monocyte % : 6.2 %  Auto Eosinophil % : 4.4 %  Auto Basophil % : 0.0 %    04-12    140  |  103  |  13  ----------------------------<  104<H>  3.5   |  25  |  0.62    Ca    9.0      12 Apr 2024 07:11  Mg     2.2     04-12

## 2024-04-14 VITALS
DIASTOLIC BLOOD PRESSURE: 61 MMHG | SYSTOLIC BLOOD PRESSURE: 101 MMHG | TEMPERATURE: 98 F | HEART RATE: 61 BPM | OXYGEN SATURATION: 93 % | RESPIRATION RATE: 18 BRPM

## 2024-04-14 LAB
CULTURE RESULTS: SIGNIFICANT CHANGE UP
CULTURE RESULTS: SIGNIFICANT CHANGE UP
PROT SERPL-MCNC: 5.9 G/DL — LOW (ref 6–8.3)
SPECIMEN SOURCE: SIGNIFICANT CHANGE UP
SPECIMEN SOURCE: SIGNIFICANT CHANGE UP

## 2024-04-14 PROCEDURE — 82746 ASSAY OF FOLIC ACID SERUM: CPT

## 2024-04-14 PROCEDURE — 87449 NOS EACH ORGANISM AG IA: CPT

## 2024-04-14 PROCEDURE — 86334 IMMUNOFIX E-PHORESIS SERUM: CPT

## 2024-04-14 PROCEDURE — 83540 ASSAY OF IRON: CPT

## 2024-04-14 PROCEDURE — 94640 AIRWAY INHALATION TREATMENT: CPT

## 2024-04-14 PROCEDURE — 84443 ASSAY THYROID STIM HORMONE: CPT

## 2024-04-14 PROCEDURE — 99285 EMERGENCY DEPT VISIT HI MDM: CPT

## 2024-04-14 PROCEDURE — 82803 BLOOD GASES ANY COMBINATION: CPT

## 2024-04-14 PROCEDURE — 87641 MR-STAPH DNA AMP PROBE: CPT

## 2024-04-14 PROCEDURE — 87086 URINE CULTURE/COLONY COUNT: CPT

## 2024-04-14 PROCEDURE — 71045 X-RAY EXAM CHEST 1 VIEW: CPT

## 2024-04-14 PROCEDURE — 87186 SC STD MICRODIL/AGAR DIL: CPT

## 2024-04-14 PROCEDURE — 83550 IRON BINDING TEST: CPT

## 2024-04-14 PROCEDURE — 85610 PROTHROMBIN TIME: CPT

## 2024-04-14 PROCEDURE — 83605 ASSAY OF LACTIC ACID: CPT

## 2024-04-14 PROCEDURE — 85014 HEMATOCRIT: CPT

## 2024-04-14 PROCEDURE — 82947 ASSAY GLUCOSE BLOOD QUANT: CPT

## 2024-04-14 PROCEDURE — 96375 TX/PRO/DX INJ NEW DRUG ADDON: CPT

## 2024-04-14 PROCEDURE — 83615 LACTATE (LD) (LDH) ENZYME: CPT

## 2024-04-14 PROCEDURE — 82435 ASSAY OF BLOOD CHLORIDE: CPT

## 2024-04-14 PROCEDURE — 71250 CT THORAX DX C-: CPT | Mod: MC

## 2024-04-14 PROCEDURE — 87637 SARSCOV2&INF A&B&RSV AMP PRB: CPT

## 2024-04-14 PROCEDURE — 83735 ASSAY OF MAGNESIUM: CPT

## 2024-04-14 PROCEDURE — 84295 ASSAY OF SERUM SODIUM: CPT

## 2024-04-14 PROCEDURE — 84165 PROTEIN E-PHORESIS SERUM: CPT

## 2024-04-14 PROCEDURE — 83880 ASSAY OF NATRIURETIC PEPTIDE: CPT

## 2024-04-14 PROCEDURE — 36415 COLL VENOUS BLD VENIPUNCTURE: CPT

## 2024-04-14 PROCEDURE — 82728 ASSAY OF FERRITIN: CPT

## 2024-04-14 PROCEDURE — 82232 ASSAY OF BETA-2 PROTEIN: CPT

## 2024-04-14 PROCEDURE — 81001 URINALYSIS AUTO W/SCOPE: CPT

## 2024-04-14 PROCEDURE — 87040 BLOOD CULTURE FOR BACTERIA: CPT

## 2024-04-14 PROCEDURE — 82330 ASSAY OF CALCIUM: CPT

## 2024-04-14 PROCEDURE — 93005 ELECTROCARDIOGRAM TRACING: CPT

## 2024-04-14 PROCEDURE — 97161 PT EVAL LOW COMPLEX 20 MIN: CPT

## 2024-04-14 PROCEDURE — 85018 HEMOGLOBIN: CPT

## 2024-04-14 PROCEDURE — 96374 THER/PROPH/DIAG INJ IV PUSH: CPT

## 2024-04-14 PROCEDURE — 87640 STAPH A DNA AMP PROBE: CPT

## 2024-04-14 PROCEDURE — 82525 ASSAY OF COPPER: CPT

## 2024-04-14 PROCEDURE — 82607 VITAMIN B-12: CPT

## 2024-04-14 PROCEDURE — 80048 BASIC METABOLIC PNL TOTAL CA: CPT

## 2024-04-14 PROCEDURE — 85025 COMPLETE CBC W/AUTO DIFF WBC: CPT

## 2024-04-14 PROCEDURE — 97116 GAIT TRAINING THERAPY: CPT

## 2024-04-14 PROCEDURE — 86738 MYCOPLASMA ANTIBODY: CPT

## 2024-04-14 PROCEDURE — 80053 COMPREHEN METABOLIC PANEL: CPT

## 2024-04-14 PROCEDURE — 84484 ASSAY OF TROPONIN QUANT: CPT

## 2024-04-14 PROCEDURE — 84155 ASSAY OF PROTEIN SERUM: CPT

## 2024-04-14 PROCEDURE — 82784 ASSAY IGA/IGD/IGG/IGM EACH: CPT

## 2024-04-14 PROCEDURE — 97530 THERAPEUTIC ACTIVITIES: CPT

## 2024-04-14 PROCEDURE — 84132 ASSAY OF SERUM POTASSIUM: CPT

## 2024-04-14 PROCEDURE — 85027 COMPLETE CBC AUTOMATED: CPT

## 2024-04-14 PROCEDURE — 87077 CULTURE AEROBIC IDENTIFY: CPT

## 2024-04-14 PROCEDURE — 85730 THROMBOPLASTIN TIME PARTIAL: CPT

## 2024-04-14 RX ORDER — PREGABALIN 225 MG/1
1000 CAPSULE ORAL ONCE
Refills: 0 | Status: DISCONTINUED | OUTPATIENT
Start: 2024-04-14 | End: 2024-04-14

## 2024-04-14 RX ADMIN — Medication 1200 MILLIGRAM(S): at 06:17

## 2024-04-14 RX ADMIN — SERTRALINE 50 MILLIGRAM(S): 25 TABLET, FILM COATED ORAL at 11:32

## 2024-04-14 RX ADMIN — APIXABAN 2.5 MILLIGRAM(S): 2.5 TABLET, FILM COATED ORAL at 08:19

## 2024-04-14 RX ADMIN — Medication 3 MILLILITER(S): at 06:17

## 2024-04-14 RX ADMIN — Medication 100 MICROGRAM(S): at 06:17

## 2024-04-14 RX ADMIN — Medication 0.5 MILLIGRAM(S): at 06:17

## 2024-04-14 RX ADMIN — Medication 3 MILLILITER(S): at 11:32

## 2024-04-14 NOTE — PROGRESS NOTE ADULT - REASON FOR ADMISSION
cough and SOB

## 2024-04-14 NOTE — PROGRESS NOTE ADULT - PROVIDER SPECIALTY LIST ADULT
Heme/Onc
Infectious Disease
Internal Medicine
Pulmonology
Pulmonology
Heme/Onc
Infectious Disease
Internal Medicine
Infectious Disease
Internal Medicine
Pulmonology
Pulmonology

## 2024-04-14 NOTE — PROGRESS NOTE ADULT - SUBJECTIVE AND OBJECTIVE BOX
SUBJECTIVE / OVERNIGHT EVENTS:    No events noted overnight.         --------------------------------------------------------------------------------------------  LABS:                        9.6    1.88  )-----------( 59       ( 12 Apr 2024 07:11 )             29.8     04-12    140  |  103  |  13  ----------------------------<  104<H>  3.5   |  25  |  0.62    Ca    9.0      12 Apr 2024 07:11  Mg     2.2     04-12        CAPILLARY BLOOD GLUCOSE            Urinalysis Basic - ( 12 Apr 2024 07:11 )    Color: x / Appearance: x / SG: x / pH: x  Gluc: 104 mg/dL / Ketone: x  / Bili: x / Urobili: x   Blood: x / Protein: x / Nitrite: x   Leuk Esterase: x / RBC: x / WBC x   Sq Epi: x / Non Sq Epi: x / Bacteria: x        RADIOLOGY & ADDITIONAL TESTS:     Imaging Personally Reviewed:  [x] YES  [ ] NO    Consultant(s) Notes Reviewed:  [x] YES  [ ] NO    MEDICATIONS  (STANDING):  albuterol/ipratropium for Nebulization 3 milliLiter(s) Nebulizer every 6 hours  aMIOdarone    Tablet 200 milliGRAM(s) Oral <User Schedule>  apixaban 2.5 milliGRAM(s) Oral <User Schedule>  buDESOnide    Inhalation Suspension 0.5 milliGRAM(s) Nebulizer two times a day  guaiFENesin ER 1200 milliGRAM(s) Oral every 12 hours  levothyroxine 100 MICROGram(s) Oral daily  sertraline 50 milliGRAM(s) Oral daily    MEDICATIONS  (PRN):  acetaminophen     Tablet .. 650 milliGRAM(s) Oral every 6 hours PRN Temp greater or equal to 38C (100.4F), Mild Pain (1 - 3)      Care Discussed with Consultants/Other Providers [x] YES  [ ] NO    Vital Signs Last 24 Hrs  T(C): 37.2 (14 Apr 2024 04:11), Max: 37.2 (14 Apr 2024 04:11)  T(F): 98.9 (14 Apr 2024 04:11), Max: 98.9 (14 Apr 2024 04:11)  HR: 65 (14 Apr 2024 04:11) (65 - 70)  BP: 127/75 (14 Apr 2024 04:11) (111/68 - 127/75)  BP(mean): --  RR: 18 (14 Apr 2024 04:11) (18 - 18)  SpO2: 93% (14 Apr 2024 04:11) (92% - 93%)    Parameters below as of 14 Apr 2024 04:11  Patient On (Oxygen Delivery Method): room air      I&O's Summary    12 Apr 2024 07:01  -  13 Apr 2024 07:00  --------------------------------------------------------  IN: 0 mL / OUT: 300 mL / NET: -300 mL    13 Apr 2024 07:01  -  14 Apr 2024 06:37  --------------------------------------------------------  IN: 600 mL / OUT: 0 mL / NET: 600 mL    PHYSICAL EXAM:  GENERAL: NAD, well-developed, comfortable, [place]  HEAD:  Atraumatic, Normocephalic  EYES: EOMI, PERRLA, conjunctiva and sclera clear  NECK: Supple, No JVD  CHEST/LUNG: Decreased BS; + slight wheeze  HEART: Regular rate and rhythm; No murmurs, rubs, or gallops  ABDOMEN: Soft, Nontender, Nondistended; Bowel sounds present  NEURO: AAOx3, no focal weakness, 5/5 b/l extremity strength, b/l knee no arthritis, no effusion   EXTREMITIES:  2+ Peripheral Pulses, No clubbing, cyanosis, or edema  SKIN: Ecchymotic

## 2024-04-14 NOTE — PROGRESS NOTE ADULT - ASSESSMENT
Ms. Slater is a 89y Female with PMHx of asthma, HTN, essential tremor, bronchiectasis, GERD, hypothyroidism, paroxysmal A.fib on Eliquis 2.5mg BID, Vit D deficiency, tuberculous peritonitis, anemia moderate AS, hx of falls, aortic regurgitation who is admitted with pneumonia with CT Chest 04/09/2024 showing secretions within the trachea, left mainstem bronchus, left lower lobe bronchus and bilateral distal small airways with foci of tree-in-bud nodularity in the right lung may reflect impacted distal airways versus infectious/inflammatory small airways disease and is being treated with antibiotics per ID and primary team.     Pt has a history of pancytopenia as outpatient with Leukopenia with since about 3y prior initially lymphopenia and then with neutropenia, progressive, anemia over the past year gradually worsening, with progressive macrocytosis, and thrombocytopenia over the past two years. Discussed pancytopenia, progressively worsening over past two years, pt aware stated she would like to await prelim testing as ordered and discuss next steps with her daughter     # Pancytopenia  - Outpatient labs show gradual progressive leukopenia x 3y, initial lymphopenia now both lymphopenia and neutropenia   -- Most recent outpatient WBC ranging 1.9-4.6   - Thrombocytopenia outpatient ranging 85-100s x 2y  - Anemia ranging 9.5-11 over past year  - MCV with progressive macrocytosis over past year  - Admission labs 04/08/2024 with Hb 11.4, .5, WBC 1.63 with ANC 1090 and , Plt 66  - CBC 04/12/2024 Hb 9.8, WBC 2.02, Plt 69k, normal renal function, bilirubin, coags on Eliquis   - Obtain Ferritin, Iron panel, B12, Folate, Copper   - Obtain SPEP/MOOKIE, Serum immunoglobulins, serum FLCs, LDH, Beta-2 Mcg,   - Pts peripheral labs suspicious for primary hematological disorder, will rule out nutritional deficiencies as above   - Transfuse PRBC to maintain Hb > 7   - Transfuse Plt to maintain Plt > 10k  - Trend CBC daily with Diff for now   - Discussed pancytopenia, progressively worsening over past two years, pt aware stated she would like to await prelim testing as ordered and discuss next steps with her daughter     # Pneumonia  - CT Chest 04/09/2024 showing secretions within the trachea, left mainstem bronchus, left lower lobe bronchus and bilateral distal small airways with foci of tree-in-bud nodularity in the right lung may reflect impacted distal airways versus infectious/inflammatory small airways disease  - Antibiotics per ID and primary team.     #A.fib  - On Eliquis 2.5mg per cardiology  - Monitor platelet count, noted risk for falls, would have ongoing discussion with cardiology regarding risks vs benefits, no active or past hx of bleeding complications noted at this time     # History of Hypothyroidism  - Obtain TSH, understanding interpretation will be based on age and acute illness, given anemia   - Continue levothyroxine     # Coagulopathy  - Likely due to Eliquis  - Do not need to monitor regularly unless active bleeding noted      Thank you for allowing me to participate in the care of Ms. Slater, please do not hesitate to call or text me if you have further questions or concerns.     Roni Polanco MD  [facility]   Division of Hematology/Oncology  [address] [place] [address], [address]  [place] [address]  P: [phone]  F: [phone]    Attestation:    ---- Pt evaluated including face-to-face interaction in addition to chart review, reviewing treatment plan, and managing the patient’s chronic diagnoses as listed in the assessment----   Ms. Slater is a 89y Female with PMHx of asthma, HTN, essential tremor, bronchiectasis, GERD, hypothyroidism, paroxysmal A.fib on Eliquis 2.5mg BID, Vit D deficiency, tuberculous peritonitis, anemia moderate AS, hx of falls, aortic regurgitation who is admitted with pneumonia with CT Chest 04/09/2024 showing secretions within the trachea, left mainstem bronchus, left lower lobe bronchus and bilateral distal small airways with foci of tree-in-bud nodularity in the right lung may reflect impacted distal airways versus infectious/inflammatory small airways disease and is being treated with antibiotics per ID and primary team.     Pt has a history of pancytopenia as outpatient with Leukopenia with since about 3y prior initially lymphopenia and then with neutropenia, progressive, anemia over the past year gradually worsening, with progressive macrocytosis, and thrombocytopenia over the past two years. Discussed pancytopenia, progressively worsening over past two years, pt aware stated she would like to await prelim testing as ordered and discuss next steps with her daughter     # Pancytopenia  # Anemia of chronic disease   - Outpatient labs show gradual progressive leukopenia x 3y, initial lymphopenia now both lymphopenia and neutropenia   -- Most recent outpatient WBC ranging 1.9-4.6   - Thrombocytopenia outpatient ranging 85-100s x 2y  - Anemia ranging 9.5-11 over past year  - MCV with progressive macrocytosis over past year  - Admission labs 04/08/2024 with Hb 11.4, .5, WBC 1.63 with ANC 1090 and , Plt 66  - CBC 04/12/2024 Hb 9.8, WBC 2.02, Plt 69k, normal renal function, bilirubin, coags on Eliquis   - Ferritin 547, Iron panel sat 23%, B12low normal 249, Folate 11.7 normal  - f/u Copper   - f/u SPEP/MOOKIE, Serum immunoglobulins, serum FLCs, LDH (normal), Beta-2 Mcg (mild elv 2.6),   - Pts peripheral labs suspicious for primary hematological disorder, will rule out nutritional deficiencies as above   - Transfuse PRBC to maintain Hb > 7   - Transfuse Plt to maintain Plt > 10k  - Trend CBC daily with Diff for now   - Discussed pancytopenia, progressively worsening over past two years, pt aware stated she would like to await prelim testing as ordered and discuss next steps with her daughter     # Pneumonia  - CT Chest 04/09/2024 showing secretions within the trachea, left mainstem bronchus, left lower lobe bronchus and bilateral distal small airways with foci of tree-in-bud nodularity in the right lung may reflect impacted distal airways versus infectious/inflammatory small airways disease  - Antibiotics per ID and primary team.     #A.fib  - On Eliquis 2.5mg per cardiology  - Monitor platelet count, noted risk for falls, would have ongoing discussion with cardiology regarding risks vs benefits, no active or past hx of bleeding complications noted at this time     # History of Hypothyroidism  - Obtain TSH, understanding interpretation will be based on age and acute illness, given anemia   - Continue levothyroxine     # Coagulopathy  - Likely due to Eliquis  - Do not need to monitor regularly unless active bleeding noted    # Vit B12 deficiency  - Start Vit B12 1000mcg IM     Thank you for allowing me to participate in the care of Ms. Slater, please do not hesitate to call or text me if you have further questions or concerns.     Roni Polanco MD  [facility] [place]   Division of Hematology/Oncology  [address] [place] [address], [address]  [place] [address]  P: [phone]  F: [phone]    Attestation:    ---- Pt evaluated including face-to-face interaction in addition to chart review, reviewing treatment plan, and managing the patient’s chronic diagnoses as listed in the assessment----

## 2024-04-14 NOTE — PROGRESS NOTE ADULT - SUBJECTIVE AND OBJECTIVE BOX
[facility] HEMATOLOGY/ONCOLOGY INPATIENT PROGRESS NOTE     Interval Hx:   04-14-24: Ms. Slater was seen at bedside today.    Meds:   MEDICATIONS  (STANDING):  albuterol/ipratropium for Nebulization 3 milliLiter(s) Nebulizer every 6 hours  aMIOdarone    Tablet 200 milliGRAM(s) Oral <User Schedule>  apixaban 2.5 milliGRAM(s) Oral <User Schedule>  buDESOnide    Inhalation Suspension 0.5 milliGRAM(s) Nebulizer two times a day  guaiFENesin ER 1200 milliGRAM(s) Oral every 12 hours  levothyroxine 100 MICROGram(s) Oral daily  sertraline 50 milliGRAM(s) Oral daily    MEDICATIONS  (PRN):  acetaminophen     Tablet .. 650 milliGRAM(s) Oral every 6 hours PRN Temp greater or equal to 38C (100.4F), Mild Pain (1 - 3)    Vital Signs Last 24 Hrs  T(C): 37.2 (14 Apr 2024 04:11), Max: 37.2 (14 Apr 2024 04:11)  T(F): 98.9 (14 Apr 2024 04:11), Max: 98.9 (14 Apr 2024 04:11)  HR: 65 (14 Apr 2024 04:11) (65 - 70)  BP: 127/75 (14 Apr 2024 04:11) (111/68 - 127/75)  BP(mean): --  RR: 18 (14 Apr 2024 04:11) (18 - 18)  SpO2: 93% (14 Apr 2024 04:11) (92% - 93%)    Parameters below as of 14 Apr 2024 04:11  Patient On (Oxygen Delivery Method): room air    Physical Exam:  Gen:  NAD  HEENT: EOMI, MMM  Chest: equal chest rise, speaking full sentences, rhonchus cough   Cardiac: irregular  Abd: soft, non-tender, no hepatomegaly or splenomegaly  Ext: No edema   Neuro: AAOx3, normal mood and affect, hard of hearing   Integument: RUE distal bruising near wrist    Labs:                        9.6    1.88  )-----------( 59       ( 12 Apr 2024 07:11 )             29.8     CBC Full  -  ( 12 Apr 2024 07:11 )  WBC Count : 1.88 K/uL  RBC Count : 2.94 M/uL  Hemoglobin : 9.6 g/dL  Hematocrit : 29.8 %  Platelet Count - Automated : 59 K/uL  Mean Cell Volume : 101.4 fl  Mean Cell Hemoglobin : 32.7 pg  Mean Cell Hemoglobin Concentration : 32.2 gm/dL  Auto Neutrophil # : 1.53 K/uL  Auto Lymphocyte # : 0.12 K/uL  Auto Monocyte # : 0.12 K/uL  Auto Eosinophil # : 0.08 K/uL  Auto Basophil # : 0.00 K/uL  Auto Neutrophil % : 77.0 %  Auto Lymphocyte % : 6.2 %  Auto Monocyte % : 6.2 %  Auto Eosinophil % : 4.4 %  Auto Basophil % : 0.0 %    04-12    140  |  103  |  13  ----------------------------<  104<H>  3.5   |  25  |  0.62    Ca    9.0      12 Apr 2024 07:11  Mg     2.2     04-12        Ferritin: 547 ng/mL (04-13-24 @ 06:50)  Vitamin B12, Serum: 249 pg/mL (04-13-24 @ 06:50)  Folate, Serum: 11.7 ng/mL (04-13-24 @ 06:50)   [facility] HEMATOLOGY/ONCOLOGY INPATIENT PROGRESS NOTE     Interval Hx:   04-14-24: Ms. Slater was seen at bedside today, awake and alert, again discussed counts, she is aware, no signs of bleeding     Meds:   MEDICATIONS  (STANDING):  albuterol/ipratropium for Nebulization 3 milliLiter(s) Nebulizer every 6 hours  aMIOdarone    Tablet 200 milliGRAM(s) Oral <User Schedule>  apixaban 2.5 milliGRAM(s) Oral <User Schedule>  buDESOnide    Inhalation Suspension 0.5 milliGRAM(s) Nebulizer two times a day  guaiFENesin ER 1200 milliGRAM(s) Oral every 12 hours  levothyroxine 100 MICROGram(s) Oral daily  sertraline 50 milliGRAM(s) Oral daily    MEDICATIONS  (PRN):  acetaminophen     Tablet .. 650 milliGRAM(s) Oral every 6 hours PRN Temp greater or equal to 38C (100.4F), Mild Pain (1 - 3)    Vital Signs Last 24 Hrs  T(C): 37.2 (14 Apr 2024 04:11), Max: 37.2 (14 Apr 2024 04:11)  T(F): 98.9 (14 Apr 2024 04:11), Max: 98.9 (14 Apr 2024 04:11)  HR: 65 (14 Apr 2024 04:11) (65 - 70)  BP: 127/75 (14 Apr 2024 04:11) (111/68 - 127/75)  BP(mean): --  RR: 18 (14 Apr 2024 04:11) (18 - 18)  SpO2: 93% (14 Apr 2024 04:11) (92% - 93%)    Parameters below as of 14 Apr 2024 04:11  Patient On (Oxygen Delivery Method): room air    Physical Exam:  Gen:  NAD  HEENT: EOMI, MMM  Chest: equal chest rise, speaking full sentences, rhonchus cough   Cardiac: irregular  Abd: soft, non-tender, no hepatomegaly or splenomegaly  Ext: No edema   Neuro: AAOx3, normal mood and affect, hard of hearing   Integument: RUE distal bruising near wrist    Labs:                        9.6    1.88  )-----------( 59       ( 12 Apr 2024 07:11 )             29.8     CBC Full  -  ( 12 Apr 2024 07:11 )  WBC Count : 1.88 K/uL  RBC Count : 2.94 M/uL  Hemoglobin : 9.6 g/dL  Hematocrit : 29.8 %  Platelet Count - Automated : 59 K/uL  Mean Cell Volume : 101.4 fl  Mean Cell Hemoglobin : 32.7 pg  Mean Cell Hemoglobin Concentration : 32.2 gm/dL  Auto Neutrophil # : 1.53 K/uL  Auto Lymphocyte # : 0.12 K/uL  Auto Monocyte # : 0.12 K/uL  Auto Eosinophil # : 0.08 K/uL  Auto Basophil # : 0.00 K/uL  Auto Neutrophil % : 77.0 %  Auto Lymphocyte % : 6.2 %  Auto Monocyte % : 6.2 %  Auto Eosinophil % : 4.4 %  Auto Basophil % : 0.0 %    04-12    140  |  103  |  13  ----------------------------<  104<H>  3.5   |  25  |  0.62    Ca    9.0      12 Apr 2024 07:11  Mg     2.2     04-12        Ferritin: 547 ng/mL (04-13-24 @ 06:50)  Vitamin B12, Serum: 249 pg/mL (04-13-24 @ 06:50)  Folate, Serum: 11.7 ng/mL (04-13-24 @ 06:50)

## 2024-04-15 LAB
IGA FLD-MCNC: 147 MG/DL — SIGNIFICANT CHANGE UP (ref 84–499)
IGG FLD-MCNC: 690 MG/DL — SIGNIFICANT CHANGE UP (ref 610–1660)
IGM SERPL-MCNC: 82 MG/DL — SIGNIFICANT CHANGE UP (ref 35–242)
KAPPA LC SER QL IFE: 2.74 MG/DL — HIGH (ref 0.33–1.94)
KAPPA/LAMBDA FREE LIGHT CHAIN RATIO, SERUM: 1.44 RATIO — SIGNIFICANT CHANGE UP (ref 0.26–1.65)
LAMBDA LC SER QL IFE: 1.9 MG/DL — SIGNIFICANT CHANGE UP (ref 0.57–2.63)

## 2024-04-16 LAB
% ALBUMIN: 54.2 % — SIGNIFICANT CHANGE UP
% ALPHA 1: 7.6 % — SIGNIFICANT CHANGE UP
% ALPHA 2: 14.4 % — SIGNIFICANT CHANGE UP
% BETA: 11.6 % — SIGNIFICANT CHANGE UP
% GAMMA: 12.2 % — SIGNIFICANT CHANGE UP
ALBUMIN SERPL ELPH-MCNC: 3.2 G/DL — LOW (ref 3.6–5.5)
ALBUMIN/GLOB SERPL ELPH: 1.2 RATIO — SIGNIFICANT CHANGE UP
ALPHA1 GLOB SERPL ELPH-MCNC: 0.4 G/DL — SIGNIFICANT CHANGE UP (ref 0.1–0.4)
ALPHA2 GLOB SERPL ELPH-MCNC: 0.8 G/DL — SIGNIFICANT CHANGE UP (ref 0.5–1)
B-GLOBULIN SERPL ELPH-MCNC: 0.7 G/DL — SIGNIFICANT CHANGE UP (ref 0.5–1)
COPPER SERPL-MCNC: 108 UG/DL — SIGNIFICANT CHANGE UP (ref 80–158)
GAMMA GLOBULIN: 0.7 G/DL — SIGNIFICANT CHANGE UP (ref 0.6–1.6)
INTERPRETATION SERPL IFE-IMP: SIGNIFICANT CHANGE UP
M PNEUMO IGM SER-ACNC: 0.63 INDEX — SIGNIFICANT CHANGE UP (ref 0–0.9)
MYCOPLASMA AG SPEC QL: NEGATIVE — SIGNIFICANT CHANGE UP
PROT PATTERN SERPL ELPH-IMP: SIGNIFICANT CHANGE UP
PROT SERPL-MCNC: 5.9 G/DL — LOW (ref 6–8.3)

## 2024-05-08 ENCOUNTER — INPATIENT (INPATIENT)
Facility: HOSPITAL | Age: 89
LOS: 2 days | Discharge: HOME HEALTH | DRG: 195 | End: 2024-05-11
Attending: STUDENT IN AN ORGANIZED HEALTH CARE EDUCATION/TRAINING PROGRAM | Admitting: STUDENT IN AN ORGANIZED HEALTH CARE EDUCATION/TRAINING PROGRAM
Payer: MEDICARE

## 2024-05-08 VITALS
DIASTOLIC BLOOD PRESSURE: 76 MMHG | HEIGHT: 65 IN | SYSTOLIC BLOOD PRESSURE: 116 MMHG | HEART RATE: 67 BPM | WEIGHT: 149.91 LBS | RESPIRATION RATE: 19 BRPM | TEMPERATURE: 98 F | OXYGEN SATURATION: 98 %

## 2024-05-08 DIAGNOSIS — Z86.59 PERSONAL HISTORY OF OTHER MENTAL AND BEHAVIORAL DISORDERS: ICD-10-CM

## 2024-05-08 DIAGNOSIS — Z71.89 OTHER SPECIFIED COUNSELING: ICD-10-CM

## 2024-05-08 DIAGNOSIS — I48.0 PAROXYSMAL ATRIAL FIBRILLATION: ICD-10-CM

## 2024-05-08 DIAGNOSIS — Z98.89 OTHER SPECIFIED POSTPROCEDURAL STATES: Chronic | ICD-10-CM

## 2024-05-08 DIAGNOSIS — E03.9 HYPOTHYROIDISM, UNSPECIFIED: ICD-10-CM

## 2024-05-08 DIAGNOSIS — J18.9 PNEUMONIA, UNSPECIFIED ORGANISM: ICD-10-CM

## 2024-05-08 DIAGNOSIS — I35.0 NONRHEUMATIC AORTIC (VALVE) STENOSIS: ICD-10-CM

## 2024-05-08 DIAGNOSIS — Z75.8 OTHER PROBLEMS RELATED TO MEDICAL FACILITIES AND OTHER HEALTH CARE: ICD-10-CM

## 2024-05-08 LAB
ALBUMIN SERPL ELPH-MCNC: 3.8 G/DL — SIGNIFICANT CHANGE UP (ref 3.3–5)
ALP SERPL-CCNC: 68 U/L — SIGNIFICANT CHANGE UP (ref 40–120)
ALT FLD-CCNC: 11 U/L — SIGNIFICANT CHANGE UP (ref 10–45)
ANION GAP SERPL CALC-SCNC: 10 MMOL/L — SIGNIFICANT CHANGE UP (ref 5–17)
ANISOCYTOSIS BLD QL: SLIGHT — SIGNIFICANT CHANGE UP
APPEARANCE UR: ABNORMAL
AST SERPL-CCNC: 15 U/L — SIGNIFICANT CHANGE UP (ref 10–40)
BACTERIA # UR AUTO: ABNORMAL /HPF
BASE EXCESS BLDV CALC-SCNC: 3.7 MMOL/L — HIGH (ref -2–3)
BASOPHILS # BLD AUTO: 0.05 K/UL — SIGNIFICANT CHANGE UP (ref 0–0.2)
BASOPHILS NFR BLD AUTO: 1.8 % — SIGNIFICANT CHANGE UP (ref 0–2)
BILIRUB SERPL-MCNC: 0.5 MG/DL — SIGNIFICANT CHANGE UP (ref 0.2–1.2)
BILIRUB UR-MCNC: NEGATIVE — SIGNIFICANT CHANGE UP
BUN SERPL-MCNC: 15 MG/DL — SIGNIFICANT CHANGE UP (ref 7–23)
CA-I SERPL-SCNC: 1.24 MMOL/L — SIGNIFICANT CHANGE UP (ref 1.15–1.33)
CALCIUM SERPL-MCNC: 9 MG/DL — SIGNIFICANT CHANGE UP (ref 8.4–10.5)
CAST: 1 /LPF — SIGNIFICANT CHANGE UP (ref 0–4)
CHLORIDE BLDV-SCNC: 106 MMOL/L — SIGNIFICANT CHANGE UP (ref 96–108)
CHLORIDE SERPL-SCNC: 104 MMOL/L — SIGNIFICANT CHANGE UP (ref 96–108)
CO2 BLDV-SCNC: 31 MMOL/L — HIGH (ref 22–26)
CO2 SERPL-SCNC: 25 MMOL/L — SIGNIFICANT CHANGE UP (ref 22–31)
COLOR SPEC: SIGNIFICANT CHANGE UP
CREAT SERPL-MCNC: 0.71 MG/DL — SIGNIFICANT CHANGE UP (ref 0.5–1.3)
DACRYOCYTES BLD QL SMEAR: SLIGHT — SIGNIFICANT CHANGE UP
DIFF PNL FLD: ABNORMAL
EGFR: 76 ML/MIN/1.73M2 — SIGNIFICANT CHANGE UP
EOSINOPHIL # BLD AUTO: 0.05 K/UL — SIGNIFICANT CHANGE UP (ref 0–0.5)
EOSINOPHIL NFR BLD AUTO: 1.8 % — SIGNIFICANT CHANGE UP (ref 0–6)
GAS PNL BLDV: 136 MMOL/L — SIGNIFICANT CHANGE UP (ref 136–145)
GAS PNL BLDV: SIGNIFICANT CHANGE UP
GAS PNL BLDV: SIGNIFICANT CHANGE UP
GLUCOSE BLDV-MCNC: 109 MG/DL — HIGH (ref 70–99)
GLUCOSE SERPL-MCNC: 114 MG/DL — HIGH (ref 70–99)
GLUCOSE UR QL: NEGATIVE MG/DL — SIGNIFICANT CHANGE UP
HCO3 BLDV-SCNC: 30 MMOL/L — HIGH (ref 22–29)
HCT VFR BLD CALC: 32.2 % — LOW (ref 34.5–45)
HCT VFR BLDA CALC: 32 % — LOW (ref 34.5–46.5)
HGB BLD CALC-MCNC: 10.7 G/DL — LOW (ref 11.7–16.1)
HGB BLD-MCNC: 10.3 G/DL — LOW (ref 11.5–15.5)
KETONES UR-MCNC: NEGATIVE MG/DL — SIGNIFICANT CHANGE UP
LACTATE BLDV-MCNC: 1.7 MMOL/L — SIGNIFICANT CHANGE UP (ref 0.5–2)
LEUKOCYTE ESTERASE UR-ACNC: ABNORMAL
LYMPHOCYTES # BLD AUTO: 0.45 K/UL — LOW (ref 1–3.3)
LYMPHOCYTES # BLD AUTO: 17.8 % — SIGNIFICANT CHANGE UP (ref 13–44)
MANUAL SMEAR VERIFICATION: SIGNIFICANT CHANGE UP
MCHC RBC-ENTMCNC: 32 GM/DL — SIGNIFICANT CHANGE UP (ref 32–36)
MCHC RBC-ENTMCNC: 32.4 PG — SIGNIFICANT CHANGE UP (ref 27–34)
MCV RBC AUTO: 101.3 FL — HIGH (ref 80–100)
MONOCYTES # BLD AUTO: 0.05 K/UL — SIGNIFICANT CHANGE UP (ref 0–0.9)
MONOCYTES NFR BLD AUTO: 1.8 % — LOW (ref 2–14)
NEUTROPHILS # BLD AUTO: 1.94 K/UL — SIGNIFICANT CHANGE UP (ref 1.8–7.4)
NEUTROPHILS NFR BLD AUTO: 76.8 % — SIGNIFICANT CHANGE UP (ref 43–77)
NITRITE UR-MCNC: NEGATIVE — SIGNIFICANT CHANGE UP
OVALOCYTES BLD QL SMEAR: SIGNIFICANT CHANGE UP
PCO2 BLDV: 50 MMHG — HIGH (ref 39–42)
PH BLDV: 7.38 — SIGNIFICANT CHANGE UP (ref 7.32–7.43)
PH UR: 6 — SIGNIFICANT CHANGE UP (ref 5–8)
PLAT MORPH BLD: NORMAL — SIGNIFICANT CHANGE UP
PLATELET # BLD AUTO: 80 K/UL — LOW (ref 150–400)
PO2 BLDV: 45 MMHG — SIGNIFICANT CHANGE UP (ref 25–45)
POIKILOCYTOSIS BLD QL AUTO: SLIGHT — SIGNIFICANT CHANGE UP
POLYCHROMASIA BLD QL SMEAR: SLIGHT — SIGNIFICANT CHANGE UP
POTASSIUM BLDV-SCNC: 4.4 MMOL/L — SIGNIFICANT CHANGE UP (ref 3.5–5.1)
POTASSIUM SERPL-MCNC: 4.2 MMOL/L — SIGNIFICANT CHANGE UP (ref 3.5–5.3)
POTASSIUM SERPL-SCNC: 4.2 MMOL/L — SIGNIFICANT CHANGE UP (ref 3.5–5.3)
PROT SERPL-MCNC: 6.7 G/DL — SIGNIFICANT CHANGE UP (ref 6–8.3)
PROT UR-MCNC: NEGATIVE MG/DL — SIGNIFICANT CHANGE UP
RBC # BLD: 3.18 M/UL — LOW (ref 3.8–5.2)
RBC # FLD: 13.5 % — SIGNIFICANT CHANGE UP (ref 10.3–14.5)
RBC BLD AUTO: ABNORMAL
RBC CASTS # UR COMP ASSIST: 4 /HPF — SIGNIFICANT CHANGE UP (ref 0–4)
REVIEW: SIGNIFICANT CHANGE UP
SAO2 % BLDV: 74.6 % — SIGNIFICANT CHANGE UP (ref 67–88)
SODIUM SERPL-SCNC: 139 MMOL/L — SIGNIFICANT CHANGE UP (ref 135–145)
SP GR SPEC: 1.02 — SIGNIFICANT CHANGE UP (ref 1–1.03)
SQUAMOUS # UR AUTO: 4 /HPF — SIGNIFICANT CHANGE UP (ref 0–5)
TROPONIN T, HIGH SENSITIVITY RESULT: 13 NG/L — SIGNIFICANT CHANGE UP (ref 0–51)
UROBILINOGEN FLD QL: 1 MG/DL — SIGNIFICANT CHANGE UP (ref 0.2–1)
WBC # BLD: 2.53 K/UL — LOW (ref 3.8–10.5)
WBC # FLD AUTO: 2.53 K/UL — LOW (ref 3.8–10.5)
WBC CLUMPS # UR AUTO: PRESENT
WBC UR QL: 109 /HPF — HIGH (ref 0–5)

## 2024-05-08 PROCEDURE — 99223 1ST HOSP IP/OBS HIGH 75: CPT

## 2024-05-08 PROCEDURE — 74177 CT ABD & PELVIS W/CONTRAST: CPT | Mod: 26,MC

## 2024-05-08 PROCEDURE — 71260 CT THORAX DX C+: CPT | Mod: 26,MC

## 2024-05-08 PROCEDURE — 99285 EMERGENCY DEPT VISIT HI MDM: CPT | Mod: FS

## 2024-05-08 PROCEDURE — 71045 X-RAY EXAM CHEST 1 VIEW: CPT | Mod: 26

## 2024-05-08 PROCEDURE — 70450 CT HEAD/BRAIN W/O DYE: CPT | Mod: 26,MC

## 2024-05-08 RX ORDER — DEXTROSE 50 % IN WATER 50 %
15 SYRINGE (ML) INTRAVENOUS ONCE
Refills: 0 | Status: DISCONTINUED | OUTPATIENT
Start: 2024-05-08 | End: 2024-05-11

## 2024-05-08 RX ORDER — SERTRALINE 25 MG/1
50 TABLET, FILM COATED ORAL DAILY
Refills: 0 | Status: DISCONTINUED | OUTPATIENT
Start: 2024-05-09 | End: 2024-05-11

## 2024-05-08 RX ORDER — GLUCAGON INJECTION, SOLUTION 0.5 MG/.1ML
1 INJECTION, SOLUTION SUBCUTANEOUS ONCE
Refills: 0 | Status: DISCONTINUED | OUTPATIENT
Start: 2024-05-08 | End: 2024-05-11

## 2024-05-08 RX ORDER — ACETAMINOPHEN 500 MG
650 TABLET ORAL EVERY 6 HOURS
Refills: 0 | Status: DISCONTINUED | OUTPATIENT
Start: 2024-05-08 | End: 2024-05-11

## 2024-05-08 RX ORDER — DEXTROSE 10 % IN WATER 10 %
125 INTRAVENOUS SOLUTION INTRAVENOUS ONCE
Refills: 0 | Status: DISCONTINUED | OUTPATIENT
Start: 2024-05-08 | End: 2024-05-11

## 2024-05-08 RX ORDER — CEFEPIME 1 G/1
1000 INJECTION, POWDER, FOR SOLUTION INTRAMUSCULAR; INTRAVENOUS EVERY 12 HOURS
Refills: 0 | Status: DISCONTINUED | OUTPATIENT
Start: 2024-05-08 | End: 2024-05-09

## 2024-05-08 RX ORDER — LEVOTHYROXINE SODIUM 125 MCG
100 TABLET ORAL DAILY
Refills: 0 | Status: DISCONTINUED | OUTPATIENT
Start: 2024-05-09 | End: 2024-05-11

## 2024-05-08 RX ORDER — DEXTROSE 50 % IN WATER 50 %
12.5 SYRINGE (ML) INTRAVENOUS ONCE
Refills: 0 | Status: DISCONTINUED | OUTPATIENT
Start: 2024-05-08 | End: 2024-05-11

## 2024-05-08 RX ORDER — AMIODARONE HYDROCHLORIDE 400 MG/1
200 TABLET ORAL
Refills: 0 | Status: DISCONTINUED | OUTPATIENT
Start: 2024-05-08 | End: 2024-05-11

## 2024-05-08 RX ORDER — PIPERACILLIN AND TAZOBACTAM 4; .5 G/20ML; G/20ML
3.38 INJECTION, POWDER, LYOPHILIZED, FOR SOLUTION INTRAVENOUS ONCE
Refills: 0 | Status: DISCONTINUED | OUTPATIENT
Start: 2024-05-08 | End: 2024-05-08

## 2024-05-08 RX ORDER — PANTOPRAZOLE SODIUM 20 MG/1
40 TABLET, DELAYED RELEASE ORAL
Refills: 0 | Status: DISCONTINUED | OUTPATIENT
Start: 2024-05-08 | End: 2024-05-11

## 2024-05-08 RX ORDER — VANCOMYCIN HCL 1 G
1000 VIAL (EA) INTRAVENOUS ONCE
Refills: 0 | Status: COMPLETED | OUTPATIENT
Start: 2024-05-08 | End: 2024-05-08

## 2024-05-08 RX ORDER — APIXABAN 2.5 MG/1
2.5 TABLET, FILM COATED ORAL
Refills: 0 | Status: DISCONTINUED | OUTPATIENT
Start: 2024-05-09 | End: 2024-05-11

## 2024-05-08 RX ORDER — DEXTROSE 50 % IN WATER 50 %
25 SYRINGE (ML) INTRAVENOUS ONCE
Refills: 0 | Status: DISCONTINUED | OUTPATIENT
Start: 2024-05-08 | End: 2024-05-11

## 2024-05-08 RX ORDER — SODIUM CHLORIDE 9 MG/ML
1000 INJECTION, SOLUTION INTRAVENOUS
Refills: 0 | Status: DISCONTINUED | OUTPATIENT
Start: 2024-05-08 | End: 2024-05-11

## 2024-05-08 RX ORDER — CEFEPIME 1 G/1
2000 INJECTION, POWDER, FOR SOLUTION INTRAMUSCULAR; INTRAVENOUS ONCE
Refills: 0 | Status: COMPLETED | OUTPATIENT
Start: 2024-05-08 | End: 2024-05-08

## 2024-05-08 RX ORDER — IPRATROPIUM/ALBUTEROL SULFATE 18-103MCG
3 AEROSOL WITH ADAPTER (GRAM) INHALATION EVERY 6 HOURS
Refills: 0 | Status: DISCONTINUED | OUTPATIENT
Start: 2024-05-08 | End: 2024-05-11

## 2024-05-08 RX ADMIN — CEFEPIME 100 MILLIGRAM(S): 1 INJECTION, POWDER, FOR SOLUTION INTRAMUSCULAR; INTRAVENOUS at 15:45

## 2024-05-08 RX ADMIN — Medication 3 MILLILITER(S): at 23:05

## 2024-05-08 RX ADMIN — Medication 250 MILLIGRAM(S): at 17:29

## 2024-05-08 RX ADMIN — Medication 3 MILLILITER(S): at 19:50

## 2024-05-08 NOTE — H&P ADULT - PROBLEM SELECTOR PLAN 2
Patient/daughter agree with anticoagulation for PAF with apixaban as above.      Would consider formal Cardiology evaluation in the AM on dosing regimen and if consideration for limited repeat echo with history of moderate AS (echo from Jan 2024).

## 2024-05-08 NOTE — ED ADULT NURSE REASSESSMENT NOTE - NS ED NURSE REASSESS COMMENT FT1
Report received from ANTHONY Conrad. Patient resting in bed, c/o chest pain. Repeat EKG obtained. Duoneb administered per orders. Patient admitted and awaiting bed assignment.

## 2024-05-08 NOTE — H&P ADULT - NSHPOUTPATIENTPROVIDERS_GEN_ALL_CORE
Steven Goldberg MD (PCP-Cardiology) [phone] Steven Goldberg MD (PCP-Cardiology)   Roni Polanco MD (Hematology) [phone]  Hollis Meyer MD (Pulmonary) [phone]  KYARA Polanco MD (ID) [phone]  Jason Moeller MD (Cardiology) [phone]

## 2024-05-08 NOTE — H&P ADULT - HISTORY OF PRESENT ILLNESS
NIGHT HOSPITALIST:    Patient remotely known to me from an admission 3/7/2022--assigned to me at this point via the ER and by Dr. Andrew of the [facility]--patient with a history of PAF maintained on amiodarone, apixaban (previously on 2.5 mg BID from a discharge from Jan 2024 but recently discharged from [place] April 2024 on apixaban 2.5 at 8AM--confirmed with patient's pharmacy [facility]), remote pulmonary and abdominal TB S/P resection with past chronic partial  SBO, previous essential HTN apparently no longer on Norvasc, moderate AS, bronchiectasis, severe bilateral hearing impairment (patient has a nonfunctioning hearing aid at home)  hypothyroidism, with most recent discharge April 14 2024 following cough and dyspnoea with completion of course of antibiotics, with patient seen by Hematology for leukopenia and thrombocytopenia suspected from patient's underlying pneumonia, with patient self referring to the ER--aide in attendance--with patient with poor PO for the past 3 days with nausea, no vomiting.  Patient with dull head fullness earlier.  No fever, no chills, no rigors.  NO hemoptysis.  NO HA no focal weakness.  NO abdominal pain, no red blood per rectum or melena.  NO back pain, no tearing back pain.  NO dysuria, no hematuria.

## 2024-05-08 NOTE — H&P ADULT - ASSESSMENT
NIGHT HOSPITALIST:    Self referral for poor PO for the past 3 days with nausea, mild earlier head fullness with negative CTT head but in the setting of patient with PAF on apixaban but on QD dosing at 8AM for 2.5 mg from last discharge, bronchiectasis, moderate AS from echo from Jan 2024, hypothyroidism, past HTN but apparently no longer on Rx, with UA with no nitrite but with moderate leukocyte esterase (unclear if true cystitis) with CTT with persistent findings of RUL and bronchiectasis LEFT apex with O2 requirements will continue with IV Cefepime 1 gm BID for now, but will assume ongoing aspiration risk for now.  NPO x medications.    FS to monitor for hypoglycemia.   S/S evaluation.  Will provide Duoneb therapy.   Would consider formal ID and Pulmonary evaluation in the AM.    Patient/daughter agree with anticoagulation for PAF with apixaban as above--would consider formal Cardiology evaluation in the AM on dosing regimen and if consideration for limited repeat echo with history of moderate AS (echo from Jan 2024).    Will check TSH on Synthroid.    Will cautiously continue patient's sertraline.    Patient is deferring all decisions to her daughter, Alyssa Purvis.  I left a general message with daughter.   Will attempt to confirm with daughter--when daughter is en route to the hospital--prior conversation by ED provider with daughter on Code Status--cannot confirm at present.        NIGHT HOSPITALIST:    Self referral for poor PO for the past 3 days with nausea, mild earlier head fullness with negative CTT head but in the setting of patient with PAF on apixaban but on QD dosing at 8AM for 2.5 mg from last discharge, bronchiectasis, moderate AS from echo from Jan 2024, hypothyroidism, past HTN but apparently no longer on Rx, with UA with no nitrite but with moderate leukocyte esterase (unclear if true cystitis) with CTT with persistent findings of RUL and bronchiectasis LEFT apex with O2 requirements will continue with IV Cefepime 1 gm BID for now, but will assume ongoing aspiration risk for now.  NPO x medications.    FS to monitor for hypoglycemia.   S/S evaluation.  Will provide Duoneb therapy.   Would consider formal ID and Pulmonary evaluation in the AM.    Patient/daughter agree with anticoagulation for PAF with apixaban as above--would consider formal Cardiology evaluation in the AM on dosing regimen and if consideration for limited repeat echo with history of moderate AS (echo from Jan 2024).    Will check TSH on Synthroid.    Will cautiously continue patient's sertraline.    Patient is verbalizing to me that she is deferring all decisions to her daughter, Alyssa Slater.  I left a general message with daughter.   Will attempt to confirm with daughter--when daughter is en route to the hospital--prior conversation by ED provider with daughter on Code Status--cannot confirm at present.

## 2024-05-08 NOTE — H&P ADULT - NSICDXPASTMEDICALHX_GEN_ALL_CORE_FT
PAST MEDICAL HISTORY:  Afib     Aortic stenosis     Asthma     Gastritis     HTN (hypertension)     Hypothyroid     Multifocal pneumonia     Orthostatic hypotension     Pulmonary TB     SBO (small bowel obstruction)     TB (pulmonary tuberculosis) TB of abdomen 1960

## 2024-05-08 NOTE — H&P ADULT - PROBLEM SELECTOR PLAN 1
CTT with persistent findings of RUL and bronchiectasis LEFT apex with O2 requirements will continue with IV Cefepime 1 gm BID for now, but will assume ongoing aspiration risk for now.  NPO x medications.    FS to monitor for hypoglycemia.   S/S evaluation.  Will provide Duoneb therapy.       Would consider formal ID and Pulmonary evaluation in the AM. CTT with persistent findings of RUL and bronchiectasis LEFT apex with O2 requirements will continue with IV Cefepime 1 gm BID for now, but will assume ongoing aspiration risk for now.  NPO x medications.    FS to monitor for hypoglycemia.   S/S evaluation.  Will provide Duoneb therapy.       Would consider formal ID and Pulmonary evaluation in the AM.    If persistent poor PO would consider formal GI opinion in the AM.

## 2024-05-08 NOTE — INPATIENT CERTIFICATION FOR MEDICARE PATIENTS - PHYSICIAN CONCUR
I concur with the Admission Order and I certify that services are provided in accordance with Section 42 CFR § 412.3 3

## 2024-05-08 NOTE — ED PROVIDER NOTE - OBJECTIVE STATEMENT
89 y/o female with  A-fib on Eliquis, Asthma, Bronchiectasis, Moderate Aortic Stenosis, HTN and Hypothyroid presents to the Ed with aide for nausea today. Patient states that today she felt nauseous but did not vomit. She also has a dull headache this morning. Her aide states that she requested to be brought to emergency department today. As per aide the past 3 days patient has had decreased PO intake. Recently discharged from [facility] on 4/14 after being treated for CAP. Aide denies any fevers, chills, chest pain, cough, abdominal pain, dysuria. Patient is A&O x 3 but is hard of hearing.

## 2024-05-08 NOTE — ED ADULT TRIAGE NOTE - CADM TRG TX PRIOR TO ARRIVAL
Quality 130: Documentation Of Current Medications In The Medical Record: Current Medications Documented Detail Level: Detailed none

## 2024-05-08 NOTE — H&P ADULT - MENTAL STATUS
AxOx3.   Speech fluent.  Hard of hearing.   Cognition limited assessment with poor short term recall.

## 2024-05-08 NOTE — H&P ADULT - NSHPLABSRESULTS_GEN_ALL_CORE
EKG tracing interpreted by me with ventricular rate at 64 with nonspecific T wave changes.    Chest radiograph with RUL opacity and LEFT basilar infiltrate.    WBC 2.5  76N  (was 1.8 upon discharge)    Hgb 10.3    Platelets of 80K  (was 59K upon discharge)    HS troponin 15<<13    UA with NO nitrite, moderate leukocyte esterase.    Random glucose of 114    Cr 0.71    K+ 4.2    e GFR 76    CTT head negative bleed/mass.    Jan 2024 echo with moderate AS with EF 59%.

## 2024-05-08 NOTE — H&P ADULT - PROBLEM SELECTOR PLAN 6
Patient is verbalizing to me that she is deferring all decisions to her daughter, Alyssa Slater.  I left a general message with daughter.   Will attempt to have provider confirm with daughter--when daughter is en route to the hospital--prior conversation by ED provider with daughter on Code Status--cannot confirm at present.

## 2024-05-08 NOTE — ED PROVIDER NOTE - ATTENDING APP SHARED VISIT CONTRIBUTION OF CARE
I, Tee Andrew MD, Emergency Medicine Attending Physician, personally saw and examined the patient and I personally made/approve the management plan and take responsibility for the patient management.    MDM: 89-year-old female with history as seen in HPI above, who presents with nausea for the last 2 weeks, feeling unwell, as well as had pressure that is dull in intensity for the last 2 to 3 days.  Patient with decreased p.o. intake for the last 3 days.  Had normal bowel movement this morning.  Recently admitted to the hospital for pneumonia versus asthma exacerbation.    ROS: denies trauma, fever, chills, chest pain, shortness of breath, abdominal pain, vomiting, constipation, obstipation, dysuria, hematuria, numbness, weakness, visual change, slurred speech, facial droop, off-balance    On examination, patient with stable vitals, well-appearing, in no acute distress.  Head is NCAT, no temporal tenderness, PERRLA, EOMI.  Cardiac examination RRR, lungs CTAB.  Abdomen is soft and nondistended, (+) mild tenderness to periumbilical region.  Neurovascularly intact in all 4 extremities.  NEURO: AAOx3, Cranial nerves III-XII intact. Strength intact with 5/5 strength in all 4 extremities. Sensation intact to light touch in all 4 extremities. No pronator drift. Normal speech and gait.    Will obtain CT Head to evaluate for acute intracranial pathology.  Will obtain CT Abd/Pelv to assess for acute intraabdominal surgical and infectious pathology.  Will obtain chest x-ray to evaluate for acute cardiopulmonary pathology.  Will obtain labs to evaluate for hematologic disorder, metabolic derangements, hepatic and renal function, and screen for infection.  Will keep patient on cardiac monitor, obtain EKG and troponin to evaluate for possible cardiac abnormality including ACS and arrhythmia.    My independent interpretation of the EKG shows:  Undetermined rhythm with rate of 64 bpm, no ST elevations or depressions.  QTc 458.    Labs with no leukocytosis, anemia at baseline, troponin stable 15, 13, electrolytes nonactionable, no lactate elevation.    Chest x-ray showed hazy airspace opacity in the right upper lobe and left lung base.  Concern for multifocal pneumonia, started on antibiotics.  Will obtain CT Chest to evaluate for acute thoracic pathology. I, Tee Andrew MD, Emergency Medicine Attending Physician, personally saw and examined the patient and I personally made/approve the management plan and take responsibility for the patient management.    MDM: 89-year-old female with history as seen in HPI above, who presents with nausea for the last 2 weeks, feeling unwell, as well as had pressure that is dull in intensity for the last 2 to 3 days.  Patient with decreased p.o. intake for the last 3 days.  Had normal bowel movement this morning.  Recently admitted to the hospital for pneumonia versus asthma exacerbation.    ROS: denies trauma, fever, chills, chest pain, shortness of breath, abdominal pain, vomiting, constipation, obstipation, dysuria, hematuria, numbness, weakness, visual change, slurred speech, facial droop, off-balance    On examination, patient with stable vitals, well-appearing, in no acute distress.  Head is NCAT, no temporal tenderness, PERRLA, EOMI.  Cardiac examination RRR, lungs CTAB.  Abdomen is soft and nondistended, (+) mild tenderness to periumbilical region.  Neurovascularly intact in all 4 extremities.  NEURO: AAOx3, Cranial nerves III-XII intact. Strength intact with 5/5 strength in all 4 extremities. Sensation intact to light touch in all 4 extremities. No pronator drift. Normal speech and gait.    Will obtain CT Head to evaluate for acute intracranial pathology.  Will obtain CT Abd/Pelv to assess for acute intraabdominal surgical and infectious pathology.  Will obtain chest x-ray to evaluate for acute cardiopulmonary pathology.  Will obtain labs to evaluate for hematologic disorder, metabolic derangements, hepatic and renal function, and screen for infection.  Will keep patient on cardiac monitor, obtain EKG and troponin to evaluate for possible cardiac abnormality including ACS and arrhythmia.    My independent interpretation of the EKG shows:  Undetermined rhythm with rate of 64 bpm, no ST elevations or depressions.  QTc 458.    Labs with no leukocytosis, anemia at baseline, troponin stable 15, 13, electrolytes nonactionable, no lactate elevation.    Chest x-ray showed hazy airspace opacity in the right upper lobe and left lung base.  Concern for multifocal pneumonia, started on antibiotics.  Will obtain CT Chest to evaluate for acute thoracic pathology.    Patient's urinalysis with moderate leukocyte esterase, 109 WBC, occasional bacteria.  Head showed age-appropriate involutional changes, no acute ICH.  CT chest showed tree-in-bud nodularity in the right upper lobe, no acute pathology in the abdomen or pelvis.  Patient has been started on antibiotics due to concern for UTI and multifocal pneumonia.  Discussed goals of care, patient is DNR/DNI.    The patient will need to be admitted to the hospital for continued evaluation and management.  Discussed with the accepting physician regarding the initial presentation, diagnostic studies, treatments given in the ED, and current plan of care.  The patient was accepted by and endorsed to the medicine team.

## 2024-05-08 NOTE — H&P ADULT - NSHPREVIEWOFSYSTEMS_GEN_ALL_CORE
NO HA< no focal weakness.  NO chest pain/pressure.  NO palpitations.  NO breast symptoms.  No abdominal pain, no red blood per rectum or melena.  NO back pain, no tearing back pain.    NO SI/HI>  NO thyroid symptoms.  NO rash.  NO joint pain.  NO weight loss.    Patient reports COVID-19 vaccine x 3.

## 2024-05-08 NOTE — ED PROVIDER NOTE - PROGRESS NOTE DETAILS
CXR with multifocal PNA. Will obtain ct chest and begin iV ABX. Pt reports allergy to PCN which is joint pain. She does not want to take Zosyn since there is some cross reactivity. Would prefer to take Vancomycin and Cefepime which she has tolerated in previous hospital admission. Called and spoke to daughter and updated her on results and plan. She verbalized understanding. States that mom is DNR/DNI. Lela De La Rosa PA-C

## 2024-05-08 NOTE — ED PROVIDER NOTE - PHYSICAL EXAMINATION
CONSTITUTIONAL: Patient is awake, alert and oriented x 3. [place]  HEAD: NCAT  EYES: PERRL bilaterally,  ENT: Airway patent, dry mucus membranes;   NECK: Supple,   LUNGS: CTA B/L, no wheezes, rhonci or rales  HEART: RRR.+S1S2 no murmurs,   ABDOMEN: Soft, non-tender to palpation throughout all four quadrants,  MSK: No edema or calf tenderness b/l, 5/5 strength b/l UE and LE  SKIN: No rash or lesions  NEURO: CN 3-12 grossly intact,

## 2024-05-08 NOTE — ED ADULT NURSE NOTE - OBJECTIVE STATEMENT
89 yo female presents to the ED AAox4 ambulatory BIBEMS with complaints of nausea x 2 days. PMH with  A-fib on Eliquis, Asthma, Bronchiectasis, Moderate Aortic Stenosis, HTN and Hypothyroid. Per patient, pt states shes "not feeling well" and is endorsing feeling generalized weakness. Per HHA at bedside, pt states she has also had decrease PO intake. Pt Denies headache, dizziness, vision changes, chest pain, shortness of breath, abdominal pain, nausea, vomiting, diarrhea, fevers, chills, dysuria, hematuria, recent illness travel or fall.

## 2024-05-08 NOTE — H&P ADULT - NSHPADDITIONALINFOADULT_GEN_ALL_CORE
NIGHT HOSPITALIST:  Patient/family aware of course and agree with plan/care as above.  Given patient's comorbidities, patient's long term prognosis is guarded.   Patient is verbalizing to me that she is deferring all decisions to her daughter, Alyssa Slater.  I left a general message with daughter.   Will attempt to confirm with daughter--when daughter is en route to the hospital--prior conversation by ED provider with daughter on Code Status--cannot confirm at present.   Care reviewed with covering NP/PA for endorsement to the [facility].    Donovan Junior MD  Available on [employer] Teams.

## 2024-05-08 NOTE — ED PROVIDER NOTE - CONVERSATION DETAILS
Daughter states that she does not want extreme measures such as intubation/CPR but would like IV ABX for PNA.

## 2024-05-09 LAB
A1C WITH ESTIMATED AVERAGE GLUCOSE RESULT: 4.9 % — SIGNIFICANT CHANGE UP (ref 4–5.6)
ANION GAP SERPL CALC-SCNC: 10 MMOL/L — SIGNIFICANT CHANGE UP (ref 5–17)
BASOPHILS # BLD AUTO: 0 K/UL — SIGNIFICANT CHANGE UP (ref 0–0.2)
BASOPHILS NFR BLD AUTO: 0 % — SIGNIFICANT CHANGE UP (ref 0–2)
BUN SERPL-MCNC: 11 MG/DL — SIGNIFICANT CHANGE UP (ref 7–23)
CALCIUM SERPL-MCNC: 8.7 MG/DL — SIGNIFICANT CHANGE UP (ref 8.4–10.5)
CHLORIDE SERPL-SCNC: 104 MMOL/L — SIGNIFICANT CHANGE UP (ref 96–108)
CO2 SERPL-SCNC: 24 MMOL/L — SIGNIFICANT CHANGE UP (ref 22–31)
CREAT SERPL-MCNC: 0.68 MG/DL — SIGNIFICANT CHANGE UP (ref 0.5–1.3)
EGFR: 78 ML/MIN/1.73M2 — SIGNIFICANT CHANGE UP
EOSINOPHIL # BLD AUTO: 0.04 K/UL — SIGNIFICANT CHANGE UP (ref 0–0.5)
EOSINOPHIL NFR BLD AUTO: 1.5 % — SIGNIFICANT CHANGE UP (ref 0–6)
ESTIMATED AVERAGE GLUCOSE: 94 MG/DL — SIGNIFICANT CHANGE UP (ref 68–114)
GLUCOSE BLDC GLUCOMTR-MCNC: 106 MG/DL — HIGH (ref 70–99)
GLUCOSE BLDC GLUCOMTR-MCNC: 156 MG/DL — HIGH (ref 70–99)
GLUCOSE SERPL-MCNC: 91 MG/DL — SIGNIFICANT CHANGE UP (ref 70–99)
HCT VFR BLD CALC: 32.3 % — LOW (ref 34.5–45)
HGB BLD-MCNC: 10.3 G/DL — LOW (ref 11.5–15.5)
IMM GRANULOCYTES NFR BLD AUTO: 0.4 % — SIGNIFICANT CHANGE UP (ref 0–0.9)
LYMPHOCYTES # BLD AUTO: 0.33 K/UL — LOW (ref 1–3.3)
LYMPHOCYTES # BLD AUTO: 12.7 % — LOW (ref 13–44)
MCHC RBC-ENTMCNC: 31.9 GM/DL — LOW (ref 32–36)
MCHC RBC-ENTMCNC: 32.7 PG — SIGNIFICANT CHANGE UP (ref 27–34)
MCV RBC AUTO: 102.5 FL — HIGH (ref 80–100)
MONOCYTES # BLD AUTO: 0.11 K/UL — SIGNIFICANT CHANGE UP (ref 0–0.9)
MONOCYTES NFR BLD AUTO: 4.2 % — SIGNIFICANT CHANGE UP (ref 2–14)
NEUTROPHILS # BLD AUTO: 2.1 K/UL — SIGNIFICANT CHANGE UP (ref 1.8–7.4)
NEUTROPHILS NFR BLD AUTO: 81.2 % — HIGH (ref 43–77)
NRBC # BLD: 0 /100 WBCS — SIGNIFICANT CHANGE UP (ref 0–0)
PLATELET # BLD AUTO: 80 K/UL — LOW (ref 150–400)
POTASSIUM SERPL-MCNC: 4.1 MMOL/L — SIGNIFICANT CHANGE UP (ref 3.5–5.3)
POTASSIUM SERPL-SCNC: 4.1 MMOL/L — SIGNIFICANT CHANGE UP (ref 3.5–5.3)
RBC # BLD: 3.15 M/UL — LOW (ref 3.8–5.2)
RBC # FLD: 13.4 % — SIGNIFICANT CHANGE UP (ref 10.3–14.5)
SODIUM SERPL-SCNC: 138 MMOL/L — SIGNIFICANT CHANGE UP (ref 135–145)
WBC # BLD: 2.59 K/UL — LOW (ref 3.8–10.5)
WBC # FLD AUTO: 2.59 K/UL — LOW (ref 3.8–10.5)

## 2024-05-09 RX ORDER — CHLORHEXIDINE GLUCONATE 213 G/1000ML
1 SOLUTION TOPICAL DAILY
Refills: 0 | Status: DISCONTINUED | OUTPATIENT
Start: 2024-05-09 | End: 2024-05-11

## 2024-05-09 RX ADMIN — CHLORHEXIDINE GLUCONATE 1 APPLICATION(S): 213 SOLUTION TOPICAL at 13:44

## 2024-05-09 RX ADMIN — Medication 3 MILLILITER(S): at 11:16

## 2024-05-09 RX ADMIN — PANTOPRAZOLE SODIUM 40 MILLIGRAM(S): 20 TABLET, DELAYED RELEASE ORAL at 06:33

## 2024-05-09 RX ADMIN — SERTRALINE 50 MILLIGRAM(S): 25 TABLET, FILM COATED ORAL at 11:15

## 2024-05-09 RX ADMIN — APIXABAN 2.5 MILLIGRAM(S): 2.5 TABLET, FILM COATED ORAL at 08:53

## 2024-05-09 RX ADMIN — Medication 3 MILLILITER(S): at 18:16

## 2024-05-09 RX ADMIN — AMIODARONE HYDROCHLORIDE 200 MILLIGRAM(S): 400 TABLET ORAL at 06:33

## 2024-05-09 RX ADMIN — CEFEPIME 100 MILLIGRAM(S): 1 INJECTION, POWDER, FOR SOLUTION INTRAMUSCULAR; INTRAVENOUS at 06:19

## 2024-05-09 RX ADMIN — Medication 100 MICROGRAM(S): at 06:25

## 2024-05-09 RX ADMIN — Medication 3 MILLILITER(S): at 06:19

## 2024-05-09 NOTE — PROGRESS NOTE ADULT - SUBJECTIVE AND OBJECTIVE BOX
Patient is a 89y old  Female who presents with a chief complaint of Poor PO past 3 days, dull head fullness this AM, nausea. (09 May 2024 10:40)      SUBJECTIVE / OVERNIGHT EVENTS:    Patient seen and examined. poor historian. no complaints. no co sob.      Vital Signs Last 24 Hrs  T(C): 36.6 (09 May 2024 11:28), Max: 37.2 (08 May 2024 12:51)  T(F): 97.8 (09 May 2024 11:28), Max: 99 (08 May 2024 12:51)  HR: 61 (09 May 2024 11:28) (58 - 66)  BP: 108/63 (09 May 2024 11:28) (108/63 - 151/71)  BP(mean): --  RR: 18 (09 May 2024 11:28) (16 - 19)  SpO2: 97% (09 May 2024 11:28) (90% - 100%)    Parameters below as of 09 May 2024 11:28  Patient On (Oxygen Delivery Method): room air      I&O's Summary      PE:  GENERAL: NAD, AAOx2  CHEST/LUNG: coarse bs  HEART: Regular rate and rhythm; + murmur  ABDOMEN: Soft, Nontender, Nondistended; Bowel sounds present  EXTREMITIES:  2+ Peripheral Pulses, No edema  NEURO: No focal deficits    LABS:                        10.3   2.59  )-----------( 80       ( 09 May 2024 07:01 )             32.3     05-09    138  |  104  |  11  ----------------------------<  91  4.1   |  24  |  0.68    Ca    8.7      09 May 2024 07:04    TPro  6.7  /  Alb  3.8  /  TBili  0.5  /  DBili  x   /  AST  15  /  ALT  11  /  AlkPhos  68  05-08      CAPILLARY BLOOD GLUCOSE      POCT Blood Glucose.: 106 mg/dL (09 May 2024 08:32)        Urinalysis Basic - ( 09 May 2024 07:04 )    Color: x / Appearance: x / SG: x / pH: x  Gluc: 91 mg/dL / Ketone: x  / Bili: x / Urobili: x   Blood: x / Protein: x / Nitrite: x   Leuk Esterase: x / RBC: x / WBC x   Sq Epi: x / Non Sq Epi: x / Bacteria: x        RADIOLOGY & ADDITIONAL TESTS:    Imaging Personally Reviewed:  [x] YES  [ ] NO    Consultant(s) Notes Reviewed:  [x] YES  [ ] NO    MEDICATIONS  (STANDING):  albuterol/ipratropium for Nebulization 3 milliLiter(s) Nebulizer every 6 hours  aMIOdarone    Tablet 200 milliGRAM(s) Oral <User Schedule>  apixaban 2.5 milliGRAM(s) Oral <User Schedule>  cefepime   IVPB 1000 milliGRAM(s) IV Intermittent every 12 hours  chlorhexidine 2% Cloths 1 Application(s) Topical daily  dextrose 10% Bolus 125 milliLiter(s) IV Bolus once  dextrose 5%. 1000 milliLiter(s) (50 mL/Hr) IV Continuous <Continuous>  dextrose 5%. 1000 milliLiter(s) (100 mL/Hr) IV Continuous <Continuous>  dextrose 50% Injectable 12.5 Gram(s) IV Push once  dextrose 50% Injectable 25 Gram(s) IV Push once  glucagon  Injectable 1 milliGRAM(s) IntraMuscular once  levothyroxine 100 MICROGram(s) Oral daily  pantoprazole    Tablet 40 milliGRAM(s) Oral before breakfast  sertraline 50 milliGRAM(s) Oral daily    MEDICATIONS  (PRN):  acetaminophen     Tablet .. 650 milliGRAM(s) Oral every 6 hours PRN Temp greater or equal to 38C (100.4F), Mild Pain (1 - 3)  dextrose Oral Gel 15 Gram(s) Oral once PRN Blood Glucose LESS THAN 70 milliGRAM(s)/deciliter      Care Discussed with Consultants/Other Providers [x] YES  [ ] NO    HEALTH ISSUES - PROBLEM Dx:  Multifocal pneumonia    PAF (paroxysmal atrial fibrillation)    Moderate aortic stenosis    Hypothyroidism    History of depression    Advance care planning    Discharge planning issues

## 2024-05-09 NOTE — PHYSICAL THERAPY INITIAL EVALUATION ADULT - PERTINENT HX OF CURRENT PROBLEM, REHAB EVAL
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   5/8 Chest Xray: Hazy airspace opacity in the right upper lobe and hazy airspace opacity at the left lung base. This may represent multifocal pneumonia. CT Abd and Pelvis, chest: Redemonstration of tree-in-bud nodularity in the right upper lobe without evidence of confluent infiltrate. No acute pathology in the abdomen or pelvis. Redemonstrated fibroid uterus and bilateral adnexal cysts present on multiple prior studies. CT Head: Age-appropriate involutional changes. Atherosclerotic changes. No acute intracranial hemorrhage, mass effect, or midline shift. If headaches persists, consider further evaluation via MR imaging to include DWI and ADC mapping techniques, provided there are no contraindications.

## 2024-05-09 NOTE — PHYSICAL THERAPY INITIAL EVALUATION ADULT - STRENGTHENING, PT EVAL
GOAL: Patient will demonstrate a 1/3 increase in strength where deficient to assist with performing functional mobility and ADLs.
normal shape/breast exam

## 2024-05-09 NOTE — SWALLOW BEDSIDE ASSESSMENT ADULT - COMMENTS
Patient's urinalysis with moderate leukocyte esterase, 109 WBC, occasional bacteria.  Head showed age-appropriate involutional changes, no acute ICH.  CT chest showed tree-in-bud nodularity in the right upper lobe, no acute pathology in the abdomen or pelvis.  Patient has been started on antibiotics due to concern for UTI and multifocal pneumonia.  Discussed goals of care, patient is DNR/DNI.  Would consider formal ID and Pulmonary evaluation in the AM. If persistent poor PO would consider formal GI opinion in the AM.    CT CHEST IMPRESSION: Redemonstration of tree-in-bud nodularity in the right upper lobe without evidence of confluent infiltrate. No acute pathology in the abdomen or pelvis. Redemonstrated fibroid uterus and bilateral adnexal cysts present on multiple prior studies    *Pt known to this service. Seen 3/29/2023 for bedside swallow evaluation w/ recommendations for regular/thin liquids.

## 2024-05-09 NOTE — SWALLOW BEDSIDE ASSESSMENT ADULT - SWALLOW EVAL: ORAL MUSCULATURE
4 Eyes Skin Assessment     NAME:  Jes Garcia  YOB: 1932  MEDICAL RECORD NUMBER:  [id number]    The patient is being assessed for  Admission    I agree that at least one RN has performed a thorough Head to Toe Skin Assessment on the patient. ALL assessment sites listed below have been assessed.      Areas assessed by both nurses:    Head, Face, Ears, Shoulders, Back, Chest, Arms, Elbows, Hands, Sacrum. Buttock, Coccyx, Ischium, Legs. Feet and Heels, and Under Medical Devices         Does the Patient have a Wound? Yes wound(s) were present on assessment. LDA wound assessment was Initiated and completed by RN    Multiple stage 2 wounds on bottom and coccyx        Vishal Prevention initiated by RN: Yes  Wound Care Orders initiated by RN: Yes    Pressure Injury (Stage 3,4, Unstageable, DTI, NWPT, and Complex wounds) if present, place Wound referral order by RN under : Yes    New Ostomies, if present place, Ostomy referral order under : No     Nurse 1 eSignature: Electronically signed by Elizabeth Skinner RN on 1/29/24 at 6:53 PM EST    **SHARE this note so that the co-signing nurse can place an eSignature**    Nurse 2 eSignature: Electronically signed by Kavita Calvillo RN on 1/29/24 at 6:55 PM EST     generally intact

## 2024-05-09 NOTE — CONSULT NOTE ADULT - REASON FOR ADMISSION
Poor PO past 3 days, dull head fullness this AM, nausea.

## 2024-05-09 NOTE — CONSULT NOTE ADULT - ASSESSMENT
Patient remotely known to me from an admission 3/7/2022--assigned to me at this point via the ER and by Dr. Andrew of the [facility]--patient with a history of PAF maintained on amiodarone, apixaban (previously on 2.5 mg BID from a discharge from Jan 2024 but recently discharged from [place] April 2024 on apixaban 2.5 at 8AM--confirmed with patient's pharmacy Triage), remote pulmonary and abdominal TB S/P resection with past chronic partial  SBO, previous essential HTN apparently no longer on Norvasc, moderate AS, bronchiectasis, severe bilateral hearing impairment (patient has a nonfunctioning hearing aid at home)  hypothyroidism, with most recent discharge April 14 2024 following cough and dyspnoea with completion of course of antibiotics, with patient seen by Hematology for leukopenia and thrombocytopenia suspected from patient's underlying pneumonia, with patient self referring to the ER--aide in attendance--with patient with poor PO for the past 3 days with nausea, no vomiting.  Patient with dull head fullness earlier.  No fever, no chills, no rigors.  NO hemoptysis.  NO HA no focal weakness.  NO abdominal pain, no red blood per rectum or melena.  NO back pain, no tearing back pain.  NO dysuria, no hematuria. (08 May 2024 18:57):  now pulm called for evaluation  pt is hard of hearing but understands questions:   she is not in any resp distress:  she says she feels very weak : she si on room air:  and  never smoked:   above noted:  she is also recently treated for pneumonia    suspected pneumonia: / hypoxic resp failure  Old pulm/gi tb:  bronchiectasis  P A fIB  HTN  Moderate AS/MOD MR   hypothyroidism  pancytopenia    suspected pneumonia: / hypoxic resp failure  -was on 2 L of oxygen before and now on room air:   -no apparent resp distress  -started on cefepime empirically:   -ct chest showed:  Redemonstration of tree-in-bud nodularity in the right upper lobe without evidence of confluent infiltrate. No acute pathology in the abdomen or pelvis. Redemonstrated fibroid uterus and bilateral adnexal cysts present on  multiple prior studies  - I doubt she has pneumonia:  check procal  in am :  - ID to see   -The old bronchiectatic changes are likely reflective of old tuberculosis   -VBg seems reasinable: mild elevation in co2:   Old pulm/gi tb:  bronchiectasis  -s/p treatemnt:   -no evidence of recurrence  P A fIB  -on amiodarone and eliquis:  defer to cards/ PMD   HTN  -controlled  Moderate AS/MOD MR   -per primary team  hypothyroidism  -on levothyroxine  pancytopenia  -per hemonc:  was evaluated before per  history     dvt prophylaxis    dw acp

## 2024-05-09 NOTE — CONSULT NOTE ADULT - ASSESSMENT
Ms. Slater is a 89 year old female with PMHx of CATHLEEN, bronchiectasis, HTN, paroxysmal AFib, epistaxis, GERD, small-bowel obstruction, tuberculosis peritonitis, nephrolithiasis, hypothyroidism, vitamin-D deficiency, AS, bilateral hearing loss, history of falls, he was now admitted with pneumonia with CT chest showing persistent right upper lobe findings as well as bronchiectasis of the left apex. Pt has a history of pancytopenia as outpatient with Leukopenia with since about 3y prior initially lymphopenia and then with neutropenia, progressive, anemia over the past year gradually worsening, with progressive macrocytosis, and thrombocytopenia over the past two years.     Patient was previously discharged from [facility] on 04/14/2024 when she was initially admitted on 04/08/2024 with pneumonia with CT Chest 04/09/2024 showing secretions within the trachea, left mainstem bronchus, left lower lobe bronchus and bilateral distal small airways with foci of tree-in-bud nodularity in the right lung may reflect impacted distal airways versus infectious/inflammatory small airways disease and was treated with antibiotics per ID and primary team.   She denied history of blood disorders, bleeding complications, or personal history of malignancy. She denied smoking, ETOH use. She lives in her apt with a 24hr aide and her daughter visits regularly.    Labs on my initial evaluation showed hemoglobin 10.3, hematocrit 23.3, .5, WBC 2.59 with lymphopenia  ANC 2100 with a platelet count 80k.  Overall counts are stable/mildly improved compared to prior discharge.    # Pancytopenia  # Anemia of chronic disease   -  Outpatient labs show gradual progressive leukopenia x 3y, initial lymphopenia now lymphopenia   -- outpatient WBC ranging 1.9-4.6   - Thrombocytopenia outpatient ranging 85-100s x 2y  - Anemia ranging 9.5-11 over past year  - MCV with progressive macrocytosis over past year  - Ferritin 547, Iron panel sat 23%, B12low normal 249, Folate 11.7 normal  - Copper 108  - Previous paraproteinemia workup negative for obvious plasma cell dyscrasia SPEP/MOOKIE normal, Serum immunoglobulins normal, serum FLCs (ratio 1.44), LDH (normal), Beta-2 Mcg (mild elv 2.6),   - Transfuse PRBC to maintain Hb > 7   - Transfuse Plt to maintain Plt > 10k  - Trend CBC daily with Diff for now   - Discussed pancytopenia, progressively worsening over past two years      # Pneumonia  - CT Chest 05/08/2024 Patent central airways. There is scarring with  calcification and bronchiectasis in the left apex is similar to the prior study. Punctate left upper lobe calcification is again noted. Linear atelectasis seen at the left lung base. No confluent infiltrates are appreciated. Small areas of tree-in-bud nodularity again appreciated in the right upper lobe  - Antibiotics per ID and primary team.     #A.fib  # AS  - On Eliquis 2.5mg per cardiology  - Monitor platelet count, noted risk for falls, would have ongoing discussion with cardiology regarding risks vs benefits, no active or past hx of bleeding complications noted at this time   -Follows with Dr. Ricardo Foss, Structural Cardiologist, as outpatient     # History of Hypothyroidism  - Obtain TSH, understanding interpretation will be based on age and acute illness, given anemia   - Continue levothyroxine     # Coagulopathy  - Likely due to Eliquis  - Do not need to monitor regularly unless active bleeding noted    # Vit B12 deficiency  - continue Vit B12 1000mcg weekly IM while admitted    Thank you for allowing me to participate in the care of Ms. Slater, please do not hesitate to call or text me if you have further questions or concerns.     Roni Polanco MD  [facility]   Division of Hematology/Oncology  [address] [place] [address], [address]  [place] [address]  P: [phone]  F: [phone]    Attestation:   Total time spent on the encounter: >** minutes   ----Including ** min face-to-face interaction in addition to chart review, reviewing treatment plan, and managing the patient’s chronic diagnoses as listed in the assessment----     1.	I have reviewed, analyzed and interpreted the following labs: CBC, CMP, imaging:  CT C/A/P impressions as above.   2.	I have reviewed notes stating pts current admission, consultants and follow ups.   3.	I have reviewed the past medical, family, and surgical history and confirmed with outpatient records    Ms. Slater is a 89 year old female with PMHx of CATHLEEN, bronchiectasis, HTN, paroxysmal AFib, epistaxis, GERD, small-bowel obstruction, tuberculosis peritonitis, nephrolithiasis, hypothyroidism, vitamin-D deficiency, AS, bilateral hearing loss, history of falls, he was now admitted with pneumonia with CT chest showing persistent right upper lobe findings as well as bronchiectasis of the left apex. Pt has a history of pancytopenia as outpatient with Leukopenia with since about 3y prior initially lymphopenia and then with neutropenia, progressive, anemia over the past year gradually worsening, with progressive macrocytosis, and thrombocytopenia over the past two years.     Patient was previously discharged from [facility] on 04/14/2024 when she was initially admitted on 04/08/2024 with pneumonia with CT Chest 04/09/2024 showing secretions within the trachea, left mainstem bronchus, left lower lobe bronchus and bilateral distal small airways with foci of tree-in-bud nodularity in the right lung may reflect impacted distal airways versus infectious/inflammatory small airways disease and was treated with antibiotics per ID and primary team.   She denied history of blood disorders, bleeding complications, or personal history of malignancy. She denied smoking, ETOH use. She lives in her apt with a 24hr aide and her daughter visits regularly.    Labs on my initial evaluation showed hemoglobin 10.3, hematocrit 23.3, .5, WBC 2.59 with lymphopenia  ANC 2100 with a platelet count 80k.  Overall counts are stable/mildly improved compared to prior discharge.    # Pancytopenia  # Anemia of chronic disease   -  Outpatient labs show gradual progressive leukopenia x 3y, initial lymphopenia now lymphopenia   -- outpatient WBC ranging 1.9-4.6   - Thrombocytopenia outpatient ranging 85-100s x 2y  - Anemia ranging 9.5-11 over past year  - MCV with progressive macrocytosis over past year  - Ferritin 547, Iron panel sat 23%, B12low normal 249, Folate 11.7 normal  - Copper 108  - Previous paraproteinemia workup negative for obvious plasma cell dyscrasia SPEP/MOOKIE normal, Serum immunoglobulins normal, serum FLCs (ratio 1.44), LDH (normal), Beta-2 Mcg (mild elv 2.6),   - Transfuse PRBC to maintain Hb > 7   - Transfuse Plt to maintain Plt > 10k  - Trend CBC daily with Diff for now   - Discussed pancytopenia, progressively worsening over past two years      # Pneumonia  - CT Chest 05/08/2024 Patent central airways. There is scarring with  calcification and bronchiectasis in the left apex is similar to the prior study. Punctate left upper lobe calcification is again noted. Linear atelectasis seen at the left lung base. No confluent infiltrates are appreciated. Small areas of tree-in-bud nodularity again appreciated in the right upper lobe  - Antibiotics per ID and primary team.     #A.fib  # AS  - On Eliquis 2.5mg per cardiology  - Monitor platelet count, noted risk for falls, would have ongoing discussion with cardiology regarding risks vs benefits, no active or past hx of bleeding complications noted at this time   -Follows with Dr. Ricardo Foss, Structural Cardiologist, as outpatient     # History of Hypothyroidism  - Obtain TSH, understanding interpretation will be based on age and acute illness, given anemia   - Continue levothyroxine     # Coagulopathy  - Likely due to Eliquis  - Do not need to monitor regularly unless active bleeding noted    # Vit B12 deficiency  - continue Vit B12 1000mcg weekly IM while admitted    Thank you for allowing me to participate in the care of Ms. Slater, please do not hesitate to call or text me if you have further questions or concerns.     Roni Polanco MD  [facility]   Division of Hematology/Oncology  [address] [place] [address], [address]  [place] [address]  P: [phone]  F: [phone]    Attestation:   Total time spent on the encounter: >60 minutes   ----Including  face-to-face interaction in addition to chart review, reviewing treatment plan, and managing the patient’s chronic diagnoses as listed in the assessment----     1.	I have reviewed, analyzed and interpreted the following labs: CBC, CMP, imaging:  CT C/A/P impressions as above.   2.	I have reviewed notes stating pts current admission, consultants and follow ups.   3.	I have reviewed the past medical, family, and surgical history and confirmed with outpatient records

## 2024-05-09 NOTE — CONSULT NOTE ADULT - ASSESSMENT
89f with paroxysmal atrial fib, pancytopenia, remote pulmonary and abdominal TB S/P resection , moderate AS, bronchiectasis, severe bilateral hearing impairment   hypothyroidism, presents from home with aide due to patient with poor PO for the past 3 days with nausea, no vomiting.   r/o pneumonia    plan  ua neg   no cough, no hypoxia, no tachypnea   chest ct reviewed and compared to previous ct scan  no changes  no acute pneumonia  stop antibx    strict aspiration precautions

## 2024-05-09 NOTE — SWALLOW BEDSIDE ASSESSMENT ADULT - SLP PERTINENT HISTORY OF CURRENT PROBLEM
89 y/o female with  A-fib on Eliquis, Asthma, Bronchiectasis, Moderate Aortic Stenosis, HTN and Hypothyroid presents to the Ed with aide for nausea today. Patient states that today she felt nauseous but did not vomit. She also has a dull headache this morning. Her aide states that she requested to be brought to emergency department today. As per aide the past 3 days patient has had decreased PO intake. Recently discharged from [facility] on 4/14 after being treated for CAP. Aide denies any fevers, chills, chest pain, cough, abdominal pain, dysuria. Patient is A&O x 3 but is hard of hearing.

## 2024-05-09 NOTE — PROGRESS NOTE ADULT - ASSESSMENT
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      PNA  CTT with persistent findings of RUL and bronchiectasis LEFT apex with O2 requirements will continue with IV Cefepime 1 gm BID for now,   ID consult  diet per S&S  Duoneb   ID and Pulmonary called  on 2 L    PAF   mod AS  cont amiodarone, apixaban   no longer on Norvasc  repeat limited echo    Hypothyroidism  Will check TSH on Synthroid    History of depression  continue patient's sertraline    HCP dtr Alyssa Slater    Please call [facility] with questions [phone]. 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      PNA  CTT with persistent findings of RUL and bronchiectasis LEFT apex with O2 requirements will continue with IV Cefepime 1 gm BID for now,   ID consult  diet per S&S  Duoneb   ID and Pulmonary called  on 2 L    PAF   mod AS  cont amiodarone, apixaban   no longer on Norvasc  repeat limited echo    Hypothyroidism  Will check TSH on Synthroid    History of depression  continue patient's sertraline    HCP dtr Alyssa Slater  [facility] DNR/DNI documented in ED records    Please call [facility] with questions [phone].

## 2024-05-09 NOTE — SWALLOW BEDSIDE ASSESSMENT ADULT - ASR SWALLOW ASPIRATION MONITOR
Monitor for s/s aspiration/laryngeal penetration. If noted:  D/C p.o. intake, provide non-oral nutrition/hydration/meds, and contact this service @ [phone]/change of breathing pattern/cough/gurgly voice/fever/pneumonia/throat clearing/upper respiratory infection

## 2024-05-09 NOTE — SWALLOW BEDSIDE ASSESSMENT ADULT - ASR SWALLOW RECOMMEND DIAG
Could consider MBS to r/o silent aspiration given findings of multifocal PNA, however would suggest GOC discussion prior as it relates to nutrition/hydration

## 2024-05-09 NOTE — PHYSICAL THERAPY INITIAL EVALUATION ADULT - ADDITIONAL COMMENTS
Pt lives alone in an apartment, no steps required, but has a 24/7 HHA. Pt was independent with all functional mobility and ADLs prior to admission using rollator.

## 2024-05-09 NOTE — CONSULT NOTE ADULT - SUBJECTIVE AND OBJECTIVE BOX
05-09-24 @ 14:34    Patient is a 89y old  Female who presents with a chief complaint of Poor PO past 3 days, dull head fullness this AM, nausea. (09 May 2024 10:40)      HPI:  NIGHT HOSPITALIST:    Patient remotely known to me from an admission 3/7/2022--assigned to me at this point via the ER and by Dr. Andrew of the [facility]--patient with a history of PAF maintained on amiodarone, apixaban (previously on 2.5 mg BID from a discharge from Jan 2024 but recently discharged from [facility] April 2024 on apixaban 2.5 at 8AM--confirmed with patient's pharmacy [email or web address]), remote pulmonary and abdominal TB S/P resection with past chronic partial  SBO, previous essential HTN apparently no longer on Norvasc, moderate AS, bronchiectasis, severe bilateral hearing impairment (patient has a nonfunctioning hearing aid at home)  hypothyroidism, with most recent discharge April 14 2024 following cough and dyspnoea with completion of course of antibiotics, with patient seen by Hematology for leukopenia and thrombocytopenia suspected from patient's underlying pneumonia, with patient self referring to the ER--aide in attendance--with patient with poor PO for the past 3 days with nausea, no vomiting.  Patient with dull head fullness earlier.  No fever, no chills, no rigors.  NO hemoptysis.  NO HA no focal weakness.  NO abdominal pain, no red blood per rectum or melena.  NO back pain, no tearing back pain.  NO dysuria, no hematuria. (08 May 2024 18:57):    now pulm called for evaluation  pt is hard of hearing but understands questions:   she is not in any resp distress:  she says she feels very weak : she si on room air:  and  never smoked:   above noted:  she is also recently treated for pneumonia        ?FOLLOWING PRESENT  [ x] Hx of PE/DVT, [x ] Hx COPD, [x ] Hx of Asthma, [ y] Hx of Hospitalization, [x ]  Hx of BiPAP/CPAP use, [x ] Hx of CATHLEEN    Allergies    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 reported allergy (Unknown)  penicillin (Rash)    Intolerances        PAST MEDICAL & SURGICAL HISTORY:  Pulmonary TB      Gastritis      Afib      SBO (small bowel obstruction)      Hypothyroid      HTN (hypertension)      TB (pulmonary tuberculosis)  TB of abdomen 1960      Orthostatic hypotension      Asthma      Aortic stenosis      Multifocal pneumonia      History of Bilateral Breast Biopsy      S/P Exploratory Laparotomy  TB in 1960s      H/O hemorrhoidectomy      S/P vein stripping          FAMILY HISTORY:  Family history of essential hypertension        Social History: [  x] TOBACCO                  [ x ] ETOH                                 [x] IVDA/DRUGS    REVIEW OF SYSTEMS      General:	weakness    Skin/Breast:x  	  Ophthalmologic:x  	  ENMT:	x    Respiratory and Thorax:  no cough, no phlegm  	  Cardiovascular:	x    Gastrointestinal:	 decreased po intake:  nausea    Genitourinary:	x    Musculoskeletal:	x    Neurological:	x    Psychiatric:	x    Hematology/Lymphatics:	x    Endocrine:	x    Allergic/Immunologic:	x    MEDICATIONS  (STANDING):  albuterol/ipratropium for Nebulization 3 milliLiter(s) Nebulizer every 6 hours  aMIOdarone    Tablet 200 milliGRAM(s) Oral <User Schedule>  apixaban 2.5 milliGRAM(s) Oral <User Schedule>  cefepime   IVPB 1000 milliGRAM(s) IV Intermittent every 12 hours  chlorhexidine 2% Cloths 1 Application(s) Topical daily  dextrose 10% Bolus 125 milliLiter(s) IV Bolus once  dextrose 5%. 1000 milliLiter(s) (50 mL/Hr) IV Continuous <Continuous>  dextrose 5%. 1000 milliLiter(s) (100 mL/Hr) IV Continuous <Continuous>  dextrose 50% Injectable 25 Gram(s) IV Push once  dextrose 50% Injectable 12.5 Gram(s) IV Push once  glucagon  Injectable 1 milliGRAM(s) IntraMuscular once  levothyroxine 100 MICROGram(s) Oral daily  pantoprazole    Tablet 40 milliGRAM(s) Oral before breakfast  sertraline 50 milliGRAM(s) Oral daily    MEDICATIONS  (PRN):  acetaminophen     Tablet .. 650 milliGRAM(s) Oral every 6 hours PRN Temp greater or equal to 38C (100.4F), Mild Pain (1 - 3)  dextrose Oral Gel 15 Gram(s) Oral once PRN Blood Glucose LESS THAN 70 milliGRAM(s)/deciliter       Vital Signs Last 24 Hrs  T(C): 36.6 (09 May 2024 11:28), Max: 36.8 (09 May 2024 00:18)  T(F): 97.8 (09 May 2024 11:28), Max: 98.3 (09 May 2024 00:18)  HR: 61 (09 May 2024 11:28) (58 - 66)  BP: 108/63 (09 May 2024 11:28) (108/63 - 151/71)  BP(mean): --  RR: 18 (09 May 2024 11:28) (16 - 19)  SpO2: 97% (09 May 2024 11:28) (90% - 100%)    Parameters below as of 09 May 2024 11:28  Patient On (Oxygen Delivery Method): room air    Orthostatic VS          I&O's Summary      Physical Exam:   GENERAL: NAD, well-groomed, well-developed  HEENT: KOLBY/   Atraumatic, Normocephalic  ENMT: No tonsillar erythema, exudates, or enlargement; Moist mucous membranes, Good dentition, No lesions  NECK: Supple, No JVD, Normal thyroid  CHEST/LUNG: sierra scattered cracekls  CVS: Regular rate and rhythm; No murmurs, rubs, or gallops  GI: : Soft, Nontender, Nondistended; Bowel sounds present  NERVOUS SYSTEM:  Alert & Oriented X3  EXTREMITIES:  - edema  LYMPH: No lymphadenopathy noted  SKIN: No rashes or lesions  ENDOCRINOLOGY: No Thyromegaly  PSYCH: Appropriate    Labs:  Venous<50<4>>45<<7.385>>Venous<<3><<4><<5<<459>>                            10.3   2.59  )-----------( 80       ( 09 May 2024 07:01 )             32.3                         10.3   2.53  )-----------( 80       ( 08 May 2024 12:37 )             32.2     05-09    138  |  104  |  11  ----------------------------<  91  4.1   |  24  |  0.68  05-08    139  |  104  |  15  ----------------------------<  114<H>  4.2   |  25  |  0.71    Ca    8.7      09 May 2024 07:04  Ca    9.0      08 May 2024 12:37    TPro  6.7  /  Alb  3.8  /  TBili  0.5  /  DBili  x   /  AST  15  /  ALT  11  /  AlkPhos  68  05-08    CAPILLARY BLOOD GLUCOSE      POCT Blood Glucose.: 156 mg/dL (09 May 2024 12:48)  POCT Blood Glucose.: 106 mg/dL (09 May 2024 08:32)    LIVER FUNCTIONS - ( 08 May 2024 12:37 )  Alb: 3.8 g/dL / Pro: 6.7 g/dL / ALK PHOS: 68 U/L / ALT: 11 U/L / AST: 15 U/L / GGT: x             Urinalysis Basic - ( 09 May 2024 07:04 )    Color: x / Appearance: x / SG: x / pH: x  Gluc: 91 mg/dL / Ketone: x  / Bili: x / Urobili: x   Blood: x / Protein: x / Nitrite: x   Leuk Esterase: x / RBC: x / WBC x   Sq Epi: x / Non Sq Epi: x / Bacteria: x      D DImer      Studies  Chest X-RAY  CT SCAN Chest   CT Abdomen  Venous Dopplers: LE:   Others        rad< from: CT Chest w/ IV Cont (05.08.24 @ 16:04) >  IMPRESSION:  Redemonstration of tree-in-bud nodularity in the right upper lobe without   evidence of confluent infiltrate.  No acute pathology in the abdomen or pelvis.  Redemonstrated fibroid uterus and bilateral adnexal cysts present on   multiple prior studies    --- End of Report ---    < end of copied text >  < from: CT Chest No Cont (04.09.24 @ 00:18) >  Secretions within the trachea, left mainstem bronchus, left lower lobe   bronchus and bilateral distal small airways.    Foci of tree-in-bud nodularity in the right lung may reflect impacted   distal airways versus infectious/inflammatory small airways disease.    < end of copied text >  < from: CT Chest No Cont (08.31.23 @ 22:09) >  BONES: Old lateral left eighth and ninth rib fractures. No acute rib   fracture. Degenerative changes of the spine.    IMPRESSION:    1.  No acute intrathoracic trauma.  2.  Interlobular septal thickening and scattered groundglass airspace   opacities suggestive of interstitial and airspace pulmonary edema.  3.  New trace bilateral pleural effusions with adjacent compressive   atelectasis.  4.  Scattered nodular opacities in the right upper and middle lobes which   could be infectious in etiology.    --- End of Report ---           JEFF BAIN MD; Resident Radiologist  This document has been electronically signed.  DEMETRIA GUO MD; Attending Radiologist    < end of copied text >      < from: TTE W or WO Ultrasound Enhancing Agent (01.02.24 @ 14:39) >  _______________________________________________________________________________________     CONCLUSIONS:      1. Left ventricular cavity is normal. Left ventricular wall thickness is normal. Left ventricular systolic function is normal with an ejection fraction of 59 % by Mireles's method of disks. There are no regional wall motion abnormalities seen.   2. No pericardial effusion seen.   3. Moderate aortic stenosis.   4. Moderate aortic regurgitation.    < end of copied text >  < from: TTE W or WO Ultrasound Enhancing Agent (01.02.24 @ 14:39) >  _______________________________________________________________________________________     CONCLUSIONS:      1. Left ventricular cavity is normal. Left ventricular wall thickness is normal. Left ventricular systolic function is normal with an ejection fraction of 59 % by Mireles's method of disks. There are no regional wall motion abnormalities seen.   2. No pericardial effusion seen.   3. Moderate aortic stenosis.   4. Moderate aortic regurgitation.    < end of copied text >      < from: CT Chest w/ IV Cont (05.08.24 @ 16:04) >  IMPRESSION:  Redemonstration of tree-in-bud nodularity in the right upper lobe without   evidence of confluent infiltrate.  No acute pathology in the abdomen or pelvis.  Redemonstrated fibroid uterus and bilateral adnexal cysts present on   multiple prior studies    --- End of Report ---    < end of copied text >    
[facility]AT[facility]Y/ONCOLOGY CONSULT NOTE     HPI:  Ms. Slater is a 89 year old female with PMHx of CATHLEEN, bronchiectasis, HTN, paroxysmal AFib, epistaxis, GERD, small-bowel obstruction, tuberculosis peritonitis, nephrolithiasis, hypothyroidism, vitamin-D deficiency, AS, bilateral hearing loss, history of falls, he was now admitted with pneumonia with CT chest showing persistent right upper lobe findings as well as bronchiectasis of the left apex. Pt has a history of pancytopenia as outpatient with Leukopenia with since about 3y prior initially lymphopenia and then with neutropenia, progressive, anemia over the past year gradually worsening, with progressive macrocytosis, and thrombocytopenia over the past two years.     Patient was previously discharged from [facility] on 04/14/2024 when she was initially admitted on 04/08/2024 with pneumonia with CT Chest 04/09/2024 showing secretions within the trachea, left mainstem bronchus, left lower lobe bronchus and bilateral distal small airways with foci of tree-in-bud nodularity in the right lung may reflect impacted distal airways versus infectious/inflammatory small airways disease and was treated with antibiotics per ID and primary team.  She denied history of blood disorders, bleeding complications, or personal history of malignancy. She denied smoking, ETOH use. She lives in her apt with a 24hr aide and her daughter visits regularly.        ROS: pertinent positives and negatives as per HPI    Allergies: 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 reported allergy (Unknown)  penicillin (Rash)      PMHx:  Pulmonary TB  Gastritis  Afib  SBO (small bowel obstruction)  Hypothyroid  UTI (urinary tract infection)  HTN (hypertension)  TB (pulmonary tuberculosis)  Orthostatic hypotension  Asthma  Aortic stenosis  Multifocal pneumonia    SurgHx:   Afib  History of Small Bowel Obstruction  History of Bilateral Breast Biopsy  S/P Exploratory Laparotomy  H/O hemorrhoidectomy  S/P vein stripping    FHx:   Family history of essential hypertension      SocHx:   Denied smoking, ETOH  Lives at home with 24hr aide and daughter visits     Meds:   MEDICATIONS  (STANDING):  albuterol/ipratropium for Nebulization 3 milliLiter(s) Nebulizer every 6 hours  aMIOdarone    Tablet 200 milliGRAM(s) Oral <User Schedule>  apixaban 2.5 milliGRAM(s) Oral <User Schedule>  chlorhexidine 2% Cloths 1 Application(s) Topical daily  dextrose 10% Bolus 125 milliLiter(s) IV Bolus once  dextrose 5%. 1000 milliLiter(s) (50 mL/Hr) IV Continuous <Continuous>  dextrose 5%. 1000 milliLiter(s) (100 mL/Hr) IV Continuous <Continuous>  dextrose 50% Injectable 25 Gram(s) IV Push once  dextrose 50% Injectable 12.5 Gram(s) IV Push once  glucagon  Injectable 1 milliGRAM(s) IntraMuscular once  levothyroxine 100 MICROGram(s) Oral daily  pantoprazole    Tablet 40 milliGRAM(s) Oral before breakfast  sertraline 50 milliGRAM(s) Oral daily    MEDICATIONS  (PRN):  acetaminophen     Tablet .. 650 milliGRAM(s) Oral every 6 hours PRN Temp greater or equal to 38C (100.4F), Mild Pain (1 - 3)  dextrose Oral Gel 15 Gram(s) Oral once PRN Blood Glucose LESS THAN 70 milliGRAM(s)/deciliter    Vital Signs  T(C): 37.1 (05-09-24 @ 16:17), Max: 37.1 (05-09-24 @ 16:17)  T(F): 98.7 (05-09-24 @ 16:17), Max: 98.7 (05-09-24 @ 16:17)  HR: 57 (05-09-24 @ 16:17) (57 - 66)  BP: 123/59 (05-09-24 @ 16:17) (108/63 - 151/71)  RR: 18 (05-09-24 @ 16:17) (18 - 19)  SpO2: 96% (05-09-24 @ 16:17) (96% - 100%)    Physical Exam:  Gen:  NAD  HEENT: EOMI, MMM  Chest: equal chest rise, speaking full sentences  Cardiac: irregular  Abd: soft, non-tender, no hepatomegaly or splenomegaly  Ext: No edema   Neuro: AAOx3, normal mood and affect, hard of hearing         Labs:                 10.3   2.59  )-----------( 80       ( 09 May 2024 07:01 )             32.3     CBC Full  -  ( 09 May 2024 07:01 )  WBC Count : 2.59 K/uL  RBC Count : 3.15 M/uL  Hemoglobin : 10.3 g/dL  Hematocrit : 32.3 %  Platelet Count - Automated : 80 K/uL  Mean Cell Volume : 102.5 fl  Mean Cell Hemoglobin : 32.7 pg  Mean Cell Hemoglobin Concentration : 31.9 gm/dL  Auto Neutrophil # : 2.10 K/uL  Auto Lymphocyte # : 0.33 K/uL  Auto Monocyte # : 0.11 K/uL  Auto Eosinophil # : 0.04 K/uL  Auto Basophil # : 0.00 K/uL  Auto Neutrophil % : 81.2 %  Auto Lymphocyte % : 12.7 %  Auto Monocyte % : 4.2 %  Auto Eosinophil % : 1.5 %  Auto Basophil % : 0.0 %    05-09    138  |  104  |  11  ----------------------------<  91  4.1   |  24  |  0.68    Ca    8.7      09 May 2024 07:04    TPro  6.7  /  Alb  3.8  /  TBili  0.5  /  DBili  x   /  AST  15  /  ALT  11  /  AlkPhos  68  05-08      
[facility], Division of Infectious Diseases  SWAPNIL Rene S. Shah, Y. Patel, G. Mark   [phone]  after hours and weekends [phone]    CATHRYN LARA  89y, Female  [id number]    HPI:  89f with paroxysmal atrial fib, pancytopenia, remote pulmonary and abdominal TB S/P resection , moderate AS, bronchiectasis, severe bilateral hearing impairment   hypothyroidism, presents from home with aide due to patient with poor PO for the past 3 days with nausea, no vomiting.   no fever no chills, no cough, no diarrhea, no dyspnea, no dysuria        PMH/PSH--  Pulmonary TB  Gastritis  Afib  SBO (small bowel obstruction)  Hypothyroid  UTI (urinary tract infection)  HTN (hypertension)  TB (pulmonary tuberculosis)  Orthostatic hypotension  Asthma  Aortic stenosis  Multifocal pneumonia  Afib  History of Small Bowel Obstruction  History of Bilateral Breast Biopsy  s/P Exploratory Laparotomy  H/O hemorrhoidectomy  S/P vein stripping        Allergies--penicillin       Medications--  Antibiotics: cefepime   IVPB 1000 milliGRAM(s) IV Intermittent every 12 hours    Immunologic:   Other: acetaminophen     Tablet .. PRN  albuterol/ipratropium for Nebulization  aMIOdarone    Tablet  apixaban  chlorhexidine 2% Cloths  dextrose 10% Bolus  dextrose 5%.  dextrose 5%.  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     Review of Systems:  REVIEW OF SYSTEMS  General: no fever, no chills, no wt loss	  Ophthalmologic: no blurry vision  Respiratory and Thorax: no cough, no dyspnea  Cardiovascular: no chest pain, no palpitations  Gastrointestinal:  no nausea, no vomiting, diarrhea  Genitourinary: no dysuria, no urgency, no frequency	  Musculoskeletal: no myalgias	  Neurological:  no headache	    Physical Exam--  Vital Signs: T(F): 97.6 (05-09-24 @ 15:04), Max: 98.3 (05-09-24 @ 00:18)  HR: 59 (05-09-24 @ 15:04)  BP: 113/59 (05-09-24 @ 15:04)  RR: 18 (05-09-24 @ 15:04)  SpO2: 97% (05-09-24 @ 15:04)  Wt(kg): --  General: Nontoxic-appearing Female in no acute distress.  HEENT: AT/NC.   Neck: Not rigid. No sense of mass.  Nodes: None palpable.  Lungs: Clear anteriorly   Heart: Regular rate and rhythm.   Abdomen: Bowel sounds present and normoactive. Soft. Nondistended. Nontender.   Extremities: No cyanosis or clubbing. No edema.   Skin: Warm. Dry. Good turgor. No rash. No vasculitic stigmata.  Psychiatric: Appropriate affect and mood for situation.         Laboratory & Imaging Data--  CBC                        10.3   2.59  )-----------( 80       ( 09 May 2024 07:01 )             32.3       Chemistries  05-09    138  |  104  |  11  ----------------------------<  91  4.1   |  24  |  0.68    Ca    8.7      09 May 2024 07:04    TPro  6.7  /  Alb  3.8  /  TBili  0.5  /  DBili  x   /  AST  15  /  ALT  11  /  AlkPhos  68  05-08      Culture Data    < from: CT Head No Cont (05.08.24 @ 16:05) >  VISUALIZED SINUSES:  Small polyp versus retention cyst along the inferior   aspect of the left maxillary sinus. No paranasal sinus air-fluid levels.  TYMPANOMASTOID CAVITIES:  Clear.  VISUALIZED ORBITS: Normal.  CALVARIUM: Intact.    MISCELLANEOUS: None.      IMPRESSION:  Age-appropriate involutional changes. Atherosclerotic changes. No acute   intracranial hemorrhage, mass effect, or midline shift. If headaches   persists, consider further evaluation via MR imaging to include DWI and   ADC mapping techniques, provided there are no contraindications.              < end of copied text >  < from: CT Abdomen and Pelvis w/ IV Cont (05.08.24 @ 16:04) >  Complications: None reported at time of study completion    PROCEDURE:  CT of the Chest, Abdomen and Pelvis was performed.  Sagittal and coronal reformats were performed.    FINDINGS:  CHEST:  LUNGS AND LARGE AIRWAYS: Patent central airways. There is scarring with   calcification and bronchiectasis in the left apex is similar to the prior   study. Punctate left upper lobe calcification is again noted. Linear   atelectasis seen at the left lung base. No confluent infiltrates are   appreciated. Small areas of tree-in-bud nodularity again appreciated in   the right upper lobe  PLEURA: No pleural effusion.  VESSELS: Atherosclerotic changes of the aorta and coronary arteries.  HEART: Heart size is normal. No pericardial effusion.  MEDIASTINUM AND TUNG: No lymphadenopathy.  CHEST WALL AND LOWER NECK: Within normal limits.    ABDOMEN AND PELVIS:  LIVER: Within normal limits.  BILE DUCTS: Normal caliber.  GALLBLADDER: Cholelithiasis. Nitrogen containing stones are again   identified  SPLEEN: Punctate calcification posterior inferior spleen again noted  PANCREAS: Within normal limits.  ADRENALS: Within normal limits.  KIDNEYS/URETERS: Small renal cysts. No hydronephrosis or urolithiasis    BLADDER: Minimally distended.  REPRODUCTIVE ORGANS: Calcified uterine fibroids. Bilateral adnexal cysts   larger on the right. Right cyst measures 4.2 x 4.1 cm and left cysts is   ovoid measuring 3.7 x 2.5 cm    BOWEL: No bowel obstruction. Appendix is normal.  PERITONEUM: No ascites.  VESSELS: Within normal limits.  RETROPERITONEUM/LYMPH NODES:No lymphadenopathy.  ABDOMINAL WALL: Within normal limits.  BONES: Degenerative changes. Moderate dextroscoliosis of the lumbar spine    IMPRESSION:  Redemonstration of tree-in-bud nodularity in the right upper lobe without   evidence of confluent infiltrate.  No acute pathology in the abdomen or pelvis.  Redemonstrated fibroid uterus and bilateral adnexal cysts present on   multiple prior studies    < end of copied text >

## 2024-05-09 NOTE — SWALLOW BEDSIDE ASSESSMENT ADULT - SLP GENERAL OBSERVATIONS
Pt encountered upright in chair, RA, NAD. Aox4, able to follow all directives. Endorses mild tenderness on hard palate, no thrush observed.

## 2024-05-09 NOTE — SWALLOW BEDSIDE ASSESSMENT ADULT - SWALLOW EVAL: DIAGNOSIS
Pt is a 89 y/o female admitted for nausea, found to have multifocal PNA. Pt presents with an oropharyngeal swallow sequence that appears WFL to tolerate a regular texture diet with no overt s/s of laryngeal penetration or aspiration.

## 2024-05-10 ENCOUNTER — TRANSCRIPTION ENCOUNTER (OUTPATIENT)
Age: 89
End: 2024-05-10

## 2024-05-10 LAB
GLUCOSE BLDC GLUCOMTR-MCNC: 122 MG/DL — HIGH (ref 70–99)
GLUCOSE BLDC GLUCOMTR-MCNC: 140 MG/DL — HIGH (ref 70–99)
GLUCOSE BLDC GLUCOMTR-MCNC: 164 MG/DL — HIGH (ref 70–99)
PROCALCITONIN SERPL-MCNC: 0.07 NG/ML — SIGNIFICANT CHANGE UP (ref 0.02–0.1)
TSH SERPL-MCNC: 0.56 UIU/ML — SIGNIFICANT CHANGE UP (ref 0.27–4.2)

## 2024-05-10 RX ORDER — PREGABALIN 225 MG/1
1000 CAPSULE ORAL ONCE
Refills: 0 | Status: COMPLETED | OUTPATIENT
Start: 2024-05-10 | End: 2024-05-11

## 2024-05-10 RX ADMIN — Medication 3 MILLILITER(S): at 17:42

## 2024-05-10 RX ADMIN — PANTOPRAZOLE SODIUM 40 MILLIGRAM(S): 20 TABLET, DELAYED RELEASE ORAL at 06:24

## 2024-05-10 RX ADMIN — Medication 3 MILLILITER(S): at 11:34

## 2024-05-10 RX ADMIN — Medication 3 MILLILITER(S): at 00:05

## 2024-05-10 RX ADMIN — APIXABAN 2.5 MILLIGRAM(S): 2.5 TABLET, FILM COATED ORAL at 08:38

## 2024-05-10 RX ADMIN — SERTRALINE 50 MILLIGRAM(S): 25 TABLET, FILM COATED ORAL at 08:38

## 2024-05-10 RX ADMIN — CHLORHEXIDINE GLUCONATE 1 APPLICATION(S): 213 SOLUTION TOPICAL at 11:37

## 2024-05-10 RX ADMIN — AMIODARONE HYDROCHLORIDE 200 MILLIGRAM(S): 400 TABLET ORAL at 05:38

## 2024-05-10 RX ADMIN — Medication 100 MICROGRAM(S): at 05:38

## 2024-05-10 RX ADMIN — Medication 3 MILLILITER(S): at 05:37

## 2024-05-10 NOTE — DISCHARGE NOTE PROVIDER - NSDCCPCAREPLAN_GEN_ALL_CORE_FT
PRINCIPAL DISCHARGE DIAGNOSIS  Diagnosis: Adult failure to thrive  Assessment and Plan of Treatment: CT chest  with persistent findings of RUL and bronchiectasis LEFT apex. ID following ->  No acute pneumonia, ua with pyuria -urine cx > [id number] gp organism. Pt no gu complaints, Ms at baseline, no fever, - asymptomatic bacteruria - no additional antibx indicated.      SECONDARY DISCHARGE DIAGNOSES  Diagnosis: Anemia of chronic disease  Assessment and Plan of Treatment: Pancytopenia, progressively worsening over past two years, outpatient follow up.

## 2024-05-10 NOTE — PROGRESS NOTE ADULT - ASSESSMENT
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      bronchiectasis  CTT with persistent findings of RUL and bronchiectasis LEFT apex  pulm and ID do not feel pna, stop abx  diet per S&S  Duoneb   stable on RA    asymptomatic bacteruria  no indication for GP in urine as pt asymptommatic hold abx    PAF   mod AS  cont amiodarone, apixaban   no longer on Norvasc  repeat limited echo    Hypothyroidism  cont synthroid    History of depression  continue patient's sertraline    HCP dtr Alyssa Slater  [facility] DNR/DNI documented in ED records    dc planning    Please call [facility] with questions [phone]. 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      bronchiectasis, concern for aspiration, resolved acute hypoxic resp failure  CTT with persistent findings of RUL and bronchiectasis LEFT apex  pulm and ID do not feel pna, stop abx  diet per S&S  Duoneb   stable on RA    asymptomatic bacteruria  no indication for GP in urine as pt asymptommatic hold abx    PAF   mod AS  cont amiodarone, apixaban   no longer on Norvasc    Hypothyroidism  cont synthroid    History of depression  continue patient's sertraline    HCP dtr Alyssa Slater  [facility] DNR/DNI documented in ED records    dc planning    Please call [facility] with questions [phone].

## 2024-05-10 NOTE — DISCHARGE NOTE PROVIDER - CARE PROVIDER_API CALL
Goldberg, Steven M  Cardiovascular Disease  [address] [place] [id number]  Phone: [phone]  Fax: [phone]  Follow Up Time:     Roni Polanco  [address], [address]  [place] [address]  Phone: [phone]  Fax: [phone]  Follow Up Time:

## 2024-05-10 NOTE — PROGRESS NOTE ADULT - SUBJECTIVE AND OBJECTIVE BOX
Patient is a 89y old  Female who presents with a chief complaint of Poor PO past 3 days, dull head fullness this AM, nausea. (10 May 2024 04:42)      SUBJECTIVE / OVERNIGHT EVENTS:  pt seen and examined. no acute events.    Vital Signs Last 24 Hrs  T(C): 36.8 (10 May 2024 08:30), Max: 37.3 (09 May 2024 20:57)  T(F): 98.3 (10 May 2024 08:30), Max: 99.1 (09 May 2024 20:57)  HR: 67 (10 May 2024 08:30) (57 - 67)  BP: 110/66 (10 May 2024 08:30) (104/55 - 143/66)  BP(mean): --  RR: 18 (10 May 2024 08:30) (18 - 18)  SpO2: 96% (10 May 2024 08:30) (92% - 97%)    Parameters below as of 10 May 2024 08:30  Patient On (Oxygen Delivery Method): room air      I&O's Summary    09 May 2024 07:01  -  10 May 2024 07:00  --------------------------------------------------------  IN: 340 mL / OUT: 0 mL / NET: 340 mL        PE:  GENERAL: NAD, AAOx2  CHEST/LUNG: coarse bs  HEART: Regular rate and rhythm; + murmur  ABDOMEN: Soft, Nontender, Nondistended; Bowel sounds present  EXTREMITIES:  2+ Peripheral Pulses, No edema  NEURO: No focal deficits      LABS:                        10.3   2.59  )-----------( 80       ( 09 May 2024 07:01 )             32.3     05-09    138  |  104  |  11  ----------------------------<  91  4.1   |  24  |  0.68    Ca    8.7      09 May 2024 07:04    TPro  6.7  /  Alb  3.8  /  TBili  0.5  /  DBili  x   /  AST  15  /  ALT  11  /  AlkPhos  68  05-08      CAPILLARY BLOOD GLUCOSE      POCT Blood Glucose.: 164 mg/dL (10 May 2024 08:13)  POCT Blood Glucose.: 156 mg/dL (09 May 2024 12:48)        Urinalysis Basic - ( 09 May 2024 07:04 )    Color: x / Appearance: x / SG: x / pH: x  Gluc: 91 mg/dL / Ketone: x  / Bili: x / Urobili: x   Blood: x / Protein: x / Nitrite: x   Leuk Esterase: x / RBC: x / WBC x   Sq Epi: x / Non Sq Epi: x / Bacteria: x        RADIOLOGY & ADDITIONAL TESTS:    Imaging Personally Reviewed:  [x] YES  [ ] NO    Consultant(s) Notes Reviewed:  [x] YES  [ ] NO    MEDICATIONS  (STANDING):  albuterol/ipratropium for Nebulization 3 milliLiter(s) Nebulizer every 6 hours  aMIOdarone    Tablet 200 milliGRAM(s) Oral <User Schedule>  apixaban 2.5 milliGRAM(s) Oral <User Schedule>  chlorhexidine 2% Cloths 1 Application(s) Topical daily  cyanocobalamin Injectable 1000 MICROGram(s) IntraMuscular once  dextrose 10% Bolus 125 milliLiter(s) IV Bolus once  dextrose 5%. 1000 milliLiter(s) (100 mL/Hr) IV Continuous <Continuous>  dextrose 5%. 1000 milliLiter(s) (50 mL/Hr) IV Continuous <Continuous>  dextrose 50% Injectable 25 Gram(s) IV Push once  dextrose 50% Injectable 12.5 Gram(s) IV Push once  glucagon  Injectable 1 milliGRAM(s) IntraMuscular once  levothyroxine 100 MICROGram(s) Oral daily  pantoprazole    Tablet 40 milliGRAM(s) Oral before breakfast  sertraline 50 milliGRAM(s) Oral daily    MEDICATIONS  (PRN):  acetaminophen     Tablet .. 650 milliGRAM(s) Oral every 6 hours PRN Temp greater or equal to 38C (100.4F), Mild Pain (1 - 3)  dextrose Oral Gel 15 Gram(s) Oral once PRN Blood Glucose LESS THAN 70 milliGRAM(s)/deciliter      Care Discussed with Consultants/Other Providers [x] YES  [ ] NO    HEALTH ISSUES - PROBLEM Dx:  Multifocal pneumonia    PAF (paroxysmal atrial fibrillation)    Moderate aortic stenosis    Hypothyroidism    History of depression    Advance care planning    Discharge planning issues         Patient is a 89y old  Female who presents with a chief complaint of Poor PO past 3 days, dull head fullness this AM, nausea. (10 May 2024 04:42)      SUBJECTIVE / OVERNIGHT EVENTS:  pt seen and examined. no acute events. on RA. no complaints.    Vital Signs Last 24 Hrs  T(C): 36.8 (10 May 2024 08:30), Max: 37.3 (09 May 2024 20:57)  T(F): 98.3 (10 May 2024 08:30), Max: 99.1 (09 May 2024 20:57)  HR: 67 (10 May 2024 08:30) (57 - 67)  BP: 110/66 (10 May 2024 08:30) (104/55 - 143/66)  BP(mean): --  RR: 18 (10 May 2024 08:30) (18 - 18)  SpO2: 96% (10 May 2024 08:30) (92% - 97%)    Parameters below as of 10 May 2024 08:30  Patient On (Oxygen Delivery Method): room air      I&O's Summary    09 May 2024 07:01  -  10 May 2024 07:00  --------------------------------------------------------  IN: 340 mL / OUT: 0 mL / NET: 340 mL        PE:  GENERAL: NAD, AAOx2  CHEST/LUNG: decreased effort  HEART: Regular rate and rhythm; + murmur  ABDOMEN: Soft, Nontender, Nondistended; Bowel sounds present  EXTREMITIES:  2+ Peripheral Pulses, No edema  NEURO: No focal deficits      LABS:                        10.3   2.59  )-----------( 80       ( 09 May 2024 07:01 )             32.3     05-09    138  |  104  |  11  ----------------------------<  91  4.1   |  24  |  0.68    Ca    8.7      09 May 2024 07:04    TPro  6.7  /  Alb  3.8  /  TBili  0.5  /  DBili  x   /  AST  15  /  ALT  11  /  AlkPhos  68  05-08      CAPILLARY BLOOD GLUCOSE      POCT Blood Glucose.: 164 mg/dL (10 May 2024 08:13)  POCT Blood Glucose.: 156 mg/dL (09 May 2024 12:48)        Urinalysis Basic - ( 09 May 2024 07:04 )    Color: x / Appearance: x / SG: x / pH: x  Gluc: 91 mg/dL / Ketone: x  / Bili: x / Urobili: x   Blood: x / Protein: x / Nitrite: x   Leuk Esterase: x / RBC: x / WBC x   Sq Epi: x / Non Sq Epi: x / Bacteria: x        RADIOLOGY & ADDITIONAL TESTS:    Imaging Personally Reviewed:  [x] YES  [ ] NO    Consultant(s) Notes Reviewed:  [x] YES  [ ] NO    MEDICATIONS  (STANDING):  albuterol/ipratropium for Nebulization 3 milliLiter(s) Nebulizer every 6 hours  aMIOdarone    Tablet 200 milliGRAM(s) Oral <User Schedule>  apixaban 2.5 milliGRAM(s) Oral <User Schedule>  chlorhexidine 2% Cloths 1 Application(s) Topical daily  cyanocobalamin Injectable 1000 MICROGram(s) IntraMuscular once  dextrose 10% Bolus 125 milliLiter(s) IV Bolus once  dextrose 5%. 1000 milliLiter(s) (100 mL/Hr) IV Continuous <Continuous>  dextrose 5%. 1000 milliLiter(s) (50 mL/Hr) IV Continuous <Continuous>  dextrose 50% Injectable 25 Gram(s) IV Push once  dextrose 50% Injectable 12.5 Gram(s) IV Push once  glucagon  Injectable 1 milliGRAM(s) IntraMuscular once  levothyroxine 100 MICROGram(s) Oral daily  pantoprazole    Tablet 40 milliGRAM(s) Oral before breakfast  sertraline 50 milliGRAM(s) Oral daily    MEDICATIONS  (PRN):  acetaminophen     Tablet .. 650 milliGRAM(s) Oral every 6 hours PRN Temp greater or equal to 38C (100.4F), Mild Pain (1 - 3)  dextrose Oral Gel 15 Gram(s) Oral once PRN Blood Glucose LESS THAN 70 milliGRAM(s)/deciliter      Care Discussed with Consultants/Other Providers [x] YES  [ ] NO    HEALTH ISSUES - PROBLEM Dx:  Multifocal pneumonia    PAF (paroxysmal atrial fibrillation)    Moderate aortic stenosis    Hypothyroidism    History of depression    Advance care planning    Discharge planning issues

## 2024-05-10 NOTE — PROGRESS NOTE ADULT - ASSESSMENT
Ms. Slater is a 89 year old female with PMHx of CATHLEEN, bronchiectasis, HTN, paroxysmal AFib, epistaxis, GERD, small-bowel obstruction, tuberculosis peritonitis, nephrolithiasis, hypothyroidism, vitamin-D deficiency, AS, bilateral hearing loss, history of falls, he was now admitted with pneumonia with CT chest showing persistent right upper lobe findings as well as bronchiectasis of the left apex. Pt has a history of pancytopenia as outpatient with Leukopenia with since about 3y prior initially lymphopenia and then with neutropenia, progressive, anemia over the past year gradually worsening, with progressive macrocytosis, and thrombocytopenia over the past two years.     Patient was previously discharged from [facility] on 04/14/2024 when she was initially admitted on 04/08/2024 with pneumonia with CT Chest 04/09/2024 showing secretions within the trachea, left mainstem bronchus, left lower lobe bronchus and bilateral distal small airways with foci of tree-in-bud nodularity in the right lung may reflect impacted distal airways versus infectious/inflammatory small airways disease and was treated with antibiotics per ID and primary team.   She denied history of blood disorders, bleeding complications, or personal history of malignancy. She denied smoking, ETOH use. She lives in her apt with a 24hr aide and her daughter visits regularly.    Labs on my initial evaluation showed hemoglobin 10.3, hematocrit 23.3, .5, WBC 2.59 with lymphopenia  ANC 2100 with a platelet count 80k.  Overall counts are stable/mildly improved compared to prior discharge.    # Pancytopenia  # Anemia of chronic disease   -  Outpatient labs show gradual progressive leukopenia x 3y, initial lymphopenia now lymphopenia   -- outpatient WBC ranging 1.9-4.6   - Thrombocytopenia outpatient ranging 85-100s x 2y  - Anemia ranging 9.5-11 over past year  - MCV with progressive macrocytosis over past year  - Ferritin 547, Iron panel sat 23%, B12low normal 249, Folate 11.7 normal  - Copper 108  - Previous paraproteinemia workup negative for obvious plasma cell dyscrasia SPEP/MOOKIE normal, Serum immunoglobulins normal, serum FLCs (ratio 1.44), LDH (normal), Beta-2 Mcg (mild elv 2.6),   - Transfuse PRBC to maintain Hb > 7   - Transfuse Plt to maintain Plt > 10k  - Trend CBC daily with Diff for now   - Discussed pancytopenia, progressively worsening over past two years      # Pneumonia  - CT Chest 05/08/2024 Patent central airways. There is scarring with  calcification and bronchiectasis in the left apex is similar to the prior study. Punctate left upper lobe calcification is again noted. Linear atelectasis seen at the left lung base. No confluent infiltrates are appreciated. Small areas of tree-in-bud nodularity again appreciated in the right upper lobe  - Antibiotics per ID and primary team.     #A.fib  # AS  - On Eliquis 2.5mg per cardiology  - Monitor platelet count, noted risk for falls, would have ongoing discussion with cardiology regarding risks vs benefits, no active or past hx of bleeding complications noted at this time   -Follows with Dr. Ricardo Foss, Structural Cardiologist, as outpatient     # History of Hypothyroidism  - Obtain TSH, understanding interpretation will be based on age and acute illness, given anemia   - Continue levothyroxine     # Coagulopathy  - Likely due to Eliquis  - Do not need to monitor regularly unless active bleeding noted    # Vit B12 deficiency  - continue Vit B12 1000mcg weekly IM while admitted    Thank you for allowing me to participate in the care of Ms. Slater, please do not hesitate to call or text me if you have further questions or concerns.     Roni Polanco MD  [facility]   Division of Hematology/Oncology  [address] [place] [address], [address]  [place] [address]  P: [phone]  F: [phone]    Attestation:   Total time spent on the encounter: >60 minutes   ----Including  face-to-face interaction in addition to chart review, reviewing treatment plan, and managing the patient’s chronic diagnoses as listed in the assessment----     1.	I have reviewed, analyzed and interpreted the following labs: CBC, CMP, imaging:  CT C/A/P impressions as above.   2.	I have reviewed notes stating pts current admission, consultants and follow ups.   3.	I have reviewed the past medical, family, and surgical history and confirmed with outpatient records    Ms. Slater is a 89 year old female with PMHx of CATHLEEN, bronchiectasis, HTN, paroxysmal AFib, epistaxis, GERD, small-bowel obstruction, tuberculosis peritonitis, nephrolithiasis, hypothyroidism, vitamin-D deficiency, AS, bilateral hearing loss, history of falls, he was now admitted with pneumonia with CT chest showing persistent right upper lobe findings as well as bronchiectasis of the left apex. Pt has a history of pancytopenia as outpatient with Leukopenia with since about 3y prior initially lymphopenia and then with neutropenia, progressive, anemia over the past year gradually worsening, with progressive macrocytosis, and thrombocytopenia over the past two years.     Patient was previously discharged from [facility] on 04/14/2024 when she was initially admitted on 04/08/2024 with pneumonia with CT Chest 04/09/2024 showing secretions within the trachea, left mainstem bronchus, left lower lobe bronchus and bilateral distal small airways with foci of tree-in-bud nodularity in the right lung may reflect impacted distal airways versus infectious/inflammatory small airways disease and was treated with antibiotics per ID and primary team.  She denied history of blood disorders, bleeding complications, or personal history of malignancy. She denied smoking, ETOH use. She lives in her apt with a 24hr aide and her daughter visits regularly.    Labs on my initial evaluation showed hemoglobin 10.3, hematocrit 23.3, .5, WBC 2.59 with lymphopenia  ANC 2100 with a platelet count 80k.  Overall counts are stable/mildly improved compared to prior discharge.    # Pancytopenia  # Anemia of chronic disease   -  Outpatient labs show gradual progressive leukopenia x 3y, initial lymphopenia now lymphopenia   -- outpatient WBC ranging 1.9-4.6   - Thrombocytopenia outpatient ranging 85-100s x 2y  - Anemia ranging 9.5-11 over past year  - MCV with progressive macrocytosis over past year  - Ferritin 547, Iron panel sat 23%, B12low normal 249, Folate 11.7 normal  - Copper 108  - Previous paraproteinemia workup negative for obvious plasma cell dyscrasia SPEP/MOOKIE normal, Serum immunoglobulins normal, serum FLCs (ratio 1.44), LDH (normal), Beta-2 Mcg (mild elv 2.6),   - Transfuse PRBC to maintain Hb > 7   - Transfuse Plt to maintain Plt > 10k  - Trend CBC daily with Diff for now   - Discussed pancytopenia, progressively worsening over past two years, outpatient follow up     # Pneumonia  - CT Chest 05/08/2024 Patent central airways. There is scarring with  calcification and bronchiectasis in the left apex is similar to the prior study. Punctate left upper lobe calcification is again noted. Linear atelectasis seen at the left lung base. No confluent infiltrates are appreciated. Small areas of tree-in-bud nodularity again appreciated in the right upper lobe  - ID recs stating currently without evidence of acute pneumonia and monitor off antibiotic therapy     #A.fib  # AS  - On Eliquis 2.5mg per cardiology  - Monitor platelet count, noted risk for falls, would have ongoing discussion with cardiology regarding risks vs benefits, no active or past hx of bleeding complications noted at this time   -Follows with Dr. Ricardo Foss, Structural Cardiologist, as outpatient     # History of Hypothyroidism  - Obtain TSH, understanding interpretation will be based on age and acute illness, given anemia   - Continue levothyroxine     # Coagulopathy  - Likely due to Eliquis  - Do not need to monitor regularly unless active bleeding noted    # Vit B12 deficiency  - continue Vit B12 1000mcg weekly IM while admitted    Thank you for allowing me to participate in the care of Ms. Slater, please do not hesitate to call or text me if you have further questions or concerns.     Roni Nadir Polanco MD  [facility]   Division of Hematology/Oncology  [address] [place] [address], [address]  [place] [address]  P: [phone]  F: [phone]    Attestation:    ----Pt evaluated including  face-to-face interaction in addition to chart review, reviewing treatment plan, and managing the patient’s chronic diagnoses as listed in the assessment----

## 2024-05-10 NOTE — PROGRESS NOTE ADULT - SUBJECTIVE AND OBJECTIVE BOX
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 reported allergy (Unknown)  penicillin (Rash)    Intolerances        Medications--  Antibiotics:    Immunologic:    Other:  acetaminophen     Tablet .. PRN  albuterol/ipratropium for Nebulization  aMIOdarone    Tablet  apixaban  chlorhexidine 2% Cloths  cyanocobalamin Injectable  dextrose 10% Bolus  dextrose 5%.  dextrose 5%.  dextrose 50% Injectable  dextrose 50% Injectable  dextrose Oral Gel PRN  glucagon  Injectable  levothyroxine  pantoprazole    Tablet  sertraline      Review of Systems--  A 10-point review of systems was obtained.     Pertinent positives and negatives--  Constitutional: No fevers. No Chills. No Rigors.   Cardiovascular: No chest pain. No palpitations.  Respiratory: No shortness of breath. No cough.  Gastrointestinal: No nausea or vomiting. No diarrhea or constipation.   Psychiatric: Pleasant. Appropriate affect.    Review of systems otherwise negative except as previously noted.    Physical Examination--  Vital Signs: T(F): 98.3 (05-10-24 @ 08:30), Max: 99.1 (05-09-24 @ 20:57)  HR: 67 (05-10-24 @ 08:30)  BP: 110/66 (05-10-24 @ 08:30)  RR: 18 (05-10-24 @ 08:30)  SpO2: 96% (05-10-24 @ 08:30)  Wt(kg): --  General: Nontoxic-appearing Female in no acute distress.  HEENT: AT/NC.   Neck: Not rigid. No sense of mass.  Nodes: None palpable.  Lungs: Clear bilaterally without rales, wheezing or rhonchi  Heart: Regular rate and rhythm.   Abdomen: Bowel sounds present and normoactive. Soft. Nondistended.   Extremities: No cyanosis or clubbing. No edema.   Skin: Warm. Dry. Good turgor. No rash. No vasculitic stigmata.  Psychiatric: Appropriate affect and mood for situation.         Laboratory Studies--  CBC                        10.3   2.59  )-----------( 80       ( 09 May 2024 07:01 )             32.3       Chemistries  05-09    138  |  104  |  11  ----------------------------<  91  4.1   |  24  |  0.68    Ca    8.7      09 May 2024 07:04    TPro  6.7  /  Alb  3.8  /  TBili  0.5  /  DBili  x   /  AST  15  /  ALT  11  /  AlkPhos  68  05-08      Culture Data    Culture - Urine (collected 08 May 2024 15:34)  Source: Clean Catch Clean Catch (Midstream)  Preliminary Report (10 May 2024 01:21):    >100,000 CFU/ml Gram positive organisms    Culture - Blood (collected 08 May 2024 14:30)  Source: .Blood Blood-Peripheral  Preliminary Report (09 May 2024 20:02):    No growth at 24 hours    Culture - Blood (collected 08 May 2024 14:00)  Source: .Blood Blood-Peripheral  Preliminary Report (09 May 2024 20:02):    No growth at 24 hours

## 2024-05-10 NOTE — PROGRESS NOTE ADULT - SUBJECTIVE AND OBJECTIVE BOX
[facility] HEMATOLOGY/ONCOLOGY INPATIENT PROGRESS NOTE     Interval Hx:   05-10-24: Ms. Slater was seen at bedside today.    Meds:   MEDICATIONS  (STANDING):  albuterol/ipratropium for Nebulization 3 milliLiter(s) Nebulizer every 6 hours  aMIOdarone    Tablet 200 milliGRAM(s) Oral <User Schedule>  apixaban 2.5 milliGRAM(s) Oral <User Schedule>  chlorhexidine 2% Cloths 1 Application(s) Topical daily  dextrose 10% Bolus 125 milliLiter(s) IV Bolus once  dextrose 5%. 1000 milliLiter(s) (100 mL/Hr) IV Continuous <Continuous>  dextrose 5%. 1000 milliLiter(s) (50 mL/Hr) IV Continuous <Continuous>  dextrose 50% Injectable 25 Gram(s) IV Push once  dextrose 50% Injectable 12.5 Gram(s) IV Push once  glucagon  Injectable 1 milliGRAM(s) IntraMuscular once  levothyroxine 100 MICROGram(s) Oral daily  pantoprazole    Tablet 40 milliGRAM(s) Oral before breakfast  sertraline 50 milliGRAM(s) Oral daily    MEDICATIONS  (PRN):  acetaminophen     Tablet .. 650 milliGRAM(s) Oral every 6 hours PRN Temp greater or equal to 38C (100.4F), Mild Pain (1 - 3)  dextrose Oral Gel 15 Gram(s) Oral once PRN Blood Glucose LESS THAN 70 milliGRAM(s)/deciliter    Vital Signs Last 24 Hrs  T(C): 37.1 (10 May 2024 00:34), Max: 37.3 (09 May 2024 20:57)  T(F): 98.8 (10 May 2024 00:34), Max: 99.1 (09 May 2024 20:57)  HR: 58 (10 May 2024 00:34) (57 - 65)  BP: 143/66 (10 May 2024 00:34) (108/63 - 151/71)  BP(mean): --  RR: 18 (10 May 2024 00:34) (18 - 18)  SpO2: 94% (10 May 2024 00:34) (94% - 98%)    Parameters below as of 10 May 2024 00:34  Patient On (Oxygen Delivery Method): room air    Physical Exam:  Gen:  NAD  HEENT: EOMI, MMM  Chest: equal chest rise, speaking full sentences  Cardiac: irregular  Abd: soft, non-tender, no hepatomegaly or splenomegaly  Ext: No edema   Neuro: AAOx3, normal mood and affect, hard of hearing     Labs:                        10.3   2.59  )-----------( 80       ( 09 May 2024 07:01 )             32.3     CBC Full  -  ( 09 May 2024 07:01 )  WBC Count : 2.59 K/uL  RBC Count : 3.15 M/uL  Hemoglobin : 10.3 g/dL  Hematocrit : 32.3 %  Platelet Count - Automated : 80 K/uL  Mean Cell Volume : 102.5 fl  Mean Cell Hemoglobin : 32.7 pg  Mean Cell Hemoglobin Concentration : 31.9 gm/dL  Auto Neutrophil # : 2.10 K/uL  Auto Lymphocyte # : 0.33 K/uL  Auto Monocyte # : 0.11 K/uL  Auto Eosinophil # : 0.04 K/uL  Auto Basophil # : 0.00 K/uL  Auto Neutrophil % : 81.2 %  Auto Lymphocyte % : 12.7 %  Auto Monocyte % : 4.2 %  Auto Eosinophil % : 1.5 %  Auto Basophil % : 0.0 %    05-09    138  |  104  |  11  ----------------------------<  91  4.1   |  24  |  0.68    Ca    8.7      09 May 2024 07:04    TPro  6.7  /  Alb  3.8  /  TBili  0.5  /  DBili  x   /  AST  15  /  ALT  11  /  AlkPhos  68  05-08       [facility] HEMATOLOGY/ONCOLOGY INPATIENT PROGRESS NOTE     Interval Hx:   05-10-24: Ms. Slater was seen at bedside today, awake and alert, no overnight events noted, noted ID recs stating currently without evidence of acute pneumonia and monitor off antibiotic therapy     Meds:   MEDICATIONS  (STANDING):  albuterol/ipratropium for Nebulization 3 milliLiter(s) Nebulizer every 6 hours  aMIOdarone    Tablet 200 milliGRAM(s) Oral <User Schedule>  apixaban 2.5 milliGRAM(s) Oral <User Schedule>  chlorhexidine 2% Cloths 1 Application(s) Topical daily  dextrose 10% Bolus 125 milliLiter(s) IV Bolus once  dextrose 5%. 1000 milliLiter(s) (100 mL/Hr) IV Continuous <Continuous>  dextrose 5%. 1000 milliLiter(s) (50 mL/Hr) IV Continuous <Continuous>  dextrose 50% Injectable 25 Gram(s) IV Push once  dextrose 50% Injectable 12.5 Gram(s) IV Push once  glucagon  Injectable 1 milliGRAM(s) IntraMuscular once  levothyroxine 100 MICROGram(s) Oral daily  pantoprazole    Tablet 40 milliGRAM(s) Oral before breakfast  sertraline 50 milliGRAM(s) Oral daily    MEDICATIONS  (PRN):  acetaminophen     Tablet .. 650 milliGRAM(s) Oral every 6 hours PRN Temp greater or equal to 38C (100.4F), Mild Pain (1 - 3)  dextrose Oral Gel 15 Gram(s) Oral once PRN Blood Glucose LESS THAN 70 milliGRAM(s)/deciliter    Vital Signs Last 24 Hrs  T(C): 37.1 (10 May 2024 00:34), Max: 37.3 (09 May 2024 20:57)  T(F): 98.8 (10 May 2024 00:34), Max: 99.1 (09 May 2024 20:57)  HR: 58 (10 May 2024 00:34) (57 - 65)  BP: 143/66 (10 May 2024 00:34) (108/63 - 151/71)  BP(mean): --  RR: 18 (10 May 2024 00:34) (18 - 18)  SpO2: 94% (10 May 2024 00:34) (94% - 98%)    Parameters below as of 10 May 2024 00:34  Patient On (Oxygen Delivery Method): room air    Physical Exam:  Gen:  NAD  HEENT: EOMI, MMM  Chest: equal chest rise, speaking full sentences  Cardiac: irregular  Abd: soft, non-tender, no hepatomegaly or splenomegaly  Ext: No edema   Neuro: AAOx3, normal mood and affect, hard of hearing     Labs:                        10.3   2.59  )-----------( 80       ( 09 May 2024 07:01 )             32.3     CBC Full  -  ( 09 May 2024 07:01 )  WBC Count : 2.59 K/uL  RBC Count : 3.15 M/uL  Hemoglobin : 10.3 g/dL  Hematocrit : 32.3 %  Platelet Count - Automated : 80 K/uL  Mean Cell Volume : 102.5 fl  Mean Cell Hemoglobin : 32.7 pg  Mean Cell Hemoglobin Concentration : 31.9 gm/dL  Auto Neutrophil # : 2.10 K/uL  Auto Lymphocyte # : 0.33 K/uL  Auto Monocyte # : 0.11 K/uL  Auto Eosinophil # : 0.04 K/uL  Auto Basophil # : 0.00 K/uL  Auto Neutrophil % : 81.2 %  Auto Lymphocyte % : 12.7 %  Auto Monocyte % : 4.2 %  Auto Eosinophil % : 1.5 %  Auto Basophil % : 0.0 %    05-09    138  |  104  |  11  ----------------------------<  91  4.1   |  24  |  0.68    Ca    8.7      09 May 2024 07:04    TPro  6.7  /  Alb  3.8  /  TBili  0.5  /  DBili  x   /  AST  15  /  ALT  11  /  AlkPhos  68  05-08

## 2024-05-10 NOTE — PROGRESS NOTE ADULT - ASSESSMENT
89f with paroxysmal atrial fib, pancytopenia, remote pulmonary and abdominal TB S/P resection , moderate AS, bronchiectasis, severe bilateral hearing impairment   hypothyroidism, presents from home with aide due to patient with poor PO for the past 3 days with nausea, no vomiting.   pneumonia ruled out  asymptomatic bacteruria     plan  blood cx neg to date    no cough, no hypoxia, no tachypnea   chest ct reviewed and compared to previous ct scan  no changes  no acute pneumonia  strict aspiration precautions    ua with pyuria  urine cx > 282055 gp organism  pt no gu complaints  ms at baseline, no fever,   asymptomatic bacteruria - no additional antibx indicated    oob as tolerated  stable ID stand point

## 2024-05-10 NOTE — DISCHARGE NOTE PROVIDER - CARE PROVIDERS DIRECT ADDRESSES
[email or web address]

## 2024-05-10 NOTE — PROGRESS NOTE ADULT - ASSESSMENT
Patient remotely known to me from an admission 3/7/2022--assigned to me at this point via the ER and by Dr. Andrew of the [facility]--patient with a history of PAF maintained on amiodarone, apixaban (previously on 2.5 mg BID from a discharge from Jan 2024 but recently discharged from [place] April 2024 on apixaban 2.5 at 8AM--confirmed with patient's pharmacy [email or web address]), remote pulmonary and abdominal TB S/P resection with past chronic partial  SBO, previous essential HTN apparently no longer on Norvasc, moderate AS, bronchiectasis, severe bilateral hearing impairment (patient has a nonfunctioning hearing aid at home)  hypothyroidism, with most recent discharge April 14 2024 following cough and dyspnoea with completion of course of antibiotics, with patient seen by Hematology for leukopenia and thrombocytopenia suspected from patient's underlying pneumonia, with patient self referring to the ER--aide in attendance--with patient with poor PO for the past 3 days with nausea, no vomiting.  Patient with dull head fullness earlier.  No fever, no chills, no rigors.  NO hemoptysis.  NO HA no focal weakness.  NO abdominal pain, no red blood per rectum or melena.  NO back pain, no tearing back pain.  NO dysuria, no hematuria. (08 May 2024 18:57):  now pulm called for evaluation  pt is hard of hearing but understands questions:   she is not in any resp distress:  she says she feels very weak : she si on room air:  and  never smoked:   above noted:  she is also recently treated for pneumonia    suspected pneumonia: / hypoxic resp failure  Old pulm/gi tb:  bronchiectasis  P A fIB  HTN  Moderate AS/MOD MR   hypothyroidism  pancytopenia    suspected pneumonia: / hypoxic resp failure  -was on 2 L of oxygen before and now on room air:   -no apparent resp distress  -started on cefepime empirically:   -ct chest showed:  Redemonstration of tree-in-bud nodularity in the right upper lobe without evidence of confluent infiltrate. No acute pathology in the abdomen or pelvis. Redemonstrated fibroid uterus and bilateral adnexal cysts present on  multiple prior studies  - I doubt she has pneumonia:  check procal  in am :  - ID to see   -The old bronchiectatic changes are likely reflective of old tuberculosis   -VBg seems reasonable mild elevation in co2:   -Old pulm/gi tb:  bronchiectasis  -s/p treatment   -no evidence of recurrence  5/10: seems OK:  add Symbicort  antibiotics dced:  procal  is low   P A fIB  -on amiodarone and eliquis:  defer to cards/ PMD   HTN  -controlled  Moderate AS/MOD MR   -per primary team  hypothyroidism  -on levothyroxine  pancytopenia  -per hemonc:  was evaluated before per  history     dvt prophylaxis    dw acp

## 2024-05-10 NOTE — DISCHARGE NOTE PROVIDER - NSDCMRMEDTOKEN_GEN_ALL_CORE_FT
Albuterol (Eqv-ProAir HFA) 90 mcg/inh inhalation aerosol: 2 puff(s) inhaled every 6 hours as needed  amiodarone 200 mg oral tablet: 1 tab(s) orally 5 times a week (Monday - Friday)  Eliquis 2.5 mg oral tablet: 1 tab(s) orally once a day  guaiFENesin 1200 mg oral tablet, extended release: 1 tab(s) orally every 12 hours  levothyroxine 100 mcg (0.1 mg) oral tablet: 1 tab(s) orally once a day  pantoprazole 40 mg oral delayed release tablet: 1 tab(s) orally once a day  Restasis 0.05% ophthalmic emulsion: 1 drop(s) in each eye once a day as needed  sertraline 50 mg oral tablet: 1 tab(s) orally once a day   Albuterol (Eqv-ProAir HFA) 90 mcg/inh inhalation aerosol: 2 puff(s) inhaled every 6 hours as needed  amiodarone 200 mg oral tablet: 1 tab(s) orally 5 times a week (Monday - Friday)  Eliquis 2.5 mg oral tablet: 1 tab(s) orally once a day  levothyroxine 100 mcg (0.1 mg) oral tablet: 1 tab(s) orally once a day  pantoprazole 40 mg oral delayed release tablet: 1 tab(s) orally once a day  sertraline 50 mg oral tablet: 1 tab(s) orally once a day  Symbicort 160 mcg-4.5 mcg/inh inhalation aerosol: 2 puff(s) inhaled 2 times a day

## 2024-05-10 NOTE — DISCHARGE NOTE PROVIDER - HOSPITAL COURSE
HPI:  NIGHT HOSPITALIST: Patient remotely known to me from an admission 3/7/2022--assigned to me at this point via the ER and by Dr. Andrew of the [facility]--patient with a history of PAF maintained on amiodarone, apixaban (previously on 2.5 mg BID from a discharge from Jan 2024 but recently discharged from [place] April 2024 on apixaban 2.5 at 8AM--confirmed with patient's pharmacy [facility]), remote pulmonary and abdominal TB S/P resection with past chronic partial  SBO, previous essential HTN apparently no longer on Norvasc, moderate AS, bronchiectasis, severe bilateral hearing impairment (patient has a nonfunctioning hearing aid at home)  hypothyroidism, with most recent discharge April 14 2024 following cough and dyspnoea with completion of course of antibiotics, with patient seen by Hematology for leukopenia and thrombocytopenia suspected from patient's underlying pneumonia, with patient self referring to the ER--aide in attendance--with patient with poor PO for the past 3 days with nausea, no vomiting. Patient with dull head fullness earlier.  No fever, no chills, no rigors.  NO hemoptysis.  NO HA no focal weakness.  NO abdominal pain, no red blood per rectum or melena.  NO back pain, no tearing back pain.  NO dysuria, no hematuria. (08 May 2024 18:57)    Hospital Course: PT presents to the ED with aide for nausea and decrease po intake. CT chest  with persistent findings of RUL and bronchiectasis LEFT apex. ID following ->  No acute pneumonia, ua with pyuria -urine cx > 070254 gp organism. Pt no gu complaints, Ms at baseline, no fever, - asymptomatic bacteruria - no additional antibx indicated. Of note pt also seen by Hematology-> Outpatient labs show gradual progressive leukopenia x 3y, initial lymphopenia now lymphopenia. Outpatient WBC ranging 1.9-4.6, Thrombocytopenia outpatient ranging 85-100s x 2y.  Previous paraproteinemia workup negative for obvious plasma cell dyscrasia SPEP/MOOKIE normal. Pancytopenia, progressively worsening over past two years, outpatient follow up. Pt now medically stable for discharge home with home PT.      Important Medication Changes and Reason:    Active or Pending Issues Requiring Follow-up:    Advanced Directives:   [ x] Full code  [ ] DNR  [ ] Hospice    Discharge Diagnoses:

## 2024-05-10 NOTE — PROGRESS NOTE ADULT - SUBJECTIVE AND OBJECTIVE BOX
Date of Service: 05-10-24 @ 15:29    Patient is a 89y old  Female who presents with a chief complaint of Poor PO past 3 days, dull head fullness this AM, nausea. (10 May 2024 11:56)      Any change in ROS: she is alert and awake and no sob:  on room air     MEDICATIONS  (STANDING):  albuterol/ipratropium for Nebulization 3 milliLiter(s) Nebulizer every 6 hours  aMIOdarone    Tablet 200 milliGRAM(s) Oral <User Schedule>  apixaban 2.5 milliGRAM(s) Oral <User Schedule>  chlorhexidine 2% Cloths 1 Application(s) Topical daily  cyanocobalamin Injectable 1000 MICROGram(s) IntraMuscular once  dextrose 10% Bolus 125 milliLiter(s) IV Bolus once  dextrose 5%. 1000 milliLiter(s) (100 mL/Hr) IV Continuous <Continuous>  dextrose 5%. 1000 milliLiter(s) (50 mL/Hr) IV Continuous <Continuous>  dextrose 50% Injectable 25 Gram(s) IV Push once  dextrose 50% Injectable 12.5 Gram(s) IV Push once  glucagon  Injectable 1 milliGRAM(s) IntraMuscular once  levothyroxine 100 MICROGram(s) Oral daily  pantoprazole    Tablet 40 milliGRAM(s) Oral before breakfast  sertraline 50 milliGRAM(s) Oral daily    MEDICATIONS  (PRN):  acetaminophen     Tablet .. 650 milliGRAM(s) Oral every 6 hours PRN Temp greater or equal to 38C (100.4F), Mild Pain (1 - 3)  dextrose Oral Gel 15 Gram(s) Oral once PRN Blood Glucose LESS THAN 70 milliGRAM(s)/deciliter    Vital Signs Last 24 Hrs  T(C): 36.8 (10 May 2024 12:30), Max: 37.3 (09 May 2024 20:57)  T(F): 98.2 (10 May 2024 12:30), Max: 99.1 (09 May 2024 20:57)  HR: 68 (10 May 2024 12:30) (57 - 68)  BP: 104/52 (10 May 2024 12:30) (104/52 - 143/66)  BP(mean): --  RR: 18 (10 May 2024 12:30) (18 - 18)  SpO2: 96% (10 May 2024 12:30) (92% - 97%)    Parameters below as of 10 May 2024 12:30  Patient On (Oxygen Delivery Method): room air        I&O's Summary    09 May 2024 07:01  -  10 May 2024 07:00  --------------------------------------------------------  IN: 340 mL / OUT: 0 mL / NET: 340 mL          Physical Exam:   GENERAL: NAD, well-groomed, well-developed  HEENT: KOLBY/   Atraumatic, Normocephalic  ENMT: No tonsillar erythema, exudates, or enlargement; Moist mucous membranes, Good dentition, No lesions  NECK: Supple, No JVD, Normal thyroid  CHEST/LUNG: Clear to auscultaion  CVS: Regular rate and rhythm; No murmurs, rubs, or gallops  GI: : Soft, Nontender, Nondistended; Bowel sounds present  NERVOUS SYSTEM:  Alert & Oriented X3  EXTREMITIES: - edema  LYMPH: No lymphadenopathy noted  SKIN: No rashes or lesions  ENDOCRINOLOGY: No Thyromegaly  PSYCH: Appropriate    Labs:  30                            10.3   2.59  )-----------( 80       ( 09 May 2024 07:01 )             32.3                         10.3   2.53  )-----------( 80       ( 08 May 2024 12:37 )             32.2     05-09    138  |  104  |  11  ----------------------------<  91  4.1   |  24  |  0.68  05-08    139  |  104  |  15  ----------------------------<  114<H>  4.2   |  25  |  0.71    Ca    8.7      09 May 2024 07:04    TPro  6.7  /  Alb  3.8  /  TBili  0.5  /  DBili  x   /  AST  15  /  ALT  11  /  AlkPhos  68  05-08    CAPILLARY BLOOD GLUCOSE      POCT Blood Glucose.: 122 mg/dL (10 May 2024 12:03)  POCT Blood Glucose.: 164 mg/dL (10 May 2024 08:13)          Urinalysis Basic - ( 09 May 2024 07:04 )    Color: x / Appearance: x / SG: x / pH: x  Gluc: 91 mg/dL / Ketone: x  / Bili: x / Urobili: x   Blood: x / Protein: x / Nitrite: x   Leuk Esterase: x / RBC: x / WBC x   Sq Epi: x / Non Sq Epi: x / Bacteria: x      Procalcitonin: 0.07 ng/mL (05-10 @ 07:10)        RECENT CULTURES:  05-08 @ 15:34 Clean Catch Clean Catch (Midstream)            rad< from: CT Chest w/ IV Cont (05.08.24 @ 16:04) >  IMPRESSION:  Redemonstration of tree-in-bud nodularity in the right upper lobe without   evidence of confluent infiltrate.  No acute pathology in the abdomen or pelvis.  Redemonstrated fibroid uterus and bilateral adnexal cysts present on   multiple prior studies    --- End of Report ---            ODETTE PEDERSON MD; Attending Radiologist  This document has been electronically signed. May  8 2024  4:55PM    < end of copied text >      >100,000 CFU/ml Gram positive organisms    05-08 @ 14:30 .Blood Blood-Peripheral                No growth at 24 hours    05-08 @ 14:00 .Blood Blood-Peripheral                No growth at 24 hours          RESPIRATORY CULTURES:          Studies  Chest X-RAY  CT SCAN Chest   Venous Dopplers: LE:   CT Abdomen  Others

## 2024-05-11 ENCOUNTER — TRANSCRIPTION ENCOUNTER (OUTPATIENT)
Age: 89
End: 2024-05-11

## 2024-05-11 VITALS
RESPIRATION RATE: 18 BRPM | SYSTOLIC BLOOD PRESSURE: 132 MMHG | TEMPERATURE: 98 F | OXYGEN SATURATION: 95 % | HEART RATE: 67 BPM | DIASTOLIC BLOOD PRESSURE: 66 MMHG

## 2024-05-11 LAB
-  AMPICILLIN: SIGNIFICANT CHANGE UP
-  CIPROFLOXACIN: SIGNIFICANT CHANGE UP
-  LEVOFLOXACIN: SIGNIFICANT CHANGE UP
-  NITROFURANTOIN: SIGNIFICANT CHANGE UP
-  TETRACYCLINE: SIGNIFICANT CHANGE UP
-  VANCOMYCIN: SIGNIFICANT CHANGE UP
CULTURE RESULTS: ABNORMAL
METHOD TYPE: SIGNIFICANT CHANGE UP
ORGANISM # SPEC MICROSCOPIC CNT: ABNORMAL
ORGANISM # SPEC MICROSCOPIC CNT: ABNORMAL
SPECIMEN SOURCE: SIGNIFICANT CHANGE UP

## 2024-05-11 PROCEDURE — 81001 URINALYSIS AUTO W/SCOPE: CPT

## 2024-05-11 PROCEDURE — 71260 CT THORAX DX C+: CPT | Mod: MC

## 2024-05-11 PROCEDURE — 87086 URINE CULTURE/COLONY COUNT: CPT

## 2024-05-11 PROCEDURE — 84132 ASSAY OF SERUM POTASSIUM: CPT

## 2024-05-11 PROCEDURE — 94640 AIRWAY INHALATION TREATMENT: CPT

## 2024-05-11 PROCEDURE — 82962 GLUCOSE BLOOD TEST: CPT

## 2024-05-11 PROCEDURE — 80053 COMPREHEN METABOLIC PANEL: CPT

## 2024-05-11 PROCEDURE — 82947 ASSAY GLUCOSE BLOOD QUANT: CPT

## 2024-05-11 PROCEDURE — 83605 ASSAY OF LACTIC ACID: CPT

## 2024-05-11 PROCEDURE — 84295 ASSAY OF SERUM SODIUM: CPT

## 2024-05-11 PROCEDURE — 87040 BLOOD CULTURE FOR BACTERIA: CPT

## 2024-05-11 PROCEDURE — 82803 BLOOD GASES ANY COMBINATION: CPT

## 2024-05-11 PROCEDURE — 87077 CULTURE AEROBIC IDENTIFY: CPT

## 2024-05-11 PROCEDURE — 85018 HEMOGLOBIN: CPT

## 2024-05-11 PROCEDURE — 83036 HEMOGLOBIN GLYCOSYLATED A1C: CPT

## 2024-05-11 PROCEDURE — 84145 PROCALCITONIN (PCT): CPT

## 2024-05-11 PROCEDURE — 71045 X-RAY EXAM CHEST 1 VIEW: CPT

## 2024-05-11 PROCEDURE — 86900 BLOOD TYPING SEROLOGIC ABO: CPT

## 2024-05-11 PROCEDURE — 70450 CT HEAD/BRAIN W/O DYE: CPT | Mod: MC

## 2024-05-11 PROCEDURE — 97161 PT EVAL LOW COMPLEX 20 MIN: CPT

## 2024-05-11 PROCEDURE — 86850 RBC ANTIBODY SCREEN: CPT

## 2024-05-11 PROCEDURE — 86901 BLOOD TYPING SEROLOGIC RH(D): CPT

## 2024-05-11 PROCEDURE — 96375 TX/PRO/DX INJ NEW DRUG ADDON: CPT

## 2024-05-11 PROCEDURE — 87186 SC STD MICRODIL/AGAR DIL: CPT

## 2024-05-11 PROCEDURE — 96374 THER/PROPH/DIAG INJ IV PUSH: CPT

## 2024-05-11 PROCEDURE — 82330 ASSAY OF CALCIUM: CPT

## 2024-05-11 PROCEDURE — 92610 EVALUATE SWALLOWING FUNCTION: CPT

## 2024-05-11 PROCEDURE — 84484 ASSAY OF TROPONIN QUANT: CPT

## 2024-05-11 PROCEDURE — 74177 CT ABD & PELVIS W/CONTRAST: CPT | Mod: MC

## 2024-05-11 PROCEDURE — 85014 HEMATOCRIT: CPT

## 2024-05-11 PROCEDURE — 84443 ASSAY THYROID STIM HORMONE: CPT

## 2024-05-11 PROCEDURE — 99285 EMERGENCY DEPT VISIT HI MDM: CPT | Mod: 25

## 2024-05-11 PROCEDURE — 80048 BASIC METABOLIC PNL TOTAL CA: CPT

## 2024-05-11 PROCEDURE — 36415 COLL VENOUS BLD VENIPUNCTURE: CPT

## 2024-05-11 PROCEDURE — 82435 ASSAY OF BLOOD CHLORIDE: CPT

## 2024-05-11 PROCEDURE — 85025 COMPLETE CBC W/AUTO DIFF WBC: CPT

## 2024-05-11 RX ORDER — BUDESONIDE AND FORMOTEROL FUMARATE DIHYDRATE 160; 4.5 UG/1; UG/1
2 AEROSOL RESPIRATORY (INHALATION)
Qty: 1 | Refills: 0
Start: 2024-05-11 | End: 2024-06-09

## 2024-05-11 RX ORDER — CYCLOSPORINE 0.5 MG/ML
1 EMULSION OPHTHALMIC
Refills: 0 | DISCHARGE

## 2024-05-11 RX ADMIN — PANTOPRAZOLE SODIUM 40 MILLIGRAM(S): 20 TABLET, DELAYED RELEASE ORAL at 06:42

## 2024-05-11 RX ADMIN — Medication 100 MICROGRAM(S): at 05:33

## 2024-05-11 RX ADMIN — APIXABAN 2.5 MILLIGRAM(S): 2.5 TABLET, FILM COATED ORAL at 08:32

## 2024-05-11 RX ADMIN — SERTRALINE 50 MILLIGRAM(S): 25 TABLET, FILM COATED ORAL at 12:18

## 2024-05-11 RX ADMIN — PREGABALIN 1000 MICROGRAM(S): 225 CAPSULE ORAL at 05:33

## 2024-05-11 RX ADMIN — Medication 3 MILLILITER(S): at 05:32

## 2024-05-11 RX ADMIN — Medication 3 MILLILITER(S): at 00:48

## 2024-05-11 NOTE — PROGRESS NOTE ADULT - REASON FOR ADMISSION
Poor PO past 3 days, dull head fullness this AM, nausea.

## 2024-05-11 NOTE — DISCHARGE NOTE NURSING/CASE MANAGEMENT/SOCIAL WORK - PATIENT PORTAL LINK FT
You can access the FollowMyHealth Patient Portal offered by [facility] by registering at the following website: [email or web address]. By joining "[employer]’s FollowMyHealth portal, you will also be able to view your health information using other applications (apps) compatible with our system.

## 2024-05-11 NOTE — PROGRESS NOTE ADULT - ASSESSMENT
Ms. Slater is a 89 year old female with PMHx of CATHLEEN, bronchiectasis, HTN, paroxysmal AFib, epistaxis, GERD, small-bowel obstruction, tuberculosis peritonitis, nephrolithiasis, hypothyroidism, vitamin-D deficiency, AS, bilateral hearing loss, history of falls, he was now admitted with pneumonia with CT chest showing persistent right upper lobe findings as well as bronchiectasis of the left apex. Pt has a history of pancytopenia as outpatient with Leukopenia with since about 3y prior initially lymphopenia and then with neutropenia, progressive, anemia over the past year gradually worsening, with progressive macrocytosis, and thrombocytopenia over the past two years.     Patient was previously discharged from [facility] on 04/14/2024 when she was initially admitted on 04/08/2024 with pneumonia with CT Chest 04/09/2024 showing secretions within the trachea, left mainstem bronchus, left lower lobe bronchus and bilateral distal small airways with foci of tree-in-bud nodularity in the right lung may reflect impacted distal airways versus infectious/inflammatory small airways disease and was treated with antibiotics per ID and primary team.  She denied history of blood disorders, bleeding complications, or personal history of malignancy. She denied smoking, ETOH use. She lives in her apt with a 24hr aide and her daughter visits regularly.    Labs on my initial evaluation showed hemoglobin 10.3, hematocrit 23.3, .5, WBC 2.59 with lymphopenia  ANC 2100 with a platelet count 80k.  Overall counts are stable/mildly improved compared to prior discharge.    # Pancytopenia  # Anemia of chronic disease   -  Outpatient labs show gradual progressive leukopenia x 3y, initial lymphopenia now lymphopenia   -- outpatient WBC ranging 1.9-4.6   - Thrombocytopenia outpatient ranging 85-100s x 2y  - Anemia ranging 9.5-11 over past year  - MCV with progressive macrocytosis over past year  - Ferritin 547, Iron panel sat 23%, B12low normal 249, Folate 11.7 normal  - Copper 108  - Previous paraproteinemia workup negative for obvious plasma cell dyscrasia SPEP/MOOKIE normal, Serum immunoglobulins normal, serum FLCs (ratio 1.44), LDH (normal), Beta-2 Mcg (mild elv 2.6),   - Transfuse PRBC to maintain Hb > 7   - Transfuse Plt to maintain Plt > 10k  - Trend CBC daily with Diff for now   - Discussed pancytopenia, progressively worsening over past two years, outpatient follow up     # Pneumonia  - CT Chest 05/08/2024 Patent central airways. There is scarring with  calcification and bronchiectasis in the left apex is similar to the prior study. Punctate left upper lobe calcification is again noted. Linear atelectasis seen at the left lung base. No confluent infiltrates are appreciated. Small areas of tree-in-bud nodularity again appreciated in the right upper lobe  - ID recs stating currently without evidence of acute pneumonia and monitor off antibiotic therapy     #A.fib  # AS  - On Eliquis 2.5mg per cardiology  - Monitor platelet count, noted risk for falls, would have ongoing discussion with cardiology regarding risks vs benefits, no active or past hx of bleeding complications noted at this time   -Follows with Dr. Ricardo Foss, Structural Cardiologist, as outpatient     # History of Hypothyroidism  - Obtain TSH, understanding interpretation will be based on age and acute illness, given anemia   - Continue levothyroxine     # Coagulopathy  - Likely due to Eliquis  - Do not need to monitor regularly unless active bleeding noted    # Vit B12 deficiency  - continue Vit B12 1000mcg weekly IM while admitted    Thank you for allowing me to participate in the care of Ms. Slater, please do not hesitate to call or text me if you have further questions or concerns.     Roni Nadir Polanco MD  [facility] [place]   Division of Hematology/Oncology  [address] [place] [address], [address]  [place] [address]  P: [phone]  F: [phone]    Attestation:    ----Pt evaluated including  face-to-face interaction in addition to chart review, reviewing treatment plan, and managing the patient’s chronic diagnoses as listed in the assessment----     Ms. Slater is a 89 year old female with PMHx of CATHLEEN, bronchiectasis, HTN, paroxysmal AFib, epistaxis, GERD, small-bowel obstruction, tuberculosis peritonitis, nephrolithiasis, hypothyroidism, vitamin-D deficiency, AS, bilateral hearing loss, history of falls, he was now admitted with pneumonia with CT chest showing persistent right upper lobe findings as well as bronchiectasis of the left apex. Pt has a history of pancytopenia as outpatient with Leukopenia with since about 3y prior initially lymphopenia and then with neutropenia, progressive, anemia over the past year gradually worsening, with progressive macrocytosis, and thrombocytopenia over the past two years.     Patient was previously discharged from [facility] on 04/14/2024 when she was initially admitted on 04/08/2024 with pneumonia with CT Chest 04/09/2024 showing secretions within the trachea, left mainstem bronchus, left lower lobe bronchus and bilateral distal small airways with foci of tree-in-bud nodularity in the right lung may reflect impacted distal airways versus infectious/inflammatory small airways disease and was treated with antibiotics per ID and primary team.  She denied history of blood disorders, bleeding complications, or personal history of malignancy. She denied smoking, ETOH use. She lives in her apt with a 24hr aide and her daughter visits regularly.    Labs on my initial evaluation showed hemoglobin 10.3, hematocrit 23.3, .5, WBC 2.59 with lymphopenia  ANC 2100 with a platelet count 80k.  Overall counts are stable/mildly improved compared to prior discharge.    Urine culture with E. faecalis 100k CFU, per ID asymptomatic therefore monitor off abx, pulmonary recs noted similar to previous findings no signs of active infection at this time. Spoke with pt again this morning, at this time she would prefer to defer invasive procedures such as BmBx and determine at later date as outpatient given hx of pancytopenia dating back to at least 2y prior.       # Pancytopenia  # Anemia of chronic disease   -  Outpatient labs show gradual progressive leukopenia x 3y, initial lymphopenia now lymphopenia   -- outpatient WBC ranging 1.9-4.6   - Thrombocytopenia outpatient ranging 85-100s x 2y  - Anemia ranging 9.5-11 over past year  - MCV with progressive macrocytosis over past year  - Ferritin 547, Iron panel sat 23%, B12low normal 249, Folate 11.7 normal  - Copper 108  - Previous paraproteinemia workup negative for obvious plasma cell dyscrasia SPEP/MOOKIE normal, Serum immunoglobulins normal, serum FLCs (ratio 1.44), LDH (normal), Beta-2 Mcg (mild elv 2.6),   - Transfuse PRBC to maintain Hb > 7   - Transfuse Plt to maintain Plt > 10k  - Trend CBC daily with Diff for now   - Discussed pancytopenia, progressively worsening over past two years, outpatient follow up per pt preference     # Pneumonia  - CT Chest 05/08/2024 Patent central airways. There is scarring with  calcification and bronchiectasis in the left apex is similar to the prior study. Punctate left upper lobe calcification is again noted. Linear atelectasis seen at the left lung base. No confluent infiltrates are appreciated. Small areas of tree-in-bud nodularity again appreciated in the right upper lobe  - ID recs stating currently without evidence of acute pneumonia and monitor off antibiotic therapy   - Pulmonary recs noted     #A.fib  # AS  - On Eliquis 2.5mg per cardiology  - Monitor platelet count, noted risk for falls, would have ongoing discussion with cardiology regarding risks vs benefits, no active or past hx of bleeding complications noted at this time   -Follows with Dr. Ricardo Foss, Structural Cardiologist, as outpatient     # History of Hypothyroidism  - Obtain TSH, understanding interpretation will be based on age and acute illness, given anemia   - Continue levothyroxine     # Coagulopathy  - Likely due to Eliquis  - Do not need to monitor regularly unless active bleeding noted    # Vit B12 deficiency  - continue Vit B12 1000mcg weekly IM while admitted    Thank you for allowing me to participate in the care of Ms. Slater, please do not hesitate to call or text me if you have further questions or concerns.     Roni Polanco MD  [facility] [place]   Division of Hematology/Oncology  [facility] [facility], [address]  [place] [address]  P: [phone]  F: [phone]    Attestation:    ----Pt evaluated including  face-to-face interaction in addition to chart review, reviewing treatment plan, and managing the patient’s chronic diagnoses as listed in the assessment----

## 2024-05-11 NOTE — DISCHARGE NOTE NURSING/CASE MANAGEMENT/SOCIAL WORK - NSDCPEFALRISK_GEN_ALL_CORE
For information on Fall & Injury Prevention, visit: [email or web address] OR  [email or web address] OR  https://www.cdc.gov/steadi/patient.html

## 2024-05-11 NOTE — PROGRESS NOTE ADULT - SUBJECTIVE AND OBJECTIVE BOX
[facility] HEMATOLOGY/ONCOLOGY INPATIENT PROGRESS NOTE     Interval Hx:   05-11-24: Ms. Slater was seen at bedside today.    Meds:   MEDICATIONS  (STANDING):  albuterol/ipratropium for Nebulization 3 milliLiter(s) Nebulizer every 6 hours  aMIOdarone    Tablet 200 milliGRAM(s) Oral <User Schedule>  apixaban 2.5 milliGRAM(s) Oral <User Schedule>  chlorhexidine 2% Cloths 1 Application(s) Topical daily  cyanocobalamin Injectable 1000 MICROGram(s) IntraMuscular once  dextrose 10% Bolus 125 milliLiter(s) IV Bolus once  dextrose 5%. 1000 milliLiter(s) (100 mL/Hr) IV Continuous <Continuous>  dextrose 5%. 1000 milliLiter(s) (50 mL/Hr) IV Continuous <Continuous>  dextrose 50% Injectable 25 Gram(s) IV Push once  dextrose 50% Injectable 12.5 Gram(s) IV Push once  glucagon  Injectable 1 milliGRAM(s) IntraMuscular once  levothyroxine 100 MICROGram(s) Oral daily  pantoprazole    Tablet 40 milliGRAM(s) Oral before breakfast  sertraline 50 milliGRAM(s) Oral daily    MEDICATIONS  (PRN):  acetaminophen     Tablet .. 650 milliGRAM(s) Oral every 6 hours PRN Temp greater or equal to 38C (100.4F), Mild Pain (1 - 3)  dextrose Oral Gel 15 Gram(s) Oral once PRN Blood Glucose LESS THAN 70 milliGRAM(s)/deciliter    Vital Signs Last 24 Hrs  T(C): 36.9 (11 May 2024 04:21), Max: 36.9 (10 May 2024 05:42)  T(F): 98.5 (11 May 2024 04:21), Max: 98.5 (10 May 2024 05:42)  HR: 78 (11 May 2024 04:21) (64 - 83)  BP: 144/60 (11 May 2024 04:21) (104/52 - 147/73)  BP(mean): --  RR: 18 (11 May 2024 04:21) (18 - 19)  SpO2: 94% (11 May 2024 04:21) (93% - 96%)    Parameters below as of 11 May 2024 04:21  Patient On (Oxygen Delivery Method): room air    Physical Exam:  Gen:  NAD  HEENT: EOMI, MMM  Chest: equal chest rise, speaking full sentences  Cardiac: irregular  Abd: soft, non-tender, no hepatomegaly or splenomegaly  Ext: No edema   Neuro: AAOx3, normal mood and affect, hard of hearing     Labs:                        10.3   2.59  )-----------( 80       ( 09 May 2024 07:01 )             32.3     CBC Full  -  ( 09 May 2024 07:01 )  WBC Count : 2.59 K/uL  RBC Count : 3.15 M/uL  Hemoglobin : 10.3 g/dL  Hematocrit : 32.3 %  Platelet Count - Automated : 80 K/uL  Mean Cell Volume : 102.5 fl  Mean Cell Hemoglobin : 32.7 pg  Mean Cell Hemoglobin Concentration : 31.9 gm/dL  Auto Neutrophil # : 2.10 K/uL  Auto Lymphocyte # : 0.33 K/uL  Auto Monocyte # : 0.11 K/uL  Auto Eosinophil # : 0.04 K/uL  Auto Basophil # : 0.00 K/uL  Auto Neutrophil % : 81.2 %  Auto Lymphocyte % : 12.7 %  Auto Monocyte % : 4.2 %  Auto Eosinophil % : 1.5 %  Auto Basophil % : 0.0 %    05-09    138  |  104  |  11  ----------------------------<  91  4.1   |  24  |  0.68    Ca    8.7      09 May 2024 07:04         [facility] HEMATOLOGY/ONCOLOGY INPATIENT PROGRESS NOTE     Interval Hx:   05-11-24: Ms. Slater was seen at bedside today, awake and alert, no acute complaints, no overnight events, urine culture with E. faecalis 100k CFU, per ID asymptomatic therefore monitor off abx, pulmonary recs noted similar to previous findings no signs of active infection at this time. Spoke with pt again this morning, at this time she would prefer to defer invasive procedures such as BmBx and determine at later date as outpatient given hx of pancytopenia dating back to at least 2y prior.     Meds:   MEDICATIONS  (STANDING):  albuterol/ipratropium for Nebulization 3 milliLiter(s) Nebulizer every 6 hours  aMIOdarone    Tablet 200 milliGRAM(s) Oral <User Schedule>  apixaban 2.5 milliGRAM(s) Oral <User Schedule>  chlorhexidine 2% Cloths 1 Application(s) Topical daily  cyanocobalamin Injectable 1000 MICROGram(s) IntraMuscular once  dextrose 10% Bolus 125 milliLiter(s) IV Bolus once  dextrose 5%. 1000 milliLiter(s) (100 mL/Hr) IV Continuous <Continuous>  dextrose 5%. 1000 milliLiter(s) (50 mL/Hr) IV Continuous <Continuous>  dextrose 50% Injectable 25 Gram(s) IV Push once  dextrose 50% Injectable 12.5 Gram(s) IV Push once  glucagon  Injectable 1 milliGRAM(s) IntraMuscular once  levothyroxine 100 MICROGram(s) Oral daily  pantoprazole    Tablet 40 milliGRAM(s) Oral before breakfast  sertraline 50 milliGRAM(s) Oral daily    MEDICATIONS  (PRN):  acetaminophen     Tablet .. 650 milliGRAM(s) Oral every 6 hours PRN Temp greater or equal to 38C (100.4F), Mild Pain (1 - 3)  dextrose Oral Gel 15 Gram(s) Oral once PRN Blood Glucose LESS THAN 70 milliGRAM(s)/deciliter    Vital Signs Last 24 Hrs  T(C): 36.9 (11 May 2024 04:21), Max: 36.9 (10 May 2024 05:42)  T(F): 98.5 (11 May 2024 04:21), Max: 98.5 (10 May 2024 05:42)  HR: 78 (11 May 2024 04:21) (64 - 83)  BP: 144/60 (11 May 2024 04:21) (104/52 - 147/73)  BP(mean): --  RR: 18 (11 May 2024 04:21) (18 - 19)  SpO2: 94% (11 May 2024 04:21) (93% - 96%)    Parameters below as of 11 May 2024 04:21  Patient On (Oxygen Delivery Method): room air    Physical Exam:  Gen:  NAD  HEENT: EOMI, MMM  Chest: equal chest rise, speaking full sentences  Cardiac: irregular  Abd: soft, non-tender, no hepatomegaly or splenomegaly  Ext: No edema   Neuro: AAOx3, normal mood and affect, hard of hearing     Labs:                        10.3   2.59  )-----------( 80       ( 09 May 2024 07:01 )             32.3     CBC Full  -  ( 09 May 2024 07:01 )  WBC Count : 2.59 K/uL  RBC Count : 3.15 M/uL  Hemoglobin : 10.3 g/dL  Hematocrit : 32.3 %  Platelet Count - Automated : 80 K/uL  Mean Cell Volume : 102.5 fl  Mean Cell Hemoglobin : 32.7 pg  Mean Cell Hemoglobin Concentration : 31.9 gm/dL  Auto Neutrophil # : 2.10 K/uL  Auto Lymphocyte # : 0.33 K/uL  Auto Monocyte # : 0.11 K/uL  Auto Eosinophil # : 0.04 K/uL  Auto Basophil # : 0.00 K/uL  Auto Neutrophil % : 81.2 %  Auto Lymphocyte % : 12.7 %  Auto Monocyte % : 4.2 %  Auto Eosinophil % : 1.5 %  Auto Basophil % : 0.0 %    05-09    138  |  104  |  11  ----------------------------<  91  4.1   |  24  |  0.68    Ca    8.7      09 May 2024 07:04

## 2024-06-06 ENCOUNTER — EMERGENCY (EMERGENCY)
Facility: HOSPITAL | Age: 89
LOS: 1 days | Discharge: HOME | End: 2024-06-06
Attending: STUDENT IN AN ORGANIZED HEALTH CARE EDUCATION/TRAINING PROGRAM | Admitting: STUDENT IN AN ORGANIZED HEALTH CARE EDUCATION/TRAINING PROGRAM
Payer: MEDICARE

## 2024-06-06 VITALS
HEART RATE: 68 BPM | HEIGHT: 65 IN | WEIGHT: 128.97 LBS | RESPIRATION RATE: 20 BRPM | DIASTOLIC BLOOD PRESSURE: 78 MMHG | SYSTOLIC BLOOD PRESSURE: 151 MMHG | TEMPERATURE: 98 F | OXYGEN SATURATION: 99 %

## 2024-06-06 DIAGNOSIS — Z98.89 OTHER SPECIFIED POSTPROCEDURAL STATES: Chronic | ICD-10-CM

## 2024-06-06 LAB
ADD ON TEST-SPECIMEN IN LAB: SIGNIFICANT CHANGE UP
ALBUMIN SERPL ELPH-MCNC: 3.6 G/DL — SIGNIFICANT CHANGE UP (ref 3.3–5)
ALP SERPL-CCNC: 61 U/L — SIGNIFICANT CHANGE UP (ref 40–120)
ALT FLD-CCNC: 8 U/L — LOW (ref 10–45)
ANION GAP SERPL CALC-SCNC: 12 MMOL/L — SIGNIFICANT CHANGE UP (ref 5–17)
ANISOCYTOSIS BLD QL: SLIGHT — SIGNIFICANT CHANGE UP
AST SERPL-CCNC: 13 U/L — SIGNIFICANT CHANGE UP (ref 10–40)
BASOPHILS # BLD AUTO: 0 K/UL — SIGNIFICANT CHANGE UP (ref 0–0.2)
BASOPHILS NFR BLD AUTO: 0 % — SIGNIFICANT CHANGE UP (ref 0–2)
BILIRUB SERPL-MCNC: 0.6 MG/DL — SIGNIFICANT CHANGE UP (ref 0.2–1.2)
BUN SERPL-MCNC: 10 MG/DL — SIGNIFICANT CHANGE UP (ref 7–23)
CALCIUM SERPL-MCNC: 9.1 MG/DL — SIGNIFICANT CHANGE UP (ref 8.4–10.5)
CHLORIDE SERPL-SCNC: 105 MMOL/L — SIGNIFICANT CHANGE UP (ref 96–108)
CO2 SERPL-SCNC: 23 MMOL/L — SIGNIFICANT CHANGE UP (ref 22–31)
CREAT SERPL-MCNC: 0.65 MG/DL — SIGNIFICANT CHANGE UP (ref 0.5–1.3)
DACRYOCYTES BLD QL SMEAR: SLIGHT — SIGNIFICANT CHANGE UP
EGFR: 79 ML/MIN/1.73M2 — SIGNIFICANT CHANGE UP
EOSINOPHIL # BLD AUTO: 0.04 K/UL — SIGNIFICANT CHANGE UP (ref 0–0.5)
EOSINOPHIL NFR BLD AUTO: 2.6 % — SIGNIFICANT CHANGE UP (ref 0–6)
FLUAV AG NPH QL: SIGNIFICANT CHANGE UP
FLUBV AG NPH QL: SIGNIFICANT CHANGE UP
GLUCOSE SERPL-MCNC: 100 MG/DL — HIGH (ref 70–99)
HCT VFR BLD CALC: 31.5 % — LOW (ref 34.5–45)
HGB BLD-MCNC: 10.1 G/DL — LOW (ref 11.5–15.5)
HYPOCHROMIA BLD QL: SLIGHT — SIGNIFICANT CHANGE UP
LYMPHOCYTES # BLD AUTO: 0.34 K/UL — LOW (ref 1–3.3)
LYMPHOCYTES # BLD AUTO: 21.7 % — SIGNIFICANT CHANGE UP (ref 13–44)
MACROCYTES BLD QL: SLIGHT — SIGNIFICANT CHANGE UP
MANUAL SMEAR VERIFICATION: SIGNIFICANT CHANGE UP
MCHC RBC-ENTMCNC: 32.1 GM/DL — SIGNIFICANT CHANGE UP (ref 32–36)
MCHC RBC-ENTMCNC: 32.9 PG — SIGNIFICANT CHANGE UP (ref 27–34)
MCV RBC AUTO: 102.6 FL — HIGH (ref 80–100)
MONOCYTES # BLD AUTO: 0.07 K/UL — SIGNIFICANT CHANGE UP (ref 0–0.9)
MONOCYTES NFR BLD AUTO: 4.4 % — SIGNIFICANT CHANGE UP (ref 2–14)
NEUTROPHILS # BLD AUTO: 1.13 K/UL — LOW (ref 1.8–7.4)
NEUTROPHILS NFR BLD AUTO: 71.3 % — SIGNIFICANT CHANGE UP (ref 43–77)
OVALOCYTES BLD QL SMEAR: SLIGHT — SIGNIFICANT CHANGE UP
PLAT MORPH BLD: NORMAL — SIGNIFICANT CHANGE UP
PLATELET # BLD AUTO: 63 K/UL — LOW (ref 150–400)
POIKILOCYTOSIS BLD QL AUTO: SIGNIFICANT CHANGE UP
POLYCHROMASIA BLD QL SMEAR: SLIGHT — SIGNIFICANT CHANGE UP
POTASSIUM SERPL-MCNC: 4 MMOL/L — SIGNIFICANT CHANGE UP (ref 3.5–5.3)
POTASSIUM SERPL-SCNC: 4 MMOL/L — SIGNIFICANT CHANGE UP (ref 3.5–5.3)
PROT SERPL-MCNC: 6.7 G/DL — SIGNIFICANT CHANGE UP (ref 6–8.3)
RBC # BLD: 3.07 M/UL — LOW (ref 3.8–5.2)
RBC # FLD: 13.6 % — SIGNIFICANT CHANGE UP (ref 10.3–14.5)
RBC BLD AUTO: ABNORMAL
RSV RNA NPH QL NAA+NON-PROBE: SIGNIFICANT CHANGE UP
SARS-COV-2 RNA SPEC QL NAA+PROBE: SIGNIFICANT CHANGE UP
SCHISTOCYTES BLD QL AUTO: SLIGHT — SIGNIFICANT CHANGE UP
SODIUM SERPL-SCNC: 140 MMOL/L — SIGNIFICANT CHANGE UP (ref 135–145)
WBC # BLD: 1.58 K/UL — LOW (ref 3.8–10.5)
WBC # FLD AUTO: 1.58 K/UL — LOW (ref 3.8–10.5)

## 2024-06-06 RX ORDER — AZITHROMYCIN 500 MG/1
500 TABLET, FILM COATED ORAL ONCE
Refills: 0 | Status: DISCONTINUED | OUTPATIENT
Start: 2024-06-06 | End: 2024-06-06

## 2024-06-06 RX ORDER — ALBUTEROL 90 UG/1
2.5 AEROSOL, METERED ORAL ONCE
Refills: 0 | Status: COMPLETED | OUTPATIENT
Start: 2024-06-06 | End: 2024-06-06

## 2024-06-06 RX ORDER — MAGNESIUM SULFATE 500 MG/ML
1 VIAL (ML) INJECTION ONCE
Refills: 0 | Status: COMPLETED | OUTPATIENT
Start: 2024-06-06 | End: 2024-06-06

## 2024-06-06 RX ORDER — CEFTRIAXONE 500 MG/1
1000 INJECTION, POWDER, FOR SOLUTION INTRAMUSCULAR; INTRAVENOUS ONCE
Refills: 0 | Status: COMPLETED | OUTPATIENT
Start: 2024-06-06 | End: 2024-06-06

## 2024-06-06 RX ADMIN — CEFTRIAXONE 1000 MILLIGRAM(S): 500 INJECTION, POWDER, FOR SOLUTION INTRAMUSCULAR; INTRAVENOUS at 23:36

## 2024-06-06 RX ADMIN — CEFTRIAXONE 100 MILLIGRAM(S): 500 INJECTION, POWDER, FOR SOLUTION INTRAMUSCULAR; INTRAVENOUS at 23:06

## 2024-06-06 RX ADMIN — ALBUTEROL 2.5 MILLIGRAM(S): 90 AEROSOL, METERED ORAL at 20:54

## 2024-06-06 NOTE — ED PROVIDER NOTE - OBJECTIVE STATEMENT
Patient is a 89-year-old female past medical history asthma,, hypertension, paroxysmal A-fib, GERD, SBO, tuberculosis peritonitis and pulmonary tuberculosis, hypothyroidism presenting for cough.  Patient states she has had mild cough for approximately 1 week but was worsening today with production of clear nonbloody sputum.  Not associated with shortness of breath or chest pain.  No dizziness, nausea, vomiting, syncope.  Says that she feels generalized fatigue with symptoms.  No measured fevers or chills.  No abdominal pain, urinary symptoms, stool changes, rashes.  No sick contacts.

## 2024-06-06 NOTE — ED PROVIDER NOTE - ATTENDING CONTRIBUTION TO CARE
89 F w/ hx ofOSA, bronchiectasis, HTN, paroxysmal AFib, epistaxis, GERD, small-bowel obstruction, tuberculosis peritonitis, nephrolithiasis, hypothyroidism, vitamin-D deficiency, AS, bilateral hearing loss, history of falls,  here w/ cough for 1 day, pt w/ no cp, no sob, accompanied by aide at home on 2 L by EMS due to hypoxia, pt w/ no nausea no vomiting but increased weakness.   on exam, pt is awake and alert oriented x3, mild tahcypnea 89 F w/ hx ofOSA, bronchiectasis, HTN, paroxysmal AFib, epistaxis, GERD, small-bowel obstruction, tuberculosis peritonitis, nephrolithiasis, hypothyroidism, vitamin-D deficiency, AS, bilateral hearing loss, history of falls,  here w/ cough for 1 day, pt w/ no cp, no sob, accompanied by aide at home on 2 L by EMS due to hypoxia, pt w/ no nausea no vomiting but increased weakness.   on exam, pt is awake and alert oriented moving arms and legs, 2+ radial pulse, mild tahcypnea rhonchi in the RLL, pt w/ soft abodmen, no lower leg edema, pt to have labs imaging and reassessment findings c/f possible pna vs asthma, dispo pending results. spoke w/ daughter at bedside, had similar hospitalization last month, pt baseline walks w/ a walker. accompanied by aide at bedside, RA sat of 91 percent,

## 2024-06-06 NOTE — ED ADULT NURSE NOTE - NSFALLHARMRISKINTERV_ED_ALL_ED

## 2024-06-06 NOTE — ED ADULT NURSE NOTE - OBJECTIVE STATEMENT
89 year old female patient presents to ED c/o lethargy, cough and SOB x 5 days. Patient place on 2L NC for work of breathing upon arrival. Patient arrived with aide and daughter at bedside- denies fevers/chills at home. Denies CP, abd pain, n/v/d, urinary symptoms. Patient well appearing with no acute distress noted. Patient aware of plan of care for monitoring. Nebulizer started. Awaiting results and dispo

## 2024-06-06 NOTE — ED PROVIDER NOTE - PHYSICAL EXAMINATION
CONSTITUTIONAL: awake; in no acute distress.   SKIN: warm, dry  HEAD: Normocephalic; atraumatic.  EYES: no conjunctival injection. no scleral icterus  ENT: No nasal discharge; airway clear.  CARD: S1, S2 normal; no murmurs, gallops, or rubs. Regular rate and rhythm.   RESP: No wheezes, rales or rhonchi. left base diminished airflow compared to R  ABD: soft ntnd, no guarding, no distention, no rigidity.   EXT: Ambulates independently.  No cyanosis or edema.   NEURO: Alert, oriented, grossly unremarkable  PSYCH: Cooperative, appropriate.

## 2024-06-06 NOTE — ED PROVIDER NOTE - PROGRESS NOTE DETAILS
Edilson De La Paz MD PGY2: worsening pancytopenia from prior, given symptoms to treat pna. qtc long, to give ctx hold azithro, give mg. to eval ct chest for occult pna.

## 2024-06-06 NOTE — ED PROVIDER NOTE - CLINICAL SUMMARY MEDICAL DECISION MAKING FREE TEXT BOX
Patient is a 89-year-old female past medical history asthma,, hypertension, paroxysmal A-fib, GERD, SBO, tuberculosis peritonitis and pulmonary tuberculosis, hypothyroidism presenting for cough. With vital signs currently within normal limits and stable, including not hypoxic on room air (initially found to be hypoxic by EMS requiring 3 L).  With new cough without hemoptysis, some diminished air sounds to left lower airfield, but generally well-appearing.  Differential includes but not limited to pneumonia versus viral syndrome, lower likelihood symptomatic anemia or electrolyte abnormality.  To evaluate x-ray, viral studies, labs. Dispo per results and reassessment. Edilson De La Paz MD PGY2

## 2024-06-07 ENCOUNTER — TRANSCRIPTION ENCOUNTER (OUTPATIENT)
Age: 89
End: 2024-06-07

## 2024-06-07 VITALS
RESPIRATION RATE: 18 BRPM | TEMPERATURE: 98 F | SYSTOLIC BLOOD PRESSURE: 163 MMHG | DIASTOLIC BLOOD PRESSURE: 78 MMHG | HEART RATE: 65 BPM | OXYGEN SATURATION: 96 %

## 2024-06-07 DIAGNOSIS — I48.20 CHRONIC ATRIAL FIBRILLATION, UNSPECIFIED: ICD-10-CM

## 2024-06-07 DIAGNOSIS — Z29.9 ENCOUNTER FOR PROPHYLACTIC MEASURES, UNSPECIFIED: ICD-10-CM

## 2024-06-07 DIAGNOSIS — R05.9 COUGH, UNSPECIFIED: ICD-10-CM

## 2024-06-07 DIAGNOSIS — D61.818 OTHER PANCYTOPENIA: ICD-10-CM

## 2024-06-07 DIAGNOSIS — J18.9 PNEUMONIA, UNSPECIFIED ORGANISM: ICD-10-CM

## 2024-06-07 DIAGNOSIS — R53.81 OTHER MALAISE: ICD-10-CM

## 2024-06-07 DIAGNOSIS — J45.909 UNSPECIFIED ASTHMA, UNCOMPLICATED: ICD-10-CM

## 2024-06-07 LAB
BASE EXCESS BLDV CALC-SCNC: 1 MMOL/L — SIGNIFICANT CHANGE UP (ref -2–3)
CA-I SERPL-SCNC: 1.23 MMOL/L — SIGNIFICANT CHANGE UP (ref 1.15–1.33)
CHLORIDE BLDV-SCNC: 104 MMOL/L — SIGNIFICANT CHANGE UP (ref 96–108)
CO2 BLDV-SCNC: 29 MMOL/L — HIGH (ref 22–26)
GAS PNL BLDV: 137 MMOL/L — SIGNIFICANT CHANGE UP (ref 136–145)
GAS PNL BLDV: SIGNIFICANT CHANGE UP
GAS PNL BLDV: SIGNIFICANT CHANGE UP
GLUCOSE BLDV-MCNC: 122 MG/DL — HIGH (ref 70–99)
HCO3 BLDV-SCNC: 27 MMOL/L — SIGNIFICANT CHANGE UP (ref 22–29)
HCT VFR BLD CALC: 31.3 % — LOW (ref 34.5–45)
HCT VFR BLDA CALC: 29 % — LOW (ref 34.5–46.5)
HGB BLD CALC-MCNC: 9.8 G/DL — LOW (ref 11.7–16.1)
HGB BLD-MCNC: 10 G/DL — LOW (ref 11.5–15.5)
LACTATE BLDV-MCNC: 1.7 MMOL/L — SIGNIFICANT CHANGE UP (ref 0.5–2)
MCHC RBC-ENTMCNC: 31.9 GM/DL — LOW (ref 32–36)
MCHC RBC-ENTMCNC: 32.8 PG — SIGNIFICANT CHANGE UP (ref 27–34)
MCV RBC AUTO: 102.6 FL — HIGH (ref 80–100)
NRBC # BLD: 0 /100 WBCS — SIGNIFICANT CHANGE UP (ref 0–0)
PCO2 BLDV: 49 MMHG — HIGH (ref 39–42)
PH BLDV: 7.35 — SIGNIFICANT CHANGE UP (ref 7.32–7.43)
PLATELET # BLD AUTO: 56 K/UL — LOW (ref 150–400)
PO2 BLDV: 42 MMHG — SIGNIFICANT CHANGE UP (ref 25–45)
POTASSIUM BLDV-SCNC: 3.5 MMOL/L — SIGNIFICANT CHANGE UP (ref 3.5–5.1)
RAPID RVP RESULT: SIGNIFICANT CHANGE UP
RBC # BLD: 3.05 M/UL — LOW (ref 3.8–5.2)
RBC # FLD: 13.5 % — SIGNIFICANT CHANGE UP (ref 10.3–14.5)
SAO2 % BLDV: 69.8 % — SIGNIFICANT CHANGE UP (ref 67–88)
SARS-COV-2 RNA SPEC QL NAA+PROBE: SIGNIFICANT CHANGE UP
WBC # BLD: 1.93 K/UL — LOW (ref 3.8–10.5)
WBC # FLD AUTO: 1.93 K/UL — LOW (ref 3.8–10.5)

## 2024-06-07 PROCEDURE — 85025 COMPLETE CBC W/AUTO DIFF WBC: CPT

## 2024-06-07 PROCEDURE — 82435 ASSAY OF BLOOD CHLORIDE: CPT

## 2024-06-07 PROCEDURE — 94640 AIRWAY INHALATION TREATMENT: CPT

## 2024-06-07 PROCEDURE — 87637 SARSCOV2&INF A&B&RSV AMP PRB: CPT

## 2024-06-07 PROCEDURE — 84145 PROCALCITONIN (PCT): CPT

## 2024-06-07 PROCEDURE — 85014 HEMATOCRIT: CPT

## 2024-06-07 PROCEDURE — 96365 THER/PROPH/DIAG IV INF INIT: CPT

## 2024-06-07 PROCEDURE — 96366 THER/PROPH/DIAG IV INF ADDON: CPT

## 2024-06-07 PROCEDURE — 82947 ASSAY GLUCOSE BLOOD QUANT: CPT

## 2024-06-07 PROCEDURE — 84132 ASSAY OF SERUM POTASSIUM: CPT

## 2024-06-07 PROCEDURE — 82803 BLOOD GASES ANY COMBINATION: CPT

## 2024-06-07 PROCEDURE — 93005 ELECTROCARDIOGRAM TRACING: CPT | Mod: 76

## 2024-06-07 PROCEDURE — 97161 PT EVAL LOW COMPLEX 20 MIN: CPT

## 2024-06-07 PROCEDURE — 80053 COMPREHEN METABOLIC PANEL: CPT

## 2024-06-07 PROCEDURE — 82330 ASSAY OF CALCIUM: CPT

## 2024-06-07 PROCEDURE — 85018 HEMOGLOBIN: CPT

## 2024-06-07 PROCEDURE — 36415 COLL VENOUS BLD VENIPUNCTURE: CPT

## 2024-06-07 PROCEDURE — 0225U NFCT DS DNA&RNA 21 SARSCOV2: CPT

## 2024-06-07 PROCEDURE — 99285 EMERGENCY DEPT VISIT HI MDM: CPT | Mod: 25

## 2024-06-07 PROCEDURE — 84295 ASSAY OF SERUM SODIUM: CPT

## 2024-06-07 PROCEDURE — 71045 X-RAY EXAM CHEST 1 VIEW: CPT

## 2024-06-07 PROCEDURE — 83605 ASSAY OF LACTIC ACID: CPT

## 2024-06-07 PROCEDURE — 71250 CT THORAX DX C-: CPT | Mod: MC

## 2024-06-07 RX ORDER — ALBUTEROL 90 UG/1
2 AEROSOL, METERED ORAL EVERY 6 HOURS
Refills: 0 | Status: DISCONTINUED | OUTPATIENT
Start: 2024-06-07 | End: 2024-06-07

## 2024-06-07 RX ORDER — SERTRALINE 25 MG/1
1 TABLET, FILM COATED ORAL
Refills: 0 | DISCHARGE

## 2024-06-07 RX ORDER — LEVOTHYROXINE SODIUM 125 MCG
100 TABLET ORAL DAILY
Refills: 0 | Status: DISCONTINUED | OUTPATIENT
Start: 2024-06-07 | End: 2024-06-07

## 2024-06-07 RX ORDER — SERTRALINE 25 MG/1
50 TABLET, FILM COATED ORAL DAILY
Refills: 0 | Status: DISCONTINUED | OUTPATIENT
Start: 2024-06-07 | End: 2024-06-07

## 2024-06-07 RX ORDER — ALBUTEROL 90 UG/1
2 AEROSOL, METERED ORAL
Qty: 0 | Refills: 0 | DISCHARGE

## 2024-06-07 RX ORDER — AMIODARONE HYDROCHLORIDE 400 MG/1
200 TABLET ORAL
Refills: 0 | Status: DISCONTINUED | OUTPATIENT
Start: 2024-06-07 | End: 2024-06-07

## 2024-06-07 RX ORDER — PANTOPRAZOLE SODIUM 20 MG/1
1 TABLET, DELAYED RELEASE ORAL
Refills: 0 | DISCHARGE

## 2024-06-07 RX ORDER — APIXABAN 2.5 MG/1
1 TABLET, FILM COATED ORAL
Refills: 0 | DISCHARGE

## 2024-06-07 RX ORDER — BUDESONIDE AND FORMOTEROL FUMARATE DIHYDRATE 160; 4.5 UG/1; UG/1
2 AEROSOL RESPIRATORY (INHALATION)
Qty: 1 | Refills: 0
Start: 2024-06-07 | End: 2024-07-06

## 2024-06-07 RX ORDER — AMIODARONE HYDROCHLORIDE 400 MG/1
1 TABLET ORAL
Refills: 0 | DISCHARGE

## 2024-06-07 RX ORDER — APIXABAN 2.5 MG/1
2.5 TABLET, FILM COATED ORAL
Refills: 0 | Status: DISCONTINUED | OUTPATIENT
Start: 2024-06-07 | End: 2024-06-07

## 2024-06-07 RX ORDER — CEFTRIAXONE 500 MG/1
1000 INJECTION, POWDER, FOR SOLUTION INTRAMUSCULAR; INTRAVENOUS EVERY 24 HOURS
Refills: 0 | Status: DISCONTINUED | OUTPATIENT
Start: 2024-06-07 | End: 2024-06-07

## 2024-06-07 RX ORDER — BUDESONIDE AND FORMOTEROL FUMARATE DIHYDRATE 160; 4.5 UG/1; UG/1
2 AEROSOL RESPIRATORY (INHALATION)
Qty: 1 | Refills: 0
Start: 2024-06-07

## 2024-06-07 RX ORDER — PANTOPRAZOLE SODIUM 20 MG/1
40 TABLET, DELAYED RELEASE ORAL
Refills: 0 | Status: DISCONTINUED | OUTPATIENT
Start: 2024-06-07 | End: 2024-06-07

## 2024-06-07 RX ORDER — BUDESONIDE AND FORMOTEROL FUMARATE DIHYDRATE 160; 4.5 UG/1; UG/1
2 AEROSOL RESPIRATORY (INHALATION)
Refills: 0 | Status: DISCONTINUED | OUTPATIENT
Start: 2024-06-07 | End: 2024-06-07

## 2024-06-07 RX ORDER — LEVOTHYROXINE SODIUM 125 MCG
1 TABLET ORAL
Refills: 0 | DISCHARGE

## 2024-06-07 RX ADMIN — APIXABAN 2.5 MILLIGRAM(S): 2.5 TABLET, FILM COATED ORAL at 09:39

## 2024-06-07 RX ADMIN — AMIODARONE HYDROCHLORIDE 200 MILLIGRAM(S): 400 TABLET ORAL at 09:40

## 2024-06-07 RX ADMIN — PANTOPRAZOLE SODIUM 40 MILLIGRAM(S): 20 TABLET, DELAYED RELEASE ORAL at 06:03

## 2024-06-07 RX ADMIN — BUDESONIDE AND FORMOTEROL FUMARATE DIHYDRATE 2 PUFF(S): 160; 4.5 AEROSOL RESPIRATORY (INHALATION) at 06:03

## 2024-06-07 RX ADMIN — Medication 100 MICROGRAM(S): at 06:03

## 2024-06-07 RX ADMIN — SERTRALINE 50 MILLIGRAM(S): 25 TABLET, FILM COATED ORAL at 11:07

## 2024-06-07 RX ADMIN — Medication 1 GRAM(S): at 01:14

## 2024-06-07 RX ADMIN — Medication 100 GRAM(S): at 00:14

## 2024-06-07 NOTE — H&P ADULT - PROBLEM SELECTOR PLAN 3
On Eliquis and amiodarone. EKG initially sinus rhythm qtc 376, repeat EKG then showing afib with qtc >500. Pt did not receive and qtc prolonging medication.   - Monitor on telemetry for now  - Repeat EKG in AM  - Continue amiodarone 200mg   - Continue Eliquis 2.5mg qd

## 2024-06-07 NOTE — DISCHARGE NOTE NURSING/CASE MANAGEMENT/SOCIAL WORK - NSDCFUADDAPPT_GEN_ALL_CORE_FT
APPTS ARE READY TO BE MADE: [X] YES    ** STAR PATIENT **    Best Family or Patient Contact (if needed):    Additional Information about above appointments (if needed):    1: Please follow up with primary care within 1 week.  2: Please follow up with hematology within 1 week.    Other comments or requests:

## 2024-06-07 NOTE — H&P ADULT - PROBLEM SELECTOR PLAN 2
Known history of pancytopenia dating back ~2 years. Appears relatively stable compared to previous admission.   - Last admission BMBx deferred and  plan for outpatient follow up  - Monitor CBC  - Transfuse PRBC to maintain Hb > 7   - Transfuse Plt to maintain Plt > 10k

## 2024-06-07 NOTE — H&P ADULT - NSHPLABSRESULTS_GEN_ALL_CORE
06-06    140  |  105  |  10  ----------------------------<  100<H>  4.0   |  23  |  0.65    Ca    9.1      06 Jun 2024 20:06    TPro  6.7  /  Alb  3.6  /  TBili  0.6  /  DBili  x   /  AST  13  /  ALT  8<L>  /  AlkPhos  61  06-06                  Urinalysis Basic - ( 06 Jun 2024 20:06 )    Color: x / Appearance: x / SG: x / pH: x  Gluc: 100 mg/dL / Ketone: x  / Bili: x / Urobili: x   Blood: x / Protein: x / Nitrite: x   Leuk Esterase: x / RBC: x / WBC x   Sq Epi: x / Non Sq Epi: x / Bacteria: x                            10.1   1.58  )-----------( 63       ( 06 Jun 2024 20:17 )             31.5     CAPILLARY BLOOD GLUCOSE        Blood Gas Source Venous: Venous (06-07-24 @ 00:15) 06-06    140  |  105  |  10  ----------------------------<  100<H>  4.0   |  23  |  0.65    Ca    9.1      06 Jun 2024 20:06    TPro  6.7  /  Alb  3.6  /  TBili  0.6  /  DBili  x   /  AST  13  /  ALT  8<L>  /  AlkPhos  61  06-06                  Urinalysis Basic - ( 06 Jun 2024 20:06 )    Color: x / Appearance: x / SG: x / pH: x  Gluc: 100 mg/dL / Ketone: x  / Bili: x / Urobili: x   Blood: x / Protein: x / Nitrite: x   Leuk Esterase: x / RBC: x / WBC x   Sq Epi: x / Non Sq Epi: x / Bacteria: x                            10.1   1.58  )-----------( 63       ( 06 Jun 2024 20:17 )             31.5     CAPILLARY BLOOD GLUCOSE    Blood Gas Source Venous: Venous (06-07-24 @ 00:15)    IMAGING    < from: CT Chest No Cont (06.06.24 @ 23:02) >    FINDINGS:    LUNGS AND LARGE AIRWAYS: Patent central airways.  7 mm right lower lobe   nodule (3-90). 5 mm groundglass nodule in the right upper lobe 3:30)   Bilateral dependent atelectasis. Tree-in-bud opacities in the bilateral   upper lobes. Left upper lobe bronchiectasis. Redemonstrated scattered   calcifications and scarring.  PLEURA: No pleural effusion.  VESSELS: Aortic calcification. Coronary artery calcification. Prominent   aorta the following measurements were obtained.  No tubular junction 3.2 cm. Ascending aorta 4.4 cm. Aortic arch 3.5 cm.   The ascending aorta 4.3 cm.  HEART: Cardiomegaly. No pericardial effusion.  MEDIASTINUM AND TUNG: No lymphadenopathy.  CHEST WALL AND LOWER NECK: Within normal limits.  VISUALIZED UPPER ABDOMEN: Within normal limits.  BONES: Degenerative changes. Scoliosis.    IMPRESSION:  Relatively unchanged chronic lung findings. Small pulmonary nodules for   which follow-up is recommended. Superimposed infection is not excluded.    < end of copied text >

## 2024-06-07 NOTE — CONSULT NOTE ADULT - SUBJECTIVE AND OBJECTIVE BOX
[facility] HEMATOLOGY/ONCOLOGY CONSULT NOTE     HPI:  Ms. Slater is a 89y Female with PMHx of asthma, HTN, essential tremor, bronchiectasis, GERD, hypothyroidism, paroxysmal A.fib on Eliquis 2.5mg BID, Vit D deficiency, tuberculous peritonitis, anemia moderate AS, hx of falls, aortic regurgitation presented with persistent cough with fatigue. CT Chest without contrast read as relatively unchanged chronic lung findings, small pulmonary nodules for which follow-up is recommended. Superimposed infection is not excluded.    Previously discharged from [facility] on 05/11/2024 and during that admission she was treated for pneumonia with CT Chest 04/09/2024 showing secretions within the trachea, left mainstem bronchus, left lower lobe bronchus and bilateral distal small airways with foci of tree-in-bud nodularity in the right lung may reflect impacted distal airways versusinfectious/inflammatory small airways disease.     Pt has a history of pancytopenia as outpatient with Leukopenia with since about 3y prior initially lymphopenia and then with neutropenia, progressive, anemia over the past year gradually worsening, with progressive macrocytosis, and thrombocytopenia over the past two years. Pt requested conservative treatment and denied BmBx a few times in the past.     She denied history of blood disorders, bleeding complications, or personal history of malignancy. She denied smoking, ETOH use. She lives in her apt with a 24hr aide and her daughter visits regularly.       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 reported allergy (Unknown)      PMHx:  Pulmonary TB  Gastritis  Afib  SBO (small bowel obstruction)  Hypothyroid  UTI (urinary tract infection)  HTN (hypertension)  Orthostatic hypotension  Asthma  Aortic stenosis  Multifocal pneumonia    SurgHx:   Afib  History of Small Bowel Obstruction  History of Bilateral Breast Biopsy  S/P Exploratory Laparotomy  H/O hemorrhoidectomy  S/P vein stripping    SocHx:   Denied smoking, ETOH  Lives at home iw 24hr aide and daughter visits     Meds:   MEDICATIONS  (STANDING):  aMIOdarone    Tablet 200 milliGRAM(s) Oral <User Schedule>  apixaban 2.5 milliGRAM(s) Oral <User Schedule>  budesonide 160 MICROgram(s)/formoterol 4.5 MICROgram(s) Inhaler 2 Puff(s) Inhalation two times a day  cefTRIAXone   IVPB 1000 milliGRAM(s) IV Intermittent every 24 hours  levothyroxine 100 MICROGram(s) Oral daily  pantoprazole    Tablet 40 milliGRAM(s) Oral before breakfast  sertraline 50 milliGRAM(s) Oral daily    MEDICATIONS  (PRN):  albuterol    90 MICROgram(s) HFA Inhaler 2 Puff(s) Inhalation every 6 hours PRN Shortness of Breath and/or Wheezing    Vital Signs  T(C): 37.1 (06-07-24 @ 05:31), Max: 37.6 (06-06-24 @ 20:48)  T(F): 98.7 (06-07-24 @ 05:31), Max: 99.6 (06-06-24 @ 20:48)  HR: 67 (06-07-24 @ 05:31) (61 - 68)  BP: 133/73 (06-07-24 @ 05:31) (128/62 - 158/72)  RR: 18 (06-07-24 @ 05:31) (18 - 22)  SpO2: 93% (06-07-24 @ 05:31) (93% - 99%)    Physical Exam:  Gen:  NAD  HEENT: EOMI, MMM  Chest: equal chest rise, speaking full sentences  Cardiac: irregular  Abd: soft, non-tender, no hepatomegaly or splenomegaly  Ext: No edema   Neuro: AAOx3, normal mood and affect, hard of hearing   Integument: RUE distal bruising near wrist     Labs:                        10.1   1.58  )-----------( 63       ( 06 Jun 2024 20:17 )             31.5     CBC Full  -  ( 06 Jun 2024 20:17 )  WBC Count : 1.58 K/uL  RBC Count : 3.07 M/uL  Hemoglobin : 10.1 g/dL  Hematocrit : 31.5 %  Platelet Count - Automated : 63 K/uL  Mean Cell Volume : 102.6 fl  Mean Cell Hemoglobin : 32.9 pg  Mean Cell Hemoglobin Concentration : 32.1 gm/dL  Auto Neutrophil # : 1.13 K/uL  Auto Lymphocyte # : 0.34 K/uL  Auto Monocyte # : 0.07 K/uL  Auto Eosinophil # : 0.04 K/uL  Auto Basophil # : 0.00 K/uL  Auto Neutrophil % : 71.3 %  Auto Lymphocyte % : 21.7 %  Auto Monocyte % : 4.4 %  Auto Eosinophil % : 2.6 %  Auto Basophil % : 0.0 %    06-06    140  |  105  |  10  ----------------------------<  100<H>  4.0   |  23  |  0.65    Ca    9.1      06 Jun 2024 20:06    TPro  6.7  /  Alb  3.6  /  TBili  0.6  /  DBili  x   /  AST  13  /  ALT  8<L>  /  AlkPhos  61  06-06      Bilirubin Total: 0.6 mg/dL (06-06-24 @ 20:06)   [facility] HEMATOLOGY/ONCOLOGY CONSULT NOTE     HPI:  Ms. Slater is a 89y Female with PMHx of asthma, HTN, essential tremor, bronchiectasis, GERD, hypothyroidism, paroxysmal A.fib on Eliquis 2.5mg BID, Vit D deficiency, tuberculous peritonitis, anemia moderate AS, hx of falls, aortic regurgitation presented with persistent cough with fatigue. CT Chest without contrast read as relatively unchanged chronic lung findings, small pulmonary nodules for which follow-up is recommended. Superimposed infection is not excluded.    Previously discharged from [facility] on [facility]/11/[facility]24 and during that admission she was treated for pneumonia with CT Chest 04/09/2024 showing secretions within the trachea, left mainstem bronchus, left lower lobe bronchus and bilateral distal small airways with foci of tree-in-bud nodularity in the right lung may reflect impacted distal airways versus infectious/inflammatory small airways disease.     Pt has a history of pancytopenia as outpatient with Leukopenia with since about 3y prior initially lymphopenia and then with neutropenia, progressive, anemia over the past year gradually worsening, with progressive macrocytosis, and thrombocytopenia over the past two years. Pt requested conservative treatment and denied BmBx a few times in the past. Discussed with pt again this admission and she stated she would not like to undergo aggressive interventions and she would not like to have a bone marrow biopsy, and she prefers conservative treatment otherwise.     She denied history of blood disorders, bleeding complications, or personal history of malignancy. She denied smoking, ETOH use. She lives in her apt with a 24hr aide and her daughter visits regularly.       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 reported allergy (Unknown)      PMHx:  Pulmonary TB  Gastritis  Afib  SBO (small bowel obstruction)  Hypothyroid  UTI (urinary tract infection)  HTN (hypertension)  Orthostatic hypotension  Asthma  Aortic stenosis  Multifocal pneumonia    SurgHx:   Afib  History of Small Bowel Obstruction  History of Bilateral Breast Biopsy  S/P Exploratory Laparotomy  H/O hemorrhoidectomy  S/P vein stripping    SocHx:   Denied smoking, ETOH  Lives at home iw 24hr aide and daughter visits     Meds:   MEDICATIONS  (STANDING):  aMIOdarone    Tablet 200 milliGRAM(s) Oral <User Schedule>  apixaban 2.5 milliGRAM(s) Oral <User Schedule>  budesonide 160 MICROgram(s)/formoterol 4.5 MICROgram(s) Inhaler 2 Puff(s) Inhalation two times a day  cefTRIAXone   IVPB 1000 milliGRAM(s) IV Intermittent every 24 hours  levothyroxine 100 MICROGram(s) Oral daily  pantoprazole    Tablet 40 milliGRAM(s) Oral before breakfast  sertraline 50 milliGRAM(s) Oral daily    MEDICATIONS  (PRN):  albuterol    90 MICROgram(s) HFA Inhaler 2 Puff(s) Inhalation every 6 hours PRN Shortness of Breath and/or Wheezing    Vital Signs  T(C): 37.1 (06-07-24 @ 05:31), Max: 37.6 (06-06-24 @ 20:48)  T(F): 98.7 (06-07-24 @ 05:31), Max: 99.6 (06-06-24 @ 20:48)  HR: 67 (06-07-24 @ 05:31) (61 - 68)  BP: 133/73 (06-07-24 @ 05:31) (128/62 - 158/72)  RR: 18 (06-07-24 @ 05:31) (18 - 22)  SpO2: 93% (06-07-24 @ 05:31) (93% - 99%)    Physical Exam:  Gen:  NAD  HEENT: EOMI, MMM  Chest: equal chest rise, speaking full sentences  Cardiac: irregular  Abd: soft, non-tender, no hepatomegaly or splenomegaly  Ext: No edema   Neuro: AAOx3, normal mood and affect, hard of hearing   Integument: RUE distal bruising near wrist     Labs:                        10.1   1.58  )-----------( 63       ( 06 Jun 2024 20:17 )             31.5     CBC Full  -  ( 06 Jun 2024 20:17 )  WBC Count : 1.58 K/uL  RBC Count : 3.07 M/uL  Hemoglobin : 10.1 g/dL  Hematocrit : 31.5 %  Platelet Count - Automated : 63 K/uL  Mean Cell Volume : 102.6 fl  Mean Cell Hemoglobin : 32.9 pg  Mean Cell Hemoglobin Concentration : 32.1 gm/dL  Auto Neutrophil # : 1.13 K/uL  Auto Lymphocyte # : 0.34 K/uL  Auto Monocyte # : 0.07 K/uL  Auto Eosinophil # : 0.04 K/uL  Auto Basophil # : 0.00 K/uL  Auto Neutrophil % : 71.3 %  Auto Lymphocyte % : 21.7 %  Auto Monocyte % : 4.4 %  Auto Eosinophil % : 2.6 %  Auto Basophil % : 0.0 %    06-06    140  |  105  |  10  ----------------------------<  100<H>  4.0   |  23  |  0.65    Ca    9.1      06 Jun 2024 20:06    TPro  6.7  /  Alb  3.6  /  TBili  0.6  /  DBili  x   /  AST  13  /  ALT  8<L>  /  AlkPhos  61  06-06      Bilirubin Total: 0.6 mg/dL (06-06-24 @ 20:06)

## 2024-06-07 NOTE — DISCHARGE NOTE PROVIDER - CARE PROVIDER_API CALL
Goldberg, Steven M  Cardiovascular Disease  [address] [place] [id number]  Phone: [phone]  Fax: [phone]  Established Patient  Follow Up Time: 1 week    Roni Polanco  Hematology  [address] [place] [address], [address]  [place] [address]  Phone: [phone]  Fax: [phone]  Established Patient  Follow Up Time: 1 week

## 2024-06-07 NOTE — H&P ADULT - NSHPREVIEWOFSYSTEMS_GEN_ALL_CORE
Review of Systems:   CONSTITUTIONAL: No fever, weight loss  EYES: No eye pain, visual disturbances, or discharge  ENMT:  No difficulty hearing, tinnitus, vertigo; No sinus or throat pain  RESPIRATORY: No SOB. No cough, wheezing, chills or hemoptysis  CARDIOVASCULAR: No chest pain, palpitations, dizziness, or leg swelling  GASTROINTESTINAL: No abdominal or epigastric pain. No nausea, vomiting, or hematemesis; No diarrhea or constipation. No melena or hematochezia.  GENITOURINARY: No dysuria, frequency, hematuria, or incontinence  NEUROLOGICAL: No headaches, memory loss, loss of strength, numbness, or tremors  SKIN: No itching, burning, rashes, or lesions   LYMPH NODES: No enlarged glands  ENDOCRINE: No heat or cold intolerance; No hair loss  MUSCULOSKELETAL: No joint pain or swelling; No muscle, back pain  PSYCHIATRIC: No depression, anxiety, mood swings, or difficulty sleeping  HEME/LYMPH: No easy bruising, or bleeding gums Review of Systems:   CONSTITUTIONAL: No fever, weight loss  EYES: No eye pain, visual disturbances, or discharge  ENMT:  No difficulty hearing, tinnitus, vertigo; No sinus or throat pain  RESPIRATORY: No SOB. +cough, no wheezing, chills or hemoptysis  CARDIOVASCULAR: No chest pain, palpitations, dizziness, or leg swelling  GASTROINTESTINAL: No abdominal or epigastric pain. No nausea, vomiting, or hematemesis; No diarrhea or constipation. No melena or hematochezia.  GENITOURINARY: No dysuria, frequency, hematuria, or incontinence  NEUROLOGICAL: No headaches, memory loss, loss of strength, numbness, or tremors  SKIN: No itching, burning, rashes, or lesions   MUSCULOSKELETAL: No joint pain or swelling; No muscle, back pain  HEME/LYMPH: No easy bruising, or bleeding gums

## 2024-06-07 NOTE — H&P ADULT - PROBLEM SELECTOR PLAN 1
CT showing relatively unchanged chronic lung findings. Small pulmonary nodules for which follow-up is recommended. Superimposed infection is not excluded.   - Procal 0.05 (normal)  - limited RVP negative  - Given patient's symptoms of cough with sputum production, will opt to cover empirically for now with Ceftriaxone  - Holding azithromycin for now given prolonged qtc in ED   - Monitor O2 - no hypoxia so far  - Monitor for fever

## 2024-06-07 NOTE — H&P ADULT - PROBLEM SELECTOR PLAN 5
No reported falls recently. Ambulates with rolling walker. Has 24hr aide at home  - PT evaluation in AM  - Fall precautions

## 2024-06-07 NOTE — CONSULT NOTE ADULT - ASSESSMENT
Ms. Slater is a 89y Female with PMHx of asthma, HTN, essential tremor, bronchiectasis, GERD, hypothyroidism, paroxysmal A.fib on Eliquis 2.5mg BID, Vit D deficiency, tuberculous peritonitis, anemia moderate AS, hx of falls, aortic regurgitation presented with persistent cough with fatigue. CT Chest without contrast read as relatively unchanged chronic lung findings, small pulmonary nodules for which follow-up is recommended. Superimposed infection is not excluded.    Previously discharged from [facility] on 05/11/2024 and during that admission she was treated for pneumonia with CT Chest 04/09/2024 showing secretions within the trachea, left mainstem bronchus, left lower lobe bronchus and bilateral distal small airways with foci of tree-in-bud nodularity in the right lung may reflect impacted distal airways versusinfectious/inflammatory small airways disease.     Pt has a history of pancytopenia as outpatient with Leukopenia with since about 3y prior initially lymphopenia and then with neutropenia, progressive, anemia over the past year gradually worsening, with progressive macrocytosis, and thrombocytopenia over the past two years. Pt requested conservative treatment and denied BmBx a few times in the past.         # Pancytopenia  - Outpatient labs show gradual progressive leukopenia x 3y, initial lymphopenia now both lymphopenia and neutropenia   -- Most recent outpatient WBC ranging 1.9-4.6   - Thrombocytopenia outpatient ranging 85-100s x 2y  - Anemia ranging 9.5-11 over past year  - MCV with progressive macrocytosis over past year  - ANC 1130, neutropenia but not severe   - Ferritin 547, Iron panel sat 23%, B12low normal 249, Folate 11.7 normal  - Copper 108  - Previous paraproteinemia workup negative for obvious plasma cell dyscrasia SPEP/MOOKIE normal, Serum immunoglobulins normal, serum FLCs (ratio 1.44), LDH (normal), Beta-2 Mcg (mild elv 2.6),   - Transfuse PRBC to maintain Hb > 7   - Transfuse Plt to maintain Plt > 10k  - Trend CBC daily with Diff for now   - Discussed pancytopenia, progressively worsening over past two years, outpatient follow up per pt preference     # Pneumonia>  -  CT Chest without contrast read as relatively unchanged chronic lung findings, small pulmonary nodules for which follow-up is recommended. Superimposed infection is not excluded.  - Antibiotics per ID and primary team.     #A.fib  - On Eliquis 2.5mg per cardiology  - Monitor platelet count, noted risk for falls, would have cardiology discuss with pt regarding risks vs benefits, no active or past hx of bleeding complications noted at this time     # Hypothyroidism  - Continue levothyroxine         Thank you for allowing me to participate in the care of Ms. Slater, please do not hesitate to call or text me if you have further questions or concerns.     Roni Polanco MD  [facility]   Division of Hematology/Oncology  [address] [place] [address], [address]  [place] [address]  P: [phone]  F: [phone]    Attestation:   Total time spent on the encounter: >60  minutes   ----Including face-to-face interaction in addition to chart review, reviewing treatment plan, and managing the patient’s chronic diagnoses as listed in the assessment----     1.	I have reviewed, analyzed and interpreted the following labs: CBC, CMP imaging:  CT Chest impressions as above.   2.	I have reviewed notes stating pts current admission, consultants and follow ups.   3.	I have reviewed the past medical, family, and surgical history and confirmed with outpatient records    Ms. Slater is a 89y Female with PMHx of asthma, HTN, essential tremor, bronchiectasis, GERD, hypothyroidism, paroxysmal A.fib on Eliquis 2.5mg BID, Vit D deficiency, tuberculous peritonitis, anemia moderate AS, hx of falls, aortic regurgitation presented with persistent cough with fatigue. CT Chest without contrast read as relatively unchanged chronic lung findings, small pulmonary nodules for which follow-up is recommended. Superimposed infection is not excluded.    Previously discharged from [facility] on 05/11/2024 and during that admission she was treated for pneumonia with CT Chest 04/09/2024 showing secretions within the trachea, left mainstem bronchus, left lower lobe bronchus and bilateral distal small airways with foci of tree-in-bud nodularity in the right lung may reflect impacted distal airways versusinfectious/inflammatory small airways disease.     Pt has a history of pancytopenia as outpatient with Leukopenia with since about 3y prior initially lymphopenia and then with neutropenia, progressive, anemia over the past year gradually worsening, with progressive macrocytosis, and thrombocytopenia over the past two years. Pt requested conservative treatment and denied BmBx a few times in the past. Discussed with pt again this admission and she stated she would not like to undergo aggressive interventions and she would not like to have a bone marrow biopsy, and she prefers conservative treatment otherwise.         # Pancytopenia  - Outpatient labs show gradual progressive leukopenia x 3y, initial lymphopenia now both lymphopenia and neutropenia   -- Most recent outpatient WBC ranging 1.9-4.6   - Thrombocytopenia outpatient ranging 85-100s x 2y  - Anemia ranging 9.5-11 over past year  - MCV with progressive macrocytosis over past year  - ANC 1130, neutropenia but not severe   - Ferritin 547, Iron panel sat 23%, B12low normal 249, Folate 11.7 normal  - Copper 108  - Previous paraproteinemia workup negative for obvious plasma cell dyscrasia SPEP/MOOKIE normal, Serum immunoglobulins normal, serum FLCs (ratio 1.44), LDH (normal), Beta-2 Mcg (mild elv 2.6),   - Transfuse PRBC to maintain Hb > 7   - Transfuse Plt to maintain Plt > 10k  - Trend CBC daily with Diff for now   - Discussed pancytopenia, progressively worsening over past two years, discussed with pt again this admission and she stated she would not like to undergo aggressive interventions and she would not like to have a bone marrow biopsy, and she prefers conservative treatment otherwise and therefore outpatient follow up per pt preference     # Pneumonia?  -  CT Chest without contrast read as relatively unchanged chronic lung findings, small pulmonary nodules for which follow-up is recommended. Superimposed infection is not excluded.  - Antibiotics per ID and primary team.     #A.fib  - On Eliquis 2.5mg per cardiology  - Monitor platelet count, noted risk for falls, would have cardiology discuss with pt regarding risks vs benefits, no active or past hx of bleeding complications noted at this time     # Hypothyroidism  - Continue levothyroxine         Thank you for allowing me to participate in the care of Ms. Slater, please do not hesitate to call or text me if you have further questions or concerns.     Roni Polanco MD  [facility]   Division of Hematology/Oncology  [address] [place] [address], [address]  [place] [address]  P: [phone]  F: [phone]    Attestation:   Total time spent on the encounter: >60  minutes   ----Including face-to-face interaction in addition to chart review, reviewing treatment plan, and managing the patient’s chronic diagnoses as listed in the assessment----     1.	I have reviewed, analyzed and interpreted the following labs: CBC, CMP imaging:  CT Chest impressions as above.   2.	I have reviewed notes stating pts current admission, consultants and follow ups.   3.	I have reviewed the past medical, family, and surgical history and confirmed with outpatient records

## 2024-06-07 NOTE — PHYSICAL THERAPY INITIAL EVALUATION ADULT - GENERAL OBSERVATIONS, REHAB EVAL
received semisupine in stretcher, A&OX4, following commands, pleasant & eager to participate, a/w cough.

## 2024-06-07 NOTE — PHYSICAL THERAPY INITIAL EVALUATION ADULT - GAIT DEVIATIONS NOTED, PT EVAL
decreased cruz/increased time in double stance/decreased velocity of limb motion/decreased step length/decreased stride length/decreased weight-shifting ability

## 2024-06-07 NOTE — PHYSICAL THERAPY INITIAL EVALUATION ADULT - ADDITIONAL COMMENTS
Pt resides in apt, has 24 hour HHA; 0 steps to enter, +elevator. PTA ambulatory with rollator, has shower chair, commode, wheelchair.

## 2024-06-07 NOTE — DISCHARGE NOTE PROVIDER - NSDCMRMEDTOKEN_GEN_ALL_CORE_FT
Albuterol (Eqv-ProAir HFA) 90 mcg/inh inhalation aerosol: 2 puff(s) inhaled 2 to 3 times as needed NOTE: As per Pharmacy, last filled 12/23 for 30 days supply  amiodarone 200 mg oral tablet: 1 tab(s) orally once a day  Eliquis 2.5 mg oral tablet: 1 tab(s) orally once a day  levothyroxine 100 mcg (0.1 mg) oral tablet: 1 tab(s) orally once a day  sertraline 50 mg oral tablet: 1 tab(s) orally once a day   Albuterol (Eqv-ProAir HFA) 90 mcg/inh inhalation aerosol: 2 puff(s) inhaled 3 times a day 2 to 3 times as needed NOTE: As per Pharmacy, last filled 12/23 for 30 days supply  amiodarone 200 mg oral tablet: 1 tab(s) orally once a day  Eliquis 2.5 mg oral tablet: 1 tab(s) orally once a day  levothyroxine 100 mcg (0.1 mg) oral tablet: 1 tab(s) orally once a day  sertraline 50 mg oral tablet: 1 tab(s) orally once a day   Albuterol (Eqv-ProAir HFA) 90 mcg/inh inhalation aerosol: 2 puff(s) inhaled 3 times a day 2 to 3 times as needed NOTE: As per Pharmacy, last filled 12/23 for 30 days supply  amiodarone 200 mg oral tablet: 1 tab(s) orally once a day  budesonide-formoterol 160 mcg-4.5 mcg/inh inhalation aerosol: 2 puff(s) inhaled 2 times a day  Eliquis 2.5 mg oral tablet: 1 tab(s) orally once a day  levothyroxine 100 mcg (0.1 mg) oral tablet: 1 tab(s) orally once a day  sertraline 50 mg oral tablet: 1 tab(s) orally once a day

## 2024-06-07 NOTE — H&P ADULT - ASSESSMENT
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 Pt admitted for further management.

## 2024-06-07 NOTE — H&P ADULT - HISTORY OF PRESENT ILLNESS
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 Symptoms were present for about 1 week. Pt felt progressive worsening of the cough which was accompanied by greenish sputum production. Pt denied any shortness of breath, no fever. Pt also felt increasingly fatigued. Denied chest pain, palpitations, abdominal pain, nausea, or vomiting. Pt has 24hr aide at home. She ambulates with rolling walker.     In the ED, labs notable for pancytopenia. CT chest imaging again showing chronic lung findings which appear overall stable. Small pulm nodules noted. Cannot rule out superimposed infection. Pt admitted for further management.

## 2024-06-07 NOTE — PROGRESS NOTE ADULT - ASSESSMENT
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 Pt admitted for further management.       Plan:    # Pneumonia:  - Procal wnl. RVP negative.   - CT w/ relatively unchanged chronic lung findings. Small pulmonary nodules for which follow-up is recommended. Superimposed infection is not excluded.   - Maintain Fio2>92%  - C/w IV abx per ID rec's  - PT eval  - ID following    # Pancytopenia:  - Last admission BMBx deferred and  plan for outpatient follow up  - Monitor CBC  - Transfuse PRBC to maintain Hb > 7   - Transfuse Plt to maintain Plt > 10k.  - Heme following-> per PT she does not want to undergo aggressive interventions and she would not like to have a bone marrow biopsy, and she prefers conservative treatment     # Afib:   - C/w current CV meds  - C/w Eliquis    # Asthma:  - C/w nebs/ inhalers    # GI:  - Bowel regimen prn    # DVT ppx:  - Eliquis    [facility]  [phone]

## 2024-06-07 NOTE — DISCHARGE NOTE NURSING/CASE MANAGEMENT/SOCIAL WORK - PATIENT PORTAL LINK FT
You can access the FollowMyHealth Patient Portal offered by [facility] by registering at the following website: [email or web address]. By joining [employer]’s FollowMyHealth portal, you will also be able to view your health information using other applications (apps) compatible with our system.

## 2024-06-07 NOTE — DISCHARGE NOTE PROVIDER - HOSPITAL COURSE
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 Symptoms were present for about 1 week. Pt felt progressive worsening of the cough which was accompanied by greenish sputum production. Pt denied any shortness of breath, no fever. Pt also felt increasingly fatigued. Denied chest pain, palpitations, abdominal pain, nausea, or vomiting. Pt has 24hr aide at home. She ambulates with rolling walker.    In the ED, labs notable for pancytopenia. CT chest imaging again showing chronic lung findings which appear overall stable. Small pulm nodules noted. Cannot rule out superimposed infection. Pt admitted for further management.    Hospital Course:  Patient presented to the ED for cough. She was suspected to have PNA as she was recently admitted for the same. She was given 1 dose of CTX in the ED. She was also given Azithromycin which was discontinued as it prolonged her QTc. Repeat EKG showed improvement after ceasing Azithro. CT Chest showed possible super imposed infection and lung nodules. ID came to see the patient and recc to monitor her off ABX and sent a full RVP, which showed _______. CXR showed ________. Heme/onc also was consulted 2/2 hx of pancytopenia. They recc daily cbc with diff. PT came to see the patient and recc home PT. Patient has 24/7 HHA. She was medically cleared for dc with outpatient follow up.    Important Medication Changes and Reason:  Please see medication reconciliation.    Active or Pending Issues Requiring Follow-up:  Please follow up with primary care and hematology.    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 Symptoms were present for about 1 week. Pt felt progressive worsening of the cough which was accompanied by greenish sputum production. Pt denied any shortness of breath, no fever. Pt also felt increasingly fatigued. Denied chest pain, palpitations, abdominal pain, nausea, or vomiting. Pt has 24hr aide at home. She ambulates with rolling walker.    In the ED, labs notable for pancytopenia. CT chest imaging again showing chronic lung findings which appear overall stable. Small pulm nodules noted. Cannot rule out superimposed infection. Pt admitted for further management.    Hospital Course:  Patient presented to the ED for cough. She was suspected to have PNA as she was recently admitted for the same. She was given 1 dose of CTX in the ED. She was also given Azithromycin which was discontinued as it prolonged her QTc. Repeat EKG showed improvement after ceasing Azithro. CT Chest showed possible super imposed infection and lung nodules/chronoic changes. ID came to see the patient and recc to monitor her off ABX and sent a full RVP, which was negative. Supportive care was recommended. Patient satting well on room air. Heme/onc also was consulted 2/2 hx of pancytopenia. They recc daily cbc with diff. PT came to see the patient and recc home PT. Patient has 24/7 HHA. She was medically cleared for dc with outpatient follow up.    Important Medication Changes and Reason:  Please see medication reconciliation.    Active or Pending Issues Requiring Follow-up:  Please follow up with primary care and hematology.    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 Symptoms were present for about 1 week. Pt felt progressive worsening of the cough which was accompanied by greenish sputum production. Pt denied any shortness of breath, no fever. Pt also felt increasingly fatigued. Denied chest pain, palpitations, abdominal pain, nausea, or vomiting. Pt has 24hr aide at home. She ambulates with rolling walker.    In the ED, labs notable for pancytopenia. CT chest imaging again showing chronic lung findings which appear overall stable. Small pulm nodules noted. Cannot rule out superimposed infection. Pt admitted for further management.    Hospital Course:  Patient presented to the ED for cough. She was suspected to have PNA as she was recently admitted for the same. She was given 1 dose of CTX in the ED. She was also given Azithromycin which was discontinued as it prolonged her QTc. Repeat EKG showed improvement after ceasing Azithro. CT Chest showed possible super imposed infection and lung nodules/chronoic changes. ID came to see the patient and recc to monitor her off ABX and sent a full RVP, which was negative. Supportive care was recommended. Patient satting well on room air. Heme/onc also was consulted 2/2 hx of pancytopenia. They recc daily cbc with diff. PT came to see the patient and recc home PT. Patient has 24/7 HHA. She was medically cleared for dc with outpatient follow up.    Baseds on MD assessment, the patient's condition has improved and no longer warrants inpatient status. It will be changed to outpatient status prior to being discharged from the hospital. The decision is based on the following reasons:  ID came to see the patient and recc to monitor her off ABX and sent a full RVP, which was negative. Supportive care was recommended. Patient satting well on room air. Heme/onc also was consulted 2/2 hx of pancytopenia. They recc daily cbc with diff. PT came to see the patient and recc home PT. Patient has 24/7 HHA. She was medically cleared for dc with outpatient follow up.    Important Medication Changes and Reason:  Please see medication reconciliation.    Active or Pending Issues Requiring Follow-up:  Please follow up with primary care and hematology.    Advanced Directives:   [X] Full code  [ ] DNR  [ ] Hospice    Discharge Diagnoses:  PNA  Pancytopenia

## 2024-06-07 NOTE — CONSULT NOTE ADULT - SUBJECTIVE AND OBJECTIVE BOX
[facility] DIVISION OF INFECTIOUS DISEASES  SWAPNIL Rene S. Shah, Y. Patel, G. [facility]  [phone]  ([phone] - weekdays after 5pm and weekends)    CATHRYN LARA  89y, Female  [id number]    HPI:  89 year old female with hx of asthma, HTN, pAfib, GERD, pulmonary and abdominal TB S/P resection with past chronic partial SBO, hypothyroidism, pancytopenia, presenting with cough. Symptoms were present for about 1 week. Pt felt progressive worsening of the cough which was accompanied by greenish sputum production. Pt denied any shortness of breath, no fever. Pt also felt increasingly fatigued. Denied chest pain, palpitations, abdominal pain, nausea, or vomiting. Pt has 24hr aide at home. She ambulates with rolling walker.   In the ED, labs notable for pancytopenia. CT chest imaging again showing chronic lung findings which appear overall stable. Small pulm nodules noted. Cannot rule out superimposed infection. Pt admitted for further management.  (07 Jun 2024 04:27)  ROS: 14 point review of systems completed, pertinent positives and negatives as per HPI.    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 reported allergy (Unknown)    PMH -- Pulmonary TB  Gastritis  Afib  SBO (small bowel obstruction)  Hypothyroid  UTI (urinary tract infection)  HTN (hypertension)  TB (pulmonary tuberculosis)  Orthostatic hypotension  Asthma  Aortic stenosis  Multifocal pneumonia    PSH -- Afib  History of Small Bowel Obstruction  History of Bilateral Breast Biopsy  S/P Exploratory Laparotomy  H/O hemorrhoidectomy  S/P vein stripping    FH -- Family history of essential hypertension  Social History -- denies tobacco, alcohol or illicit drug use    Physical Exam--  Vital Signs Last 24 Hrs  T(F): 98.7 (07 Jun 2024 05:31), Max: 99.6 (06 Jun 2024 20:48)  HR: 67 (07 Jun 2024 05:31) (61 - 68)  BP: 133/73 (07 Jun 2024 05:31) (128/62 - 158/72)  RR: 18 (07 Jun 2024 05:31) (18 - 22)  SpO2: 93% (07 Jun 2024 05:31) (93% - 99%)  General: no acute distress  HEENT: NC/AT, EOMI, anicteric  Lungs: Clear bilaterally without rales, wheezing or rhonchi  Heart: S1, S2 present, RRR. No murmur, rub or gallop.  Abdomen: Soft. Nondistended. Nontender. BS present.   Neuro: AAOx3, no obvious focal deficits   Extremities: No cyanosis. No edema.   Skin: Warm. Dry. No visible rash.   Lines: PIV    Laboratory & Imaging Data--  CBC:                       10.1   1.58  )-----------( 63       ( 06 Jun 2024 20:17 )             31.5     WBC Count: 1.58 K/uL (06-06-24 @ 20:17)    CMP: 06-06    140  |  105  |  10  ----------------------------<  100<H>  4.0   |  23  |  0.65    Ca    9.1      06 Jun 2024 20:06    TPro  6.7  /  Alb  3.6  /  TBili  0.6  /  DBili  x   /  AST  13  /  ALT  8<L>  /  AlkPhos  61  06-06    LIVER FUNCTIONS - ( 06 Jun 2024 20:06 )  Alb: 3.6 g/dL / Pro: 6.7 g/dL / ALK PHOS: 61 U/L / ALT: 8 U/L / AST: 13 U/L / GGT: x           Microbiology: reviewed  SARS-CoV-2 Result: Eritec (06 Jun 2024 19:45)    Radiology--reviewed    Active Medications--  albuterol    90 MICROgram(s) HFA Inhaler 2 Puff(s) Inhalation every 6 hours PRN  aMIOdarone    Tablet 200 milliGRAM(s) Oral <User Schedule>  apixaban 2.5 milliGRAM(s) Oral <User Schedule>  budesonide 160 MICROgram(s)/formoterol 4.5 MICROgram(s) Inhaler 2 Puff(s) Inhalation two times a day  cefTRIAXone   IVPB 1000 milliGRAM(s) IV Intermittent every 24 hours  levothyroxine 100 MICROGram(s) Oral daily  pantoprazole    Tablet 40 milliGRAM(s) Oral before breakfast  sertraline 50 milliGRAM(s) Oral daily    Current Antimicrobials:   cefTRIAXone   IVPB 1000 milliGRAM(s) IV Intermittent every 24 hours    Prior/Completed Antimicrobials:  cefTRIAXone   IVPB   [facility] DIVISION OF INFECTIOUS DISEASES  SWAPNIL Rene S. Shah, Y. Patel, G. [facility]  [phone]  ([phone] - weekdays after 5pm and weekends)    CATHRYN LARA  89y, Female  [id number]    HPI:  Patient is a 89 year old female with PMH of asthma, HTN, pAfib, GERD, pulmonary and abdominal TB S/P resection with past chronic partial SBO, hypothyroidism, pancytopenia who presents with cough. Symptoms were present for about 1 week. Patient felt progressive worsening of the cough which was accompanied by greenish sputum production. Patient denied any shortness of breath, denies fever or chills. Patient also felt increasingly fatigued. Denied chest pain, palpitations, abdominal pain, nausea, vomiting or diarrhea. Patient has 24hr aide at home. She ambulates with rolling walker. In the ED, labs notable for pancytopenia. CT chest imaging again showing chronic lung findings which appear overall stable. Small pulm nodules noted. Cannot rule out superimposed infection. Patient admitted for further management.   Patient seen and examined at bedside this morning. Patient states her cough stopped yesterday evening after coming here and then she started to have it again. She currently denies any other new complaints, no pain or fevers.   ROS: 14 point review of systems completed, pertinent positives and negatives as per HPI.    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 reported allergy (Unknown)    PMH -- Pulmonary TB  Gastritis  Afib  SBO (small bowel obstruction)  Hypothyroid  UTI (urinary tract infection)  HTN (hypertension)  TB (pulmonary tuberculosis)  Orthostatic hypotension  Asthma  Aortic stenosis  Multifocal pneumonia    PSH -- Afib  History of Small Bowel Obstruction  History of Bilateral Breast Biopsy  S/P Exploratory Laparotomy  H/O hemorrhoidectomy  S/P vein stripping    FH -- Family history of essential hypertension  Social History -- denies tobacco, alcohol or illicit drug use    Physical Exam--  Vital Signs Last 24 Hrs  T(F): 98.7 (07 Jun 2024 05:31), Max: 99.6 (06 Jun 2024 20:48)  HR: 67 (07 Jun 2024 05:31) (61 - 68)  BP: 133/73 (07 Jun 2024 05:31) (128/62 - 158/72)  RR: 18 (07 Jun 2024 05:31) (18 - 22)  SpO2: 93% (07 Jun 2024 05:31) (93% - 99%)  General: no acute distress, RA, nontoxic   HEENT: NC/AT, EOMI, anicteric  Lungs: decreased breath sounds b/l  Heart: S1, S2 present, normal rate   Abdomen: Soft. Nondistended. Nontender.  Neuro: AAOx3, no obvious focal deficits   Extremities: No cyanosis. No edema.   Skin: Warm. Dry. No visible rash.   Lines: PIV    Laboratory & Imaging Data--  CBC:                       10.1   1.58  )-----------( 63       ( 06 Jun 2024 20:17 )             31.5     WBC Count: 1.58 K/uL (06-06-24 @ 20:17)    CMP: 06-06    140  |  105  |  10  ----------------------------<  100<H>  4.0   |  23  |  0.65    Ca    9.1      06 Jun 2024 20:06    TPro  6.7  /  Alb  3.6  /  TBili  0.6  /  DBili  x   /  AST  13  /  ALT  8<L>  /  AlkPhos  61  06-06    LIVER FUNCTIONS - ( 06 Jun 2024 20:06 )  Alb: 3.6 g/dL / Pro: 6.7 g/dL / ALK PHOS: 61 U/L / ALT: 8 U/L / AST: 13 U/L / GGT: x           Microbiology: reviewed  SARS-CoV-2 Result: NotDete (06 Jun 2024 19:45)    Radiology--reviewed  < from: CT Chest No Cont (06.06.24 @ 23:02) >  IMPRESSION:  Relatively unchanged chronic lung findings. Small pulmonary nodules for   which follow-up is recommended. Superimposed infection is not excluded.    < end of copied text >    Active Medications--  albuterol    90 MICROgram(s) HFA Inhaler 2 Puff(s) Inhalation every 6 hours PRN  aMIOdarone    Tablet 200 milliGRAM(s) Oral <User Schedule>  apixaban 2.5 milliGRAM(s) Oral <User Schedule>  budesonide 160 MICROgram(s)/formoterol 4.5 MICROgram(s) Inhaler 2 Puff(s) Inhalation two times a day  cefTRIAXone   IVPB 1000 milliGRAM(s) IV Intermittent every 24 hours  levothyroxine 100 MICROGram(s) Oral daily  pantoprazole    Tablet 40 milliGRAM(s) Oral before breakfast  sertraline 50 milliGRAM(s) Oral daily    Current Antimicrobials:   cefTRIAXone   IVPB 1000 milliGRAM(s) IV Intermittent every 24 hours    Prior/Completed Antimicrobials:  cefTRIAXone   IVPB

## 2024-06-07 NOTE — H&P ADULT - NSHPPHYSICALEXAM_GEN_ALL_CORE
Vital Signs Last 24 Hrs  T(C): 37 (07 Jun 2024 02:45), Max: 37.6 (06 Jun 2024 20:48)  T(F): 98.6 (07 Jun 2024 02:45), Max: 99.6 (06 Jun 2024 20:48)  HR: 63 (07 Jun 2024 02:45) (61 - 68)  BP: 131/63 (07 Jun 2024 02:45) (131/63 - 158/72)  BP(mean): 83 (07 Jun 2024 02:45) (76 - 97)  RR: 22 (07 Jun 2024 02:45) (18 - 22)  SpO2: 95% (07 Jun 2024 02:45) (95% - 99%)    Parameters below as of 07 Jun 2024 02:45  Patient On (Oxygen Delivery Method): room air        CONSTITUTIONAL: Well-groomed, in no apparent distress  EYES: No conjunctival or scleral injection, non-icteric; PERRLA and symmetric  ENMT: No external nasal lesions; nasal mucosa not inflamed; normal dentition; no pharyngeal injection or exudates, oral mucosa with moist membranes  RESPIRATORY: Breathing comfortably; lungs CTA without wheeze/rhonchi/rales  CARDIOVASCULAR: +S1S2, RRR, no M/G/R; no carotid bruits; pedal pulses full and symmetric; no lower extremity edema  GASTROINTESTINAL: No palpable masses or tenderness, +BS throughout, no rebound/guarding; no hepatosplenomegaly; no hernia palpated  GENITOURINARY:  LYMPHATIC: No cervical LAD or tenderness; no axillary LAD or tenderness; no inguinal LAD or tenderness  MUSCULOSKELETAL: Normal gait and station; no digital clubbing or cyanosis; no paraspinal tenderness; no malalignment of extremities  SKIN: No rashes or ulcers noted; no subcutaneous nodules or induration palpable  NEUROLOGIC: CN grossly intact; sensation intact in LEs b/l to light touch; normal strength and tone  PSYCHIATRIC: A+O x 3; mood and affect appropriate; appropriate insight and judgment Vital Signs Last 24 Hrs  T(C): 37 (07 Jun 2024 02:45), Max: 37.6 (06 Jun 2024 20:48)  T(F): 98.6 (07 Jun 2024 02:45), Max: 99.6 (06 Jun 2024 20:48)  HR: 63 (07 Jun 2024 02:45) (61 - 68)  BP: 131/63 (07 Jun 2024 02:45) (131/63 - 158/72)  BP(mean): 83 (07 Jun 2024 02:45) (76 - 97)  RR: 22 (07 Jun 2024 02:45) (18 - 22)  SpO2: 95% (07 Jun 2024 02:45) (95% - 99%)    Parameters below as of 07 Jun 2024 02:45  Patient On (Oxygen Delivery Method): room air        CONSTITUTIONAL: Well-groomed, in no apparent distress  EYES: No conjunctival or scleral injection, non-icteric; PERRLA and symmetric  ENMT: No external nasal lesions; nasal mucosa not inflamed; oral mucosa with moist membranes  RESPIRATORY: Breathing comfortably; lungs with diffuse rhonchi bilaterally  CARDIOVASCULAR: +S1S2, RRR, no M/G/R; no lower extremity edema  GASTROINTESTINAL: No palpable masses or tenderness, +BS throughout, no rebound/guarding; no hepatosplenomegaly; no hernia palpated  LYMPHATIC: No cervical LAD or tenderness  MUSCULOSKELETAL: No digital clubbing or cyanosis; no paraspinal tenderness; no malalignment of extremities  SKIN: No rashes or ulcers noted; no subcutaneous nodules or induration palpable  NEUROLOGIC: CN grossly intact; sensation intact in LEs b/l to light touch; normal strength and tone  PSYCHIATRIC: A+O x 3; mood and affect appropriate; appropriate insight and judgment

## 2024-06-07 NOTE — CHART NOTE - NSCHARTNOTEFT_GEN_A_CORE
Request from Dr. Pinedo to facilitate patient discharge. Medication reconciliation reviewed, revised, and resolved with Dr. Pinedo, who has medically cleared patient for discharge with follow up as advised. Please refer to discharge note for detailed hospital course.

## 2024-06-07 NOTE — DISCHARGE NOTE PROVIDER - NSDCFUADDAPPT_GEN_ALL_CORE_FT
APPTS ARE READY TO BE MADE: [X] YES    ** STAR PATIENT **    Best Family or Patient Contact (if needed):    Additional Information about above appointments (if needed):    1: Please follow up with primary care within 1 week.  2: Please follow up with hematology within 1 week.    Other comments or requests:    APPTS ARE READY TO BE MADE: [X] YES    ** STAR PATIENT **    Best Family or Patient Contact (if needed):    Additional Information about above appointments (if needed):    1: Please follow up with primary care within 1 week.  2: Please follow up with hematology within 1 week.    Other comments or requests:   Patient advised they did not want to proceed with scheduling appointments with the providers on their referrals. They will coordinate care on their own.

## 2024-06-07 NOTE — DISCHARGE NOTE PROVIDER - NSDCCPCAREPLAN_GEN_ALL_CORE_FT
PRINCIPAL DISCHARGE DIAGNOSIS  Diagnosis: Pneumonia  Assessment and Plan of Treatment: Pneumonia is a lung infection that can cause a fever, cough, and trouble breathing.  Nutrition is important, eat small frequent meals.  Get lots of rest and drink fluids.  Call your primary care provider upon arrival home from hospital and make a follow up appointment for one week.  If your cough worsens, you develop fever greater than 101', you have shaking chills, a fast heartbeat, trouble breathing and/or feel your are breathing much faster than usual, call your healthcare provider.  Make sure you wash your hands frequently.        SECONDARY DISCHARGE DIAGNOSES  Diagnosis: Pancytopenia  Assessment and Plan of Treatment: Please take all medications as prescribed.  Monitor yourself for signs or symptoms of bleeding.  Please seek prompt medical attention for any new or worsening symptoms.  Please follow up with primary care and hematology within 1 week of discharge.

## 2024-06-07 NOTE — ED ADULT NURSE NOTE - NSFALLRSKHARMRISK_ED_ALL_ED
6/10/2024    Luzma Lora MD  [address] [place] [address]        RE:  Initial Evaluation    Luzma Lora MD:    Thank you for referring Meaghan Rodriguez to {[facility]/Physical Therapy:[id number]}.  The following information reflects my assessment and plan of care.      Please review and sign the attached form to indicate your approval of the plan of care. Insurance compliance requires your approval be on this plan of care. After your review, please fax back all pages received. Should you have any questions, feel free to contact me.    Autumn Moody, PT  {[facility]/Physical Therapy:[id number]}  [address] [address]  Phone: [phone]  Fax: [phone]     Meaghan Rodriguez  [id number]    Autumn Moody, PT  6/10/2024  4:45 PM  Signed  Physical Therapy Progress Note    Visit Type: Progress Note  Visit: 19  Referring Provider: Lumza Lora MD  Medical Diagnosis (from order): Z98.2 - Status post ventriculoperitoneal shunt     SUBJECTIVE                                                                                                               Pt reports improvement.  Notes increased ease with getting shoes on.  Occasionally feels increase in pain when standing in one spot for any length of time (minutes).    Continues to have dizziness with standing up too fast or when bending down related to shunt placement.    Reviews that he uses walls for ambulation at home.  Takes cane for community ambulation.  Describes fall outside in the past several days.  Functional Change: Increased ease with donning shoes & socks, decreased use of walker.  Notes increased ease with 3 steps in & out of the house.  Current Functional Limitations: Fatigues with community ambulation, relies on external support of wall for ambulation at home, stairs (uses stair lift to upstairs or basement), squatting (limited by dizziness)    Pain / Symptoms  - Pain rating (out of 10): Current: 2 ; Best: 0  (\"when i'm sleeping\"); Worst: 10      OBJECTIVE                                                                                                                     Range of Motion (ROM)   (degrees unless noted; active unless noted; norms in ( ); negative=lacking to 0, positive=beyond 0)  Hip:   - Flexion (100-120):       Left: 110         Right: 92  Pain     Strength  (out of 5 unless noted, standard test position unless noted)   Hip:    - Flexion:         Left: 5         Right: 5, pain    - Abduction:         Left: 5         Right: 4-, pain    - Internal Rotation:         Left: 5         Right: 5, pain    - External Rotation:         Left: 5         Right: 4, pain          Muscle Length Tests  - 90/90 Hamstring at knee:  - Left:  140° - Right: 131°      Balance Tests   5 Times Sit to Stand (seconds) 20.63 (slow; cautious given dizziness with standing quickly)    Interpretation:        > 12 seconds indicates need for further assessment for fall risk for community dwelling elderly      > 16 seconds indicative of falls risk for Parkinson's disease  Timed Up and Go (TUG)      - Total time to complete: 13.3 sec    Interpretation: < 10 seconds = normal; > or = 12 seconds is a positive screen for fall risk based on     CDC STEADI tool kit; > or = 13.5 seconds has been shown to indicate high risk for falls     high risk of falls          Outcome/Assessments  Outcome Measures:   Lower Extremity Functional Scale: LEFS Calculated Total: 27 (0=extreme difficulty; 80=no difficulty) see flowsheet for additional documentation       Treatment     Therapeutic Exercise  Supine hamstring stretch  Bent knee fallout  Gluteal sets  Seated at edge of chair hamstring stretch    Issued 2 exercises for pt to be working on at home as noted below    Therapeutic Activity  Assessed range of motion, strength, LEFS, progress to therapy goals.    Discussed and agreed upon plan for continued therapy.    Writer verbally educated and received verbal  consent for hand placement, positioning of patient, and techniques to be performed today from patient for hand placement and palpation for techniques as described above and how they are pertinent to the patient's plan of care.  Home Exercise Program  Access Code: [email or web address]  URL: [email or web address]  Date: 06/07/2024  Prepared by: Justina Moody    Exercises  - Seated Hamstring Stretch  - 1 x daily - 2 reps - 20 seconds hold  - Bent Knee Fall Out  - 1 x daily - 1-2 sets - 10 reps      ASSESSMENT                                                                                                          To date the patient has made gains as expected.  Patient will continue to benefit from skilled care as outlined.  Patient continues to have impairments and functional deficits as noted.    Pt has been seen for 10 physical therapy visits since last progress note.  Pt is being seen for deconditioning, lower extremity weakness, decreased balance following complications following shunt placement.  Pt has had multiple procedures at right hip in the past including total hip replacement (2020), acetabular revision (2021) and arthroscopic iliopsoas tendon lengthening.  Since last progress note pt has been seen for 9 aquatic therapy sessions and today's session is to assess pt's progress.  Since beginning aquatic therapy, pt is describing increased ease with getting shoes and socks on and increased ease with stairs he uses to enter/exit the house.    Pt continues to have difficulty with right hip weakness which limits tolerance for standing in one place as well as contributes to difficulty with balance and gait as well as stairs.    Examination today reveals improvement in bilateral hip flexion strength and right hip abduction strength as compared to about 6 weeks ago.  Pt continues to have weakness in right hip abduction and external rotation.  Strength testing at right hip is painful with all motions tested.   TUG score and 5x sit to stand score were reassessed today but are truly not great measures for this patient.  Given shunt pt continues to have to be cautious with how quickly he moves and cannot stand too quickly without having difficulty with balance.    While pt has had provider suspect that symptoms are related to lower back, pt continues to have weakness at right hip.    Pt will benefit from continued therapy for progression of hip and core strength to assist with balance and gait.    Pain after session: pain rating not formally assessed.  Education:   - Results of above outlined education: Verbalizes understanding and Needs reinforcement    PLAN                                                                                                                          Extend frequency and duration per below.  Frequency / Duration  2 times per week tapering as patient progresses for 8 weeks for an estimated total of 10 visits    Suggestions for next session as indicated: Progress per plan of care  Assess response to home exercise program and review as needed.    Continue progression of gait, lower extremity flexibility, core stabilization and hip strengthening to assist with improving functional mobility and balance.    Goals  Decrease pain/symptoms to 2/10 - PARTIALLY MET  Improve involved strength to 5/5 - PARTIALLY MET  Improve involved ROM to hip flexion greater than 110 degrees, abduction greater than 30 degrees, internal rotation greater than 45 degrees, external rotation greater than 45 degrees - NOT ASSESSED THIS DATE  The above improvements in impairments to assist in obtaining goals listed below  Long Term Goals: to be met by end of plan of care  1. Patient will demonstrate ability to negotiate level and unlevel surfaces at variable velocities, including change of direction without increased pain or instability to return to age appropriate and community activities at prior level of function. Status:  partially met  Pt continues to rely on cane for community ambulation, reports use of external support when ambulating without cane at home and has had recent fall when outdoors  2. Patient will stand for 60 minutes for cook and eat a meal.  Status: not metPt with difficulty tolerating standing in place due to pain  3. Patient will ascend and descend 1 flight of steps for safe home access with reciprocal gait Status: partially met  Noting increased ease with stairs to enter/exit house  4. Lower Extremity Functional Scale: Patient will complete form to reflect an improved raw score to greater than or equal to 40/80 to indicate patient reported improvement in function/disability/impairment (minimal detectable change: 9 points).  Status: not met 27  5. Patient will be independent with progressed and modified home exercise program. Status: progressing/ongoing      Therapy procedure time and total treatment time can be found documented on the Time Entry flowsheet           Certification from:  *** through:  ***  I certify the need for these services, furnished under this plan of treatment and while under my care.  I agree with the Plan of Care as stated and request that therapy proceed.      _________________________________   __________________________________  Provider Signature  (Luzma Lora MD)              Date:     yes

## 2024-06-07 NOTE — ED ADULT NURSE REASSESSMENT NOTE - NS ED NURSE REASSESS COMMENT FT1
Patient transported to ED [place] with her red rolling walker @ bedside from home. Holding RN Ruth made aware.

## 2024-06-07 NOTE — PROGRESS NOTE ADULT - SUBJECTIVE AND OBJECTIVE BOX
SUBJECTIVE / OVERNIGHT EVENTS:      Patient seen and examined at bedside. Resting in bed. Denies any chest pain, sob or fevers      --------------------------------------------------------------------------------------------  LABS:                        10.1   1.58  )-----------( 63       ( 06 Jun 2024 20:17 )             31.5     06-06    140  |  105  |  10  ----------------------------<  100<H>  4.0   |  23  |  0.65    Ca    9.1      06 Jun 2024 20:06    TPro  6.7  /  Alb  3.6  /  TBili  0.6  /  DBili  x   /  AST  13  /  ALT  8<L>  /  AlkPhos  61  06-06      CAPILLARY BLOOD GLUCOSE            Urinalysis Basic - ( 06 Jun 2024 20:06 )    Color: x / Appearance: x / SG: x / pH: x  Gluc: 100 mg/dL / Ketone: x  / Bili: x / Urobili: x   Blood: x / Protein: x / Nitrite: x   Leuk Esterase: x / RBC: x / WBC x   Sq Epi: x / Non Sq Epi: x / Bacteria: x        RADIOLOGY & ADDITIONAL TESTS: < from: CT Chest No Cont (06.06.24 @ 23:02) >  IMPRESSION:  Relatively unchanged chronic lung findings. Small pulmonary nodules for   which follow-up is recommended. Superimposed infection is not excluded.    --- End of Report --    < end of copied text >      Imaging Personally Reviewed:  [x] YES  [ ] NO    Consultant(s) Notes Reviewed:  [x] YES  [ ] NO    MEDICATIONS  (STANDING):  aMIOdarone    Tablet 200 milliGRAM(s) Oral <User Schedule>  apixaban 2.5 milliGRAM(s) Oral <User Schedule>  budesonide 160 MICROgram(s)/formoterol 4.5 MICROgram(s) Inhaler 2 Puff(s) Inhalation two times a day  cefTRIAXone   IVPB 1000 milliGRAM(s) IV Intermittent every 24 hours  levothyroxine 100 MICROGram(s) Oral daily  pantoprazole    Tablet 40 milliGRAM(s) Oral before breakfast  sertraline 50 milliGRAM(s) Oral daily    MEDICATIONS  (PRN):  albuterol    90 MICROgram(s) HFA Inhaler 2 Puff(s) Inhalation every 6 hours PRN Shortness of Breath and/or Wheezing      Care Discussed with Consultants/Other Providers [x] YES  [ ] NO    Vital Signs Last 24 Hrs  T(C): 36.6 (07 Jun 2024 08:37), Max: 37.6 (06 Jun 2024 20:48)  T(F): 97.8 (07 Jun 2024 08:37), Max: 99.6 (06 Jun 2024 20:48)  HR: 49 (07 Jun 2024 08:37) (49 - 68)  BP: 132/85 (07 Jun 2024 08:37) (128/62 - 158/72)  BP(mean): 80 (07 Jun 2024 05:25) (76 - 97)  RR: 18 (07 Jun 2024 08:37) (18 - 22)  SpO2: 98% (07 Jun 2024 08:37) (93% - 99%)    Parameters below as of 07 Jun 2024 08:37  Patient On (Oxygen Delivery Method): room air      I&O's Summary      PHYSICAL EXAM:  GENERAL: NAD, well-developed, comfortable  HEAD:  Atraumatic, Normocephalic  EYES: EOMI, PERRLA, conjunctiva and sclera clear  NECK: Supple, No JVD  CHEST/LUNG: Rhonchi bilaterally; No wheeze  HEART: Regular rate and rhythm; No murmurs, rubs, or gallops  ABDOMEN: Soft, Nontender, Nondistended; Bowel sounds present  NEURO: AAOx3, no focal weakness, 5/5 b/l extremity strength, b/l knee no arthritis, no effusion   EXTREMITIES:  2+ Peripheral Pulses, No clubbing, cyanosis, or edema  SKIN: No rashes or lesions

## 2024-06-07 NOTE — PHYSICAL THERAPY INITIAL EVALUATION ADULT - PERTINENT HX OF CURRENT PROBLEM, REHAB EVAL
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 Symptoms were present for about 1 week. Pt felt progressive worsening of the cough which was accompanied by greenish sputum production. Pt denied any shortness of breath, no fever. Pt also felt increasingly fatigued. Denied chest pain, palpitations, abdominal pain, nausea, or vomiting. Pt has 24hr aide at home. She ambulates with rolling walker.     In the ED, labs notable for pancytopenia. CT chest imaging again showing chronic lung findings which appear overall stable. Small pulm nodules noted. Cannot rule out superimposed infection. Pt admitted for further management.     CT CHEST: Relatively unchanged chronic lung findings. Small pulmonary nodules for which follow-up is recommended. Superimposed infection is not excluded.

## 2024-06-07 NOTE — DISCHARGE NOTE PROVIDER - PROVIDER TOKENS
PROVIDER:[TOKEN:[[id number]],FOLLOWUP:[1 week],ESTABLISHEDPATIENT:[T]],PROVIDER:[TOKEN:[[id number]],FOLLOWUP:[1 week],ESTABLISHEDPATIENT:[T]]

## 2024-07-24 NOTE — PHYSICAL THERAPY INITIAL EVALUATION ADULT - GAIT TRAINING, PT EVAL
Pt refused morphine states \"I don't want that\", not given.   GOAL: Pt will ambulate 150ft with rolling walker independently in 4 weeks.

## 2024-08-23 ENCOUNTER — INPATIENT (INPATIENT)
Facility: HOSPITAL | Age: 89
LOS: 12 days | Discharge: HOME HEALTH | DRG: 871 | End: 2024-09-05
Attending: INTERNAL MEDICINE | Admitting: INTERNAL MEDICINE
Payer: MEDICARE

## 2024-08-23 VITALS
SYSTOLIC BLOOD PRESSURE: 174 MMHG | HEART RATE: 71 BPM | HEIGHT: 64 IN | DIASTOLIC BLOOD PRESSURE: 92 MMHG | WEIGHT: 125 LBS | TEMPERATURE: 99 F | OXYGEN SATURATION: 90 %

## 2024-08-23 DIAGNOSIS — Z51.5 ENCOUNTER FOR PALLIATIVE CARE: ICD-10-CM

## 2024-08-23 DIAGNOSIS — Z98.89 OTHER SPECIFIED POSTPROCEDURAL STATES: Chronic | ICD-10-CM

## 2024-08-23 DIAGNOSIS — J96.01 ACUTE RESPIRATORY FAILURE WITH HYPOXIA: ICD-10-CM

## 2024-08-23 DIAGNOSIS — K56.609 UNSPECIFIED INTESTINAL OBSTRUCTION, UNSPECIFIED AS TO PARTIAL VERSUS COMPLETE OBSTRUCTION: ICD-10-CM

## 2024-08-23 DIAGNOSIS — R53.2 FUNCTIONAL QUADRIPLEGIA: ICD-10-CM

## 2024-08-23 DIAGNOSIS — Z71.89 OTHER SPECIFIED COUNSELING: ICD-10-CM

## 2024-08-23 LAB
ADD ON TEST-SPECIMEN IN LAB: SIGNIFICANT CHANGE UP
ALBUMIN SERPL ELPH-MCNC: 4.3 G/DL — SIGNIFICANT CHANGE UP (ref 3.3–5)
ALP SERPL-CCNC: 78 U/L — SIGNIFICANT CHANGE UP (ref 40–120)
ALT FLD-CCNC: 11 U/L — SIGNIFICANT CHANGE UP (ref 10–45)
ANION GAP SERPL CALC-SCNC: 12 MMOL/L — SIGNIFICANT CHANGE UP (ref 5–17)
ANISOCYTOSIS BLD QL: SLIGHT — SIGNIFICANT CHANGE UP
APTT BLD: 27.4 SEC — SIGNIFICANT CHANGE UP (ref 24.5–35.6)
AST SERPL-CCNC: 12 U/L — SIGNIFICANT CHANGE UP (ref 10–40)
BASOPHILS # BLD AUTO: 0 K/UL — SIGNIFICANT CHANGE UP (ref 0–0.2)
BASOPHILS NFR BLD AUTO: 0 % — SIGNIFICANT CHANGE UP (ref 0–2)
BILIRUB SERPL-MCNC: 0.9 MG/DL — SIGNIFICANT CHANGE UP (ref 0.2–1.2)
BLD GP AB SCN SERPL QL: NEGATIVE — SIGNIFICANT CHANGE UP
BUN SERPL-MCNC: 13 MG/DL — SIGNIFICANT CHANGE UP (ref 7–23)
CALCIUM SERPL-MCNC: 9.7 MG/DL — SIGNIFICANT CHANGE UP (ref 8.4–10.5)
CHLORIDE SERPL-SCNC: 102 MMOL/L — SIGNIFICANT CHANGE UP (ref 96–108)
CO2 SERPL-SCNC: 26 MMOL/L — SIGNIFICANT CHANGE UP (ref 22–31)
CREAT SERPL-MCNC: 0.7 MG/DL — SIGNIFICANT CHANGE UP (ref 0.5–1.3)
DACRYOCYTES BLD QL SMEAR: SLIGHT — SIGNIFICANT CHANGE UP
EGFR: 78 ML/MIN/1.73M2 — SIGNIFICANT CHANGE UP
EOSINOPHIL # BLD AUTO: 0 K/UL — SIGNIFICANT CHANGE UP (ref 0–0.5)
EOSINOPHIL NFR BLD AUTO: 0 % — SIGNIFICANT CHANGE UP (ref 0–6)
FLUAV AG NPH QL: SIGNIFICANT CHANGE UP
FLUBV AG NPH QL: SIGNIFICANT CHANGE UP
GAS PNL BLDV: SIGNIFICANT CHANGE UP
GAS PNL BLDV: SIGNIFICANT CHANGE UP
GLUCOSE SERPL-MCNC: 116 MG/DL — HIGH (ref 70–99)
HCT VFR BLD CALC: 37.4 % — SIGNIFICANT CHANGE UP (ref 34.5–45)
HGB BLD-MCNC: 11.5 G/DL — SIGNIFICANT CHANGE UP (ref 11.5–15.5)
INR BLD: 1.05 RATIO — SIGNIFICANT CHANGE UP (ref 0.85–1.18)
LYMPHOCYTES # BLD AUTO: 0.51 K/UL — LOW (ref 1–3.3)
LYMPHOCYTES # BLD AUTO: 38 % — SIGNIFICANT CHANGE UP (ref 13–44)
MACROCYTES BLD QL: SLIGHT — SIGNIFICANT CHANGE UP
MAGNESIUM SERPL-MCNC: 2.4 MG/DL — SIGNIFICANT CHANGE UP (ref 1.6–2.6)
MANUAL SMEAR VERIFICATION: SIGNIFICANT CHANGE UP
MCHC RBC-ENTMCNC: 30.7 GM/DL — LOW (ref 32–36)
MCHC RBC-ENTMCNC: 33.1 PG — SIGNIFICANT CHANGE UP (ref 27–34)
MCV RBC AUTO: 107.8 FL — HIGH (ref 80–100)
MONOCYTES # BLD AUTO: 0.03 K/UL — SIGNIFICANT CHANGE UP (ref 0–0.9)
MONOCYTES NFR BLD AUTO: 2 % — SIGNIFICANT CHANGE UP (ref 2–14)
NEUTROPHILS # BLD AUTO: 0.8 K/UL — LOW (ref 1.8–7.4)
NEUTROPHILS NFR BLD AUTO: 42 % — LOW (ref 43–77)
NEUTS BAND # BLD: 18 % — HIGH (ref 0–8)
NRBC # BLD: 0 /100 WBCS — SIGNIFICANT CHANGE UP (ref 0–0)
OVALOCYTES BLD QL SMEAR: SIGNIFICANT CHANGE UP
PLAT MORPH BLD: NORMAL — SIGNIFICANT CHANGE UP
PLATELET # BLD AUTO: 72 K/UL — LOW (ref 150–400)
POIKILOCYTOSIS BLD QL AUTO: SIGNIFICANT CHANGE UP
POLYCHROMASIA BLD QL SMEAR: SLIGHT — SIGNIFICANT CHANGE UP
POTASSIUM SERPL-MCNC: 3.7 MMOL/L — SIGNIFICANT CHANGE UP (ref 3.5–5.3)
POTASSIUM SERPL-SCNC: 3.7 MMOL/L — SIGNIFICANT CHANGE UP (ref 3.5–5.3)
PROT SERPL-MCNC: 7.9 G/DL — SIGNIFICANT CHANGE UP (ref 6–8.3)
PROTHROM AB SERPL-ACNC: 11 SEC — SIGNIFICANT CHANGE UP (ref 9.5–13)
RBC # BLD: 3.47 M/UL — LOW (ref 3.8–5.2)
RBC # FLD: 16 % — HIGH (ref 10.3–14.5)
RBC BLD AUTO: ABNORMAL
RH IG SCN BLD-IMP: POSITIVE — SIGNIFICANT CHANGE UP
RSV RNA NPH QL NAA+NON-PROBE: SIGNIFICANT CHANGE UP
SARS-COV-2 RNA SPEC QL NAA+PROBE: SIGNIFICANT CHANGE UP
SCHISTOCYTES BLD QL AUTO: SLIGHT — SIGNIFICANT CHANGE UP
SODIUM SERPL-SCNC: 140 MMOL/L — SIGNIFICANT CHANGE UP (ref 135–145)
TROPONIN T, HIGH SENSITIVITY RESULT: <6 NG/L — SIGNIFICANT CHANGE UP (ref 0–51)
WBC # BLD: 1.33 K/UL — LOW (ref 3.8–10.5)
WBC # FLD AUTO: 1.33 K/UL — LOW (ref 3.8–10.5)

## 2024-08-23 PROCEDURE — 99291 CRITICAL CARE FIRST HOUR: CPT

## 2024-08-23 PROCEDURE — 71045 X-RAY EXAM CHEST 1 VIEW: CPT | Mod: 26

## 2024-08-23 PROCEDURE — 74177 CT ABD & PELVIS W/CONTRAST: CPT | Mod: 26,MC

## 2024-08-23 PROCEDURE — 99285 EMERGENCY DEPT VISIT HI MDM: CPT

## 2024-08-23 RX ORDER — HEPARIN SODIUM,BOVINE 1000/ML
5000 VIAL (ML) INJECTION EVERY 8 HOURS
Refills: 0 | Status: DISCONTINUED | OUTPATIENT
Start: 2024-08-23 | End: 2024-08-25

## 2024-08-23 RX ORDER — ROPIVACAINE IN 0.9% SOD CHL/PF 0.1 %
0.08 PLASTIC BAG, INJECTION (ML) EPIDURAL
Qty: 8 | Refills: 0 | Status: DISCONTINUED | OUTPATIENT
Start: 2024-08-23 | End: 2024-08-24

## 2024-08-23 RX ORDER — ACETAMINOPHEN 325 MG/1
1000 TABLET ORAL ONCE
Refills: 0 | Status: COMPLETED | OUTPATIENT
Start: 2024-08-23 | End: 2024-08-23

## 2024-08-23 RX ORDER — HYDROCORTISONE 10 MG/1
TABLET ORAL
Refills: 0 | Status: DISCONTINUED | OUTPATIENT
Start: 2024-08-23 | End: 2024-08-26

## 2024-08-23 RX ORDER — METOCLOPRAMIDE HCL 5 MG
10 TABLET ORAL ONCE
Refills: 0 | Status: COMPLETED | OUTPATIENT
Start: 2024-08-23 | End: 2024-08-23

## 2024-08-23 RX ORDER — METRONIDAZOLE 250 MG
500 TABLET ORAL ONCE
Refills: 0 | Status: COMPLETED | OUTPATIENT
Start: 2024-08-23 | End: 2024-08-23

## 2024-08-23 RX ORDER — PANTOPRAZOLE SODIUM 40 MG
40 TABLET, DELAYED RELEASE (ENTERIC COATED) ORAL DAILY
Refills: 0 | Status: DISCONTINUED | OUTPATIENT
Start: 2024-08-23 | End: 2024-09-05

## 2024-08-23 RX ORDER — HYDROCORTISONE 10 MG/1
100 TABLET ORAL ONCE
Refills: 0 | Status: COMPLETED | OUTPATIENT
Start: 2024-08-23 | End: 2024-08-23

## 2024-08-23 RX ORDER — LEVOTHYROXINE SODIUM 100 MCG
70 TABLET ORAL AT BEDTIME
Refills: 0 | Status: DISCONTINUED | OUTPATIENT
Start: 2024-08-23 | End: 2024-09-05

## 2024-08-23 RX ORDER — SODIUM CHLORIDE 9 MG/ML
4 INJECTION INTRAMUSCULAR; INTRAVENOUS; SUBCUTANEOUS EVERY 6 HOURS
Refills: 0 | Status: DISCONTINUED | OUTPATIENT
Start: 2024-08-23 | End: 2024-08-26

## 2024-08-23 RX ORDER — SODIUM CHLORIDE 9 MG/ML
3 INJECTION INTRAMUSCULAR; INTRAVENOUS; SUBCUTANEOUS ONCE
Refills: 0 | Status: DISCONTINUED | OUTPATIENT
Start: 2024-08-23 | End: 2024-08-26

## 2024-08-23 RX ORDER — ONDANSETRON 2 MG/ML
4 INJECTION, SOLUTION INTRAMUSCULAR; INTRAVENOUS ONCE
Refills: 0 | Status: COMPLETED | OUTPATIENT
Start: 2024-08-23 | End: 2024-08-23

## 2024-08-23 RX ORDER — ROPIVACAINE IN 0.9% SOD CHL/PF 0.1 %
0.05 PLASTIC BAG, INJECTION (ML) EPIDURAL
Qty: 8 | Refills: 0 | Status: DISCONTINUED | OUTPATIENT
Start: 2024-08-23 | End: 2024-08-23

## 2024-08-23 RX ORDER — IPRATROPIUM BROMIDE AND ALBUTEROL SULFATE .5; 3 MG/3ML; MG/3ML
3 SOLUTION RESPIRATORY (INHALATION) EVERY 6 HOURS
Refills: 0 | Status: DISCONTINUED | OUTPATIENT
Start: 2024-08-23 | End: 2024-09-05

## 2024-08-23 RX ORDER — HYDROCORTISONE 10 MG/1
50 TABLET ORAL EVERY 12 HOURS
Refills: 0 | Status: DISCONTINUED | OUTPATIENT
Start: 2024-08-26 | End: 2024-08-26

## 2024-08-23 RX ORDER — IPRATROPIUM BROMIDE AND ALBUTEROL SULFATE .5; 3 MG/3ML; MG/3ML
3 SOLUTION RESPIRATORY (INHALATION) ONCE
Refills: 0 | Status: COMPLETED | OUTPATIENT
Start: 2024-08-23 | End: 2024-08-23

## 2024-08-23 RX ORDER — CEFEPIME 2 G/1
2000 INJECTION, POWDER, FOR SOLUTION INTRAVENOUS EVERY 12 HOURS
Refills: 0 | Status: DISCONTINUED | OUTPATIENT
Start: 2024-08-23 | End: 2024-08-30

## 2024-08-23 RX ORDER — METRONIDAZOLE 250 MG
500 TABLET ORAL EVERY 8 HOURS
Refills: 0 | Status: COMPLETED | OUTPATIENT
Start: 2024-08-23 | End: 2024-08-28

## 2024-08-23 RX ORDER — HYDROCORTISONE 10 MG/1
50 TABLET ORAL EVERY 8 HOURS
Refills: 0 | Status: COMPLETED | OUTPATIENT
Start: 2024-08-24 | End: 2024-08-25

## 2024-08-23 RX ORDER — CEFEPIME 2 G/1
1000 INJECTION, POWDER, FOR SOLUTION INTRAVENOUS ONCE
Refills: 0 | Status: COMPLETED | OUTPATIENT
Start: 2024-08-23 | End: 2024-08-23

## 2024-08-23 RX ADMIN — IPRATROPIUM BROMIDE AND ALBUTEROL SULFATE 3 MILLILITER(S): .5; 3 SOLUTION RESPIRATORY (INHALATION) at 18:58

## 2024-08-23 RX ADMIN — Medication 1000 MILLILITER(S): at 09:25

## 2024-08-23 RX ADMIN — ACETAMINOPHEN 400 MILLIGRAM(S): 325 TABLET ORAL at 18:14

## 2024-08-23 RX ADMIN — Medication 10 MILLIGRAM(S): at 17:55

## 2024-08-23 RX ADMIN — Medication 4 MILLIGRAM(S): at 04:52

## 2024-08-23 RX ADMIN — Medication 4 MILLIGRAM(S): at 05:15

## 2024-08-23 RX ADMIN — Medication 8.82 MICROGRAM(S)/KG/MIN: at 17:15

## 2024-08-23 RX ADMIN — Medication 100 MILLIGRAM(S): at 08:30

## 2024-08-23 RX ADMIN — SODIUM CHLORIDE 4 MILLILITER(S): 9 INJECTION INTRAMUSCULAR; INTRAVENOUS; SUBCUTANEOUS at 23:16

## 2024-08-23 RX ADMIN — ACETAMINOPHEN 1000 MILLIGRAM(S): 325 TABLET ORAL at 09:35

## 2024-08-23 RX ADMIN — Medication 5.32 MICROGRAM(S)/KG/MIN: at 15:28

## 2024-08-23 RX ADMIN — Medication 70 MICROGRAM(S): at 22:16

## 2024-08-23 RX ADMIN — HYDROCORTISONE 100 MILLIGRAM(S): 10 TABLET ORAL at 20:10

## 2024-08-23 RX ADMIN — ONDANSETRON 4 MILLIGRAM(S): 2 INJECTION, SOLUTION INTRAMUSCULAR; INTRAVENOUS at 05:34

## 2024-08-23 RX ADMIN — Medication 1000 MILLILITER(S): at 10:35

## 2024-08-23 RX ADMIN — SODIUM CHLORIDE 4 MILLILITER(S): 9 INJECTION INTRAMUSCULAR; INTRAVENOUS; SUBCUTANEOUS at 18:59

## 2024-08-23 RX ADMIN — Medication 100 MILLIGRAM(S): at 21:39

## 2024-08-23 RX ADMIN — Medication 10 MILLIGRAM(S): at 04:10

## 2024-08-23 RX ADMIN — ACETAMINOPHEN 1000 MILLIGRAM(S): 325 TABLET ORAL at 18:49

## 2024-08-23 RX ADMIN — ONDANSETRON 4 MILLIGRAM(S): 2 INJECTION, SOLUTION INTRAMUSCULAR; INTRAVENOUS at 17:05

## 2024-08-23 RX ADMIN — ACETAMINOPHEN 400 MILLIGRAM(S): 325 TABLET ORAL at 09:20

## 2024-08-23 RX ADMIN — Medication 5000 UNIT(S): at 21:39

## 2024-08-23 RX ADMIN — Medication 40 MILLIGRAM(S): at 17:55

## 2024-08-23 RX ADMIN — Medication 8.82 MICROGRAM(S)/KG/MIN: at 20:11

## 2024-08-23 RX ADMIN — Medication 1000 MILLILITER(S): at 11:50

## 2024-08-23 RX ADMIN — CEFEPIME 100 MILLIGRAM(S): 2 INJECTION, POWDER, FOR SOLUTION INTRAVENOUS at 17:55

## 2024-08-23 RX ADMIN — IPRATROPIUM BROMIDE AND ALBUTEROL SULFATE 3 MILLILITER(S): .5; 3 SOLUTION RESPIRATORY (INHALATION) at 23:16

## 2024-08-23 RX ADMIN — Medication 75 MILLILITER(S): at 17:55

## 2024-08-23 RX ADMIN — CEFEPIME 100 MILLIGRAM(S): 2 INJECTION, POWDER, FOR SOLUTION INTRAVENOUS at 07:02

## 2024-08-23 RX ADMIN — Medication 75 MILLILITER(S): at 20:10

## 2024-08-23 RX ADMIN — IPRATROPIUM BROMIDE AND ALBUTEROL SULFATE 3 MILLILITER(S): .5; 3 SOLUTION RESPIRATORY (INHALATION) at 11:16

## 2024-08-23 RX ADMIN — Medication 1000 MILLILITER(S): at 13:00

## 2024-08-23 NOTE — ED PROVIDER NOTE - PROGRESS NOTE DETAILS
Bren AMBROSE: Patient's bands reported to be at 18%, will give cefepime. BP dropping, MAP 50s. Receiving 500cc fluid boluses x2. MICU consulted for septic shock. PCU also consulted given DNR/DNI and for goals of care  Surgery consulted for SBO, s/p NGT placement, on intermittent suction Patient was received in signout at the usual hour from the outgoing overnight team.  In short she is a 89-year-old female history of pulmonary and abdominal tuberculosis with multiple prior small bowel obstructions coming in with symptoms consistent with small bowel obstruction though was notably also hypoxic with increased work of breathing likely due to vomiting and aspiration episode.  At the time of signout CT scan results were pending.  Patient had been covered with antibiotics due to concerns for aspiration and bandemia.  She had also been placed on high flow due to her low oxygen saturation.  CT ultimately resulted with likely obstruction and multifocal pneumonia blood cultures were sent and surgery was consulted.  Patient continued to have vomiting and NG tube was placed by surgery.  Patient continues to be tachypneic with coarse breath sounds requiring high flow.  Daughter is present at the bedside and reaffirms DNR/DNI status though is okay with a trial of noninvasive ventilation as well as with a trial of pressors.  Patient's blood pressure noted to be dropping prior echoes with normal EF.  IV fluid resuscitation was initiated.  Both palliative and medical ICU consulted to assist both with management as well as disposition of this patient.  At this time patient is starting her third liter of fluid based upon bedside POCUS by ICU team.  If not responsive to third liter will likely require pressors. Patient is status post 3 L crystalloid without significant improvement in blood pressure still mapping low.  Decision made to start pressors.  Patient dispo to MICU.

## 2024-08-23 NOTE — ED ADULT NURSE REASSESSMENT NOTE - NS ED NURSE REASSESS COMMENT FT1
Patient becoming less responsive, surgery MD at bedside for NG tube placement; NG tube placed and confirmed with chest x-ray. NG tube connected to intermittent suction.

## 2024-08-23 NOTE — ED PROVIDER NOTE - ATTENDING CONTRIBUTION TO CARE
I was the supervising attending. I have independently seen face-to-face and examined the patient with the resident. I have reviewed the history and physical and discussed the MDM with the resident. I agree with the assessment and plan as presented unless otherwise documented as follows:     89-year-old female, history of A-fib, hypothyroidism, prior pulmonary and abdominal TB status post resection, prior SBO, presenting with abdominal pain and vomiting.  Symptoms started acutely approximately 1 hour prior to arrival.  Otherwise denies fevers or chills, chest pain, difficulty breathing, cough, urinary complaints.  Unsure of last bowel movement. Was satting normally on RA en route to the hospital but was found to be hypoxic in triage to the high 80s and placed on 6LNC. Awake and alert, appears ill, in mild respiratory distress. Hypoxic to 90% on 6LNC, placed on HFNC and satting 95%. Actively vomiting. Diffuse rhonchi B/L with congested breathing, abdominal distended with diffuse tenderness and involuntary guarding. POCUS w/ dilated loops of bowel c/f SBO. Will give pain/nausea medication, obtain labs/CT. -Marley Arcos MD (Attending) I was the supervising attending. I have independently seen face-to-face and examined the patient with the resident. I have reviewed the history and physical and discussed the MDM with the resident. I agree with the assessment and plan as presented unless otherwise documented as follows:     89-year-old female, history of A-fib, hypothyroidism, prior pulmonary and abdominal TB status post resection, prior SBO, presenting with abdominal pain and vomiting.  Symptoms started acutely approximately 1 hour prior to arrival.  Otherwise denies fevers or chills, chest pain, difficulty breathing, cough, urinary complaints.  Unsure of last bowel movement. Was satting normally on RA en route to the hospital but was found to be hypoxic in triage to the high 80s and placed on 6LNC. Awake and alert, appears ill, in mild respiratory distress. Hypoxic to 90% on 6LNC, placed on HFNC and satting 95%. Actively vomiting. Diffuse rhonchi B/L with congested breathing, abdominal distended with diffuse tenderness and involuntary guarding. POCUS w/ dilated loops of bowel c/f SBO. Additionally concerned for acute hypoxemic respiratory failure likely i/s/o aspiration event from vomiting. Will give pain/nausea medication, obtain labs/CT. -Marley Arcos MD (Attending)

## 2024-08-23 NOTE — ED ADULT NURSE NOTE - OBJECTIVE STATEMENT
89 y/o F PMH paroxysmal atrial fibrillation, hypothyroidism, history of pulmonary/abdominal tuberculosis, recurrent SBOs BIBEMS from  home c/o abdominal pain, N/V, sudden onset this AM, per EMS pt woke up her aid because she had vomited, complaining of severe abd discomfort and abd distention so called EMS. On arrival to ED, pt satting 83% on room air, audible chest congestion with wet cough, pt placed on hi lauren o2 per MD Arcos. Pt placed on CM, VS obtained, IV access obtained. Pt A&Ox4, neuro status in tact, answering questions appropriately.

## 2024-08-23 NOTE — CONSULT NOTE ADULT - NS ATTEST RISK PROBLEM GEN_ALL_CORE FT
1. Number and complexity of problems addressed for this patient:    1.1 Moderate (At least 1)  [ ] 1 or more chronic illnesses with exacerbation, progression, or side effects of treatment  [ ] 2 or more stable chronic illnesses  [ ] 1 undiagnosed new problem with uncertain prognosis  [ ] 1 acute illness with systemic symptoms  [ ] 1 acute complicated injury  1.2 High (At least 1)   [ ] 1 or more chronic illnesses with severe exacerbation, progression, or side effects of treatment  [x ] 1 acute or chronic illnesses or injuries that may pose a threat to life or bodily function    2. Amount and/or Complexity of Data that was Reviewed and Analyzed for this case:       Moderate (1 out of 3)       High (2 out of 3)  2.1. (Any combination of 3 of the following)   [ ] Prior External notes were reviewed  [x ] Each test result was reviewed (see "LABS" and "RADIOLOGY & ADDITIONAL STUDIES" above)  [ ] The following tests were ordered and/or reviewed (Only count 1 point for ordering or reviewing a unique test):  	[ ]CBC  	[ ] Chemistry   	[ ] Imaging   	[ ] Other:   [ ] Assessment requiring an independent historian   		Name of historian and relationship:   2.2  [ ] Personally review and interpretation of  image or testing   2.3  [ ] Discussion of management or test interpretation with external physician/other qualified health care professional\appropriate source (not separately reported)    3. Risk of Complications and/or Morbidity or Mortality of for this Patient’s Management:  3.1 Moderate risk of morbidity from additional diagnostic testing or treatment (At least 1):   [ ] Prescription drug management   [ ] Decision regarding minor surgery, treatment, or procedure with identified patient or procedure risk factors  [ ] Decision regarding elective major surgery, treatment, or procedure without identified patient or procedure risk factors   [ ] Diagnosis or treatment significantly limited by social determinants of health   [ ] Other:   3.2 High risk of morbidity from additional diagnostic testing or treatment (At least 1):   [ ] Drug therapy requiring intensive monitoring for toxicity   [ ] Decision regarding elective major surgery, treatment, or procedure with identified patient or procedure risk factors   [ ] Decision regarding emergency major surgery, treatment, or procedure   [ x] Decision regarding hospitalization or escalation of hospital-level of care  [x ] Decision not to resuscitate, not to intubate, or to de-escalate care because of poor prognosis   [ ] Decision to proceed or not with artificial nutrition   [ ] Parenteral controlled substance  [ ] Other:

## 2024-08-23 NOTE — CONSULT NOTE ADULT - PROBLEM SELECTOR RECOMMENDATION 2
- CT Abdomen and Pelvis w/ IV Cont (08.23.24 @ 06:27) showed borderline dilated fluid-filled loopsof small bowel measuring up to 4.0 cm with gradual appearing transition point in the left lower quadrant. Findings may represent early small bowel obstruction or enteritis. Large volume rectal stool.  - NGT placed in the ED, n/v resolved   - s/p IV morphine 4 mg x1 and raglan in the ED   - Surgery consulted, who recommend no acute surgical intervention and NPO   - management per primary team

## 2024-08-23 NOTE — H&P ADULT - NSHPPHYSICALEXAM_GEN_ALL_CORE
Nursing notes reviewed and accepted.    Jonnie Douglas is a 14 year old male who presents for well exam.  Patient presents with Mother.      I have reviewed the patient's medications and allergies, past medical, surgical, social and family history, updating these as appropriate.  See Histories section of the EMR (electronic medical record) for a display of this information.    REVIEW OF SYSTEMS: No respiratory or cardiac issues.  No headache.  Stooling voiding okay.  Using Zyrtec and fluticasone for allergies as needed.  Spinal asymmetry on imaging last year    Social History:   School: [employer]/ 9th grade  Interests / Activities: Basketball and baseball  Tobacco:no  ETOH (Alcohol): no    Family History: family history includes Hyperlipidemia in his father, maternal grandmother, paternal grandfather, and sister.    PHYSICAL EXAMINATION:  Blood pressure 118/62, pulse 66, temperature 98.3 °F (36.8 °C), temperature source Temporal, resp. rate 16, height 6' 2.02\" (1.88 m), weight 64.9 kg (143 lb).  General: Well appearing male, no acute distress.  Dermatologic: No worrisome lesions or rashes noted. Warm and dry.  HEENT: PERRL (Pupils equal, round, reactive to light), EOMI (extraocular movements intact), no conjunctival injection. Nose without discharge or lesion. TM's (Tympanic membranes) clear.  Throat without erythema, exudate or lesion.  Neck: Supple, no thyromegaly.  Nodes: No anterior/posterior cervical adenopathy.  Lungs: Clear to auscultation. No wheezes, rales, rhonchi. Respirations unlabored.  Cardiac: Regular rate and rhythm, normal S1, S2, no murmur appreciated.  Abdomen: Soft, nontender. Positive bowel sounds. No hepatosplenomegaly or masses, no inguinal nodes.  Genital: Testes descended bilaterally, no masses.  No hernia.  Extremities: Full ROM UE/LE (range of motion upper extremities/lower extremities). No laxity or deformity. No cyanosis/edema. Good peripheral pulses.  Back: Subtle  scoliosis.  Neurologic: Strength 5/5 bilaterally upper and lower. Sensation intact to light touch upper and lower.  Gait normal.    ASSESSMENT: Well 14 year old male adolescent.    BEHAVIOR/GUIDANCE:      Self-testicular examination - DISCUSSED      Accident prevention - DISCUSSED      School achievement - DISCUSSED      Family/Peer relationships - DISCUSSED      Regular physical activity - DISCUSSED      Healthy food choices - DISCUSSED        Tobacco, ETOH (alcohol), drug avoidance - DISCUSSED         PLAN:    Anticipatory guidance reviewed   Immunizations per orders  Questions answered  Return to clinic in 1 year for WCE (well child examination) or sooner prn (as needed) for illness/concerns.                  T(C): 37.1 (08-23-24 @ 11:20), Max: 39.3 (08-23-24 @ 09:00)  HR: 87 (08-23-24 @ 15:35) (65 - 121)  BP: 112/56 (08-23-24 @ 15:35) (72/43 - 177/102)  RR: 20 (08-23-24 @ 15:35) (18 - 26)  SpO2: 97% (08-23-24 @ 15:35) (90% - 100%)    CONSTITUTIONAL: laying in bed and appears comfortable   EYES: PERRLA and symmetric, EOMI, No conjunctival or scleral injection  ENMT: NGT in place, HFNC 40/40 and with 98% sat on monitor   RESP: No respiratory distress, no use of accessory muscles  CV: RRR, +S1S2,  no peripheral edema  GI: Soft, NT, ND, no rebound, no guarding; NGT draining minimal brown material   MSK: moving extremities spontaneously but limited from fatigue   PSYCH: AAOx1 to self

## 2024-08-23 NOTE — ED ADULT NURSE REASSESSMENT NOTE - NS ED NURSE REASSESS COMMENT FT1
Patient is only responsive to pain; blood pressure has dropped to 72/43. MD Lu and MD Farley aware and are at bedside. Plan of care being discussed with patient's daughter. MICU and PCU to consult with patient.

## 2024-08-23 NOTE — H&P ADULT - HISTORY OF PRESENT ILLNESS
89F with history of paroxysmal atrial fibrillation, hypothyroidism, pulmonary/abdominal tuberculosis, recurrent small bowel obstruction, recurrent UTIs presenting from home with abdominal pain and vomiting for 1 day. Per daughter and HHA at bedside, patient was in her usual state of health until yesterday. Normally able to communicate and ambulate to the bathroom and eat on her own. Does have a HHA for assistance as needed. HHA reports that around 2:30 AM last night, patient called for assistance and was vomiting and asked to call an ambulance. CT scan performed in ED showed evidence of dilated small bowel loops with large stool burden with concern for small bowel obstruction vs enteritis. Also with some imaging findings concerning for multifocal pneumonia. Surgery consulted given history of small bowel obstruction and imaging and NGT placed with NGT to suction draining minimal dark abdominal contents. Additionally with hypoxemic respiratory failure requiring HFNC 40/40. ED course c/b hypotension 70/40s with transient improvement with 3L IVF however with repeat hypotension despite these measures.   GOC discussion had with daughter at bedside. Patient is DNR/DNI and would not want aggressive measures including surgery or central line placement for pressor therapy. Would like a trial of IV pressors via peripheral IV and if these fails, daughter would not want further escalation. Was also seen by palliative care team in ED and was being considered for PCU on fixed dose pressors however no beds available at this time.   Admitted to MICU for septic shock.

## 2024-08-23 NOTE — CONSULT NOTE ADULT - WHAT MATTERS MOST
reference for a trial of vasopressors, and antibiotics, with a transition to comfort measures if the patient does not show improvement.

## 2024-08-23 NOTE — PATIENT PROFILE ADULT - FALL HARM RISK - HARM RISK INTERVENTIONS
Assistance with ambulation/Assistance OOB with selected safe patient handling equipment/Communicate Risk of Fall with Harm to all staff/Discuss with provider need for PT consult/Monitor gait and stability/Provide patient with walking aids - walker, cane, crutches/Reinforce activity limits and safety measures with patient and family/Sit up slowly, dangle for a short time, stand at bedside before walking/Tailored Fall Risk Interventions/Visual Cue: Yellow wristband and red socks/Bed in lowest position, wheels locked, appropriate side rails in place/Call bell, personal items and telephone in reach/Instruct patient to call for assistance before getting out of bed or chair/Non-slip footwear when patient is out of bed/[place] to call system/Physically safe environment - no spills, clutter or unnecessary equipment/Purposeful Proactive Rounding/Room/bathroom lighting operational, light cord in reach

## 2024-08-23 NOTE — CONSULT NOTE ADULT - PROBLEM SELECTOR RECOMMENDATION 9
On HFNC, hypotensive  - CT showed Multifocal pneumonia.  - s/p cefepime x1, IVF 3 lt in the ED  - Daughter Alyssa confirmed code status: DNR/ I trial of NIV  - Daughter would like a trial of vasopressors, and antibiotics, with a transition to comfort measures if the patient does not show improvement.  - management per primary team

## 2024-08-23 NOTE — CONSULT NOTE ADULT - PROBLEM SELECTOR RECOMMENDATION 4
Surrogate: Daughter Alyssa Slater   Code status: DNR/I trial NIV, trial of vasopressors    Discussed with daughter about wishes regarding extent of medical care to be provided including the possibility of ICU admission, vasopressor support, cardiopulmonary resuscitation, artificial nutrition, hydration, antibiotics, and further investigative studies. Alyssa expressed a strong understanding of the patient's medical condition and a preference for a trial of vasopressors, and antibiotics, with a transition to comfort measures if the patient does not show improvement. Offered PCU for symptoms monitoring and management if daughter opts for symptom directed approach.    Please see complete GOC note

## 2024-08-23 NOTE — H&P ADULT - NSHPLABSRESULTS_GEN_ALL_CORE
Labs:  CAPILLARY BLOOD GLUCOSE                              11.5   1.33  )-----------( 72       ( 23 Aug 2024 04:27 )             37.4       Auto Neutrophil %: 42.0 % (08-23-24 @ 04:27)  Band Neutrophils %: 18.0 % (08-23-24 @ 04:27)    08-23    140  |  102  |  13  ----------------------------<  116<H>  3.7   |  26  |  0.70      Calcium: 9.7 mg/dL (08-23-24 @ 04:27)      LFTs:             7.9  | 0.9  | 12       ------------------[78      ( 23 Aug 2024 04:27 )  4.3  | x    | 11          Lipase:56     Amylase:x         Blood Gas Venous - Lactate: 2.2 mmol/L (08-23-24 @ 11:20)  Blood Gas Venous - Lactate: 1.4 mmol/L (08-23-24 @ 04:27)      Coags:     11.0   ----< 1.05    ( 23 Aug 2024 04:27 )     27.4                Urinalysis Basic - ( 23 Aug 2024 04:27 )    Color: x / Appearance: x / SG: x / pH: x  Gluc: 116 mg/dL / Ketone: x  / Bili: x / Urobili: x   Blood: x / Protein: x / Nitrite: x   Leuk Esterase: x / RBC: x / WBC x   Sq Epi: x / Non Sq Epi: x / Bacteria: x

## 2024-08-23 NOTE — CONSULT NOTE ADULT - ASSESSMENT
89F with hx of paroxysmal atrial fibrillation, hypothyroidism, pulmonary/abdominal tuberculosis, recurrent small bowel obstruction, now presenting with abdominal pain for 1 day and vomiting 1 hour prior to ED presentation. Patient has previously had SBOs managed by NGT and bowel rest. In ED patient O2 sat 88% and placed on HFNC. WBC 1.33 (baseline low). CT obtained showing multifocal pneumonia and SBO. Surgery consulted for non-operative management of SBO. NGT placed by surgical resident in ED.    Plan:  - no acute surgical intervention  - Recommend medicine admission for multifocal PNA w increasing O2 requirements  - Recommend NPO IVF and bowel rest for SBO  - Monitor bowel function  - C/w NGT after confirmation of placement  - Surgery to follow    Discussed with ACS attending Dr. Reynolds  ACS/Trauma Surgery  [phone]

## 2024-08-23 NOTE — PATIENT PROFILE ADULT - OVER THE PAST TWO WEEKS HAVE YOU FELT DOWN, DEPRESSED OR HOPELESS?
[No Respiratory Distress] : no respiratory distress  [No Edema] : there was no peripheral edema [de-identified] : Well-healed laparoscopic scars [Normal] : soft, non-tender, non-distended, no masses palpated, no HSM and normal bowel sounds no

## 2024-08-23 NOTE — ED ADULT TRIAGE NOTE - O2 FLOW (L/MIN)
June 16, 2021      Romelia Rojas  [address]  [place] [address]        Dear Romelia,       Our records indicates that it is time for you to be seen for an office visit with Dr. Duval.    Please call our office at [phone] to schedule an appointment for visit with PAP. This appointment should be scheduled for sometime in September 2021.     Please disregard this notice if you have already made an appointment.    If you are no longer a [facility] patient; Please contact us and let us know that as well. You will also need to the let the pharmacy know the name of your current Provider so that they can send future request to them.       Sincerely,    [facility] Staff          
4

## 2024-08-23 NOTE — CONSULT NOTE ADULT - PROBLEM SELECTOR RECOMMENDATION 5
GaP team consulted for complex medical decision making in the setting of serious illness and symptoms management. Discussed with ED attending/ resident and family. Will continue to follow     If daughter opted for comfort measures would recommend:   - IV morphine 1 mg q2hrs prn for moderate pain  - IV morphine 2 mg q2hrs prn for severe pain/ dyspnea  - IV  Ativan 0.5 mg q4h prn for agitation/ anxiety   - Glycopyrrolate 0.4 mg q6hrs prn for excessive oral secretions   - vitals q daily, no blood draws, no rapids, no MEWs, no code strokes  - Bowel regimen while on opioids.  Monitor for constipation.      Can be reached by TEAMS M-F 9-5 Vi Skinner. Any other time please page [phone] if needed.    In the event of worsening symptoms, please contact the Palliative Medicine team via pager (if the patient is at [facility] #[phone] or if the patient is at [facility] #[phone]) The Geriatric and Palliative Medicine service has coverage 24 hours a day/ 7 days a week to provide medical recommendations regarding symptom management needs via telephone

## 2024-08-23 NOTE — ED PROVIDER NOTE - PHYSICAL EXAMINATION
PHYSICAL EXAM:  GENERAL: Increased WOB   HEAD: Normocephalic  EYES: EOMI, PERRLA, conjunctiva and sclera clear  ENT: No erythema/pallor/petechiae/lesions   NECK: Supple,  LUNG: Left sided coarse rales   HEART: RRR, +S1/S2  ABDOMEN: Distended, inaudible BS    EXTREMITIES:  2+ Peripheral Pulses, brisk cap refill. No clubbing, cyanosis, or edema  NERVOUS SYSTEM:  AAOx3, speech clear. No sensory/motor deficits   SKIN: No rashes or lesions

## 2024-08-23 NOTE — CONSULT NOTE ADULT - ASSESSMENT
89F with hx of paroxysmal atrial fibrillation, hypothyroidism, pulmonary/abdominal tuberculosis, recurrent small bowel obstruction, now presenting with abdominal pain for 1 day and vomiting 1 hour prior to ED presentation. Patient has previously had SBOs managed by NGT and bowel rest. In ED patient O2 sat 88% and placed on HFNC. WBC 1.33 (baseline low). CT obtained showing multifocal pneumonia and SBO. GaP team consulted for complex medical decision making in the setting of serious illness and symptoms management.

## 2024-08-23 NOTE — CONSULT NOTE ADULT - SUBJECTIVE AND OBJECTIVE BOX
Date of Service: 08-23-24 @ 12:47  [facility]   Vi Skinner Palliative Care Attending    HPI: 89F with hx of paroxysmal atrial fibrillation, hypothyroidism, pulmonary/abdominal tuberculosis, recurrent small bowel obstruction, now presenting with abdominal pain for 1 day and vomiting 1 hour prior to ED presentation. Patient has previously had SBOs managed by NGT and bowel rest. In ED patient O2 sat 88% and placed on HFNC. CT obtained showing multifocal pneumonia and SBO.     GaP team consulted for complex medical decision making in the setting of serious illness and symptoms management.  Patient seen this morning, daughter Alyssa at bedside. Patient appears lethargic, ill, hard of hearing, follows simple commands. Noticed Paradoxical breathing pattern, fell asleep during encounter.   Daughter reported that abdominal pain and vomiting resolved with NGT.       PERTINENT PM/SXH:   Pulmonary TB  Gastritis  Afib  SBO (small bowel obstruction)  Hypotyroid  UTI (urinary tract infection)  HTN (hypertension)  TB (pulmonary tuberculosis)  Orthostatic hypotension  Asthma    Aortic stenosis    Multifocal pneumonia      Afib    History of Small Bowel Obstruction    History of Bilateral Breast Biopsy    S/P Exploratory Laparotomy    H/O hemorrhoidectomy    S/P vein stripping      FAMILY HISTORY:  Family history of essential hypertension        SOCIAL HISTORY:   Significant other/partner[ ]  Children[x ] Alyssa Slater Worship/Spirituality:  Substance hx:  [ ]   Tobacco hx:  [ ]   Alcohol hx: [ ]   Home Opioid hx:  [ x] [employer]-Stop Reference No: Vi Bassett | Reference #: [id number]  Living Situation: [ ]Home  [ ]Long term care  [ ]Rehab [ ]Other    ADVANCE DIRECTIVES:    DNR/MOLST  [ ]  Living Will  [ ]   DECISION MAKER(s):  [ ] Health Care Proxy(s)  [x ] Surrogate(s)  [ ] Guardian           Name(s): Phone Number(s): Daughter Alyssa Slater     BASELINE (I)ADL(s) (prior to admission):  [place]: [ ]Total  [x ] Moderate [ ]Dependent    Allergies    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 reported allergy (Unknown)  penicillin (Rash)    Intolerances    MEDICATIONS  (STANDING):  sodium chloride 0.9% for Nebulization 3 milliLiter(s) Nebulizer Once    MEDICATIONS  (PRN):      ITEMS NOT CHECKED ARE NOT PRESENT  PRESENT SYMPTOMS: [ x]Unable to self-report see CPOT, PAINADs, RDOS  Source if other than patient:  [ ]Family   [ ]Team     Pain: [ ]yes [ ]no  QOL impact -   Location -                    Aggravating factors -  Quality -  Radiation -  Timing-  Severity (0-10 scale):  Minimal acceptable level (0-10 scale):       Dyspnea:                           [ ]Mild [ ]Moderate [ ]Severe  Anxiety:                             [ ]Mild [ ]Moderate [ ]Severe  Fatigue:                             [ ]Mild [ ]Moderate [ ]Severe  Nausea:                             [ ]Mild [ ]Moderate [ ]Severe  Loss of appetite:              [ ]Mild [ ]Moderate [ ]Severe  Constipation:                    [ ]Mild [ ]Moderate [ ]Severe    PCSSQ [Palliative Care Spiritual Screening Question]   Severity (0-10):  Score of 4 or > indicate consideration of Chaplaincy referral.  Chaplaincy Referral: [ ] yes [ x] refused [ ] following [ ] deferred  Caregiver [place]? : [x ] yes [ ] no [ ] deferred:  Social work referral [ ] Patient & Family Centered Care Referral [ ]   Anticipatory Grief Present?: [ x] yes [ ] no  [ ] deferred: Palliative Social work referral [ ]  Patient & Family Centered Care Referral [ ]       Other Symptoms:  [ ]All other review of systems negative   [x ] Unable to obtain due to poor mentation    PHYSICAL EXAM:  Vital Signs Last 24 Hrs  T(C): 39.3 (23 Aug 2024 09:00), Max: 39.3 (23 Aug 2024 09:00)  T(F): 102.7 (23 Aug 2024 09:00), Max: 102.7 (23 Aug 2024 09:00)  HR: 86 (23 Aug 2024 10:49) (65 - 121)  BP: 72/43 (23 Aug 2024 10:00) (72/43 - 177/102)  BP(mean): 53 (23 Aug 2024 10:00) (53 - 71)  RR: 22 (23 Aug 2024 10:49) (18 - 26)  SpO2: 99% (23 Aug 2024 10:49) (90% - 100%)    Parameters below as of 23 Aug 2024 10:49  Patient On (Oxygen Delivery Method): nasal cannula, high flow, Temp @31  O2 Flow (L/min): 40  O2 Concentration (%): 40 I&O's Summary      GENERAL:  [x]Alert  []Oriented   [ x]Lethargic  [ ]Cachexia  [ ]Unarousable  [x]Verbal  [ ]Non-Verbal  Behavioral:   [ ]Anxiety  [ ]Delirium [ ]Agitation [ ]Other  HEENT: hard of hearing   []Normal   [ ]Dry mouth   [ ]ET Tube/Trach  [ ]Oral lesions  PULMONARY:   []Clear [x ]Tachypnea  [ ]Audible excessive secretions   [ ]Rhonchi        [ ]Right [ ]Left [ ]Bilateral  [ ]Crackles        [ ]Right [ ]Left [ ]Bilateral  [ ]Wheezing     [ ]Right [ ]Left [ ]Bilateral  [ ]Diminished BS [ ] Right [ ]Left [ ]Bilateral  CARDIOVASCULAR:    [x]Regular [ ]Irregular [ ]Tachy  [ ]Faisal [ ]Murmur [ ]Other  GASTROINTESTINAL:  [x]Soft  [ ]Distended   [x]+BS  [x]Non tender [ ]Tender  [ ]PEG [ x]OGT/ NGT   Last BM:  ?  GENITOURINARY:  [x]Normal [ ]Incontinent   [ ]Oliguria/Anuria   [ ]Baum  MUSCULOSKELETAL:   [ ]Normal   [x]Weakness  [ ]Bed/Wheelchair bound [ ]Edema  NEUROLOGIC:   [x]No focal deficits  [ ] Cognitive impairment  [ ] Dysphagia [ ]Dysarthria [ ] Paresis [ ]Other   SKIN:   [x]Normal  [ ]Rash   [ ]Pressure ulcer(s) [ ]y [ ]n present on admission    CRITICAL CARE:  [ ] Shock Present  [ ]Septic [ ]Cardiogenic [ ]Neurologic [ ]Hypovolemic  [ ]  Vasopressors [ ]  Inotropes   [x ]Respiratory failure present [ ]Mechanical ventilation [ ]Non-invasive ventilatory support [x ]High flow    [x ]Acute  [ ]Chronic [x ]Hypoxic  [ ]Hypercarbic [ ]Other  [ ]Other organ failure     LABS:                        11.5   1.33  )-----------( 72       ( 23 Aug 2024 04:27 )             37.4   08-23    140  |  102  |  13  ----------------------------<  116<H>  3.7   |  26  |  0.70    Ca    9.7      23 Aug 2024 04:27  Mg     2.4     08-23    TPro  7.9  /  Alb  4.3  /  TBili  0.9  /  DBili  x   /  AST  12  /  ALT  11  /  AlkPhos  78  08-23  PT/INR - ( 23 Aug 2024 04:27 )   PT: 11.0 sec;   INR: 1.05 ratio         PTT - ( 23 Aug 2024 04:27 )  PTT:27.4 sec    Urinalysis Basic - ( 23 Aug 2024 04:27 )    Color: x / Appearance: x / SG: x / pH: x  Gluc: 116 mg/dL / Ketone: x  / Bili: x / Urobili: x   Blood: x / Protein: x / Nitrite: x   Leuk Esterase: x / RBC: x / WBC x   Sq Epi: x / Non Sq Epi: x / Bacteria: x      RADIOLOGY & ADDITIONAL STUDIES:     Xray Chest 1 View- PORTABLE-Urgent (Xray Chest 1 View- PORTABLE-Urgent .) (08.23.24 @ 09:39) >  IMPRESSION:  Enteric tube descends below the diaphragm and overlies the stomach.  Minimal left basilar hazy opacity which may represent small effusion or   atelectasis     CT Abdomen and Pelvis w/ IV Cont (08.23.24 @ 06:27) >  Multifocal pneumonia.    Borderline dilated fluid-filled loopsof small bowel measuring up to 4.0   cm with gradual appearing transition point in the left lower quadrant.   Findings may represent early small bowel obstruction or enteritis.    Large volume rectal stool.    < from: Xray Chest 1 View-PORTABLE IMMEDIATE (Xray Chest 1 View-PORTABLE IMMEDIATE .) (08.23.24 @ 04:30) >    The heart is normal in size.  The lungs are clear. No pleural effusion.  There is no pneumothorax.  No acute bony abnormality.    < end of copied text >        ROTEIN CALORIE MALNUTRITION PRESENT: [ ]mild [ ]moderate [ ]severe [ ]underweight [ ]morbid obesity  https://www.andeal.org/vault/2440/web/files/ONC/Table_Clinical%20Characteristics%20to%20Document%20Malnutrition-White%20JV%20et%20al%202012.pdf    Height (cm): 162.6 (08-23-24 @ 03:37), 165.1 (06-06-24 @ 19:21), 165.1 (05-08-24 @ 11:40)  Weight (kg): 56.7 (08-23-24 @ 03:37), 58.5 (06-06-24 @ 19:21), 68 (05-08-24 @ 11:40)  BMI (kg/m2): 21.4 (08-23-24 @ 03:37), 21.5 (06-06-24 @ 19:21), 24.9 (05-08-24 @ 11:40)    [ ]PPSV2 < or = to 30% [ ]significant weight loss  [ ]poor nutritional intake  [ ]anasarca[ ]Artificial Nutrition      Other REFERRALS:  [ ]Hospice  [ ]Child Life  [ ]Social Work  [ ]Case management [ ]Holistic Therapy

## 2024-08-23 NOTE — ED ADULT NURSE REASSESSMENT NOTE - NS ED NURSE REASSESS COMMENT FT1
Received report from Lesa CRUZ. A&Ox4. Patient in no acute distress. Patient currently connected to high flow nasal cannula set at 40 L/min and FiO2 of 40%. Patient pending results of CT abdomen and pelvis. Plan of care discussed. Safety and comfort measures maintained.

## 2024-08-23 NOTE — CONSULT NOTE ADULT - CONVERSATION DETAILS
First introduced myself and role in patient care. Daughter verbalized understanding and was receptive with palliative care consult. However, due to the patient's current medical condition, she was unable to participate. We then discussed the patient's medical and social history, as well as the events leading to their hospitalization.    Alyssa shared that patient last night had ice cream and decompensate suddenly since last night. She mentioned that patient always defer to her to take her medical decision, since her  passed away 4 years ago.      Inquired about wishes regarding extent of medical care to be provided including the possibility of ICU admission, vasopressor support, cardiopulmonary resuscitation, artificial nutrition, hydration, antibiotics, and further investigative studies. Alyssa expressed a strong understanding of the patient's medical condition and a preference for a trial of vasopressors, and antibiotics, with a transition to comfort measures if the patient does not show improvement. After confirming the patient's code status and DNR/DNI status, answered questions and provided emotional support. Daughter declined a chaplaincy referral,

## 2024-08-23 NOTE — ED PROVIDER NOTE - OBJECTIVE STATEMENT
89-year-old female with history of paroxysmal atrial fibrillation, hypothyroidism, history of pulmonary/abdominal tuberculosis, recurrent small bowel obstruction, now presenting with abdominal pain and vomiting 1 hour prior to ED presentation.  Brought in by EMTs who report patient became hypoxic during transport requiring supplemental oxygen.  On ED presentation patient appears to be breathing fast, he reports upper abdominal pain.  Denies fevers, headaches, lightheadedness, syncope, chest pain, urinary/bowel complaints.

## 2024-08-23 NOTE — ED PROVIDER NOTE - NSICDXFAMILYHX_GEN_ALL_CORE_FT
FAMILY HISTORY:  Family history of essential hypertension     Xeljanz Counseling: I discussed with the patient the risks of Xeljanz therapy including increased risk of infection, liver issues, headache, diarrhea, or cold symptoms. Live vaccines should be avoided. They were instructed to call if they have any problems.

## 2024-08-23 NOTE — ED ADULT NURSE NOTE - NSFALLHARMRISKINTERV_ED_ALL_ED
Assistance OOB with selected safe patient handling equipment if applicable/Assistance with ambulation/Communicate risk of Fall with Harm to all staff, patient, and family/Monitor gait and stability/Provide visual cue: red socks, yellow wristband, yellow gown, etc/Reinforce activity limits and safety measures with patient and family/Bed in lowest position, wheels locked, appropriate side rails in place/Call bell, personal items and telephone in reach/Instruct patient to call for assistance before getting out of bed/chair/stretcher/Non-slip footwear applied when patient is off stretcher/[place] to call system/Physically safe environment - no spills, clutter or unnecessary equipment/Purposeful Proactive Rounding/Room/bathroom lighting operational, light cord in reach

## 2024-08-23 NOTE — PATIENT PROFILE ADULT - HISTORY OF COVID-19 VACCINATION
Medication: Ambien  Dosage: 5 mg  Sig: take 1 tablet prn nightly   Last Refill: 11/24/2020 #30 with 0 rf  Last Office Visit for this diagnosis or CPE/PREOP: 6/12/2020-tele visit  Did not address.  Next Appt:  None made  Pertinent labs UTD: No      PDMP reviewed and documented.  Picked up at pharmacy on 11/24/2020.     Sent to MD to advise on Refill(s)  
Vaccine status unknown

## 2024-08-23 NOTE — CONSULT NOTE ADULT - SUBJECTIVE AND OBJECTIVE BOX
GENERAL SURGERY CONSULT NOTE  --------------------------------------------------------------------------------------------    HPI:  89F with hx of paroxysmal atrial fibrillation, hypothyroidism, pulmonary/abdominal tuberculosis, recurrent small bowel obstruction, now presenting with abdominal pain for 1 day and vomiting 1 hour prior to ED presentation. Patient has previously had SBOs managed by NGT and bowel rest. In ED patient O2 sat 88% and placed on HFNC. CT obtained showing multifocal pneumonia and SBO.     Surgery consulted for SBO.   Patient seen and examined. Patient is slightly altered at the time of exam, but daughter bedside. States she has had SBOs in the past. Had surgery and was found to have abdominal tuberculosis. Since then, when shes had an SBO, it was managed w NGT and bowel rest. Patient and daughter do not know when the last time she passed gas or had a BM was.   Of note, patient is DNR/DNI and does not want surgery.     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 reported allergy (Unknown)  penicillin (Rash)      HOME MEDICATIONS:   Home Medications:  Albuterol (Eqv-ProAir HFA) 90 mcg/inh inhalation aerosol: 2 puff(s) inhaled 3 times a day 2 to 3 times as needed NOTE: As per Pharmacy, last filled 12/23 for 30 days supply (07 Jun 2024 15:21)  amiodarone 200 mg oral tablet: 1 tab(s) orally once a day (07 Jun 2024 09:55)  Eliquis 2.5 mg oral tablet: 1 tab(s) orally once a day (07 Jun 2024 09:56)  levothyroxine 100 mcg (0.1 mg) oral tablet: 1 tab(s) orally once a day (07 Jun 2024 09:56)  sertraline 50 mg oral tablet: 1 tab(s) orally once a day (07 Jun 2024 09:56)      CURRENT MEDICATIONS  MEDICATIONS (STANDING): lactated ringers Bolus 500 milliLiter(s) IV Bolus once  lactated ringers Bolus 500 milliLiter(s) IV Bolus once    MEDICATIONS (PRN):  --------------------------------------------------------------------------------------------    Vitals:   T(C): 37.2 (08-23-24 @ 06:46), Max: 37.2 (08-23-24 @ 06:46)  HR: 82 (08-23-24 @ 09:25) (65 - 112)  BP: 155/80 (08-23-24 @ 07:35) (140/81 - 177/102)  RR: 20 (08-23-24 @ 09:25) (18 - 20)  SpO2: 92% (08-23-24 @ 09:25) (90% - 97%)  CAPILLARY BLOOD GLUCOSE          Height (cm): 162.6 (08-23 @ 03:37)  Weight (kg): 56.7 (08-23 @ 03:37)  BMI (kg/m2): 21.4 (08-23 @ 03:37)  BSA (m2): 1.6 (08-23 @ 03:37)      PHYSICAL EXAM:  General: uncomfortable  Neuro: unable to assess  Resp: increased work of breathing, on HFNC  GI/Abd: Soft, distended, difficult to assess tenderness, nonperitonitic  --------------------------------------------------------------------------------------------    LABS  CBC (08-23 @ 04:27)                              11.5                           1.33<L>  )----------------(  72<L>      42.0<L>% Neutrophils, 38.0  % Lymphocytes, ANC: 0.80<L>                              37.4      BMP (08-23 @ 04:27)             140     |  102     |  13    		Ca++ --      Ca 9.7                ---------------------------------( 116<H>		Mg 2.4                3.7     |  26      |  0.70  			Ph --        LFTs (08-23 @ 04:27)      TPro 7.9 / Alb 4.3 / TBili 0.9 / DBili -- / AST 12 / ALT 11 / AlkPhos 78    Coags (08-23 @ 04:27)  aPTT 27.4 / INR 1.05 / PT 11.0        VBG (08-23 @ 04:27)     7.36 / 57<H> / 32 / 32<H> / 5.4<H> / 55.4<L>%     Lactate: 1.4    --------------------------------------------------------------------------------------------    MICROBIOLOGY  Urinalysis (08-23 @ 04:27):     Color:  / Appearance:  / SG:  / pH:  / Gluc: 116<H> / Ketones:  / Bili:  / Urobili:  / Protein : / Nitrites:  / Leuk.Est:  / RBC:  / WBC:  / Sq Epi:  / Non Sq Epi:  / Bacteria          --------------------------------------------------------------------------------------------    IMAGING  < from: CT Abdomen and Pelvis w/ IV Cont (08.23.24 @ 06:27) >  IMPRESSION:  Multifocal pneumonia.    Borderline dilated fluid-filled loopsof small bowel measuring up to 4.0   cm with gradual appearing transition point in the left lower quadrant.   Findings may represent early small bowel obstruction or enteritis.    Large volume rectal stool.    < end of copied text >      --------------------------------------------------------------------------------------------

## 2024-08-23 NOTE — ED PROVIDER NOTE - CLINICAL SUMMARY MEDICAL DECISION MAKING FREE TEXT BOX
89-year-old female with history of paroxysmal atrial fibrillation, hypothyroidism, history of pulmonary/abdominal tuberculosis, recurrent small bowel obstruction, now presenting with abdominal pain and vomiting 1 hour prior to ED presentation. VS sats to 88% on RA, RR 24/min, o/w HD stable, on exam left basal creps to chest ausc, distended abd with inaudible BS. Concern for bowel obst, Asp PNA. Will get labs, CXR, CT abd/pelvis. Resp therapist involved for HFNC. Will re-evaluate, admit.

## 2024-08-23 NOTE — ED PROVIDER NOTE - CARE PLAN
Principal Discharge DX:	Acute hypoxic respiratory failure  Secondary Diagnosis:	Pneumonia, aspiration  Secondary Diagnosis:	Septic shock  Secondary Diagnosis:	SBO (small bowel obstruction)   1

## 2024-08-23 NOTE — H&P ADULT - ASSESSMENT
89F with history of paroxysmal atrial fibrillation, hypothyroidism, pulmonary/abdominal tuberculosis, recurrent small bowel obstruction, recurrent UTIs presenting from home with abdominal pain and vomiting for 1 day with CT scans with evidence of SBO vs enteritis s/p NGT. Course c/b hypotension likely iso septic from multifocal PNA process.    Neurology  #Mental Status  - AAO x 3 at baseline  - lethargic at this time however AAO x 1    Cardiovascular  #Septic Shock   - etiology of hypotension likely iso septic shock  - s/p 3L IVF for volume resuscitation iso SBO and IVC indicating volume responsivement  - on levophed for MAP > 60  - on trial of IV pressors and potential transfer to PCU for fixed dose pressor requirements    #Paroxysmal Atrial Fibrillation   - will need to hold amiodarone 200 qd for rate control iso NPO  - will need to hold home eliquis 2.5 BID iso NPO    Respiratory  #Acute Hypoxemic Respiratory Failure  #Multifocal PNA   - on HFNC 40/40 for hypoxemic respiratory failure likely iso multifocal PNA which may have been iso aspiration event from SBO  - c/w cefepime for antibiotics for empiric coverage of aspiration PNA   - wean HFNC as tolerated     #History of Pulmonary Tuberculosis in 90s  - s/p treatment in 1990s    GI  #History of Abdominal Tuberculosis   #History of Recurrent Small Bowel Obstruction  #Small Bowel Obstruction  - NGT to suction draining small amounts of brown stomach content   - NPO  - stress ulcer prophylaxis with pantoprazole 40 BID iso NPO    Renal  #no active issues     Hematology/Oncology  #Leukopenia  #Neutropenia  - was receiving prednisone OP for low blood pressure  - stress dose hydrocortisone iso hypotension iso ongoing steroid use    #DVT Prophylaxis  - heparin 5000 subQ    ID  #Neutropenic Fever  #Sepsis 2/2 Multifocal PNA  - s/p 3L IVF resuscitation   - c/w cefepime and flagyll for empiric antibiotics for PNA and abdominal coverage   - pending blood cultures and urine cultures    Endocrine  #Hypothyroidism  - c/w levothyroxine 75 mcg IV    Ethics  DNR/DNI; no central lines

## 2024-08-24 LAB
ADD ON TEST-SPECIMEN IN LAB: SIGNIFICANT CHANGE UP
ALBUMIN SERPL ELPH-MCNC: 3 G/DL — LOW (ref 3.3–5)
ALP SERPL-CCNC: 89 U/L — SIGNIFICANT CHANGE UP (ref 40–120)
ALT FLD-CCNC: 12 U/L — SIGNIFICANT CHANGE UP (ref 10–45)
ANION GAP SERPL CALC-SCNC: 13 MMOL/L — SIGNIFICANT CHANGE UP (ref 5–17)
APPEARANCE UR: CLEAR — SIGNIFICANT CHANGE UP
AST SERPL-CCNC: 15 U/L — SIGNIFICANT CHANGE UP (ref 10–40)
BACTERIA # UR AUTO: NEGATIVE /HPF — SIGNIFICANT CHANGE UP
BASOPHILS # BLD AUTO: 0.03 K/UL — SIGNIFICANT CHANGE UP (ref 0–0.2)
BASOPHILS NFR BLD AUTO: 1.2 % — SIGNIFICANT CHANGE UP (ref 0–2)
BILIRUB SERPL-MCNC: 1.1 MG/DL — SIGNIFICANT CHANGE UP (ref 0.2–1.2)
BILIRUB UR-MCNC: NEGATIVE — SIGNIFICANT CHANGE UP
BUN SERPL-MCNC: 20 MG/DL — SIGNIFICANT CHANGE UP (ref 7–23)
CA-I BLD-SCNC: 1.05 MMOL/L — LOW (ref 1.15–1.33)
CALCIUM SERPL-MCNC: 8.1 MG/DL — LOW (ref 8.4–10.5)
CAST: 0 /LPF — SIGNIFICANT CHANGE UP (ref 0–4)
CHLORIDE SERPL-SCNC: 105 MMOL/L — SIGNIFICANT CHANGE UP (ref 96–108)
CO2 SERPL-SCNC: 21 MMOL/L — LOW (ref 22–31)
COLOR SPEC: SIGNIFICANT CHANGE UP
CREAT SERPL-MCNC: 0.78 MG/DL — SIGNIFICANT CHANGE UP (ref 0.5–1.3)
DIFF PNL FLD: NEGATIVE — SIGNIFICANT CHANGE UP
EGFR: 68 ML/MIN/1.73M2 — SIGNIFICANT CHANGE UP
EOSINOPHIL # BLD AUTO: 0.01 K/UL — SIGNIFICANT CHANGE UP (ref 0–0.5)
EOSINOPHIL NFR BLD AUTO: 0.4 % — SIGNIFICANT CHANGE UP (ref 0–6)
FOLATE SERPL-MCNC: 15.6 NG/ML — SIGNIFICANT CHANGE UP
GAS PNL BLDA: SIGNIFICANT CHANGE UP
GLUCOSE SERPL-MCNC: 106 MG/DL — HIGH (ref 70–99)
GLUCOSE UR QL: NEGATIVE MG/DL — SIGNIFICANT CHANGE UP
HCT VFR BLD CALC: 28.9 % — LOW (ref 34.5–45)
HGB BLD-MCNC: 9.3 G/DL — LOW (ref 11.5–15.5)
IMM GRANULOCYTES NFR BLD AUTO: 0.4 % — SIGNIFICANT CHANGE UP (ref 0–0.9)
INR BLD: 1.25 RATIO — HIGH (ref 0.85–1.18)
KETONES UR-MCNC: NEGATIVE MG/DL — SIGNIFICANT CHANGE UP
LEGIONELLA AG UR QL: NEGATIVE — SIGNIFICANT CHANGE UP
LEUKOCYTE ESTERASE UR-ACNC: ABNORMAL
LYMPHOCYTES # BLD AUTO: 0.3 K/UL — LOW (ref 1–3.3)
LYMPHOCYTES # BLD AUTO: 12.3 % — LOW (ref 13–44)
MAGNESIUM SERPL-MCNC: 1.9 MG/DL — SIGNIFICANT CHANGE UP (ref 1.6–2.6)
MCHC RBC-ENTMCNC: 32.2 GM/DL — SIGNIFICANT CHANGE UP (ref 32–36)
MCHC RBC-ENTMCNC: 34.3 PG — HIGH (ref 27–34)
MCV RBC AUTO: 106.6 FL — HIGH (ref 80–100)
MONOCYTES # BLD AUTO: 0.06 K/UL — SIGNIFICANT CHANGE UP (ref 0–0.9)
MONOCYTES NFR BLD AUTO: 2.5 % — SIGNIFICANT CHANGE UP (ref 2–14)
NEUTROPHILS # BLD AUTO: 2.03 K/UL — SIGNIFICANT CHANGE UP (ref 1.8–7.4)
NEUTROPHILS NFR BLD AUTO: 83.2 % — HIGH (ref 43–77)
NITRITE UR-MCNC: NEGATIVE — SIGNIFICANT CHANGE UP
NRBC # BLD: 0 /100 WBCS — SIGNIFICANT CHANGE UP (ref 0–0)
PH UR: 5.5 — SIGNIFICANT CHANGE UP (ref 5–8)
PHOSPHATE SERPL-MCNC: 4.8 MG/DL — HIGH (ref 2.5–4.5)
PLATELET # BLD AUTO: 76 K/UL — LOW (ref 150–400)
POTASSIUM SERPL-MCNC: 4.9 MMOL/L — SIGNIFICANT CHANGE UP (ref 3.5–5.3)
POTASSIUM SERPL-SCNC: 4.9 MMOL/L — SIGNIFICANT CHANGE UP (ref 3.5–5.3)
PROT SERPL-MCNC: 5.9 G/DL — LOW (ref 6–8.3)
PROT UR-MCNC: 30 MG/DL
PROTHROM AB SERPL-ACNC: 13.7 SEC — HIGH (ref 9.5–13)
RBC # BLD: 2.71 M/UL — LOW (ref 3.8–5.2)
RBC # FLD: 16.4 % — HIGH (ref 10.3–14.5)
RBC CASTS # UR COMP ASSIST: 4 /HPF — SIGNIFICANT CHANGE UP (ref 0–4)
S PNEUM AG UR QL: NEGATIVE — SIGNIFICANT CHANGE UP
SODIUM SERPL-SCNC: 139 MMOL/L — SIGNIFICANT CHANGE UP (ref 135–145)
SP GR SPEC: >1.03 — HIGH (ref 1–1.03)
SQUAMOUS # UR AUTO: 2 /HPF — SIGNIFICANT CHANGE UP (ref 0–5)
TSH SERPL-MCNC: 0.55 UIU/ML — SIGNIFICANT CHANGE UP (ref 0.27–4.2)
UROBILINOGEN FLD QL: 1 MG/DL — SIGNIFICANT CHANGE UP (ref 0.2–1)
VIT B12 SERPL-MCNC: 179 PG/ML — LOW (ref 232–1245)
WBC # BLD: 2.44 K/UL — LOW (ref 3.8–10.5)
WBC # FLD AUTO: 2.44 K/UL — LOW (ref 3.8–10.5)
WBC UR QL: 1 /HPF — SIGNIFICANT CHANGE UP (ref 0–5)

## 2024-08-24 PROCEDURE — 99232 SBSQ HOSP IP/OBS MODERATE 35: CPT

## 2024-08-24 PROCEDURE — 71045 X-RAY EXAM CHEST 1 VIEW: CPT | Mod: 26

## 2024-08-24 RX ORDER — ACETAMINOPHEN 325 MG/1
1000 TABLET ORAL ONCE
Refills: 0 | Status: COMPLETED | OUTPATIENT
Start: 2024-08-24 | End: 2024-08-24

## 2024-08-24 RX ADMIN — SODIUM CHLORIDE 4 MILLILITER(S): 9 INJECTION INTRAMUSCULAR; INTRAVENOUS; SUBCUTANEOUS at 11:02

## 2024-08-24 RX ADMIN — HYDROCORTISONE 50 MILLIGRAM(S): 10 TABLET ORAL at 14:08

## 2024-08-24 RX ADMIN — Medication 70 MICROGRAM(S): at 22:14

## 2024-08-24 RX ADMIN — Medication 5000 UNIT(S): at 14:08

## 2024-08-24 RX ADMIN — CEFEPIME 100 MILLIGRAM(S): 2 INJECTION, POWDER, FOR SOLUTION INTRAVENOUS at 05:04

## 2024-08-24 RX ADMIN — Medication 5000 UNIT(S): at 22:14

## 2024-08-24 RX ADMIN — IPRATROPIUM BROMIDE AND ALBUTEROL SULFATE 3 MILLILITER(S): .5; 3 SOLUTION RESPIRATORY (INHALATION) at 11:02

## 2024-08-24 RX ADMIN — HYDROCORTISONE 50 MILLIGRAM(S): 10 TABLET ORAL at 22:14

## 2024-08-24 RX ADMIN — SODIUM CHLORIDE 4 MILLILITER(S): 9 INJECTION INTRAMUSCULAR; INTRAVENOUS; SUBCUTANEOUS at 23:23

## 2024-08-24 RX ADMIN — Medication 100 MILLILITER(S): at 12:10

## 2024-08-24 RX ADMIN — IPRATROPIUM BROMIDE AND ALBUTEROL SULFATE 3 MILLILITER(S): .5; 3 SOLUTION RESPIRATORY (INHALATION) at 17:07

## 2024-08-24 RX ADMIN — Medication 5000 UNIT(S): at 05:03

## 2024-08-24 RX ADMIN — HYDROCORTISONE 50 MILLIGRAM(S): 10 TABLET ORAL at 05:04

## 2024-08-24 RX ADMIN — SODIUM CHLORIDE 4 MILLILITER(S): 9 INJECTION INTRAMUSCULAR; INTRAVENOUS; SUBCUTANEOUS at 17:07

## 2024-08-24 RX ADMIN — SODIUM CHLORIDE 4 MILLILITER(S): 9 INJECTION INTRAMUSCULAR; INTRAVENOUS; SUBCUTANEOUS at 05:02

## 2024-08-24 RX ADMIN — IPRATROPIUM BROMIDE AND ALBUTEROL SULFATE 3 MILLILITER(S): .5; 3 SOLUTION RESPIRATORY (INHALATION) at 23:23

## 2024-08-24 RX ADMIN — Medication 100 MILLIGRAM(S): at 14:08

## 2024-08-24 RX ADMIN — CEFEPIME 100 MILLIGRAM(S): 2 INJECTION, POWDER, FOR SOLUTION INTRAVENOUS at 17:08

## 2024-08-24 RX ADMIN — ACETAMINOPHEN 400 MILLIGRAM(S): 325 TABLET ORAL at 14:07

## 2024-08-24 RX ADMIN — Medication 100 MILLIGRAM(S): at 05:04

## 2024-08-24 RX ADMIN — Medication 40 MILLIGRAM(S): at 12:08

## 2024-08-24 RX ADMIN — IPRATROPIUM BROMIDE AND ALBUTEROL SULFATE 3 MILLILITER(S): .5; 3 SOLUTION RESPIRATORY (INHALATION) at 05:02

## 2024-08-24 RX ADMIN — ACETAMINOPHEN 1000 MILLIGRAM(S): 325 TABLET ORAL at 14:37

## 2024-08-24 RX ADMIN — Medication 100 MILLIGRAM(S): at 22:13

## 2024-08-24 NOTE — PROGRESS NOTE ADULT - ASSESSMENT
89F with hx of paroxysmal atrial fibrillation, hypothyroidism, pulmonary/abdominal tuberculosis, recurrent small bowel obstruction, now presenting with abdominal pain for 1 day and vomiting 1 hour prior to ED presentation. Patient has previously had SBOs managed by NGT and bowel rest. In ED patient O2 sat 88% and placed on HFNC. WBC 1.33 (baseline low). CT obtained showing multifocal pneumonia and SBO. Surgery consulted for non-operative management of SBO. NGT placed by surgical resident in ED. Overnight, pt reports bowel movement.    Plan:  - no acute surgical intervention  - Recommend d/c'ing NGT due to patient having bowel movement  - Recommend advancing to clears, advance diet as tolerated  - ACS will sign off, please page PRN if patient status changes.      ACS/Trauma Surgery  [phone]

## 2024-08-24 NOTE — PROGRESS NOTE ADULT - ASSESSMENT
89F with history of paroxysmal atrial fibrillation, hypothyroidism, pulmonary/abdominal tuberculosis, recurrent small bowel obstruction, recurrent UTIs presenting from home with abdominal pain and vomiting for 1 day with CT scans with evidence of SBO vs enteritis s/p NGT. Course c/b hypotension likely iso septic from multifocal PNA process.    Neurology  #Mental Status  - AAO x 3 at baseline  - lethargic at this time however AAO x 1    Cardiovascular  #Septic Shock   - etiology of hypotension likely iso septic shock  - s/p 3L IVF for volume resuscitation iso SBO and IVC indicating volume responsivement  - required levophed fo9r shock state but now weaned off with MAP > 60 maintained    #Paroxysmal Atrial Fibrillation   - will need to hold amiodarone 200 qd for rate control iso NPO  - will need to hold home eliquis 2.5 BID iso NPO    Respiratory  #Acute Hypoxemic Respiratory Failure  #Multifocal PNA   - on HFNC 40/40 for hypoxemic respiratory failure likely iso multifocal PNA which may have been iso aspiration event from SBO  - c/w cefepime for antibiotics for empiric coverage of aspiration PNA   - wean HFNC as tolerated     #History of Pulmonary Tuberculosis in 90s  - s/p treatment in 1990s    GI  #History of Abdominal Tuberculosis   #History of Recurrent Small Bowel Obstruction  #Small Bowel Obstruction  - NGT to suction draining small amounts of brown stomach content   - NPO  - stress ulcer prophylaxis with pantoprazole 40 BID iso NPO    Renal  #no active issues     Hematology/Oncology  #Leukopenia  #Neutropenia  - was receiving prednisone OP for low blood pressure  - stress dose hydrocortisone iso hypotension iso ongoing steroid use    #DVT Prophylaxis  - heparin 5000 subQ    ID  #Neutropenic Fever  #Sepsis 2/2 Multifocal PNA  - s/p 3L IVF resuscitation   - c/w cefepime and flagyll for empiric antibiotics for PNA and abdominal coverage   - pending blood cultures and urine cultures    Endocrine  #Hypothyroidism  - c/w levothyroxine 75 mcg IV    Ethics  DNR/DNI; no central lines

## 2024-08-24 NOTE — PROGRESS NOTE ADULT - SUBJECTIVE AND OBJECTIVE BOX
INTERVAL HPI/OVERNIGHT EVENTS: NAEON    SUBJECTIVE: Patient seen and examined at bedside.   Overnight, pulled out NGT which was replaced.   Levophed was weaned off successfully     VITAL SIGNS:  ICU Vital Signs Last 24 Hrs  T(C): 37.3 (24 Aug 2024 04:00), Max: 39.3 (23 Aug 2024 09:00)  T(F): 99.1 (24 Aug 2024 04:00), Max: 102.7 (23 Aug 2024 09:00)  HR: 75 (24 Aug 2024 07:00) (72 - 121)  BP: 107/56 (24 Aug 2024 07:00) (72/43 - 171/72)  BP(mean): 77 (24 Aug 2024 07:00) (53 - 103)  ABP: --  ABP(mean): --  RR: 35 (24 Aug 2024 07:00) (16 - 36)  SpO2: 100% (24 Aug 2024 07:00) (92% - 100%)    O2 Parameters below as of 24 Aug 2024 05:02  Patient On (Oxygen Delivery Method): nasal cannula            Plateau pressure:   P/F ratio:     08-23 @ 07:01  -  08-24 @ 07:00  --------------------------------------------------------  IN: 1411.3 mL / OUT: 850 mL / NET: 561.3 mL      CAPILLARY BLOOD GLUCOSE      POCT Blood Glucose.: 87 mg/dL (24 Aug 2024 05:03)    ECG:    PHYSICAL EXAM:  CONSTITUTIONAL: laying in bed and appears comfortable   EYES: PERRLA and symmetric, EOMI, No conjunctival or scleral injection  ENMT: NGT in place, NC 2L with adequate O2 sat  RESP: No respiratory distress, no use of accessory muscles  CV: RRR, +S1S2,  no peripheral edema  GI: Soft, NT, ND, no rebound, no guarding; NGT draining minimal brown material   MSK: moving extremities spontaneously but limited from fatigue   PSYCH: AAOx1 to self    MEDICATIONS:  MEDICATIONS  (STANDING):  albuterol/ipratropium for Nebulization 3 milliLiter(s) Nebulizer every 6 hours  cefepime   IVPB 2000 milliGRAM(s) IV Intermittent every 12 hours  dextrose 5% + lactated ringers. 1000 milliLiter(s) (75 mL/Hr) IV Continuous <Continuous>  heparin   Injectable 5000 Unit(s) SubCutaneous every 8 hours  hydrocortisone sodium succinate Injectable 50 milliGRAM(s) IV Push every 8 hours  hydrocortisone sodium succinate Injectable   IV Push   levothyroxine Injectable 70 MICROGram(s) IV Push at bedtime  metroNIDAZOLE  IVPB 500 milliGRAM(s) IV Intermittent every 8 hours  pantoprazole  Injectable 40 milliGRAM(s) IV Push daily  sodium chloride 0.9% for Nebulization 3 milliLiter(s) Nebulizer Once  sodium chloride 3%  Inhalation 4 milliLiter(s) Inhalation every 6 hours    MEDICATIONS  (PRN):      ALLERGIES:  Allergies    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 reported allergy (Unknown)  penicillin (Rash)    Intolerances        LABS:                        9.3    2.44  )-----------( 76       ( 24 Aug 2024 00:10 )             28.9     08-24    139  |  105  |  20  ----------------------------<  106<H>  4.9   |  21<L>  |  0.78    Ca    8.1<L>      24 Aug 2024 00:10  Phos  4.8     08-24  Mg     1.9     08-24    TPro  5.9<L>  /  Alb  3.0<L>  /  TBili  1.1  /  DBili  x   /  AST  15  /  ALT  12  /  AlkPhos  89  08-24    PT/INR - ( 24 Aug 2024 00:10 )   PT: 13.7 sec;   INR: 1.25 ratio         PTT - ( 23 Aug 2024 04:27 )  PTT:27.4 sec  Urinalysis Basic - ( 24 Aug 2024 00:10 )    Color: Dark Yellow / Appearance: Clear / SG: >1.030 / pH: x  Gluc: 106 mg/dL / Ketone: Negative mg/dL  / Bili: Negative / Urobili: 1.0 mg/dL   Blood: x / Protein: 30 mg/dL / Nitrite: Negative   Leuk Esterase: Trace / RBC: 4 /HPF / WBC 1 /HPF   Sq Epi: x / Non Sq Epi: 2 /HPF / Bacteria: Negative /HPF        RADIOLOGY & ADDITIONAL TESTS: Reviewed.

## 2024-08-24 NOTE — PROGRESS NOTE ADULT - SUBJECTIVE AND OBJECTIVE BOX
SUBJECTIVE: Pt seen at bedside during AM rounds. No o/n events, patient resting comfortably. Pt had a bowel movement overnight. NGT in place.    Vital Signs Last 24 Hrs  T(C): 37.4 (24 Aug 2024 08:00), Max: 38.7 (23 Aug 2024 16:52)  T(F): 99.3 (24 Aug 2024 08:00), Max: 101.7 (23 Aug 2024 16:52)  HR: 75 (24 Aug 2024 09:00) (72 - 105)  BP: 107/53 (24 Aug 2024 09:00) (72/43 - 171/72)  BP(mean): 76 (24 Aug 2024 09:00) (53 - 103)  RR: 17 (24 Aug 2024 09:00) (16 - 36)  SpO2: 100% (24 Aug 2024 09:00) (92% - 100%)    Parameters below as of 24 Aug 2024 08:00  Patient On (Oxygen Delivery Method): nasal cannula  O2 Flow (L/min): 2      I&O's Summary    23 Aug 2024 07:01  -  24 Aug 2024 07:00  --------------------------------------------------------  IN: 1486.3 mL / OUT: 850 mL / NET: 636.3 mL    24 Aug 2024 07:01  -  24 Aug 2024 09:18  --------------------------------------------------------  IN: 150 mL / OUT: 0 mL / NET: 150 mL        LABS:                        9.3    2.44  )-----------( 76       ( 24 Aug 2024 00:10 )             28.9     08-24    139  |  105  |  20  ----------------------------<  106<H>  4.9   |  21<L>  |  0.78    Ca    8.1<L>      24 Aug 2024 00:10  Phos  4.8     08-24  Mg     1.9     08-24    TPro  5.9<L>  /  Alb  3.0<L>  /  TBili  1.1  /  DBili  x   /  AST  15  /  ALT  12  /  AlkPhos  89  08-24    PT/INR - ( 24 Aug 2024 00:10 )   PT: 13.7 sec;   INR: 1.25 ratio         PTT - ( 23 Aug 2024 04:27 )  PTT:27.4 sec  Urinalysis Basic - ( 24 Aug 2024 00:10 )    Color: Dark Yellow / Appearance: Clear / SG: >1.030 / pH: x  Gluc: 106 mg/dL / Ketone: Negative mg/dL  / Bili: Negative / Urobili: 1.0 mg/dL   Blood: x / Protein: 30 mg/dL / Nitrite: Negative   Leuk Esterase: Trace / RBC: 4 /HPF / WBC 1 /HPF   Sq Epi: x / Non Sq Epi: 2 /HPF / Bacteria: Negative /HPF        Physical Exam:  General: uncomfortable, NGT in place  Neuro: unable to assess  Resp: increased work of breathing, on HFNC  GI/Abd: Soft, distended, difficult to assess tenderness, nonperitonitic

## 2024-08-24 NOTE — CHART NOTE - NSCHARTNOTEFT_GEN_A_CORE
MICU Transfer Note    Transfer from: MICU    Transfer to: (  ) Medicine    (  ) Telemetry     (   ) RCU        (    ) Palliative         (   ) Stroke Unit          (   ) __________________    Accepting Physician:  Signout given to:     MICU COURSE:  89 yo F with PMH of paroxysmal Afib, hypothyroidism, pulmonary and abdominal tuberculosis, recurrent small bowel obstruction, recurrent UTIs presenting to [facility] on 8/23 from home with c/o abdominal pain and vomiting x1 day. In ED pt found to be hypoxic requiring HFNC 40LPM/40% and hypotensive refractory to 3L IVF started on vasopressors. CT abdomen/pelvis revealing dilated small bowel loops and large stool burden with c/f SBO vs enteritis; CT chest c/f multifocal pneumonia. Surgery consulted for which NGT was placed and set to drainage, suctioning minimal dark bilious fluid. GOC discussed with daughter confirming pt DNR/DNI and she would not want aggressive measures including surgery or invasive line placement although amendable to trial of IV vasopressors through peripheral IV with no further escalation if pt worsens. Pt admitted to MICU for septic shock requiring vasopressors d/t possible multifocal pneumonia 2/2 likely aspiration d/t SBO now s/p NGT placement.     ASSESSMENT & PLAN:   89F with history of paroxysmal atrial fibrillation, hypothyroidism, pulmonary/abdominal tuberculosis, recurrent small bowel obstruction, recurrent UTIs presenting from home with abdominal pain and vomiting for 1 day with CT scans with evidence of SBO vs enteritis s/p NGT. Course c/b hypotension likely iso septic from multifocal PNA process.    Neurology  #Mental Status  - AAO x 3 at baseline  - lethargic at this time however AAO x 1    Cardiovascular  #Septic Shock   - etiology of hypotension likely iso septic shock  - s/p 3L IVF for volume resuscitation iso SBO and IVC indicating volume responsivement  - on levophed for MAP > 60  - on trial of IV pressors and potential transfer to PCU for fixed dose pressor requirements    #Paroxysmal Atrial Fibrillation   - will need to hold amiodarone 200 qd for rate control iso NPO  - will need to hold home eliquis 2.5 BID iso NPO    Respiratory  #Acute Hypoxemic Respiratory Failure  #Multifocal PNA   - on HFNC 40/40 for hypoxemic respiratory failure likely iso multifocal PNA which may have been iso aspiration event from SBO  - c/w cefepime for antibiotics for empiric coverage of aspiration PNA   - HFNC weaned to NC     #History of Pulmonary Tuberculosis in 90s  - s/p treatment in 1990s    GI  #History of Abdominal Tuberculosis   #History of Recurrent Small Bowel Obstruction  #Small Bowel Obstruction  - NGT to suction draining small amounts of brown stomach content   - NPO  - stress ulcer prophylaxis with pantoprazole 40 BID iso NPO    Renal  #no active issues     Hematology/Oncology  #Leukopenia  #Neutropenia  - was receiving prednisone OP for low blood pressure  - stress dose hydrocortisone iso hypotension iso ongoing steroid use    #DVT Prophylaxis  - heparin 5000 subQ    ID  #Neutropenic Fever  #Sepsis 2/2 Multifocal PNA  - s/p 3L IVF resuscitation   - c/w cefepime and flagyll for empiric antibiotics for PNA and abdominal coverage   - pending blood cultures and urine cultures    Endocrine  #Hypothyroidism  - c/w levothyroxine 75 mcg IV    Ethics  DNR/DNI; no central lines              FOR FOLLOW UP: MICU Transfer Note    Transfer from: MICU    Transfer to: (  ) Medicine    (  ) Telemetry     (   ) RCU        (    ) Palliative         (   ) Stroke Unit          (   ) __________________    Accepting Physician: Mukesh White  Signout given to:     MICU COURSE:  89 yo F with PMH of paroxysmal Afib, hypothyroidism, pulmonary and abdominal tuberculosis, recurrent small bowel obstruction, recurrent UTIs presenting to [facility] on 8/23 from home with c/o abdominal pain and vomiting x1 day. In ED pt found to be hypoxic requiring HFNC 40LPM/40% and hypotensive refractory to 3L IVF started on vasopressors. CT abdomen/pelvis revealing dilated small bowel loops and large stool burden with c/f SBO vs enteritis; CT chest c/f multifocal pneumonia. Surgery consulted for which NGT was placed and set to drainage, suctioning minimal dark bilious fluid. GOC discussed with daughter confirming pt DNR/DNI and she would not want aggressive measures including surgery or invasive line placement although amendable to trial of IV vasopressors through peripheral IV with no further escalation if pt worsens. Pt admitted to MICU for septic shock requiring vasopressors d/t possible multifocal pneumonia 2/2 likely aspiration d/t SBO now s/p NGT placement.     ASSESSMENT & PLAN:   89F with history of paroxysmal atrial fibrillation, hypothyroidism, pulmonary/abdominal tuberculosis, recurrent small bowel obstruction, recurrent UTIs presenting from home with abdominal pain and vomiting for 1 day with CT scans with evidence of SBO vs enteritis s/p NGT. Course c/b hypotension likely iso septic from multifocal PNA process.    Neurology  #Mental Status  - AAO x 3 at baseline  - lethargic at this time however AAO x 1    Cardiovascular  #Septic Shock   - etiology of hypotension likely iso septic shock  - s/p 3L IVF for volume resuscitation iso SBO and IVC indicating volume responsivement  - on levophed for MAP > 60  - on trial of IV pressors and potential transfer to PCU for fixed dose pressor requirements    #Paroxysmal Atrial Fibrillation   - will need to hold amiodarone 200 qd for rate control iso NPO  - will need to hold home eliquis 2.5 BID iso NPO    Respiratory  #Acute Hypoxemic Respiratory Failure  #Multifocal PNA   - on HFNC 40/40 for hypoxemic respiratory failure likely iso multifocal PNA which may have been iso aspiration event from SBO  - c/w cefepime for antibiotics for empiric coverage of aspiration PNA   - HFNC weaned to NC     #History of Pulmonary Tuberculosis in 90s  - s/p treatment in 1990s    GI  #History of Abdominal Tuberculosis   #History of Recurrent Small Bowel Obstruction  #Small Bowel Obstruction  - NGT to suction draining small amounts of brown stomach content   - NPO  - stress ulcer prophylaxis with pantoprazole 40 BID iso NPO    Renal  #no active issues     Hematology/Oncology  #Leukopenia  #Neutropenia  - was receiving prednisone OP for low blood pressure  - stress dose hydrocortisone iso hypotension iso ongoing steroid use    #DVT Prophylaxis  - heparin 5000 subQ    ID  #Neutropenic Fever  #Sepsis 2/2 Multifocal PNA  - s/p 3L IVF resuscitation   - c/w cefepime and flagyll for empiric antibiotics for PNA and abdominal coverage   - pending blood cultures and urine cultures    Endocrine  #Hypothyroidism  - c/w levothyroxine 75 mcg IV    Ethics  DNR/DNI; no central lines              FOR FOLLOW UP: MICU Transfer Note    Transfer from: MICU    Transfer to: ( x ) Medicine    (  ) Telemetry     (   ) RCU        (    ) Palliative         (   ) Stroke Unit          (   ) __________________    Accepting Physician: Mukesh White    MICU COURSE:  89 yo F with PMH of paroxysmal Afib, hypothyroidism, pulmonary and abdominal tuberculosis, recurrent small bowel obstruction, recurrent UTIs presenting to [facility] on 8/23 from home with c/o abdominal pain and vomiting x1 day. In ED pt found to be hypoxic requiring HFNC 40LPM/40% and hypotensive refractory to 3L IVF started on vasopressors. CT abdomen/pelvis revealing dilated small bowel loops and large stool burden with c/f SBO vs enteritis; CT chest c/f multifocal pneumonia. Surgery consulted for which NGT was placed and set to drainage, suctioning minimal dark bilious fluid. GOC discussed with daughter confirming pt DNR/DNI and she would not want aggressive measures including surgery or invasive line placement although amendable to trial of IV vasopressors through peripheral IV with no further escalation if pt worsens. Pt admitted to MICU for septic shock requiring vasopressors d/t possible multifocal pneumonia 2/2 likely aspiration d/t SBO now s/p NGT placement.       FOR FOLLOW UP:  [ ] c/w cefepime and flagyll for PNA and intraabdominal infection and narrow per sensitivities  [ ] resume home PO medications as tolerated (amiodarone, eliquis, and levothyroxine)  [ ] c/w hydrocortisone taper and resume home prednisone 10 mg after taper dose is completed   [ ] advance diet as tolerated     89F with history of paroxysmal atrial fibrillation, hypothyroidism, pulmonary/abdominal tuberculosis, recurrent small bowel obstruction, recurrent UTIs presenting from home with abdominal pain and vomiting for 1 day with CT scans with evidence of SBO vs enteritis s/p NGT. Course c/b hypotension likely iso septic from multifocal PNA process.    Neurology  #Mental Status  - AAO x 3 at baseline  - lethargic at this time however AAO x 1    Cardiovascular  #Septic Shock   - etiology of hypotension likely iso septic shock  - s/p 3L IVF for volume resuscitation iso SBO and IVC indicating volume responsiveness  - required levophed for shock state but now weaned off with MAP > 60 maintained    #Paroxysmal Atrial Fibrillation   - will need to hold amiodarone 200 qd for rate control iso NPO  - will need to hold home eliquis 2.5 BID iso NPO    Respiratory  #Acute Hypoxemic Respiratory Failure  #Multifocal PNA   - on HFNC 40/40 for hypoxemic respiratory failure likely iso multifocal PNA which may have been iso aspiration event from SBO  - c/w cefepime for antibiotics for empiric coverage of aspiration PNA   - wean HFNC as tolerated     #History of Pulmonary Tuberculosis in 90s  - s/p treatment in 1990s    GI  #History of Abdominal Tuberculosis   #History of Recurrent Small Bowel Obstruction  #Small Bowel Obstruction  - NGT to suction draining small amounts of brown stomach content   - NPO  - stress ulcer prophylaxis with pantoprazole 40 BID iso NPO    Renal  #no active issues     Hematology/Oncology  #Leukopenia  #Neutropenia  - was receiving prednisone OP for low blood pressure  - stress dose hydrocortisone iso hypotension iso ongoing steroid use    #DVT Prophylaxis  - heparin 5000 subQ    ID  #Neutropenic Fever  #Sepsis 2/2 Multifocal PNA  - s/p 3L IVF resuscitation   - c/w cefepime and flagyll for empiric antibiotics for PNA and abdominal coverage   - pending blood cultures and urine cultures    Endocrine  #Hypothyroidism  - c/w levothyroxine 75 mcg IV    Ethics  DNR/DNI; no central lines

## 2024-08-25 LAB
ADD ON TEST-SPECIMEN IN LAB: SIGNIFICANT CHANGE UP
ALBUMIN SERPL ELPH-MCNC: 2.7 G/DL — LOW (ref 3.3–5)
ALP SERPL-CCNC: 83 U/L — SIGNIFICANT CHANGE UP (ref 40–120)
ALT FLD-CCNC: 11 U/L — SIGNIFICANT CHANGE UP (ref 10–45)
ANION GAP SERPL CALC-SCNC: 9 MMOL/L — SIGNIFICANT CHANGE UP (ref 5–17)
APTT BLD: 35.5 SEC — SIGNIFICANT CHANGE UP (ref 24.5–35.6)
AST SERPL-CCNC: 11 U/L — SIGNIFICANT CHANGE UP (ref 10–40)
BASOPHILS # BLD AUTO: 0.03 K/UL — SIGNIFICANT CHANGE UP (ref 0–0.2)
BASOPHILS NFR BLD AUTO: 1.1 % — SIGNIFICANT CHANGE UP (ref 0–2)
BILIRUB SERPL-MCNC: 0.6 MG/DL — SIGNIFICANT CHANGE UP (ref 0.2–1.2)
BUN SERPL-MCNC: 20 MG/DL — SIGNIFICANT CHANGE UP (ref 7–23)
CA-I BLD-SCNC: 1.13 MMOL/L — LOW (ref 1.15–1.33)
CALCIUM SERPL-MCNC: 8.3 MG/DL — LOW (ref 8.4–10.5)
CHLORIDE SERPL-SCNC: 108 MMOL/L — SIGNIFICANT CHANGE UP (ref 96–108)
CO2 SERPL-SCNC: 23 MMOL/L — SIGNIFICANT CHANGE UP (ref 22–31)
CREAT SERPL-MCNC: 0.63 MG/DL — SIGNIFICANT CHANGE UP (ref 0.5–1.3)
CULTURE RESULTS: SIGNIFICANT CHANGE UP
EGFR: 80 ML/MIN/1.73M2 — SIGNIFICANT CHANGE UP
EOSINOPHIL # BLD AUTO: 0 K/UL — SIGNIFICANT CHANGE UP (ref 0–0.5)
EOSINOPHIL NFR BLD AUTO: 0 % — SIGNIFICANT CHANGE UP (ref 0–6)
GAS PNL BLDA: SIGNIFICANT CHANGE UP
GLUCOSE BLDC GLUCOMTR-MCNC: 106 MG/DL — HIGH (ref 70–99)
GLUCOSE BLDC GLUCOMTR-MCNC: 133 MG/DL — HIGH (ref 70–99)
GLUCOSE SERPL-MCNC: 125 MG/DL — HIGH (ref 70–99)
HCT VFR BLD CALC: 26.6 % — LOW (ref 34.5–45)
HGB BLD-MCNC: 8.2 G/DL — LOW (ref 11.5–15.5)
IMM GRANULOCYTES NFR BLD AUTO: 11.4 % — HIGH (ref 0–0.9)
INR BLD: 1.5 RATIO — HIGH (ref 0.85–1.18)
LYMPHOCYTES # BLD AUTO: 0.28 K/UL — LOW (ref 1–3.3)
LYMPHOCYTES # BLD AUTO: 10.3 % — LOW (ref 13–44)
MAGNESIUM SERPL-MCNC: 2 MG/DL — SIGNIFICANT CHANGE UP (ref 1.6–2.6)
MCHC RBC-ENTMCNC: 30.8 GM/DL — LOW (ref 32–36)
MCHC RBC-ENTMCNC: 33.7 PG — SIGNIFICANT CHANGE UP (ref 27–34)
MCV RBC AUTO: 109.5 FL — HIGH (ref 80–100)
MONOCYTES # BLD AUTO: 0.06 K/UL — SIGNIFICANT CHANGE UP (ref 0–0.9)
MONOCYTES NFR BLD AUTO: 2.2 % — SIGNIFICANT CHANGE UP (ref 2–14)
NEUTROPHILS # BLD AUTO: 2.03 K/UL — SIGNIFICANT CHANGE UP (ref 1.8–7.4)
NEUTROPHILS NFR BLD AUTO: 75 % — SIGNIFICANT CHANGE UP (ref 43–77)
NRBC # BLD: 0 /100 WBCS — SIGNIFICANT CHANGE UP (ref 0–0)
PHOSPHATE SERPL-MCNC: 3.7 MG/DL — SIGNIFICANT CHANGE UP (ref 2.5–4.5)
PLATELET # BLD AUTO: 51 K/UL — LOW (ref 150–400)
POTASSIUM SERPL-MCNC: 4 MMOL/L — SIGNIFICANT CHANGE UP (ref 3.5–5.3)
POTASSIUM SERPL-SCNC: 4 MMOL/L — SIGNIFICANT CHANGE UP (ref 3.5–5.3)
PROT SERPL-MCNC: 5.4 G/DL — LOW (ref 6–8.3)
PROTHROM AB SERPL-ACNC: 15.6 SEC — HIGH (ref 9.5–13)
RBC # BLD: 2.43 M/UL — LOW (ref 3.8–5.2)
RBC # FLD: 16.2 % — HIGH (ref 10.3–14.5)
SODIUM SERPL-SCNC: 140 MMOL/L — SIGNIFICANT CHANGE UP (ref 135–145)
SPECIMEN SOURCE: SIGNIFICANT CHANGE UP
WBC # BLD: 2.71 K/UL — LOW (ref 3.8–10.5)
WBC # FLD AUTO: 2.71 K/UL — LOW (ref 3.8–10.5)

## 2024-08-25 RX ORDER — APIXABAN 5 MG/1
2.5 TABLET, FILM COATED ORAL EVERY 12 HOURS
Refills: 0 | Status: DISCONTINUED | OUTPATIENT
Start: 2024-08-26 | End: 2024-08-26

## 2024-08-25 RX ORDER — ACETYLCYSTEINE 200 MG/ML
4 VIAL (ML) MISCELLANEOUS EVERY 6 HOURS
Refills: 0 | Status: COMPLETED | OUTPATIENT
Start: 2024-08-25 | End: 2024-09-01

## 2024-08-25 RX ADMIN — Medication 100 MILLIGRAM(S): at 13:29

## 2024-08-25 RX ADMIN — IPRATROPIUM BROMIDE AND ALBUTEROL SULFATE 3 MILLILITER(S): .5; 3 SOLUTION RESPIRATORY (INHALATION) at 17:11

## 2024-08-25 RX ADMIN — Medication 40 MILLIGRAM(S): at 13:30

## 2024-08-25 RX ADMIN — Medication 5000 UNIT(S): at 13:30

## 2024-08-25 RX ADMIN — CEFEPIME 100 MILLIGRAM(S): 2 INJECTION, POWDER, FOR SOLUTION INTRAVENOUS at 17:22

## 2024-08-25 RX ADMIN — Medication 100 MILLIGRAM(S): at 22:13

## 2024-08-25 RX ADMIN — Medication 70 MICROGRAM(S): at 22:12

## 2024-08-25 RX ADMIN — Medication 100 MILLIGRAM(S): at 05:53

## 2024-08-25 RX ADMIN — IPRATROPIUM BROMIDE AND ALBUTEROL SULFATE 3 MILLILITER(S): .5; 3 SOLUTION RESPIRATORY (INHALATION) at 23:25

## 2024-08-25 RX ADMIN — HYDROCORTISONE 50 MILLIGRAM(S): 10 TABLET ORAL at 13:29

## 2024-08-25 RX ADMIN — IPRATROPIUM BROMIDE AND ALBUTEROL SULFATE 3 MILLILITER(S): .5; 3 SOLUTION RESPIRATORY (INHALATION) at 05:52

## 2024-08-25 RX ADMIN — Medication 4 MILLILITER(S): at 23:25

## 2024-08-25 RX ADMIN — SODIUM CHLORIDE 4 MILLILITER(S): 9 INJECTION INTRAMUSCULAR; INTRAVENOUS; SUBCUTANEOUS at 11:28

## 2024-08-25 RX ADMIN — Medication 4 MILLILITER(S): at 17:59

## 2024-08-25 RX ADMIN — SODIUM CHLORIDE 4 MILLILITER(S): 9 INJECTION INTRAMUSCULAR; INTRAVENOUS; SUBCUTANEOUS at 05:52

## 2024-08-25 RX ADMIN — HYDROCORTISONE 50 MILLIGRAM(S): 10 TABLET ORAL at 22:12

## 2024-08-25 RX ADMIN — Medication 5000 UNIT(S): at 05:54

## 2024-08-25 RX ADMIN — HYDROCORTISONE 50 MILLIGRAM(S): 10 TABLET ORAL at 06:04

## 2024-08-25 RX ADMIN — SODIUM CHLORIDE 4 MILLILITER(S): 9 INJECTION INTRAMUSCULAR; INTRAVENOUS; SUBCUTANEOUS at 23:26

## 2024-08-25 RX ADMIN — IPRATROPIUM BROMIDE AND ALBUTEROL SULFATE 3 MILLILITER(S): .5; 3 SOLUTION RESPIRATORY (INHALATION) at 11:28

## 2024-08-25 RX ADMIN — Medication 100 MILLILITER(S): at 15:52

## 2024-08-25 RX ADMIN — CEFEPIME 100 MILLIGRAM(S): 2 INJECTION, POWDER, FOR SOLUTION INTRAVENOUS at 05:54

## 2024-08-25 RX ADMIN — SODIUM CHLORIDE 4 MILLILITER(S): 9 INJECTION INTRAMUSCULAR; INTRAVENOUS; SUBCUTANEOUS at 17:12

## 2024-08-25 NOTE — PROGRESS NOTE ADULT - ATTENDING COMMENTS
89F PMH chronic AF / OAC, GERD, H/O abdominal TB s/p ex-lap c/b recurrent SBO, H/O multiple UTIs now p/w early SBO s/p NGT decompression, surgery recommending conservative management. Now hypotensive, transferred to ICU for shock.  - Now with several BMs, appears to have regained bowel function. Removed NGT, advanced diet to clears.  - D/C IVF  - Empiric antibiotics with cefepime + Flagyl  - Off pressors, c/w stress-dose steroids. Can start taper tomorrow.  - Patient remains DNR/DNI but trial peripheral pressors if needed.  - Transfer to general medicine for further evaluation and management.
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
89F PMH chronic AF / OAC, GERD, H/O abdominal TB s/p ex-lap c/b recurrent SBO, H/O multiple UTIs now p/w early SBO s/p NGT decompression, surgery recommending conservative management. Now hypotensive, transferred to ICU for shock.  - NGT for decompression.  - Now s/p BM, appears to have regained bowel function. F/U with surgery re: when she can resume PO diet.  - c/w IVF for now D5LR @75/hr, urine output adequate  - Empiric antibiotics with cefepime + Flagyl  - Titrating pressors to maintain MAP >65, c/w stress-dose steroids.  - Per daughter, attempting low-dose pressors until patient recovers. If she continues to decompensate, we will transition to comfort care measures only. Patient remains DNR/DNI but trial peripheral pressors.

## 2024-08-25 NOTE — CONSULT NOTE ADULT - SUBJECTIVE AND OBJECTIVE BOX
08-25-24 @ 13:37    Patient is a 89y old  Female who presents with a chief complaint of septic shock (25 Aug 2024 11:31)      HPI:  89F with history of paroxysmal atrial fibrillation, hypothyroidism, pulmonary/abdominal tuberculosis, recurrent small bowel obstruction, recurrent UTIs presenting from home with abdominal pain and vomiting for 1 day. Per daughter and HHA at bedside, patient was in her usual state of health until yesterday. Normally able to communicate and ambulate to the bathroom and eat on her own. Does have a HHA for assistance as needed. HHA reports that around 2:30 AM last night, patient called for assistance and was vomiting and asked to call an ambulance. CT scan performed in ED showed evidence of dilated small bowel loops with large stool burden with concern for small bowel obstruction vs enteritis. Also with some imaging findings concerning for multifocal pneumonia. Surgery consulted given history of small bowel obstruction and imaging and NGT placed with NGT to suction draining minimal dark abdominal contents. Additionally with hypoxemic respiratory failure requiring HFNC 40/40. ED course c/b hypotension 70/40s with transient improvement with 3L IVF however with repeat hypotension despite these measures.   GOC discussion had with daughter at bedside. Patient is DNR/DNI and would not want aggressive measures including surgery or central line placement for pressor therapy. Would like a trial of IV pressors via peripheral IV and if these fails, daughter would not want further escalation. Was also seen by palliative care team in ED and was being considered for PCU on fixed dose pressors however no beds available at this time.   Admitted to MICU for septic shock. 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 reported allergy (Unknown)  penicillin (Rash)    Intolerances        PAST MEDICAL & SURGICAL HISTORY:  Pulmonary TB      Gastritis      Afib      SBO (small bowel obstruction)      Hypothyroid      HTN (hypertension)      TB (pulmonary tuberculosis)  TB of abdomen 1960      Orthostatic hypotension      Asthma      Aortic stenosis      Multifocal pneumonia      History of Bilateral Breast Biopsy      S/P Exploratory Laparotomy  TB in 1960s      H/O hemorrhoidectomy      S/P vein stripping          FAMILY HISTORY:  Family history of essential hypertension        Social History: [  ] TOBACCO                  [  ] ETOH                                 [  ] IVDA/DRUGS    REVIEW OF SYSTEMS      General:	    Skin/Breast:  	  Ophthalmologic:  	  ENMT:	    Respiratory and Thorax:  	  Cardiovascular:	    Gastrointestinal:	    Genitourinary:	    Musculoskeletal:	    Neurological:	    Psychiatric:	    Hematology/Lymphatics:	    Endocrine:	    Allergic/Immunologic:	    MEDICATIONS  (STANDING):  albuterol/ipratropium for Nebulization 3 milliLiter(s) Nebulizer every 6 hours  cefepime   IVPB 2000 milliGRAM(s) IV Intermittent every 12 hours  dextrose 5% + lactated ringers. 1000 milliLiter(s) (100 mL/Hr) IV Continuous <Continuous>  heparin   Injectable 5000 Unit(s) SubCutaneous every 8 hours  hydrocortisone sodium succinate Injectable 50 milliGRAM(s) IV Push every 8 hours  hydrocortisone sodium succinate Injectable   IV Push   levothyroxine Injectable 70 MICROGram(s) IV Push at bedtime  metroNIDAZOLE  IVPB 500 milliGRAM(s) IV Intermittent every 8 hours  pantoprazole  Injectable 40 milliGRAM(s) IV Push daily  sodium chloride 0.9% for Nebulization 3 milliLiter(s) Nebulizer Once  sodium chloride 3%  Inhalation 4 milliLiter(s) Inhalation every 6 hours    MEDICATIONS  (PRN):       Vital Signs Last 24 Hrs  T(C): 36.8 (25 Aug 2024 11:00), Max: 36.9 (24 Aug 2024 16:00)  T(F): 98.3 (25 Aug 2024 11:00), Max: 98.4 (24 Aug 2024 16:00)  HR: 80 (25 Aug 2024 11:45) (62 - 88)  BP: 122/60 (25 Aug 2024 11:00) (85/50 - 134/61)  BP(mean): 84 (25 Aug 2024 08:00) (65 - 88)  RR: 18 (25 Aug 2024 11:00) (15 - 27)  SpO2: 94% (25 Aug 2024 11:45) (94% - 100%)    Parameters below as of 25 Aug 2024 11:45  Patient On (Oxygen Delivery Method): nasal cannula    Orthostatic VS          I&O's Summary    24 Aug 2024 07:01  -  25 Aug 2024 07:00  --------------------------------------------------------  IN: 2600 mL / OUT: 1020 mL / NET: 1580 mL    25 Aug 2024 07:01  -  25 Aug 2024 13:37  --------------------------------------------------------  IN: 200 mL / OUT: 0 mL / NET: 200 mL        Physical Exam:   GENERAL: NAD, well-groomed, well-developed  HEENT: KOLBY/   Atraumatic, Normocephalic  ENMT: No tonsillar erythema, exudates, or enlargement; Moist mucous membranes, Good dentition, No lesions  NECK: Supple, No JVD, Normal thyroid  CHEST/LUNG: Clear to auscultation bilaterally; No rales, rhonchi, wheezing, or rubs  CVS: Regular rate and rhythm; No murmurs, rubs, or gallops  GI: : Soft, Nontender, Nondistended; Bowel sounds present  NERVOUS SYSTEM:  Alert & Oriented X3, Good concentration; Motor Strength 5/5 B/L upper and lower extremities; DTRs 2+ intact and symmetric  EXTREMITIES:  2+ Peripheral Pulses, No clubbing, cyanosis, or edema  LYMPH: No lymphadenopathy noted  SKIN: No rashes or lesions  ENDOCRINOLOGY: No Thyromegaly  PSYCH: Appropriate    Labs:  ABG - ( 25 Aug 2024 00:05 )  pH, Arterial: 7.34  pH, Blood: x     /  pCO2: 48    /  pO2: 146   / HCO3: 26    / Base Excess: -0.1  /  SaO2: 99.9            Venous<55<4>>67<<7.345>>Venous<<3><<4><<5<<679>>, Venous<57<4>>32<<7.365>>Venous<<3><<4><<5<<329>>SARS-CoV-2: [facility] (06 Jun 2024 19:45)                              8.2    2.71  )-----------( 51       ( 25 Aug 2024 00:22 )             26.6                         9.3    2.44  )-----------( 76       ( 24 Aug 2024 00:10 )             28.9                         11.5   1.33  )-----------( 72       ( 23 Aug 2024 04:27 )             37.4     08-25    140  |  108  |  20  ----------------------------<  125<H>  4.0   |  23  |  0.63  08-24    139  |  105  |  20  ----------------------------<  106<H>  4.9   |  21<L>  |  0.78  08-23    140  |  102  |  13  ----------------------------<  116<H>  3.7   |  26  |  0.70    Ca    8.3<L>      25 Aug 2024 00:22  Ca    8.1<L>      24 Aug 2024 00:10  Phos  3.7     08-25  Phos  4.8     08-24  Mg     2.0     08-25  Mg     1.9     08-24    TPro  5.4<L>  /  Alb  2.7<L>  /  TBili  0.6  /  DBili  x   /  AST  11  /  ALT  11  /  AlkPhos  83  08-25  TPro  5.9<L>  /  Alb  3.0<L>  /  TBili  1.1  /  DBili  x   /  AST  15  /  ALT  12  /  AlkPhos  89  08-24  TPro  7.9  /  Alb  4.3  /  TBili  0.9  /  DBili  x   /  AST  12  /  ALT  11  /  AlkPhos  78  08-23    CAPILLARY BLOOD GLUCOSE      POCT Blood Glucose.: 78 mg/dL (25 Aug 2024 11:27)  POCT Blood Glucose.: 111 mg/dL (25 Aug 2024 05:56)  POCT Blood Glucose.: 122 mg/dL (24 Aug 2024 17:06)    LIVER FUNCTIONS - ( 25 Aug 2024 00:22 )  Alb: 2.7 g/dL / Pro: 5.4 g/dL / ALK PHOS: 83 U/L / ALT: 11 U/L / AST: 11 U/L / GGT: x           PT/INR - ( 25 Aug 2024 00:22 )   PT: 15.6 sec;   INR: 1.50 ratio         PTT - ( 25 Aug 2024 00:22 )  PTT:35.5 sec  Urinalysis Basic - ( 25 Aug 2024 00:22 )    Color: x / Appearance: x / SG: x / pH: x  Gluc: 125 mg/dL / Ketone: x  / Bili: x / Urobili: x   Blood: x / Protein: x / Nitrite: x   Leuk Esterase: x / RBC: x / WBC x   Sq Epi: x / Non Sq Epi: x / Bacteria: x      Culture - Urine (collected 24 Aug 2024 00:10)  Source: Clean Catch Clean Catch (Midstream)  Final Report (25 Aug 2024 05:34):    <10,000 CFU/mL Normal Urogenital Yeimi    Culture - Blood (collected 23 Aug 2024 09:30)  Source: .Blood Blood-Peripheral  Preliminary Report (24 Aug 2024 15:01):    No growth at 24 hours    Culture - Blood (collected 23 Aug 2024 09:00)  Source: .Blood Blood-Peripheral  Preliminary Report (24 Aug 2024 15:01):    No growth at 24 hours      D DImer      Studies  Chest X-RAY  CT SCAN Chest   CT Abdomen  Venous Dopplers: LE:   Others    rad< from: Xray Chest 1 View- PORTABLE-Urgent (Xray Chest 1 View- PORTABLE-Urgent .) (08.24.24 @ 05:09) >        INTERPRETATION:  EXAMINATION: XR CHEST URGENT    CLINICAL INDICATION: NGT Placement    TECHNIQUE: Single frontal, portable view of the chest was obtained.    COMPARISON: Chest x-ray 8/23/2024.    FINDINGS:  Interval retraction of the enteric tube terminating within the GE   junction/proximal stomach.  The heart is normal in size.  Subsegmental atelectasis.  Left basilar hazy opacity, decreased in size compared to prior.  There is no pneumothorax or pleural effusion.    IMPRESSION:  Enteric tube terminating within the GE junction/proximal stomach,   recommend advancing further into stomach.    --- End of Report ---          ROXI BOLIVAR MD; Resident Radiologist  This document has been electronically signed.  DESHAUN BARRON MD; Attending Radiologist  This document has been electronically signed. Aug 24 2024  3:16PM    < end of copied text >  < from: Xray Chest 1 View- PORTABLE-Urgent (Xray Chest 1 View- PORTABLE-Urgent .) (08.23.24 @ 09:39) >    TECHNIQUE: Single frontal,portable view of the chest was obtained.    COMPARISON: Chest x-ray same day from 4:08 AM.    FINDINGS:  Enteric tube descends below the diaphragm and overlies the stomach.  The heart is normal in size.  Chin obscures the apices.  Minimal left basilar hazy opacity. Right lung is clear.  There is no pneumothorax or pleural effusion.  No acute bony abnormality.    IMPRESSION:  Enteric tube descends below the diaphragm and overlies the stomach.    Minimal left basilar hazy opacity which may represent small effusion or   atelectasis    --- End of Report ---           YESY BERGERON MD; Resident Radiologist  This document has been electronically signed.  DESHAUN BARRON MD; Attending Radiologist  This document has been electronically signed. Aug 23 2024 10:03AM    < end of copied text >  < from: CT Chest No Cont (06.06.24 @ 23:02) >  calcifications and scarring.  PLEURA: No pleural effusion.  VESSELS: Aortic calcification. Coronary artery calcification. Prominent   aorta the following measurements were obtained.  No tubular junction 3.2 cm. Ascending aorta 4.4 cm. Aortic arch 3.5 cm.   The ascending aorta 4.3 cm.  HEART: Cardiomegaly. No pericardial effusion.  MEDIASTINUM AND TUNG: No lymphadenopathy.  CHEST WALL AND LOWER NECK: Within normal limits.  VISUALIZED UPPER ABDOMEN: Within normal limits.  BONES: Degenerative changes. Scoliosis.    IMPRESSION:  Relatively unchanged chronic lung findings. Small pulmonary nodules for   which follow-up is recommended. Superimposed infection is not excluded.    --- End of Report ---          IRASEMA STRAUSS MD; Resident Radiologist  This document has been electronically signed.  RENU SAMUEL MD; Attending Radiologist  This document has been electronically signed. Jun 6 2024 11:45PM    < end of copied text >      rad< from: Xray Chest 1 View- PORTABLE-Urgent (Xray Chest 1 View- PORTABLE-Urgent .) (08.24.24 @ 05:09) >  FINDINGS:  Interval retraction of the enteric tube terminating within the GE   junction/proximal stomach.  The heart is normal in size.  Subsegmental atelectasis.  Left basilar hazy opacity, decreased in size compared to prior.  There is no pneumothorax or pleural effusion.    IMPRESSION:  Enteric tube terminating within the GE junction/proximal stomach,   recommend advancing further into stomach.    --- End of Report ---          ROXI BOLIVAR MD; Resident Radiologist  This document has been electronically signed.  DESHAUN BARRON MD; Attending Radiologist  This document has been electronically signed. Aug 24 2024  3:16PM    < end of copied text >               08-25-24 @ 13:37    Patient is a 89y old  Female who presents with a chief complaint of septic shock (25 Aug 2024 11:31)      HPI:  89F with history of paroxysmal atrial fibrillation, hypothyroidism, pulmonary/abdominal tuberculosis, recurrent small bowel obstruction, recurrent UTIs presenting from home with abdominal pain and vomiting for 1 day. Per daughter and HHA at bedside, patient was in her usual state of health until yesterday. Normally able to communicate and ambulate to the bathroom and eat on her own. Does have a HHA for assistance as needed. HHA reports that around 2:30 AM last night, patient called for assistance and was vomiting and asked to call an ambulance. CT scan performed in ED showed evidence of dilated small bowel loops with large stool burden with concern for small bowel obstruction vs enteritis. Also with some imaging findings concerning for multifocal pneumonia. Surgery consulted given history of small bowel obstruction and imaging and NGT placed with NGT to suction draining minimal dark abdominal contents. Additionally with hypoxemic respiratory failure requiring HFNC 40/40. ED course c/b hypotension 70/40s with transient improvement with 3L IVF however with repeat hypotension despite these measures.   GOC discussion had with daughter at bedside. Patient is DNR/DNI and would not want aggressive measures including surgery or central line placement for pressor therapy. Would like a trial of IV pressors via peripheral IV and if these fails, daughter would not want further escalation. Was also seen by palliative care team in ED and was being considered for PCU on fixed dose pressors however no beds available at this time.   Admitted to MICU for septic shock. (23 Aug 2024 15:40)   the daughter is here at bedside:  she says she wheezes at home and then he uses albuterol ; otherwise there is no know lung diagnosis      ?FOLLOWING PRESENT  [x ] Hx of PE/DVT, [ x] Hx COPD, [? 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 reported allergy (Unknown)  penicillin (Rash)    Intolerances        PAST MEDICAL & SURGICAL HISTORY:  Pulmonary TB      Gastritis      Afib      SBO (small bowel obstruction)      Hypothyroid      HTN (hypertension)      TB (pulmonary tuberculosis)  TB of abdomen 1960      Orthostatic hypotension      Asthma      Aortic stenosis      Multifocal pneumonia      History of Bilateral Breast Biopsy      S/P Exploratory Laparotomy  TB in 1960s      H/O hemorrhoidectomy      S/P vein stripping          FAMILY HISTORY:  Family history of essential hypertension        Social History: [x  ] TOBACCO                  [ x ] ETOH                                 [ x ] IVDA/DRUGS    REVIEW OF SYSTEMS      General:	x    Skin/Breast:x  	  Ophthalmologic:x  ENMT:	    Respiratory and Thorax: wheezing  	  Cardiovascular:	x    Gastrointestinal:	x    Genitourinary:	x    Musculoskeletal:	  x  Neurological:	x    Psychiatric:	  x  Hematology/Lymphatics:	x    Endocrine:	x    Allergic/Immunologic:	x    MEDICATIONS  (STANDING):  albuterol/ipratropium for Nebulization 3 milliLiter(s) Nebulizer every 6 hours  cefepime   IVPB 2000 milliGRAM(s) IV Intermittent every 12 hours  dextrose 5% + lactated ringers. 1000 milliLiter(s) (100 mL/Hr) IV Continuous <Continuous>  heparin   Injectable 5000 Unit(s) SubCutaneous every 8 hours  hydrocortisone sodium succinate Injectable 50 milliGRAM(s) IV Push every 8 hours  hydrocortisone sodium succinate Injectable   IV Push   levothyroxine Injectable 70 MICROGram(s) IV Push at bedtime  metroNIDAZOLE  IVPB 500 milliGRAM(s) IV Intermittent every 8 hours  pantoprazole  Injectable 40 milliGRAM(s) IV Push daily  sodium chloride 0.9% for Nebulization 3 milliLiter(s) Nebulizer Once  sodium chloride 3%  Inhalation 4 milliLiter(s) Inhalation every 6 hours    MEDICATIONS  (PRN):       Vital Signs Last 24 Hrs  T(C): 36.8 (25 Aug 2024 11:00), Max: 36.9 (24 Aug 2024 16:00)  T(F): 98.3 (25 Aug 2024 11:00), Max: 98.4 (24 Aug 2024 16:00)  HR: 80 (25 Aug 2024 11:45) (62 - 88)  BP: 122/60 (25 Aug 2024 11:00) (85/50 - 134/61)  BP(mean): 84 (25 Aug 2024 08:00) (65 - 88)  RR: 18 (25 Aug 2024 11:00) (15 - 27)  SpO2: 94% (25 Aug 2024 11:45) (94% - 100%)    Parameters below as of 25 Aug 2024 11:45  Patient On (Oxygen Delivery Method): nasal cannula    Orthostatic VS          I&O's Summary    24 Aug 2024 07:01  -  25 Aug 2024 07:00  --------------------------------------------------------  IN: 2600 mL / OUT: 1020 mL / NET: 1580 mL    25 Aug 2024 07:01  -  25 Aug 2024 13:37  --------------------------------------------------------  IN: 200 mL / OUT: 0 mL / NET: 200 mL        Physical Exam:   GENERAL: NAD, well-groomed, well-developed  HEENT: KOLBY/   Atraumatic, Normocephalic  ENMT: No tonsillar erythema, exudates, or enlargement; Moist mucous membranes, Good dentition, No lesions  NECK: Supple, No JVD, Normal thyroid  CHEST/LUNG: coarse breath sounds:  with wheezing  CVS: Regular rate and rhythm; No murmurs, rubs, or gallops  GI: : Soft, Nontender, Nondistended; Bowel sounds present  NERVOUS SYSTEM:  Alert & Oriented X3, Good concentration; Motor Strength 5/5 B/L upper and lower extremities; DTRs 2+ intact and symmetric  EXTREMITIES:  -edema  LYMPH: No lymphadenopathy noted  SKIN: No rashes or lesions  ENDOCRINOLOGY: No Thyromegaly  PSYCH: Appropriate    Labs:  ABG - ( 25 Aug 2024 00:05 )  pH, Arterial: 7.34  pH, Blood: x     /  pCO2: 48    /  pO2: 146   / HCO3: 26    / Base Excess: -0.1  /  SaO2: 99.9            Venous<55<4>>67<<7.345>>Venous<<3><<4><<5<<679>>, Venous<57<4>>32<<7.365>>Venous<<3><<4><<5<<329>>SARS-CoV-2: Camryn (06 Jun 2024 19:45)                              8.2    2.71  )-----------( 51       ( 25 Aug 2024 00:22 )             26.6                         9.3    2.44  )-----------( 76       ( 24 Aug 2024 00:10 )             28.9                         11.5   1.33  )-----------( 72       ( 23 Aug 2024 04:27 )             37.4     08-25    140  |  108  |  20  ----------------------------<  125<H>  4.0   |  23  |  0.63  08-24    139  |  105  |  20  ----------------------------<  106<H>  4.9   |  21<L>  |  0.78  08-23    140  |  102  |  13  ----------------------------<  116<H>  3.7   |  26  |  0.70    Ca    8.3<L>      25 Aug 2024 00:22  Ca    8.1<L>      24 Aug 2024 00:10  Phos  3.7     08-25  Phos  4.8     08-24  Mg     2.0     08-25  Mg     1.9     08-24    TPro  5.4<L>  /  Alb  2.7<L>  /  TBili  0.6  /  DBili  x   /  AST  11  /  ALT  11  /  AlkPhos  83  08-25  TPro  5.9<L>  /  Alb  3.0<L>  /  TBili  1.1  /  DBili  x   /  AST  15  /  ALT  12  /  AlkPhos  89  08-24  TPro  7.9  /  Alb  4.3  /  TBili  0.9  /  DBili  x   /  AST  12  /  ALT  11  /  AlkPhos  78  08-23    CAPILLARY BLOOD GLUCOSE      POCT Blood Glucose.: 78 mg/dL (25 Aug 2024 11:27)  POCT Blood Glucose.: 111 mg/dL (25 Aug 2024 05:56)  POCT Blood Glucose.: 122 mg/dL (24 Aug 2024 17:06)    LIVER FUNCTIONS - ( 25 Aug 2024 00:22 )  Alb: 2.7 g/dL / Pro: 5.4 g/dL / ALK PHOS: 83 U/L / ALT: 11 U/L / AST: 11 U/L / GGT: x           PT/INR - ( 25 Aug 2024 00:22 )   PT: 15.6 sec;   INR: 1.50 ratio         PTT - ( 25 Aug 2024 00:22 )  PTT:35.5 sec  Urinalysis Basic - ( 25 Aug 2024 00:22 )    Color: x / Appearance: x / SG: x / pH: x  Gluc: 125 mg/dL / Ketone: x  / Bili: x / Urobili: x   Blood: x / Protein: x / Nitrite: x   Leuk Esterase: x / RBC: x / WBC x   Sq Epi: x / Non Sq Epi: x / Bacteria: x      Culture - Urine (collected 24 Aug 2024 00:10)  Source: Clean Catch Clean Catch (Midstream)  Final Report (25 Aug 2024 05:34):    <10,000 CFU/mL Normal Urogenital Yeimi    Culture - Blood (collected 23 Aug 2024 09:30)  Source: .Blood Blood-Peripheral  Preliminary Report (24 Aug 2024 15:01):    No growth at 24 hours    Culture - Blood (collected 23 Aug 2024 09:00)  Source: .Blood Blood-Peripheral  Preliminary Report (24 Aug 2024 15:01):    No growth at 24 hours      D DImer      Studies  Chest X-RAY  CT SCAN Chest   CT Abdomen  Venous Dopplers: LE:   Others    rad< from: Xray Chest 1 View- PORTABLE-Urgent (Xray Chest 1 View- PORTABLE-Urgent .) (08.24.24 @ 05:09) >        INTERPRETATION:  EXAMINATION: XR CHEST URGENT    CLINICAL INDICATION: NGT Placement    TECHNIQUE: Single frontal, portable view of the chest was obtained.    COMPARISON: Chest x-ray 8/23/2024.    FINDINGS:  Interval retraction of the enteric tube terminating within the GE   junction/proximal stomach.  The heart is normal in size.  Subsegmental atelectasis.  Left basilar hazy opacity, decreased in size compared to prior.  There is no pneumothorax or pleural effusion.    IMPRESSION:  Enteric tube terminating within the GE junction/proximal stomach,   recommend advancing further into stomach.    --- End of Report ---          ROXI BOLIVAR MD; Resident Radiologist  This document has been electronically signed.  DESHAUN BARRON MD; Attending Radiologist  This document has been electronically signed. Aug 24 2024  3:16PM    < end of copied text >  < from: Xray Chest 1 View- PORTABLE-Urgent (Xray Chest 1 View- PORTABLE-Urgent .) (08.23.24 @ 09:39) >    TECHNIQUE: Single frontal,portable view of the chest was obtained.    COMPARISON: Chest x-ray same day from 4:08 AM.    FINDINGS:  Enteric tube descends below the diaphragm and overlies the stomach.  The heart is normal in size.  Chin obscures the apices.  Minimal left basilar hazy opacity. Right lung is clear.  There is no pneumothorax or pleural effusion.  No acute bony abnormality.    IMPRESSION:  Enteric tube descends below the diaphragm and overlies the stomach.    Minimal left basilar hazy opacity which may represent small effusion or   atelectasis    --- End of Report ---           YESY BERGERON MD; Resident Radiologist  This document has been electronically signed.  DESHAUN BARRON MD; Attending Radiologist  This document has been electronically signed. Aug 23 2024 10:03AM    < end of copied text >  < from: CT Chest No Cont (06.06.24 @ 23:02) >  calcifications and scarring.  PLEURA: No pleural effusion.  VESSELS: Aortic calcification. Coronary artery calcification. Prominent   aorta the following measurements were obtained.  No tubular junction 3.2 cm. Ascending aorta 4.4 cm. Aortic arch 3.5 cm.   The ascending aorta 4.3 cm.  HEART: Cardiomegaly. No pericardial effusion.  MEDIASTINUM AND TUNG: No lymphadenopathy.  CHEST WALL AND LOWER NECK: Within normal limits.  VISUALIZED UPPER ABDOMEN: Within normal limits.  BONES: Degenerative changes. Scoliosis.    IMPRESSION:  Relatively unchanged chronic lung findings. Small pulmonary nodules for   which follow-up is recommended. Superimposed infection is not excluded.    --- End of Report ---          IRASEMA STRAUSS MD; Resident Radiologist  This document has been electronically signed.  RENU SAMUEL MD; Attending Radiologist  This document has been electronically signed. Jun 6 2024 11:45PM    < end of copied text >      rad< from: Xray Chest 1 View- PORTABLE-Urgent (Xray Chest 1 View- PORTABLE-Urgent .) (08.24.24 @ 05:09) >  FINDINGS:  Interval retraction of the enteric tube terminating within the GE   junction/proximal stomach.  The heart is normal in size.  Subsegmental atelectasis.  Left basilar hazy opacity, decreased in size compared to prior.  There is no pneumothorax or pleural effusion.    IMPRESSION:  Enteric tube terminating within the GE junction/proximal stomach,   recommend advancing further into stomach.    --- End of Report ---          ROXI BOLIVAR MD; Resident Radiologist  This document has been electronically signed.  DESHAUN BARRON MD; Attending Radiologist  This document has been electronically signed. Aug 24 2024  3:16PM    < end of copied text >    eco< from: TTE W or WO Ultrasound Enhancing Agent (01.02.24 @ 14:39) >    _______________________________________________________________________________________     CONCLUSIONS:      1. Left ventricular cavity is normal. Left ventricular wall thickness is normal. Left ventricular systolic function is normal with an ejection fraction of 59 % by Mireles's method of disks. There are no regional wall motion abnormalities seen.   2. No pericardial effusion seen.   3. Moderate aortic stenosis.   4. Moderate aortic regurgitation.    < end of copied text >

## 2024-08-25 NOTE — PROGRESS NOTE ADULT - ASSESSMENT
89F with history of paroxysmal atrial fibrillation, hypothyroidism, pulmonary/abdominal tuberculosis, recurrent small bowel obstruction, recurrent UTIs presenting from home with abdominal pain and vomiting for 1 day with CT scans with evidence of SBO vs enteritis s/p NGT. Course c/b hypotension likely iso septic from multifocal PNA process.    Neurology  #Mental Status  - AAO x 3 at baseline  - now mental status AAO x 1    Cardiovascular  #Septic Shock   - etiology of hypotension likely iso septic shock  - s/p 3L IVF for volume resuscitation iso SBO and IVC indicating volume responsivement  - required levophed for shock state but now weaned off with MAP > 60 maintained    #Paroxysmal Atrial Fibrillation   - will need to hold amiodarone 200 qd for rate control iso NPO  - will need to hold home eliquis 2.5 BID iso NPO  - on heparin subq for DVT ppx    Respiratory  #Acute Hypoxemic Respiratory Failure  #Multifocal PNA   - on HFNC 40/40 for hypoxemic respiratory failure likely iso multifocal PNA which may have been iso aspiration event from SBO  - c/w cefepime for antibiotics for empiric coverage of aspiration PNA   - wean HFNC as tolerated     #History of Pulmonary Tuberculosis in 90s  - s/p treatment in 1990s    GI  #History of Abdominal Tuberculosis   #History of Recurrent Small Bowel Obstruction  #Small Bowel Obstruction  - NGT to suction draining small amounts of brown stomach content   - NPO  - stress ulcer prophylaxis with pantoprazole 40 BID iso NPO    Renal  #no active issues     Hematology/Oncology  #Leukopenia  #Neutropenia  - was receiving prednisone OP for low blood pressure  - stress dose hydrocortisone iso hypotension iso ongoing steroid use    #DVT Prophylaxis  - heparin 5000 subQ    ID  #Neutropenic Fever  #Sepsis 2/2 Multifocal PNA  - s/p 3L IVF resuscitation   - c/w cefepime and flagyll for empiric antibiotics for PNA and abdominal coverage   - pending blood cultures and urine cultures    Endocrine  #Hypothyroidism  - c/w levothyroxine 75 mcg IV    Ethics  DNR/DNI; no central lines

## 2024-08-25 NOTE — PROGRESS NOTE ADULT - SUBJECTIVE AND OBJECTIVE BOX
Patient is a 89y old  Female who presents with a chief complaint of septic shock (25 Aug 2024 07:11)      SUBJECTIVE / OVERNIGHT EVENTS:  Patient seen and examined.   Patient with HARRY.       Vital Signs Last 24 Hrs  T(C): 36.7 (25 Aug 2024 09:15), Max: 37.3 (24 Aug 2024 12:00)  T(F): 98.1 (25 Aug 2024 09:15), Max: 99.1 (24 Aug 2024 12:00)  HR: 71 (25 Aug 2024 09:15) (62 - 88)  BP: 124/61 (25 Aug 2024 10:00) (85/50 - 134/61)  BP(mean): 84 (25 Aug 2024 08:00) (65 - 88)  RR: 22 (25 Aug 2024 09:15) (15 - 27)  SpO2: 100% (25 Aug 2024 09:15) (94% - 100%)    Parameters below as of 25 Aug 2024 09:15  Patient On (Oxygen Delivery Method): nasal cannula, 2 l  O2 Flow (L/min): 2    I&O's Summary    24 Aug 2024 07:01  -  25 Aug 2024 07:00  --------------------------------------------------------  IN: 2600 mL / OUT: 1020 mL / NET: 1580 mL    25 Aug 2024 07:01  -  25 Aug 2024 11:31  --------------------------------------------------------  IN: 200 mL / OUT: 0 mL / NET: 200 mL        PHYSICAL EXAM:  GENERAL: NAD, AAOx2  HEAD:  Atraumatic, Normocephalic  EYES: EOMI; conjunctiva and sclera clear  NECK: Supple, No JVD, No LAD  CHEST/LUNG: b/l rhonci   HEART: Regular rate and rhythm; No murmurs, rubs, or gallops  ABDOMEN: Soft, Nontender, Nondistended; Bowel sounds present  EXTREMITIES:  2+ Peripheral Pulses, No clubbing, cyanosis, or edema  SKIN: No rashes or lesions    LABS:                        8.2    2.71  )-----------( 51       ( 25 Aug 2024 00:22 )             26.6     08-25    140  |  108  |  20  ----------------------------<  125<H>  4.0   |  23  |  0.63    Ca    8.3<L>      25 Aug 2024 00:22  Phos  3.7     08-25  Mg     2.0     08-25    TPro  5.4<L>  /  Alb  2.7<L>  /  TBili  0.6  /  DBili  x   /  AST  11  /  ALT  11  /  AlkPhos  83  08-25    PT/INR - ( 25 Aug 2024 00:22 )   PT: 15.6 sec;   INR: 1.50 ratio         PTT - ( 25 Aug 2024 00:22 )  PTT:35.5 sec  CAPILLARY BLOOD GLUCOSE      POCT Blood Glucose.: 78 mg/dL (25 Aug 2024 11:27)  POCT Blood Glucose.: 111 mg/dL (25 Aug 2024 05:56)  POCT Blood Glucose.: 122 mg/dL (24 Aug 2024 17:06)  POCT Blood Glucose.: 89 mg/dL (24 Aug 2024 12:11)        Urinalysis Basic - ( 25 Aug 2024 00:22 )    Color: x / Appearance: x / SG: x / pH: x  Gluc: 125 mg/dL / Ketone: x  / Bili: x / Urobili: x   Blood: x / Protein: x / Nitrite: x   Leuk Esterase: x / RBC: x / WBC x   Sq Epi: x / Non Sq Epi: x / Bacteria: x        RADIOLOGY & ADDITIONAL TESTS:    Imaging Personally Reviewed:  [x] YES  [ ] NO    Consultant(s) Notes Reviewed:  [x] YES  [ ] NO      MEDICATIONS  (STANDING):  albuterol/ipratropium for Nebulization 3 milliLiter(s) Nebulizer every 6 hours  cefepime   IVPB 2000 milliGRAM(s) IV Intermittent every 12 hours  dextrose 5% + lactated ringers. 1000 milliLiter(s) (100 mL/Hr) IV Continuous <Continuous>  heparin   Injectable 5000 Unit(s) SubCutaneous every 8 hours  hydrocortisone sodium succinate Injectable 50 milliGRAM(s) IV Push every 8 hours  hydrocortisone sodium succinate Injectable   IV Push   levothyroxine Injectable 70 MICROGram(s) IV Push at bedtime  metroNIDAZOLE  IVPB 500 milliGRAM(s) IV Intermittent every 8 hours  pantoprazole  Injectable 40 milliGRAM(s) IV Push daily  sodium chloride 0.9% for Nebulization 3 milliLiter(s) Nebulizer Once  sodium chloride 3%  Inhalation 4 milliLiter(s) Inhalation every 6 hours    MEDICATIONS  (PRN):      Care Discussed with Consultants/Other Providers [x] YES  [ ] NO    HEALTH ISSUES - PROBLEM Dx:  Acute hypoxic respiratory failure    SBO (small bowel obstruction)    Functional quadriplegia    Advance care planning    Palliative care encounter

## 2024-08-25 NOTE — CONSULT NOTE ADULT - ASSESSMENT
89F with history of paroxysmal atrial fibrillation, hypothyroidism, pulmonary/abdominal tuberculosis, recurrent small bowel obstruction, recurrent UTIs presenting from home with abdominal pain and vomiting for 1 day. Per daughter and HHA at bedside, patient was in her usual state of health until yesterday. Normally able to communicate and ambulate to the bathroom and eat on her own. Does have a HHA for assistance as needed. HHA reports that around 2:30 AM last night, patient called for assistance and was vomiting and asked to call an ambulance. CT scan performed in ED showed evidence of dilated small bowel loops with large stool burden with concern for small bowel obstruction vs enteritis. Also with some imaging findings concerning for multifocal pneumonia. Surgery consulted given history of small bowel obstruction and imaging and NGT placed with NGT to suction draining minimal dark abdominal contents. Additionally with hypoxemic respiratory failure requiring HFNC 40/40. ED course c/b hypotension 70/40s with transient improvement with 3L IVF however with repeat hypotension despite these measures.   GOC discussion had with daughter at bedside. Patient is DNR/DNI and would not want aggressive measures including surgery or central line placement for pressor therapy. Would like a trial of IV pressors via peripheral IV and if these fails, daughter would not want further escalation. Was also seen by palliative care team in ED and was being considered for PCU on fixed dose pressors however no beds available at this time.   Admitted to MICU for septic shock. (23 Aug 2024 15:40)      Multifocal Pneumonia/ Septic shock / Hypoxia  A trial fibrillation  Hypothyroidism   Hx of pulm/Abd tuberculosis  HTN/Hx of orthostatic hypotension    Multifocal Pneumonia/ Septic shock / Hypoxia  -was neutropenic on admission with multifocal pneumonia and septic shock;  -S/P MICU stay recovered now transferred to floor  -she is on broad spectrum antibtiocs:  would cont for now;   -cxr reviewed:  -consider ct chest  non contrast    A trial fibrillation  -not on any full AC:  -on heparin sq   Hypothyroidism   -levothyroxine  Hx of pulm/Abd tuberculosis  -treated before  HTN/Hx of orthostatic hypotension  -currently on stress doses of steroids and being taperd;     dw team  89F with history of paroxysmal atrial fibrillation, hypothyroidism, pulmonary/abdominal tuberculosis, recurrent small bowel obstruction, recurrent UTIs presenting from home with abdominal pain and vomiting for 1 day. Per daughter and HHA at bedside, patient was in her usual state of health until yesterday. Normally able to communicate and ambulate to the bathroom and eat on her own. Does have a HHA for assistance as needed. HHA reports that around 2:30 AM last night, patient called for assistance and was vomiting and asked to call an ambulance. CT scan performed in ED showed evidence of dilated small bowel loops with large stool burden with concern for small bowel obstruction vs enteritis. Also with some imaging findings concerning for multifocal pneumonia. Surgery consulted given history of small bowel obstruction and imaging and NGT placed with NGT to suction draining minimal dark abdominal contents. Additionally with hypoxemic respiratory failure requiring HFNC 40/40. ED course c/b hypotension 70/40s with transient improvement with 3L IVF however with repeat hypotension despite these measures.   GOC discussion had with daughter at bedside. Patient is DNR/DNI and would not want aggressive measures including surgery or central line placement for pressor therapy. Would like a trial of IV pressors via peripheral IV and if these fails, daughter would not want further escalation. Was also seen by palliative care team in ED and was being considered for PCU on fixed dose pressors however no beds available at this time.   Admitted to MICU for septic shock. (23 Aug 2024 15:40)      Multifocal Pneumonia/ Septic shock / Hypoxia  A trial fibrillation  Hypothyroidism   Hx of pulm/Abd tuberculosis  HTN/Hx of orthostatic hypotension    Multifocal Pneumonia/ Septic shock / Hypoxia  -was neutropenic on admission with multifocal pneumonia and septic shock;  -S/P MICU stay recovered now transferred to floor  -she is on broad spectrum antibtiocs:  would cont for now;   -cxr reviewed:  -consider ct chest  non contrast    -she has wheezing and difficulty to expectorate phlegm : add duoneb and Mucomyst   -may need steriods jeferson wait and see for next 24 hours;  if  needed will add  in am     A trial fibrillation  -not on any full AC:  -on heparin sq   Hypothyroidism   -levothyroxine  Hx of pulm/Abd tuberculosis  -treated before  HTN/Hx of orthostatic hypotension  -currently on stress doses of steroids and being tapered     dw team

## 2024-08-25 NOTE — PROGRESS NOTE ADULT - SUBJECTIVE AND OBJECTIVE BOX
INTERVAL HPI/OVERNIGHT EVENTS:    SUBJECTIVE: Patient seen and examined at bedside. No acute events overnight.     ROS: All negative except as listed above.    VITAL SIGNS:  ICU Vital Signs Last 24 Hrs  T(C): 36.5 (25 Aug 2024 04:00), Max: 37.4 (24 Aug 2024 08:00)  T(F): 97.7 (25 Aug 2024 04:00), Max: 99.3 (24 Aug 2024 08:00)  HR: 70 (25 Aug 2024 06:00) (62 - 88)  BP: 110/62 (25 Aug 2024 06:00) (85/50 - 134/61)  BP(mean): 81 (25 Aug 2024 06:00) (65 - 88)  ABP: --  ABP(mean): --  RR: 24 (25 Aug 2024 06:00) (15 - 27)  SpO2: 100% (25 Aug 2024 06:00) (100% - 100%)    O2 Parameters below as of 25 Aug 2024 05:52  Patient On (Oxygen Delivery Method): nasal cannula            Plateau pressure:   P/F ratio:     08-24 @ 07:01  -  08-25 @ 07:00  --------------------------------------------------------  IN: 2600 mL / OUT: 1020 mL / NET: 1580 mL      CAPILLARY BLOOD GLUCOSE      POCT Blood Glucose.: 111 mg/dL (25 Aug 2024 05:56)      ECG: reviewed.    PHYSICAL EXAM:    CONSTITUTIONAL: laying in bed and appears comfortable   EYES: PERRLA and symmetric, EOMI, No conjunctival or scleral injection  ENMT: NGT in place, NC 2L with adequate O2 sat  RESP: No respiratory distress, no use of accessory muscles  CV: RRR, +S1S2,  no peripheral edema  GI: Soft, NT, ND, no rebound, no guarding; NGT draining minimal brown material   MSK: moving extremities spontaneously but limited from fatigue   PSYCH: AAOx1 to self    MEDICATIONS:  MEDICATIONS  (STANDING):  albuterol/ipratropium for Nebulization 3 milliLiter(s) Nebulizer every 6 hours  cefepime   IVPB 2000 milliGRAM(s) IV Intermittent every 12 hours  dextrose 5% + lactated ringers. 1000 milliLiter(s) (100 mL/Hr) IV Continuous <Continuous>  heparin   Injectable 5000 Unit(s) SubCutaneous every 8 hours  hydrocortisone sodium succinate Injectable   IV Push   hydrocortisone sodium succinate Injectable 50 milliGRAM(s) IV Push every 8 hours  levothyroxine Injectable 70 MICROGram(s) IV Push at bedtime  metroNIDAZOLE  IVPB 500 milliGRAM(s) IV Intermittent every 8 hours  pantoprazole  Injectable 40 milliGRAM(s) IV Push daily  sodium chloride 0.9% for Nebulization 3 milliLiter(s) Nebulizer Once  sodium chloride 3%  Inhalation 4 milliLiter(s) Inhalation every 6 hours    MEDICATIONS  (PRN):      ALLERGIES:  Allergies    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 reported allergy (Unknown)  penicillin (Rash)    Intolerances        LABS:                        8.2    2.71  )-----------( 51       ( 25 Aug 2024 00:22 )             26.6     08-25    140  |  108  |  20  ----------------------------<  125<H>  4.0   |  23  |  0.63    Ca    8.3<L>      25 Aug 2024 00:22  Phos  3.7     08-25  Mg     2.0     08-25    TPro  5.4<L>  /  Alb  2.7<L>  /  TBili  0.6  /  DBili  x   /  AST  11  /  ALT  11  /  AlkPhos  83  08-25    PT/INR - ( 25 Aug 2024 00:22 )   PT: 15.6 sec;   INR: 1.50 ratio         PTT - ( 25 Aug 2024 00:22 )  PTT:35.5 sec  Urinalysis Basic - ( 25 Aug 2024 00:22 )    Color: x / Appearance: x / SG: x / pH: x  Gluc: 125 mg/dL / Ketone: x  / Bili: x / Urobili: x   Blood: x / Protein: x / Nitrite: x   Leuk Esterase: x / RBC: x / WBC x   Sq Epi: x / Non Sq Epi: x / Bacteria: x      ABG:  pH, Arterial: 7.34 (08-25-24 @ 00:05)  pCO2, Arterial: 48 mmHg (08-25-24 @ 00:05)  pO2, Arterial: 146 mmHg (08-25-24 @ 00:05)      vBG:    Micro:    Culture - Blood (collected 08-23-24 @ 09:30)  Source: .Blood Blood-Peripheral  Preliminary Report (08-24-24 @ 15:01):    No growth at 24 hours    Culture - Blood (collected 08-23-24 @ 09:00)  Source: .Blood Blood-Peripheral  Preliminary Report (08-24-24 @ 15:01):    No growth at 24 hours          RADIOLOGY & ADDITIONAL TESTS: Reviewed.

## 2024-08-25 NOTE — PROGRESS NOTE ADULT - ASSESSMENT
89F with history of paroxysmal atrial fibrillation, hypothyroidism, pulmonary/abdominal tuberculosis, recurrent small bowel obstruction, recurrent UTIs presenting from home with abdominal pain and vomiting for 1 day with CT scans with evidence of SBO vs enteritis s/p NGT. Course c/b hypotension likely iso septic from multifocal PNA process.    #Septic Shock   #Acute Hypoxemic Respiratory Failure  #Multifocal PNA   #Acute metabolic encephalopathy   - etiology of hypotension likely iso septic shock  - s/p 3L IVF for volume resuscitation iso SBO and IVC indicating volume responsivement  - required levophed for shock state but now weaned off with MAP > 60 maintained  - c/w cefepime and flagyll for empiric antibiotics for PNA and abdominal coverage   - pending blood cultures and urine cultures  - on HFNC 40/40 for hypoxemic respiratory failure likely iso multifocal PNA which may have been iso aspiration event from SBO  - wean HFNC as tolerated     #Small Bowel Obstruction  #History of Abdominal Tuberculosis   #History of Recurrent Small Bowel Obstruction  - s/p NGT to suction with small amounts of brown stomach content   - now on clear liquid diet   - ongoing swallow eval   - stress ulcer prophylaxis with pantoprazole 40 BID iso NPO    #Paroxysmal Atrial Fibrillation   - will need to hold amiodarone 200 qd until seen by speech and swallow   - will need to hold home eliquis 2.5 BID until seen by speech and swallow per critical care   - on heparin subq for DVT ppx    #History of Pulmonary Tuberculosis in 90s  - s/p treatment in 1990s    #Leukopenia  #Neutropenia  - was receiving prednisone OP for low blood pressure  - stress dose hydrocortisone iso hypotension iso ongoing steroid use    #Hypothyroidism  - c/w levothyroxine 75 mcg IV    Ethics  DNR/DNI; no central lines

## 2024-08-26 LAB
ALBUMIN SERPL ELPH-MCNC: 3 G/DL — LOW (ref 3.3–5)
ALP SERPL-CCNC: 86 U/L — SIGNIFICANT CHANGE UP (ref 40–120)
ALT FLD-CCNC: 11 U/L — SIGNIFICANT CHANGE UP (ref 10–45)
ANION GAP SERPL CALC-SCNC: 13 MMOL/L — SIGNIFICANT CHANGE UP (ref 5–17)
ANISOCYTOSIS BLD QL: SLIGHT — SIGNIFICANT CHANGE UP
AST SERPL-CCNC: 12 U/L — SIGNIFICANT CHANGE UP (ref 10–40)
BASO STIPL BLD QL SMEAR: PRESENT — SIGNIFICANT CHANGE UP
BASOPHILS # BLD AUTO: 0 K/UL — SIGNIFICANT CHANGE UP (ref 0–0.2)
BASOPHILS NFR BLD AUTO: 0 % — SIGNIFICANT CHANGE UP (ref 0–2)
BILIRUB SERPL-MCNC: 0.5 MG/DL — SIGNIFICANT CHANGE UP (ref 0.2–1.2)
BUN SERPL-MCNC: 22 MG/DL — SIGNIFICANT CHANGE UP (ref 7–23)
CALCIUM SERPL-MCNC: 8.7 MG/DL — SIGNIFICANT CHANGE UP (ref 8.4–10.5)
CHLORIDE SERPL-SCNC: 107 MMOL/L — SIGNIFICANT CHANGE UP (ref 96–108)
CO2 SERPL-SCNC: 24 MMOL/L — SIGNIFICANT CHANGE UP (ref 22–31)
CREAT SERPL-MCNC: 0.58 MG/DL — SIGNIFICANT CHANGE UP (ref 0.5–1.3)
DACRYOCYTES BLD QL SMEAR: SLIGHT — SIGNIFICANT CHANGE UP
EGFR: 81 ML/MIN/1.73M2 — SIGNIFICANT CHANGE UP
ELLIPTOCYTES BLD QL SMEAR: SIGNIFICANT CHANGE UP
EOSINOPHIL # BLD AUTO: 0 K/UL — SIGNIFICANT CHANGE UP (ref 0–0.5)
EOSINOPHIL NFR BLD AUTO: 0 % — SIGNIFICANT CHANGE UP (ref 0–6)
GLUCOSE BLDC GLUCOMTR-MCNC: 125 MG/DL — HIGH (ref 70–99)
GLUCOSE BLDC GLUCOMTR-MCNC: 93 MG/DL — SIGNIFICANT CHANGE UP (ref 70–99)
GLUCOSE SERPL-MCNC: 102 MG/DL — HIGH (ref 70–99)
HCT VFR BLD CALC: 30.3 % — LOW (ref 34.5–45)
HGB BLD-MCNC: 9.2 G/DL — LOW (ref 11.5–15.5)
LYMPHOCYTES # BLD AUTO: 0.46 K/UL — LOW (ref 1–3.3)
LYMPHOCYTES # BLD AUTO: 12.3 % — LOW (ref 13–44)
MACROCYTES BLD QL: SLIGHT — SIGNIFICANT CHANGE UP
MAGNESIUM SERPL-MCNC: 2.2 MG/DL — SIGNIFICANT CHANGE UP (ref 1.6–2.6)
MANUAL SMEAR VERIFICATION: SIGNIFICANT CHANGE UP
MCHC RBC-ENTMCNC: 30.4 GM/DL — LOW (ref 32–36)
MCHC RBC-ENTMCNC: 32.9 PG — SIGNIFICANT CHANGE UP (ref 27–34)
MCV RBC AUTO: 108.2 FL — HIGH (ref 80–100)
MONOCYTES # BLD AUTO: 0.06 K/UL — SIGNIFICANT CHANGE UP (ref 0–0.9)
MONOCYTES NFR BLD AUTO: 1.7 % — LOW (ref 2–14)
NEUTROPHILS # BLD AUTO: 3.25 K/UL — SIGNIFICANT CHANGE UP (ref 1.8–7.4)
NEUTROPHILS NFR BLD AUTO: 86 % — HIGH (ref 43–77)
PHOSPHATE SERPL-MCNC: 2.7 MG/DL — SIGNIFICANT CHANGE UP (ref 2.5–4.5)
PLAT MORPH BLD: NORMAL — SIGNIFICANT CHANGE UP
PLATELET # BLD AUTO: 50 K/UL — LOW (ref 150–400)
POIKILOCYTOSIS BLD QL AUTO: SIGNIFICANT CHANGE UP
POLYCHROMASIA BLD QL SMEAR: SLIGHT — SIGNIFICANT CHANGE UP
POTASSIUM SERPL-MCNC: 3.9 MMOL/L — SIGNIFICANT CHANGE UP (ref 3.5–5.3)
POTASSIUM SERPL-SCNC: 3.9 MMOL/L — SIGNIFICANT CHANGE UP (ref 3.5–5.3)
PROT SERPL-MCNC: 6.1 G/DL — SIGNIFICANT CHANGE UP (ref 6–8.3)
RBC # BLD: 2.8 M/UL — LOW (ref 3.8–5.2)
RBC # FLD: 15.8 % — HIGH (ref 10.3–14.5)
RBC BLD AUTO: ABNORMAL
SCHISTOCYTES BLD QL AUTO: SLIGHT — SIGNIFICANT CHANGE UP
SODIUM SERPL-SCNC: 144 MMOL/L — SIGNIFICANT CHANGE UP (ref 135–145)
WBC # BLD: 3.78 K/UL — LOW (ref 3.8–10.5)
WBC # FLD AUTO: 3.78 K/UL — LOW (ref 3.8–10.5)

## 2024-08-26 PROCEDURE — 93010 ELECTROCARDIOGRAM REPORT: CPT

## 2024-08-26 PROCEDURE — 99232 SBSQ HOSP IP/OBS MODERATE 35: CPT

## 2024-08-26 PROCEDURE — 71045 X-RAY EXAM CHEST 1 VIEW: CPT | Mod: 26

## 2024-08-26 RX ORDER — GUAIFENESIN 100 MG/5ML
1200 LIQUID ORAL EVERY 12 HOURS
Refills: 0 | Status: COMPLETED | OUTPATIENT
Start: 2024-08-26 | End: 2024-08-31

## 2024-08-26 RX ORDER — METOPROLOL TARTRATE 100 MG/1
2.5 TABLET ORAL EVERY 6 HOURS
Refills: 0 | Status: DISCONTINUED | OUTPATIENT
Start: 2024-08-26 | End: 2024-08-27

## 2024-08-26 RX ORDER — OXYCODONE HYDROCHLORIDE 15 MG/1
200 TABLET ORAL DAILY
Refills: 0 | Status: DISCONTINUED | OUTPATIENT
Start: 2024-08-26 | End: 2024-08-26

## 2024-08-26 RX ORDER — CYANOCOBALAMIN (VITAMIN B-12) 500MCG/0.1
1000 GEL (ML) NASAL ONCE
Refills: 0 | Status: COMPLETED | OUTPATIENT
Start: 2024-08-26 | End: 2024-08-26

## 2024-08-26 RX ORDER — METHYLPREDNISOLONE 4 MG
40 TABLET ORAL EVERY 8 HOURS
Refills: 0 | Status: DISCONTINUED | OUTPATIENT
Start: 2024-08-26 | End: 2024-08-28

## 2024-08-26 RX ORDER — FUROSEMIDE 40 MG
20 TABLET ORAL ONCE
Refills: 0 | Status: COMPLETED | OUTPATIENT
Start: 2024-08-26 | End: 2024-08-26

## 2024-08-26 RX ORDER — BUDESONIDE 3 MG/1
0.5 CAPSULE ORAL EVERY 12 HOURS
Refills: 0 | Status: DISCONTINUED | OUTPATIENT
Start: 2024-08-26 | End: 2024-09-05

## 2024-08-26 RX ORDER — HEPARIN SODIUM,BOVINE 1000/ML
5000 VIAL (ML) INJECTION EVERY 12 HOURS
Refills: 0 | Status: DISCONTINUED | OUTPATIENT
Start: 2024-08-26 | End: 2024-09-05

## 2024-08-26 RX ORDER — METHYLPREDNISOLONE 4 MG
40 TABLET ORAL DAILY
Refills: 0 | Status: DISCONTINUED | OUTPATIENT
Start: 2024-08-26 | End: 2024-08-26

## 2024-08-26 RX ADMIN — Medication 40 MILLIGRAM(S): at 18:30

## 2024-08-26 RX ADMIN — Medication 4 MILLILITER(S): at 17:16

## 2024-08-26 RX ADMIN — IPRATROPIUM BROMIDE AND ALBUTEROL SULFATE 3 MILLILITER(S): .5; 3 SOLUTION RESPIRATORY (INHALATION) at 23:59

## 2024-08-26 RX ADMIN — Medication 1000 MICROGRAM(S): at 18:58

## 2024-08-26 RX ADMIN — SODIUM CHLORIDE 4 MILLILITER(S): 9 INJECTION INTRAMUSCULAR; INTRAVENOUS; SUBCUTANEOUS at 11:10

## 2024-08-26 RX ADMIN — Medication 100 MILLIGRAM(S): at 22:36

## 2024-08-26 RX ADMIN — BUDESONIDE 0.5 MILLIGRAM(S): 3 CAPSULE ORAL at 17:15

## 2024-08-26 RX ADMIN — Medication 4 MILLILITER(S): at 23:59

## 2024-08-26 RX ADMIN — Medication 100 MILLIGRAM(S): at 06:47

## 2024-08-26 RX ADMIN — APIXABAN 2.5 MILLIGRAM(S): 5 TABLET, FILM COATED ORAL at 06:48

## 2024-08-26 RX ADMIN — CEFEPIME 100 MILLIGRAM(S): 2 INJECTION, POWDER, FOR SOLUTION INTRAVENOUS at 06:48

## 2024-08-26 RX ADMIN — Medication 4 MILLILITER(S): at 11:10

## 2024-08-26 RX ADMIN — Medication 20 MILLIGRAM(S): at 17:14

## 2024-08-26 RX ADMIN — Medication 40 MILLIGRAM(S): at 12:08

## 2024-08-26 RX ADMIN — IPRATROPIUM BROMIDE AND ALBUTEROL SULFATE 3 MILLILITER(S): .5; 3 SOLUTION RESPIRATORY (INHALATION) at 17:16

## 2024-08-26 RX ADMIN — IPRATROPIUM BROMIDE AND ALBUTEROL SULFATE 3 MILLILITER(S): .5; 3 SOLUTION RESPIRATORY (INHALATION) at 11:10

## 2024-08-26 RX ADMIN — Medication 70 MICROGRAM(S): at 22:36

## 2024-08-26 RX ADMIN — SODIUM CHLORIDE 4 MILLILITER(S): 9 INJECTION INTRAMUSCULAR; INTRAVENOUS; SUBCUTANEOUS at 05:16

## 2024-08-26 RX ADMIN — Medication 4 MILLILITER(S): at 05:16

## 2024-08-26 RX ADMIN — METOPROLOL TARTRATE 2.5 MILLIGRAM(S): 100 TABLET ORAL at 17:12

## 2024-08-26 RX ADMIN — CEFEPIME 100 MILLIGRAM(S): 2 INJECTION, POWDER, FOR SOLUTION INTRAVENOUS at 17:12

## 2024-08-26 RX ADMIN — Medication 100 MILLIGRAM(S): at 13:16

## 2024-08-26 RX ADMIN — IPRATROPIUM BROMIDE AND ALBUTEROL SULFATE 3 MILLILITER(S): .5; 3 SOLUTION RESPIRATORY (INHALATION) at 05:16

## 2024-08-26 RX ADMIN — Medication 5000 UNIT(S): at 18:31

## 2024-08-26 RX ADMIN — OXYCODONE HYDROCHLORIDE 200 MILLIGRAM(S): 15 TABLET ORAL at 15:14

## 2024-08-26 RX ADMIN — Medication 40 MILLIGRAM(S): at 22:36

## 2024-08-26 NOTE — PROGRESS NOTE ADULT - SUBJECTIVE AND OBJECTIVE BOX
Date of Service: 08-26-24 @ 13:57    Patient is a 89y old  Female who presents with a chief complaint of septic shock (26 Aug 2024 11:17)      Any change in ROS: seems OK:  daughter is at bedside :    MEDICATIONS  (STANDING):  acetylcysteine 20%  Inhalation 4 milliLiter(s) Inhalation every 6 hours  albuterol/ipratropium for Nebulization 3 milliLiter(s) Nebulizer every 6 hours  apixaban 2.5 milliGRAM(s) Oral every 12 hours  cefepime   IVPB 2000 milliGRAM(s) IV Intermittent every 12 hours  hydrocortisone sodium succinate Injectable 50 milliGRAM(s) IV Push every 12 hours  hydrocortisone sodium succinate Injectable   IV Push   levothyroxine Injectable 70 MICROGram(s) IV Push at bedtime  metroNIDAZOLE  IVPB 500 milliGRAM(s) IV Intermittent every 8 hours  pantoprazole  Injectable 40 milliGRAM(s) IV Push daily  sodium chloride 0.9% for Nebulization 3 milliLiter(s) Nebulizer Once  sodium chloride 3%  Inhalation 4 milliLiter(s) Inhalation every 6 hours    MEDICATIONS  (PRN):    Vital Signs Last 24 Hrs  T(C): 37 (26 Aug 2024 11:00), Max: 37 (26 Aug 2024 11:00)  T(F): 98.6 (26 Aug 2024 11:00), Max: 98.6 (26 Aug 2024 11:00)  HR: 73 (26 Aug 2024 11:20) (73 - 98)  BP: 131/69 (26 Aug 2024 11:00) (124/71 - 147/66)  BP(mean): --  RR: 18 (26 Aug 2024 11:00) (18 - 19)  SpO2: 98% (26 Aug 2024 11:20) (91% - 99%)    Parameters below as of 26 Aug 2024 11:20  Patient On (Oxygen Delivery Method): nasal cannula        I&O's Summary    25 Aug 2024 07:01  -  26 Aug 2024 07:00  --------------------------------------------------------  IN: 650 mL / OUT: 0 mL / NET: 650 mL          Physical Exam:   GENERAL: NAD, well-groomed, well-developed  HEENT: KOLBY/   Atraumatic, Normocephalic  ENMT: No tonsillar erythema, exudates, or enlargement; Moist mucous membranes, Good dentition, No lesions  NECK: Supple, No JVD, Normal thyroid  CHEST/LUNG: Clear to auscultaion- no wheezing : coarse breath sounds  : mild wheezing  CVS: Regular rate and rhythm; No murmurs, rubs, or gallops  GI: : Soft, Nontender, Nondistended; Bowel sounds present  NERVOUS SYSTEM:  Alert & Oriented X3  EXTREMITIES: -edema  LYMPH: No lymphadenopathy noted  SKIN: No rashes or lesions  ENDOCRINOLOGY: No Thyromegaly  PSYCH: Appropriate    Labs:  ABG - ( 25 Aug 2024 00:05 )  pH, Arterial: 7.34  pH, Blood: x     /  pCO2: 48    /  pO2: 146   / HCO3: 26    / Base Excess: -0.1  /  SaO2: 99.9            30, 32                            9.2    3.78  )-----------( 50       ( 26 Aug 2024 07:31 )             30.3                         8.2    2.71  )-----------( 51       ( 25 Aug 2024 00:22 )             26.6                         9.3    2.44  )-----------( 76       ( 24 Aug 2024 00:10 )             28.9                         11.5   1.33  )-----------( 72       ( 23 Aug 2024 04:27 )             37.4     08-26    144  |  107  |  22  ----------------------------<  102<H>  3.9   |  24  |  0.58  08-25    140  |  108  |  20  ----------------------------<  125<H>  4.0   |  23  |  0.63  08-24    139  |  105  |  20  ----------------------------<  106<H>  4.9   |  21<L>  |  0.78  08-23    140  |  102  |  13  ----------------------------<  116<H>  3.7   |  26  |  0.70    Ca    8.7      26 Aug 2024 07:32  Ca    8.3<L>      25 Aug 2024 00:22  Phos  2.7     08-26  Phos  3.7     08-25  Mg     2.2     08-26  Mg     2.0     08-25    TPro  6.1  /  Alb  3.0<L>  /  TBili  0.5  /  DBili  x   /  AST  12  /  ALT  11  /  AlkPhos  86  08-26  TPro  5.4<L>  /  Alb  2.7<L>  /  TBili  0.6  /  DBili  x   /  AST  11  /  ALT  11  /  AlkPhos  83  08-25  TPro  5.9<L>  /  Alb  3.0<L>  /  TBili  1.1  /  DBili  x   /  AST  15  /  ALT  12  /  AlkPhos  89  08-24  TPro  7.9  /  Alb  4.3  /  TBili  0.9  /  DBili  x   /  AST  12  /  ALT  11  /  AlkPhos  78  08-23    CAPILLARY BLOOD GLUCOSE      POCT Blood Glucose.: 93 mg/dL (26 Aug 2024 12:02)  POCT Blood Glucose.: 125 mg/dL (26 Aug 2024 06:10)  POCT Blood Glucose.: 106 mg/dL (25 Aug 2024 23:55)  POCT Blood Glucose.: 133 mg/dL (25 Aug 2024 18:04)      LIVER FUNCTIONS - ( 26 Aug 2024 07:32 )  Alb: 3.0 g/dL / Pro: 6.1 g/dL / ALK PHOS: 86 U/L / ALT: 11 U/L / AST: 12 U/L / GGT: x           PT/INR - ( 25 Aug 2024 00:22 )   PT: 15.6 sec;   INR: 1.50 ratio         PTT - ( 25 Aug 2024 00:22 )  PTT:35.5 sec  Urinalysis Basic - ( 26 Aug 2024 07:32 )    Color: x / Appearance: x / SG: x / pH: x  Gluc: 102 mg/dL / Ketone: x  / Bili: x / Urobili: x   Blood: x / Protein: x / Nitrite: x   Leuk Esterase: x / RBC: x / WBC x   Sq Epi: x / Non Sq Epi: x / Bacteria: x            RECENT CULTURES:  08-24 @ 00:10 Clean Catch Clean Catch (Midstream)       rad< from: Xray Chest 1 View- PORTABLE-Urgent (Xray Chest 1 View- PORTABLE-Urgent .) (08.24.24 @ 05:09) >    ACC: [id number] EXAM:  XR CHEST PORTABLE URGENT 1V   ORDERED BY: GORGE HAUSER     PROCEDURE DATE:  08/24/2024          INTERPRETATION:  EXAMINATION: XR CHEST URGENT    CLINICAL INDICATION: NGT Placement    TECHNIQUE: Single frontal, portable view of the chest was obtained.    COMPARISON: Chest x-ray 8/23/2024.    FINDINGS:  Interval retraction of the enteric tube terminating within the GE   junction/proximal stomach.  The heart is normal in size.  Subsegmental atelectasis.  Left basilar hazy opacity, decreased in size compared to prior.  There is no pneumothorax or pleural effusion.    IMPRESSION:  Enteric tube terminating within the GE junction/proximal stomach,   recommend advancing further into stomach.    --- End of Report ---          ROXI BOLIVAR MD; Resident Radiologist  This document has been electronically signed.  DESHAUN BARRON MD; Attending Radiologist  This document has been electronically signed. Aug 24 2024  3:16PM    < end of copied text >           <10,000 CFU/mL Normal Urogenital Yeimi    08-23 @ 09:30 .Blood Blood-Peripheral                No growth at 48 Hours    08-23 @ 09:00 .Blood Blood-Peripheral                No growth at 48 Hours          RESPIRATORY CULTURES:          Studies  Chest X-RAY  CT SCAN Chest   Venous Dopplers: LE:   CT Abdomen  Others

## 2024-08-26 NOTE — ED PROVIDER NOTE - NSICDXPASTMEDICALHX_GEN_ALL_CORE_FT
8/26/2024                   Sarthak Ramos  [id number]  [facility] [address]      Dear Sarthak,    We are writing to inform you that you are due for your Annual Medicare Wellness Exam.    This appointment will focus on preventative care including a Personalized Prevention Plan, a check of your weight, blood pressure and pulse, a brief check of your hearing and vision, any immunizations you may need, as well as referrals for any other screening services or test you may need.    Medicare will cover the cost of the preventative care services.  Any additional services including lab and some immunizations may not be a covered service.  If you have any other health concerns or questions to discuss with your provider, a medical visit may be added to your wellness visit and you may need to pay a deductible or co-pay depending on your Medicare plan.    Please call our office at [phone] to schedule your appointment with Lee Barahona MD .    Please fill out the enclosed questionnaire a few days before your exam and bring it along to the appointment.    We look forward to seeing you soon.    Lee Barahona MD  [id number] [facility] [address]  [facility] [address]  [phone]    
PAST MEDICAL HISTORY:  Afib     Gastritis     HTN (hypertension)     Hypothyroid     Pulmonary TB     SBO (small bowel obstruction)     TB (pulmonary tuberculosis) TB of abdomen 1960    UTI (urinary tract infection)

## 2024-08-26 NOTE — CONSULT NOTE ADULT - SUBJECTIVE AND OBJECTIVE BOX
[facility] DIVISION OF INFECTIOUS DISEASES  SWAPNIL Rene S. Shah, Y. Patel, G. [facility]  [phone]  ([phone] - weekdays after 5pm and weekends)    CATHRYN LARA  89y, Female  [id number]    HPI:  Patient is a 89 year old female with PMH of pAfib, hypothyroidism, pulmonary/abdominal tuberculosis, recurrent small bowel obstruction, recurrent UTIs presenting from home with abdominal pain and vomiting for 1 day. Per daughter and HHA at bedside, patient was in her usual state of health until yesterday. Normally able to communicate and ambulate to the bathroom and eat on her own. Does have a HHA for assistance as needed. HHA reports that around 2:30 AM last night, patient called for assistance and was vomiting and asked to call an ambulance. CT scan performed in ED showed evidence of dilated small bowel loops with large stool burden with concern for small bowel obstruction vs enteritis. Also with some imaging findings concerning for multifocal pneumonia. Surgery consulted given history of small bowel obstruction and imaging and NGT placed with NGT to suction draining minimal dark abdominal contents. Additionally with hypoxemic respiratory failure requiring HFNC 40/40. ED course c/b hypotension 70/40s with transient improvement with 3L IVF however with repeat hypotension despite these measures.   GOC discussion had with daughter at bedside. Patient is DNR/DNI and would not want aggressive measures including surgery or central line placement for pressor therapy. Would like a trial of IV pressors via peripheral IV and if these fails, daughter would not want further escalation. Was also seen by palliative care team in ED and was being considered for PCU on fixed dose pressors however no beds available at this time. Admitted to MICU for septic shock. (23 Aug 2024 15:40)  Patient seen and examined at bedside this afternoon, daughter at bedside. Patient resting on nasal cannula, currently no complaints. Daughter reports having been having coughing, no fevers, chills or pain reported.   ROS: 14 point review of systems completed, pertinent positives and negatives as per HPI.    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 reported allergy (Unknown)  penicillin (Rash)    PMH -- Pulmonary TB  Gastritis  Afib  SBO (small bowel obstruction)  Hypothyroid  UTI (urinary tract infection)  HTN (hypertension)  TB (pulmonary tuberculosis)  Orthostatic hypotension  Asthma  Aortic stenosis  Multifocal pneumonia    PSH -- Afib  History of Small Bowel Obstruction  History of Bilateral Breast Biopsy  S/P Exploratory Laparotomy  H/O hemorrhoidectomy  S/P vein stripping    FH -- Family history of essential hypertension  Social History -- denies tobacco, alcohol or illicit drug use    Physical Exam--  Vital Signs Last 24 Hrs  T(F): 98.6 (26 Aug 2024 11:00), Max: 98.6 (26 Aug 2024 11:00)  HR: 73 (26 Aug 2024 11:20) (73 - 98)  BP: 131/69 (26 Aug 2024 11:00) (124/71 - 147/66)  RR: 18 (26 Aug 2024 11:00) (18 - 19)  SpO2: 98% (26 Aug 2024 11:20) (91% - 99%)  General: no acute distress, NC   HEENT: NC/AT, EOMI, anicteric  Lungs: coarse breath sounds, wheezes  Heart: S1, S2 present, normal rate/rhythm  Abdomen: Soft. ND. NT. BS present.   Neuro: AAOx3, no obvious focal deficits   Extremities: No cyanosis. No edema.   Skin: Warm. Dry. No visible rash.   Lines: PIV     Laboratory & Imaging Data--  CBC:                       9.2    3.78  )-----------( 50       ( 26 Aug 2024 07:31 )             30.3     WBC Count: 3.78 K/uL (08-26-24 @ 07:31)  WBC Count: 2.71 K/uL (08-25-24 @ 00:22)  WBC Count: 2.44 K/uL (08-24-24 @ 00:10)  WBC Count: 1.33 K/uL (08-23-24 @ 04:27)    CMP: 08-26    144  |  107  |  22  ----------------------------<  102<H>  3.9   |  24  |  0.58    Ca    8.7      26 Aug 2024 07:32  Phos  2.7     08-26  Mg     2.2     08-26    TPro  6.1  /  Alb  3.0<L>  /  TBili  0.5  /  DBili  x   /  AST  12  /  ALT  11  /  AlkPhos  86  08-26    LIVER FUNCTIONS - ( 26 Aug 2024 07:32 )  Alb: 3.0 g/dL / Pro: 6.1 g/dL / ALK PHOS: 86 U/L / ALT: 11 U/L / AST: 12 U/L / GGT: x           Urinalysis + Microscopic Examination (08.24.24 @ 00:10)    pH Urine: 5.5   Urine Appearance: Clear   Color: Dark Yellow   Specific Gravity: >1.030   Protein, Urine: 30 mg/dL   Glucose Qualitative, Urine: Negative mg/dL   Ketone - Urine: Negative mg/dL   Blood, Urine: Negative   Bilirubin: Negative   Urobilinogen: 1.0 mg/dL   Leukocyte Esterase Concentration: Trace   Nitrite: Negative   White Blood Cell - Urine: 1 /HPF   Red Blood Cell - Urine: 4 /HPF   Bacteria: Negative /HPF   Cast: 0 /LPF   Epithelial Cells: 2 /HPF    Microbiology: reviewed  Culture - Urine (collected 08-24-24 @ 00:10)  Source: Clean Catch Clean Catch (Midstream)  Final Report (08-25-24 @ 05:34):    <10,000 CFU/mL Normal Urogenital Yeimi    Culture - Blood (collected 08-23-24 @ 09:30)  Source: .Blood Blood-Peripheral  Preliminary Report (08-25-24 @ 15:00):    No growth at 48 Hours    Culture - Blood (collected 08-23-24 @ 09:00)  Source: .Blood Blood-Peripheral  Preliminary Report (08-25-24 @ 15:00):    No growth at 48 Hours    [facility] (23 Aug 2024 05:12)    Radiology--reviewed  < from: Xray Chest 1 View- PORTABLE-Urgent (Xray Chest 1 View- PORTABLE-Urgent .) (08.24.24 @ 05:09) >  FINDINGS:  Interval retraction of the enteric tube terminating within the GE   junction/proximal stomach.  The heart is normal in size.  Subsegmental atelectasis.  Left basilar hazy opacity, decreased in size compared to prior.  There is no pneumothorax or pleural effusion.    IMPRESSION:  Enteric tube terminating within the GE junction/proximal stomach,   recommend advancing further into stomach.    < end of copied text >  < from: Xray Chest 1 View- PORTABLE-Urgent (Xray Chest 1 View- PORTABLE-Urgent .) (08.23.24 @ 09:39) >    IMPRESSION:  Enteric tube descends below the diaphragm and overlies the stomach.    Minimal left basilar hazy opacity which may represent small effusion or   atelectasis    < end of copied text >  < from: CT Abdomen and Pelvis w/ IV Cont (08.23.24 @ 06:27) >  IMPRESSION:  Multifocal pneumonia.    Borderline dilated fluid-filled loopsof small bowel measuring up to 4.0   cm with gradual appearing transition point in the left lower quadrant.   Findings may represent early small bowel obstruction or enteritis.    Large volume rectal stool.      < end of copied text >    Active Medications--  acetylcysteine 20%  Inhalation 4 milliLiter(s) Inhalation every 6 hours  albuterol/ipratropium for Nebulization 3 milliLiter(s) Nebulizer every 6 hours  apixaban 2.5 milliGRAM(s) Oral every 12 hours  cefepime   IVPB 2000 milliGRAM(s) IV Intermittent every 12 hours  guaiFENesin ER 1200 milliGRAM(s) Oral every 12 hours  hydrocortisone sodium succinate Injectable 50 milliGRAM(s) IV Push every 12 hours  hydrocortisone sodium succinate Injectable   IV Push   levothyroxine Injectable 70 MICROGram(s) IV Push at bedtime  metroNIDAZOLE  IVPB 500 milliGRAM(s) IV Intermittent every 8 hours  pantoprazole  Injectable 40 milliGRAM(s) IV Push daily  sodium chloride 0.9% for Nebulization 3 milliLiter(s) Nebulizer Once  sodium chloride 3%  Inhalation 4 milliLiter(s) Inhalation every 6 hours    Current Antimicrobials:   cefepime   IVPB 2000 milliGRAM(s) IV Intermittent every 12 hours  metroNIDAZOLE  IVPB 500 milliGRAM(s) IV Intermittent every 8 hours    Prior/Completed Antimicrobials:  cefepime   IVPB  metroNIDAZOLE  IVPB

## 2024-08-26 NOTE — PROGRESS NOTE ADULT - SUBJECTIVE AND OBJECTIVE BOX
Patient is a 89y old  Female who presents with a chief complaint of septic shock (27 Aug 2024 04:52)      SUBJECTIVE / OVERNIGHT EVENTS:  Patient seen and examined.   Doing well.       Vital Signs Last 24 Hrs  T(C): 36.9 (27 Aug 2024 11:00), Max: 37.2 (27 Aug 2024 00:00)  T(F): 98.4 (27 Aug 2024 11:00), Max: 99 (27 Aug 2024 00:00)  HR: 98 (27 Aug 2024 11:00) (90 - 134)  BP: 111/72 (27 Aug 2024 11:00) (97/69 - 127/75)  BP(mean): --  RR: 18 (27 Aug 2024 11:00) (18 - 26)  SpO2: 100% (27 Aug 2024 11:00) (93% - 100%)    Parameters below as of 27 Aug 2024 11:00  Patient On (Oxygen Delivery Method): mask, nonrebreather      I&O's Summary    26 Aug 2024 07:01  -  27 Aug 2024 07:00  --------------------------------------------------------  IN: 250 mL / OUT: 1750 mL / NET: -1500 mL        PHYSICAL EXAM:  GENERAL: NAD, AAOx3  HEAD:  Atraumatic, Normocephalic  EYES: EOMI; conjunctiva and sclera clear  NECK: Supple, No JVD, No LAD  CHEST/LUNG: B/L air entry; rhonci   HEART: Regular rate and rhythm; No murmurs, rubs, or gallops  ABDOMEN: Soft, Nontender, Nondistended; Bowel sounds present  EXTREMITIES:  2+ Peripheral Pulses, No clubbing, cyanosis, or edema  SKIN: No rashes or lesions    LABS:                        8.6    2.58  )-----------( 48       ( 27 Aug 2024 07:15 )             28.3     08-27    145  |  106  |  24<H>  ----------------------------<  110<H>  3.8   |  27  |  0.55    Ca    8.3<L>      27 Aug 2024 07:15  Phos  2.8     08-27  Mg     2.1     08-27    TPro  6.1  /  Alb  3.0<L>  /  TBili  0.5  /  DBili  x   /  AST  12  /  ALT  11  /  AlkPhos  86  08-26      CAPILLARY BLOOD GLUCOSE      POCT Blood Glucose.: 124 mg/dL (27 Aug 2024 11:22)  POCT Blood Glucose.: 126 mg/dL (27 Aug 2024 06:25)  POCT Blood Glucose.: 169 mg/dL (26 Aug 2024 23:59)  POCT Blood Glucose.: 93 mg/dL (26 Aug 2024 12:02)        Urinalysis Basic - ( 27 Aug 2024 07:15 )    Color: x / Appearance: x / SG: x / pH: x  Gluc: 110 mg/dL / Ketone: x  / Bili: x / Urobili: x   Blood: x / Protein: x / Nitrite: x   Leuk Esterase: x / RBC: x / WBC x   Sq Epi: x / Non Sq Epi: x / Bacteria: x        RADIOLOGY & ADDITIONAL TESTS:    Imaging Personally Reviewed:  [x] YES  [ ] NO    Consultant(s) Notes Reviewed:  [x] YES  [ ] NO      MEDICATIONS  (STANDING):  acetylcysteine 20%  Inhalation 4 milliLiter(s) Inhalation every 6 hours  albuterol/ipratropium for Nebulization 3 milliLiter(s) Nebulizer every 6 hours  buDESOnide    Inhalation Suspension 0.5 milliGRAM(s) Inhalation every 12 hours  cefepime   IVPB 2000 milliGRAM(s) IV Intermittent every 12 hours  guaiFENesin ER 1200 milliGRAM(s) Oral every 12 hours  heparin   Injectable 5000 Unit(s) SubCutaneous every 12 hours  levothyroxine Injectable 70 MICROGram(s) IV Push at bedtime  methylPREDNISolone sodium succinate Injectable 40 milliGRAM(s) IV Push every 8 hours  metoprolol tartrate Injectable 2.5 milliGRAM(s) IV Push every 6 hours  metroNIDAZOLE  IVPB 500 milliGRAM(s) IV Intermittent every 8 hours  pantoprazole  Injectable 40 milliGRAM(s) IV Push daily    MEDICATIONS  (PRN):      Care Discussed with Consultants/Other Providers [x] YES  [ ] NO    HEALTH ISSUES - PROBLEM Dx:  Acute hypoxic respiratory failure    SBO (small bowel obstruction)    Functional quadriplegia    Advance care planning    Palliative care encounter

## 2024-08-26 NOTE — PROGRESS NOTE ADULT - ASSESSMENT
89F with history of paroxysmal atrial fibrillation, hypothyroidism, pulmonary/abdominal tuberculosis, recurrent small bowel obstruction, recurrent UTIs presenting from home with abdominal pain and vomiting for 1 day. Per daughter and HHA at bedside, patient was in her usual state of health until yesterday. Normally able to communicate and ambulate to the bathroom and eat on her own. Does have a HHA for assistance as needed. HHA reports that around 2:30 AM last night, patient called for assistance and was vomiting and asked to call an ambulance. CT scan performed in ED showed evidence of dilated small bowel loops with large stool burden with concern for small bowel obstruction vs enteritis. Also with some imaging findings concerning for multifocal pneumonia. Surgery consulted given history of small bowel obstruction and imaging and NGT placed with NGT to suction draining minimal dark abdominal contents. Additionally with hypoxemic respiratory failure requiring HFNC 40/40. ED course c/b hypotension 70/40s with transient improvement with 3L IVF however with repeat hypotension despite these measures.   GOC discussion had with daughter at bedside. Patient is DNR/DNI and would not want aggressive measures including surgery or central line placement for pressor therapy. Would like a trial of IV pressors via peripheral IV and if these fails, daughter would not want further escalation. Was also seen by palliative care team in ED and was being considered for PCU on fixed dose pressors however no beds available at this time.   Admitted to MICU for septic shock. (23 Aug 2024 15:40)      Multifocal Pneumonia/ Septic shock / Hypoxia  A trial fibrillation  Hypothyroidism   Hx of pulm/Abd tuberculosis  HTN/Hx of orthostatic hypotension    Multifocal Pneumonia/ Septic shock / Hypoxia  -was neutropenic on admission with multifocal pneumonia and septic shock;  -S/P MICU stay recovered now transferred to floor  -she is on broad spectrum antibtiocs:  would cont for now;   -cxr reviewed:  -consider ct chest  non contrast    -she has wheezing and difficulty to expectorate phlegm : add duoneb and Mucomyst   -may need steroids will wait and see for next 24 hours;  if  needed will add  in am       8/26:-change to solumedrol : cont Mucomyst and nebs  -watch for blood pressure   -cont mucomyst  : add mucinex too      A trial fibrillation  -not on any full AC:  -on heparin sq     Hypothyroidism   -levothyroxine    Hx of pulm/Abd tuberculosis  -treated before    HTN/Hx of orthostatic hypotension  -currently on stress doses of steroids and being tapered   8/28" changed to solumedrol :" monitor for orthostasis     dw RCU ACP

## 2024-08-26 NOTE — PROGRESS NOTE ADULT - SUBJECTIVE AND OBJECTIVE BOX
SUBJECTIVE AND OBJECTIVE:  Indication for Geriatrics and Palliative Care Services/INTERVAL HPI: GaP team consulted for complex medical decision making in the setting of serious illness and symptoms management.    OVERNIGHT EVENTS: Levophed was weaned off successfully.   Patient seen and examined this morning, daughter at bedside. Patient awake and oriented x3, complaining productive cough and fatigue.    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 reported allergy (Unknown)  penicillin (Rash)    Intolerances    MEDICATIONS  (STANDING):  acetylcysteine 20%  Inhalation 4 milliLiter(s) Inhalation every 6 hours  albuterol/ipratropium for Nebulization 3 milliLiter(s) Nebulizer every 6 hours  buDESOnide    Inhalation Suspension 0.5 milliGRAM(s) Inhalation every 12 hours  cefepime   IVPB 2000 milliGRAM(s) IV Intermittent every 12 hours  furosemide   Injectable 20 milliGRAM(s) IV Push daily  guaiFENesin ER 1200 milliGRAM(s) Oral every 12 hours  heparin   Injectable 5000 Unit(s) SubCutaneous every 12 hours  levothyroxine Injectable 70 MICROGram(s) IV Push at bedtime  methylPREDNISolone sodium succinate Injectable 40 milliGRAM(s) IV Push every 8 hours  metoprolol tartrate 25 milliGRAM(s) Oral two times a day  metroNIDAZOLE  IVPB 500 milliGRAM(s) IV Intermittent every 8 hours  pantoprazole  Injectable 40 milliGRAM(s) IV Push daily    MEDICATIONS  (PRN):      ITEMS UNCHECKED ARE NOT PRESENT    PRESENT SYMPTOMS: [ ]Unable to self-report - see  CPOT, PAINADS, RDOS below  Source if other than patient:  [ ]Family   [ ]Team     Pain:  [ ]yes [ x]no  QOL impact -   Location -                    Aggravating factors -  Quality -  Radiation -  Timing-  Severity (0-10 scale):  Minimal acceptable level (0-10 scale):     Dyspnea:                           [x ]Mild [ ]Moderate [ ]Severe  Anxiety:                             [ ]Mild [ ]Moderate [ ]Severe  Fatigue:                             [ ]Mild [x ]Moderate [ ]Severe  Nausea:                             [ ]Mild [ ]Moderate [ ]Severe  Loss of appetite:              [ ]Mild [ ]Moderate [ ]Severe  Constipation:                    [ ]Mild [ ]Moderate [ ]Severe    PCSSQ[Palliative Care Spiritual Screening Question]   Severity (0-10):  Score of 4 or > indicate consideration of Chaplaincy referral.  Chaplaincy Referral: [ ] yes [x ] refused [ ] following  Caregiver Innis? : [ ] yes [ ] no  [ x] deferred:  Social work referral [ ] Patient & Family Centered Care Referral [ ]   Anticipatory Grief present?:  [ ] yes [ ] no  [ x] deferred: Social work referral [ ] Patient & Family Centered Care Referral [ ]      Other Symptoms:  [x ]All other review of systems negative     PHYSICAL EXAM:  Vital Signs Last 24 Hrs  T(C): 36.9 (27 Aug 2024 11:00), Max: 37.2 (27 Aug 2024 00:00)  T(F): 98.4 (27 Aug 2024 11:00), Max: 99 (27 Aug 2024 00:00)  HR: 92 (27 Aug 2024 12:30) (90 - 134)  BP: 111/72 (27 Aug 2024 11:00) (97/69 - 127/75)  BP(mean): --  RR: 17 (27 Aug 2024 12:30) (17 - 26)  SpO2: 100% (27 Aug 2024 12:30) (93% - 100%)    Parameters below as of 27 Aug 2024 12:30  Patient On (Oxygen Delivery Method): nasal cannula  O2 Flow (L/min): 6    I&O's Summary    26 Aug 2024 07:01  -  27 Aug 2024 07:00  --------------------------------------------------------  IN: 250 mL / OUT: 1750 mL / NET: -1500 mL    27 Aug 2024 07:01  -  27 Aug 2024 14:17  --------------------------------------------------------  IN: 0 mL / OUT: 500 mL / NET: -500 mL       GENERAL:  [x]Alert  [x]Oriented x3  [ x]Lethargic  [ ]Cachexia  [ ]Unarousable  [x]Verbal  [ ]Non-Verbal  Behavioral:   [ ]Anxiety  [ ]Delirium [ ]Agitation [x ]Other: callm   HEENT: hard of hearing   [x]Normal   [ ]Dry mouth   [ ]ET Tube/Trach  [ ]Oral lesions  PULMONARY:   []Clear [x ]Tachypnea  [ ]Audible excessive secretions   [ ]Rhonchi        [ ]Right [ ]Left [ ]Bilateral  [ ]Crackles        [ ]Right [ ]Left [ ]Bilateral  [ ]Wheezing     [ ]Right [ ]Left [ ]Bilateral  [ ]Diminished BS [ ] Right [ ]Left [ ]Bilateral  CARDIOVASCULAR:    [x]Regular [ ]Irregular [ ]Tachy  [ ]Faisal [ ]Murmur [ ]Other  GASTROINTESTINAL:  [x]Soft  [ ]Distended   [x]+BS  [x]Non tender [ ]Tender  [ ]PEG [ x]OGT/ NGT   Last BM:  ?  GENITOURINARY:  [x]Normal [ ]Incontinent   [ ]Oliguria/Anuria   [ ]Baum  MUSCULOSKELETAL:   [ ]Normal   [x]Weakness  [ ]Bed/Wheelchair bound [ ]Edema  NEUROLOGIC:   [x]No focal deficits  [ ] Cognitive impairment  [ ] Dysphagia [ ]Dysarthria [ ] Paresis [ ]Other   SKIN:   [x]Normal  [ ]Rash   [ ]Pressure ulcer(s) [ ]y [ ]n present on admission    CRITICAL CARE:  [ ] Shock Present  [ ]Septic [ ]Cardiogenic [ ]Neurologic [ ]Hypovolemic  [ ]  Vasopressors [ ]  Inotropes   [x ]Respiratory failure present [ ]Mechanical ventilation [ ]Non-invasive ventilatory support [ ]High flow    [x ]Acute  [ ]Chronic [x ]Hypoxic  [ ]Hypercarbic [ x]Other: non rebreather   [ ]Other organ failure         LABS:                        8.6    2.58  )-----------( 48       ( 27 Aug 2024 07:15 )             28.3   08-27    145  |  106  |  24<H>  ----------------------------<  110<H>  3.8   |  27  |  0.55    Ca    8.3<L>      27 Aug 2024 07:15  Phos  2.8     08-27  Mg     2.1     08-27    TPro  6.1  /  Alb  3.0<L>  /  TBili  0.5  /  DBili  x   /  AST  12  /  ALT  11  /  AlkPhos  86  08-26      Urinalysis Basic - ( 27 Aug 2024 07:15 )    Color: x / Appearance: x / SG: x / pH: x  Gluc: 110 mg/dL / Ketone: x  / Bili: x / Urobili: x   Blood: x / Protein: x / Nitrite: x   Leuk Esterase: x / RBC: x / WBC x   Sq Epi: x / Non Sq Epi: x / Bacteria: x      RADIOLOGY & ADDITIONAL STUDIES:     Xray Chest 1 View- PORTABLE-Urgent (Xray Chest 1 View- PORTABLE-Urgent .) (08.26.24 @ 17:17) >  Findings are concerning for right sided pneumonia with small dependent   bilateral effusions.          Protein Calorie Malnutrition Present: [ ]mild [ ]moderate [ ]severe [ ]underweight [ ]morbid obesity  https://www.andeal.org/vault/2440/web/files/ONC/Table_Clinical%20Characteristics%20to%20Document%20Malnutrition-White%20JV%20et%20al%202012.pdf    Height (cm): 167.6 (08-23-24 @ 16:52), 165.1 (06-06-24 @ 19:21), 165.1 (05-08-24 @ 11:40)  Weight (kg): 58.8 (08-23-24 @ 16:52), 58.5 (06-06-24 @ 19:21), 68 (05-08-24 @ 11:40)  BMI (kg/m2): 20.9 (08-23-24 @ 16:52), 21.5 (06-06-24 @ 19:21), 24.9 (05-08-24 @ 11:40)    [ ]PPSV2 < or = 30%  [ ]significant weight loss [x]poor nutritional intake [ ]anasarca[ ]Artificial Nutrition    Other REFERRALS:  [ ]Hospice  [ ]Child Life  [ ]Social Work  [x]Case management [ ]Holistic Therapy     Palliative Performance Scale:  http://npcrc.org/files/news/palliative_performance_scale_ppsv2.pdf  (Ctrl +  left click to view)  Respiratory Distress Observation Tool:  https://homecareinformation.net/handouts/hen/Respiratory_Distress_Observation_Scale.pdf (Ctrl +  left click to view)  PAINAD Score:  http://geriatrictoolkit.[facility]/cog/painad.pdf (Ctrl +  left click to view)

## 2024-08-26 NOTE — CONSULT NOTE ADULT - ASSESSMENT
Patient is a 89 year old female with PMH of pAfib, hypothyroidism, pulmonary/abdominal tuberculosis, recurrent small bowel obstruction, recurrent UTIs presenting from home with abdominal pain and vomiting for 1 day. CT scan performed in ED showed evidence of dilated small bowel loops with large stool burden with concern for small bowel obstruction vs enteritis; s/p NGT. Course complicated by hypotension likely septic from multifocal pneumonia. Shock resolved, off pressors, transferred out of MICU to floor.   AHRF due to multifocal pneumonia - suspect aspiration  Leukopenia with bandemia on admission  chart reviewed, h/o pancytopenia with progressive leukopenia   COVID/Flu/RSV negative   Bcx NGTD; Ucx negative   h/o PCN allergy, tolerates cephalosporins     Recommendations:   Continue on cefepime and flagyl - will plan for 7d course until 8/29  Pulmonary following, on steroids   Heme/Onc following for pancytopenia   Aspiration precautions, SLP following  Supplemental O2 as needed, wean as tolerated   Monitor temps/CBC  Continue rest of care per primary team       D/w daughter at bedside   Lilli Polanco M.D.  [facility], Division of Infectious Diseases  [phone]  After 5pm on weekdays and all day on weekends - please call [phone]  Available on [employer] TEAMS

## 2024-08-26 NOTE — PROGRESS NOTE ADULT - ASSESSMENT
89F with history of paroxysmal atrial fibrillation, hypothyroidism, pulmonary/abdominal tuberculosis, recurrent small bowel obstruction, recurrent UTIs presenting from home with abdominal pain and vomiting for 1 day with CT scans with evidence of SBO vs enteritis s/p NGT. Course c/b hypotension likely iso septic from multifocal PNA process.    #Septic Shock   #Acute Hypoxemic Respiratory Failure  #Multifocal PNA   #Acute metabolic encephalopathy   - etiology of hypotension likely iso septic shock  - s/p 3L IVF for volume resuscitation iso SBO and IVC indicating volume responsivement  - required levophed for shock state but now weaned off with MAP > 60 maintained  - c/w cefepime and flagyll for empiric antibiotics for PNA and abdominal coverage   - pending blood cultures and urine cultures  - on HFNC 40/40 for hypoxemic respiratory failure likely iso multifocal PNA which may have been iso aspiration event from SBO  - wean HFNC as tolerated     #Small Bowel Obstruction  #History of Abdominal Tuberculosis   #History of Recurrent Small Bowel Obstruction  - s/p NGT to suction with small amounts of brown stomach content   - now on clear liquid diet   - ongoing swallow eval   - stress ulcer prophylaxis with pantoprazole 40 BID iso NPO    #Paroxysmal Atrial Fibrillation   - will need to hold amiodarone 200 qd until seen by speech and swallow   - will need to hold home eliquis 2.5 BID until seen by speech and swallow per critical care   - on heparin subq for DVT ppx    #History of Pulmonary Tuberculosis in 90s  - s/p treatment in 1990s    #Leukopenia  #Neutropenia  - was receiving prednisone OP for low blood pressure  - stress dose hydrocortisone iso hypotension iso ongoing steroid use; switch to solumedrol per pulm     #Hypothyroidism  - c/w levothyroxine 75 mcg IV    Ethics  DNR/DNI; no central lines

## 2024-08-26 NOTE — CHART NOTE - NSCHARTNOTEFT_GEN_A_CORE
Called by RN earlier this afternoon patient Tachycardic EKG performed c/w Rapid A.fib case d/w ; amiodarone 200 mg x 1 given. Later this afternoon called by RN again patient now with Hypoxemia; upon entrance into the room patient saturating 68% with labored breathing and tachycardic HR 120s. Chest PT performed and 100% Non-rebreather placed on patient and deep suctioning performed. Patient with expiratory wheeze on physical exam Pulmicort INH given as well as Lasix 20 mg IVP X 1. STAT CXR Ordered. Patient currently remains on Cefepime and Flagyl. Case d/w  Hypersal dcd and Solucortef dcd and changed to Solumedrol 40 mg q 8hrs. Patient made NPO. Case re-discussed with  will initiate Lopressor 2.5 mg IVP Q 6 HRS and he will call cardiology consult . Patients daughter Connie called and provided with medical update this evening as well.

## 2024-08-26 NOTE — CONSULT NOTE ADULT - SUBJECTIVE AND OBJECTIVE BOX
[facility]/ON[facility]OGY CONSULT NOTE     HPI:  Ms. Slater is a 89y Female with PMHx of     ROS: pertinent positives and negatives as per HPI    Allergies: 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 reported allergy (Unknown)  penicillin (Rash)    PMHx:  Pulmonary TB  Gastritis  Afib  SBO (small bowel obstruction)  Hypothyroid  UTI (urinary tract infection)  HTN (hypertension)  TB (pulmonary tuberculosis)  Orthostatic hypotension  Asthma  Aortic stenosis  Multifocal pneumonia    SurgHx:   Afib  History of Small Bowel Obstruction  History of Bilateral Breast Biopsy  S/P Exploratory Laparotomy  H/O hemorrhoidectomy  S/P vein stripping    FHx:   Family history of essential hypertension    SocHx:     Meds:   MEDICATIONS  (STANDING):  acetylcysteine 20%  Inhalation 4 milliLiter(s) Inhalation every 6 hours  albuterol/ipratropium for Nebulization 3 milliLiter(s) Nebulizer every 6 hours  apixaban 2.5 milliGRAM(s) Oral every 12 hours  cefepime   IVPB 2000 milliGRAM(s) IV Intermittent every 12 hours  hydrocortisone sodium succinate Injectable 50 milliGRAM(s) IV Push every 12 hours  hydrocortisone sodium succinate Injectable   IV Push   levothyroxine Injectable 70 MICROGram(s) IV Push at bedtime  metroNIDAZOLE  IVPB 500 milliGRAM(s) IV Intermittent every 8 hours  pantoprazole  Injectable 40 milliGRAM(s) IV Push daily  sodium chloride 0.9% for Nebulization 3 milliLiter(s) Nebulizer Once  sodium chloride 3%  Inhalation 4 milliLiter(s) Inhalation every 6 hours    MEDICATIONS  (PRN):    Vital Signs  T(C): 37 (08-26-24 @ 11:00), Max: 37 (08-26-24 @ 11:00)  T(F): 98.6 (08-26-24 @ 11:00), Max: 98.6 (08-26-24 @ 11:00)  HR: 78 (08-26-24 @ 11:00) (74 - 98)  BP: 131/69 (08-26-24 @ 11:00) (124/71 - 147/66)  RR: 18 (08-26-24 @ 11:00) (18 - 19)  SpO2: 98% (08-26-24 @ 11:00) (91% - 99%)    Physical Exam:  Gen:   HEENT:   Chest:   Cardiac:  Abd:   Ext:   Neuro:   Integument:     Labs:  CBC Full  -  ( 26 Aug 2024 07:31 )  WBC Count : 3.78 K/uL  RBC Count : 2.80 M/uL  Hemoglobin : 9.2 g/dL  Hematocrit : 30.3 %  Platelet Count - Automated : 50 K/uL  Mean Cell Volume : 108.2 fl  Mean Cell Hemoglobin : 32.9 pg  Mean Cell Hemoglobin Concentration : 30.4 gm/dL  Auto Neutrophil # : 3.25 K/uL  Auto Lymphocyte # : 0.46 K/uL  Auto Monocyte # : 0.06 K/uL  Auto Eosinophil # : 0.00 K/uL  Auto Basophil # : 0.00 K/uL  Auto Neutrophil % : 86.0 %  Auto Lymphocyte % : 12.3 %  Auto Monocyte % : 1.7 %  Auto Eosinophil % : 0.0 %  Auto Basophil % : 0.0 %    08-26    144  |  107  |  22  ----------------------------<  102<H>  3.9   |  24  |  0.58    Ca    8.7      26 Aug 2024 07:32  Phos  2.7     08-26  Mg     2.2     08-26    TPro  6.1  /  Alb  3.0<L>  /  TBili  0.5  /  DBili  x   /  AST  12  /  ALT  11  /  AlkPhos  86  08-26    PT/INR - ( 25 Aug 2024 00:22 )   PT: 15.6 sec;   INR: 1.50 ratio         PTT - ( 25 Aug 2024 00:22 )  PTT:35.5 sec  Bilirubin Total: 0.5 mg/dL (08-26-24 @ 07:32)   [facility]AT[facility]/ONCOLOGY CONSULT NOTE     HPI:  Ms. Slater is a 89y Female with PMHx of asthma, HTN, essential tremor, bronchiectasis, GERD, hypothyroidism, paroxysmal A.fib on Eliquis 2.5mg BID, Vit D deficiency, tuberculous peritonitis, anemia moderate AS, hx of falls, aortic regurgitation presented with nausea and vomiting 08/23/2024 and subsequently admitted to MICU with SBO and transferred to floor on 08/25/2024, treated conservatively. Pt has hx of pancytopenia.     Pt has a history of pancytopenia as outpatient with Leukopenia with since about 3y prior initially lymphopenia and then with neutropenia, progressive, anemia over the past year gradually worsening, with progressive macrocytosis, and thrombocytopenia over the past two years. Pt requested conservative treatment and denied BmBx a few times in the past. Discussed with pt again this admission and she stated she would not like to undergo aggressive interventions and she would not like to have a bone marrow biopsy, and she prefers conservative treatment otherwise.     She denied history of blood disorders, bleeding complications, or personal history of malignancy. She denied smoking, ETOH use. She lives in her apt with a 24hr aide and her daughter visits regularly.       ROS: pertinent positives and negatives as per HPI    Allergies: 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 reported allergy (Unknown)  penicillin (Rash)    PMHx:  Pulmonary TB  Gastritis  Afib  SBO (small bowel obstruction)  Hypothyroid  UTI (urinary tract infection)  HTN (hypertension)  TB (pulmonary tuberculosis)  Orthostatic hypotension  Asthma  Aortic stenosis  Multifocal pneumonia    SurgHx:   Afib  History of Small Bowel Obstruction  History of Bilateral Breast Biopsy  S/P Exploratory Laparotomy  H/O hemorrhoidectomy  S/P vein stripping    FHx:   Family history of essential hypertension    SocHx:     Meds:   MEDICATIONS  (STANDING):  acetylcysteine 20%  Inhalation 4 milliLiter(s) Inhalation every 6 hours  albuterol/ipratropium for Nebulization 3 milliLiter(s) Nebulizer every 6 hours  apixaban 2.5 milliGRAM(s) Oral every 12 hours  cefepime   IVPB 2000 milliGRAM(s) IV Intermittent every 12 hours  hydrocortisone sodium succinate Injectable 50 milliGRAM(s) IV Push every 12 hours  hydrocortisone sodium succinate Injectable   IV Push   levothyroxine Injectable 70 MICROGram(s) IV Push at bedtime  metroNIDAZOLE  IVPB 500 milliGRAM(s) IV Intermittent every 8 hours  pantoprazole  Injectable 40 milliGRAM(s) IV Push daily  sodium chloride 0.9% for Nebulization 3 milliLiter(s) Nebulizer Once  sodium chloride 3%  Inhalation 4 milliLiter(s) Inhalation every 6 hours    MEDICATIONS  (PRN):    Vital Signs  T(C): 37 (08-26-24 @ 11:00), Max: 37 (08-26-24 @ 11:00)  T(F): 98.6 (08-26-24 @ 11:00), Max: 98.6 (08-26-24 @ 11:00)  HR: 78 (08-26-24 @ 11:00) (74 - 98)  BP: 131/69 (08-26-24 @ 11:00) (124/71 - 147/66)  RR: 18 (08-26-24 @ 11:00) (18 - 19)  SpO2: 98% (08-26-24 @ 11:00) (91% - 99%)    Physical Exam:  Gen:  NAD, lethargic  HEENT: EOMI, MMM  Chest: equal chest rise, speaking full sentences  Cardiac: irregular  Abd: soft, non-tender, no hepatomegaly or splenomegaly  Ext: No edema   Neuro: AAOx3, normal mood and affect, hard of hearing     Labs:                        9.2    3.78  )-----------( 50       ( 26 Aug 2024 07:31 )             30.3     CBC Full  -  ( 26 Aug 2024 07:31 )  WBC Count : 3.78 K/uL  RBC Count : 2.80 M/uL  Hemoglobin : 9.2 g/dL  Hematocrit : 30.3 %  Platelet Count - Automated : 50 K/uL  Mean Cell Volume : 108.2 fl  Mean Cell Hemoglobin : 32.9 pg  Mean Cell Hemoglobin Concentration : 30.4 gm/dL  Auto Neutrophil # : 3.25 K/uL  Auto Lymphocyte # : 0.46 K/uL  Auto Monocyte # : 0.06 K/uL  Auto Eosinophil # : 0.00 K/uL  Auto Basophil # : 0.00 K/uL  Auto Neutrophil % : 86.0 %  Auto Lymphocyte % : 12.3 %  Auto Monocyte % : 1.7 %  Auto Eosinophil % : 0.0 %  Auto Basophil % : 0.0 %    08-26    144  |  107  |  22  ----------------------------<  102<H>  3.9   |  24  |  0.58    Ca    8.7      26 Aug 2024 07:32  Phos  2.7     08-26  Mg     2.2     08-26    TPro  6.1  /  Alb  3.0<L>  /  TBili  0.5  /  DBili  x   /  AST  12  /  ALT  11  /  AlkPhos  86  08-26    PT/INR - ( 25 Aug 2024 00:22 )   PT: 15.6 sec;   INR: 1.50 ratio         PTT - ( 25 Aug 2024 00:22 )  PTT:35.5 sec  Bilirubin Total: 0.5 mg/dL (08-26-24 @ 07:32)

## 2024-08-26 NOTE — CONSULT NOTE ADULT - ASSESSMENT
Ms. Slater is a 89y Female with PMHx of asthma, HTN, essential tremor, bronchiectasis, GERD, hypothyroidism, paroxysmal A.fib on Eliquis 2.5mg BID, Vit D deficiency, tuberculous peritonitis, anemia moderate AS, hx of falls, aortic regurgitation presented with nausea and vomiting 08/23/2024 and subsequently admitted to MICU with SBO and transferred to floor on 08/25/2024, treated conservatively complicated by septic shock requiring pressor support. Pt has hx of pancytopenia.     Pt has a history of pancytopenia as outpatient with Leukopenia with since about 3y prior initially lymphopenia and then with neutropenia, progressive, anemia over the past year gradually worsening, with progressive macrocytosis, and thrombocytopenia over the past two years. Pt requested conservative treatment and denied BmBx a few times in the past. Discussed with pt again this admission and she stated she would not like to undergo aggressive interventions and she would not like to have a bone marrow biopsy, and she prefers conservative treatment otherwise.     She denied history of blood disorders, bleeding complications, or personal history of malignancy. She denied smoking, ETOH use. She lives in her apt with a 24hr aide and her daughter visits regularly.       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   - Outpatient labs show gradual progressive leukopenia x 3y, initial lymphopenia now both lymphopenia and neutropenia   -- Most recent outpatient WBC ranging 1.9-4.6   - Thrombocytopenia outpatient ranging 85-100s x 2y  - Anemia ranging 9.5-11 over past year, currently close to this  - MCV with progressive macrocytosis over past year  - ANC 3250, s/p steroids  - Iron sat 9%, Folate 15.6, B12 179  - Previous paraproteinemia workup negative for obvious plasma cell dyscrasia SPEP/MOOKIE normal, Serum immunoglobulins normal, serum FLCs (ratio 1.44), LDH (normal), Beta-2 Mcg (mild elv 2.6),   - Transfuse PRBC to maintain Hb > 7   - Transfuse Plt to maintain Plt > 10k  - Trend CBC daily with Diff for now   - Start B12 1000mcg IM weekly  - Again reviewed and discussed pancytopenia with the pt she is aware of its gradual, progressive worsening over past three years, discussed with pt again this admission and she stated she would not like to undergo aggressive interventions and she would declined bone marrow biopsy  - GOC noted   - Needs to follow up as outpatient     # Small bowel obstruction  - CT scan performed in ED showed evidence of dilated small bowel loops with large stool burden with concern for small bowel obstruction vs enteritis  - S/P MICU admission with NGT decompression  - Transferred to floor 08/25/2024     # Aspiration Pneumonia  -  CT A/P lower chest with with multifocal PNA  -  Pulmonology following, recs noted  -  Resolved septic shock while in MICU  -  CT Chest   - Antibiotics per ID and primary team    #A.fib  - Was previously on Eliquis 2.5mg per cardiology  - Plt 50k  - On Hep SubQ    # Hypothyroidism  - Continue levothyroxine         Thank you for allowing me to participate in the care of Ms. Slater, please do not hesitate to call or text me if you have further questions or concerns.       Roni Polanco MD  [facility]   Division of Hematology/Oncology  [facility] [facility], [address]  [place] [address]  P: [phone]  F: [phone]    Attestation:   Total time spent on the encounter: >60 minutes   ----Including face-to-face interaction in addition to chart review, reviewing treatment plan, and managing the patient’s chronic diagnoses as listed in the assessment----     1.	I have reviewed, analyzed and interpreted the following labs: CBC, CMP, imaging:  CT C/A/P and Head impressions as above.   2.	I have reviewed notes stating pts current admission, consultants and follow ups.   3.	I have reviewed the past medical, family, and surgical history and confirmed with outpatient records  Ms. Slater is a 89y Female with PMHx of asthma, HTN, essential tremor, bronchiectasis, GERD, hypothyroidism, paroxysmal A.fib on Eliquis 2.5mg BID, Vit D deficiency, tuberculous peritonitis, anemia moderate AS, hx of falls, aortic regurgitation presented with nausea and vomiting 08/23/2024 and subsequently admitted to MICU with SBO and transferred to floor on 08/25/2024, treated conservatively complicated by septic shock requiring pressor support. Pt has hx of pancytopenia.     Pt has a history of pancytopenia as outpatient with Leukopenia with since about 3y prior initially lymphopenia and then with neutropenia, progressive, anemia over the past year gradually worsening, with progressive macrocytosis, and thrombocytopenia over the past two years. Pt requested conservative treatment and denied BmBx a few times in the past. Discussed with pt again this admission and she stated she would not like to undergo aggressive interventions and she would not like to have a bone marrow biopsy, and she prefers conservative treatment otherwise.     She denied history of blood disorders, bleeding complications, or personal history of malignancy. She denied smoking, ETOH use. She lives in her apt with a 24hr aide and her daughter visits regularly.       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   - Outpatient labs show gradual progressive leukopenia x 3y, initial lymphopenia now both lymphopenia and neutropenia   -- Most recent outpatient WBC ranging 1.9-4.6   - Thrombocytopenia outpatient ranging 85-100s x 2y  - Anemia ranging 9.5-11 over past year, currently close to this  - MCV with progressive macrocytosis over past year  - ANC 3250, s/p steroids  - Iron sat 9%, Folate 15.6, B12 179  - Previous paraproteinemia workup negative for obvious plasma cell dyscrasia SPEP/MOOKIE normal, Serum immunoglobulins normal, serum FLCs (ratio 1.44), LDH (normal), Beta-2 Mcg (mild elv 2.6),   - Transfuse PRBC to maintain Hb > 7   - Transfuse Plt to maintain Plt > 10k  - Trend CBC daily with Diff for now   - Start B12 1000mcg IM weekly  - Again reviewed and discussed pancytopenia with the pt she is aware of its gradual, progressive worsening over past three years, discussed with pt again this admission and she stated she would not like to undergo aggressive interventions and she would declined bone marrow biopsy  - GOC noted   - Needs to follow up as outpatient     # Small bowel obstruction  - CT scan performed in ED showed evidence of dilated small bowel loops with large stool burden with concern for small bowel obstruction vs enteritis  - S/P MICU admission with NGT decompression  - Transferred to floor 08/25/2024     # Aspiration Pneumonia  -  CT A/P lower chest with with multifocal PNA  -  Pulmonology following, recs noted  -  Resolved septic shock while in MICU  -  CT Chest   - Antibiotics per ID and primary team    #A.fib  - Was previously on Eliquis 2.5mg per cardiology  - Plt 50k  - Recommend to discuss with cardiology Eliquis given Plt <75k  - Can proceed with Hep SubQ BID     # Hypothyroidism  - Continue levothyroxine         Thank you for allowing me to participate in the care of Ms. Slater, please do not hesitate to call or text me if you have further questions or concerns.       Roni Polanco MD  [facility]   Division of Hematology/Oncology  [address] [facility] [facility], [address]  [place] [address]  P: [phone]  F: [phone]    Attestation:   Total time spent on the encounter: >60 minutes   ----Including face-to-face interaction in addition to chart review, reviewing treatment plan, and managing the patient’s chronic diagnoses as listed in the assessment----     1.	I have reviewed, analyzed and interpreted the following labs: CBC, CMP, imaging:  CT C/A/P and Head impressions as above.   2.	I have reviewed notes stating pts current admission, consultants and follow ups.   3.	I have reviewed the past medical, family, and surgical history and confirmed with outpatient records

## 2024-08-27 ENCOUNTER — RESULT REVIEW (OUTPATIENT)
Age: 89
End: 2024-08-27

## 2024-08-27 DIAGNOSIS — A41.9 SEPSIS, UNSPECIFIED ORGANISM: ICD-10-CM

## 2024-08-27 LAB
ANION GAP SERPL CALC-SCNC: 12 MMOL/L — SIGNIFICANT CHANGE UP (ref 5–17)
BUN SERPL-MCNC: 24 MG/DL — HIGH (ref 7–23)
CALCIUM SERPL-MCNC: 8.3 MG/DL — LOW (ref 8.4–10.5)
CHLORIDE SERPL-SCNC: 106 MMOL/L — SIGNIFICANT CHANGE UP (ref 96–108)
CO2 SERPL-SCNC: 27 MMOL/L — SIGNIFICANT CHANGE UP (ref 22–31)
CREAT SERPL-MCNC: 0.55 MG/DL — SIGNIFICANT CHANGE UP (ref 0.5–1.3)
EGFR: 82 ML/MIN/1.73M2 — SIGNIFICANT CHANGE UP
GLUCOSE BLDC GLUCOMTR-MCNC: 124 MG/DL — HIGH (ref 70–99)
GLUCOSE BLDC GLUCOMTR-MCNC: 126 MG/DL — HIGH (ref 70–99)
GLUCOSE BLDC GLUCOMTR-MCNC: 131 MG/DL — HIGH (ref 70–99)
GLUCOSE BLDC GLUCOMTR-MCNC: 135 MG/DL — HIGH (ref 70–99)
GLUCOSE BLDC GLUCOMTR-MCNC: 169 MG/DL — HIGH (ref 70–99)
GLUCOSE SERPL-MCNC: 110 MG/DL — HIGH (ref 70–99)
HCT VFR BLD CALC: 28.3 % — LOW (ref 34.5–45)
HGB BLD-MCNC: 8.6 G/DL — LOW (ref 11.5–15.5)
MAGNESIUM SERPL-MCNC: 2.1 MG/DL — SIGNIFICANT CHANGE UP (ref 1.6–2.6)
MCHC RBC-ENTMCNC: 30.4 GM/DL — LOW (ref 32–36)
MCHC RBC-ENTMCNC: 33.1 PG — SIGNIFICANT CHANGE UP (ref 27–34)
MCV RBC AUTO: 108.8 FL — HIGH (ref 80–100)
NRBC # BLD: 0 /100 WBCS — SIGNIFICANT CHANGE UP (ref 0–0)
PHOSPHATE SERPL-MCNC: 2.8 MG/DL — SIGNIFICANT CHANGE UP (ref 2.5–4.5)
PLATELET # BLD AUTO: 48 K/UL — LOW (ref 150–400)
POTASSIUM SERPL-MCNC: 3.8 MMOL/L — SIGNIFICANT CHANGE UP (ref 3.5–5.3)
POTASSIUM SERPL-SCNC: 3.8 MMOL/L — SIGNIFICANT CHANGE UP (ref 3.5–5.3)
RBC # BLD: 2.6 M/UL — LOW (ref 3.8–5.2)
RBC # FLD: 15.6 % — HIGH (ref 10.3–14.5)
SODIUM SERPL-SCNC: 145 MMOL/L — SIGNIFICANT CHANGE UP (ref 135–145)
WBC # BLD: 2.58 K/UL — LOW (ref 3.8–10.5)
WBC # FLD AUTO: 2.58 K/UL — LOW (ref 3.8–10.5)

## 2024-08-27 PROCEDURE — 93306 TTE W/DOPPLER COMPLETE: CPT | Mod: 26

## 2024-08-27 PROCEDURE — 99233 SBSQ HOSP IP/OBS HIGH 50: CPT

## 2024-08-27 RX ORDER — FUROSEMIDE 40 MG
20 TABLET ORAL DAILY
Refills: 0 | Status: DISCONTINUED | OUTPATIENT
Start: 2024-08-27 | End: 2024-08-30

## 2024-08-27 RX ORDER — METOPROLOL TARTRATE 100 MG/1
25 TABLET ORAL
Refills: 0 | Status: DISCONTINUED | OUTPATIENT
Start: 2024-08-27 | End: 2024-09-05

## 2024-08-27 RX ADMIN — METOPROLOL TARTRATE 2.5 MILLIGRAM(S): 100 TABLET ORAL at 00:34

## 2024-08-27 RX ADMIN — IPRATROPIUM BROMIDE AND ALBUTEROL SULFATE 3 MILLILITER(S): .5; 3 SOLUTION RESPIRATORY (INHALATION) at 11:23

## 2024-08-27 RX ADMIN — CEFEPIME 100 MILLIGRAM(S): 2 INJECTION, POWDER, FOR SOLUTION INTRAVENOUS at 06:54

## 2024-08-27 RX ADMIN — Medication 5000 UNIT(S): at 17:33

## 2024-08-27 RX ADMIN — Medication 40 MILLIGRAM(S): at 22:17

## 2024-08-27 RX ADMIN — Medication 100 MILLIGRAM(S): at 06:54

## 2024-08-27 RX ADMIN — Medication 40 MILLIGRAM(S): at 12:25

## 2024-08-27 RX ADMIN — Medication 70 MICROGRAM(S): at 23:04

## 2024-08-27 RX ADMIN — METOPROLOL TARTRATE 2.5 MILLIGRAM(S): 100 TABLET ORAL at 12:27

## 2024-08-27 RX ADMIN — IPRATROPIUM BROMIDE AND ALBUTEROL SULFATE 3 MILLILITER(S): .5; 3 SOLUTION RESPIRATORY (INHALATION) at 06:12

## 2024-08-27 RX ADMIN — Medication 5000 UNIT(S): at 06:54

## 2024-08-27 RX ADMIN — Medication 40 MILLIGRAM(S): at 06:54

## 2024-08-27 RX ADMIN — GUAIFENESIN 1200 MILLIGRAM(S): 100 LIQUID ORAL at 17:33

## 2024-08-27 RX ADMIN — Medication 20 MILLIGRAM(S): at 12:25

## 2024-08-27 RX ADMIN — CEFEPIME 100 MILLIGRAM(S): 2 INJECTION, POWDER, FOR SOLUTION INTRAVENOUS at 17:34

## 2024-08-27 RX ADMIN — METOPROLOL TARTRATE 25 MILLIGRAM(S): 100 TABLET ORAL at 17:33

## 2024-08-27 RX ADMIN — Medication 4 MILLILITER(S): at 06:12

## 2024-08-27 RX ADMIN — Medication 4 MILLILITER(S): at 11:42

## 2024-08-27 RX ADMIN — Medication 4 MILLILITER(S): at 23:04

## 2024-08-27 RX ADMIN — Medication 100 MILLIGRAM(S): at 13:32

## 2024-08-27 RX ADMIN — Medication 40 MILLIGRAM(S): at 13:32

## 2024-08-27 RX ADMIN — IPRATROPIUM BROMIDE AND ALBUTEROL SULFATE 3 MILLILITER(S): .5; 3 SOLUTION RESPIRATORY (INHALATION) at 23:04

## 2024-08-27 RX ADMIN — Medication 4 MILLILITER(S): at 17:33

## 2024-08-27 RX ADMIN — Medication 100 MILLIGRAM(S): at 23:04

## 2024-08-27 RX ADMIN — IPRATROPIUM BROMIDE AND ALBUTEROL SULFATE 3 MILLILITER(S): .5; 3 SOLUTION RESPIRATORY (INHALATION) at 17:34

## 2024-08-27 RX ADMIN — BUDESONIDE 0.5 MILLIGRAM(S): 3 CAPSULE ORAL at 17:33

## 2024-08-27 RX ADMIN — BUDESONIDE 0.5 MILLIGRAM(S): 3 CAPSULE ORAL at 06:13

## 2024-08-27 NOTE — PHYSICAL THERAPY INITIAL EVALUATION ADULT - FUNCTIONAL LIMITATIONS, PT EVAL
Detail Level: Generalized Detail Level: Simple self-care/home management self-care/home management/community/leisure

## 2024-08-27 NOTE — DIETITIAN INITIAL EVALUATION ADULT - PERTINENT MEDS FT
MEDICATIONS  (STANDING):  acetylcysteine 20%  Inhalation 4 milliLiter(s) Inhalation every 6 hours  albuterol/ipratropium for Nebulization 3 milliLiter(s) Nebulizer every 6 hours  buDESOnide    Inhalation Suspension 0.5 milliGRAM(s) Inhalation every 12 hours  cefepime   IVPB 2000 milliGRAM(s) IV Intermittent every 12 hours  furosemide   Injectable 20 milliGRAM(s) IV Push daily  guaiFENesin ER 1200 milliGRAM(s) Oral every 12 hours  heparin   Injectable 5000 Unit(s) SubCutaneous every 12 hours  levothyroxine Injectable 70 MICROGram(s) IV Push at bedtime  methylPREDNISolone sodium succinate Injectable 40 milliGRAM(s) IV Push every 8 hours  metoprolol tartrate Injectable 2.5 milliGRAM(s) IV Push every 6 hours  metroNIDAZOLE  IVPB 500 milliGRAM(s) IV Intermittent every 8 hours  pantoprazole  Injectable 40 milliGRAM(s) IV Push daily    MEDICATIONS  (PRN):

## 2024-08-27 NOTE — PROGRESS NOTE ADULT - ASSESSMENT
89F with history of paroxysmal atrial fibrillation, hypothyroidism, pulmonary/abdominal tuberculosis, recurrent small bowel obstruction, recurrent UTIs presenting from home with abdominal pain and vomiting for 1 day. Per daughter and HHA at bedside, patient was in her usual state of health until yesterday. Normally able to communicate and ambulate to the bathroom and eat on her own. Does have a HHA for assistance as needed. HHA reports that around 2:30 AM last night, patient called for assistance and was vomiting and asked to call an ambulance. CT scan performed in ED showed evidence of dilated small bowel loops with large stool burden with concern for small bowel obstruction vs enteritis. Also with some imaging findings concerning for multifocal pneumonia. Surgery consulted given history of small bowel obstruction and imaging and NGT placed with NGT to suction draining minimal dark abdominal contents. Additionally with hypoxemic respiratory failure requiring HFNC 40/40. ED course c/b hypotension 70/40s with transient improvement with 3L IVF however with repeat hypotension despite these measures.   GOC discussion had with daughter at bedside. Patient is DNR/DNI and would not want aggressive measures including surgery or central line placement for pressor therapy. Would like a trial of IV pressors via peripheral IV and if these fails, daughter would not want further escalation. Was also seen by palliative care team in ED and was being considered for PCU on fixed dose pressors however no beds available at this time.   Admitted to MICU for septic shock. (23 Aug 2024 15:40)      Multifocal Pneumonia/ Septic shock / Hypoxia  A trial fibrillation  Hypothyroidism   Hx of pulm/Abd tuberculosis  HTN/Hx of orthostatic hypotension    Multifocal Pneumonia/ Septic shock / Hypoxia  -was neutropenic on admission with multifocal pneumonia and septic shock;  -S/P MICU stay recovered now transferred to floor  -she is on broad spectrum antibtiocs:  would cont for now;   -cxr reviewed:  -consider ct chest  non contrast    -she has wheezing and difficulty to expectorate phlegm : add duoneb and Mucomyst   -may need steroids will wait and see for next 24 hours;  if  needed will add  in am       8/26:-change to solumedrol : cont Mucomyst and nebs  -watch for blood pressure   -cont mucomyst  : add mucinex too    8/27: still with some cough : has coarse breath sounds ; still on steroids increased to q 8 yesterday ;      A trial fibrillation  -not on any full AC:  -on heparin sq   8/27: now he has RVR:  fill ac per prim svitlana team:  being transferred to monitored unit     Hypothyroidism   -levothyroxine    Hx of pulm/Abd tuberculosis  -treated before    HTN/Hx of orthostatic hypotension  -currently on stress doses of steroids and being tapered   8/27" changed to solumedrol :" monitor for orthostasis     dw RCU ACP

## 2024-08-27 NOTE — PHYSICAL THERAPY INITIAL EVALUATION ADULT - TRANSFER TRAINING, PT EVAL
GOAL: Patient will perform sit to stand transfers with modA with least restrictive assistive device in 2 weeks

## 2024-08-27 NOTE — PROGRESS NOTE ADULT - ASSESSMENT
89F with history of paroxysmal atrial fibrillation, hypothyroidism, pulmonary/abdominal tuberculosis, recurrent small bowel obstruction, recurrent UTIs presenting from home with abdominal pain and vomiting for 1 day with CT scans with evidence of SBO vs enteritis s/p NGT. Course c/b hypotension likely iso septic from multifocal PNA process.    #Septic Shock   #Acute Hypoxemic Respiratory Failure  #Multifocal PNA   #Acute metabolic encephalopathy   - etiology of hypotension likely iso septic shock  - s/p 3L IVF for volume resuscitation iso SBO and IVC indicating volume responsive  - required levophed for shock state but now weaned off with MAP > 60 maintained  - c/w cefepime and flagyl for empiric antibiotics for PNA and abdominal coverage   - pending blood cultures and urine cultures  - on HFNC 40/40 for hypoxemic respiratory failure likely iso multifocal PNA which may have been iso aspiration event from SBO  - wean HFNC as tolerated     #Paroxysmal Atrial Fibrillation  #Acute hfpef    - now with rapid afib   - metoprolol 2.5 i vq 6 hours   - eliquis on hold given possible dysphagia   - Strict I & O, Daily weights   - Start Lasix 20 mg IV daily   - Last echo with presered lvef , rpt echo  - F/u cardiology recommendations  - on heparin subq for DVT ppx    #Small Bowel Obstruction  #History of Abdominal Tuberculosis   #History of Recurrent Small Bowel Obstruction  - s/p NGT to suction with small amounts of brown stomach content   - now on clear liquid diet   - ongoing swallow eval   - stress ulcer prophylaxis with pantoprazole 40 BID iso NPO    #History of Pulmonary Tuberculosis in 90s  - s/p treatment in 1990s    #Leukopenia  #Neutropenia  - was receiving prednisone OP for low blood pressure  - stress dose hydrocortisone iso hypotension iso ongoing steroid use; switch to solumedrol per pulm     #Hypothyroidism  - c/w levothyroxine 75 mcg IV    Ethics  DNR/DNI; no central lines

## 2024-08-27 NOTE — CONSULT NOTE ADULT - CONSULT REASON
septic shock
PAF
SBO
pneumonia
Pancytopenia
GaP team consulted for complex medical decision making in the setting of serious illness and symptoms management.

## 2024-08-27 NOTE — PROGRESS NOTE ADULT - SUBJECTIVE AND OBJECTIVE BOX
[facility] HEMATOLOGY/ONCOLOGY INPATIENT PROGRESS NOTE     Interval Hx:   08-27-24: Ms. Slater was seen at bedside today.    Meds:   MEDICATIONS  (STANDING):  acetylcysteine 20%  Inhalation 4 milliLiter(s) Inhalation every 6 hours  albuterol/ipratropium for Nebulization 3 milliLiter(s) Nebulizer every 6 hours  buDESOnide    Inhalation Suspension 0.5 milliGRAM(s) Inhalation every 12 hours  cefepime   IVPB 2000 milliGRAM(s) IV Intermittent every 12 hours  guaiFENesin ER 1200 milliGRAM(s) Oral every 12 hours  heparin   Injectable 5000 Unit(s) SubCutaneous every 12 hours  levothyroxine Injectable 70 MICROGram(s) IV Push at bedtime  methylPREDNISolone sodium succinate Injectable 40 milliGRAM(s) IV Push every 8 hours  metoprolol tartrate Injectable 2.5 milliGRAM(s) IV Push every 6 hours  metroNIDAZOLE  IVPB 500 milliGRAM(s) IV Intermittent every 8 hours  pantoprazole  Injectable 40 milliGRAM(s) IV Push daily    MEDICATIONS  (PRN):    Vital Signs Last 24 Hrs  T(C): 37.2 (27 Aug 2024 00:00), Max: 37.2 (27 Aug 2024 00:00)  T(F): 99 (27 Aug 2024 00:00), Max: 99 (27 Aug 2024 00:00)  HR: 111 (27 Aug 2024 00:15) (73 - 134)  BP: 122/85 (27 Aug 2024 00:00) (122/85 - 147/66)  BP(mean): --  RR: 18 (27 Aug 2024 00:00) (18 - 26)  SpO2: 97% (27 Aug 2024 00:15) (93% - 99%)    Parameters below as of 27 Aug 2024 00:15  Patient On (Oxygen Delivery Method): mask, nonrebreather    Physical Exam:  Gen:  NAD, lethargic  HEENT: EOMI, MMM  Chest: equal chest rise, speaking full sentences  Cardiac: irregular  Abd: soft, non-tender, no hepatomegaly or splenomegaly  Ext: No edema   Neuro: AAOx3, normal mood and affect, hard of hearing     Labs:                        9.2    3.78  )-----------( 50       ( 26 Aug 2024 07:31 )             30.3     CBC Full  -  ( 26 Aug 2024 07:31 )  WBC Count : 3.78 K/uL  RBC Count : 2.80 M/uL  Hemoglobin : 9.2 g/dL  Hematocrit : 30.3 %  Platelet Count - Automated : 50 K/uL  Mean Cell Volume : 108.2 fl  Mean Cell Hemoglobin : 32.9 pg  Mean Cell Hemoglobin Concentration : 30.4 gm/dL  Auto Neutrophil # : 3.25 K/uL  Auto Lymphocyte # : 0.46 K/uL  Auto Monocyte # : 0.06 K/uL  Auto Eosinophil # : 0.00 K/uL  Auto Basophil # : 0.00 K/uL  Auto Neutrophil % : 86.0 %  Auto Lymphocyte % : 12.3 %  Auto Monocyte % : 1.7 %  Auto Eosinophil % : 0.0 %  Auto Basophil % : 0.0 %    08-26    144  |  107  |  22  ----------------------------<  102<H>  3.9   |  24  |  0.58    Ca    8.7      26 Aug 2024 07:32  Phos  2.7     08-26  Mg     2.2     08-26    TPro  6.1  /  Alb  3.0<L>  /  TBili  0.5  /  DBili  x   /  AST  12  /  ALT  11  /  AlkPhos  86  08-26      Bilirubin Total: 0.5 mg/dL (08-26-24 @ 07:32)   [facility] HEMATOLOGY/ONCOLOGY INPATIENT PROGRESS NOTE     Interval Hx:   08-27-24: Ms. Slater was seen at bedside today, on NRB, noted ongoing tachycardia overnight, cardiology consult pending, no signs of bleeding at this time, thrombocytopenia noted on Hep SubQ for PPx     Meds:   MEDICATIONS  (STANDING):  acetylcysteine 20%  Inhalation 4 milliLiter(s) Inhalation every 6 hours  albuterol/ipratropium for Nebulization 3 milliLiter(s) Nebulizer every 6 hours  buDESOnide    Inhalation Suspension 0.5 milliGRAM(s) Inhalation every 12 hours  cefepime   IVPB 2000 milliGRAM(s) IV Intermittent every 12 hours  guaiFENesin ER 1200 milliGRAM(s) Oral every 12 hours  heparin   Injectable 5000 Unit(s) SubCutaneous every 12 hours  levothyroxine Injectable 70 MICROGram(s) IV Push at bedtime  methylPREDNISolone sodium succinate Injectable 40 milliGRAM(s) IV Push every 8 hours  metoprolol tartrate Injectable 2.5 milliGRAM(s) IV Push every 6 hours  metroNIDAZOLE  IVPB 500 milliGRAM(s) IV Intermittent every 8 hours  pantoprazole  Injectable 40 milliGRAM(s) IV Push daily    MEDICATIONS  (PRN):    Vital Signs Last 24 Hrs  T(C): 37.2 (27 Aug 2024 00:00), Max: 37.2 (27 Aug 2024 00:00)  T(F): 99 (27 Aug 2024 00:00), Max: 99 (27 Aug 2024 00:00)  HR: 111 (27 Aug 2024 00:15) (73 - 134)  BP: 122/85 (27 Aug 2024 00:00) (122/85 - 147/66)  BP(mean): --  RR: 18 (27 Aug 2024 00:00) (18 - 26)  SpO2: 97% (27 Aug 2024 00:15) (93% - 99%)    Parameters below as of 27 Aug 2024 00:15  Patient On (Oxygen Delivery Method): mask, nonrebreather    Physical Exam:  Gen:  NAD, lethargic  HEENT: EOMI, MMM  Chest: equal chest rise, speaking full sentences  Cardiac: irregular  Abd: soft, non-tender, no hepatomegaly or splenomegaly  Ext: No edema   Neuro: AAOx3, normal mood and affect, hard of hearing     Labs:                        9.2    3.78  )-----------( 50       ( 26 Aug 2024 07:31 )             30.3     CBC Full  -  ( 26 Aug 2024 07:31 )  WBC Count : 3.78 K/uL  RBC Count : 2.80 M/uL  Hemoglobin : 9.2 g/dL  Hematocrit : 30.3 %  Platelet Count - Automated : 50 K/uL  Mean Cell Volume : 108.2 fl  Mean Cell Hemoglobin : 32.9 pg  Mean Cell Hemoglobin Concentration : 30.4 gm/dL  Auto Neutrophil # : 3.25 K/uL  Auto Lymphocyte # : 0.46 K/uL  Auto Monocyte # : 0.06 K/uL  Auto Eosinophil # : 0.00 K/uL  Auto Basophil # : 0.00 K/uL  Auto Neutrophil % : 86.0 %  Auto Lymphocyte % : 12.3 %  Auto Monocyte % : 1.7 %  Auto Eosinophil % : 0.0 %  Auto Basophil % : 0.0 %    08-26    144  |  107  |  22  ----------------------------<  102<H>  3.9   |  24  |  0.58    Ca    8.7      26 Aug 2024 07:32  Phos  2.7     08-26  Mg     2.2     08-26    TPro  6.1  /  Alb  3.0<L>  /  TBili  0.5  /  DBili  x   /  AST  12  /  ALT  11  /  AlkPhos  86  08-26      Bilirubin Total: 0.5 mg/dL (08-26-24 @ 07:32)

## 2024-08-27 NOTE — PROGRESS NOTE ADULT - SUBJECTIVE AND OBJECTIVE BOX
Patient is a 89y old  Female who presents with a chief complaint of Acute respiratory failure with hypoxia     (27 Aug 2024 13:35)      SUBJECTIVE / OVERNIGHT EVENTS:  Patient seen and examined.   Doing ok.       Vital Signs Last 24 Hrs  T(C): 36.9 (27 Aug 2024 11:00), Max: 37.2 (27 Aug 2024 00:00)  T(F): 98.4 (27 Aug 2024 11:00), Max: 99 (27 Aug 2024 00:00)  HR: 92 (27 Aug 2024 12:30) (90 - 134)  BP: 111/72 (27 Aug 2024 11:00) (97/69 - 127/75)  BP(mean): --  RR: 17 (27 Aug 2024 12:30) (17 - 26)  SpO2: 100% (27 Aug 2024 12:30) (93% - 100%)    Parameters below as of 27 Aug 2024 12:30  Patient On (Oxygen Delivery Method): nasal cannula  O2 Flow (L/min): 6    I&O's Summary    26 Aug 2024 07:01  -  27 Aug 2024 07:00  --------------------------------------------------------  IN: 250 mL / OUT: 1750 mL / NET: -1500 mL    27 Aug 2024 07:01  -  27 Aug 2024 14:17  --------------------------------------------------------  IN: 0 mL / OUT: 500 mL / NET: -500 mL        PHYSICAL EXAM:  GENERAL: NAD,  HEAD:  Atraumatic, Normocephalic  EYES: EOMI; conjunctiva and sclera clear  NECK: Supple, No JVD, No LAD  CHEST/LUNG: B/L air entry; No wheezes, rales or rhonci   HEART: Regular rate and rhythm; No murmurs, rubs, or gallops  ABDOMEN: Soft, Nontender, Nondistended; Bowel sounds present  EXTREMITIES:  2+ Peripheral Pulses, No clubbing, cyanosis, or edema  SKIN: No rashes or lesions    LABS:                        8.6    2.58  )-----------( 48       ( 27 Aug 2024 07:15 )             28.3     08-27    145  |  106  |  24<H>  ----------------------------<  110<H>  3.8   |  27  |  0.55    Ca    8.3<L>      27 Aug 2024 07:15  Phos  2.8     08-27  Mg     2.1     08-27    TPro  6.1  /  Alb  3.0<L>  /  TBili  0.5  /  DBili  x   /  AST  12  /  ALT  11  /  AlkPhos  86  08-26      CAPILLARY BLOOD GLUCOSE      POCT Blood Glucose.: 124 mg/dL (27 Aug 2024 11:22)  POCT Blood Glucose.: 126 mg/dL (27 Aug 2024 06:25)  POCT Blood Glucose.: 169 mg/dL (26 Aug 2024 23:59)        Urinalysis Basic - ( 27 Aug 2024 07:15 )    Color: x / Appearance: x / SG: x / pH: x  Gluc: 110 mg/dL / Ketone: x  / Bili: x / Urobili: x   Blood: x / Protein: x / Nitrite: x   Leuk Esterase: x / RBC: x / WBC x   Sq Epi: x / Non Sq Epi: x / Bacteria: x        RADIOLOGY & ADDITIONAL TESTS:    Imaging Personally Reviewed:  [x] YES  [ ] NO    Consultant(s) Notes Reviewed:  [x] YES  [ ] NO      MEDICATIONS  (STANDING):  acetylcysteine 20%  Inhalation 4 milliLiter(s) Inhalation every 6 hours  albuterol/ipratropium for Nebulization 3 milliLiter(s) Nebulizer every 6 hours  buDESOnide    Inhalation Suspension 0.5 milliGRAM(s) Inhalation every 12 hours  cefepime   IVPB 2000 milliGRAM(s) IV Intermittent every 12 hours  furosemide   Injectable 20 milliGRAM(s) IV Push daily  guaiFENesin ER 1200 milliGRAM(s) Oral every 12 hours  heparin   Injectable 5000 Unit(s) SubCutaneous every 12 hours  levothyroxine Injectable 70 MICROGram(s) IV Push at bedtime  methylPREDNISolone sodium succinate Injectable 40 milliGRAM(s) IV Push every 8 hours  metoprolol tartrate Injectable 2.5 milliGRAM(s) IV Push every 6 hours  metroNIDAZOLE  IVPB 500 milliGRAM(s) IV Intermittent every 8 hours  pantoprazole  Injectable 40 milliGRAM(s) IV Push daily    MEDICATIONS  (PRN):      Care Discussed with Consultants/Other Providers [x] YES  [ ] NO    HEALTH ISSUES - PROBLEM Dx:  Acute hypoxic respiratory failure    SBO (small bowel obstruction)    Functional quadriplegia    Advance care planning    Palliative care encounter

## 2024-08-27 NOTE — PROGRESS NOTE ADULT - SUBJECTIVE AND OBJECTIVE BOX
[facility]  SWAPNIL Rene Y. Patel, S. Shah, G. Mark  [phone]  ([phone] - weekdays after 5pm and weekends)    Name: CATHRYN LARA  Age/Gender: 89y Female  MRN: [id number]    Interval History:  Patient seen and examined this morning.   Resting comfortably.   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 reported allergy (Unknown)  penicillin (Rash)      Objective:  Vitals:   T(F): 98.4 (08-27-24 @ 11:00), Max: 99 (08-27-24 @ 00:00)  HR: 97 (08-27-24 @ 11:42) (90 - 134)  BP: 111/72 (08-27-24 @ 11:00) (97/69 - 127/75)  RR: 18 (08-27-24 @ 11:00) (18 - 26)  SpO2: 98% (08-27-24 @ 11:42) (93% - 100%)  Physical Examination:  General: no acute distress, NC  HEENT: NC/AT, anicteric, EOMI  Respiratory: no acc muscle use, breathing comfortably  Cardiovascular: S1 and S2 present  Gastrointestinal: normal appearing, nondistended  Extremities: no cyanosis  Skin: no visible rash    Laboratory Studies:  CBC:                       8.6    2.58  )-----------( 48       ( 27 Aug 2024 07:15 )             28.3     WBC Trend:  2.58 08-27-24 @ 07:15  3.78 08-26-24 @ 07:31  2.71 08-25-24 @ 00:22  2.44 08-24-24 @ 00:10  1.33 08-23-24 @ 04:27    CMP: 08-27    145  |  106  |  24<H>  ----------------------------<  110<H>  3.8   |  27  |  0.55    Ca    8.3<L>      27 Aug 2024 07:15  Phos  2.8     08-27  Mg     2.1     08-27    TPro  6.1  /  Alb  3.0<L>  /  TBili  0.5  /  DBili  x   /  AST  12  /  ALT  11  /  AlkPhos  86  08-26    Creatinine: 0.55 mg/dL (08-27-24 @ 07:15)  Creatinine: 0.58 mg/dL (08-26-24 @ 07:32)  Creatinine: 0.63 mg/dL (08-25-24 @ 00:22)  Creatinine: 0.78 mg/dL (08-24-24 @ 00:10)  Creatinine: 0.70 mg/dL (08-23-24 @ 04:27)    LIVER FUNCTIONS - ( 26 Aug 2024 07:32 )  Alb: 3.0 g/dL / Pro: 6.1 g/dL / ALK PHOS: 86 U/L / ALT: 11 U/L / AST: 12 U/L / GGT: x           Microbiology: reviewed   Culture - Urine (collected 08-24-24 @ 00:10)  Source: Clean Catch Clean Catch (Midstream)  Final Report (08-25-24 @ 05:34):    <10,000 CFU/mL Normal Urogenital Yeimi    Culture - Blood (collected 08-23-24 @ 09:30)  Source: .Blood Blood-Peripheral  Preliminary Report (08-26-24 @ 15:01):    No growth at 72 Hours    Culture - Blood (collected 08-23-24 @ 09:00)  Source: .Blood Blood-Peripheral  Preliminary Report (08-26-24 @ 15:01):    No growth at 72 Hours    SARS-CoV-2 Result: NotDete (23 Aug 2024 05:12)    Radiology: reviewed     Medications:  acetylcysteine 20%  Inhalation 4 milliLiter(s) Inhalation every 6 hours  albuterol/ipratropium for Nebulization 3 milliLiter(s) Nebulizer every 6 hours  buDESOnide    Inhalation Suspension 0.5 milliGRAM(s) Inhalation every 12 hours  cefepime   IVPB 2000 milliGRAM(s) IV Intermittent every 12 hours  furosemide   Injectable 20 milliGRAM(s) IV Push daily  guaiFENesin ER 1200 milliGRAM(s) Oral every 12 hours  heparin   Injectable 5000 Unit(s) SubCutaneous every 12 hours  levothyroxine Injectable 70 MICROGram(s) IV Push at bedtime  methylPREDNISolone sodium succinate Injectable 40 milliGRAM(s) IV Push every 8 hours  metoprolol tartrate Injectable 2.5 milliGRAM(s) IV Push every 6 hours  metroNIDAZOLE  IVPB 500 milliGRAM(s) IV Intermittent every 8 hours  pantoprazole  Injectable 40 milliGRAM(s) IV Push daily    Current Antimicrobials:  cefepime   IVPB 2000 milliGRAM(s) IV Intermittent every 12 hours  metroNIDAZOLE  IVPB 500 milliGRAM(s) IV Intermittent every 8 hours    Prior/Completed Antimicrobials:  cefepime   IVPB  metroNIDAZOLE  IVPB [facility]  SWAPNIL Rene Y. Patel, S. Shah, G. Mark  [phone]  ([phone] - weekdays after 5pm and weekends)    Name: CATHRYN LARA  Age/Gender: 89y Female  MRN: [id number]    Interval History:  Patient seen and examined this morning.   Resting comfortably.   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 reported allergy (Unknown)  penicillin (Rash)      Objective:  Vitals:   T(F): 98.4 (08-27-24 @ 11:00), Max: 99 (08-27-24 @ 00:00)  HR: 97 (08-27-24 @ 11:42) (90 - 134)  BP: 111/72 (08-27-24 @ 11:00) (97/69 - 127/75)  RR: 18 (08-27-24 @ 11:00) (18 - 26)  SpO2: 98% (08-27-24 @ 11:42) (93% - 100%)  Physical Examination:  General: no acute distress, NRBM  HEENT: NC/AT, anicteric, EOMI  Respiratory: decreased breath sounds b/l   Cardiovascular: S1 and S2 present, normal rate   Gastrointestinal: normal appearing, nondistended  Extremities: no edema, no cyanosis  Skin: no visible rash    Laboratory Studies:  CBC:                       8.6    2.58  )-----------( 48       ( 27 Aug 2024 07:15 )             28.3     WBC Trend:  2.58 08-27-24 @ 07:15  3.78 08-26-24 @ 07:31  2.71 08-25-24 @ 00:22  2.44 08-24-24 @ 00:10  1.33 08-23-24 @ 04:27    CMP: 08-27    145  |  106  |  24<H>  ----------------------------<  110<H>  3.8   |  27  |  0.55    Ca    8.3<L>      27 Aug 2024 07:15  Phos  2.8     08-27  Mg     2.1     08-27    TPro  6.1  /  Alb  3.0<L>  /  TBili  0.5  /  DBili  x   /  AST  12  /  ALT  11  /  AlkPhos  86  08-26    Creatinine: 0.55 mg/dL (08-27-24 @ 07:15)  Creatinine: 0.58 mg/dL (08-26-24 @ 07:32)  Creatinine: 0.63 mg/dL (08-25-24 @ 00:22)  Creatinine: 0.78 mg/dL (08-24-24 @ 00:10)  Creatinine: 0.70 mg/dL (08-23-24 @ 04:27)    LIVER FUNCTIONS - ( 26 Aug 2024 07:32 )  Alb: 3.0 g/dL / Pro: 6.1 g/dL / ALK PHOS: 86 U/L / ALT: 11 U/L / AST: 12 U/L / GGT: x           Microbiology: reviewed   Culture - Urine (collected 08-24-24 @ 00:10)  Source: Clean Catch Clean Catch (Midstream)  Final Report (08-25-24 @ 05:34):    <10,000 CFU/mL Normal Urogenital Yeimi    Culture - Blood (collected 08-23-24 @ 09:30)  Source: .Blood Blood-Peripheral  Preliminary Report (08-26-24 @ 15:01):    No growth at 72 Hours    Culture - Blood (collected 08-23-24 @ 09:00)  Source: .Blood Blood-Peripheral  Preliminary Report (08-26-24 @ 15:01):    No growth at 72 Hours    SARS-CoV-2 Result: NotDete (23 Aug 2024 05:12)    Radiology: reviewed   < from: Xray Chest 1 View- PORTABLE-Urgent (Xray Chest 1 View- PORTABLE-Urgent .) (08.26.24 @ 17:17) >  IMPRESSION:  Findings are concerning for right sided pneumonia with small dependent   bilateral effusions.    < end of copied text >  < from: Xray Chest 1 View- PORTABLE-Urgent (Xray Chest 1 View- PORTABLE-Urgent .) (08.24.24 @ 05:09) >  IMPRESSION:  Enteric tube terminating within the GE junction/proximal stomach,   recommend advancing further into stomach.    < end of copied text >  < from: Xray Chest 1 View- PORTABLE-Urgent (Xray Chest 1 View- PORTABLE-Urgent .) (08.23.24 @ 09:39) >  IMPRESSION:  Enteric tube descends below the diaphragm and overlies the stomach.    Minimal left basilar hazy opacity which may represent small effusion or   atelectasis    < end of copied text >    Medications:  acetylcysteine 20%  Inhalation 4 milliLiter(s) Inhalation every 6 hours  albuterol/ipratropium for Nebulization 3 milliLiter(s) Nebulizer every 6 hours  buDESOnide    Inhalation Suspension 0.5 milliGRAM(s) Inhalation every 12 hours  cefepime   IVPB 2000 milliGRAM(s) IV Intermittent every 12 hours  furosemide   Injectable 20 milliGRAM(s) IV Push daily  guaiFENesin ER 1200 milliGRAM(s) Oral every 12 hours  heparin   Injectable 5000 Unit(s) SubCutaneous every 12 hours  levothyroxine Injectable 70 MICROGram(s) IV Push at bedtime  methylPREDNISolone sodium succinate Injectable 40 milliGRAM(s) IV Push every 8 hours  metoprolol tartrate Injectable 2.5 milliGRAM(s) IV Push every 6 hours  metroNIDAZOLE  IVPB 500 milliGRAM(s) IV Intermittent every 8 hours  pantoprazole  Injectable 40 milliGRAM(s) IV Push daily    Current Antimicrobials:  cefepime   IVPB 2000 milliGRAM(s) IV Intermittent every 12 hours  metroNIDAZOLE  IVPB 500 milliGRAM(s) IV Intermittent every 8 hours    Prior/Completed Antimicrobials:  cefepime   IVPB  metroNIDAZOLE  IVPB

## 2024-08-27 NOTE — DIETITIAN INITIAL EVALUATION ADULT - NS FNS DIET ORDER
"[id number]: Study Visit Note   Subject name: Erasto Maher     Visit: Screening    Did the study visit occur within the appropriate window allowed by the protocol? no    If no, why? Per Dr. Chan, the screening provider visit can occur on Day 0 before surgery.     Screening labs (CBC, CMP, PT, INR, and urinalysis) were completed and ECG done. Provider has reviewed lab/ECG results. Wt: 162 lbs 4.8 oz, Ht: 5' 10\". The patient was also instructed about how to take Veledimex and how to fill out the drug diary.     I have personally interviewed Erasto Maher and reviewed his medical record for adverse events and concomitant medications and these have been recorded on the corresponding logs in Erasto Maher's research file.     Erasto Maher was given the opportunity to ask any trial related questions.    Please see provider progress note which will occur on Day 0 for physical exam and other clinical information. Labs were reviewed - any significant lab values were addressed and reviewed.    Lily Yadav, Clinical Research Coordinator, RN-ZULAY.   " Diet, ALYSON (08-26-24 @ 16:49)

## 2024-08-27 NOTE — PHYSICAL THERAPY INITIAL EVALUATION ADULT - NSPTDISCHREC_GEN_A_CORE
if patient/family declines [facility]; rec is Home PT, patient would require HPT, Home with maximal formal/informal supervision, home care referral, hospital bed, nato/Sub-acute Rehab

## 2024-08-27 NOTE — PHYSICAL THERAPY INITIAL EVALUATION ADULT - GENERAL OBSERVATIONS, REHAB EVAL
Patient received semi-reclined in bed with 15L NRB, PIV medlocked, on masimo, b/l CAIR boots and primafit.

## 2024-08-27 NOTE — DIETITIAN INITIAL EVALUATION ADULT - ADD RECOMMEND
1) Will continue to monitor PO intake, weight, labs, skin, GI status and diet 2) defer diet advancement to team/family wishes.  1) Will continue to monitor PO intake, weight, labs, skin, GI status and diet 2) defer diet advancement to team/family wishes. if able to advance, add ensure plus high protein to aid in meeting nutrient needs

## 2024-08-27 NOTE — PHYSICAL THERAPY INITIAL EVALUATION ADULT - PERTINENT HX OF CURRENT PROBLEM, REHAB EVAL
89F with history of paroxysmal atrial fibrillation, hypothyroidism, pulmonary/abdominal tuberculosis, recurrent small bowel obstruction, recurrent UTIs presenting from home with abdominal pain and vomiting for 1 day with CT scans with evidence of SBO vs enteritis s/p NGT. Course c/b hypotension likely iso septic from multifocal PNA process. Hospital Course: CT Abd/Pelvis/Chest: Multifocal pneumonia. Borderline dilated fluid-filled loops of small bowel measuring up to 4.0 cm with gradual appearing transition point in the left lower quadrant. Findings may represent early small bowel obstruction or enteritis. Large volume rectal stool.

## 2024-08-27 NOTE — PROGRESS NOTE ADULT - ASSESSMENT
Ms. Slater is a 89y Female with PMHx of asthma, HTN, essential tremor, bronchiectasis, GERD, hypothyroidism, paroxysmal A.fib on Eliquis 2.5mg BID, Vit D deficiency, tuberculous peritonitis, anemia moderate AS, hx of falls, aortic regurgitation presented with nausea and vomiting 08/23/2024 and subsequently admitted to MICU with SBO and transferred to floor on 08/25/2024, treated conservatively complicated by septic shock requiring pressor support. Pt has hx of pancytopenia.     Pt has a history of pancytopenia as outpatient with Leukopenia with since about 3y prior initially lymphopenia and then with neutropenia, progressive, anemia over the past year gradually worsening, with progressive macrocytosis, and thrombocytopenia over the past two years. Pt requested conservative treatment and denied BmBx a few times in the past. Discussed with pt again this admission and she stated she would not like to undergo aggressive interventions and she would not like to have a bone marrow biopsy, and she prefers conservative treatment otherwise.     She denied history of blood disorders, bleeding complications, or personal history of malignancy. She denied smoking, ETOH use. She lives in her apt with a 24hr aide and her daughter visits regularly.       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   - Outpatient labs show gradual progressive leukopenia x 3y, initial lymphopenia now both lymphopenia and neutropenia   -- Most recent outpatient WBC ranging 1.9-4.6   - Thrombocytopenia outpatient ranging 85-100s x 2y  - Anemia ranging 9.5-11 over past year, currently close to this  - MCV with progressive macrocytosis over past year  - ANC 3250, s/p steroids  - Iron sat 9%, Folate 15.6, B12 179  - Previous paraproteinemia workup negative for obvious plasma cell dyscrasia SPEP/MOOKIE normal, Serum immunoglobulins normal, serum FLCs (ratio 1.44), LDH (normal), Beta-2 Mcg (mild elv 2.6),   - Transfuse PRBC to maintain Hb > 7   - Transfuse Plt to maintain Plt > 10k  - Trend CBC daily with Diff for now   - Start B12 1000mcg IM weekly  - Again reviewed and discussed pancytopenia with the pt she is aware of its gradual, progressive worsening over past three years, discussed with pt again this admission and she stated she would not like to undergo aggressive interventions and she would declined bone marrow biopsy  - GOC noted   - Needs to follow up as outpatient     # Small bowel obstruction  - CT scan performed in ED showed evidence of dilated small bowel loops with large stool burden with concern for small bowel obstruction vs enteritis  - S/P MICU admission with NGT decompression  - Transferred to floor 08/25/2024     # Aspiration Pneumonia  -  CT A/P lower chest with with multifocal PNA  -  Pulmonology following, recs noted  -  Resolved septic shock while in MICU  -  CT Chest   - Antibiotics per ID and primary team    #A.fib  - Was previously on Eliquis 2.5mg per cardiology  - Plt 50k  - Recommend to discuss with cardiology Eliquis given Plt <75k  - Can proceed with Hep SubQ BID     # Hypothyroidism  - Continue levothyroxine         Thank you for allowing me to participate in the care of Ms. Slater, please do not hesitate to call or text me if you have further questions or concerns.       Roni Polanco MD  [facility] [place]   Division of Hematology/Oncology  [address], [address]  [place] [address]  P: [phone]  F: [phone]    Attestation:   Total time spent on the encounter: >60 minutes   ----Including face-to-face interaction in addition to chart review, reviewing treatment plan, and managing the patient’s chronic diagnoses as listed in the assessment----     1.	I have reviewed, analyzed and interpreted the following labs: CBC, CMP, imaging:  CT C/A/P and Head impressions as above.   2.	I have reviewed notes stating pts current admission, consultants and follow ups.   3.	I have reviewed the past medical, family, and surgical history and confirmed with outpatient records  Ms. Slater is a 89y Female with PMHx of asthma, HTN, essential tremor, bronchiectasis, GERD, hypothyroidism, paroxysmal A.fib on Eliquis 2.5mg BID, Vit D deficiency, tuberculous peritonitis, anemia moderate AS, hx of falls, aortic regurgitation presented with nausea and vomiting 08/23/2024 and subsequently admitted to MICU with SBO and transferred to floor on 08/25/2024, treated conservatively complicated by septic shock requiring pressor support. Pt has hx of pancytopenia.     Pt has a history of pancytopenia as outpatient with Leukopenia with since about 3y prior initially lymphopenia and then with neutropenia, progressive, anemia over the past year gradually worsening, with progressive macrocytosis, and thrombocytopenia over the past two years. Pt requested conservative treatment and denied BmBx a few times in the past. Discussed with pt again this admission and she stated she would not like to undergo aggressive interventions and she would not like to have a bone marrow biopsy, and she prefers conservative treatment otherwise.     She denied history of blood disorders, bleeding complications, or personal history of malignancy. She denied smoking, ETOH use. She lives in her apt with a 24hr aide and her daughter visits regularly.     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   - Outpatient labs show gradual progressive leukopenia x 3y, initial lymphopenia now both lymphopenia and neutropenia   -- Most recent outpatient WBC ranging 1.9-4.6   - Thrombocytopenia outpatient ranging 85-100s x 2y  - Anemia ranging 9.5-11 over past year, currently close to this  - MCV with progressive macrocytosis over past year  - ANC 3250, s/p steroids  - Iron sat 9%, Folate 15.6, B12 179  - Previous paraproteinemia workup negative for obvious plasma cell dyscrasia SPEP/MOOKIE normal, Serum immunoglobulins normal, serum FLCs (ratio 1.44), LDH (normal), Beta-2 Mcg (mild elv 2.6),   - Transfuse PRBC to maintain Hb > 7   - Transfuse Plt to maintain Plt > 10k  - Trend CBC daily with Diff for now   - Continue B12 1000mcg IM weekly  - Again reviewed and discussed pancytopenia with the pt she is aware of its gradual, progressive worsening over past three years, discussed with pt again this admission and she stated she would not like to undergo aggressive interventions and she would declined bone marrow biopsy  - GOC noted   - Needs to follow up as outpatient     # Small bowel obstruction  - CT scan performed in ED showed evidence of dilated small bowel loops with large stool burden with concern for small bowel obstruction vs enteritis  - S/P MICU admission with NGT decompression  - Transferred to floor 08/25/2024     # Aspiration Pneumonia  -  CT A/P lower chest with with multifocal PNA  -  Pulmonology following, recs noted  -  Resolved septic shock while in MICU  -  CT Chest   - Antibiotics per ID and primary team    #A.fib  - Was previously on Eliquis 2.5mg per cardiology  - Plt 50k  - Pt at risk for CVA vs bleeding given clinical context  - Cardiology consult pending   - Can proceed with Hep SubQ BID, can consider Eliquis depending on Plt count trajectory, clinical risk     # Hypothyroidism  - Continue levothyroxine         Thank you for allowing me to participate in the care of Ms. Slater, please do not hesitate to call or text me if you have further questions or concerns.       Roni Polanco MD  [facility] [place]   Division of Hematology/Oncology  [address], [address]  [place] [address]  P: [phone]  F: [phone]    Attestation:    ----Pt evaluated including face-to-face interaction in addition to chart review, reviewing treatment plan, and managing the patient’s chronic diagnoses as listed in the assessment----

## 2024-08-27 NOTE — DIETITIAN INITIAL EVALUATION ADULT - ORAL INTAKE PTA/DIET HISTORY
seen this morning in RCU. unable to answer writer questions. lethargic and with non rebreather. no family at bedside. Per H&P: normally able to eat on her own.

## 2024-08-27 NOTE — CONSULT NOTE ADULT - SUBJECTIVE AND OBJECTIVE BOX
DATE OF SERVICE: 08-27-24 @ 22:10    CHIEF COMPLAINT:Patient is a 89y old  Female who presents with a chief complaint of septic shock (27 Aug 2024 16:41)      HISTORY OF PRESENT ILLNESS:HPI:  89F with history of paroxysmal atrial fibrillation, hypothyroidism, pulmonary/abdominal tuberculosis, recurrent small bowel obstruction, recurrent UTIs presenting from home with abdominal pain and vomiting for 1 day. Per daughter and HHA at bedside, patient was in her usual state of health until yesterday. Normally able to communicate and ambulate to the bathroom and eat on her own. Does have a HHA for assistance as needed. HHA reports that around 2:30 AM last night, patient called for assistance and was vomiting and asked to call an ambulance. CT scan performed in ED showed evidence of dilated small bowel loops with large stool burden with concern for small bowel obstruction vs enteritis. Also with some imaging findings concerning for multifocal pneumonia. Surgery consulted given history of small bowel obstruction and imaging and NGT placed with NGT to suction draining minimal dark abdominal contents. Additionally with hypoxemic respiratory failure requiring HFNC 40/40. ED course c/b hypotension 70/40s with transient improvement with 3L IVF however with repeat hypotension despite these measures.   GOC discussion had with daughter at bedside. Patient is DNR/DNI and would not want aggressive measures including surgery or central line placement for pressor therapy. Would like a trial of IV pressors via peripheral IV and if these fails, daughter would not want further escalation. Was also seen by palliative care team in ED and was being considered for PCU on fixed dose pressors however no beds available at this time.   Admitted to MICU for septic shock. (23 Aug 2024 15:40)      PAST MEDICAL & SURGICAL HISTORY:  Pulmonary TB      Gastritis      Afib      SBO (small bowel obstruction)      Hypothyroid      HTN (hypertension)      TB (pulmonary tuberculosis)  TB of abdomen 1960      Orthostatic hypotension      Asthma      Aortic stenosis      Multifocal pneumonia      History of Bilateral Breast Biopsy      S/P Exploratory Laparotomy  TB in 1960s      H/O hemorrhoidectomy      S/P vein stripping              MEDICATIONS:  furosemide   Injectable 20 milliGRAM(s) IV Push daily  heparin   Injectable 5000 Unit(s) SubCutaneous every 12 hours  metoprolol tartrate 25 milliGRAM(s) Oral two times a day    cefepime   IVPB 2000 milliGRAM(s) IV Intermittent every 12 hours  metroNIDAZOLE  IVPB 500 milliGRAM(s) IV Intermittent every 8 hours    acetylcysteine 20%  Inhalation 4 milliLiter(s) Inhalation every 6 hours  albuterol/ipratropium for Nebulization 3 milliLiter(s) Nebulizer every 6 hours  buDESOnide    Inhalation Suspension 0.5 milliGRAM(s) Inhalation every 12 hours  guaiFENesin ER 1200 milliGRAM(s) Oral every 12 hours      pantoprazole  Injectable 40 milliGRAM(s) IV Push daily    levothyroxine Injectable 70 MICROGram(s) IV Push at bedtime  methylPREDNISolone sodium succinate Injectable 40 milliGRAM(s) IV Push every 8 hours        FAMILY HISTORY:  Family history of essential hypertension        Non-contributory    SOCIAL HISTORY:    [ ] not a smoker    Allergies    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 reported allergy (Unknown)  penicillin (Rash)    Intolerances    	    REVIEW OF SYSTEMS:  CONSTITUTIONAL: No fever  EYES: No eye pain, visual disturbances, or discharge  ENMT:  No difficulty hearing, tinnitus  NECK: No pain or stiffness  RESPIRATORY: No cough, wheezing,  CARDIOVASCULAR: No chest pain, palpitations, passing out, dizziness, or leg swelling  GASTROINTESTINAL:  No nausea, vomiting, diarrhea or constipation. No melena.  GENITOURINARY: No dysuria, hematuria  NEUROLOGICAL: No stroke like symptoms  SKIN: No burning or lesions   ENDOCRINE: No heat or cold intolerance  MUSCULOSKELETAL: No joint pain or swelling  PSYCHIATRIC: No  anxiety, mood swings  HEME/LYMPH: No bleeding gums  ALLERGY AND IMMUNOLOGIC: No hives or eczema	    All other ROS negative    PHYSICAL EXAM:  T(C): 36.6 (08-27-24 @ 20:21), Max: 37.2 (08-27-24 @ 00:00)  HR: 103 (08-27-24 @ 20:21) (90 - 112)  BP: 119/75 (08-27-24 @ 20:21) (97/69 - 125/70)  RR: 19 (08-27-24 @ 20:21) (17 - 19)  SpO2: 95% (08-27-24 @ 20:21) (95% - 100%)  Wt(kg): --  I&O's Summary    26 Aug 2024 07:01  -  27 Aug 2024 07:00  --------------------------------------------------------  IN: 250 mL / OUT: 1750 mL / NET: -1500 mL    27 Aug 2024 07:01  -  27 Aug 2024 22:10  --------------------------------------------------------  IN: 340 mL / OUT: 930 mL / NET: -590 mL        Appearance: Normal	  HEENT:   Normal oral mucosa, EOMI	  Cardiovascular:  S1 S2, No JVD,    Respiratory: Lungs clear to auscultation	  Psychiatry: Alert  Gastrointestinal:  Soft, Non-tender, + BS	  Skin: No rashes   Neurologic: Non-focal  Extremities:  No edema  Vascular: Peripheral pulses palpable    	    	  	  CARDIAC MARKERS:  Labs personally reviewed by me                                  8.6    2.58  )-----------( 48       ( 27 Aug 2024 07:15 )             28.3     08-27    145  |  106  |  24<H>  ----------------------------<  110<H>  3.8   |  27  |  0.55    Ca    8.3<L>      27 Aug 2024 07:15  Phos  2.8     08-27  Mg     2.1     08-27    TPro  6.1  /  Alb  3.0<L>  /  TBili  0.5  /  DBili  x   /  AST  12  /  ALT  11  /  AlkPhos  86  08-26          EKG: Personally reviewed by me - AF RVR  Radiology: Personally reviewed by me - CXR Findings are concerning for right sided pneumonia with small dependent bilateral effusions.    TTE CONCLUSIONS:      1. Left ventricular cavity is small. Left ventricular wall thickness is normal. Left ventricular systolic function is normal. There are no regional wall motion abnormalities seen.   2. Normal right ventricular cavity size, with normal wall thickness, and normal right ventricular systolic function.   3. Mild pulmonary hypertension.      Assessment and Plan: 	  89F with history of paroxysmal atrial fibrillation, hypothyroidism, pulmonary/abdominal tuberculosis, recurrent small bowel obstruction, recurrent UTIs presenting from home with abdominal pain and vomiting for 1 day with CT scans with evidence of SBO vs enteritis s/p NGT. Course c/b hypotension likely iso septic from multifocal PNA process.    1. Paroxysmal Atrial Fibrillation  - Switch Metoprolol IV to Metoprolol 25mg PO BID  - Hold Eliquis given thrombocytopenia ~50, resume when safe per heme but risks seem to outweigh benefits at this time    2. Acute hfpef - On Lasix 20 mg IV daily   - Switch to PO likely in 1-2 days  - TTE with preserved EF and mild pulm HTN    3. Multifocal PNA   - Abx as per primary team     4. DVT PPX - on HSQ          Differential diagnosis and plan of care discussed with patient after the evaluation. Counseling on diet, nutritional counseling, weight management, exercise and medication compliance was done.   Advanced care planning/advanced directives discussed with patient/family. DNR status including forceful chest compressions to attempt to restart the heart, ventilator support/artificial breathing, electric shock, artificial nutrition, health care proxy, Molst form all discussed with pt. DNR. Sixteen minutes spent on discussing advanced directives.        Nilton Roldan DO [facility]  Cardiovascular Medicine  [address], [address]  Office [phone]  Available via call/text on [employer] Teams

## 2024-08-27 NOTE — PROGRESS NOTE ADULT - SUBJECTIVE AND OBJECTIVE BOX
SUBJECTIVE AND OBJECTIVE:  Indication for Geriatrics and Palliative Care Services/INTERVAL HPI: [facility] team consulted for complex medical decision making in the setting of serious illness and symptoms management.    OVERNIGHT EVENTS:  No acute events reported overnight.   Patient seen and examined this morning, patient appears more lethargic, reactive to verbal stimulus, follows simple commands.     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 reported allergy (Unknown)  penicillin (Rash)    Intolerances    MEDICATIONS  (STANDING):  acetylcysteine 20%  Inhalation 4 milliLiter(s) Inhalation every 6 hours  albuterol/ipratropium for Nebulization 3 milliLiter(s) Nebulizer every 6 hours  buDESOnide    Inhalation Suspension 0.5 milliGRAM(s) Inhalation every 12 hours  cefepime   IVPB 2000 milliGRAM(s) IV Intermittent every 12 hours  furosemide   Injectable 20 milliGRAM(s) IV Push daily  guaiFENesin ER 1200 milliGRAM(s) Oral every 12 hours  heparin   Injectable 5000 Unit(s) SubCutaneous every 12 hours  levothyroxine Injectable 70 MICROGram(s) IV Push at bedtime  methylPREDNISolone sodium succinate Injectable 40 milliGRAM(s) IV Push every 8 hours  metoprolol tartrate 25 milliGRAM(s) Oral two times a day  metroNIDAZOLE  IVPB 500 milliGRAM(s) IV Intermittent every 8 hours  pantoprazole  Injectable 40 milliGRAM(s) IV Push daily    MEDICATIONS  (PRN):      ITEMS UNCHECKED ARE NOT PRESENT    PRESENT SYMPTOMS: [ x]Unable to self-report - see  CPOT, PAINADS, RDOS below  Source if other than patient:  [ ]Family   [ ]Team     Pain:  [ ]yes [ ]no  QOL impact -   Location -                    Aggravating factors -  Quality -  Radiation -  Timing-  Severity (0-10 scale):  Minimal acceptable level (0-10 scale):     Dyspnea:                           []Mild [ ]Moderate [ ]Severe  Anxiety:                             [ ]Mild [ ]Moderate [ ]Severe  Fatigue:                             [ ]Mild [ ]Moderate [ ]Severe  Nausea:                             [ ]Mild [ ]Moderate [ ]Severe  Loss of appetite:              [ ]Mild [ ]Moderate [ ]Severe  Constipation:                    [ ]Mild [ ]Moderate [ ]Severe    PCSSQ[Palliative Care Spiritual Screening Question]   Severity (0-10):  Score of 4 or > indicate consideration of Chaplaincy referral.  Chaplaincy Referral: [ ] yes [x ] refused [ ] following  Caregiver [place]? : [ ] yes [ ] no  [ x] deferred:  Social work referral [ ] Patient & Family Centered Care Referral [ ]   Anticipatory Grief present?:  [ ] yes [ ] no  [ x] deferred: Social work referral [ ] Patient & Family Centered Care Referral [ ]      Other Symptoms:  [x ]All other review of systems negative     PHYSICAL EXAM:  Vital Signs Last 24 Hrs  T(C): 36.9 (27 Aug 2024 11:00), Max: 37.2 (27 Aug 2024 00:00)  T(F): 98.4 (27 Aug 2024 11:00), Max: 99 (27 Aug 2024 00:00)  HR: 92 (27 Aug 2024 12:30) (90 - 118)  BP: 125/70 (27 Aug 2024 12:02) (97/69 - 127/75)  BP(mean): --  RR: 17 (27 Aug 2024 12:30) (17 - 26)  SpO2: 100% (27 Aug 2024 12:30) (97% - 100%)    Parameters below as of 27 Aug 2024 12:30  Patient On (Oxygen Delivery Method): nasal cannula  O2 Flow (L/min): 6    I&O's Summary    26 Aug 2024 07:01  -  27 Aug 2024 07:00  --------------------------------------------------------  IN: 250 mL / OUT: 1750 mL / NET: -1500 mL    27 Aug 2024 07:01  -  27 Aug 2024 16:44  --------------------------------------------------------  IN: 0 mL / OUT: 500 mL / NET: -500 mL     GENERAL:  [x]Alert  []Oriented   [ x]Lethargic  [ ]Cachexia  [ ]Unarousable  [x]Verbal  [ ]Non-Verbal  Behavioral:   [ ]Anxiety  [ ]Delirium [ ]Agitation [x ]Other:   HEENT: hard of hearing   [x]Normal   [ ]Dry mouth   [ ]ET Tube/Trach  [ ]Oral lesions  PULMONARY:   []Clear [x ]Tachypnea  [ ]Audible excessive secretions   [ ]Rhonchi        [ ]Right [ ]Left [ ]Bilateral  [ ]Crackles        [ ]Right [ ]Left [ ]Bilateral  [ ]Wheezing     [ ]Right [ ]Left [ ]Bilateral  [ ]Diminished BS [ ] Right [ ]Left [ ]Bilateral  CARDIOVASCULAR:    [x]Regular [ ]Irregular [ ]Tachy  [ ]Faisal [ ]Murmur [ ]Other  GASTROINTESTINAL:  [x]Soft  [ ]Distended   [x]+BS  [x]Non tender [ ]Tender  [ ]PEG [ x]OGT/ NGT   Last BM:  8/24/24  GENITOURINARY:  [x]Normal [ ]Incontinent   [ ]Oliguria/Anuria   [ ]Baum  MUSCULOSKELETAL:   [ ]Normal   [x]Weakness  [ ]Bed/Wheelchair bound [ ]Edema  NEUROLOGIC: patients appears more lethargic, able to follow simple commands   [x]No focal deficits  [ ] Cognitive impairment  [ ] Dysphagia [ ]Dysarthria [ ] Paresis [ ]Other   SKIN:   [x]Normal  [ ]Rash   [ ]Pressure ulcer(s) [ ]y [ ]n present on admission    CRITICAL CARE:  [ ] Shock Present  [ ]Septic [ ]Cardiogenic [ ]Neurologic [ ]Hypovolemic  [ ]  Vasopressors [ ]  Inotropes   []Respiratory failure present [ ]Mechanical ventilation [ ]Non-invasive ventilatory support [ ]High flow    [x ]Acute  [ ]Chronic [x ]Hypoxic  [ ]Hypercarbic [ x]Other: non rebreather   [ ]Other organ failure         LABS:                        8.6    2.58  )-----------( 48       ( 27 Aug 2024 07:15 )             28.3   08-27    145  |  106  |  24<H>  ----------------------------<  110<H>  3.8   |  27  |  0.55    Ca    8.3<L>      27 Aug 2024 07:15  Phos  2.8     08-27  Mg     2.1     08-27    TPro  6.1  /  Alb  3.0<L>  /  TBili  0.5  /  DBili  x   /  AST  12  /  ALT  11  /  AlkPhos  86  08-26      Urinalysis Basic - ( 27 Aug 2024 07:15 )    Color: x / Appearance: x / SG: x / pH: x  Gluc: 110 mg/dL / Ketone: x  / Bili: x / Urobili: x   Blood: x / Protein: x / Nitrite: x   Leuk Esterase: x / RBC: x / WBC x   Sq Epi: x / Non Sq Epi: x / Bacteria: x      RADIOLOGY & ADDITIONAL STUDIES: reviewed none new      Protein Calorie Malnutrition Present: [ ]mild [ ]moderate [ ]severe [ ]underweight [ ]morbid obesity  https://www.andeal.org/vault/2440/web/files/ONC/Table_Clinical%20Characteristics%20to%20Document%20Malnutrition-White%20JV%20et%20al%202012.pdf    Height (cm): 167.6 (08-23-24 @ 16:52), 165.1 (06-06-24 @ 19:21), 165.1 (05-08-24 @ 11:40)  Weight (kg): 58.8 (08-23-24 @ 16:52), 58.5 (06-06-24 @ 19:21), 68 (05-08-24 @ 11:40)  BMI (kg/m2): 20.9 (08-23-24 @ 16:52), 21.5 (06-06-24 @ 19:21), 24.9 (05-08-24 @ 11:40)    [ ]PPSV2 < or = 30%  [ ]significant weight loss [x]poor nutritional intake [ ]anasarca[ ]Artificial Nutrition    Other REFERRALS:  [ ]Hospice  [ ]Child Life  [ ]Social Work  [x]Case management [ ]Holistic Therapy     Palliative Performance Scale:  http://npcrc.org/files/news/palliative_performance_scale_ppsv2.pdf  (Ctrl +  left click to view)  Respiratory Distress Observation Tool:  https://homecareinformation.net/handouts/hen/Respiratory_Distress_Observation_Scale.pdf (Ctrl +  left click to view)  PAINAD Score:  http://geriatrictoolkit.[facility]/cog/painad.pdf (Ctrl +  left click to view)                                Care Coordination Assessment 201    COGNITIVE/LEARNING 201  Mental Status    Answers: Unable to assess    ADMISSION HISTORY 201  Admitted From    Answers: Home    FINANCIAL 201  Does patient have financial concerns for discharge?    Answers: None    LIVING ARRANGEMENTS/SUPPORT 201  Housing Environment    Answers: Apartment    CAREGIVER CONTACT 201  Does the patient wish to identify a Caregiver?    Answers: Unable to assess    EMERGENCY CONTACTS OUTSIDE HOME 201  Emergency Contact    Answers: Emergency Contact Name Connie Slater,  Answers: Emergency Contact Phone # [phone],  Answers: Emergency Contact Relationship daughter    DISCHARGE PLANNING 201  Potential Discharge Plan and Services    Answers: Anticipated Needs Unclear at Present    SCREENING 201  Social Work Screen and Referral    Answers: None    SUMMARY 201  Initial Clinical Summary    Notes: Chart reviewed and case management referral received. Pt admitted with  SBO vs enteritis and course c/b sepsis from multifocal pneumonia. Attempted to  speak with patient however pt acute and on non-rebreather mask. Spoke with pt's  daughter Alyssa and discussed role of , medical plan of care and  discharge needs. Pt's daughter verbalizes understanding. Prior to admission, pt  resided in an apartment alone and had 24hr/7day aide services through [facility]  home care agency (Phone: [phone]). Pt required assistance with bathing at  baseline and used a walker for ambulation. Pt also has a transport wheelchair,  commode and raised toilet seat at home. PCP Dr. Steven Goldberg.    Anticipated Discharge Plan and Services    Notes: P/T eval ordered. Plan pending hospitalization. Pt known to [facility] at home from 6/2024 but services have concluded.  name and  contact number provided.       Electronically signed by:  Celeste Izquierdo  Electronically signed on:  2024-08-27  11:53

## 2024-08-27 NOTE — PROGRESS NOTE ADULT - SUBJECTIVE AND OBJECTIVE BOX
Date of Service: 08-27-24 @ 13:36    Patient is a 89y old  Female who presents with a chief complaint of septic shock (27 Aug 2024 08:51)      Any change in ROS: Seems OK:  n o sob:    has RVR with a fib  : now being transferred to monitored unit     MEDICATIONS  (STANDING):  acetylcysteine 20%  Inhalation 4 milliLiter(s) Inhalation every 6 hours  albuterol/ipratropium for Nebulization 3 milliLiter(s) Nebulizer every 6 hours  buDESOnide    Inhalation Suspension 0.5 milliGRAM(s) Inhalation every 12 hours  cefepime   IVPB 2000 milliGRAM(s) IV Intermittent every 12 hours  furosemide   Injectable 20 milliGRAM(s) IV Push daily  guaiFENesin ER 1200 milliGRAM(s) Oral every 12 hours  heparin   Injectable 5000 Unit(s) SubCutaneous every 12 hours  levothyroxine Injectable 70 MICROGram(s) IV Push at bedtime  methylPREDNISolone sodium succinate Injectable 40 milliGRAM(s) IV Push every 8 hours  metoprolol tartrate Injectable 2.5 milliGRAM(s) IV Push every 6 hours  metroNIDAZOLE  IVPB 500 milliGRAM(s) IV Intermittent every 8 hours  pantoprazole  Injectable 40 milliGRAM(s) IV Push daily    MEDICATIONS  (PRN):    Vital Signs Last 24 Hrs  T(C): 36.9 (27 Aug 2024 11:00), Max: 37.2 (27 Aug 2024 00:00)  T(F): 98.4 (27 Aug 2024 11:00), Max: 99 (27 Aug 2024 00:00)  HR: 92 (27 Aug 2024 12:30) (90 - 134)  BP: 111/72 (27 Aug 2024 11:00) (97/69 - 127/75)  BP(mean): --  RR: 17 (27 Aug 2024 12:30) (17 - 26)  SpO2: 100% (27 Aug 2024 12:30) (93% - 100%)    Parameters below as of 27 Aug 2024 12:30  Patient On (Oxygen Delivery Method): nasal cannula  O2 Flow (L/min): 6      I&O's Summary    26 Aug 2024 07:01  -  27 Aug 2024 07:00  --------------------------------------------------------  IN: 250 mL / OUT: 1750 mL / NET: -1500 mL          Physical Exam:   GENERAL: NAD, well-groomed, well-developed  HEENT: KOLBY/   Atraumatic, Normocephalic  ENMT: No tonsillar erythema, exudates, or enlargement; Moist mucous membranes, Good dentition, No lesions  NECK: Supple, No JVD, Normal thyroid  CHEST/LUNG: Clear to auscultaion-no wheezing  CVS: Regular rate and rhythm; No murmurs, rubs, or gallops  GI: : Soft, Nontender, Nondistended; Bowel sounds present  NERVOUS SYSTEM:  Alert & Oriented X3  EXTREMITIES:- edema  LYMPH: No lymphadenopathy noted  SKIN: No rashes or lesions  ENDOCRINOLOGY: No Thyromegaly  PSYCH: Appropriate    Labs:  30, 32                            8.6    2.58  )-----------( 48       ( 27 Aug 2024 07:15 )             28.3                         9.2    3.78  )-----------( 50       ( 26 Aug 2024 07:31 )             30.3                         8.2    2.71  )-----------( 51       ( 25 Aug 2024 00:22 )             26.6                         9.3    2.44  )-----------( 76       ( 24 Aug 2024 00:10 )             28.9     08-27    145  |  106  |  24<H>  ----------------------------<  110<H>  3.8   |  27  |  0.55  08-26    144  |  107  |  22  ----------------------------<  102<H>  3.9   |  24  |  0.58  08-25    140  |  108  |  20  ----------------------------<  125<H>  4.0   |  23  |  0.63  08-24    139  |  105  |  20  ----------------------------<  106<H>  4.9   |  21<L>  |  0.78    Ca    8.3<L>      27 Aug 2024 07:15  Ca    8.7      26 Aug 2024 07:32  Phos  2.8     08-27  Phos  2.7     08-26  Mg     2.1     08-27  Mg     2.2     08-26    TPro  6.1  /  Alb  3.0<L>  /  TBili  0.5  /  DBili  x   /  AST  12  /  ALT  11  /  AlkPhos  86  08-26  TPro  5.4<L>  /  Alb  2.7<L>  /  TBili  0.6  /  DBili  x   /  AST  11  /  ALT  11  /  AlkPhos  83  08-25  TPro  5.9<L>  /  Alb  3.0<L>  /  TBili  1.1  /  DBili  x   /  AST  15  /  ALT  12  /  AlkPhos  89  08-24    CAPILLARY BLOOD GLUCOSE      POCT Blood Glucose.: 124 mg/dL (27 Aug 2024 11:22)  POCT Blood Glucose.: 126 mg/dL (27 Aug 2024 06:25)  POCT Blood Glucose.: 169 mg/dL (26 Aug 2024 23:59)      LIVER FUNCTIONS - ( 26 Aug 2024 07:32 )  Alb: 3.0 g/dL / Pro: 6.1 g/dL / ALK PHOS: 86 U/L / ALT: 11 U/L / AST: 12 U/L / GGT: x             Urinalysis Basic - ( 27 Aug 2024 07:15 )    Color: x / Appearance: x / SG: x / pH: x  Gluc: 110 mg/dL / Ketone: x  / Bili: x / Urobili: x   Blood: x / Protein: x / Nitrite: x   Leuk Esterase: x / RBC: x / WBC x   Sq Epi: x / Non Sq Epi: x / Bacteria: x            RECENT CULTURES:  08-24 @ 00:10 Clean Catch Clean Catch (Midstream)            rad< from: Xray Chest 1 View- PORTABLE-Urgent (Xray Chest 1 View- PORTABLE-Urgent .) (08.26.24 @ 17:17) >  ACC: [id number] EXAM:  XR CHEST PORTABLE URGENT 1V   ORDERED BY: KATHLEEN PEREZ     PROCEDURE DATE:  08/26/2024          INTERPRETATION:  EXAMINATION: XR CHEST URGENT    CLINICAL INDICATION: Dyspnea    TECHNIQUE: Single frontal, portable view of the chest was obtained.    COMPARISON: Chest x-ray 8/23/2024 and 8/24/2024.    FINDINGS:  Rotated study.  Interval removal of patient's enteric tube.  The heart is magnified by technique, size not reliably assessed.  New right midlung field airspace opacity. Mild left basilar haziness.  No pneumothorax.  No acute bony abnormality.    IMPRESSION:  Findings are concerning for right sided pneumonia with small dependent   bilateral effusions.    --- End of Report ---           YESY BERGERON MD; Resident Radiologist  This document has been electronically signed.  DESHAUN BARRON MD; Attending Radiologist  This document has been electronically signed. Aug 27 2024 12:16PM    < end of copied text >      <10,000 CFU/mL Normal Urogenital Yeimi    08-23 @ 09:30 .Blood Blood-Peripheral                No growth at 72 Hours    08-23 @ 09:00 .Blood Blood-Peripheral                No growth at 72 Hours          RESPIRATORY CULTURES:          Studies  Chest X-RAY  CT SCAN Chest   Venous Dopplers: LE:   CT Abdomen  Others

## 2024-08-27 NOTE — PROGRESS NOTE ADULT - ASSESSMENT
Patient is a 89 year old female with PMH of pAfib, hypothyroidism, pulmonary/abdominal tuberculosis, recurrent small bowel obstruction, recurrent UTIs presenting from home with abdominal pain and vomiting for 1 day. CT scan performed in ED showed evidence of dilated small bowel loops with large stool burden with concern for small bowel obstruction vs enteritis; s/p NGT. Course complicated by hypotension likely septic from multifocal pneumonia. Shock resolved, off pressors, transferred out of MICU to floor.   AHRF due to multifocal pneumonia - suspect aspiration  Leukopenia with bandemia on admission, trend noted   chart reviewed, h/o pancytopenia with progressive leukopenia   COVID/Flu/RSV negative   Bcx NGTD; Ucx negative   h/o PCN allergy, tolerates cephalosporins     Recommendations:   Continue on cefepime and flagyl - plan for 7d course until 8/29  Pulmonary following, on steroids   Heme/Onc following for pancytopenia   Aspiration precautions, SLP following  Supplemental O2 as needed, wean as tolerated   Monitor temps/CBC  Continue rest of care per primary team       Lilli Polanco M.D.  [facility], Division of Infectious Diseases  [phone]  After 5pm on weekdays and all day on weekends - please call [phone]  Available on [employer] TEAMS    Patient is a 89 year old female with PMH of pAfib, hypothyroidism, pulmonary/abdominal tuberculosis, recurrent small bowel obstruction, recurrent UTIs presenting from home with abdominal pain and vomiting for 1 day. CT scan performed in ED showed evidence of dilated small bowel loops with large stool burden with concern for small bowel obstruction vs enteritis; s/p NGT. Course complicated by hypotension likely septic from multifocal pneumonia. Shock resolved, off pressors, transferred out of MICU to floor.   AHRF due to multifocal pneumonia - suspect aspiration  Leukopenia with bandemia on admission, trend noted   chart reviewed, h/o pancytopenia with progressive leukopenia   COVID/Flu/RSV negative   Bcx NGTD; Ucx negative   CXR with R sided pneumonia with small dependent b/l effusions  currently on NRBM, remains afebrile, tachycardia noted   h/o PCN allergy, tolerates cephalosporins     Recommendations:   Continue on cefepime and flagyl - plan for 7d course until 8/29  Pulmonary following, on steroids   Heme/Onc following for pancytopenia   Aspiration precautions, SLP following  Supplemental O2 as needed, wean as tolerated   Monitor temps/CBC - if any fevers or worsening, send Bcx x2 and can broaden to meropenem  Continue rest of care per primary team     D/w patients daughter over the phone  Lilli Polanco M.D.  [facility], Division of Infectious Diseases  [phone]  After 5pm on weekdays and all day on weekends - please call [phone]  Available on [employer] TEAMS

## 2024-08-27 NOTE — PHYSICAL THERAPY INITIAL EVALUATION ADULT - ADDITIONAL COMMENTS
Prior to admission, pt resided in an apartment alone and had 24hr/7day aide services through [facility]  home care agency. Pt required assistance with bathing at baseline and used a walker for ambulation. Pt also has a transport wheelchair, commode and raised toilet seat at home.

## 2024-08-27 NOTE — DIETITIAN INITIAL EVALUATION ADULT - ENTER FROM (CAL/KG)
27 Unna Boot Text: An Unna boot was placed to help immobilize the limb and facilitate more rapid healing.

## 2024-08-28 LAB
ALBUMIN SERPL ELPH-MCNC: 3.1 G/DL — LOW (ref 3.3–5)
ALP SERPL-CCNC: 84 U/L — SIGNIFICANT CHANGE UP (ref 40–120)
ALT FLD-CCNC: 10 U/L — SIGNIFICANT CHANGE UP (ref 10–45)
ANION GAP SERPL CALC-SCNC: 12 MMOL/L — SIGNIFICANT CHANGE UP (ref 5–17)
ANISOCYTOSIS BLD QL: SLIGHT — SIGNIFICANT CHANGE UP
AST SERPL-CCNC: 11 U/L — SIGNIFICANT CHANGE UP (ref 10–40)
BASOPHILS # BLD AUTO: 0 K/UL — SIGNIFICANT CHANGE UP (ref 0–0.2)
BASOPHILS NFR BLD AUTO: 0 % — SIGNIFICANT CHANGE UP (ref 0–2)
BILIRUB SERPL-MCNC: 0.6 MG/DL — SIGNIFICANT CHANGE UP (ref 0.2–1.2)
BUN SERPL-MCNC: 36 MG/DL — HIGH (ref 7–23)
CALCIUM SERPL-MCNC: 8.8 MG/DL — SIGNIFICANT CHANGE UP (ref 8.4–10.5)
CHLORIDE SERPL-SCNC: 104 MMOL/L — SIGNIFICANT CHANGE UP (ref 96–108)
CO2 SERPL-SCNC: 30 MMOL/L — SIGNIFICANT CHANGE UP (ref 22–31)
CREAT SERPL-MCNC: 0.64 MG/DL — SIGNIFICANT CHANGE UP (ref 0.5–1.3)
CULTURE RESULTS: SIGNIFICANT CHANGE UP
CULTURE RESULTS: SIGNIFICANT CHANGE UP
DACRYOCYTES BLD QL SMEAR: SLIGHT — SIGNIFICANT CHANGE UP
EGFR: 79 ML/MIN/1.73M2 — SIGNIFICANT CHANGE UP
ELLIPTOCYTES BLD QL SMEAR: SLIGHT — SIGNIFICANT CHANGE UP
EOSINOPHIL # BLD AUTO: 0 K/UL — SIGNIFICANT CHANGE UP (ref 0–0.5)
EOSINOPHIL NFR BLD AUTO: 0 % — SIGNIFICANT CHANGE UP (ref 0–6)
GLUCOSE BLDC GLUCOMTR-MCNC: 136 MG/DL — HIGH (ref 70–99)
GLUCOSE BLDC GLUCOMTR-MCNC: 155 MG/DL — HIGH (ref 70–99)
GLUCOSE BLDC GLUCOMTR-MCNC: 169 MG/DL — HIGH (ref 70–99)
GLUCOSE BLDC GLUCOMTR-MCNC: 191 MG/DL — HIGH (ref 70–99)
GLUCOSE SERPL-MCNC: 148 MG/DL — HIGH (ref 70–99)
HCT VFR BLD CALC: 29.7 % — LOW (ref 34.5–45)
HGB BLD-MCNC: 9.1 G/DL — LOW (ref 11.5–15.5)
LYMPHOCYTES # BLD AUTO: 0.32 K/UL — LOW (ref 1–3.3)
LYMPHOCYTES # BLD AUTO: 9.5 % — LOW (ref 13–44)
MACROCYTES BLD QL: SLIGHT — SIGNIFICANT CHANGE UP
MANUAL SMEAR VERIFICATION: SIGNIFICANT CHANGE UP
MCHC RBC-ENTMCNC: 30.6 GM/DL — LOW (ref 32–36)
MCHC RBC-ENTMCNC: 32.7 PG — SIGNIFICANT CHANGE UP (ref 27–34)
MCV RBC AUTO: 106.8 FL — HIGH (ref 80–100)
MONOCYTES # BLD AUTO: 0.03 K/UL — SIGNIFICANT CHANGE UP (ref 0–0.9)
MONOCYTES NFR BLD AUTO: 0.9 % — LOW (ref 2–14)
NEUTROPHILS # BLD AUTO: 2.94 K/UL — SIGNIFICANT CHANGE UP (ref 1.8–7.4)
NEUTROPHILS NFR BLD AUTO: 85.3 % — HIGH (ref 43–77)
NEUTS BAND # BLD: 2.6 % — SIGNIFICANT CHANGE UP (ref 0–8)
OVALOCYTES BLD QL SMEAR: SIGNIFICANT CHANGE UP
PLAT MORPH BLD: NORMAL — SIGNIFICANT CHANGE UP
PLATELET # BLD AUTO: 61 K/UL — LOW (ref 150–400)
POIKILOCYTOSIS BLD QL AUTO: SIGNIFICANT CHANGE UP
POTASSIUM SERPL-MCNC: 3.1 MMOL/L — LOW (ref 3.5–5.3)
POTASSIUM SERPL-SCNC: 3.1 MMOL/L — LOW (ref 3.5–5.3)
PROT SERPL-MCNC: 6.3 G/DL — SIGNIFICANT CHANGE UP (ref 6–8.3)
RBC # BLD: 2.78 M/UL — LOW (ref 3.8–5.2)
RBC # FLD: 15.3 % — HIGH (ref 10.3–14.5)
RBC BLD AUTO: ABNORMAL
SODIUM SERPL-SCNC: 146 MMOL/L — HIGH (ref 135–145)
SPECIMEN SOURCE: SIGNIFICANT CHANGE UP
SPECIMEN SOURCE: SIGNIFICANT CHANGE UP
TOXIC GRANULES BLD QL SMEAR: PRESENT — SIGNIFICANT CHANGE UP
VARIANT LYMPHS # BLD: 1.7 % — SIGNIFICANT CHANGE UP (ref 0–6)
WBC # BLD: 3.35 K/UL — LOW (ref 3.8–10.5)
WBC # FLD AUTO: 3.35 K/UL — LOW (ref 3.8–10.5)

## 2024-08-28 RX ORDER — METHYLPREDNISOLONE 4 MG
20 TABLET ORAL EVERY 8 HOURS
Refills: 0 | Status: DISCONTINUED | OUTPATIENT
Start: 2024-08-28 | End: 2024-08-30

## 2024-08-28 RX ORDER — POTASSIUM CHLORIDE 10 MEQ
20 TABLET, EXT RELEASE, PARTICLES/CRYSTALS ORAL ONCE
Refills: 0 | Status: COMPLETED | OUTPATIENT
Start: 2024-08-28 | End: 2024-08-28

## 2024-08-28 RX ADMIN — CEFEPIME 100 MILLIGRAM(S): 2 INJECTION, POWDER, FOR SOLUTION INTRAVENOUS at 05:36

## 2024-08-28 RX ADMIN — Medication 20 MILLIEQUIVALENT(S): at 17:39

## 2024-08-28 RX ADMIN — Medication 20 MILLIGRAM(S): at 21:37

## 2024-08-28 RX ADMIN — IPRATROPIUM BROMIDE AND ALBUTEROL SULFATE 3 MILLILITER(S): .5; 3 SOLUTION RESPIRATORY (INHALATION) at 17:40

## 2024-08-28 RX ADMIN — GUAIFENESIN 1200 MILLIGRAM(S): 100 LIQUID ORAL at 17:40

## 2024-08-28 RX ADMIN — Medication 4 MILLILITER(S): at 13:04

## 2024-08-28 RX ADMIN — Medication 70 MICROGRAM(S): at 21:37

## 2024-08-28 RX ADMIN — Medication 20 MILLIGRAM(S): at 05:36

## 2024-08-28 RX ADMIN — Medication 5000 UNIT(S): at 17:41

## 2024-08-28 RX ADMIN — Medication 4 MILLILITER(S): at 05:33

## 2024-08-28 RX ADMIN — METOPROLOL TARTRATE 25 MILLIGRAM(S): 100 TABLET ORAL at 17:41

## 2024-08-28 RX ADMIN — Medication 20 MILLIEQUIVALENT(S): at 14:42

## 2024-08-28 RX ADMIN — IPRATROPIUM BROMIDE AND ALBUTEROL SULFATE 3 MILLILITER(S): .5; 3 SOLUTION RESPIRATORY (INHALATION) at 13:03

## 2024-08-28 RX ADMIN — Medication 4 MILLILITER(S): at 17:41

## 2024-08-28 RX ADMIN — Medication 40 MILLIGRAM(S): at 05:36

## 2024-08-28 RX ADMIN — Medication 100 MILLIGRAM(S): at 05:36

## 2024-08-28 RX ADMIN — Medication 5000 UNIT(S): at 05:33

## 2024-08-28 RX ADMIN — CEFEPIME 100 MILLIGRAM(S): 2 INJECTION, POWDER, FOR SOLUTION INTRAVENOUS at 17:38

## 2024-08-28 RX ADMIN — Medication 40 MILLIGRAM(S): at 13:04

## 2024-08-28 RX ADMIN — BUDESONIDE 0.5 MILLIGRAM(S): 3 CAPSULE ORAL at 18:17

## 2024-08-28 RX ADMIN — BUDESONIDE 0.5 MILLIGRAM(S): 3 CAPSULE ORAL at 05:32

## 2024-08-28 RX ADMIN — Medication 100 MILLIGRAM(S): at 13:03

## 2024-08-28 RX ADMIN — IPRATROPIUM BROMIDE AND ALBUTEROL SULFATE 3 MILLILITER(S): .5; 3 SOLUTION RESPIRATORY (INHALATION) at 05:32

## 2024-08-28 RX ADMIN — GUAIFENESIN 1200 MILLIGRAM(S): 100 LIQUID ORAL at 05:32

## 2024-08-28 RX ADMIN — Medication 40 MILLIGRAM(S): at 13:03

## 2024-08-28 RX ADMIN — METOPROLOL TARTRATE 25 MILLIGRAM(S): 100 TABLET ORAL at 05:32

## 2024-08-28 NOTE — PROGRESS NOTE ADULT - ASSESSMENT
Ms. Slater is a 89y Female with PMHx of asthma, HTN, essential tremor, bronchiectasis, GERD, hypothyroidism, paroxysmal A.fib on Eliquis 2.5mg BID, Vit D deficiency, tuberculous peritonitis, anemia moderate AS, hx of falls, aortic regurgitation presented with nausea and vomiting 08/23/2024 and subsequently admitted to MICU with SBO and transferred to floor on 08/25/2024, treated conservatively complicated by septic shock requiring pressor support. Pt has hx of pancytopenia.     Pt has a history of pancytopenia as outpatient with Leukopenia with since about 3y prior initially lymphopenia and then with neutropenia, progressive, anemia over the past year gradually worsening, with progressive macrocytosis, and thrombocytopenia over the past two years. Pt requested conservative treatment and denied BmBx a few times in the past. Discussed with pt again this admission and she stated she would not like to undergo aggressive interventions and she would not like to have a bone marrow biopsy, and she prefers conservative treatment otherwise.     She denied history of blood disorders, bleeding complications, or personal history of malignancy. She denied smoking, ETOH use. She lives in her apt with a 24hr aide and her daughter visits regularly.     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   - Outpatient labs show gradual progressive leukopenia x 3y, initial lymphopenia now both lymphopenia and neutropenia   -- Most recent outpatient WBC ranging 1.9-4.6   - Thrombocytopenia outpatient ranging 85-100s x 2y  - Anemia ranging 9.5-11 over past year, currently close to this  - MCV with progressive macrocytosis over past year  - ANC 3250, s/p steroids  - Iron sat 9%, Folate 15.6, B12 179  - Previous paraproteinemia workup negative for obvious plasma cell dyscrasia SPEP/MOOKIE normal, Serum immunoglobulins normal, serum FLCs (ratio 1.44), LDH (normal), Beta-2 Mcg (mild elv 2.6),   - Transfuse PRBC to maintain Hb > 7   - Transfuse Plt to maintain Plt > 10k  - Trend CBC daily with Diff for now   - Continue B12 1000mcg IM weekly  - Again reviewed and discussed pancytopenia with the pt she is aware of its gradual, progressive worsening over past three years, discussed with pt again this admission and she stated she would not like to undergo aggressive interventions and she would declined bone marrow biopsy  - GOC noted   - Needs to follow up as outpatient     # Small bowel obstruction  - CT scan performed in ED showed evidence of dilated small bowel loops with large stool burden with concern for small bowel obstruction vs enteritis  - S/P MICU admission with NGT decompression  - Transferred to floor 08/25/2024     # Aspiration Pneumonia  -  CT A/P lower chest with with multifocal PNA  -  Pulmonology following, recs noted  -  Resolved septic shock while in MICU  -  CT Chest   - Antibiotics per ID and primary team    #A.fib  - Was previously on Eliquis 2.5mg per cardiology  - Plt 50k  - Pt at risk for CVA vs bleeding given clinical context  - Cardiology consult pending   - Can proceed with Hep SubQ BID, can consider Eliquis depending on Plt count trajectory, clinical risk     # Hypothyroidism  - Continue levothyroxine         Thank you for allowing me to participate in the care of Ms. Slater, please do not hesitate to call or text me if you have further questions or concerns.       Roni Polanco MD  [facility] [place]   Division of Hematology/Oncology  [address], [address]  [place] [address]  P: [phone]  F: [phone]    Attestation:    ----Pt evaluated including face-to-face interaction in addition to chart review, reviewing treatment plan, and managing the patient’s chronic diagnoses as listed in the assessment----      Ms. Slater is a 89y Female with PMHx of asthma, HTN, essential tremor, bronchiectasis, GERD, hypothyroidism, paroxysmal A.fib on Eliquis 2.5mg BID, Vit D deficiency, tuberculous peritonitis, anemia moderate AS, hx of falls, aortic regurgitation presented with nausea and vomiting 08/23/2024 and subsequently admitted to MICU with SBO and transferred to floor on 08/25/2024, treated conservatively complicated by septic shock requiring pressor support. Pt has hx of pancytopenia.     Pt has a history of pancytopenia as outpatient with Leukopenia with since about 3y prior initially lymphopenia and then with neutropenia, progressive, anemia over the past year gradually worsening, with progressive macrocytosis, and thrombocytopenia over the past two years. Pt requested conservative treatment and denied BmBx a few times in the past. Discussed with pt again this admission and she stated she would not like to undergo aggressive interventions and she would not like to have a bone marrow biopsy, and she prefers conservative treatment otherwise.     She denied history of blood disorders, bleeding complications, or personal history of malignancy. She denied smoking, ETOH use. She lives in her apt with a 24hr aide and her daughter visits regularly.     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   - Outpatient labs show gradual progressive leukopenia x 3y, initial lymphopenia now both lymphopenia and neutropenia   -- Most recent outpatient WBC ranging 1.9-4.6   - Thrombocytopenia outpatient ranging 85-100s x 2y  - Anemia ranging 9.5-11 over past year, currently close to this  - MCV with progressive macrocytosis over past year  - ANC 3250, s/p steroids  - Iron sat 9%, Folate 15.6, B12 179  - Previous paraproteinemia workup negative for obvious plasma cell dyscrasia SPEP/MOOKIE normal, Serum immunoglobulins normal, serum FLCs (ratio 1.44), LDH (normal), Beta-2 Mcg (mild elv 2.6),   - Transfuse PRBC to maintain Hb > 7   - Transfuse Plt to maintain Plt > 10k  - Trend CBC daily with Diff for now   - Continue B12 1000mcg IM weekly  - Again reviewed and discussed pancytopenia with the pt she is aware of its gradual, progressive worsening over past three years, discussed with pt again this admission and she stated she would not like to undergo aggressive interventions and she would declined bone marrow biopsy  - GOC noted   - Needs to follow up as outpatient     # Small bowel obstruction  - CT scan performed in ED showed evidence of dilated small bowel loops with large stool burden with concern for small bowel obstruction vs enteritis  - S/P MICU admission with NGT decompression  - Transferred to floor 08/25/2024     # Aspiration Pneumonia  -  CT A/P lower chest with with multifocal PNA  -  Pulmonology following, recs noted  -  Resolved septic shock while in MICU  -  CT Chest   - Antibiotics per ID and primary team    #A.fib  - Was previously on Eliquis 2.5mg per cardiology  - Plt 50k >48k   - Pt at risk for CVA vs bleeding given clinical context  - Cardiology consult and recs noted, currently risks vs benefits favoring high risk for AC  - Can proceed with Hep SubQ BID, can consider Eliquis depending on Plt count trajectory, recommend ok to restart if Plt count consistently > 75k and per clinical risk     # Hypothyroidism  - Continue levothyroxine         Thank you for allowing me to participate in the care of Ms. Slater, please do not hesitate to call or text me if you have further questions or concerns.       Roni Polanco MD  [facility] [place]   Division of Hematology/Oncology  [address], [address]  [place] [address]  P: [phone]  F: [phone]    Attestation:    ----Pt evaluated including face-to-face interaction in addition to chart review, reviewing treatment plan, and managing the patient’s chronic diagnoses as listed in the assessment----

## 2024-08-28 NOTE — PROGRESS NOTE ADULT - ASSESSMENT
89F with history of paroxysmal atrial fibrillation, hypothyroidism, pulmonary/abdominal tuberculosis, recurrent small bowel obstruction, recurrent UTIs presenting from home with abdominal pain and vomiting for 1 day. Per daughter and HHA at bedside, patient was in her usual state of health until yesterday. Normally able to communicate and ambulate to the bathroom and eat on her own. Does have a HHA for assistance as needed. HHA reports that around 2:30 AM last night, patient called for assistance and was vomiting and asked to call an ambulance. CT scan performed in ED showed evidence of dilated small bowel loops with large stool burden with concern for small bowel obstruction vs enteritis. Also with some imaging findings concerning for multifocal pneumonia. Surgery consulted given history of small bowel obstruction and imaging and NGT placed with NGT to suction draining minimal dark abdominal contents. Additionally with hypoxemic respiratory failure requiring HFNC 40/40. ED course c/b hypotension 70/40s with transient improvement with 3L IVF however with repeat hypotension despite these measures.   GOC discussion had with daughter at bedside. Patient is DNR/DNI and would not want aggressive measures including surgery or central line placement for pressor therapy. Would like a trial of IV pressors via peripheral IV and if these fails, daughter would not want further escalation. Was also seen by palliative care team in ED and was being considered for PCU on fixed dose pressors however no beds available at this time.   Admitted to MICU for septic shock. (23 Aug 2024 15:40)      Multifocal Pneumonia/ Septic shock / Hypoxia  A trial fibrillation  Hypothyroidism   Hx of pulm/Abd tuberculosis  HTN/Hx of orthostatic hypotension    Multifocal Pneumonia/ Septic shock / Hypoxia  -was neutropenic on admission with multifocal pneumonia and septic shock;  -S/P MICU stay recovered now transferred to floor  -she is on broad spectrum antibtiocs:  would cont for now;   -cxr reviewed:  -consider ct chest  non contrast    -she has wheezing and difficulty to expectorate phlegm : add duoneb and Mucomyst   -may need steroids will wait and see for next 24 hours;  if  needed will add  in am       8/26:-change to solumedrol : cont Mucomyst and nebs  -watch for blood pressure   -cont mucomyst  : add mucinex too    8/27: still with some cough : has coarse breath sounds ; still on steroids increased to q 8 yesterday ;  8/28: her wheezxing is better:  decrease 20 mg q 8 hours and if doing  ok : will change and taper:       A trial fibrillation  -not on any full AC:  -on heparin sq   8/27: now he has RVR:  fill ac per prim svitlana team:  being transferred to monitored unit     Hypothyroidism   -levothyroxine    Hx of pulm/Abd tuberculosis  -treated before    HTN/Hx of orthostatic hypotension  -currently on stress doses of steroids and being tapered   8/27" changed to solumedrol :" monitor for orthostasis   8/28: blood pressure seems stable:     wing RCU ACP

## 2024-08-28 NOTE — PROGRESS NOTE ADULT - ASSESSMENT
Patient is a 89 year old female with PMH of pAfib, hypothyroidism, pulmonary/abdominal tuberculosis, recurrent small bowel obstruction, recurrent UTIs presenting from home with abdominal pain and vomiting for 1 day. CT scan performed in ED showed evidence of dilated small bowel loops with large stool burden with concern for small bowel obstruction vs enteritis; s/p NGT. Course complicated by hypotension likely septic from multifocal pneumonia. Shock resolved, off pressors, transferred out of MICU to floor.   AHRF due to multifocal pneumonia - suspect aspiration  Leukopenia with bandemia on admission, trend noted   chart reviewed, h/o pancytopenia with progressive leukopenia   COVID/Flu/RSV negative   Bcx NGTD; Ucx negative   CXR with R sided pneumonia with small dependent b/l effusions  now on NC, remains afebrile  Cardiology following for afib  h/o PCN allergy, tolerates cephalosporins     Recommendations:   Continue on cefepime and flagyl - plan for 7d course until 8/29  Pulmonary following, on steroids   Heme/Onc following for pancytopenia   Aspiration precautions, SLP following  Supplemental O2 as needed, wean as tolerated   Monitor temps/CBC - if any fevers or worsening, send Bcx x2 and can broaden to meropenem  Continue rest of care per primary team       Lilli Polanco M.D.  [facility], Division of Infectious Diseases  [phone]  After 5pm on weekdays and all day on weekends - please call [phone]  Available on [employer] TEAMS

## 2024-08-28 NOTE — PROGRESS NOTE ADULT - SUBJECTIVE AND OBJECTIVE BOX
Date of Service: 08-28-24 @ 16:59    Patient is a 89y old  Female who presents with a chief complaint of septic shock (28 Aug 2024 08:56)      Any change in ROS: she seems better;     MEDICATIONS  (STANDING):  acetylcysteine 20%  Inhalation 4 milliLiter(s) Inhalation every 6 hours  albuterol/ipratropium for Nebulization 3 milliLiter(s) Nebulizer every 6 hours  buDESOnide    Inhalation Suspension 0.5 milliGRAM(s) Inhalation every 12 hours  cefepime   IVPB 2000 milliGRAM(s) IV Intermittent every 12 hours  furosemide   Injectable 20 milliGRAM(s) IV Push daily  guaiFENesin ER 1200 milliGRAM(s) Oral every 12 hours  heparin   Injectable 5000 Unit(s) SubCutaneous every 12 hours  levothyroxine Injectable 70 MICROGram(s) IV Push at bedtime  methylPREDNISolone sodium succinate Injectable 40 milliGRAM(s) IV Push every 8 hours  metoprolol tartrate 25 milliGRAM(s) Oral two times a day  pantoprazole  Injectable 40 milliGRAM(s) IV Push daily  potassium chloride   Solution 20 milliEquivalent(s) Oral once    MEDICATIONS  (PRN):    Vital Signs Last 24 Hrs  T(C): 36.8 (28 Aug 2024 11:06), Max: 36.8 (28 Aug 2024 11:06)  T(F): 98.3 (28 Aug 2024 11:06), Max: 98.3 (28 Aug 2024 11:06)  HR: 84 (28 Aug 2024 11:06) (84 - 103)  BP: 123/85 (28 Aug 2024 11:06) (118/79 - 123/85)  BP(mean): --  RR: 18 (28 Aug 2024 04:35) (18 - 19)  SpO2: 98% (28 Aug 2024 11:06) (95% - 98%)    Parameters below as of 28 Aug 2024 11:06  Patient On (Oxygen Delivery Method): nasal cannula  O2 Flow (L/min): 6      I&O's Summary    27 Aug 2024 07:01  -  28 Aug 2024 07:00  --------------------------------------------------------  IN: 340 mL / OUT: 930 mL / NET: -590 mL    28 Aug 2024 07:01  -  28 Aug 2024 16:59  --------------------------------------------------------  IN: 0 mL / OUT: 1200 mL / NET: -1200 mL          Physical Exam:   GENERAL: NAD, well-groomed, well-developed  HEENT: KOLBY/   Atraumatic, Normocephalic  ENMT: No tonsillar erythema, exudates, or enlargement; Moist mucous membranes, Good dentition, No lesions  NECK: Supple, No JVD, Normal thyroid  CHEST/LUNG: Clear to auscultaion-  CVS: Regular rate and rhythm; No murmurs, rubs, or gallops  GI: : Soft, Nontender, Nondistended; Bowel sounds present  NERVOUS SYSTEM:  Alert & awake  EXTREMITIES:  - edema  LYMPH: No lymphadenopathy noted  SKIN: No rashes or lesions  ENDOCRINOLOGY: No Thyromegaly  PSYCH: calm   Labs:  30, 32                            9.1    3.35  )-----------( 61       ( 28 Aug 2024 10:43 )             29.7                         8.6    2.58  )-----------( 48       ( 27 Aug 2024 07:15 )             28.3                         9.2    3.78  )-----------( 50       ( 26 Aug 2024 07:31 )             30.3                         8.2    2.71  )-----------( 51       ( 25 Aug 2024 00:22 )             26.6     08-28    146<H>  |  104  |  36<H>  ----------------------------<  148<H>  3.1<L>   |  30  |  0.64  08-27    145  |  106  |  24<H>  ----------------------------<  110<H>  3.8   |  27  |  0.55  08-26    144  |  107  |  22  ----------------------------<  102<H>  3.9   |  24  |  0.58  08-25    140  |  108  |  20  ----------------------------<  125<H>  4.0   |  23  |  0.63    Ca    8.8      28 Aug 2024 10:43  Ca    8.3<L>      27 Aug 2024 07:15  Phos  2.8     08-27  Mg     2.1     08-27    TPro  6.3  /  Alb  3.1<L>  /  TBili  0.6  /  DBili  x   /  AST  11  /  ALT  10  /  AlkPhos  84  08-28  TPro  6.1  /  Alb  3.0<L>  /  TBili  0.5  /  DBili  x   /  AST  12  /  ALT  11  /  AlkPhos  86  08-26  TPro  5.4<L>  /  Alb  2.7<L>  /  TBili  0.6  /  DBili  x   /  AST  11  /  ALT  11  /  AlkPhos  83  08-25    CAPILLARY BLOOD GLUCOSE      POCT Blood Glucose.: 191 mg/dL (28 Aug 2024 12:39)  POCT Blood Glucose.: 169 mg/dL (28 Aug 2024 07:12)  POCT Blood Glucose.: 135 mg/dL (27 Aug 2024 23:42)  POCT Blood Glucose.: 131 mg/dL (27 Aug 2024 17:06)      LIVER FUNCTIONS - ( 28 Aug 2024 10:43 )  Alb: 3.1 g/dL / Pro: 6.3 g/dL / ALK PHOS: 84 U/L / ALT: 10 U/L / AST: 11 U/L / GGT: x             Urinalysis Basic - ( 28 Aug 2024 10:43 )    Color: x / Appearance: x / SG: x / pH: x  Gluc: 148 mg/dL / Ketone: x  / Bili: x / Urobili: x   Blood: x / Protein: x / Nitrite: x   Leuk Esterase: x / RBC: x / WBC x   Sq Epi: x / Non Sq Epi: x / Bacteria: x            RECENT CULTURES:  08-24 @ 00:10 Clean Catch Clean Catch (Midstream)                <10,000 CFU/mL Normal Urogenital Yeimi    08-23 @ 09:30 .Blood Blood-Peripheral     rad< from: Xray Chest 1 View- PORTABLE-Urgent (Xray Chest 1 View- PORTABLE-Urgent .) (08.26.24 @ 17:17) >        INTERPRETATION:  EXAMINATION: XR CHEST URGENT    CLINICAL INDICATION: Dyspnea    TECHNIQUE: Single frontal, portable view of the chest was obtained.    COMPARISON: Chest x-ray 8/23/2024 and 8/24/2024.    FINDINGS:  Rotated study.  Interval removal of patient's enteric tube.  The heart is magnified by technique, size not reliably assessed.  New right midlung field airspace opacity. Mild left basilar haziness.  No pneumothorax.  No acute bony abnormality.    IMPRESSION:  Findings are concerning for right sided pneumonia with small dependent   bilateral effusions.    --- End of Report ---           YESY BERGERON MD; Resident Radiologist  This document has been electronically signed.  DESHAUN BARRON MD; Attending Radiologist  This document has been electronically signed. Aug 27 2024 12:16PM    < end of copied text >             No growth at 5 days    08-23 @ 09:00 .Blood Blood-Peripheral                No growth at 5 days          RESPIRATORY CULTURES:          Studies  Chest X-RAY  CT SCAN Chest   Venous Dopplers: LE:   CT Abdomen  Others

## 2024-08-28 NOTE — PROGRESS NOTE ADULT - ASSESSMENT
89F with history of paroxysmal atrial fibrillation, hypothyroidism, pulmonary/abdominal tuberculosis, recurrent small bowel obstruction, recurrent UTIs presenting from home with abdominal pain and vomiting for 1 day with CT scans with evidence of SBO vs enteritis s/p NGT. Course c/b hypotension likely iso septic from multifocal PNA process.    #Septic Shock   #Acute Hypoxemic Respiratory Failure  #Multifocal PNA   #Acute metabolic encephalopathy   - required levophed for shock state but now weaned off with MAP > 60 maintained  - c/w cefepime and flagyl for empiric antibiotics for PNA and abdominal coverage   - BC NTD  - off HIFLO on 6L, cont to titrate  - fu S&S eval  - on solumedrol, Mucomyst and nebs per pulm    #Paroxysmal Atrial Fibrillation  #Acute hfpef    - eliquis on hold given possible dysphagia, pending S&S eval  - Strict I & O, Daily weights   - on Lasix 20 mg IV daily   - TTE with presered lvef  - on heparin subq for DVT ppx    #Small Bowel Obstruction  #History of Abdominal Tuberculosis   #History of Recurrent Small Bowel Obstruction  - s/p NGT to suction with small amounts of brown stomach content   - now on clear liquid diet   - ongoing swallow eval     #History of Pulmonary Tuberculosis in 90s  - s/p treatment in 1990s    #Leukopenia  #Neutropenia  - was receiving prednisone OP for low blood pressure  - on solumedrol per pulm     #Hypothyroidism  - c/w levothyroxine 75 mcg IV    Ethics  DNR/DNI; no central lines    Please call [facility] with questions [phone].   89F with history of paroxysmal atrial fibrillation, hypothyroidism, pulmonary/abdominal tuberculosis, recurrent small bowel obstruction, recurrent UTIs presenting from home with abdominal pain and vomiting for 1 day with CT scans with evidence of SBO vs enteritis s/p NGT. Course c/b hypotension likely iso septic from multifocal PNA process.    #Septic Shock   #Acute Hypoxemic Respiratory Failure  #Multifocal PNA   #Acute metabolic encephalopathy   - required levophed for shock state but now weaned off with MAP > 60 maintained  - c/w cefepime and flagyl for empiric antibiotics for PNA and abdominal coverage   - BC NTD  - off Hiflo on 6L, cont to titrate  - fu S&S eval  - on solumedrol, Mucomyst and nebs per pulm    #Paroxysmal Atrial Fibrillation  #Acute hfpef    - eliquis on hold given possible dysphagia, pending S&S eval  - Strict I & O, Daily weights   - on Lasix 20 mg IV daily   - TTE with presered lvef  - on heparin subq for DVT ppx    #Small Bowel Obstruction  #History of Abdominal Tuberculosis   #History of Recurrent Small Bowel Obstruction  - s/p NGT to suction with small amounts of brown stomach content   - now on clear liquid diet   - ongoing swallow eval     #History of Pulmonary Tuberculosis in 90s  - s/p treatment in 1990s    #Leukopenia  #Neutropenia  - was receiving prednisone OP for low blood pressure  - on solumedrol per pulm     #Hypothyroidism  - c/w levothyroxine 75 mcg IV    Ethics  DNR/DNI; no central lines    Please call [facility] with questions [phone].   89F with history of paroxysmal atrial fibrillation, hypothyroidism, pulmonary/abdominal tuberculosis, recurrent small bowel obstruction, recurrent UTIs presenting from home with abdominal pain and vomiting for 1 day with CT scans with evidence of SBO vs enteritis s/p NGT. Course c/b hypotension likely iso septic from multifocal PNA process.    #Septic Shock   #Acute Hypoxemic Respiratory Failure  #Multifocal PNA   #Acute metabolic encephalopathy   - required levophed for shock state but now weaned off with MAP > 60 maintained  - c/w cefepime and flagyl for empiric antibiotics for PNA and abdominal coverage   - BC NTD  - off Hiflo on 6L, cont to titrate  - fu S&S eval  - on solumedrol, Mucomyst and nebs per pulm    #Paroxysmal Atrial Fibrillation  #Acute hfpef    - eliquis on hold given possible dysphagia, pending S&S eval  - Strict I & O, Daily weights   - on Lasix 20 mg IV daily   - TTE with presered lvef  - on heparin subq for DVT ppx    #Small Bowel Obstruction  #History of Abdominal Tuberculosis   #History of Recurrent Small Bowel Obstruction  - s/p NGT to suction with small amounts of brown stomach content   - now on clear liquid diet   - ongoing swallow eval     #History of Pulmonary Tuberculosis in 90s  - s/p treatment in 1990s    #pancytopenia  - was receiving prednisone OP for low blood pressure  - on solumedrol per pulm   - likely bone marrow disorder, outpt fu heme    #Hypothyroidism  - c/w levothyroxine 75 mcg IV    Ethics  DNR/DNI; no central lines    Please call [facility] with questions [phone].

## 2024-08-28 NOTE — PROGRESS NOTE ADULT - SUBJECTIVE AND OBJECTIVE BOX
[facility] HEMATOLOGY/ONCOLOGY INPATIENT PROGRESS NOTE     Interval Hx:   08-28-24: Ms. Slater was seen at bedside today.    Meds:   MEDICATIONS  (STANDING):  acetylcysteine 20%  Inhalation 4 milliLiter(s) Inhalation every 6 hours  albuterol/ipratropium for Nebulization 3 milliLiter(s) Nebulizer every 6 hours  buDESOnide    Inhalation Suspension 0.5 milliGRAM(s) Inhalation every 12 hours  cefepime   IVPB 2000 milliGRAM(s) IV Intermittent every 12 hours  furosemide   Injectable 20 milliGRAM(s) IV Push daily  guaiFENesin ER 1200 milliGRAM(s) Oral every 12 hours  heparin   Injectable 5000 Unit(s) SubCutaneous every 12 hours  levothyroxine Injectable 70 MICROGram(s) IV Push at bedtime  methylPREDNISolone sodium succinate Injectable 40 milliGRAM(s) IV Push every 8 hours  metoprolol tartrate 25 milliGRAM(s) Oral two times a day  metroNIDAZOLE  IVPB 500 milliGRAM(s) IV Intermittent every 8 hours  pantoprazole  Injectable 40 milliGRAM(s) IV Push daily    MEDICATIONS  (PRN):    Vital Signs Last 24 Hrs  T(C): 36.6 (27 Aug 2024 20:21), Max: 37 (27 Aug 2024 14:30)  T(F): 97.8 (27 Aug 2024 20:21), Max: 98.6 (27 Aug 2024 14:30)  HR: 103 (27 Aug 2024 20:21) (90 - 108)  BP: 119/75 (27 Aug 2024 20:21) (97/69 - 125/70)  BP(mean): --  RR: 19 (27 Aug 2024 20:21) (17 - 19)  SpO2: 95% (27 Aug 2024 20:21) (95% - 100%)    Parameters below as of 27 Aug 2024 21:06  Patient On (Oxygen Delivery Method): nasal cannula    Physical Exam:  Gen:  NAD, lethargic  HEENT: EOMI, MMM  Chest: equal chest rise, speaking full sentences  Cardiac: irregular  Abd: soft, non-tender, no hepatomegaly or splenomegaly  Ext: No edema   Neuro: AAOx3, normal mood and affect, hard of hearing     Labs:                        8.6    2.58  )-----------( 48       ( 27 Aug 2024 07:15 )             28.3     CBC Full  -  ( 27 Aug 2024 07:15 )  WBC Count : 2.58 K/uL  RBC Count : 2.60 M/uL  Hemoglobin : 8.6 g/dL  Hematocrit : 28.3 %  Platelet Count - Automated : 48 K/uL  Mean Cell Volume : 108.8 fl  Mean Cell Hemoglobin : 33.1 pg  Mean Cell Hemoglobin Concentration : 30.4 gm/dL    08-27    145  |  106  |  24<H>  ----------------------------<  110<H>  3.8   |  27  |  0.55    Ca    8.3<L>      27 Aug 2024 07:15  Phos  2.8     08-27  Mg     2.1     08-27    TPro  6.1  /  Alb  3.0<L>  /  TBili  0.5  /  DBili  x   /  AST  12  /  ALT  11  /  AlkPhos  86  08-26       [facility] HEMATOLOGY/ONCOLOGY INPATIENT PROGRESS NOTE     Interval Hx:   08-28-24: Ms. Slater was seen at bedside today, awake and alert, quite hard of hearing, nurse at bedside stated pt did well overnight, she had dry mucous membranes, no signs of bleeding, discussed her hematological diagnoses again, she is aware     Meds:   MEDICATIONS  (STANDING):  acetylcysteine 20%  Inhalation 4 milliLiter(s) Inhalation every 6 hours  albuterol/ipratropium for Nebulization 3 milliLiter(s) Nebulizer every 6 hours  buDESOnide    Inhalation Suspension 0.5 milliGRAM(s) Inhalation every 12 hours  cefepime   IVPB 2000 milliGRAM(s) IV Intermittent every 12 hours  furosemide   Injectable 20 milliGRAM(s) IV Push daily  guaiFENesin ER 1200 milliGRAM(s) Oral every 12 hours  heparin   Injectable 5000 Unit(s) SubCutaneous every 12 hours  levothyroxine Injectable 70 MICROGram(s) IV Push at bedtime  methylPREDNISolone sodium succinate Injectable 40 milliGRAM(s) IV Push every 8 hours  metoprolol tartrate 25 milliGRAM(s) Oral two times a day  metroNIDAZOLE  IVPB 500 milliGRAM(s) IV Intermittent every 8 hours  pantoprazole  Injectable 40 milliGRAM(s) IV Push daily    MEDICATIONS  (PRN):    Vital Signs Last 24 Hrs  T(C): 36.6 (27 Aug 2024 20:21), Max: 37 (27 Aug 2024 14:30)  T(F): 97.8 (27 Aug 2024 20:21), Max: 98.6 (27 Aug 2024 14:30)  HR: 103 (27 Aug 2024 20:21) (90 - 108)  BP: 119/75 (27 Aug 2024 20:21) (97/69 - 125/70)  BP(mean): --  RR: 19 (27 Aug 2024 20:21) (17 - 19)  SpO2: 95% (27 Aug 2024 20:21) (95% - 100%)    Parameters below as of 27 Aug 2024 21:06  Patient On (Oxygen Delivery Method): nasal cannula    Physical Exam:  Gen:  NAD, lethargic  HEENT: EOMI, MMM  Chest: equal chest rise, speaking full sentences  Cardiac: irregular  Abd: soft, non-tender, no hepatomegaly or splenomegaly  Ext: No edema   Neuro: AAOx3, normal mood and affect, hard of hearing     Labs:                        8.6    2.58  )-----------( 48       ( 27 Aug 2024 07:15 )             28.3     CBC Full  -  ( 27 Aug 2024 07:15 )  WBC Count : 2.58 K/uL  RBC Count : 2.60 M/uL  Hemoglobin : 8.6 g/dL  Hematocrit : 28.3 %  Platelet Count - Automated : 48 K/uL  Mean Cell Volume : 108.8 fl  Mean Cell Hemoglobin : 33.1 pg  Mean Cell Hemoglobin Concentration : 30.4 gm/dL    08-27    145  |  106  |  24<H>  ----------------------------<  110<H>  3.8   |  27  |  0.55    Ca    8.3<L>      27 Aug 2024 07:15  Phos  2.8     08-27  Mg     2.1     08-27    TPro  6.1  /  Alb  3.0<L>  /  TBili  0.5  /  DBili  x   /  AST  12  /  ALT  11  /  AlkPhos  86  08-26

## 2024-08-28 NOTE — PROGRESS NOTE ADULT - SUBJECTIVE AND OBJECTIVE BOX
DATE OF SERVICE: 08-28-24 @ 20:14    Patient is a 89y old  Female who presents with a chief complaint of septic shock (28 Aug 2024 16:58)      INTERVAL HISTORY: feels ok    TELEMETRY Personally reviewed: no events  	  MEDICATIONS:  furosemide   Injectable 20 milliGRAM(s) IV Push daily  metoprolol tartrate 25 milliGRAM(s) Oral two times a day        PHYSICAL EXAM:  T(C): 36.8 (08-28-24 @ 11:06), Max: 36.8 (08-28-24 @ 11:06)  HR: 95 (08-28-24 @ 17:39) (84 - 103)  BP: 122/77 (08-28-24 @ 17:39) (118/79 - 123/85)  RR: 19 (08-28-24 @ 17:39) (18 - 19)  SpO2: 94% (08-28-24 @ 17:39) (94% - 98%)  Wt(kg): --  I&O's Summary    27 Aug 2024 07:01  -  28 Aug 2024 07:00  --------------------------------------------------------  IN: 340 mL / OUT: 930 mL / NET: -590 mL    28 Aug 2024 07:01  -  28 Aug 2024 20:14  --------------------------------------------------------  IN: 0 mL / OUT: 1200 mL / NET: -1200 mL          Appearance: In no distress	  HEENT:    PERRL, EOMI	  Cardiovascular:  S1 S2, No JVD  Respiratory: Lungs clear to auscultation	  Gastrointestinal:  Soft, Non-tender, + BS	  Vascularature:  No edema of LE  Psychiatric: Appropriate affect   Neuro: no acute focal deficits                               9.1    3.35  )-----------( 61       ( 28 Aug 2024 10:43 )             29.7     08-28    146<H>  |  104  |  36<H>  ----------------------------<  148<H>  3.1<L>   |  30  |  0.64    Ca    8.8      28 Aug 2024 10:43  Phos  2.8     08-27  Mg     2.1     08-27    TPro  6.3  /  Alb  3.1<L>  /  TBili  0.6  /  DBili  x   /  AST  11  /  ALT  10  /  AlkPhos  84  08-28        Labs personally reviewed      EKG: Personally reviewed by me - AF RVR  Radiology: Personally reviewed by me - CXR Findings are concerning for right sided pneumonia with small dependent bilateral effusions.    TTE CONCLUSIONS:      1. Left ventricular cavity is small. Left ventricular wall thickness is normal. Left ventricular systolic function is normal. There are no regional wall motion abnormalities seen.   2. Normal right ventricular cavity size, with normal wall thickness, and normal right ventricular systolic function.   3. Mild pulmonary hypertension.      Assessment and Plan: 	  89F with history of paroxysmal atrial fibrillation, hypothyroidism, pulmonary/abdominal tuberculosis, recurrent small bowel obstruction, recurrent UTIs presenting from home with abdominal pain and vomiting for 1 day with CT scans with evidence of SBO vs enteritis s/p NGT. Course c/b hypotension likely iso septic from multifocal PNA process.    1. Paroxysmal Atrial Fibrillation  - Switch Metoprolol IV to Metoprolol 25mg PO BID  - Hold Eliquis given thrombocytopenia ~50, resume when safe per heme but risks seem to outweigh benefits at this time    2. Acute hfpef - On Lasix 20 mg IV daily   - Switch to PO likely 8/29  - TTE with preserved EF and mild pulm HTN    3. Multifocal PNA   - Abx as per primary team     4. DVT PPX - on HSQ        Nilton Roldan DO [facility]  Cardiovascular Medicine  [address], [address]  Office: [phone]  Available via Text/call on Microsoft Teams

## 2024-08-28 NOTE — PROGRESS NOTE ADULT - SUBJECTIVE AND OBJECTIVE BOX
Patient is a 89y old  Female who presents with a chief complaint of septic shock (28 Aug 2024 08:56)      SUBJECTIVE / OVERNIGHT EVENTS:    Patient seen and examined. co fatigue.       Vital Signs Last 24 Hrs  T(C): 36.6 (27 Aug 2024 20:21), Max: 37 (27 Aug 2024 14:30)  T(F): 97.8 (27 Aug 2024 20:21), Max: 98.6 (27 Aug 2024 14:30)  HR: 93 (28 Aug 2024 04:35) (90 - 103)  BP: 118/79 (28 Aug 2024 04:35) (111/72 - 125/70)  BP(mean): --  RR: 18 (28 Aug 2024 04:35) (17 - 19)  SpO2: 98% (28 Aug 2024 04:35) (95% - 100%)    Parameters below as of 28 Aug 2024 04:35  Patient On (Oxygen Delivery Method): nasal cannula  O2 Flow (L/min): 6    I&O's Summary    27 Aug 2024 07:01  -  28 Aug 2024 07:00  --------------------------------------------------------  IN: 340 mL / OUT: 930 mL / NET: -590 mL        PE:  GENERAL: NAD, AAOx2, [place]  CHEST/LUNG: CTABL, No wheeze  HEART: Regular rate and rhythm; + murmur  ABDOMEN: Soft, Nontender, Nondistended; Bowel sounds present  EXTREMITIES:  2+ Peripheral Pulses, +2 LE edema  NEURO: No focal deficits    LABS:                        9.1    3.35  )-----------( x        ( 28 Aug 2024 10:43 )             29.7     08-27    145  |  106  |  24<H>  ----------------------------<  110<H>  3.8   |  27  |  0.55    Ca    8.3<L>      27 Aug 2024 07:15  Phos  2.8     08-27  Mg     2.1     08-27        CAPILLARY BLOOD GLUCOSE      POCT Blood Glucose.: 169 mg/dL (28 Aug 2024 07:12)  POCT Blood Glucose.: 135 mg/dL (27 Aug 2024 23:42)  POCT Blood Glucose.: 131 mg/dL (27 Aug 2024 17:06)  POCT Blood Glucose.: 124 mg/dL (27 Aug 2024 11:22)        Urinalysis Basic - ( 27 Aug 2024 07:15 )    Color: x / Appearance: x / SG: x / pH: x  Gluc: 110 mg/dL / Ketone: x  / Bili: x / Urobili: x   Blood: x / Protein: x / Nitrite: x   Leuk Esterase: x / RBC: x / WBC x   Sq Epi: x / Non Sq Epi: x / Bacteria: x        RADIOLOGY & ADDITIONAL TESTS:    Imaging Personally Reviewed:  [x] YES  [ ] NO    Consultant(s) Notes Reviewed:  [x] YES  [ ] NO    MEDICATIONS  (STANDING):  acetylcysteine 20%  Inhalation 4 milliLiter(s) Inhalation every 6 hours  albuterol/ipratropium for Nebulization 3 milliLiter(s) Nebulizer every 6 hours  buDESOnide    Inhalation Suspension 0.5 milliGRAM(s) Inhalation every 12 hours  cefepime   IVPB 2000 milliGRAM(s) IV Intermittent every 12 hours  furosemide   Injectable 20 milliGRAM(s) IV Push daily  guaiFENesin ER 1200 milliGRAM(s) Oral every 12 hours  heparin   Injectable 5000 Unit(s) SubCutaneous every 12 hours  levothyroxine Injectable 70 MICROGram(s) IV Push at bedtime  methylPREDNISolone sodium succinate Injectable 40 milliGRAM(s) IV Push every 8 hours  metoprolol tartrate 25 milliGRAM(s) Oral two times a day  metroNIDAZOLE  IVPB 500 milliGRAM(s) IV Intermittent every 8 hours  pantoprazole  Injectable 40 milliGRAM(s) IV Push daily    MEDICATIONS  (PRN):      Care Discussed with Consultants/Other Providers [x] YES  [ ] NO    HEALTH ISSUES - PROBLEM Dx:  Acute hypoxic respiratory failure    SBO (small bowel obstruction)    Functional quadriplegia    Advance care planning    Palliative care encounter    Septic shock

## 2024-08-28 NOTE — PROGRESS NOTE ADULT - SUBJECTIVE AND OBJECTIVE BOX
[facility] DIVISION OF INFECTIOUS DISEASES  [address] Rene Y. Patel, S. Shah, [facility]  [phone]  ([phone] - weekdays after 5pm and weekends)    Name: CATHRYN LARA  Age/Gender: 89y Female  MRN: [id number]    Interval History:  Patient seen and examined this morning.   States she feels "lousy," has cough.   Denies fever, chills, chest pain, abd pain, n/v/d.  Notes reviewed. Afebrile   Allergies: 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 reported allergy (Unknown)  penicillin (Rash)      Objective:  Vitals:   T(F): 97.8 (08-27-24 @ 20:21), Max: 98.6 (08-27-24 @ 14:30)  HR: 93 (08-28-24 @ 04:35) (90 - 103)  BP: 118/79 (08-28-24 @ 04:35) (118/79 - 125/70)  RR: 18 (08-28-24 @ 04:35) (17 - 19)  SpO2: 98% (08-28-24 @ 04:35) (95% - 100%)  Physical Examination:  General: no acute distress, NC  HEENT: NC/AT, anicteric, EOMI  Respiratory: decreased breath sounds b/l   Cardiovascular: S1 and S2 present, normal rate   Gastrointestinal: soft, nontender, nondistended  Extremities: no edema, no cyanosis  Skin: no visible rash    Laboratory Studies:  CBC:                       9.1    3.35  )-----------( 61       ( 28 Aug 2024 10:43 )             29.7     WBC Trend:  3.35 08-28-24 @ 10:43  2.58 08-27-24 @ 07:15  3.78 08-26-24 @ 07:31  2.71 08-25-24 @ 00:22  2.44 08-24-24 @ 00:10  1.33 08-23-24 @ 04:27    CMP: 08-28    146<H>  |  104  |  36<H>  ----------------------------<  148<H>  3.1<L>   |  30  |  0.64    Ca    8.8      28 Aug 2024 10:43  Phos  2.8     08-27  Mg     2.1     08-27    TPro  6.3  /  Alb  3.1<L>  /  TBili  0.6  /  DBili  x   /  AST  11  /  ALT  10  /  AlkPhos  84  08-28    Creatinine: 0.64 mg/dL (08-28-24 @ 10:43)  Creatinine: 0.55 mg/dL (08-27-24 @ 07:15)  Creatinine: 0.58 mg/dL (08-26-24 @ 07:32)  Creatinine: 0.63 mg/dL (08-25-24 @ 00:22)  Creatinine: 0.78 mg/dL (08-24-24 @ 00:10)  Creatinine: 0.70 mg/dL (08-23-24 @ 04:27)    LIVER FUNCTIONS - ( 28 Aug 2024 10:43 )  Alb: 3.1 g/dL / Pro: 6.3 g/dL / ALK PHOS: 84 U/L / ALT: 10 U/L / AST: 11 U/L / GGT: x           Microbiology: reviewed   Culture - Urine (collected 08-24-24 @ 00:10)  Source: Clean Catch Clean Catch (Midstream)  Final Report (08-25-24 @ 05:34):    <10,000 CFU/mL Normal Urogenital Yeimi    Culture - Blood (collected 08-23-24 @ 09:30)  Source: .Blood Blood-Peripheral  Preliminary Report (08-27-24 @ 15:01):    No growth at 4 days    Culture - Blood (collected 08-23-24 @ 09:00)  Source: .Blood Blood-Peripheral  Preliminary Report (08-27-24 @ 15:00):    No growth at 4 days    SARS-CoV-2 Result: NotDete (23 Aug 2024 05:12)    Radiology: reviewed     Medications:  acetylcysteine 20%  Inhalation 4 milliLiter(s) Inhalation every 6 hours  albuterol/ipratropium for Nebulization 3 milliLiter(s) Nebulizer every 6 hours  buDESOnide    Inhalation Suspension 0.5 milliGRAM(s) Inhalation every 12 hours  cefepime   IVPB 2000 milliGRAM(s) IV Intermittent every 12 hours  furosemide   Injectable 20 milliGRAM(s) IV Push daily  guaiFENesin ER 1200 milliGRAM(s) Oral every 12 hours  heparin   Injectable 5000 Unit(s) SubCutaneous every 12 hours  levothyroxine Injectable 70 MICROGram(s) IV Push at bedtime  methylPREDNISolone sodium succinate Injectable 40 milliGRAM(s) IV Push every 8 hours  metoprolol tartrate 25 milliGRAM(s) Oral two times a day  metroNIDAZOLE  IVPB 500 milliGRAM(s) IV Intermittent every 8 hours  pantoprazole  Injectable 40 milliGRAM(s) IV Push daily    Current Antimicrobials:  cefepime   IVPB 2000 milliGRAM(s) IV Intermittent every 12 hours  metroNIDAZOLE  IVPB 500 milliGRAM(s) IV Intermittent every 8 hours    Prior/Completed Antimicrobials:  cefepime   IVPB  metroNIDAZOLE  IVPB

## 2024-08-29 LAB
ANION GAP SERPL CALC-SCNC: 10 MMOL/L — SIGNIFICANT CHANGE UP (ref 5–17)
ANION GAP SERPL CALC-SCNC: 12 MMOL/L — SIGNIFICANT CHANGE UP (ref 5–17)
BUN SERPL-MCNC: 38 MG/DL — HIGH (ref 7–23)
BUN SERPL-MCNC: 41 MG/DL — HIGH (ref 7–23)
CALCIUM SERPL-MCNC: 8.6 MG/DL — SIGNIFICANT CHANGE UP (ref 8.4–10.5)
CALCIUM SERPL-MCNC: 9.1 MG/DL — SIGNIFICANT CHANGE UP (ref 8.4–10.5)
CHLORIDE SERPL-SCNC: 101 MMOL/L — SIGNIFICANT CHANGE UP (ref 96–108)
CHLORIDE SERPL-SCNC: 103 MMOL/L — SIGNIFICANT CHANGE UP (ref 96–108)
CO2 SERPL-SCNC: 29 MMOL/L — SIGNIFICANT CHANGE UP (ref 22–31)
CO2 SERPL-SCNC: 30 MMOL/L — SIGNIFICANT CHANGE UP (ref 22–31)
CREAT SERPL-MCNC: 0.59 MG/DL — SIGNIFICANT CHANGE UP (ref 0.5–1.3)
CREAT SERPL-MCNC: 0.66 MG/DL — SIGNIFICANT CHANGE UP (ref 0.5–1.3)
EGFR: 79 ML/MIN/1.73M2 — SIGNIFICANT CHANGE UP
EGFR: 81 ML/MIN/1.73M2 — SIGNIFICANT CHANGE UP
GLUCOSE BLDC GLUCOMTR-MCNC: 126 MG/DL — HIGH (ref 70–99)
GLUCOSE BLDC GLUCOMTR-MCNC: 133 MG/DL — HIGH (ref 70–99)
GLUCOSE SERPL-MCNC: 116 MG/DL — HIGH (ref 70–99)
GLUCOSE SERPL-MCNC: 160 MG/DL — HIGH (ref 70–99)
HCT VFR BLD CALC: 29.7 % — LOW (ref 34.5–45)
HCT VFR BLD CALC: 31.2 % — LOW (ref 34.5–45)
HGB BLD-MCNC: 9.1 G/DL — LOW (ref 11.5–15.5)
HGB BLD-MCNC: 9.5 G/DL — LOW (ref 11.5–15.5)
MAGNESIUM SERPL-MCNC: 2.3 MG/DL — SIGNIFICANT CHANGE UP (ref 1.6–2.6)
MCHC RBC-ENTMCNC: 30.4 GM/DL — LOW (ref 32–36)
MCHC RBC-ENTMCNC: 30.6 GM/DL — LOW (ref 32–36)
MCHC RBC-ENTMCNC: 32.9 PG — SIGNIFICANT CHANGE UP (ref 27–34)
MCHC RBC-ENTMCNC: 33.5 PG — SIGNIFICANT CHANGE UP (ref 27–34)
MCV RBC AUTO: 107.2 FL — HIGH (ref 80–100)
MCV RBC AUTO: 109.9 FL — HIGH (ref 80–100)
NRBC # BLD: 0 /100 WBCS — SIGNIFICANT CHANGE UP (ref 0–0)
NRBC # BLD: 0 /100 WBCS — SIGNIFICANT CHANGE UP (ref 0–0)
PHOSPHATE SERPL-MCNC: 2.2 MG/DL — LOW (ref 2.5–4.5)
PLATELET # BLD AUTO: 60 K/UL — LOW (ref 150–400)
PLATELET # BLD AUTO: 64 K/UL — LOW (ref 150–400)
POTASSIUM SERPL-MCNC: 4.1 MMOL/L — SIGNIFICANT CHANGE UP (ref 3.5–5.3)
POTASSIUM SERPL-MCNC: 4.3 MMOL/L — SIGNIFICANT CHANGE UP (ref 3.5–5.3)
POTASSIUM SERPL-SCNC: 4.1 MMOL/L — SIGNIFICANT CHANGE UP (ref 3.5–5.3)
POTASSIUM SERPL-SCNC: 4.3 MMOL/L — SIGNIFICANT CHANGE UP (ref 3.5–5.3)
RBC # BLD: 2.77 M/UL — LOW (ref 3.8–5.2)
RBC # BLD: 2.84 M/UL — LOW (ref 3.8–5.2)
RBC # FLD: 15.1 % — HIGH (ref 10.3–14.5)
RBC # FLD: 15.3 % — HIGH (ref 10.3–14.5)
SODIUM SERPL-SCNC: 141 MMOL/L — SIGNIFICANT CHANGE UP (ref 135–145)
SODIUM SERPL-SCNC: 144 MMOL/L — SIGNIFICANT CHANGE UP (ref 135–145)
WBC # BLD: 4.47 K/UL — SIGNIFICANT CHANGE UP (ref 3.8–10.5)
WBC # BLD: 4.8 K/UL — SIGNIFICANT CHANGE UP (ref 3.8–10.5)
WBC # FLD AUTO: 4.47 K/UL — SIGNIFICANT CHANGE UP (ref 3.8–10.5)
WBC # FLD AUTO: 4.8 K/UL — SIGNIFICANT CHANGE UP (ref 3.8–10.5)

## 2024-08-29 PROCEDURE — 99233 SBSQ HOSP IP/OBS HIGH 50: CPT

## 2024-08-29 RX ORDER — METRONIDAZOLE 250 MG
500 TABLET ORAL EVERY 8 HOURS
Refills: 0 | Status: COMPLETED | OUTPATIENT
Start: 2024-08-29 | End: 2024-08-30

## 2024-08-29 RX ADMIN — Medication 4 MILLILITER(S): at 17:31

## 2024-08-29 RX ADMIN — Medication 4 MILLILITER(S): at 05:12

## 2024-08-29 RX ADMIN — Medication 500 MILLIGRAM(S): at 21:43

## 2024-08-29 RX ADMIN — Medication 20 MILLIGRAM(S): at 05:12

## 2024-08-29 RX ADMIN — Medication 20 MILLIGRAM(S): at 21:43

## 2024-08-29 RX ADMIN — GUAIFENESIN 1200 MILLIGRAM(S): 100 LIQUID ORAL at 17:32

## 2024-08-29 RX ADMIN — GUAIFENESIN 1200 MILLIGRAM(S): 100 LIQUID ORAL at 05:15

## 2024-08-29 RX ADMIN — BUDESONIDE 0.5 MILLIGRAM(S): 3 CAPSULE ORAL at 05:16

## 2024-08-29 RX ADMIN — IPRATROPIUM BROMIDE AND ALBUTEROL SULFATE 3 MILLILITER(S): .5; 3 SOLUTION RESPIRATORY (INHALATION) at 05:12

## 2024-08-29 RX ADMIN — IPRATROPIUM BROMIDE AND ALBUTEROL SULFATE 3 MILLILITER(S): .5; 3 SOLUTION RESPIRATORY (INHALATION) at 13:12

## 2024-08-29 RX ADMIN — METOPROLOL TARTRATE 25 MILLIGRAM(S): 100 TABLET ORAL at 17:32

## 2024-08-29 RX ADMIN — Medication 4 MILLILITER(S): at 13:16

## 2024-08-29 RX ADMIN — CEFEPIME 100 MILLIGRAM(S): 2 INJECTION, POWDER, FOR SOLUTION INTRAVENOUS at 17:32

## 2024-08-29 RX ADMIN — Medication 70 MICROGRAM(S): at 21:33

## 2024-08-29 RX ADMIN — Medication 5000 UNIT(S): at 05:17

## 2024-08-29 RX ADMIN — Medication 5000 UNIT(S): at 17:32

## 2024-08-29 RX ADMIN — CEFEPIME 100 MILLIGRAM(S): 2 INJECTION, POWDER, FOR SOLUTION INTRAVENOUS at 05:12

## 2024-08-29 RX ADMIN — Medication 20 MILLIGRAM(S): at 13:12

## 2024-08-29 RX ADMIN — METOPROLOL TARTRATE 25 MILLIGRAM(S): 100 TABLET ORAL at 05:25

## 2024-08-29 RX ADMIN — IPRATROPIUM BROMIDE AND ALBUTEROL SULFATE 3 MILLILITER(S): .5; 3 SOLUTION RESPIRATORY (INHALATION) at 17:31

## 2024-08-29 RX ADMIN — Medication 500 MILLIGRAM(S): at 13:13

## 2024-08-29 RX ADMIN — Medication 500 MILLIGRAM(S): at 09:19

## 2024-08-29 RX ADMIN — Medication 40 MILLIGRAM(S): at 13:12

## 2024-08-29 RX ADMIN — BUDESONIDE 0.5 MILLIGRAM(S): 3 CAPSULE ORAL at 17:31

## 2024-08-29 RX ADMIN — Medication 20 MILLIGRAM(S): at 05:16

## 2024-08-29 NOTE — PROGRESS NOTE ADULT - SUBJECTIVE AND OBJECTIVE BOX
SUBJECTIVE AND OBJECTIVE:  Indication for Geriatrics and Palliative Care Services/INTERVAL HPI: [facility] team consulted for complex medical decision making in the setting of serious illness and symptoms management.    OVERNIGHT EVENTS:  No acute events reported overnight.   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 reported allergy (Unknown)  penicillin (Rash)    Intolerances    MEDICATIONS  (STANDING):  acetylcysteine 20%  Inhalation 4 milliLiter(s) Inhalation every 6 hours  albuterol/ipratropium for Nebulization 3 milliLiter(s) Nebulizer every 6 hours  buDESOnide    Inhalation Suspension 0.5 milliGRAM(s) Inhalation every 12 hours  cefepime   IVPB 2000 milliGRAM(s) IV Intermittent every 12 hours  furosemide   Injectable 20 milliGRAM(s) IV Push daily  guaiFENesin ER 1200 milliGRAM(s) Oral every 12 hours  heparin   Injectable 5000 Unit(s) SubCutaneous every 12 hours  levothyroxine Injectable 70 MICROGram(s) IV Push at bedtime  methylPREDNISolone sodium succinate Injectable 20 milliGRAM(s) IV Push every 8 hours  metoprolol tartrate 25 milliGRAM(s) Oral two times a day  metroNIDAZOLE    Tablet 500 milliGRAM(s) Oral every 8 hours  pantoprazole  Injectable 40 milliGRAM(s) IV Push daily    MEDICATIONS  (PRN):        ITEMS UNCHECKED ARE NOT PRESENT    PRESENT SYMPTOMS: [ ]Unable to self-report - see  CPOT, PAINADS, RDOS below  Source if other than patient:  [ ]Family   [ ]Team     Pain:  [ ]yes [ x]no  QOL impact -   Location -                    Aggravating factors -  Quality -  Radiation -  Timing-  Severity (0-10 scale):  Minimal acceptable level (0-10 scale):     Dyspnea:                           [x]Mild [ ]Moderate [ ]Severe  Anxiety:                             [ ]Mild [ ]Moderate [ ]Severe  Fatigue:                             [ ]Mild [ ]Moderate [ ]Severe  Nausea:                             [ ]Mild [ ]Moderate [ ]Severe  Loss of appetite:              [ ]Mild [ ]Moderate [ ]Severe  Constipation:                    [ ]Mild [ ]Moderate [ ]Severe    PCSSQ[Palliative Care Spiritual Screening Question]   Severity (0-10):  Score of 4 or > indicate consideration of Chaplaincy referral.  Chaplaincy Referral: [ ] yes [x ] refused [ ] following  Caregiver [place]? : [ ] yes [ ] no  [ x] deferred:  Social work referral [ ] Patient & Family Centered Care Referral [ ]   Anticipatory Grief present?:  [x ] yes [ ] no  [ ] deferred: Social work referral [ ] Patient & Family Centered Care Referral [ ]      Other Symptoms:  [x ]All other review of systems negative     PHYSICAL EXAM:  Vital Signs Last 24 Hrs  T(C): 36.7 (29 Aug 2024 16:40), Max: 36.8 (28 Aug 2024 21:04)  T(F): 98.1 (29 Aug 2024 16:40), Max: 98.3 (28 Aug 2024 21:04)  HR: 103 (29 Aug 2024 16:40) (80 - 103)  BP: 121/84 (29 Aug 2024 16:40) (107/68 - 138/90)  BP(mean): 96 (29 Aug 2024 16:40) (81 - 96)  RR: 18 (29 Aug 2024 16:40) (18 - 19)  SpO2: 96% (29 Aug 2024 16:40) (94% - 98%)    Parameters below as of 29 Aug 2024 16:40  Patient On (Oxygen Delivery Method): nasal cannula    I&O's Summary    28 Aug 2024 07:01  -  29 Aug 2024 07:00  --------------------------------------------------------  IN: 220 mL / OUT: 1520 mL / NET: -1300 mL    29 Aug 2024 07:01  -  29 Aug 2024 17:02  --------------------------------------------------------  IN: 220 mL / OUT: 1200 mL / NET: -980 mL    GENERAL:  [x]Alert  [x]Oriented 2 person and place  [ ]Lethargic  [ ]Cachexia  [ ]Unarousable  [x]Verbal  [ ]Non-Verbal  Behavioral:   [ ]Anxiety  [ ]Delirium [ ]Agitation [x ]Other:   HEENT: hard of hearing   [x]Normal   [ ]Dry mouth   [ ]ET Tube/Trach  [ ]Oral lesions  PULMONARY:   []Clear [x ]Tachypnea  [ ]Audible excessive secretions   [ ]Rhonchi        [ ]Right [ ]Left [ ]Bilateral  [ ]Crackles        [ ]Right [ ]Left [ ]Bilateral  [ ]Wheezing     [ ]Right [ ]Left [ ]Bilateral  [ ]Diminished BS [ ] Right [ ]Left [ ]Bilateral  CARDIOVASCULAR:    [x]Regular [ ]Irregular [ ]Tachy  [ ]Faisal [ ]Murmur [ ]Other  GASTROINTESTINAL:  [x]Soft  [ ]Distended   [x]+BS  [x]Non tender [ ]Tender  [ ]PEG [ x]OGT/ NGT   Last BM:  fecal incontinent   GENITOURINARY:  [x]Normal [ ]Incontinent   [ ]Oliguria/Anuria   [ ]Baum  MUSCULOSKELETAL:   [ ]Normal   [x]Weakness  [ ]Bed/Wheelchair bound [ ]Edema  NEUROLOGIC: patients appears more lethargic, able to follow simple commands   [x]No focal deficits  [ ] Cognitive impairment  [ ] Dysphagia [ ]Dysarthria [ ] Paresis [ ]Other   SKIN:   [x]Normal  [ ]Rash   [ ]Pressure ulcer(s) [ ]y [ ]n present on admission    CRITICAL CARE:  [ ] Shock Present  [ ]Septic [ ]Cardiogenic [ ]Neurologic [ ]Hypovolemic  [ ]  Vasopressors [ ]  Inotropes   []Respiratory failure present [ ]Mechanical ventilation [ ]Non-invasive ventilatory support [ ]High flow    [x ]Acute  [ ]Chronic [x ]Hypoxic  [ ]Hypercarbic [ x]Other: non rebreather   [ ]Other organ failure         LABS:                                 9.5    4.47  )-----------( 64       ( 29 Aug 2024 07:06 )             31.2     08-29    144  |  103  |  41<H>  ----------------------------<  116<H>  4.3   |  29  |  0.59    Ca    9.1      29 Aug 2024 07:18  Phos  2.2     08-29  Mg     2.3     08-29    TPro  6.3  /  Alb  3.1<L>  /  TBili  0.6  /  DBili  x   /  AST  11  /  ALT  10  /  AlkPhos  84  08-28     RADIOLOGY & ADDITIONAL STUDIES: reviewed none new                                       Progress Notes    PROGRESS NOTE  Date & Time of Note   2024-08-29 11:16    Notes    Notes: Chart reviewed:  Patient remains acute with sepsis, multifocal PNA on IV  Cefepime and IV Solumedrol, 3 liters of oxygen, chest vest therapy;  As per  daughter Connie and patient are declining [facility] placement requesting resumption  of private 24/7 HHA via [facility] contacted at [phone], will require D/C  summary to be faxed at [phone] and notify 24 hours prior to discharge.    Home care referral initiate din [facility];  Will continue to collaborate and  remain available.       Electronically signed by:  Candice Gallegos  Electronically signed on:  2024-08-29  11:30      Protein Calorie Malnutrition Present: [ ]mild [ ]moderate [ ]severe [ ]underweight [ ]morbid obesity  https://www.andeal.org/vault/2440/web/files/ONC/Table_Clinical%20Characteristics%20to%20Document%20Malnutrition-White%20JV%20et%20al%202012.pdf    Height (cm): 167.6 (08-23-24 @ 16:52), 165.1 (06-06-24 @ 19:21), 165.1 (05-08-24 @ 11:40)  Weight (kg): 58.8 (08-23-24 @ 16:52), 58.5 (06-06-24 @ 19:21), 68 (05-08-24 @ 11:40)  BMI (kg/m2): 20.9 (08-23-24 @ 16:52), 21.5 (06-06-24 @ 19:21), 24.9 (05-08-24 @ 11:40)    [ ]PPSV2 < or = 30%  [ ]significant weight loss [x]poor nutritional intake [ ]anasarca[ ]Artificial Nutrition    Other REFERRALS:  [ ]Hospice  [ ]Child Life  [ ]Social Work  [x]Case management [ ]Holistic Therapy     Palliative Performance Scale:  http://npcrc.org/files/news/palliative_performance_scale_ppsv2.pdf  (Ctrl +  left click to view)  Respiratory Distress Observation Tool:  https://homecareinformation.net/handouts/hen/Respiratory_Distress_Observation_Scale.pdf (Ctrl +  left click to view)  PAINAD Score:  http://geriatrictoolkit.[place].[facility]/cog/painad.pdf (Ctrl +  left click to view)                                Care Coordination Assessment 201    COGNITIVE/LEARNING 201  Mental Status    Answers: Unable to assess    ADMISSION HISTORY 201  Admitted From    Answers: Home    FINANCIAL 201  Does patient have financial concerns for discharge?    Answers: None    LIVING ARRANGEMENTS/SUPPORT 201  Housing Environment    Answers: Apartment    CAREGIVER CONTACT 201  Does the patient wish to identify a Caregiver?    Answers: Unable to assess    EMERGENCY CONTACTS OUTSIDE HOME 201  Emergency Contact    Answers: Emergency Contact Name Connie Slater,  Answers: Emergency Contact Phone # [phone],  Answers: Emergency Contact Relationship daughter    DISCHARGE PLANNING 201  Potential Discharge Plan and Services    Answers: Anticipated Needs Unclear at Present    SCREENING 201  Social Work Screen and Referral    Answers: None    SUMMARY 201  Initial Clinical Summary    Notes: Chart reviewed and case management referral received. Pt admitted with  SBO vs enteritis and course c/b sepsis from multifocal pneumonia. Attempted to  speak with patient however pt acute and on non-rebreather mask. Spoke with pt's  daughter Alyssa and discussed role of , medical plan of care and  discharge needs. Pt's daughter verbalizes understanding. Prior to admission, pt  resided in an apartment alone and had 24hr/7day aide services through [facility]  home care agency (Phone: [phone]). Pt required assistance with bathing at  baseline and used a walker for ambulation. Pt also has a transport wheelchair,  commode and raised toilet seat at home. PCP Dr. Steven Goldberg.    Anticipated Discharge Plan and Services    Notes: P/T eval ordered. Plan pending hospitalization. Pt known to [facility] at home from 6/2024 but services have concluded.  name and  contact number provided.       Electronically signed by:  Celeste Izquierdo  Electronically signed on:  2024-08-27  11:53

## 2024-08-29 NOTE — PROGRESS NOTE ADULT - ASSESSMENT
89F with history of paroxysmal atrial fibrillation, hypothyroidism, pulmonary/abdominal tuberculosis, recurrent small bowel obstruction, recurrent UTIs presenting from home with abdominal pain and vomiting for 1 day with CT scans with evidence of SBO vs enteritis s/p NGT. Course c/b hypotension likely iso septic from multifocal PNA process.    #Septic Shock   #Acute Hypoxemic Respiratory Failure  #Multifocal PNA   #Acute metabolic encephalopathy   - required levophed for shock state but now weaned off  - completes abx today for pna  - BC NTD  - off Hiflo on 3L, cont to titrate down  - fu S&S eval, advance diet?  - on solumedrol, Mucomyst and nebs per pulm, steroid taper per pulm    #Paroxysmal Atrial Fibrillation  #Acute hfpef    - eliquis on hold for thrombocytopenia  - Strict I & O, Daily weights   - on Lasix 20 mg IV daily   - TTE with presered lvef  - on heparin subq for DVT ppx    #Small Bowel Obstruction  #History of Abdominal Tuberculosis   #History of Recurrent Small Bowel Obstruction  - s/p NGT to suction with small amounts of brown stomach content   - now on clear liquid diet   - pending repeat swallow eval     #History of Pulmonary Tuberculosis in 90s  - s/p treatment in 1990s    #pancytopenia  - was receiving prednisone OP for low blood pressure  - on solumedrol per pulm, down to solu 20 q8  - 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 89F with history of paroxysmal atrial fibrillation, hypothyroidism, pulmonary/abdominal tuberculosis, recurrent small bowel obstruction, recurrent UTIs presenting from home with abdominal pain and vomiting for 1 day with CT scans with evidence of SBO vs enteritis s/p NGT. Course c/b hypotension likely iso septic from multifocal PNA process.    #Septic Shock   #Acute Hypoxemic Respiratory Failure  #Multifocal PNA   #Acute metabolic encephalopathy   - required levophed for shock state but now weaned off  - completes abx today for pna  - BC NTD  - off Hiflo on 3L, cont to titrate down  - fu S&S eval, advance diet?  - on solumedrol, Mucomyst and nebs per pulm, steroid taper per pulm  - PT OOB    #Paroxysmal Atrial Fibrillation  #Acute hfpef    - eliquis on hold for thrombocytopenia  - Strict I & O, Daily weights   - on Lasix 20 mg IV daily   - TTE with presered lvef  - on heparin subq for DVT ppx    #Small Bowel Obstruction  #History of Abdominal Tuberculosis   #History of Recurrent Small Bowel Obstruction  - s/p NGT to suction with small amounts of brown stomach content   - now on clear liquid diet   - pending repeat swallow eval     #History of Pulmonary Tuberculosis in 90s  - s/p treatment in 1990s    #pancytopenia  - was receiving prednisone OP for low blood pressure  - on solumedrol per pulm, down to solu 20 q8  - 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

## 2024-08-29 NOTE — SWALLOW BEDSIDE ASSESSMENT ADULT - ASR SWALLOW ASPIRATION MONITOR
Monitor for s/s aspiration/laryngeal penetration. If noted:  D/C p.o. intake, provide non-oral nutrition/hydration/meds, and contact this service @ [phone]/change of breathing pattern/cough/gurgly voice/fever/pneumonia/throat clearing/upper respiratory infection

## 2024-08-29 NOTE — PROGRESS NOTE ADULT - ASSESSMENT
Patient is a 89 year old female with PMH of pAfib, hypothyroidism, pulmonary/abdominal tuberculosis, recurrent small bowel obstruction, recurrent UTIs presenting from home with abdominal pain and vomiting for 1 day. CT scan performed in ED showed evidence of dilated small bowel loops with large stool burden with concern for small bowel obstruction vs enteritis; s/p NGT. Course complicated by hypotension likely septic from multifocal pneumonia. Shock resolved, off pressors, transferred out of MICU to floor.   AHRF due to multifocal pneumonia - suspect aspiration  Leukopenia with bandemia on admission, trend noted   chart reviewed, h/o pancytopenia with progressive leukopenia   COVID/Flu/RSV negative   Bcx NGTD; Ucx negative   CXR with R sided pneumonia with small dependent b/l effusions  now on NC, remains afebrile  Cardiology following for afib  h/o PCN allergy, tolerates cephalosporins     Recommendations:   Continue on cefepime and flagyl - complete 7d course today 8/29  Pulmonary following, on steroids   Heme/Onc following for pancytopenia   Aspiration precautions  Supplemental O2 as needed, wean as tolerated   Monitor temps/CBC - if any fevers or worsening, send Bcx x2 and can broaden to meropenem  Supportive care   Continue rest of care per primary team       Lilli Polanco M.D.  [facility], Division of Infectious Diseases  [phone]  After 5pm on weekdays and all day on weekends - please call [phone]  Available on [employer] TEAMS

## 2024-08-29 NOTE — SWALLOW BEDSIDE ASSESSMENT ADULT - SWALLOW EVAL: DIAGNOSIS
Consult received and appreciated. Chart reviewed. Attempted to see pt for swallow evaluation. Pt with documented AFib with RVR with episode of hypoxemia, currently on NRB for O2 support. As per d/w [facility] NP Herminia Bledsoe, no plan for titration of O2 this morning. Given current level of O2 support, Pt not a candidate for PO trials as this time. Will reattempt pending improvement in respiratory status and titration of O2 support to NC. This service will continue to follow.
89 yo F admitted with SBO requiring NGT, with course c/b iso septic shock from multifocal PNA, which may have been iso aspiration event from SBO. Pt currently  presents with oropharyngeal swallow sequence that appears overtly functional for trials of thin liquids with timely pharyngeal swallow initiation, palpable hyolaryngeal elevation/excursion and no overt s/s laryngeal penetration/aspiration. Unable to assess other textures at this time as Pt deferring other trials. This service will continue to follow as appropriate.

## 2024-08-29 NOTE — PROGRESS NOTE ADULT - ASSESSMENT
Ms. Slater is a 89y Female with PMHx of asthma, HTN, essential tremor, bronchiectasis, GERD, hypothyroidism, paroxysmal A.fib on Eliquis 2.5mg BID, Vit D deficiency, tuberculous peritonitis, anemia moderate AS, hx of falls, aortic regurgitation presented with nausea and vomiting 08/23/2024 and subsequently admitted to MICU with SBO and transferred to floor on 08/25/2024, treated conservatively complicated by septic shock requiring pressor support. Pt has hx of pancytopenia.     Pt has a history of pancytopenia as outpatient with Leukopenia with since about 3y prior initially lymphopenia and then with neutropenia, progressive, anemia over the past year gradually worsening, with progressive macrocytosis, and thrombocytopenia over the past two years. Pt requested conservative treatment and denied BmBx a few times in the past. Discussed with pt again this admission and she stated she would not like to undergo aggressive interventions and she would not like to have a bone marrow biopsy, and she prefers conservative treatment otherwise.     She denied history of blood disorders, bleeding complications, or personal history of malignancy. She denied smoking, ETOH use. She lives in her apt with a 24hr aide and her daughter visits regularly.     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   - Outpatient labs show gradual progressive leukopenia x 3y, initial lymphopenia now both lymphopenia and neutropenia   -- Most recent outpatient WBC ranging 1.9-4.6   - Thrombocytopenia outpatient ranging 85-100s x 2y  - Anemia ranging 9.5-11 over past year, currently close to this  - MCV with progressive macrocytosis over past year  - ANC 3250, s/p steroids  - Iron sat 9%, Folate 15.6, B12 179  - Previous paraproteinemia workup negative for obvious plasma cell dyscrasia SPEP/MOOKIE normal, Serum immunoglobulins normal, serum FLCs (ratio 1.44), LDH (normal), Beta-2 Mcg (mild elv 2.6),   - Transfuse PRBC to maintain Hb > 7   - Transfuse Plt to maintain Plt > 10k  - Trend CBC daily with Diff for now   - Continue B12 1000mcg IM weekly  - Again reviewed and discussed pancytopenia with the pt she is aware of its gradual, progressive worsening over past three years, discussed with pt again this admission and she stated she would not like to undergo aggressive interventions and she would declined bone marrow biopsy  - GOC noted   - Needs to follow up as outpatient     # Small bowel obstruction  - CT scan performed in ED showed evidence of dilated small bowel loops with large stool burden with concern for small bowel obstruction vs enteritis  - S/P MICU admission with NGT decompression  - Transferred to floor 08/25/2024     # Aspiration Pneumonia  -  CT A/P lower chest with with multifocal PNA  -  Pulmonology following, recs noted  -  Resolved septic shock while in MICU  -  CT Chest   - Antibiotics per ID and primary team    #A.fib  - Was previously on Eliquis 2.5mg per cardiology  - Plt 50k >48k   - Pt at risk for CVA vs bleeding given clinical context  - Cardiology consult and recs noted, currently risks vs benefits favoring high risk for AC  - Can proceed with Hep SubQ BID, can consider Eliquis depending on Plt count trajectory, recommend ok to restart if Plt count consistently > 75k and per clinical risk     # Hypothyroidism  - Continue levothyroxine         Thank you for allowing me to participate in the care of Ms. Slater, please do not hesitate to call or text me if you have further questions or concerns.       Roni Polanco MD  [facility] [place]   Division of Hematology/Oncology  [address], [address]  [place] [address]  P: [phone]  F: [phone]    Attestation:    ----Pt evaluated including face-to-face interaction in addition to chart review, reviewing treatment plan, and managing the patient’s chronic diagnoses as listed in the assessment----      Ms. Slater is a 89y Female with PMHx of asthma, HTN, essential tremor, bronchiectasis, GERD, hypothyroidism, paroxysmal A.fib on Eliquis 2.5mg BID, Vit D deficiency, tuberculous peritonitis, anemia moderate AS, hx of falls, aortic regurgitation presented with nausea and vomiting 08/23/2024 and subsequently admitted to MICU with SBO and transferred to floor on 08/25/2024, treated conservatively complicated by septic shock requiring pressor support. Pt has hx of pancytopenia.     Pt has a history of pancytopenia as outpatient with Leukopenia with since about 3y prior initially lymphopenia and then with neutropenia, progressive, anemia over the past year gradually worsening, with progressive macrocytosis, and thrombocytopenia over the past two years. Pt requested conservative treatment and denied BmBx a few times in the past. Discussed with pt again this admission and she stated she would not like to undergo aggressive interventions and she would not like to have a bone marrow biopsy, and she prefers conservative treatment otherwise.     She denied history of blood disorders, bleeding complications, or personal history of malignancy. She denied smoking, ETOH use. She lives in her apt with a 24hr aide and her daughter visits regularly.     08/27/2024 on NRB, noted ongoing tachycardia overnight, cardiology consult pending, no signs of bleeding at this time, thrombocytopenia noted on Hep SubQ for PPx     08/28/2024: awake and alert, quite hard of hearing, nurse at bedside stated pt did well overnight, she had dry mucous membranes, no signs of bleeding, discussed her hematological diagnoses again, she is aware     08/29/2024: awake and alert, prolonged exam due to being hard of hearing, stated she has persistent coughing and associated nausea without vomiting, nursing staff at bedside, pt with minor bleeding from IV site, required pressure. 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   - Outpatient labs show gradual progressive leukopenia x 3y, initial lymphopenia now both lymphopenia and neutropenia   -- Most recent outpatient WBC ranging 1.9-4.6   - Thrombocytopenia outpatient ranging 85-100s x 2y  - Anemia ranging 9.5-11 over past year, currently close to this  - MCV with progressive macrocytosis over past year  - ANC 3250, s/p steroids  - Iron sat 9%, Folate 15.6, B12 179  - Previous paraproteinemia workup negative for obvious plasma cell dyscrasia SPEP/MOOKIE normal, Serum immunoglobulins normal, serum FLCs (ratio 1.44), LDH (normal), Beta-2 Mcg (mild elv 2.6),   - Transfuse PRBC to maintain Hb > 7   - Transfuse Plt to maintain Plt > 10k  - Trend CBC daily with Diff for now   - Continue B12 1000mcg IM weekly  - Again reviewed and discussed pancytopenia with the pt she is aware of its gradual, progressive worsening over past three years, discussed with pt again this admission and she stated she would not like to undergo aggressive interventions and she would declined bone marrow biopsy  - GOC noted   - Needs to follow up as outpatient     # Small bowel obstruction  - CT scan performed in ED showed evidence of dilated small bowel loops with large stool burden with concern for small bowel obstruction vs enteritis  - S/P MICU admission with NGT decompression  - Transferred to floor 08/25/2024     # Aspiration Pneumonia  -  CT A/P lower chest with with multifocal PNA  -  Pulmonology following, recs noted  -  Resolved septic shock while in MICU  -  CT Chest   - Antibiotics per ID and primary team    #A.fib  - Was previously on Eliquis 2.5mg per cardiology  - Plt 50k >48k >61k  - Pt at risk for CVA vs bleeding given clinical context  - Cardiology consult and recs noted, currently risks vs benefits favoring high risk for AC  - Can proceed with Hep SubQ BID, can consider Eliquis depending on Plt count trajectory, recommend ok to restart if Plt count consistently > 75k and per clinical risk     # Hypothyroidism  - Continue levothyroxine         Thank you for allowing me to participate in the care of Ms. Slater, please do not hesitate to call or text me if you have further questions or concerns.       Roni Polanco MD  [facility] [place]   Division of Hematology/Oncology  [address], [address]  [place] [address]  P: [phone]  F: [phone]    Attestation:    ----Pt evaluated including face-to-face interaction in addition to chart review, reviewing treatment plan, and managing the patient’s chronic diagnoses as listed in the assessment----

## 2024-08-29 NOTE — PROGRESS NOTE ADULT - SUBJECTIVE AND OBJECTIVE BOX
[facility] DIVISION OF INFECTIOUS DISEASES  SWAPNIL Rene Y. Patel, S. Shah, G. Mark  [phone]  ([phone] - weekdays after 5pm and weekends)    Name: CATHRYN LARA  Age/Gender: 89y Female  MRN: [id number]    Interval History:  Patient seen and examined this morning.   States she feels "lousy" with cough.   Denies fever, chest pain, abd pain, n/v/d.   Notes reviewed. 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 reported allergy (Unknown)  penicillin (Rash)      Objective:  Vitals:   T(F): 97.6 (08-29-24 @ 04:00), Max: 98.3 (08-28-24 @ 11:06)  HR: 98 (08-29-24 @ 04:00) (80 - 98)  BP: 138/90 (08-29-24 @ 04:00) (107/68 - 138/90)  RR: 18 (08-29-24 @ 04:00) (18 - 19)  SpO2: 98% (08-29-24 @ 04:00) (94% - 98%)  Physical Examination:  General: no acute distress, NC  HEENT: NC/AT, anicteric, EOMI  Respiratory: decreased breath sounds b/l   Cardiovascular: S1 and S2 present, normal rate   Gastrointestinal: soft, nontender, nondistended  Extremities: no edema, no cyanosis  Skin: no visible rash    Laboratory Studies:  CBC:                       9.5    4.47  )-----------( 64       ( 29 Aug 2024 07:06 )             31.2     WBC Trend:  4.47 08-29-24 @ 07:06  3.35 08-28-24 @ 10:43  2.58 08-27-24 @ 07:15  3.78 08-26-24 @ 07:31  2.71 08-25-24 @ 00:22  2.44 08-24-24 @ 00:10  1.33 08-23-24 @ 04:27    CMP: 08-28    146<H>  |  104  |  36<H>  ----------------------------<  148<H>  3.1<L>   |  30  |  0.64    Ca    8.8      28 Aug 2024 10:43    TPro  6.3  /  Alb  3.1<L>  /  TBili  0.6  /  DBili  x   /  AST  11  /  ALT  10  /  AlkPhos  84  08-28    Creatinine: 0.64 mg/dL (08-28-24 @ 10:43)  Creatinine: 0.55 mg/dL (08-27-24 @ 07:15)  Creatinine: 0.58 mg/dL (08-26-24 @ 07:32)  Creatinine: 0.63 mg/dL (08-25-24 @ 00:22)  Creatinine: 0.78 mg/dL (08-24-24 @ 00:10)  Creatinine: 0.70 mg/dL (08-23-24 @ 04:27)    LIVER FUNCTIONS - ( 28 Aug 2024 10:43 )  Alb: 3.1 g/dL / Pro: 6.3 g/dL / ALK PHOS: 84 U/L / ALT: 10 U/L / AST: 11 U/L / GGT: x           Microbiology: reviewed   Culture - Urine (collected 08-24-24 @ 00:10)  Source: Clean Catch Clean Catch (Midstream)  Final Report (08-25-24 @ 05:34):    <10,000 CFU/mL Normal Urogenital Yeimi    Culture - Blood (collected 08-23-24 @ 09:30)  Source: .Blood Blood-Peripheral  Final Report (08-28-24 @ 15:00):    No growth at 5 days    Culture - Blood (collected 08-23-24 @ 09:00)  Source: .Blood Blood-Peripheral  Final Report (08-28-24 @ 15:00):    No growth at 5 days    SARS-CoV-2 Result: NotDete (23 Aug 2024 05:12)    Radiology: reviewed     Medications:  acetylcysteine 20%  Inhalation 4 milliLiter(s) Inhalation every 6 hours  albuterol/ipratropium for Nebulization 3 milliLiter(s) Nebulizer every 6 hours  buDESOnide    Inhalation Suspension 0.5 milliGRAM(s) Inhalation every 12 hours  cefepime   IVPB 2000 milliGRAM(s) IV Intermittent every 12 hours  furosemide   Injectable 20 milliGRAM(s) IV Push daily  guaiFENesin ER 1200 milliGRAM(s) Oral every 12 hours  heparin   Injectable 5000 Unit(s) SubCutaneous every 12 hours  levothyroxine Injectable 70 MICROGram(s) IV Push at bedtime  methylPREDNISolone sodium succinate Injectable 20 milliGRAM(s) IV Push every 8 hours  metoprolol tartrate 25 milliGRAM(s) Oral two times a day  pantoprazole  Injectable 40 milliGRAM(s) IV Push daily    Current Antimicrobials:  cefepime   IVPB 2000 milliGRAM(s) IV Intermittent every 12 hours    Prior/Completed Antimicrobials:  cefepime   IVPB  metroNIDAZOLE  IVPB  metroNIDAZOLE  IVPB

## 2024-08-29 NOTE — PROGRESS NOTE ADULT - SUBJECTIVE AND OBJECTIVE BOX
Patient is a 89y old  Female who presents with a chief complaint of septic shock (29 Aug 2024 08:22)      SUBJECTIVE / OVERNIGHT EVENTS:    Patient seen and examined. on 3L. no acute events.      Vital Signs Last 24 Hrs  T(C): 36.4 (29 Aug 2024 04:00), Max: 36.8 (28 Aug 2024 11:06)  T(F): 97.6 (29 Aug 2024 04:00), Max: 98.3 (28 Aug 2024 11:06)  HR: 98 (29 Aug 2024 04:00) (80 - 98)  BP: 138/90 (29 Aug 2024 04:00) (107/68 - 138/90)  BP(mean): 81 (28 Aug 2024 21:04) (81 - 81)  RR: 18 (29 Aug 2024 04:00) (18 - 19)  SpO2: 98% (29 Aug 2024 04:00) (94% - 98%)    Parameters below as of 28 Aug 2024 21:04  Patient On (Oxygen Delivery Method): nasal cannula  O2 Flow (L/min): 3    I&O's Summary    28 Aug 2024 07:01  -  29 Aug 2024 07:00  --------------------------------------------------------  IN: 220 mL / OUT: 1520 mL / NET: -1300 mL    29 Aug 2024 07:01  -  29 Aug 2024 10:32  --------------------------------------------------------  IN: 100 mL / OUT: 1000 mL / NET: -900 mL        PE:  GENERAL: NAD, AAOx2, [place]  CHEST/LUNG: CTABL, No wheeze  HEART: Regular rate and rhythm; + murmur  ABDOMEN: Soft, Nontender, Nondistended; Bowel sounds present  EXTREMITIES:  2+ Peripheral Pulses, +2 LE edema  NEURO: No focal deficits      LABS:                        9.5    4.47  )-----------( 64       ( 29 Aug 2024 07:06 )             31.2     08-29    144  |  103  |  41<H>  ----------------------------<  116<H>  4.3   |  29  |  0.59    Ca    9.1      29 Aug 2024 07:18  Phos  2.2     08-29  Mg     2.3     08-29    TPro  6.3  /  Alb  3.1<L>  /  TBili  0.6  /  DBili  x   /  AST  11  /  ALT  10  /  AlkPhos  84  08-28      CAPILLARY BLOOD GLUCOSE      POCT Blood Glucose.: 126 mg/dL (29 Aug 2024 08:43)  POCT Blood Glucose.: 155 mg/dL (28 Aug 2024 23:34)  POCT Blood Glucose.: 136 mg/dL (28 Aug 2024 17:30)  POCT Blood Glucose.: 191 mg/dL (28 Aug 2024 12:39)        Urinalysis Basic - ( 29 Aug 2024 07:18 )    Color: x / Appearance: x / SG: x / pH: x  Gluc: 116 mg/dL / Ketone: x  / Bili: x / Urobili: x   Blood: x / Protein: x / Nitrite: x   Leuk Esterase: x / RBC: x / WBC x   Sq Epi: x / Non Sq Epi: x / Bacteria: x        RADIOLOGY & ADDITIONAL TESTS:    Imaging Personally Reviewed:  [x] YES  [ ] NO    Consultant(s) Notes Reviewed:  [x] YES  [ ] NO    MEDICATIONS  (STANDING):  acetylcysteine 20%  Inhalation 4 milliLiter(s) Inhalation every 6 hours  albuterol/ipratropium for Nebulization 3 milliLiter(s) Nebulizer every 6 hours  buDESOnide    Inhalation Suspension 0.5 milliGRAM(s) Inhalation every 12 hours  cefepime   IVPB 2000 milliGRAM(s) IV Intermittent every 12 hours  furosemide   Injectable 20 milliGRAM(s) IV Push daily  guaiFENesin ER 1200 milliGRAM(s) Oral every 12 hours  heparin   Injectable 5000 Unit(s) SubCutaneous every 12 hours  levothyroxine Injectable 70 MICROGram(s) IV Push at bedtime  methylPREDNISolone sodium succinate Injectable 20 milliGRAM(s) IV Push every 8 hours  metoprolol tartrate 25 milliGRAM(s) Oral two times a day  metroNIDAZOLE    Tablet 500 milliGRAM(s) Oral every 8 hours  pantoprazole  Injectable 40 milliGRAM(s) IV Push daily    MEDICATIONS  (PRN):      Care Discussed with Consultants/Other Providers [x] YES  [ ] NO    HEALTH ISSUES - PROBLEM Dx:  Acute hypoxic respiratory failure    SBO (small bowel obstruction)    Functional quadriplegia    Advance care planning    Palliative care encounter    Septic shock         Patient is a 89y old  Female who presents with a chief complaint of septic shock (29 Aug 2024 08:22)      SUBJECTIVE / OVERNIGHT EVENTS:    Patient seen and examined. on 3L. no acute events. co gen weakness. dtr at bedside.      Vital Signs Last 24 Hrs  T(C): 36.4 (29 Aug 2024 04:00), Max: 36.8 (28 Aug 2024 11:06)  T(F): 97.6 (29 Aug 2024 04:00), Max: 98.3 (28 Aug 2024 11:06)  HR: 98 (29 Aug 2024 04:00) (80 - 98)  BP: 138/90 (29 Aug 2024 04:00) (107/68 - 138/90)  BP(mean): 81 (28 Aug 2024 21:04) (81 - 81)  RR: 18 (29 Aug 2024 04:00) (18 - 19)  SpO2: 98% (29 Aug 2024 04:00) (94% - 98%)    Parameters below as of 28 Aug 2024 21:04  Patient On (Oxygen Delivery Method): nasal cannula  O2 Flow (L/min): 3    I&O's Summary    28 Aug 2024 07:01  -  29 Aug 2024 07:00  --------------------------------------------------------  IN: 220 mL / OUT: 1520 mL / NET: -1300 mL    29 Aug 2024 07:01  -  29 Aug 2024 10:32  --------------------------------------------------------  IN: 100 mL / OUT: 1000 mL / NET: -900 mL        PE:  GENERAL: NAD, AAOx2, [place]  CHEST/LUNG: crackles  HEART: Regular rate and rhythm; + murmur  ABDOMEN: Soft, Nontender, Nondistended; Bowel sounds present  EXTREMITIES:  2+ Peripheral Pulses, +2 LE edema  NEURO: No focal deficits      LABS:                        9.5    4.47  )-----------( 64       ( 29 Aug 2024 07:06 )             31.2     08-29    144  |  103  |  41<H>  ----------------------------<  116<H>  4.3   |  29  |  0.59    Ca    9.1      29 Aug 2024 07:18  Phos  2.2     08-29  Mg     2.3     08-29    TPro  6.3  /  Alb  3.1<L>  /  TBili  0.6  /  DBili  x   /  AST  11  /  ALT  10  /  AlkPhos  84  08-28      CAPILLARY BLOOD GLUCOSE      POCT Blood Glucose.: 126 mg/dL (29 Aug 2024 08:43)  POCT Blood Glucose.: 155 mg/dL (28 Aug 2024 23:34)  POCT Blood Glucose.: 136 mg/dL (28 Aug 2024 17:30)  POCT Blood Glucose.: 191 mg/dL (28 Aug 2024 12:39)        Urinalysis Basic - ( 29 Aug 2024 07:18 )    Color: x / Appearance: x / SG: x / pH: x  Gluc: 116 mg/dL / Ketone: x  / Bili: x / Urobili: x   Blood: x / Protein: x / Nitrite: x   Leuk Esterase: x / RBC: x / WBC x   Sq Epi: x / Non Sq Epi: x / Bacteria: x        RADIOLOGY & ADDITIONAL TESTS:    Imaging Personally Reviewed:  [x] YES  [ ] NO    Consultant(s) Notes Reviewed:  [x] YES  [ ] NO    MEDICATIONS  (STANDING):  acetylcysteine 20%  Inhalation 4 milliLiter(s) Inhalation every 6 hours  albuterol/ipratropium for Nebulization 3 milliLiter(s) Nebulizer every 6 hours  buDESOnide    Inhalation Suspension 0.5 milliGRAM(s) Inhalation every 12 hours  cefepime   IVPB 2000 milliGRAM(s) IV Intermittent every 12 hours  furosemide   Injectable 20 milliGRAM(s) IV Push daily  guaiFENesin ER 1200 milliGRAM(s) Oral every 12 hours  heparin   Injectable 5000 Unit(s) SubCutaneous every 12 hours  levothyroxine Injectable 70 MICROGram(s) IV Push at bedtime  methylPREDNISolone sodium succinate Injectable 20 milliGRAM(s) IV Push every 8 hours  metoprolol tartrate 25 milliGRAM(s) Oral two times a day  metroNIDAZOLE    Tablet 500 milliGRAM(s) Oral every 8 hours  pantoprazole  Injectable 40 milliGRAM(s) IV Push daily    MEDICATIONS  (PRN):      Care Discussed with Consultants/Other Providers [x] YES  [ ] NO    HEALTH ISSUES - PROBLEM Dx:  Acute hypoxic respiratory failure    SBO (small bowel obstruction)    Functional quadriplegia    Advance care planning    Palliative care encounter    Septic shock

## 2024-08-29 NOTE — PROGRESS NOTE ADULT - SUBJECTIVE AND OBJECTIVE BOX
DATE OF SERVICE: 08-29-24 @ 21:46    Patient is a 89y old  Female who presents with a chief complaint of septic shock (29 Aug 2024 16:59)      INTERVAL HISTORY: feels ok     MEDICATIONS:  furosemide   Injectable 20 milliGRAM(s) IV Push daily  metoprolol tartrate 25 milliGRAM(s) Oral two times a day        PHYSICAL EXAM:  T(C): 36.7 (08-29-24 @ 20:56), Max: 36.7 (08-29-24 @ 16:40)  HR: 98 (08-29-24 @ 20:56) (98 - 103)  BP: 127/84 (08-29-24 @ 20:56) (121/84 - 138/90)  RR: 18 (08-29-24 @ 20:56) (18 - 18)  SpO2: 97% (08-29-24 @ 20:56) (96% - 98%)  Wt(kg): --  I&O's Summary    28 Aug 2024 07:01  -  29 Aug 2024 07:00  --------------------------------------------------------  IN: 220 mL / OUT: 1520 mL / NET: -1300 mL    29 Aug 2024 07:01  -  29 Aug 2024 21:46  --------------------------------------------------------  IN: 220 mL / OUT: 1200 mL / NET: -980 mL          Appearance: In no distress	  HEENT:    PERRL, EOMI	  Cardiovascular:  S1 S2, No JVD  Respiratory: Lungs clear to auscultation	  Gastrointestinal:  Soft, Non-tender, + BS	  Vascularature:  No edema of LE  Psychiatric: Appropriate affect   Neuro: no acute focal deficits                               9.5    4.47  )-----------( 64       ( 29 Aug 2024 07:06 )             31.2     08-29    144  |  103  |  41<H>  ----------------------------<  116<H>  4.3   |  29  |  0.59    Ca    9.1      29 Aug 2024 07:18  Phos  2.2     08-29  Mg     2.3     08-29    TPro  6.3  /  Alb  3.1<L>  /  TBili  0.6  /  DBili  x   /  AST  11  /  ALT  10  /  AlkPhos  84  08-28        Labs personally reviewed      EKG: Personally reviewed by me - AF RVR  Radiology: Personally reviewed by me - CXR Findings are concerning for right sided pneumonia with small dependent bilateral effusions.    TTE CONCLUSIONS:      1. Left ventricular cavity is small. Left ventricular wall thickness is normal. Left ventricular systolic function is normal. There are no regional wall motion abnormalities seen.   2. Normal right ventricular cavity size, with normal wall thickness, and normal right ventricular systolic function.   3. Mild pulmonary hypertension.      Assessment and Plan: 	  89F with history of paroxysmal atrial fibrillation, hypothyroidism, pulmonary/abdominal tuberculosis, recurrent small bowel obstruction, recurrent UTIs presenting from home with abdominal pain and vomiting for 1 day with CT scans with evidence of SBO vs enteritis s/p NGT. Course c/b hypotension likely iso septic from multifocal PNA process.    1. Paroxysmal Atrial Fibrillation  - Switch Metoprolol IV to Metoprolol 25mg PO BID  - Hold Eliquis given thrombocytopenia ~50, resume when safe per heme but risks seem to outweigh benefits at this time    2. Acute hfpef - On Lasix 20 mg IV daily   - Switch to PO likely 8/29  - TTE with preserved EF and mild pulm HTN    3. Multifocal PNA   - Abx as per primary team     4. DVT PPX - on HSQ          Nilton Roldan DO [facility]  Cardiovascular Medicine  [address] [facility], [address]  Office: [phone]  Available via Text/call on [employer] Teams

## 2024-08-29 NOTE — PROGRESS NOTE ADULT - SUBJECTIVE AND OBJECTIVE BOX
[facility] HEMATOLOGY/ONCOLOGY INPATIENT PROGRESS NOTE     Interval Hx:   08-29-24: Ms. Slater was seen at bedside today.    Meds:   MEDICATIONS  (STANDING):  acetylcysteine 20%  Inhalation 4 milliLiter(s) Inhalation every 6 hours  albuterol/ipratropium for Nebulization 3 milliLiter(s) Nebulizer every 6 hours  buDESOnide    Inhalation Suspension 0.5 milliGRAM(s) Inhalation every 12 hours  cefepime   IVPB 2000 milliGRAM(s) IV Intermittent every 12 hours  furosemide   Injectable 20 milliGRAM(s) IV Push daily  guaiFENesin ER 1200 milliGRAM(s) Oral every 12 hours  heparin   Injectable 5000 Unit(s) SubCutaneous every 12 hours  levothyroxine Injectable 70 MICROGram(s) IV Push at bedtime  methylPREDNISolone sodium succinate Injectable 20 milliGRAM(s) IV Push every 8 hours  metoprolol tartrate 25 milliGRAM(s) Oral two times a day  pantoprazole  Injectable 40 milliGRAM(s) IV Push daily    MEDICATIONS  (PRN):    Vital Signs Last 24 Hrs  T(C): 36.8 (28 Aug 2024 21:04), Max: 36.8 (28 Aug 2024 11:06)  T(F): 98.3 (28 Aug 2024 21:04), Max: 98.3 (28 Aug 2024 11:06)  HR: 80 (28 Aug 2024 21:04) (80 - 95)  BP: 107/68 (28 Aug 2024 21:04) (107/68 - 123/85)  BP(mean): 81 (28 Aug 2024 21:04) (81 - 81)  RR: 18 (28 Aug 2024 21:04) (18 - 19)  SpO2: 94% (28 Aug 2024 21:04) (94% - 98%)    Parameters below as of 28 Aug 2024 21:04  Patient On (Oxygen Delivery Method): nasal cannula  O2 Flow (L/min): 3    Physical Exam:  Gen:  NAD, lethargic  HEENT: EOMI, MMM  Chest: equal chest rise, speaking full sentences  Cardiac: irregular  Abd: soft, non-tender, no hepatomegaly or splenomegaly  Ext: No edema   Neuro: AAOx3, normal mood and affect, hard of hearing     Labs:                        9.1    3.35  )-----------( 61       ( 28 Aug 2024 10:43 )             29.7     CBC Full  -  ( 28 Aug 2024 10:43 )  WBC Count : 3.35 K/uL  RBC Count : 2.78 M/uL  Hemoglobin : 9.1 g/dL  Hematocrit : 29.7 %  Platelet Count - Automated : 61 K/uL  Mean Cell Volume : 106.8 fl  Mean Cell Hemoglobin : 32.7 pg  Mean Cell Hemoglobin Concentration : 30.6 gm/dL  Auto Neutrophil # : 2.94 K/uL  Auto Lymphocyte # : 0.32 K/uL  Auto Monocyte # : 0.03 K/uL  Auto Eosinophil # : 0.00 K/uL  Auto Basophil # : 0.00 K/uL  Auto Neutrophil % : 85.3 %  Auto Lymphocyte % : 9.5 %  Auto Monocyte % : 0.9 %  Auto Eosinophil % : 0.0 %  Auto Basophil % : 0.0 %    08-28    146<H>  |  104  |  36<H>  ----------------------------<  148<H>  3.1<L>   |  30  |  0.64    Ca    8.8      28 Aug 2024 10:43  Phos  2.8     08-27  Mg     2.1     08-27    TPro  6.3  /  Alb  3.1<L>  /  TBili  0.6  /  DBili  x   /  AST  11  /  ALT  10  /  AlkPhos  84  08-28      Bilirubin Total: 0.6 mg/dL (08-28-24 @ 10:43)   \[facility] [place] [facility]\"" HEMATOLOGY/ONCOLOGY INPATIENT PROGRESS NOTE     Interval Hx:   08-29-24: Ms. Slater was seen at bedside today, awake and alert, prolonged exam due to being hard of hearing, stated she has persistent coughing and associated nausea without vomiting, nursing staff at bedside, pt with minor bleeding from IV site, required pressure. Otherwise no bleeding noted, continues Hep SubQ for now    Meds:   MEDICATIONS  (STANDING):  acetylcysteine 20%  Inhalation 4 milliLiter(s) Inhalation every 6 hours  albuterol/ipratropium for Nebulization 3 milliLiter(s) Nebulizer every 6 hours  buDESOnide    Inhalation Suspension 0.5 milliGRAM(s) Inhalation every 12 hours  cefepime   IVPB 2000 milliGRAM(s) IV Intermittent every 12 hours  furosemide   Injectable 20 milliGRAM(s) IV Push daily  guaiFENesin ER 1200 milliGRAM(s) Oral every 12 hours  heparin   Injectable 5000 Unit(s) SubCutaneous every 12 hours  levothyroxine Injectable 70 MICROGram(s) IV Push at bedtime  methylPREDNISolone sodium succinate Injectable 20 milliGRAM(s) IV Push every 8 hours  metoprolol tartrate 25 milliGRAM(s) Oral two times a day  pantoprazole  Injectable 40 milliGRAM(s) IV Push daily    MEDICATIONS  (PRN):    Vital Signs Last 24 Hrs  T(C): 36.8 (28 Aug 2024 21:04), Max: 36.8 (28 Aug 2024 11:06)  T(F): 98.3 (28 Aug 2024 21:04), Max: 98.3 (28 Aug 2024 11:06)  HR: 80 (28 Aug 2024 21:04) (80 - 95)  BP: 107/68 (28 Aug 2024 21:04) (107/68 - 123/85)  BP(mean): 81 (28 Aug 2024 21:04) (81 - 81)  RR: 18 (28 Aug 2024 21:04) (18 - 19)  SpO2: 94% (28 Aug 2024 21:04) (94% - 98%)    Parameters below as of 28 Aug 2024 21:04  Patient On (Oxygen Delivery Method): nasal cannula  O2 Flow (L/min): 3    Physical Exam:  Gen:  NAD, lethargic  HEENT: EOMI, MMM  Chest: equal chest rise, speaking full sentences  Cardiac: irregular  Abd: soft, non-tender, no hepatomegaly or splenomegaly  Ext: No edema   Neuro: AAOx3, normal mood and affect, hard of hearing     Labs:                        9.1    3.35  )-----------( 61       ( 28 Aug 2024 10:43 )             29.7     CBC Full  -  ( 28 Aug 2024 10:43 )  WBC Count : 3.35 K/uL  RBC Count : 2.78 M/uL  Hemoglobin : 9.1 g/dL  Hematocrit : 29.7 %  Platelet Count - Automated : 61 K/uL  Mean Cell Volume : 106.8 fl  Mean Cell Hemoglobin : 32.7 pg  Mean Cell Hemoglobin Concentration : 30.6 gm/dL  Auto Neutrophil # : 2.94 K/uL  Auto Lymphocyte # : 0.32 K/uL  Auto Monocyte # : 0.03 K/uL  Auto Eosinophil # : 0.00 K/uL  Auto Basophil # : 0.00 K/uL  Auto Neutrophil % : 85.3 %  Auto Lymphocyte % : 9.5 %  Auto Monocyte % : 0.9 %  Auto Eosinophil % : 0.0 %  Auto Basophil % : 0.0 %    08-28    146<H>  |  104  |  36<H>  ----------------------------<  148<H>  3.1<L>   |  30  |  0.64    Ca    8.8      28 Aug 2024 10:43  Phos  2.8     08-27  Mg     2.1     08-27    TPro  6.3  /  Alb  3.1<L>  /  TBili  0.6  /  DBili  x   /  AST  11  /  ALT  10  /  AlkPhos  84  08-28      Bilirubin Total: 0.6 mg/dL (08-28-24 @ 10:43)

## 2024-08-29 NOTE — SWALLOW BEDSIDE ASSESSMENT ADULT - SWALLOW EVAL: RECOMMENDED DIET
Pt appears to tolerate Thin Liquids with no overt s/s laryngeal penetration/aspiration. Given Pt deferring all food trials, defer to primary team for most appropriate food textures at this time.

## 2024-08-29 NOTE — SWALLOW BEDSIDE ASSESSMENT ADULT - COMMENTS
Course: Acute Hypoxemic Respiratory Failure, Multifocal PNA - requiring HFNC iso multifocal PNA which may have been iso aspiration event from SBO, on Abx  for empiric coverage of aspiration PNA; Small Bowel Obstruction - NGT to suction draining small amounts of brown stomach content; Leukopenia; Neutropenia - was receiving prednisone OP for low BP; Neutropenic Fever, Sepsis 2/2 Multifocal PNA.   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
Course: Acute Hypoxemic Respiratory Failure, Multifocal PNA - requiring HFNC iso multifocal PNA which may have been iso aspiration event from SBO, on Abx  for empiric coverage of aspiration PNA; Small Bowel Obstruction - NGT to suction draining small amounts of brown stomach content; Leukopenia; Neutropenia - was receiving prednisone OP for low BP; Neutropenic Fever, Sepsis 2/2 Multifocal PNA.   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

## 2024-08-29 NOTE — SWALLOW BEDSIDE ASSESSMENT ADULT - SWALLOW EVAL: PATIENT/FAMILY GOALS STATEMENT
Pt stated, "That's all I want," in reference to liquids. Pt stated she did not wish to eat anything at this time.

## 2024-08-29 NOTE — PROGRESS NOTE ADULT - NS ATTEST RISK PROBLEM GEN_ALL_CORE FT
Due to: 1. Number and complexity of problems addressed for this patient:    1.1 Moderate (At least 1)  [ ] 1 or more chronic illnesses with exacerbation, progression, or side effects of treatment  [ ] 2 or more stable chronic illnesses  [ ] 1 undiagnosed new problem with uncertain prognosis  [ ] 1 acute illness with systemic symptoms  [ ] 1 acute complicated injury  1.2 High (At least 1)   [ ] 1 or more chronic illnesses with severe exacerbation, progression, or side effects of treatment  [x ] 1 acute or chronic illnesses or injuries that may pose a threat to life or bodily function    2. Amount and/or Complexity of Data that was Reviewed and Analyzed for this case:       Moderate (1 out of 3)       High (2 out of 3)  2.1. (Any combination of 3 of the following)   [ ] Prior External notes were reviewed  [x ] Each test result was reviewed (see "LABS" and "RADIOLOGY & ADDITIONAL STUDIES" above)  [ ] The following tests were ordered and/or reviewed (Only count 1 point for ordering or reviewing a unique test):  	[ ]CBC  	[ ] Chemistry   	[ ] Imaging   	[ ] Other:   [ ] Assessment requiring an independent historian   		Name of historian and relationship:   2.2  [ ] Personally review and interpretation of  image or testing   2.3  [ ] Discussion of management or test interpretation with external physician/other qualified health care professional\appropriate source (not separately reported)    3. Risk of Complications and/or Morbidity or Mortality of for this Patient’s Management:  3.1 Moderate risk of morbidity from additional diagnostic testing or treatment (At least 1):   [ ] Prescription drug management   [ ] Decision regarding minor surgery, treatment, or procedure with identified patient or procedure risk factors  [ ] Decision regarding elective major surgery, treatment, or procedure without identified patient or procedure risk factors   [ ] Diagnosis or treatment significantly limited by social determinants of health   [ ] Other:   3.2 High risk of morbidity from additional diagnostic testing or treatment (At least 1):   [ ] Drug therapy requiring intensive monitoring for toxicity   [ ] Decision regarding elective major surgery, treatment, or procedure with identified patient or procedure risk factors   [ ] Decision regarding emergency major surgery, treatment, or procedure   [ x] Decision regarding hospitalization or escalation of hospital-level of care  [x ] Decision not to resuscitate, not to intubate, or to de-escalate care because of poor prognosis   [ ] Decision to proceed or not with artificial nutrition   [ ] Parenteral controlled substance  [ ] Other:
Due to: 1. Number and complexity of problems addressed for this patient:    1.1 Moderate (At least 1)  [ ] 1 or more chronic illnesses with exacerbation, progression, or side effects of treatment  [ ] 2 or more stable chronic illnesses  [ ] 1 undiagnosed new problem with uncertain prognosis  [ ] 1 acute illness with systemic symptoms  [ ] 1 acute complicated injury  1.2 High (At least 1)   [ ] 1 or more chronic illnesses with severe exacerbation, progression, or side effects of treatment  [x ] 1 acute or chronic illnesses or injuries that may pose a threat to life or bodily function    2. Amount and/or Complexity of Data that was Reviewed and Analyzed for this case:       Moderate (1 out of 3)       High (2 out of 3)  2.1. (Any combination of 3 of the following)   [ ] Prior External notes were reviewed  [x ] Each test result was reviewed (see "LABS" and "RADIOLOGY & ADDITIONAL STUDIES" above)  [ ] The following tests were ordered and/or reviewed (Only count 1 point for ordering or reviewing a unique test):  	[ ]CBC  	[ ] Chemistry   	[ ] Imaging   	[ ] Other:   [ ] Assessment requiring an independent historian   		Name of historian and relationship:   2.2  [ ] Personally review and interpretation of  image or testing   2.3  [ ] Discussion of management or test interpretation with external physician/other qualified health care professional\appropriate source (not separately reported)    3. Risk of Complications and/or Morbidity or Mortality of for this Patient’s Management:  3.1 Moderate risk of morbidity from additional diagnostic testing or treatment (At least 1):   [ ] Prescription drug management   [ ] Decision regarding minor surgery, treatment, or procedure with identified patient or procedure risk factors  [ ] Decision regarding elective major surgery, treatment, or procedure without identified patient or procedure risk factors   [ ] Diagnosis or treatment significantly limited by social determinants of health   [ ] Other:   3.2 High risk of morbidity from additional diagnostic testing or treatment (At least 1):   [ ] Drug therapy requiring intensive monitoring for toxicity   [ ] Decision regarding elective major surgery, treatment, or procedure with identified patient or procedure risk factors   [ ] Decision regarding emergency major surgery, treatment, or procedure   [x] Decision regarding hospitalization or escalation of hospital-level of care  [ ] Decision not to resuscitate, not to intubate, or to de-escalate care because of poor prognosis   [ ] Decision to proceed or not with artificial nutrition   [ ] Parenteral controlled substance  [ ] Other:
Due to: 1. Number and complexity of problems addressed for this patient:    1.1 Moderate (At least 1)  [ ] 1 or more chronic illnesses with exacerbation, progression, or side effects of treatment  [ ] 2 or more stable chronic illnesses  [ ] 1 undiagnosed new problem with uncertain prognosis  [ ] 1 acute illness with systemic symptoms  [ ] 1 acute complicated injury  1.2 High (At least 1)   [ ] 1 or more chronic illnesses with severe exacerbation, progression, or side effects of treatment  [x ] 1 acute or chronic illnesses or injuries that may pose a threat to life or bodily function    2. Amount and/or Complexity of Data that was Reviewed and Analyzed for this case:       Moderate (1 out of 3)       High (2 out of 3)  2.1. (Any combination of 3 of the following)   [ ] Prior External notes were reviewed  [x ] Each test result was reviewed (see "LABS" and "RADIOLOGY & ADDITIONAL STUDIES" above)  [ ] The following tests were ordered and/or reviewed (Only count 1 point for ordering or reviewing a unique test):  	[ ]CBC  	[ ] Chemistry   	[ ] Imaging   	[ ] Other:   [ ] Assessment requiring an independent historian   		Name of historian and relationship:   2.2  [ ] Personally review and interpretation of  image or testing   2.3  [ ] Discussion of management or test interpretation with external physician/other qualified health care professional\appropriate source (not separately reported)    3. Risk of Complications and/or Morbidity or Mortality of for this Patient’s Management:  3.1 Moderate risk of morbidity from additional diagnostic testing or treatment (At least 1):   [ ] Prescription drug management   [ ] Decision regarding minor surgery, treatment, or procedure with identified patient or procedure risk factors  [ ] Decision regarding elective major surgery, treatment, or procedure without identified patient or procedure risk factors   [ ] Diagnosis or treatment significantly limited by social determinants of health   [ ] Other:   3.2 High risk of morbidity from additional diagnostic testing or treatment (At least 1):   [ ] Drug therapy requiring intensive monitoring for toxicity   [ ] Decision regarding elective major surgery, treatment, or procedure with identified patient or procedure risk factors   [ ] Decision regarding emergency major surgery, treatment, or procedure   [ x] Decision regarding hospitalization or escalation of hospital-level of care  [x ] Decision not to resuscitate, not to intubate, or to de-escalate care because of poor prognosis   [ ] Decision to proceed or not with artificial nutrition   [ ] Parenteral controlled substance  [ ] Other:

## 2024-08-29 NOTE — SWALLOW BEDSIDE ASSESSMENT ADULT - SWALLOW EVAL: SECRETION MANAGEMENT
baseline congested cough observed x1 prior to initiation of PO trials, no further cough observed during exam

## 2024-08-29 NOTE — PROGRESS NOTE ADULT - ASSESSMENT
89F with history of paroxysmal atrial fibrillation, hypothyroidism, pulmonary/abdominal tuberculosis, recurrent small bowel obstruction, recurrent UTIs presenting from home with abdominal pain and vomiting for 1 day. Per daughter and HHA at bedside, patient was in her usual state of health until yesterday. Normally able to communicate and ambulate to the bathroom and eat on her own. Does have a HHA for assistance as needed. HHA reports that around 2:30 AM last night, patient called for assistance and was vomiting and asked to call an ambulance. CT scan performed in ED showed evidence of dilated small bowel loops with large stool burden with concern for small bowel obstruction vs enteritis. Also with some imaging findings concerning for multifocal pneumonia. Surgery consulted given history of small bowel obstruction and imaging and NGT placed with NGT to suction draining minimal dark abdominal contents. Additionally with hypoxemic respiratory failure requiring HFNC 40/40. ED course c/b hypotension 70/40s with transient improvement with 3L IVF however with repeat hypotension despite these measures.   GOC discussion had with daughter at bedside. Patient is DNR/DNI and would not want aggressive measures including surgery or central line placement for pressor therapy. Would like a trial of IV pressors via peripheral IV and if these fails, daughter would not want further escalation. Was also seen by palliative care team in ED and was being considered for PCU on fixed dose pressors however no beds available at this time.   Admitted to MICU for septic shock. (23 Aug 2024 15:40)      Multifocal Pneumonia/ Septic shock / Hypoxia  A trial fibrillation  Hypothyroidism   Hx of pulm/Abd tuberculosis  HTN/Hx of orthostatic hypotension    Multifocal Pneumonia/ Septic shock / Hypoxia  -was neutropenic on admission with multifocal pneumonia and septic shock;  -S/P MICU stay recovered now transferred to floor  -she is on broad spectrum antibtiocs:  would cont for now;   -cxr reviewed:  -consider ct chest  non contrast    -she has wheezing and difficulty to expectorate phlegm : add duoneb and Mucomyst   -may need steroids will wait and see for next 24 hours;  if  needed will add  in am       8/26:-change to solumedrol : cont Mucomyst and nebs  -watch for blood pressure   -cont mucomyst  : add mucinex too    8/27: still with some cough : has coarse breath sounds ; still on steroids increased to q 8 yesterday ;  8/28: her wheezing is better:  decrease 20 mg q 8 hours and if doing  ok : will change and taper:   8/29: she is doing relatively better: cont steroids  decrease tomorrow and change to po      A trial fibrillation  -not on any full AC:  -on heparin sq   8/27: now he has RVR:  fill ac per [facility] team:  being transferred to monitored unit   8/29: seems stable: on hep sq     Hypothyroidism   -levothyroxine    Hx of pulm/Abd tuberculosis  -treated before    HTN/Hx of orthostatic hypotension  -currently on stress doses of steroids and being tapered   8/27" changed to solumedrol :" monitor for orthostasis   8/28: blood pressure seems stable:     wing RCU ACP

## 2024-08-29 NOTE — PROGRESS NOTE ADULT - SUBJECTIVE AND OBJECTIVE BOX
Date of Service: 08-29-24 @ 16:53    Patient is a 89y old  Female who presents with a chief complaint of septic shock (29 Aug 2024 10:32)      Any change in ROS: Alert and awake and responsive:  no obvious resp distress;   on 4  of nasal cannula      MEDICATIONS  (STANDING):  acetylcysteine 20%  Inhalation 4 milliLiter(s) Inhalation every 6 hours  albuterol/ipratropium for Nebulization 3 milliLiter(s) Nebulizer every 6 hours  buDESOnide    Inhalation Suspension 0.5 milliGRAM(s) Inhalation every 12 hours  cefepime   IVPB 2000 milliGRAM(s) IV Intermittent every 12 hours  furosemide   Injectable 20 milliGRAM(s) IV Push daily  guaiFENesin ER 1200 milliGRAM(s) Oral every 12 hours  heparin   Injectable 5000 Unit(s) SubCutaneous every 12 hours  levothyroxine Injectable 70 MICROGram(s) IV Push at bedtime  methylPREDNISolone sodium succinate Injectable 20 milliGRAM(s) IV Push every 8 hours  metoprolol tartrate 25 milliGRAM(s) Oral two times a day  metroNIDAZOLE    Tablet 500 milliGRAM(s) Oral every 8 hours  pantoprazole  Injectable 40 milliGRAM(s) IV Push daily    MEDICATIONS  (PRN):    Vital Signs Last 24 Hrs  T(C): 36.7 (29 Aug 2024 16:40), Max: 36.8 (28 Aug 2024 21:04)  T(F): 98.1 (29 Aug 2024 16:40), Max: 98.3 (28 Aug 2024 21:04)  HR: 103 (29 Aug 2024 16:40) (80 - 103)  BP: 121/84 (29 Aug 2024 16:40) (107/68 - 138/90)  BP(mean): 96 (29 Aug 2024 16:40) (81 - 96)  RR: 18 (29 Aug 2024 16:40) (18 - 19)  SpO2: 96% (29 Aug 2024 16:40) (94% - 98%)    Parameters below as of 29 Aug 2024 16:40  Patient On (Oxygen Delivery Method): nasal cannula        I&O's Summary    28 Aug 2024 07:01  -  29 Aug 2024 07:00  --------------------------------------------------------  IN: 220 mL / OUT: 1520 mL / NET: -1300 mL    29 Aug 2024 07:01  -  29 Aug 2024 16:53  --------------------------------------------------------  IN: 220 mL / OUT: 1200 mL / NET: -980 mL          Physical Exam:   GENERAL: NAD, well-groomed, well-developed  HEENT: KOLBY/   Atraumatic, Normocephalic  ENMT: No tonsillar erythema, exudates, or enlargement; Moist mucous membranes, Good dentition, No lesions  NECK: Supple, No JVD, Normal thyroid  CHEST/LUNG: Clear to auscultaion  CVS: Regular rate and rhythm; No murmurs, rubs, or gallops  GI: : Soft, Nontender, Nondistended; Bowel sounds present  NERVOUS SYSTEM:  Alert & Oriented X3  EXTREMITIES: - edema  LYMPH: No lymphadenopathy noted  SKIN: No rashes or lesions  ENDOCRINOLOGY: No Thyromegaly  PSYCH: Appropriate    Labs:  30, 32                            9.5    4.47  )-----------( 64       ( 29 Aug 2024 07:06 )             31.2                         9.1    3.35  )-----------( 61       ( 28 Aug 2024 10:43 )             29.7                         8.6    2.58  )-----------( 48       ( 27 Aug 2024 07:15 )             28.3                         9.2    3.78  )-----------( 50       ( 26 Aug 2024 07:31 )             30.3     08-29    144  |  103  |  41<H>  ----------------------------<  116<H>  4.3   |  29  |  0.59  08-28    146<H>  |  104  |  36<H>  ----------------------------<  148<H>  3.1<L>   |  30  |  0.64  08-27    145  |  106  |  24<H>  ----------------------------<  110<H>  3.8   |  27  |  0.55  08-26    144  |  107  |  22  ----------------------------<  102<H>  3.9   |  24  |  0.58    Ca    9.1      29 Aug 2024 07:18  Ca    8.8      28 Aug 2024 10:43  Phos  2.2     08-29  Mg     2.3     08-29    TPro  6.3  /  Alb  3.1<L>  /  TBili  0.6  /  DBili  x   /  AST  11  /  ALT  10  /  AlkPhos  84  08-28  TPro  6.1  /  Alb  3.0<L>  /  TBili  0.5  /  DBili  x   /  AST  12  /  ALT  11  /  AlkPhos  86  08-26    CAPILLARY BLOOD GLUCOSE      POCT Blood Glucose.: 133 mg/dL (29 Aug 2024 12:20)  POCT Blood Glucose.: 126 mg/dL (29 Aug 2024 08:43)  POCT Blood Glucose.: 155 mg/dL (28 Aug 2024 23:34)  POCT Blood Glucose.: 136 mg/dL (28 Aug 2024 17:30)      LIVER FUNCTIONS - ( 28 Aug 2024 10:43 )  Alb: 3.1 g/dL / Pro: 6.3 g/dL / ALK PHOS: 84 U/L / ALT: 10 U/L / AST: 11 U/L / GGT: x             Urinalysis Basic - ( 29 Aug 2024 07:18 )    Color: x / Appearance: x / SG: x / pH: x  Gluc: 116 mg/dL / Ketone: x  / Bili: x / Urobili: x   Blood: x / Protein: x / Nitrite: x   Leuk Esterase: x / RBC: x / WBC x   Sq Epi: x / Non Sq Epi: x / Bacteria: x            RECENT CULTURES:  08-24 @ 00:10 Clean Catch Clean Catch (Midstream)                <10,000 CFU/mL Normal Urogenital Yeimi    08-23 @ 09:30 .Blood Blood-Peripheral         rad< from: Xray Chest 1 View- PORTABLE-Urgent (Xray Chest 1 View- PORTABLE-Urgent .) (08.26.24 @ 17:17) >  PROCEDURE DATE:  08/26/2024          INTERPRETATION:  EXAMINATION: XR CHEST URGENT    CLINICAL INDICATION: Dyspnea    TECHNIQUE: Single frontal, portable view of the chest was obtained.    COMPARISON: Chest x-ray 8/23/2024 and 8/24/2024.    FINDINGS:  Rotated study.  Interval removal of patient's enteric tube.  The heart is magnified by technique, size not reliably assessed.  New right midlung field airspace opacity. Mild left basilar haziness.  No pneumothorax.  No acute bony abnormality.    IMPRESSION:  Findings are concerning for right sided pneumonia with small dependent   bilateral effusions.    --- End of Report ---           YESY BERGERON MD; Resident Radiologist  This document has been electronically signed.  DESHAUN BARRON MD; Attending Radiologist  This document has been electronically signed. Aug 27 2024 12:16PM    < end of copied text >         No growth at 5 days    08-23 @ 09:00 .Blood Blood-Peripheral                No growth at 5 days          RESPIRATORY CULTURES:          Studies  Chest X-RAY  CT SCAN Chest   Venous Dopplers: LE:   CT Abdomen  Others

## 2024-08-29 NOTE — SWALLOW BEDSIDE ASSESSMENT ADULT - SLP GENERAL OBSERVATIONS
4L NC 4L NC. Pt seen at bedside, awake and alert, oriented to self and place, reduced for time, verbally responsive, following directions for the purposes of the exam.

## 2024-08-29 NOTE — SWALLOW BEDSIDE ASSESSMENT ADULT - PHARYNGEAL PHASE
timely swallow initiation; palpable hyolaryngeal elevation/excursion; no overt s/s laryngeal penetration/aspiration

## 2024-08-29 NOTE — SWALLOW BEDSIDE ASSESSMENT ADULT - SLP PERTINENT HISTORY OF CURRENT PROBLEM
89y F with PMHx of asthma, HTN, essential tremor, bronchiectasis, GERD, hypothyroidism, paroxysmal A.fib on Eliquis 2.5mg BID, Vit D deficiency, tuberculous peritonitis, anemia moderate AS, hx of falls, aortic regurgitation,h/o Pulmonary TB in 90s - s/p treatment in 1990s; h/o Abdominal TB, recurrent small bowel obstruction, recurrent UTIs,  presented with nausea and vomiting x1 day on 08/23/2024. CT scans with evidence of SBO vs enteritis s/p NGT. Course c/b hypotension likely iso septic shock from multifocal PNA process. Admitted to MICU with SBO and transferred to floor on 08/25/2024, treated conservatively, c/b septic shock requiring pressor support. h/o pancytopenia - Hem/Onc offered BMBx, declined. (Baseline: Lives in her apt with a 24hr aide and her daughter visits regularly. AAO x 3 at baseline, now mental status AAO x 1)
89y F with PMHx of asthma, HTN, essential tremor, bronchiectasis, GERD, hypothyroidism, paroxysmal A.fib on Eliquis 2.5mg BID, Vit D deficiency, tuberculous peritonitis, anemia moderate AS, hx of falls, aortic regurgitation,h/o Pulmonary TB in 90s - s/p treatment in 1990s; h/o Abdominal TB, recurrent small bowel obstruction, recurrent UTIs,  presented with nausea and vomiting x1 day on 08/23/2024. CT scans with evidence of SBO vs enteritis s/p NGT. Course c/b hypotension likely iso septic shock from multifocal PNA process. Admitted to MICU with SBO and transferred to floor on 08/25/2024, treated conservatively, c/b septic shock requiring pressor support. h/o pancytopenia - Hem/Onc offered BMBx, declined. (Baseline: Lives in her apt with a 24hr aide and her daughter visits regularly. AAO x 3 at baseline, now mental status AAO x 1)

## 2024-08-30 LAB
ANION GAP SERPL CALC-SCNC: 10 MMOL/L — SIGNIFICANT CHANGE UP (ref 5–17)
APPEARANCE UR: ABNORMAL
BACTERIA # UR AUTO: ABNORMAL /HPF
BILIRUB UR-MCNC: ABNORMAL
BUN SERPL-MCNC: 33 MG/DL — HIGH (ref 7–23)
CALCIUM SERPL-MCNC: 8.6 MG/DL — SIGNIFICANT CHANGE UP (ref 8.4–10.5)
CAST: 1 /LPF — SIGNIFICANT CHANGE UP (ref 0–4)
CHLORIDE SERPL-SCNC: 99 MMOL/L — SIGNIFICANT CHANGE UP (ref 96–108)
CO2 SERPL-SCNC: 30 MMOL/L — SIGNIFICANT CHANGE UP (ref 22–31)
COLOR SPEC: SIGNIFICANT CHANGE UP
CREAT SERPL-MCNC: 0.51 MG/DL — SIGNIFICANT CHANGE UP (ref 0.5–1.3)
DIFF PNL FLD: ABNORMAL
EGFR: 84 ML/MIN/1.73M2 — SIGNIFICANT CHANGE UP
GLUCOSE BLDC GLUCOMTR-MCNC: 106 MG/DL — HIGH (ref 70–99)
GLUCOSE BLDC GLUCOMTR-MCNC: 114 MG/DL — HIGH (ref 70–99)
GLUCOSE BLDC GLUCOMTR-MCNC: 117 MG/DL — HIGH (ref 70–99)
GLUCOSE BLDC GLUCOMTR-MCNC: 121 MG/DL — HIGH (ref 70–99)
GLUCOSE BLDC GLUCOMTR-MCNC: 143 MG/DL — HIGH (ref 70–99)
GLUCOSE SERPL-MCNC: 110 MG/DL — HIGH (ref 70–99)
GLUCOSE UR QL: NEGATIVE MG/DL — SIGNIFICANT CHANGE UP
HCT VFR BLD CALC: 30.4 % — LOW (ref 34.5–45)
HGB BLD-MCNC: 9.4 G/DL — LOW (ref 11.5–15.5)
KETONES UR-MCNC: ABNORMAL MG/DL
LEUKOCYTE ESTERASE UR-ACNC: ABNORMAL
MCHC RBC-ENTMCNC: 30.9 GM/DL — LOW (ref 32–36)
MCHC RBC-ENTMCNC: 32.2 PG — SIGNIFICANT CHANGE UP (ref 27–34)
MCV RBC AUTO: 104.1 FL — HIGH (ref 80–100)
NITRITE UR-MCNC: NEGATIVE — SIGNIFICANT CHANGE UP
NRBC # BLD: 0 /100 WBCS — SIGNIFICANT CHANGE UP (ref 0–0)
NT-PROBNP SERPL-SCNC: 5685 PG/ML — HIGH (ref 0–300)
PH UR: 6 — SIGNIFICANT CHANGE UP (ref 5–8)
PLATELET # BLD AUTO: 65 K/UL — LOW (ref 150–400)
POTASSIUM SERPL-MCNC: 5 MMOL/L — SIGNIFICANT CHANGE UP (ref 3.5–5.3)
POTASSIUM SERPL-SCNC: 5 MMOL/L — SIGNIFICANT CHANGE UP (ref 3.5–5.3)
PROT UR-MCNC: 30 MG/DL
RBC # BLD: 2.92 M/UL — LOW (ref 3.8–5.2)
RBC # FLD: 15.1 % — HIGH (ref 10.3–14.5)
RBC CASTS # UR COMP ASSIST: 12 /HPF — HIGH (ref 0–4)
REVIEW: SIGNIFICANT CHANGE UP
SODIUM SERPL-SCNC: 139 MMOL/L — SIGNIFICANT CHANGE UP (ref 135–145)
SP GR SPEC: 1.03 — SIGNIFICANT CHANGE UP (ref 1–1.03)
SQUAMOUS # UR AUTO: 4 /HPF — SIGNIFICANT CHANGE UP (ref 0–5)
TROPONIN T, HIGH SENSITIVITY RESULT: <6 NG/L — SIGNIFICANT CHANGE UP (ref 0–51)
UROBILINOGEN FLD QL: 1 MG/DL — SIGNIFICANT CHANGE UP (ref 0.2–1)
WBC # BLD: 6.25 K/UL — SIGNIFICANT CHANGE UP (ref 3.8–10.5)
WBC # FLD AUTO: 6.25 K/UL — SIGNIFICANT CHANGE UP (ref 3.8–10.5)
WBC UR QL: 0 /HPF — SIGNIFICANT CHANGE UP (ref 0–5)

## 2024-08-30 PROCEDURE — 99233 SBSQ HOSP IP/OBS HIGH 50: CPT

## 2024-08-30 PROCEDURE — 93010 ELECTROCARDIOGRAM REPORT: CPT

## 2024-08-30 RX ORDER — PREDNISONE 10 MG
20 TABLET, DOSE PACK ORAL EVERY 12 HOURS
Refills: 0 | Status: COMPLETED | OUTPATIENT
Start: 2024-08-30 | End: 2024-09-02

## 2024-08-30 RX ORDER — ACETAMINOPHEN 325 MG/1
1000 TABLET ORAL ONCE
Refills: 0 | Status: COMPLETED | OUTPATIENT
Start: 2024-08-30 | End: 2024-08-30

## 2024-08-30 RX ORDER — FUROSEMIDE 40 MG
20 TABLET ORAL ONCE
Refills: 0 | Status: COMPLETED | OUTPATIENT
Start: 2024-08-30 | End: 2024-08-30

## 2024-08-30 RX ORDER — FUROSEMIDE 40 MG
20 TABLET ORAL DAILY
Refills: 0 | Status: DISCONTINUED | OUTPATIENT
Start: 2024-08-31 | End: 2024-09-03

## 2024-08-30 RX ORDER — ACETAMINOPHEN 325 MG/1
650 TABLET ORAL ONCE
Refills: 0 | Status: COMPLETED | OUTPATIENT
Start: 2024-08-30 | End: 2024-08-30

## 2024-08-30 RX ADMIN — Medication 20 MILLIGRAM(S): at 06:04

## 2024-08-30 RX ADMIN — Medication 4 MILLILITER(S): at 06:05

## 2024-08-30 RX ADMIN — Medication 20 MILLIGRAM(S): at 14:43

## 2024-08-30 RX ADMIN — Medication 20 MILLIGRAM(S): at 22:15

## 2024-08-30 RX ADMIN — Medication 70 MICROGRAM(S): at 22:12

## 2024-08-30 RX ADMIN — Medication 40 MILLIGRAM(S): at 12:28

## 2024-08-30 RX ADMIN — ACETAMINOPHEN 400 MILLIGRAM(S): 325 TABLET ORAL at 20:16

## 2024-08-30 RX ADMIN — Medication 4 MILLILITER(S): at 17:52

## 2024-08-30 RX ADMIN — BUDESONIDE 0.5 MILLIGRAM(S): 3 CAPSULE ORAL at 06:07

## 2024-08-30 RX ADMIN — Medication 5000 UNIT(S): at 17:53

## 2024-08-30 RX ADMIN — IPRATROPIUM BROMIDE AND ALBUTEROL SULFATE 3 MILLILITER(S): .5; 3 SOLUTION RESPIRATORY (INHALATION) at 17:52

## 2024-08-30 RX ADMIN — ACETAMINOPHEN 650 MILLIGRAM(S): 325 TABLET ORAL at 12:28

## 2024-08-30 RX ADMIN — IPRATROPIUM BROMIDE AND ALBUTEROL SULFATE 3 MILLILITER(S): .5; 3 SOLUTION RESPIRATORY (INHALATION) at 23:33

## 2024-08-30 RX ADMIN — GUAIFENESIN 1200 MILLIGRAM(S): 100 LIQUID ORAL at 17:52

## 2024-08-30 RX ADMIN — METOPROLOL TARTRATE 25 MILLIGRAM(S): 100 TABLET ORAL at 17:53

## 2024-08-30 RX ADMIN — CEFEPIME 100 MILLIGRAM(S): 2 INJECTION, POWDER, FOR SOLUTION INTRAVENOUS at 06:03

## 2024-08-30 RX ADMIN — Medication 4 MILLILITER(S): at 23:33

## 2024-08-30 RX ADMIN — IPRATROPIUM BROMIDE AND ALBUTEROL SULFATE 3 MILLILITER(S): .5; 3 SOLUTION RESPIRATORY (INHALATION) at 06:05

## 2024-08-30 RX ADMIN — GUAIFENESIN 1200 MILLIGRAM(S): 100 LIQUID ORAL at 06:05

## 2024-08-30 RX ADMIN — Medication 4 MILLILITER(S): at 12:29

## 2024-08-30 RX ADMIN — METOPROLOL TARTRATE 25 MILLIGRAM(S): 100 TABLET ORAL at 06:05

## 2024-08-30 RX ADMIN — IPRATROPIUM BROMIDE AND ALBUTEROL SULFATE 3 MILLILITER(S): .5; 3 SOLUTION RESPIRATORY (INHALATION) at 12:29

## 2024-08-30 RX ADMIN — BUDESONIDE 0.5 MILLIGRAM(S): 3 CAPSULE ORAL at 17:53

## 2024-08-30 RX ADMIN — ACETAMINOPHEN 650 MILLIGRAM(S): 325 TABLET ORAL at 13:55

## 2024-08-30 RX ADMIN — Medication 500 MILLIGRAM(S): at 06:06

## 2024-08-30 RX ADMIN — ACETAMINOPHEN 1000 MILLIGRAM(S): 325 TABLET ORAL at 20:55

## 2024-08-30 RX ADMIN — Medication 5000 UNIT(S): at 06:05

## 2024-08-30 RX ADMIN — Medication 20 MILLIGRAM(S): at 22:09

## 2024-08-30 NOTE — DISCHARGE NOTE PROVIDER - CARE PROVIDER_API CALL
Goldberg, Steven M  Cardiovascular Disease  [address] [place] [id number]  Phone: [phone]  Fax: [phone]  Established Patient  Follow Up Time: 1 week

## 2024-08-30 NOTE — PROGRESS NOTE ADULT - PROBLEM SELECTOR PLAN 3
- Patient has history of recurrent SBO in the setting post-op adhesions and prior abdominal tuberculosis   - CT Abdomen and Pelvis w/ IV Cont (08.23.24 @ 06:27) showed borderline dilated fluid-filled loopsof small bowel measuring up to 4.0 cm with gradual appearing transition point in the left lower quadrant. Findings may represent early small bowel obstruction or enteritis. Large volume rectal stool.  - Surgery consulted, who recommend no acute surgical intervention and NPO   - management per primary team.
- PPSV 30%. The patient requires nursing assistance with all ADLs,  - Supportive care.  - PT consulted, recommended [facility] or if family declines [facility], home PT
- Patient has history of recurrent SBO in the setting post-op adhesions and prior abdominal tuberculosis   - CT Abdomen and Pelvis w/ IV Cont (08.23.24 @ 06:27) showed borderline dilated fluid-filled loopsof small bowel measuring up to 4.0 cm with gradual appearing transition point in the left lower quadrant. Findings may represent early small bowel obstruction or enteritis. Large volume rectal stool.  - Surgery consulted, who recommend no acute surgical intervention and NPO   - management per primary team.
- Patient has history of recurrent SBO in the setting post-op adhesions and prior abdominal tuberculosis   - Had BM on 8/24th   - CT Abdomen and Pelvis w/ IV Cont (08.23.24 @ 06:27) showed borderline dilated fluid-filled loopsof small bowel measuring up to 4.0 cm with gradual appearing transition point in the left lower quadrant. Findings may represent early small bowel obstruction or enteritis. Large volume rectal stool.  - Surgery consulted, who recommend no acute surgical intervention and NPO   - management per primary team.

## 2024-08-30 NOTE — PROGRESS NOTE ADULT - ASSESSMENT
89F with history of paroxysmal atrial fibrillation, hypothyroidism, pulmonary/abdominal tuberculosis, recurrent small bowel obstruction, recurrent UTIs presenting from home with abdominal pain and vomiting for 1 day with CT scans with evidence of SBO vs enteritis s/p NGT. Course c/b hypotension likely iso septic from multifocal PNA process.    #Septic Shock   #Acute Hypoxemic Respiratory Failure  #Multifocal PNA   #Acute metabolic encephalopathy   - required levophed for shock state but now weaned off  - completes abx for pna  - BC NTD  - on 3L, cont to titrate down  - fu S&S eval, advance diet? on clear liquids  - on solumedrol, Mucomyst and nebs per pulm, steroid taper per pulm  - PT OOB    #Paroxysmal Atrial Fibrillation  #Acute hfpef    - eliquis on hold for thrombocytopenia, Can proceed with Hep SubQ BID, can consider Eliquis depending on Plt count trajectory, recommend ok to restart if Plt count consistently > 75k and per clinical risk per heme  - Strict I & O, Daily weights   - on Lasix 20 mg IV daily per cardio  - TTE with presered lvef    #Small Bowel Obstruction  #History of Abdominal Tuberculosis   #History of Recurrent Small Bowel Obstruction  - s/p NGT to suction with small amounts of brown stomach content   - now on clear liquid diet, pending repeat swallow eval     #History of Pulmonary Tuberculosis in 90s  - s/p treatment in 1990s    #pancytopenia  - was receiving prednisone OP for low blood pressure, on solumedrol per pulm  - 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 89F with history of paroxysmal atrial fibrillation, hypothyroidism, pulmonary/abdominal tuberculosis, recurrent small bowel obstruction, recurrent UTIs presenting from home with abdominal pain and vomiting for 1 day with CT scans with evidence of SBO vs enteritis s/p NGT. Course c/b hypotension likely iso septic from multifocal PNA process.    #Septic Shock   #Acute Hypoxemic Respiratory Failure  #Multifocal PNA   #Acute metabolic encephalopathy   - required levophed for shock state but now weaned off  - completes abx for pna  - BC NTD  - on 3L, cont to titrate down  - fu S&S eval, advance diet? on clear liquids, will try on pureed, aspiration precautions  - on solumedrol, Mucomyst and nebs per pulm, steroid taper per pulm  - PT OOB    #Paroxysmal Atrial Fibrillation  #Acute hfpef    - eliquis on hold for thrombocytopenia, Can proceed with Hep SubQ BID, can consider Eliquis depending on Plt count trajectory, recommend ok to restart if Plt count consistently > 75k and per clinical risk per heme  - Strict I & O, Daily weights   - on Lasix 20 mg IV daily per cardio  - TTE with presered lvef    #Small Bowel Obstruction  #History of Abdominal Tuberculosis   #History of Recurrent Small Bowel Obstruction  - s/p NGT to suction with small amounts of brown stomach content   - now on clear liquid diet, pending repeat swallow eval     #History of Pulmonary Tuberculosis in 90s  - s/p treatment in 1990s    #pancytopenia  - was receiving prednisone OP for low blood pressure, on solumedrol per pulm  - 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

## 2024-08-30 NOTE — DISCHARGE NOTE PROVIDER - NSDCCPCAREPLAN_GEN_ALL_CORE_FT
PRINCIPAL DISCHARGE DIAGNOSIS  Diagnosis: Acute hypoxic respiratory failure  Assessment and Plan of Treatment: Suppplemental oxygen provided. Pt was on high flow nasal cannula.   Now on room air.      SECONDARY DISCHARGE DIAGNOSES  Diagnosis: Pneumonia, aspiration  Assessment and Plan of Treatment: s/p IV abx.    Diagnosis: Septic shock  Assessment and Plan of Treatment: Pt was in the intensive care unit septic shock   s/p IV pressors.       Diagnosis: SBO (small bowel obstruction)  Assessment and Plan of Treatment: s/p NGT  for suction.   Pt now tolerating puree diet.

## 2024-08-30 NOTE — DISCHARGE NOTE PROVIDER - DISCHARGE DIET
eGFR <15 this admission. Stage 5 CKD  - Trend SCr, currently stable at low 4s.  - S/p Bumex 1 mg PO QD for diuresis  - Switched to furosemide overnight. 80 mg IV push, then 500 mg IV.  - On prior admission, unremarkable HIV, HCV, dsDNA, anti-GBM, ANCA, C3, and C4.  - Close follow-up with nephrologist after discharge. May require long term RRT if needs major cardiac procedure  - DASH diet. eGFR <15 this admission. Stage 5 CKD  - Trend SCr, currently stable at low 4s.  - now on lasix drip, ok for now( has B lines at multiple regions and homogenous) + JVP, cont diuresis to a crt of 5.5-6 if needed and co2 rises to Hct rises.  - On prior admission, unremarkable HIV, HCV, dsDNA, anti-GBM, ANCA, C3, and C4.  - Close follow-up with nephrologist after discharge. May require long term RRT if needs major cardiac procedure  - DASH diet. Soft and Bite-Sized Diet

## 2024-08-30 NOTE — PROGRESS NOTE ADULT - SUBJECTIVE AND OBJECTIVE BOX
DATE OF SERVICE: 08-30-24 @ 13:18    Patient is a 89y old  Female who presents with a chief complaint of septic shock (30 Aug 2024 12:07)      INTERVAL HISTORY: feels ok    TELEMETRY Personally reviewed: no events  	  MEDICATIONS:  furosemide   Injectable 20 milliGRAM(s) IV Push daily  metoprolol tartrate 25 milliGRAM(s) Oral two times a day        PHYSICAL EXAM:  T(C): 36.3 (08-30-24 @ 11:35), Max: 36.7 (08-29-24 @ 16:40)  HR: 90 (08-30-24 @ 12:07) (89 - 103)  BP: 125/83 (08-30-24 @ 12:07) (121/84 - 127/84)  RR: 18 (08-30-24 @ 11:35) (18 - 18)  SpO2: 93% (08-30-24 @ 11:35) (93% - 100%)  Wt(kg): --  I&O's Summary    29 Aug 2024 07:01  -  30 Aug 2024 07:00  --------------------------------------------------------  IN: 440 mL / OUT: 1450 mL / NET: -1010 mL          Appearance: In no distress	  HEENT:    PERRL, EOMI	  Cardiovascular:  S1 S2, No JVD  Respiratory: Lungs clear to auscultation	  Gastrointestinal:  Soft, Non-tender, + BS	  Vascularature:  No edema of LE  Psychiatric: Appropriate affect   Neuro: no acute focal deficits                               9.4    6.25  )-----------( 65       ( 30 Aug 2024 12:49 )             30.4     08-30    139  |  99  |  33<H>  ----------------------------<  110<H>  5.0   |  30  |  0.51    Ca    8.6      30 Aug 2024 11:52  Phos  2.2     08-29  Mg     2.3     08-29          Labs personally reviewed      EKG: Personally reviewed by me - AF RVR  Radiology: Personally reviewed by me - CXR Findings are concerning for right sided pneumonia with small dependent bilateral effusions.    TTE CONCLUSIONS:      1. Left ventricular cavity is small. Left ventricular wall thickness is normal. Left ventricular systolic function is normal. There are no regional wall motion abnormalities seen.   2. Normal right ventricular cavity size, with normal wall thickness, and normal right ventricular systolic function.   3. Mild pulmonary hypertension.      Assessment and Plan: 	  89F with history of paroxysmal atrial fibrillation, hypothyroidism, pulmonary/abdominal tuberculosis, recurrent small bowel obstruction, recurrent UTIs presenting from home with abdominal pain and vomiting for 1 day with CT scans with evidence of SBO vs enteritis s/p NGT. Course c/b hypotension likely iso septic from multifocal PNA process.    1. Paroxysmal Atrial Fibrillation  - Switch Metoprolol IV to Metoprolol 25mg PO BID  - Hold Eliquis given thrombocytopenia ~50, resume when safe per heme but risks seem to outweigh benefits at this time    2. Acute hfpef - On Lasix 20 mg IV daily   - Switch to PO penidng CT chest  - TTE with preserved EF and mild pulm HTN  - Rec Chest CT no contrast given persistent hypoxia    3. Multifocal PNA   - Abx as per primary team     4. DVT PPX - on HSQ            Nilton Roldan DO [facility]  Cardiovascular Medicine  [address], [address]  Office: [phone]  Available via Text/call on Microsoft Teams

## 2024-08-30 NOTE — PROGRESS NOTE ADULT - TIME BILLING
Reviewed images and labs. Clinical decision making, changes in medication regimen. Frequent bedside visits. Discussion with family, and patient. This excludes any time spent on separate procedures or teaching.
Total Time Spent 50 minutes.    This includes chart review, patient assessment, discussion and collaboration with interdisciplinary team members, excluding ACP.

## 2024-08-30 NOTE — PROGRESS NOTE ADULT - SUBJECTIVE AND OBJECTIVE BOX
[facility] HEMATOLOGY/ONCOLOGY INPATIENT PROGRESS NOTE     Interval Hx:   08-30-24: Ms. Slater was seen at bedside today.    Meds:   MEDICATIONS  (STANDING):  acetylcysteine 20%  Inhalation 4 milliLiter(s) Inhalation every 6 hours  albuterol/ipratropium for Nebulization 3 milliLiter(s) Nebulizer every 6 hours  buDESOnide    Inhalation Suspension 0.5 milliGRAM(s) Inhalation every 12 hours  cefepime   IVPB 2000 milliGRAM(s) IV Intermittent every 12 hours  furosemide   Injectable 20 milliGRAM(s) IV Push daily  guaiFENesin ER 1200 milliGRAM(s) Oral every 12 hours  heparin   Injectable 5000 Unit(s) SubCutaneous every 12 hours  levothyroxine Injectable 70 MICROGram(s) IV Push at bedtime  methylPREDNISolone sodium succinate Injectable 20 milliGRAM(s) IV Push every 8 hours  metoprolol tartrate 25 milliGRAM(s) Oral two times a day  metroNIDAZOLE    Tablet 500 milliGRAM(s) Oral every 8 hours  pantoprazole  Injectable 40 milliGRAM(s) IV Push daily    MEDICATIONS  (PRN):    Vital Signs Last 24 Hrs  T(C): 36.7 (30 Aug 2024 04:38), Max: 36.7 (29 Aug 2024 16:40)  T(F): 98 (30 Aug 2024 04:38), Max: 98.1 (29 Aug 2024 16:40)  HR: 93 (30 Aug 2024 04:38) (93 - 103)  BP: 125/84 (30 Aug 2024 04:38) (121/84 - 127/84)  BP(mean): 96 (29 Aug 2024 16:40) (96 - 96)  RR: 18 (30 Aug 2024 04:38) (18 - 18)  SpO2: 100% (30 Aug 2024 04:38) (96% - 100%)    Parameters below as of 30 Aug 2024 04:38  Patient On (Oxygen Delivery Method): nasal cannula    Physical Exam:  Gen:  NAD, lethargic  HEENT: EOMI, MMM  Chest: equal chest rise, speaking full sentences  Cardiac: irregular  Abd: soft, non-tender, no hepatomegaly or splenomegaly  Ext: No edema   Neuro: AAOx3, normal mood and affect, hard of hearing    Labs:                        9.1    4.80  )-----------( 60       ( 29 Aug 2024 22:58 )             29.7     CBC Full  -  ( 29 Aug 2024 22:58 )  WBC Count : 4.80 K/uL  RBC Count : 2.77 M/uL  Hemoglobin : 9.1 g/dL  Hematocrit : 29.7 %  Platelet Count - Automated : 60 K/uL  Mean Cell Volume : 107.2 fl  Mean Cell Hemoglobin : 32.9 pg  Mean Cell Hemoglobin Concentration : 30.6 gm/dL    08-29    141  |  101  |  38<H>  ----------------------------<  160<H>  4.1   |  30  |  0.66    Ca    8.6      29 Aug 2024 22:58  Phos  2.2     08-29  Mg     2.3     08-29    TPro  6.3  /  Alb  3.1<L>  /  TBili  0.6  /  DBili  x   /  AST  11  /  ALT  10  /  AlkPhos  84  08-28       [facility] HEMATOLOGY/ONCOLOGY INPATIENT PROGRESS NOTE     Interval Hx:   08-30-24: Ms. Slater was seen at bedside today, no overnight events noted, palliative care recs noted     Meds:   MEDICATIONS  (STANDING):  acetylcysteine 20%  Inhalation 4 milliLiter(s) Inhalation every 6 hours  albuterol/ipratropium for Nebulization 3 milliLiter(s) Nebulizer every 6 hours  buDESOnide    Inhalation Suspension 0.5 milliGRAM(s) Inhalation every 12 hours  cefepime   IVPB 2000 milliGRAM(s) IV Intermittent every 12 hours  furosemide   Injectable 20 milliGRAM(s) IV Push daily  guaiFENesin ER 1200 milliGRAM(s) Oral every 12 hours  heparin   Injectable 5000 Unit(s) SubCutaneous every 12 hours  levothyroxine Injectable 70 MICROGram(s) IV Push at bedtime  methylPREDNISolone sodium succinate Injectable 20 milliGRAM(s) IV Push every 8 hours  metoprolol tartrate 25 milliGRAM(s) Oral two times a day  metroNIDAZOLE    Tablet 500 milliGRAM(s) Oral every 8 hours  pantoprazole  Injectable 40 milliGRAM(s) IV Push daily    MEDICATIONS  (PRN):    Vital Signs Last 24 Hrs  T(C): 36.7 (30 Aug 2024 04:38), Max: 36.7 (29 Aug 2024 16:40)  T(F): 98 (30 Aug 2024 04:38), Max: 98.1 (29 Aug 2024 16:40)  HR: 93 (30 Aug 2024 04:38) (93 - 103)  BP: 125/84 (30 Aug 2024 04:38) (121/84 - 127/84)  BP(mean): 96 (29 Aug 2024 16:40) (96 - 96)  RR: 18 (30 Aug 2024 04:38) (18 - 18)  SpO2: 100% (30 Aug 2024 04:38) (96% - 100%)    Parameters below as of 30 Aug 2024 04:38  Patient On (Oxygen Delivery Method): nasal cannula    Physical Exam:  Gen:  NAD, lethargic  HEENT: EOMI, MMM  Chest: equal chest rise, speaking full sentences  Cardiac: irregular  Abd: soft, non-tender, no hepatomegaly or splenomegaly  Ext: No edema   Neuro: AAOx3, normal mood and affect, hard of hearing    Labs:                        9.1    4.80  )-----------( 60       ( 29 Aug 2024 22:58 )             29.7     CBC Full  -  ( 29 Aug 2024 22:58 )  WBC Count : 4.80 K/uL  RBC Count : 2.77 M/uL  Hemoglobin : 9.1 g/dL  Hematocrit : 29.7 %  Platelet Count - Automated : 60 K/uL  Mean Cell Volume : 107.2 fl  Mean Cell Hemoglobin : 32.9 pg  Mean Cell Hemoglobin Concentration : 30.6 gm/dL    08-29    141  |  101  |  38<H>  ----------------------------<  160<H>  4.1   |  30  |  0.66    Ca    8.6      29 Aug 2024 22:58  Phos  2.2     08-29  Mg     2.3     08-29    TPro  6.3  /  Alb  3.1<L>  /  TBili  0.6  /  DBili  x   /  AST  11  /  ALT  10  /  AlkPhos  84  08-28

## 2024-08-30 NOTE — PROGRESS NOTE ADULT - PROBLEM SELECTOR PROBLEM 3
SBO (small bowel obstruction)
Functional quadriplegia
SBO (small bowel obstruction)
SBO (small bowel obstruction)

## 2024-08-30 NOTE — PROGRESS NOTE ADULT - SUBJECTIVE AND OBJECTIVE BOX
Date of Service: 08-30-24 @ 16:30    Patient is a 89y old  Female who presents with a chief complaint of septic shock (30 Aug 2024 16:06)      Any change in ROS: seems to be doing  ok ; sleepy: on 4 L of oxygen      MEDICATIONS  (STANDING):  acetylcysteine 20%  Inhalation 4 milliLiter(s) Inhalation every 6 hours  albuterol/ipratropium for Nebulization 3 milliLiter(s) Nebulizer every 6 hours  buDESOnide    Inhalation Suspension 0.5 milliGRAM(s) Inhalation every 12 hours  guaiFENesin ER 1200 milliGRAM(s) Oral every 12 hours  heparin   Injectable 5000 Unit(s) SubCutaneous every 12 hours  levothyroxine Injectable 70 MICROGram(s) IV Push at bedtime  methylPREDNISolone sodium succinate Injectable 20 milliGRAM(s) IV Push every 8 hours  metoprolol tartrate 25 milliGRAM(s) Oral two times a day  pantoprazole  Injectable 40 milliGRAM(s) IV Push daily    MEDICATIONS  (PRN):    Vital Signs Last 24 Hrs  T(C): 36.3 (30 Aug 2024 11:35), Max: 36.7 (29 Aug 2024 16:40)  T(F): 97.3 (30 Aug 2024 11:35), Max: 98.1 (29 Aug 2024 16:40)  HR: 90 (30 Aug 2024 12:07) (89 - 103)  BP: 125/83 (30 Aug 2024 12:07) (121/84 - 127/84)  BP(mean): 96 (29 Aug 2024 16:40) (96 - 96)  RR: 18 (30 Aug 2024 11:35) (18 - 18)  SpO2: 93% (30 Aug 2024 11:35) (93% - 100%)    Parameters below as of 30 Aug 2024 11:35  Patient On (Oxygen Delivery Method): nasal cannula  O2 Flow (L/min): 4      I&O's Summary    29 Aug 2024 07:01  -  30 Aug 2024 07:00  --------------------------------------------------------  IN: 440 mL / OUT: 1450 mL / NET: -1010 mL    30 Aug 2024 07:01  -  30 Aug 2024 16:30  --------------------------------------------------------  IN: 0 mL / OUT: 550 mL / NET: -550 mL          Physical Exam:   GENERAL: NAD, well-groomed, well-developed  HEENT: KOLBY/   Atraumatic, Normocephalic  ENMT: No tonsillar erythema, exudates, or enlargement; Moist mucous membranes, Good dentition, No lesions  NECK: Supple, No JVD, Normal thyroid  CHEST/LUNG: Clear to auscultaion- no wheezing  CVS: Regular rate and rhythm; No murmurs, rubs, or gallops  GI: : Soft, Nontender, Nondistended; Bowel sounds present  NERVOUS SYSTEM:  Alert & awake and responsive:  no labored breathing   EXTREMITIES:  -mild edema  LYMPH: No lymphadenopathy noted  SKIN: No rashes or lesions  ENDOCRINOLOGY: No Thyromegaly  PSYCH: calm     Labs:                              9.4    6.25  )-----------( 65       ( 30 Aug 2024 12:49 )             30.4                         9.1    4.80  )-----------( 60       ( 29 Aug 2024 22:58 )             29.7                         9.5    4.47  )-----------( 64       ( 29 Aug 2024 07:06 )             31.2                         9.1    3.35  )-----------( 61       ( 28 Aug 2024 10:43 )             29.7                         8.6    2.58  )-----------( 48       ( 27 Aug 2024 07:15 )             28.3     08-30    139  |  99  |  33<H>  ----------------------------<  110<H>  5.0   |  30  |  0.51  08-29    141  |  101  |  38<H>  ----------------------------<  160<H>  4.1   |  30  |  0.66  08-29    144  |  103  |  41<H>  ----------------------------<  116<H>  4.3   |  29  |  0.59  08-28    146<H>  |  104  |  36<H>  ----------------------------<  148<H>  3.1<L>   |  30  |  0.64  08-27    145  |  106  |  24<H>  ----------------------------<  110<H>  3.8   |  27  |  0.55    Ca    8.6      30 Aug 2024 11:52  Ca    8.6      29 Aug 2024 22:58  Ca    9.1      29 Aug 2024 07:18  Phos  2.2     08-29  Mg     2.3     08-29    TPro  6.3  /  Alb  3.1<L>  /  TBili  0.6  /  DBili  x   /  AST  11  /  ALT  10  /  AlkPhos  84  08-28    CAPILLARY BLOOD GLUCOSE      POCT Blood Glucose.: 121 mg/dL (30 Aug 2024 12:19)  POCT Blood Glucose.: 117 mg/dL (30 Aug 2024 07:14)  POCT Blood Glucose.: 143 mg/dL (30 Aug 2024 00:45)          Urinalysis Basic - ( 30 Aug 2024 11:52 )    Color: x / Appearance: x / SG: x / pH: x  Gluc: 110 mg/dL / Ketone: x  / Bili: x / Urobili: x   Blood: x / Protein: x / Nitrite: x   Leuk Esterase: x / RBC: x / WBC x   Sq Epi: x / Non Sq Epi: x / Bacteria: x            RECENT CULTURES:  08-24 @ 00:10 Clean Catch Clean Catch (Midstream)         ad< from: Xray Chest 1 View- PORTABLE-Urgent (Xray Chest 1 View- PORTABLE-Urgent .) (08.26.24 @ 17:17) >        INTERPRETATION:  EXAMINATION: XR CHEST URGENT    CLINICAL INDICATION: Dyspnea    TECHNIQUE: Single frontal, portable view of the chest was obtained.    COMPARISON: Chest x-ray 8/23/2024 and 8/24/2024.    FINDINGS:  Rotated study.  Interval removal of patient's enteric tube.  The heart is magnified by technique, size not reliably assessed.  New right midlung field airspace opacity. Mild left basilar haziness.  No pneumothorax.  No acute bony abnormality.    IMPRESSION:  Findings are concerning for right sided pneumonia with small dependent   bilateral effusions.    --- End of Report ---           YESY BERGERON MD; Resident Radiologist  This document has been electronically signed.  DESHAUN BARRON MD; Attending Radiologist  This document has been electronically signed. Aug 27 2024 12:16PM    < end of copied text >         <10,000 CFU/mL Normal Urogenital Yeimi          RESPIRATORY CULTURES:          Studies  Chest X-RAY  CT SCAN Chest   Venous Dopplers: LE:   CT Abdomen  Others

## 2024-08-30 NOTE — PROGRESS NOTE ADULT - PROBLEM SELECTOR PLAN 2
On NC 4lt   - s/p vasopressors   - Pulm  following,  recommendations appreciated.  - management per primary team
On NC, s/p levophed.   - Pulm  following,  recommendations appreciated.  - management per primary team
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  - care per primary team
On NRBM, weaned off levophed.   - On steroids   - Pulm  following,  recommendations appreciated.  - managemente per primary team

## 2024-08-30 NOTE — DISCHARGE NOTE PROVIDER - HOSPITAL COURSE
HPI:  89F with history of paroxysmal atrial fibrillation, hypothyroidism, pulmonary/abdominal tuberculosis, recurrent small bowel obstruction, recurrent UTIs presenting from home with abdominal pain and vomiting for 1 day. Per daughter and HHA at bedside, patient was in her usual state of health until yesterday. Normally able to communicate and ambulate to the bathroom and eat on her own. Does have a HHA for assistance as needed. HHA reports that around 2:30 AM last night, patient called for assistance and was vomiting and asked to call an ambulance. CT scan performed in ED showed evidence of dilated small bowel loops with large stool burden with concern for small bowel obstruction vs enteritis. Also with some imaging findings concerning for multifocal pneumonia. Surgery consulted given history of small bowel obstruction and imaging and NGT placed with NGT to suction draining minimal dark abdominal contents. Additionally with hypoxemic respiratory failure requiring HFNC 40/40. ED course c/b hypotension 70/40s with transient improvement with 3L IVF however with repeat hypotension despite these measures.   GOC discussion had with daughter at bedside. Patient is DNR/DNI and would not want aggressive measures including surgery or central line placement for pressor therapy. Would like a trial of IV pressors via peripheral IV and if these fails, daughter would not want further escalation. Was also seen by palliative care team in ED and was being considered for PCU on fixed dose pressors however no beds available at this time.   Admitted to MICU for septic shock. (23 Aug 2024 15:40)    Hospital Course:  53F w/hx of HTN, HLD, spinal stenosis s/p cervical fusion  peripheral neuropathy, asthma presents with chest pressure after admission 2 days ago for STEMI s/p PCI.        Important Medication Changes and Reason: n      Active or Pending Issues Requiring Follow-up:   Advanced Directives:   [ ] Full code  [ ] DNR  [ ] Hospice    Discharge Diagnoses:         HPI:  89F with history of paroxysmal atrial fibrillation, hypothyroidism, pulmonary/abdominal tuberculosis, recurrent small bowel obstruction, recurrent UTIs presenting from home with abdominal pain and vomiting for 1 day. Per daughter and HHA at bedside, patient was in her usual state of health until yesterday. Normally able to communicate and ambulate to the bathroom and eat on her own. Does have a HHA for assistance as needed. HHA reports that around 2:30 AM last night, patient called for assistance and was vomiting and asked to call an ambulance. CT scan performed in ED showed evidence of dilated small bowel loops with large stool burden with concern for small bowel obstruction vs enteritis. Also with some imaging findings concerning for multifocal pneumonia. Surgery consulted given history of small bowel obstruction and imaging and NGT placed with NGT to suction draining minimal dark abdominal contents. Additionally with hypoxemic respiratory failure requiring HFNC 40/40. ED course c/b hypotension 70/40s with transient improvement with 3L IVF however with repeat hypotension despite these measures.   GOC discussion had with daughter at bedside. Patient is DNR/DNI and would not want aggressive measures including surgery or central line placement for pressor therapy. Would like a trial of IV pressors via peripheral IV and if these fails, daughter would not want further escalation. Was also seen by palliative care team in ED and was being considered for PCU on fixed dose pressors however no beds available at this time.   Admitted to MICU for septic shock. (23 Aug 2024 15:40)    Hospital Course:          Important Medication Changes and Reason: n      Active or Pending Issues Requiring Follow-up:   Advanced Directives:   [ ] Full code  [ ] DNR  [ ] Hospice    Discharge Diagnoses:         HPI:  89F with history of paroxysmal atrial fibrillation, hypothyroidism, pulmonary/abdominal tuberculosis, recurrent small bowel obstruction, recurrent UTIs presenting from home with abdominal pain and vomiting for 1 day. Per daughter and HHA at bedside, patient was in her usual state of health until yesterday. Normally able to communicate and ambulate to the bathroom and eat on her own. Does have a HHA for assistance as needed. HHA reports that around 2:30 AM last night, patient called for assistance and was vomiting and asked to call an ambulance. CT scan performed in ED showed evidence of dilated small bowel loops with large stool burden with concern for small bowel obstruction vs enteritis. Also with some imaging findings concerning for multifocal pneumonia. Surgery consulted given history of small bowel obstruction and imaging and NGT placed with NGT to suction draining minimal dark abdominal contents. Additionally with hypoxemic respiratory failure requiring HFNC 40/40. ED course c/b hypotension 70/40s with transient improvement with 3L IVF however with repeat hypotension despite these measures.   GOC discussion had with daughter at bedside. Patient is DNR/DNI and would not want aggressive measures including surgery or central line placement for pressor therapy. Would like a trial of IV pressors via peripheral IV and if these fails, daughter would not want further escalation. Was also seen by palliative care team in ED and was being considered for PCU on fixed dose pressors however no beds available at this time.   Admitted to MICU for septic shock. (23 Aug 2024 15:40)    Hospital Course:  #Septic Shock, resolved  #Multifocal PNA   #Acute metabolic encephalopathy   - required levophed for shock state but now weaned off  - completed abx for pna  - BC NTD    #Acute hfpef    #Acute Hypoxemic Respiratory Failure  - on Lasix 20 mg IV daily per cardio, change to PO.  - CT chest Occlusive debris/mucous plugging within bronchus intermedius and more distal airways of the RML and RLL, RML and RLL collapse with volume loss. Small bilateral pleural effusions. Patchy ground-glass opacities throughout the right upper lobe and lingula may be secondary to edema or infection.  - LE doppler neg dvt, CTA chest neg PE  - TTE with preserved lvef  - s/p 3L NC now on room air.   - steroid taper per pulm  - pt did not complete swallow eval, no aspiration with liquids, will advance diet, maintain aspiration precautions    #Paroxysmal Atrial Fibrillation  - eliquis on hold for thrombocytopenia, Can proceed with Hep SubQ BID, can consider Eliquis if Plt count consistently > 75k per heme    #Small Bowel Obstruction  #History of Abdominal Tuberculosis   #History of Recurrent Small Bowel Obstruction  - advance diet to soft, aspiration precautions    #pancytopenia  - was receiving prednisone OP for low blood pressure  - likely bone marrow disorder, outpt fu heme    #Hypothyroidism  - c/w levothyroxine 75 mcg IV    dvt ppx HSQ, eliquis on hold for thrombocytopenia    GOC: DNR/DNI    Important Medication Changes and Reason: n    NA  Active or Pending Issues Requiring Follow-up:   Follow up with PMD/Cardiologist, Dr. Steven Goldberg in 1 week.     Advanced Directives:   [ ] Full code  [ ] DNR  [ ] Hospice    Discharge Diagnoses:  Septic Shock.   Multifocal PNA  Acute Heart Failure  Acute Hypoxic Respiratory Failure  Paroxysmal Afib  Small bowel Obstruction.        HPI:  89F with history of paroxysmal atrial fibrillation, hypothyroidism, pulmonary/abdominal tuberculosis, recurrent small bowel obstruction, recurrent UTIs presenting from home with abdominal pain and vomiting for 1 day. Per daughter and HHA at bedside, patient was in her usual state of health until yesterday. Normally able to communicate and ambulate to the bathroom and eat on her own. Does have a HHA for assistance as needed. HHA reports that around 2:30 AM last night, patient called for assistance and was vomiting and asked to call an ambulance. CT scan performed in ED showed evidence of dilated small bowel loops with large stool burden with concern for small bowel obstruction vs enteritis. Also with some imaging findings concerning for multifocal pneumonia. Surgery consulted given history of small bowel obstruction and imaging and NGT placed with NGT to suction draining minimal dark abdominal contents. Additionally with hypoxemic respiratory failure requiring HFNC 40/40. ED course c/b hypotension 70/40s with transient improvement with 3L IVF however with repeat hypotension despite these measures.   GOC discussion had with daughter at bedside. Patient is DNR/DNI and would not want aggressive measures including surgery or central line placement for pressor therapy. Would like a trial of IV pressors via peripheral IV and if these fails, daughter would not want further escalation. Was also seen by palliative care team in ED and was being considered for PCU on fixed dose pressors however no beds available at this time.   Admitted to MICU for septic shock. (23 Aug 2024 15:40)    Hospital Course:  #Septic Shock, resolved  #Multifocal PNA   #Acute metabolic encephalopathy   - required levophed for shock state but now weaned off  - completed abx for pna  - BC NTD    #Acute hfpef    #Acute Hypoxemic Respiratory Failure  - on Lasix 20 mg IV daily per cardio, change to PO.  - CT chest Occlusive debris/mucous plugging within bronchus intermedius and more distal airways of the RML and RLL, RML and RLL collapse with volume loss. Small bilateral pleural effusions. Patchy ground-glass opacities throughout the right upper lobe and lingula may be secondary to edema or infection.  - LE doppler neg dvt, CTA chest neg PE  - TTE with preserved lvef  - s/p 3L NC now on room air.   - steroid taper per pulm  - pt did not complete swallow eval, no aspiration with liquids, will advance diet, maintain aspiration precautions    #Paroxysmal Atrial Fibrillation  - eliquis on hold for thrombocytopenia, Can proceed with Hep SubQ BID, can consider Eliquis if Plt count consistently > 75k per heme    #Small Bowel Obstruction  #History of Abdominal Tuberculosis   #History of Recurrent Small Bowel Obstruction  - advance diet to soft, aspiration precautions    #pancytopenia  - was receiving prednisone OP for low blood pressure  - likely bone marrow disorder, outpt fu heme    #Hypothyroidism  - c/w levothyroxine 75 mcg IV    dvt ppx HSQ, eliquis on hold for thrombocytopenia    GOC: DNR/DNI    Important Medication Changes and Reason: n    NA  Active or Pending Issues Requiring Follow-up:   Follow up with PMD/Cardiologist, Dr. Steven Goldberg in 1 week.     Advanced Directives:   [ ] Full code  [ x] DNR  [ ] Hospice    Discharge Diagnoses:  Septic Shock.   Multifocal PNA  Acute Heart Failure  Acute Hypoxic Respiratory Failure  Paroxysmal Afib  Small bowel Obstruction.

## 2024-08-30 NOTE — PROGRESS NOTE ADULT - SUBJECTIVE AND OBJECTIVE BOX
Patient is a 89y old  Female who presents with a chief complaint of septic shock (30 Aug 2024 05:49)      SUBJECTIVE / OVERNIGHT EVENTS:    Patient seen and examined. co weakness. did not complete swallow eval, only tried liquids.      Vital Signs Last 24 Hrs  T(C): 36.7 (30 Aug 2024 04:38), Max: 36.7 (29 Aug 2024 16:40)  T(F): 98 (30 Aug 2024 04:38), Max: 98.1 (29 Aug 2024 16:40)  HR: 93 (30 Aug 2024 04:38) (93 - 103)  BP: 125/84 (30 Aug 2024 04:38) (121/84 - 127/84)  BP(mean): 96 (29 Aug 2024 16:40) (96 - 96)  RR: 18 (30 Aug 2024 04:38) (18 - 18)  SpO2: 100% (30 Aug 2024 04:38) (96% - 100%)    Parameters below as of 30 Aug 2024 04:38  Patient On (Oxygen Delivery Method): nasal cannula      I&O's Summary    29 Aug 2024 07:01  -  30 Aug 2024 07:00  --------------------------------------------------------  IN: 440 mL / OUT: 1450 mL / NET: -1010 mL        PE:  GENERAL: NAD, AAOx2, [place]  CHEST/LUNG: crackles  HEART: Regular rate and rhythm; + murmur  ABDOMEN: Soft, Nontender, Nondistended; Bowel sounds present  EXTREMITIES:  2+ Peripheral Pulses, +2 LE edema  NEURO: No focal deficits    LABS:                        9.1    4.80  )-----------( 60       ( 29 Aug 2024 22:58 )             29.7     08-29    141  |  101  |  38<H>  ----------------------------<  160<H>  4.1   |  30  |  0.66    Ca    8.6      29 Aug 2024 22:58  Phos  2.2     08-  Mg     2.3     08    TPro  6.3  /  Alb  3.1<L>  /  TBili  0.6  /  DBili  x   /  AST  11  /  ALT  10  /  AlkPhos  84  08-28      CAPILLARY BLOOD GLUCOSE      POCT Blood Glucose.: 117 mg/dL (30 Aug 2024 07:14)  POCT Blood Glucose.: 143 mg/dL (30 Aug 2024 00:45)  POCT Blood Glucose.: 133 mg/dL (29 Aug 2024 12:20)  POCT Blood Glucose.: 126 mg/dL (29 Aug 2024 08:43)        Urinalysis Basic - ( 30 Aug 2024 02:33 )    Color: Dark Yellow / Appearance: Cloudy / S.028 / pH: x  Gluc: x / Ketone: Trace mg/dL  / Bili: Small / Urobili: 1.0 mg/dL   Blood: x / Protein: 30 mg/dL / Nitrite: Negative   Leuk Esterase: Small / RBC: 12 /HPF / WBC 0 /HPF   Sq Epi: x / Non Sq Epi: 4 /HPF / Bacteria: Many /HPF        RADIOLOGY & ADDITIONAL TESTS:    Imaging Personally Reviewed:  [x] YES  [ ] NO    Consultant(s) Notes Reviewed:  [x] YES  [ ] NO    MEDICATIONS  (STANDING):  acetylcysteine 20%  Inhalation 4 milliLiter(s) Inhalation every 6 hours  albuterol/ipratropium for Nebulization 3 milliLiter(s) Nebulizer every 6 hours  buDESOnide    Inhalation Suspension 0.5 milliGRAM(s) Inhalation every 12 hours  furosemide   Injectable 20 milliGRAM(s) IV Push daily  guaiFENesin ER 1200 milliGRAM(s) Oral every 12 hours  heparin   Injectable 5000 Unit(s) SubCutaneous every 12 hours  levothyroxine Injectable 70 MICROGram(s) IV Push at bedtime  methylPREDNISolone sodium succinate Injectable 20 milliGRAM(s) IV Push every 8 hours  metoprolol tartrate 25 milliGRAM(s) Oral two times a day  pantoprazole  Injectable 40 milliGRAM(s) IV Push daily    MEDICATIONS  (PRN):      Care Discussed with Consultants/Other Providers [x] YES  [ ] NO    HEALTH ISSUES - PROBLEM Dx:  Acute hypoxic respiratory failure    SBO (small bowel obstruction)    Functional quadriplegia    Advance care planning    Palliative care encounter    Septic shock         Patient is a 89y old  Female who presents with a chief complaint of septic shock (30 Aug 2024 05:49)      SUBJECTIVE / OVERNIGHT EVENTS:    Patient seen and examined. co weakness. did not complete swallow eval, only tried liquids.      Vital Signs Last 24 Hrs  T(C): 36.7 (30 Aug 2024 04:38), Max: 36.7 (29 Aug 2024 16:40)  T(F): 98 (30 Aug 2024 04:38), Max: 98.1 (29 Aug 2024 16:40)  HR: 93 (30 Aug 2024 04:38) (93 - 103)  BP: 125/84 (30 Aug 2024 04:38) (121/84 - 127/84)  BP(mean): 96 (29 Aug 2024 16:40) (96 - 96)  RR: 18 (30 Aug 2024 04:38) (18 - 18)  SpO2: 100% (30 Aug 2024 04:38) (96% - 100%)    Parameters below as of 30 Aug 2024 04:38  Patient On (Oxygen Delivery Method): nasal cannula      I&O's Summary    29 Aug 2024 07:01  -  30 Aug 2024 07:00  --------------------------------------------------------  IN: 440 mL / OUT: 1450 mL / NET: -1010 mL        PE:  GENERAL: NAD, AAOx1, [place]  CHEST/LUNG: crackles  HEART: Regular rate and rhythm; + murmur  ABDOMEN: Soft, Nontender, Nondistended; Bowel sounds present  EXTREMITIES:  2+ Peripheral Pulses, +2 LE edema  NEURO: No focal deficits    LABS:                        9.1    4.80  )-----------( 60       ( 29 Aug 2024 22:58 )             29.7     08-29    141  |  101  |  38<H>  ----------------------------<  160<H>  4.1   |  30  |  0.66    Ca    8.6      29 Aug 2024 22:58  Phos  2.2     08-29  Mg     2.3     08    TPro  6.3  /  Alb  3.1<L>  /  TBili  0.6  /  DBili  x   /  AST  11  /  ALT  10  /  AlkPhos  84  08-28      CAPILLARY BLOOD GLUCOSE      POCT Blood Glucose.: 117 mg/dL (30 Aug 2024 07:14)  POCT Blood Glucose.: 143 mg/dL (30 Aug 2024 00:45)  POCT Blood Glucose.: 133 mg/dL (29 Aug 2024 12:20)  POCT Blood Glucose.: 126 mg/dL (29 Aug 2024 08:43)        Urinalysis Basic - ( 30 Aug 2024 02:33 )    Color: Dark Yellow / Appearance: Cloudy / S.028 / pH: x  Gluc: x / Ketone: Trace mg/dL  / Bili: Small / Urobili: 1.0 mg/dL   Blood: x / Protein: 30 mg/dL / Nitrite: Negative   Leuk Esterase: Small / RBC: 12 /HPF / WBC 0 /HPF   Sq Epi: x / Non Sq Epi: 4 /HPF / Bacteria: Many /HPF        RADIOLOGY & ADDITIONAL TESTS:    Imaging Personally Reviewed:  [x] YES  [ ] NO    Consultant(s) Notes Reviewed:  [x] YES  [ ] NO    MEDICATIONS  (STANDING):  acetylcysteine 20%  Inhalation 4 milliLiter(s) Inhalation every 6 hours  albuterol/ipratropium for Nebulization 3 milliLiter(s) Nebulizer every 6 hours  buDESOnide    Inhalation Suspension 0.5 milliGRAM(s) Inhalation every 12 hours  furosemide   Injectable 20 milliGRAM(s) IV Push daily  guaiFENesin ER 1200 milliGRAM(s) Oral every 12 hours  heparin   Injectable 5000 Unit(s) SubCutaneous every 12 hours  levothyroxine Injectable 70 MICROGram(s) IV Push at bedtime  methylPREDNISolone sodium succinate Injectable 20 milliGRAM(s) IV Push every 8 hours  metoprolol tartrate 25 milliGRAM(s) Oral two times a day  pantoprazole  Injectable 40 milliGRAM(s) IV Push daily    MEDICATIONS  (PRN):      Care Discussed with Consultants/Other Providers [x] YES  [ ] NO    HEALTH ISSUES - PROBLEM Dx:  Acute hypoxic respiratory failure    SBO (small bowel obstruction)    Functional quadriplegia    Advance care planning    Palliative care encounter    Septic shock         Patient is a 89y old  Female who presents with a chief complaint of septic shock (30 Aug 2024 05:49)      SUBJECTIVE / OVERNIGHT EVENTS:    Patient seen and examined. co weakness. did not complete swallow eval, only tried liquids. had some hematuria overnight, now improved.      Vital Signs Last 24 Hrs  T(C): 36.7 (30 Aug 2024 04:38), Max: 36.7 (29 Aug 2024 16:40)  T(F): 98 (30 Aug 2024 04:38), Max: 98.1 (29 Aug 2024 16:40)  HR: 93 (30 Aug 2024 04:38) (93 - 103)  BP: 125/84 (30 Aug 2024 04:38) (121/84 - 127/84)  BP(mean): 96 (29 Aug 2024 16:40) (96 - 96)  RR: 18 (30 Aug 2024 04:38) (18 - 18)  SpO2: 100% (30 Aug 2024 04:38) (96% - 100%)    Parameters below as of 30 Aug 2024 04:38  Patient On (Oxygen Delivery Method): nasal cannula      I&O's Summary    29 Aug 2024 07:01  -  30 Aug 2024 07:00  --------------------------------------------------------  IN: 440 mL / OUT: 1450 mL / NET: -1010 mL        PE:  GENERAL: NAD, AAOx1, [place]  CHEST/LUNG: crackles  HEART: Regular rate and rhythm; + murmur  ABDOMEN: Soft, Nontender, Nondistended; Bowel sounds present  EXTREMITIES:  2+ Peripheral Pulses, +2 LE edema  NEURO: No focal deficits    LABS:                        9.1    4.80  )-----------( 60       ( 29 Aug 2024 22:58 )             29.7     08-29    141  |  101  |  38<H>  ----------------------------<  160<H>  4.1   |  30  |  0.66    Ca    8.6      29 Aug 2024 22:58  Phos  2.2     08-29  Mg     2.3     08-29    TPro  6.3  /  Alb  3.1<L>  /  TBili  0.6  /  DBili  x   /  AST  11  /  ALT  10  /  AlkPhos  84        CAPILLARY BLOOD GLUCOSE      POCT Blood Glucose.: 117 mg/dL (30 Aug 2024 07:14)  POCT Blood Glucose.: 143 mg/dL (30 Aug 2024 00:45)  POCT Blood Glucose.: 133 mg/dL (29 Aug 2024 12:20)  POCT Blood Glucose.: 126 mg/dL (29 Aug 2024 08:43)        Urinalysis Basic - ( 30 Aug 2024 02:33 )    Color: Dark Yellow / Appearance: Cloudy / S.028 / pH: x  Gluc: x / Ketone: Trace mg/dL  / Bili: Small / Urobili: 1.0 mg/dL   Blood: x / Protein: 30 mg/dL / Nitrite: Negative   Leuk Esterase: Small / RBC: 12 /HPF / WBC 0 /HPF   Sq Epi: x / Non Sq Epi: 4 /HPF / Bacteria: Many /HPF        RADIOLOGY & ADDITIONAL TESTS:    Imaging Personally Reviewed:  [x] YES  [ ] NO    Consultant(s) Notes Reviewed:  [x] YES  [ ] NO    MEDICATIONS  (STANDING):  acetylcysteine 20%  Inhalation 4 milliLiter(s) Inhalation every 6 hours  albuterol/ipratropium for Nebulization 3 milliLiter(s) Nebulizer every 6 hours  buDESOnide    Inhalation Suspension 0.5 milliGRAM(s) Inhalation every 12 hours  furosemide   Injectable 20 milliGRAM(s) IV Push daily  guaiFENesin ER 1200 milliGRAM(s) Oral every 12 hours  heparin   Injectable 5000 Unit(s) SubCutaneous every 12 hours  levothyroxine Injectable 70 MICROGram(s) IV Push at bedtime  methylPREDNISolone sodium succinate Injectable 20 milliGRAM(s) IV Push every 8 hours  metoprolol tartrate 25 milliGRAM(s) Oral two times a day  pantoprazole  Injectable 40 milliGRAM(s) IV Push daily    MEDICATIONS  (PRN):      Care Discussed with Consultants/Other Providers [x] YES  [ ] NO    HEALTH ISSUES - PROBLEM Dx:  Acute hypoxic respiratory failure    SBO (small bowel obstruction)    Functional quadriplegia    Advance care planning    Palliative care encounter    Septic shock

## 2024-08-30 NOTE — PROGRESS NOTE ADULT - PROBLEM SELECTOR PLAN 5
Code status: DNR/I trial NIV, trial of vasopressors  Called daughter Alyssa this afternoon given updates, she understand patient guarded prognosis.   GOC discussion will be guide by clinical course.  Please refer to GOC on 8/23
Code status: DNR/I trial NIV, trial of vasopressors  Daughter declined KALYANI, would like home discharge with HHA.
GaP team following for complex medical decision making in the setting of serious illness. Discussed with primary team and family. Will continue to follow    Can be reached by TEAMS M-F 9-5 Vi Skinner. Any other time please page [phone] if needed.    In the event of worsening symptoms, please contact the Palliative Medicine team via pager (if the patient is at [facility] #[phone] or if the patient is at [facility] #[phone]) The Geriatric and Palliative Medicine service has coverage 24 hours a day/ 7 days a week to provide medical recommendations regarding symptom management needs via telephone
Code status: DNR/I trial NIV, trial of vasopressors  Daughter declined KALYANI, would like home discharge with HHA. 24/7

## 2024-08-30 NOTE — PROGRESS NOTE ADULT - PROBLEM SELECTOR PLAN 6
GaP team following for complex medical decision making in the setting of serious illness. Palliative care consult appreciated and completed. Palliative care team will sign off. The team remains available if additional services are needed. Please reconsult as needed. Discussed with the primary team.      Can be reached by TEAMS M-F 9-5 Vi Skinner. Any other time please page [phone] if needed.
GaP team following for complex medical decision making in the setting of serious illness. Will continue to follow. GOC discussion will be guide by clinical course     Can be reached by TEAMS MBeboF 9-5 Vi Skinner. Any other time please page [phone] if needed.    In the event of worsening symptoms, please contact the Palliative Medicine team via pager (if the patient is at [facility] #[phone] or if the patient is at [facility] #[phone]) The Geriatric and Palliative Medicine service has coverage 24 hours a day/ 7 days a week to provide medical recommendations regarding symptom management needs via telephone
GaP team following for complex medical decision making in the setting of serious illness. Will continue to follow.   Discussed with primary team and daughter.     If daughter opted for comfort measures would recommend:   - IV morphine 1 mg q2hrs prn for moderate pain  - IV morphine 2 mg q2hrs prn for severe pain/ dyspnea  - IV  Ativan 0.5 mg q4h prn for agitation/ anxiety   - Glycopyrrolate 0.4 mg q6hrs prn for excessive oral secretions   - vitals q daily, no blood draws, no rapids, no MEWs, no code strokes  - Bowel regimen while on opioids.  Monitor for constipation.    Can be reached by TEAMS M-F 9-5 Vi Skinner. Any other time please page [phone] if needed.    In the event of worsening symptoms, please contact the Palliative Medicine team via pager (if the patient is at [facility] #[phone] or if the patient is at [facility] #[phone]) The Geriatric and Palliative Medicine service has coverage 24 hours a day/ 7 days a week to provide medical recommendations regarding symptom management needs via telephone

## 2024-08-30 NOTE — DISCHARGE NOTE PROVIDER - NSDCMRMEDTOKEN_GEN_ALL_CORE_FT
Albuterol (Eqv-ProAir HFA) 90 mcg/inh inhalation aerosol: 2 puff(s) inhaled 3 times a day 2 to 3 times as needed NOTE: As per Pharmacy, last filled 12/23 for 30 days supply  amiodarone 200 mg oral tablet: 1 tab(s) orally once a day  budesonide-formoterol 160 mcg-4.5 mcg/inh inhalation aerosol: 2 puff(s) inhaled 2 times a day  Eliquis 2.5 mg oral tablet: 1 tab(s) orally 2 times a day  levothyroxine 100 mcg (0.1 mg) oral tablet: 1 tab(s) orally once a day  predniSONE 10 mg oral tablet: 1 tab(s) orally once a day  sertraline 50 mg oral tablet: 1 tab(s) orally once a day   Albuterol (Eqv-ProAir HFA) 90 mcg/inh inhalation aerosol: 2 puff(s) inhaled 3 times a day 2 to 3 times as needed NOTE: As per Pharmacy, last filled 12/23 for 30 days supply  budesonide-formoterol 160 mcg-4.5 mcg/inh inhalation aerosol: 2 puff(s) inhaled 2 times a day  levothyroxine 100 mcg (0.1 mg) oral tablet: 1 tab(s) orally once a day  metoprolol tartrate 25 mg oral tablet: 1 tab(s) orally 2 times a day MDD: 2

## 2024-08-30 NOTE — CHART NOTE - NSCHARTNOTEFT_GEN_A_CORE
Evaluted for c/o Chest pressure after  chest Vest therapy   Unable to elaborate on symptoms , 12 lead EKG with no st or T wave changes Afib with  PAC    Trop x 1 ,  ProBNP   f/u Ct chest w/o  con   Symptoms  likely Pleuritic ,   will  Try a dose  Tylenol IV    c/w Tele and  Monitor   Shinamol David GANT [id number]

## 2024-08-30 NOTE — PROGRESS NOTE ADULT - SUBJECTIVE AND OBJECTIVE BOX
[facility] DIVISION OF INFECTIOUS DISEASES  SWAPNIL Rene Y. Patel, S. Shah, G. Mark  [phone]  ([phone] - weekdays after 5pm and weekends)    Name: CATHRYN LARA  Age/Gender: 89y Female  MRN: [id number]    Interval History:  Patient seen and examined this morning.   Resting comfortably, cough same, no new complaints.   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 reported allergy (Unknown)  penicillin (Rash)      Objective:  Vitals:   T(F): 98 (24 @ 04:38), Max: 98.1 (24 @ 16:40)  HR: 93 (24 @ 04:38) (93 - 103)  BP: 125/84 (24 @ 04:38) (121/84 - 127/84)  RR: 18 (24 @ 04:38) (18 - 18)  SpO2: 100% (24 @ 04:38) (96% - 100%)  Physical Examination:  General: no acute distress, NC  HEENT: NC/AT, anicteric, EOMI  Respiratory: decreased breath sounds b/l   Cardiovascular: S1 and S2 present, normal rate   Gastrointestinal: soft, nontender, nondistended  Extremities: no LE edema, no cyanosis  Skin: no visible rash    Laboratory Studies:  CBC:                       9.1    4.80  )-----------( 60       ( 29 Aug 2024 22:58 )             29.7     WBC Trend:  4.80 24 @ 22:58  4.47 24 @ 07:06  3.35 24 @ 10:43  2.58 24 @ 07:15  3.78 24 @ 07:31  2.71 24 @ 00:22  2.44 24 @ 00:10    CMP:     141  |  101  |  38<H>  ----------------------------<  160<H>  4.1   |  30  |  0.66    Ca    8.6      29 Aug 2024 22:58  Phos  2.2       Mg     2.3         TPro  6.3  /  Alb  3.1<L>  /  TBili  0.6  /  DBili  x   /  AST  11  /  ALT  10  /  AlkPhos  84      Creatinine: 0.66 mg/dL (24 @ 22:58)  Creatinine: 0.59 mg/dL (24 @ 07:18)  Creatinine: 0.64 mg/dL (24 @ 10:43)  Creatinine: 0.55 mg/dL (24 @ 07:15)  Creatinine: 0.58 mg/dL (24 @ 07:32)  Creatinine: 0.63 mg/dL (24 @ 00:22)  Creatinine: 0.78 mg/dL (24 @ 00:10)    LIVER FUNCTIONS - ( 28 Aug 2024 10:43 )  Alb: 3.1 g/dL / Pro: 6.3 g/dL / ALK PHOS: 84 U/L / ALT: 10 U/L / AST: 11 U/L / GGT: x           Urinalysis Basic - ( 30 Aug 2024 02:33 )  Color: Dark Yellow / Appearance: Cloudy / S.028 / pH: x  Gluc: x / Ketone: Trace mg/dL  / Bili: Small / Urobili: 1.0 mg/dL   Blood: x / Protein: 30 mg/dL / Nitrite: Negative   Leuk Esterase: Small / RBC: 12 /HPF / WBC 0 /HPF   Sq Epi: x / Non Sq Epi: 4 /HPF / Bacteria: Many /HPF    Microbiology: reviewed   Culture - Urine (collected 24 @ 00:10)  Source: Clean Catch Clean Catch (Midstream)  Final Report (24 @ 05:34):    <10,000 CFU/mL Normal Urogenital Yeimi    Culture - Blood (collected 24 @ 09:30)  Source: .Blood Blood-Peripheral  Final Report (24 @ 15:00):    No growth at 5 days    Culture - Blood (collected 24 @ 09:00)  Source: .Blood Blood-Peripheral  Final Report (24 @ 15:00):    No growth at 5 days    SARS-CoV-2 Result: NotDetec (23 Aug 2024 05:12)    Radiology: reviewed     Medications:  acetylcysteine 20%  Inhalation 4 milliLiter(s) Inhalation every 6 hours  albuterol/ipratropium for Nebulization 3 milliLiter(s) Nebulizer every 6 hours  buDESOnide    Inhalation Suspension 0.5 milliGRAM(s) Inhalation every 12 hours  furosemide   Injectable 20 milliGRAM(s) IV Push daily  guaiFENesin ER 1200 milliGRAM(s) Oral every 12 hours  heparin   Injectable 5000 Unit(s) SubCutaneous every 12 hours  levothyroxine Injectable 70 MICROGram(s) IV Push at bedtime  methylPREDNISolone sodium succinate Injectable 20 milliGRAM(s) IV Push every 8 hours  metoprolol tartrate 25 milliGRAM(s) Oral two times a day  pantoprazole  Injectable 40 milliGRAM(s) IV Push daily    Prior/Completed Antimicrobials:  cefepime   IVPB  metroNIDAZOLE    Tablet  metroNIDAZOLE  IVPB  metroNIDAZOLE  IVPB

## 2024-08-30 NOTE — PROGRESS NOTE ADULT - PROBLEM SELECTOR PLAN 1
Possible secondary to PNA   - Xray Chest 1 View- PORTABLE-Urgent (Xray Chest 1 View- PORTABLE-Urgent .) (08.26.24 @ 17:17) Findings are concerning for right sided pneumonia with small dependent bilateral effusions.  - CT Abdomen and Pelvis w/ IV Cont (08.23.24 @ 06:27) >Borderline dilated fluid-filled loopsof small bowel measuring up to 4.0 cm with gradual appearing transition point in the left lower quadrant. Findings may represent early small bowel obstruction or enteritis.  - Cultures NGTD, pending final   - On Cefepime and Flagyl   - ID following   - Management per primary team
On HFNC, weaned off levophed. In the setting of septic shock secondary to PNA   - On cefepime and steroids   - Pulm  and ID following   - care per primary team
Possible secondary to PNA   - Xray Chest 1 View- PORTABLE-Urgent (Xray Chest 1 View- PORTABLE-Urgent .) (08.26.24 @ 17:17) Findings are concerning for right sided pneumonia with small dependent bilateral effusions.  - CT Abdomen and Pelvis w/ IV Cont (08.23.24 @ 06:27) >Borderline dilated fluid-filled loopsof small bowel measuring up to 4.0 cm with gradual appearing transition point in the left lower quadrant. Findings may represent early small bowel obstruction or enteritis.  - Cultures NGTD, pending final   - On Cefepime and Flagyl   - ID following   - Management per primary team
- Xray Chest 1 View- PORTABLE-Urgent (Xray Chest 1 View- PORTABLE-Urgent .) (08.26.24 @ 17:17) Findings are concerning for right sided pneumonia with small dependent bilateral effusions.  - CT Abdomen and Pelvis w/ IV Cont (08.23.24 @ 06:27) >Borderline dilated fluid-filled loopsof small bowel measuring up to 4.0 cm with gradual appearing transition point in the left lower quadrant. Findings may represent early small bowel obstruction or enteritis.  -s/p Cefepime and Flagyl, now off antibiotics   - ID following   - Management per primary team

## 2024-08-30 NOTE — PROGRESS NOTE ADULT - SUBJECTIVE AND OBJECTIVE BOX
SUBJECTIVE AND OBJECTIVE:  Indication for Geriatrics and Palliative Care Services/INTERVAL HPI: GaP team consulted for complex medical decision making in the setting of serious illness and symptoms management.    OVERNIGHT EVENTS:  No acute events reported overnight.   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 reported allergy (Unknown)  penicillin (Rash)    Intolerances      MEDICATIONS  (STANDING):  acetylcysteine 20%  Inhalation 4 milliLiter(s) Inhalation every 6 hours  albuterol/ipratropium for Nebulization 3 milliLiter(s) Nebulizer every 6 hours  buDESOnide    Inhalation Suspension 0.5 milliGRAM(s) Inhalation every 12 hours  guaiFENesin ER 1200 milliGRAM(s) Oral every 12 hours  heparin   Injectable 5000 Unit(s) SubCutaneous every 12 hours  levothyroxine Injectable 70 MICROGram(s) IV Push at bedtime  methylPREDNISolone sodium succinate Injectable 20 milliGRAM(s) IV Push every 8 hours  metoprolol tartrate 25 milliGRAM(s) Oral two times a day  pantoprazole  Injectable 40 milliGRAM(s) IV Push daily    MEDICATIONS  (PRN):          ITEMS UNCHECKED ARE NOT PRESENT    PRESENT SYMPTOMS: [ ]Unable to self-report - see  CPOT, PAINADS, RDOS below  Source if other than patient:  [ ]Family   [ ]Team     Pain:  [ ]yes [ x]no  QOL impact -   Location -                    Aggravating factors -  Quality -  Radiation -  Timing-  Severity (0-10 scale):  Minimal acceptable level (0-10 scale):     Dyspnea:                           [x]Mild [ ]Moderate [ ]Severe  Anxiety:                             [ ]Mild [ ]Moderate [ ]Severe  Fatigue:                             [ ]Mild [ ]Moderate [ ]Severe  Nausea:                             [ ]Mild [ ]Moderate [ ]Severe  Loss of appetite:              [ ]Mild [ ]Moderate [ ]Severe  Constipation:                    [ ]Mild [ ]Moderate [ ]Severe    PCSSQ[Palliative Care Spiritual Screening Question]   Severity (0-10):  Score of 4 or > indicate consideration of Chaplaincy referral.  Chaplaincy Referral: [ ] yes [x ] refused [ ] following  Caregiver Gabbs? : [ ] yes [ ] no  [ x] deferred:  Social work referral [ ] Patient & Family Centered Care Referral [ ]   Anticipatory Grief present?:  [x ] yes [ ] no  [ ] deferred: Social work referral [ ] Patient & Family Centered Care Referral [ ]      Other Symptoms:  [x ]All other review of systems negative     PHYSICAL EXAM:  Vital Signs Last 24 Hrs  T(C): 36.3 (30 Aug 2024 11:35), Max: 36.7 (29 Aug 2024 16:40)  T(F): 97.3 (30 Aug 2024 11:35), Max: 98.1 (29 Aug 2024 16:40)  HR: 90 (30 Aug 2024 12:07) (89 - 103)  BP: 125/83 (30 Aug 2024 12:07) (121/84 - 127/84)  BP(mean): 96 (29 Aug 2024 16:40) (96 - 96)  RR: 18 (30 Aug 2024 11:35) (18 - 18)  SpO2: 93% (30 Aug 2024 11:35) (93% - 100%)    Parameters below as of 30 Aug 2024 11:35  Patient On (Oxygen Delivery Method): nasal cannula  O2 Flow (L/min): 4  I&O's Summary    29 Aug 2024 07:01  -  30 Aug 2024 07:00  --------------------------------------------------------  IN: 440 mL / OUT: 1450 mL / NET: -1010 mL      GENERAL:  [x]Alert  [x]Oriented x3 [ ]Lethargic  [ ]Cachexia  [ ]Unarousable  [x]Verbal  [ ]Non-Verbal  Behavioral:   [ ]Anxiety  [ ]Delirium [ ]Agitation [x ]Other:   HEENT: hard of hearing   [x]Normal   [ ]Dry mouth   [ ]ET Tube/Trach  [ ]Oral lesions  PULMONARY:   []Clear []Tachypnea  [ ]Audible excessive secretions   [ ]Rhonchi        [ ]Right [ ]Left [ ]Bilateral  [ ]Crackles        [ ]Right [ ]Left [ ]Bilateral  [ ]Wheezing     [ ]Right [ ]Left [ ]Bilateral  [ x]Diminished BS [ ] Right [ ]Left [x ]Bilateral  CARDIOVASCULAR:    [x]Regular [ ]Irregular [ ]Tachy  [ ]Faisal [ ]Murmur [ ]Other  GASTROINTESTINAL:  [x]Soft  [ ]Distended   [x]+BS  [x]Non tender [ ]Tender  [ ]PEG [ x]OGT/ NGT   Last BM:  fecal incontinent   GENITOURINARY:  [x]Normal [ ]Incontinent   [ ]Oliguria/Anuria   [ ]Baum  MUSCULOSKELETAL:   [ ]Normal   [x]Weakness  [ ]Bed/Wheelchair bound [ ]Edema  NEUROLOGIC: patients appears more lethargic, able to follow simple commands   [x]No focal deficits  [ ] Cognitive impairment  [ ] Dysphagia [ ]Dysarthria [ ] Paresis [ ]Other   SKIN:   [x]Normal  [ ]Rash   [ ]Pressure ulcer(s) [ ]y [ ]n present on admission    CRITICAL CARE:  [ ] Shock Present  [ ]Septic [ ]Cardiogenic [ ]Neurologic [ ]Hypovolemic  [ ]  Vasopressors [ ]  Inotropes   []Respiratory failure present [ ]Mechanical ventilation [ ]Non-invasive ventilatory support [ ]High flow    [x ]Acute  [ ]Chronic [x ]Hypoxic  [ ]Hypercarbic [ x]Other: NC  [ ]Other organ failure     LABS:                          9.4    6.25  )-----------( 65       ( 30 Aug 2024 12:49 )             30.4   08-30    139  |  99  |  33<H>  ----------------------------<  110<H>  5.0   |  30  |  0.51    Ca    8.6      30 Aug 2024 11:52  Phos  2.2     08-29  Mg     2.3     08-29                                                    RADIOLOGY & ADDITIONAL STUDIES: reviewed none new      Protein Calorie Malnutrition Present: [ ]mild [ ]moderate [ ]severe [ ]underweight [ ]morbid obesity  https://www.andeal.org/vault/2440/web/files/ONC/Table_Clinical%20Characteristics%20to%20Document%20Malnutrition-White%20JV%20et%20al%202012.pdf    Height (cm): 167.6 (08-23-24 @ 16:52), 165.1 (06-06-24 @ 19:21), 165.1 (05-08-24 @ 11:40)  Weight (kg): 58.8 (08-23-24 @ 16:52), 58.5 (06-06-24 @ 19:21), 68 (05-08-24 @ 11:40)  BMI (kg/m2): 20.9 (08-23-24 @ 16:52), 21.5 (06-06-24 @ 19:21), 24.9 (05-08-24 @ 11:40)    [ ]PPSV2 < or = 30%  [ ]significant weight loss []poor nutritional intake [ ]anasarca[ ]Artificial Nutrition    Other REFERRALS:  [ ]Hospice  [ ]Child Life  [ ]Social Work  [x]Case management [ ]Holistic Therapy     Palliative Performance Scale:  http://npcrc.org/files/news/palliative_performance_scale_ppsv2.pdf  (Ctrl +  left click to view)  Respiratory Distress Observation Tool:  https://homecareinformation.net/handouts/hen/Respiratory_Distress_Observation_Scale.pdf (Ctrl +  left click to view)  PAINAD Score:  http://geriatrictoolkit.[facility]/cog/painad.pdf (Ctrl +  left click to view)                                       Progress Notes    PROGRESS NOTE  Date & Time of Note   2024-08-30 11:41    Notes    Notes: Chart reviewed:   Patient remains acute with hypoxia on 4 liters of  oxygen at present and IV Solumedrol,   no home oxygen available;  Patient and  family declining KALYANI requesting Home Care instead completed and faxed to [facility]  for review.   Will continue to collaborate with interdisciplinary team to  assess ongoing need.       Electronically signed by:  Candice Gallegos  Electronically signed on:  2024-08-30  11:51

## 2024-08-30 NOTE — PROGRESS NOTE ADULT - ASSESSMENT
Ms. Slater is a 89y Female with PMHx of asthma, HTN, essential tremor, bronchiectasis, GERD, hypothyroidism, paroxysmal A.fib on Eliquis 2.5mg BID, Vit D deficiency, tuberculous peritonitis, anemia moderate AS, hx of falls, aortic regurgitation presented with nausea and vomiting 08/23/2024 and subsequently admitted to MICU with SBO and transferred to floor on 08/25/2024, treated conservatively complicated by septic shock requiring pressor support. Pt has hx of pancytopenia.     Pt has a history of pancytopenia as outpatient with Leukopenia with since about 3y prior initially lymphopenia and then with neutropenia, progressive, anemia over the past year gradually worsening, with progressive macrocytosis, and thrombocytopenia over the past two years. Pt requested conservative treatment and denied BmBx a few times in the past. Discussed with pt again this admission and she stated she would not like to undergo aggressive interventions and she would not like to have a bone marrow biopsy, and she prefers conservative treatment otherwise.     She denied history of blood disorders, bleeding complications, or personal history of malignancy. She denied smoking, ETOH use. She lives in her apt with a 24hr aide and her daughter visits regularly.     08/27/2024 on NRB, noted ongoing tachycardia overnight, cardiology consult pending, no signs of bleeding at this time, thrombocytopenia noted on Hep SubQ for PPx     08/28/2024: awake and alert, quite hard of hearing, nurse at bedside stated pt did well overnight, she had dry mucous membranes, no signs of bleeding, discussed her hematological diagnoses again, she is aware     08/29/2024: awake and alert, prolonged exam due to being hard of hearing, stated she has persistent coughing and associated nausea without vomiting, nursing staff at bedside, pt with minor bleeding from IV site, required pressure. 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   - Outpatient labs show gradual progressive leukopenia x 3y, initial lymphopenia now both lymphopenia and neutropenia   -- Most recent outpatient WBC ranging 1.9-4.6   - Thrombocytopenia outpatient ranging 85-100s x 2y  - Anemia ranging 9.5-11 over past year, currently close to this  - MCV with progressive macrocytosis over past year  - ANC 3250, s/p steroids  - Iron sat 9%, Folate 15.6, B12 179  - Previous paraproteinemia workup negative for obvious plasma cell dyscrasia SPEP/MOOKIE normal, Serum immunoglobulins normal, serum FLCs (ratio 1.44), LDH (normal), Beta-2 Mcg (mild elv 2.6),   - Transfuse PRBC to maintain Hb > 7   - Transfuse Plt to maintain Plt > 10k  - Trend CBC daily with Diff for now   - Continue B12 1000mcg IM weekly  - Again reviewed and discussed pancytopenia with the pt she is aware of its gradual, progressive worsening over past three years, discussed with pt again this admission and she stated she would not like to undergo aggressive interventions and she would declined bone marrow biopsy  - GOC noted   - Needs to follow up as outpatient     # Small bowel obstruction  - CT scan performed in ED showed evidence of dilated small bowel loops with large stool burden with concern for small bowel obstruction vs enteritis  - S/P MICU admission with NGT decompression  - Transferred to floor 08/25/2024     # Aspiration Pneumonia  -  CT A/P lower chest with with multifocal PNA  -  Pulmonology following, recs noted  -  Resolved septic shock while in MICU  -  CT Chest   - Antibiotics per ID and primary team    #A.fib  - Was previously on Eliquis 2.5mg per cardiology  - Plt 50k >48k >61k  - Pt at risk for CVA vs bleeding given clinical context  - Cardiology consult and recs noted, currently risks vs benefits favoring high risk for AC  - Can proceed with Hep SubQ BID, can consider Eliquis depending on Plt count trajectory, recommend ok to restart if Plt count consistently > 75k and per clinical risk     # Hypothyroidism  - Continue levothyroxine         Thank you for allowing me to participate in the care of Ms. Slater, please do not hesitate to call or text me if you have further questions or concerns.       Roni Polanco MD  [facility] [place]   Division of Hematology/Oncology  [address], [address]  [place] [address]  P: [phone]  F: [phone]    Attestation:    ----Pt evaluated including face-to-face interaction in addition to chart review, reviewing treatment plan, and managing the patient’s chronic diagnoses as listed in the assessment----      Ms. Slater is a 89y Female with PMHx of asthma, HTN, essential tremor, bronchiectasis, GERD, hypothyroidism, paroxysmal A.fib on Eliquis 2.5mg BID, Vit D deficiency, tuberculous peritonitis, anemia moderate AS, hx of falls, aortic regurgitation presented with nausea and vomiting 08/23/2024 and subsequently admitted to MICU with SBO and transferred to floor on 08/25/2024, treated conservatively complicated by septic shock requiring pressor support. Pt has hx of pancytopenia.     Pt has a history of pancytopenia as outpatient with Leukopenia with since about 3y prior initially lymphopenia and then with neutropenia, progressive, anemia over the past year gradually worsening, with progressive macrocytosis, and thrombocytopenia over the past two years. Pt requested conservative treatment and denied BmBx a few times in the past. Discussed with pt again this admission and she stated she would not like to undergo aggressive interventions and she would not like to have a bone marrow biopsy, and she prefers conservative treatment otherwise.     She denied history of blood disorders, bleeding complications, or personal history of malignancy. She denied smoking, ETOH use. She lives in her apt with a 24hr aide and her daughter visits regularly.     08/27/2024 on NRB, noted ongoing tachycardia overnight, cardiology consult pending, no signs of bleeding at this time, thrombocytopenia noted on Hep SubQ for PPx     08/28/2024: awake and alert, quite hard of hearing, nurse at bedside stated pt did well overnight, she had dry mucous membranes, no signs of bleeding, discussed her hematological diagnoses again, she is aware     08/29/2024: awake and alert, prolonged exam due to being hard of hearing, stated she has persistent coughing and associated nausea without vomiting, nursing staff at bedside, pt with minor bleeding from IV site, required pressure. 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   - Outpatient labs show gradual progressive leukopenia x 3y, initial lymphopenia now both lymphopenia and neutropenia   -- Most recent outpatient WBC ranging 1.9-4.6   - Thrombocytopenia outpatient ranging 85-100s x 2y  - Anemia ranging 9.5-11 over past year, currently close to this  - MCV with progressive macrocytosis over past year  - ANC 3250, s/p steroids  - Iron sat 9%, Folate 15.6, B12 179  - Previous paraproteinemia workup negative for obvious plasma cell dyscrasia SPEP/MOOKIE normal, Serum immunoglobulins normal, serum FLCs (ratio 1.44), LDH (normal), Beta-2 Mcg (mild elv 2.6),   - Transfuse PRBC to maintain Hb > 7   - Transfuse Plt to maintain Plt > 10k  - Trend CBC daily with Diff for now   - Continue B12 1000mcg IM weekly  - Again reviewed and discussed pancytopenia with the pt she is aware of its gradual, progressive worsening over past three years, discussed with pt again this admission and she stated she would not like to undergo aggressive interventions and she would declined bone marrow biopsy  - GOC noted ongoing   - Needs to follow up as outpatient     # Small bowel obstruction  - CT scan performed in ED showed evidence of dilated small bowel loops with large stool burden with concern for small bowel obstruction vs enteritis  - S/P MICU admission with NGT decompression  - Transferred to floor 08/25/2024     # Aspiration Pneumonia  -  CT A/P lower chest with with multifocal PNA  -  Pulmonology following, recs noted  -  Resolved septic shock while in MICU  -  CT Chest   - Antibiotics per ID and primary team    #A.fib  - Was previously on Eliquis 2.5mg per cardiology  - Plt 50k >48k >61k  - Pt at risk for CVA vs bleeding given clinical context  - Cardiology consult and recs noted, currently risks vs benefits favoring high risk for AC  - Can proceed with Hep SubQ BID, can consider Eliquis depending on Plt count trajectory, recommend ok to restart if Plt count consistently > 75k and per clinical risk     # Hypothyroidism  - Continue levothyroxine         Thank you for allowing me to participate in the care of Ms. Slater, please do not hesitate to call or text me if you have further questions or concerns.       Roni Polanco MD  [facility] [place]   Division of Hematology/Oncology  [address], [address]  [place] [address]  P: [phone]  F: [phone]    Attestation:    ----Pt evaluated including face-to-face interaction in addition to chart review, reviewing treatment plan, and managing the patient’s chronic diagnoses as listed in the assessment----

## 2024-08-30 NOTE — PROGRESS NOTE ADULT - PROBLEM SELECTOR PLAN 4
Spoke with patient and daughter, they have a good  insight about current patient medical condition and prognosis, at the moment patient is hemodynamic stable not requiring vasopressor however due to advance age and comorbidities patient can decompensate anytime.   Daughter states that if patient is stable to be discharge will hire A 24/7.
- PPSV 40%. The patient requires nursing assistance with all ADLs,  - Supportive care.  - PT consulted, recommended KALYANI or if family declines KALYANI, home PT
- PPSV 40%. The patient requires nursing assistance with all ADLs,  - Supportive care.  - PT consulted, recommended KALYANI or if family declines KALYANI, home PT
- PPSV 30%. The patient requires nursing assistance with all ADLs,  - Supportive care.  - PT consulted, recommended KALYANI or if family declines KALYANI, home PT

## 2024-08-30 NOTE — PROGRESS NOTE ADULT - ASSESSMENT
89F with history of paroxysmal atrial fibrillation, hypothyroidism, pulmonary/abdominal tuberculosis, recurrent small bowel obstruction, recurrent UTIs presenting from home with abdominal pain and vomiting for 1 day. Per daughter and HHA at bedside, patient was in her usual state of health until yesterday. Normally able to communicate and ambulate to the bathroom and eat on her own. Does have a HHA for assistance as needed. HHA reports that around 2:30 AM last night, patient called for assistance and was vomiting and asked to call an ambulance. CT scan performed in ED showed evidence of dilated small bowel loops with large stool burden with concern for small bowel obstruction vs enteritis. Also with some imaging findings concerning for multifocal pneumonia. Surgery consulted given history of small bowel obstruction and imaging and NGT placed with NGT to suction draining minimal dark abdominal contents. Additionally with hypoxemic respiratory failure requiring HFNC 40/40. ED course c/b hypotension 70/40s with transient improvement with 3L IVF however with repeat hypotension despite these measures.   GOC discussion had with daughter at bedside. Patient is DNR/DNI and would not want aggressive measures including surgery or central line placement for pressor therapy. Would like a trial of IV pressors via peripheral IV and if these fails, daughter would not want further escalation. Was also seen by palliative care team in ED and was being considered for PCU on fixed dose pressors however no beds available at this time.   Admitted to MICU for septic shock. (23 Aug 2024 15:40)      Multifocal Pneumonia/ Septic shock / Hypoxia  A trial fibrillation  Hypothyroidism   Hx of pulm/Abd tuberculosis  HTN/Hx of orthostatic hypotension    Multifocal Pneumonia/ Septic shock / Hypoxia  -was neutropenic on admission with multifocal pneumonia and septic shock;  -S/P MICU stay recovered now transferred to floor  -she is on broad spectrum antibtiocs:  would cont for now;   -cxr reviewed:  -consider ct chest  non contrast    -she has wheezing and difficulty to expectorate phlegm : add duoneb and Mucomyst   -may need steroids will wait and see for next 24 hours;  if  needed will add  in am       8/26:-change to solumedrol : cont Mucomyst and nebs  -watch for blood pressure   -cont mucomyst  : add mucinex too    8/27: still with some cough : has coarse breath sounds ; still on steroids increased to q 8 yesterday ;  8/28: her wheezing is better:  decrease 20 mg q 8 hours and if doing  ok : will change and taper:   8/29: she is doing relatively better: cont steroids  decrease tomorrow and change to po    8/30: pn 4 L:  doing same:  change to po steroids and decrease the dose   antibiotics finished     A trial fibrillation  -not on any full AC:  -on heparin sq   8/27: now he has RVR:  fill ac per prim svitlana team:  being transferred to monitored unit   8/29: seems stable: on hep sq   8/30? r plts are more then 50K:  defer to hemonc and primary team  for restarting eliquis    Hypothyroidism   -levothyroxine    Hx of pulm/Abd tuberculosis  -treated before    HTN/Hx of orthostatic hypotension  -currently on stress doses of steroids and being tapered   8/27" changed to solumedrol :" monitor for orthostasis   8/28: blood pressure seems stable:   8/30: hemodynamically stable:     dw acp

## 2024-08-30 NOTE — PROGRESS NOTE ADULT - ASSESSMENT
Patient is a 89 year old female with PMH of pAfib, hypothyroidism, pulmonary/abdominal tuberculosis, recurrent small bowel obstruction, recurrent UTIs presenting from home with abdominal pain and vomiting for 1 day. CT scan performed in ED showed evidence of dilated small bowel loops with large stool burden with concern for small bowel obstruction vs enteritis; s/p NGT. Course complicated by hypotension likely septic from multifocal pneumonia. Shock resolved, off pressors, transferred out of MICU to floor.   AHRF due to multifocal pneumonia - suspect aspiration  Leukopenia with bandemia on admission, trend noted   chart reviewed, h/o pancytopenia with progressive leukopenia   COVID/Flu/RSV negative   Bcx NGTD; Ucx negative   CXR with R sided pneumonia with small dependent b/l effusions  now on NC, remains afebrile  Cardiology following for afib  h/o PCN allergy, tolerates cephalosporins     s/p cefepime 8/23-8/30  s/p flagyl 8/23-8/30    Recommendations:   Continue off antibiotics   Pulmonary following, on steroids   Heme/Onc following for pancytopenia   Aspiration precautions  Supplemental O2 as needed  Supportive care   Palliative care following   Continue rest of care per primary team     Stable from ID standpoint at this time.   Please call if any questions, thank you.     Lilli Polanco M.D.  [facility], Division of Infectious Diseases  [phone]  After 5pm on weekdays and all day on weekends - please call [phone]  Available on [employer] TEAMS

## 2024-08-30 NOTE — PROGRESS NOTE ADULT - PROBLEM SELECTOR PROBLEM 2
Acute hypoxic respiratory failure
SBO (small bowel obstruction)
Acute hypoxic respiratory failure
Acute hypoxic respiratory failure

## 2024-08-31 LAB
ANION GAP SERPL CALC-SCNC: 12 MMOL/L — SIGNIFICANT CHANGE UP (ref 5–17)
BASE EXCESS BLDA CALC-SCNC: 13 MMOL/L — HIGH (ref -2–3)
BUN SERPL-MCNC: 26 MG/DL — HIGH (ref 7–23)
CALCIUM SERPL-MCNC: 8.3 MG/DL — LOW (ref 8.4–10.5)
CHLORIDE SERPL-SCNC: 94 MMOL/L — LOW (ref 96–108)
CO2 BLDA-SCNC: 40 MMOL/L — HIGH (ref 19–24)
CO2 SERPL-SCNC: 32 MMOL/L — HIGH (ref 22–31)
CREAT SERPL-MCNC: 0.48 MG/DL — LOW (ref 0.5–1.3)
D DIMER BLD IA.RAPID-MCNC: 929 NG/ML DDU — HIGH
EGFR: 85 ML/MIN/1.73M2 — SIGNIFICANT CHANGE UP
GAS PNL BLDA: SIGNIFICANT CHANGE UP
GLUCOSE BLDC GLUCOMTR-MCNC: 147 MG/DL — HIGH (ref 70–99)
GLUCOSE BLDC GLUCOMTR-MCNC: 160 MG/DL — HIGH (ref 70–99)
GLUCOSE BLDC GLUCOMTR-MCNC: 199 MG/DL — HIGH (ref 70–99)
GLUCOSE BLDC GLUCOMTR-MCNC: 262 MG/DL — HIGH (ref 70–99)
GLUCOSE SERPL-MCNC: 123 MG/DL — HIGH (ref 70–99)
HCO3 BLDA-SCNC: 38 MMOL/L — HIGH (ref 21–28)
HCT VFR BLD CALC: 28.2 % — LOW (ref 34.5–45)
HGB BLD-MCNC: 8.9 G/DL — LOW (ref 11.5–15.5)
HOROWITZ INDEX BLDA+IHG-RTO: 44 — SIGNIFICANT CHANGE UP
MAGNESIUM SERPL-MCNC: 2 MG/DL — SIGNIFICANT CHANGE UP (ref 1.6–2.6)
MCHC RBC-ENTMCNC: 31.6 GM/DL — LOW (ref 32–36)
MCHC RBC-ENTMCNC: 32.6 PG — SIGNIFICANT CHANGE UP (ref 27–34)
MCV RBC AUTO: 103.3 FL — HIGH (ref 80–100)
NRBC # BLD: 0 /100 WBCS — SIGNIFICANT CHANGE UP (ref 0–0)
PCO2 BLDA: 49 MMHG — HIGH (ref 32–45)
PH BLDA: 7.5 — HIGH (ref 7.35–7.45)
PHOSPHATE SERPL-MCNC: 3.1 MG/DL — SIGNIFICANT CHANGE UP (ref 2.5–4.5)
PLATELET # BLD AUTO: 60 K/UL — LOW (ref 150–400)
PO2 BLDA: 114 MMHG — HIGH (ref 83–108)
POTASSIUM SERPL-MCNC: 3.9 MMOL/L — SIGNIFICANT CHANGE UP (ref 3.5–5.3)
POTASSIUM SERPL-SCNC: 3.9 MMOL/L — SIGNIFICANT CHANGE UP (ref 3.5–5.3)
RBC # BLD: 2.73 M/UL — LOW (ref 3.8–5.2)
RBC # FLD: 15 % — HIGH (ref 10.3–14.5)
SAO2 % BLDA: 99.4 % — HIGH (ref 94–98)
SODIUM SERPL-SCNC: 138 MMOL/L — SIGNIFICANT CHANGE UP (ref 135–145)
WBC # BLD: 6.96 K/UL — SIGNIFICANT CHANGE UP (ref 3.8–10.5)
WBC # FLD AUTO: 6.96 K/UL — SIGNIFICANT CHANGE UP (ref 3.8–10.5)

## 2024-08-31 PROCEDURE — 71250 CT THORAX DX C-: CPT | Mod: 26

## 2024-08-31 PROCEDURE — 93970 EXTREMITY STUDY: CPT | Mod: 26

## 2024-08-31 RX ADMIN — Medication 4 MILLILITER(S): at 16:55

## 2024-08-31 RX ADMIN — IPRATROPIUM BROMIDE AND ALBUTEROL SULFATE 3 MILLILITER(S): .5; 3 SOLUTION RESPIRATORY (INHALATION) at 16:55

## 2024-08-31 RX ADMIN — Medication 4 MILLILITER(S): at 12:27

## 2024-08-31 RX ADMIN — Medication 5000 UNIT(S): at 16:56

## 2024-08-31 RX ADMIN — Medication 40 MILLIGRAM(S): at 12:58

## 2024-08-31 RX ADMIN — METOPROLOL TARTRATE 25 MILLIGRAM(S): 100 TABLET ORAL at 06:26

## 2024-08-31 RX ADMIN — IPRATROPIUM BROMIDE AND ALBUTEROL SULFATE 3 MILLILITER(S): .5; 3 SOLUTION RESPIRATORY (INHALATION) at 23:26

## 2024-08-31 RX ADMIN — Medication 20 MILLIGRAM(S): at 16:57

## 2024-08-31 RX ADMIN — Medication 5000 UNIT(S): at 06:27

## 2024-08-31 RX ADMIN — Medication 4 MILLILITER(S): at 06:26

## 2024-08-31 RX ADMIN — BUDESONIDE 0.5 MILLIGRAM(S): 3 CAPSULE ORAL at 06:27

## 2024-08-31 RX ADMIN — METOPROLOL TARTRATE 25 MILLIGRAM(S): 100 TABLET ORAL at 16:56

## 2024-08-31 RX ADMIN — IPRATROPIUM BROMIDE AND ALBUTEROL SULFATE 3 MILLILITER(S): .5; 3 SOLUTION RESPIRATORY (INHALATION) at 06:26

## 2024-08-31 RX ADMIN — BUDESONIDE 0.5 MILLIGRAM(S): 3 CAPSULE ORAL at 16:55

## 2024-08-31 RX ADMIN — Medication 4 MILLILITER(S): at 23:26

## 2024-08-31 RX ADMIN — IPRATROPIUM BROMIDE AND ALBUTEROL SULFATE 3 MILLILITER(S): .5; 3 SOLUTION RESPIRATORY (INHALATION) at 12:18

## 2024-08-31 RX ADMIN — Medication 20 MILLIGRAM(S): at 06:28

## 2024-08-31 RX ADMIN — Medication 20 MILLIGRAM(S): at 06:27

## 2024-08-31 RX ADMIN — GUAIFENESIN 1200 MILLIGRAM(S): 100 LIQUID ORAL at 06:25

## 2024-08-31 RX ADMIN — Medication 70 MICROGRAM(S): at 21:25

## 2024-08-31 NOTE — PROGRESS NOTE ADULT - SUBJECTIVE AND OBJECTIVE BOX
Date of Service: 08-31-24 @ 15:02    Patient is a 89y old  Female who presents with a chief complaint of septic shock (31 Aug 2024 07:40)      Any change in ROS: She seems OK:  no sob:  no cough : no phlegm :  on 2 L of oxygen : daughter is at bedside;       MEDICATIONS  (STANDING):  acetylcysteine 20%  Inhalation 4 milliLiter(s) Inhalation every 6 hours  albuterol/ipratropium for Nebulization 3 milliLiter(s) Nebulizer every 6 hours  buDESOnide    Inhalation Suspension 0.5 milliGRAM(s) Inhalation every 12 hours  furosemide   Injectable 20 milliGRAM(s) IV Push daily  heparin   Injectable 5000 Unit(s) SubCutaneous every 12 hours  levothyroxine Injectable 70 MICROGram(s) IV Push at bedtime  metoprolol tartrate 25 milliGRAM(s) Oral two times a day  pantoprazole  Injectable 40 milliGRAM(s) IV Push daily  predniSONE   Tablet 20 milliGRAM(s) Oral every 12 hours    MEDICATIONS  (PRN):    Vital Signs Last 24 Hrs  T(C): 36.6 (31 Aug 2024 09:15), Max: 36.6 (30 Aug 2024 18:22)  T(F): 97.8 (31 Aug 2024 09:15), Max: 97.9 (30 Aug 2024 20:13)  HR: 87 (31 Aug 2024 13:30) (75 - 98)  BP: 110/70 (31 Aug 2024 13:30) (106/73 - 135/72)  BP(mean): 84 (30 Aug 2024 20:13) (84 - 91)  RR: 18 (31 Aug 2024 13:30) (18 - 18)  SpO2: 99% (31 Aug 2024 13:30) (95% - 99%)    Parameters below as of 31 Aug 2024 13:30  Patient On (Oxygen Delivery Method): nasal cannula  O2 Flow (L/min): 3      I&O's Summary    30 Aug 2024 07:01  -  31 Aug 2024 07:00  --------------------------------------------------------  IN: 220 mL / OUT: 1550 mL / NET: -1330 mL    31 Aug 2024 07:01  -  31 Aug 2024 15:02  --------------------------------------------------------  IN: 490 mL / OUT: 800 mL / NET: -310 mL          Physical Exam:   GENERAL: NAD, well-groomed, well-developed  HEENT: KOLBY/   Atraumatic, Normocephalic  ENMT: No tonsillar erythema, exudates, or enlargement; Moist mucous membranes, Good dentition, No lesions  NECK: Supple, No JVD, Normal thyroid  CHEST/LUNG: sierra cracekls+  CVS: Regular rate and rhythm; No murmurs, rubs, or gallops  GI: : Soft, Nontender, Nondistended; Bowel sounds present  NERVOUS SYSTEM:  Alert & Oriented X3  EXTREMITIES:  - edema  LYMPH: No lymphadenopathy noted  SKIN: No rashes or lesions  ENDOCRINOLOGY: No Thyromegaly  PSYCH: Appropriate    Labs:  ABG - ( 31 Aug 2024 05:11 )  pH, Arterial: 7.50  pH, Blood: x     /  pCO2: 49    /  pO2: 114   / HCO3: 38    / Base Excess: 13.0  /  SaO2: 99.4                                        8.9    6.96  )-----------( 60       ( 31 Aug 2024 14:33 )             28.2                         9.4    6.25  )-----------( 65       ( 30 Aug 2024 12:49 )             30.4                         9.1    4.80  )-----------( 60       ( 29 Aug 2024 22:58 )             29.7                         9.5    4.47  )-----------( 64       ( 29 Aug 2024 07:06 )             31.2                         9.1    3.35  )-----------( 61       ( 28 Aug 2024 10:43 )             29.7     08-30    139  |  99  |  33<H>  ----------------------------<  110<H>  5.0   |  30  |  0.51  08-29    141  |  101  |  38<H>  ----------------------------<  160<H>  4.1   |  30  |  0.66  08-29    144  |  103  |  41<H>  ----------------------------<  116<H>  4.3   |  29  |  0.59  08-28    146<H>  |  104  |  36<H>  ----------------------------<  148<H>  3.1<L>   |  30  |  0.64    Ca    8.6      30 Aug 2024 11:52  Ca    8.6      29 Aug 2024 22:58  Phos  3.1     08-31  Mg     2.0     08-31    TPro  6.3  /  Alb  3.1<L>  /  TBili  0.6  /  DBili  x   /  AST  11  /  ALT  10  /  AlkPhos  84  08-28    CAPILLARY BLOOD GLUCOSE      POCT Blood Glucose.: 147 mg/dL (31 Aug 2024 12:32)  POCT Blood Glucose.: 199 mg/dL (31 Aug 2024 06:41)  POCT Blood Glucose.: 106 mg/dL (30 Aug 2024 23:34)  POCT Blood Glucose.: 114 mg/dL (30 Aug 2024 17:13)          Urinalysis Basic - ( 30 Aug 2024 11:52 )    Color: x / Appearance: x / SG: x / pH: x  Gluc: 110 mg/dL / Ketone: x  / Bili: x / Urobili: x   Blood: x / Protein: x / Nitrite: x   Leuk Esterase: x / RBC: x / WBC x   Sq Epi: x / Non Sq Epi: x / Bacteria: x  < from: CT Chest No Cont (08.31.24 @ 11:21) >    IMPRESSION:.        --- End of Report ---             WESLY GAMBOA MD; Attending Radiologist  This document has been electronically signed. Aug 31 2024 11:59AM    < end of copied text >  < from: CT Chest No Cont (06.06.24 @ 23:02) >  VESSELS: Aortic calcification. Coronary artery calcification. Prominent   aorta the following measurements were obtained.  No tubular junction 3.2 cm. Ascending aorta 4.4 cm. Aortic arch 3.5 cm.   The ascending aorta 4.3 cm.  HEART: Cardiomegaly. No pericardial effusion.  MEDIASTINUM AND TUNG: No lymphadenopathy.  CHEST WALL AND LOWER NECK: Within normal limits.  VISUALIZED UPPER ABDOMEN: Within normal limits.  BONES: Degenerative changes. Scoliosis.    IMPRESSION:  Relatively unchanged chronic lung findings. Small pulmonary nodules for   which follow-up is recommended. Superimposed infection is not excluded.    --- End of Report ---          IRASEMA STRAUSS MD; Resident Radiologist  This document has been electronically signed.  RENU SAMUEL MD; Attending Radiologist  This document has been electronically signed. Jun 6 2024 11:45PM    < end of copied text >            RECENT CULTURES:        RESPIRATORY CULTURES:    eho< from: TTE W or WO Ultrasound Enhancing Agent (08.27.24 @ 15:27) >     CONCLUSIONS:      1. Left ventricular cavity is small. Left ventricular wall thickness is normal. Left ventricular systolic function is normal. There are no regional wall motion abnormalities seen.   2. Normal right ventricular cavity size, with normal wall thickness, and normal right ventricular systolic function.   3. Mild pulmonary hypertension.    < end of copied text >        Studies  Chest X-RAY  CT SCAN Chest   Venous Dopplers: LE:   CT Abdomen  Others    < from: CT Chest No Cont (08.31.24 @ 11:21) >  BONES: Unremarkable.      IMPRESSION:.    Occlusive debris/mucous plugging within bronchus intermedius and more   distal airways of the right middle lobe and right lower lobe.    There is resultant right middle lobe and right lower lobe collapse with   volume loss.    Small bilateral pleural effusions.    Patchy ground-glass opacities throughout the right upper lobe and lingula   may be secondary to edema or infection.    --- End of Report ---             WESLY GAMBOA MD; Attending Radiologist  This document has been electronically signed. Aug 31 2024 11:59AM    < end of copied text >

## 2024-08-31 NOTE — PROGRESS NOTE ADULT - SUBJECTIVE AND OBJECTIVE BOX
DATE OF SERVICE: 08-31-24 @ 15:36    Patient is a 89y old  Female who presents with a chief complaint of septic shock (31 Aug 2024 15:02)      INTERVAL HISTORY: feels okay, daughter at bedside    REVIEW OF SYSTEMS:  CONSTITUTIONAL: No weakness  EYES/ENT: No visual changes;  No throat pain   NECK: No pain or stiffness  RESPIRATORY: No cough, wheezing; No shortness of breath  CARDIOVASCULAR: No chest pain or palpitations  GASTROINTESTINAL: No abdominal  pain. No nausea, vomiting, or hematemesis  GENITOURINARY: No dysuria, frequency or hematuria  NEUROLOGICAL: No stroke like symptoms  SKIN: No rashes    TELEMETRY Personally reviewed: AF 70s-80s  	  MEDICATIONS:  furosemide   Injectable 20 milliGRAM(s) IV Push daily  metoprolol tartrate 25 milliGRAM(s) Oral two times a day        PHYSICAL EXAM:  T(C): 36.6 (08-31-24 @ 09:15), Max: 36.6 (08-30-24 @ 18:22)  HR: 87 (08-31-24 @ 13:30) (75 - 98)  BP: 110/70 (08-31-24 @ 13:30) (106/73 - 135/72)  RR: 18 (08-31-24 @ 13:30) (18 - 18)  SpO2: 99% (08-31-24 @ 13:30) (95% - 99%)  Wt(kg): --  I&O's Summary    30 Aug 2024 07:01  -  31 Aug 2024 07:00  --------------------------------------------------------  IN: 220 mL / OUT: 1550 mL / NET: -1330 mL    31 Aug 2024 07:01  -  31 Aug 2024 15:36  --------------------------------------------------------  IN: 490 mL / OUT: 800 mL / NET: -310 mL          Appearance: In no distress	  HEENT:    PERRL, EOMI	  Cardiovascular:  S1 S2, No JVD  Respiratory: Lungs clear to auscultation	  Gastrointestinal:  Soft, Non-tender, + BS	  Vascularature:  No edema of LE  Psychiatric: Appropriate affect   Neuro: no acute focal deficits                               8.9    6.96  )-----------( 60       ( 31 Aug 2024 14:33 )             28.2     08-31    138  |  94<L>  |  26<H>  ----------------------------<  123<H>  3.9   |  32<H>  |  0.48<L>    Ca    8.3<L>      31 Aug 2024 14:31  Phos  3.1     08-31  Mg     2.0     08-31          Labs personally reviewed      ASSESSMENT/PLAN: 	    89F with history of paroxysmal atrial fibrillation, hypothyroidism, pulmonary/abdominal tuberculosis, recurrent small bowel obstruction, recurrent UTIs presenting from home with abdominal pain and vomiting for 1 day with CT scans with evidence of SBO vs enteritis s/p NGT. Course c/b hypotension likely iso septic from multifocal PNA process.    1. Paroxysmal Atrial Fibrillation  - Switch Metoprolol IV to Metoprolol 25mg PO BID  - Hold Eliquis given thrombocytopenia ~50, resume when safe per heme but risks seem to outweigh benefits at this time    2. Acute hfpef - On Lasix 20 mg IV daily   - Switch to PO penidng CT chest  - TTE with preserved EF and mild pulm HTN  - Rec Chest CT no contrast given persistent hypoxia    3. Multifocal PNA   - Abx as per primary team     4. DVT PPX - on HSQ        Iolani Behrbom, AG-NP   Nilton Roldan DO [facility]  Cardiovascular Medicine  [address] [place] [address], [address]  Available through call or text on [employer]  Office: [phone]   DATE OF SERVICE: 08-31-24 @ 15:36    Patient is a 89y old  Female who presents with a chief complaint of septic shock (31 Aug 2024 15:02)      INTERVAL HISTORY: feels okay, daughter at bedside    REVIEW OF SYSTEMS:  CONSTITUTIONAL: No weakness  EYES/ENT: No visual changes;  No throat pain   NECK: No pain or stiffness  RESPIRATORY: No cough, wheezing; No shortness of breath  CARDIOVASCULAR: No chest pain or palpitations  GASTROINTESTINAL: No abdominal  pain. No nausea, vomiting, or hematemesis  GENITOURINARY: No dysuria, frequency or hematuria  NEUROLOGICAL: No stroke like symptoms  SKIN: No rashes    TELEMETRY Personally reviewed: AF 70s-80s  	  MEDICATIONS:  furosemide   Injectable 20 milliGRAM(s) IV Push daily  metoprolol tartrate 25 milliGRAM(s) Oral two times a day        PHYSICAL EXAM:  T(C): 36.6 (08-31-24 @ 09:15), Max: 36.6 (08-30-24 @ 18:22)  HR: 87 (08-31-24 @ 13:30) (75 - 98)  BP: 110/70 (08-31-24 @ 13:30) (106/73 - 135/72)  RR: 18 (08-31-24 @ 13:30) (18 - 18)  SpO2: 99% (08-31-24 @ 13:30) (95% - 99%)  Wt(kg): --  I&O's Summary    30 Aug 2024 07:01  -  31 Aug 2024 07:00  --------------------------------------------------------  IN: 220 mL / OUT: 1550 mL / NET: -1330 mL    31 Aug 2024 07:01  -  31 Aug 2024 15:36  --------------------------------------------------------  IN: 490 mL / OUT: 800 mL / NET: -310 mL          Appearance: In no distress	  HEENT:    PERRL, EOMI	  Cardiovascular:  S1 S2, No JVD  Respiratory: Lungs clear to auscultation	  Gastrointestinal:  Soft, Non-tender, + BS	  Vascularature:  No edema of LE  Psychiatric: Appropriate affect   Neuro: no acute focal deficits                               8.9    6.96  )-----------( 60       ( 31 Aug 2024 14:33 )             28.2     08-31    138  |  94<L>  |  26<H>  ----------------------------<  123<H>  3.9   |  32<H>  |  0.48<L>    Ca    8.3<L>      31 Aug 2024 14:31  Phos  3.1     08-31  Mg     2.0     08-31          Labs personally reviewed      ASSESSMENT/PLAN: 	    89F with history of paroxysmal atrial fibrillation, hypothyroidism, pulmonary/abdominal tuberculosis, recurrent small bowel obstruction, recurrent UTIs presenting from home with abdominal pain and vomiting for 1 day with CT scans with evidence of SBO vs enteritis s/p NGT. Course c/b hypotension likely iso septic from multifocal PNA process.    1. Paroxysmal Atrial Fibrillation  - Switch Metoprolol IV to Metoprolol 25mg PO BID  - Hold Eliquis given thrombocytopenia ~50, resume when safe per heme but risks seem to outweigh benefits at this time    2. Acute hfpef - On Lasix 20 mg IV daily   - Switch to PO penidng CT chest  - TTE with preserved EF and mild pulm HTN  - Rec Chest CT no contrast given persistent hypoxia  - CT chest : Occlusive debris/mucous plugging within bronchus intermedius and more   distal airways of the right middle lobe and right lower lobe. Small bilateral pleural effusions.Patchy ground-glass opacities throughout the right upper lobe and lingula   may be secondary to edema or infection. c/w IVP lasix 20mg qd      3. Multifocal PNA   - Abx as per primary team     4. DVT PPX - on HSQ        Iolani Behrbom, AMANDA-NP   Nilton Roldan DO [facility]  Cardiovascular Medicine  [address] [facility], [address]  Available through call or text on [employer] TE[employer]  Office: [phone]   DATE OF SERVICE: 08-31-24 @ 15:36    Patient is a 89y old  Female who presents with a chief complaint of septic shock (31 Aug 2024 15:02)      INTERVAL HISTORY: feels okay, daughter at bedside    REVIEW OF SYSTEMS:  CONSTITUTIONAL: No weakness  EYES/ENT: No visual changes;  No throat pain   NECK: No pain or stiffness  RESPIRATORY: No cough, wheezing; No shortness of breath  CARDIOVASCULAR: No chest pain or palpitations  GASTROINTESTINAL: No abdominal  pain. No nausea, vomiting, or hematemesis  GENITOURINARY: No dysuria, frequency or hematuria  NEUROLOGICAL: No stroke like symptoms  SKIN: No rashes    TELEMETRY Personally reviewed: AF 70s-80s  	  MEDICATIONS:  furosemide   Injectable 20 milliGRAM(s) IV Push daily  metoprolol tartrate 25 milliGRAM(s) Oral two times a day        PHYSICAL EXAM:  T(C): 36.6 (08-31-24 @ 09:15), Max: 36.6 (08-30-24 @ 18:22)  HR: 87 (08-31-24 @ 13:30) (75 - 98)  BP: 110/70 (08-31-24 @ 13:30) (106/73 - 135/72)  RR: 18 (08-31-24 @ 13:30) (18 - 18)  SpO2: 99% (08-31-24 @ 13:30) (95% - 99%)  Wt(kg): --  I&O's Summary    30 Aug 2024 07:01  -  31 Aug 2024 07:00  --------------------------------------------------------  IN: 220 mL / OUT: 1550 mL / NET: -1330 mL    31 Aug 2024 07:01  -  31 Aug 2024 15:36  --------------------------------------------------------  IN: 490 mL / OUT: 800 mL / NET: -310 mL          Appearance: In no distress	  HEENT:    PERRL, EOMI	  Cardiovascular:  S1 S2, No JVD  Respiratory: Lungs clear to auscultation	  Gastrointestinal:  Soft, Non-tender, + BS	  Vascularature:  No edema of LE  Psychiatric: Appropriate affect   Neuro: no acute focal deficits                               8.9    6.96  )-----------( 60       ( 31 Aug 2024 14:33 )             28.2     08-31    138  |  94<L>  |  26<H>  ----------------------------<  123<H>  3.9   |  32<H>  |  0.48<L>    Ca    8.3<L>      31 Aug 2024 14:31  Phos  3.1     08-31  Mg     2.0     08-31          Labs personally reviewed      ASSESSMENT/PLAN: 	    89F with history of paroxysmal atrial fibrillation, hypothyroidism, pulmonary/abdominal tuberculosis, recurrent small bowel obstruction, recurrent UTIs presenting from home with abdominal pain and vomiting for 1 day with CT scans with evidence of SBO vs enteritis s/p NGT. Course c/b hypotension likely iso septic from multifocal PNA process.    1. Paroxysmal Atrial Fibrillation  - Switch Metoprolol IV to Metoprolol 25mg PO BID  - Hold Eliquis given thrombocytopenia ~50, resume when safe per heme but risks seem to outweigh benefits at this time    2. Acute hfpef - On Lasix 20 mg IV daily   - Switch to PO penidng CT chest  - TTE with preserved EF and mild pulm HTN  - Rec Chest CT no contrast given persistent hypoxia  - CT chest : Occlusive debris/mucous plugging within bronchus intermedius and more   distal airways of the right middle lobe and right lower lobe. Small bilateral pleural effusions.Patchy ground-glass opacities throughout the right upper lobe and lingula   may be secondary to edema or infection.  -  c/w IVP lasix 20mg qd      3. Multifocal PNA   - Abx as per primary team     4. DVT PPX - on HSQ        Iolani Behrbom, AG-NP   Nilton Roldan DO [facility]  Cardiovascular Medicine  [address], [address]  Available through call or text on [employer]  Office: [phone]

## 2024-08-31 NOTE — PROGRESS NOTE ADULT - ASSESSMENT
89F with history of paroxysmal atrial fibrillation, hypothyroidism, pulmonary/abdominal tuberculosis, recurrent small bowel obstruction, recurrent UTIs presenting from home with abdominal pain and vomiting for 1 day with CT scans with evidence of SBO vs enteritis s/p NGT. Course c/b hypotension likely iso septic from multifocal PNA process.    #Septic Shock, resolved  #Multifocal PNA   #Acute metabolic encephalopathy   - required levophed for shock state but now weaned off  - completed abx for pna  - BC NTD  - will try on pureed, aspiration precautions  - PT OOB    #Paroxysmal Atrial Fibrillation  #Acute hfpef    #Acute Hypoxemic Respiratory Failure  - eliquis on hold for thrombocytopenia, Can proceed with Hep SubQ BID, can consider Eliquis if Plt count consistently > 75k per heme  - Strict I & O, Daily weights   - on Lasix 20 mg IV daily per cardio, pending CT chest NC  - check LE doppler ro dvt as AC has been on hold for low platelets  - TTE with presered lvef  - on 4L, cont to titrate down  - check D dimer  - steroid taper per pulm    #Small Bowel Obstruction  #History of Abdominal Tuberculosis   #History of Recurrent Small Bowel Obstruction  - advanced to pureed diet    #pancytopenia  - was receiving prednisone OP for low blood pressure  - 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 89F with history of paroxysmal atrial fibrillation, hypothyroidism, pulmonary/abdominal tuberculosis, recurrent small bowel obstruction, recurrent UTIs presenting from home with abdominal pain and vomiting for 1 day with CT scans with evidence of SBO vs enteritis s/p NGT. Course c/b hypotension likely iso septic from multifocal PNA process.    #Septic Shock, resolved  #Multifocal PNA   #Acute metabolic encephalopathy   - required levophed for shock state but now weaned off  - completed abx for pna  - BC NTD  - will try on pureed, aspiration precautions  - PT OOB    #Paroxysmal Atrial Fibrillation  #Acute hfpef    #Acute Hypoxemic Respiratory Failure  - eliquis on hold for thrombocytopenia, Can proceed with Hep SubQ BID, can consider Eliquis if Plt count consistently > 75k per heme  - Strict I & O, Daily weights   - on Lasix 20 mg IV daily per cardio, pending CT chest NC  - check LE doppler ro dvt as AC has been on hold for low platelets  - check D dimer  - TTE with preserved lvef  - on 4L, cont to titrate down  - steroid taper per pulm    #Small Bowel Obstruction  #History of Abdominal Tuberculosis   #History of Recurrent Small Bowel Obstruction  - advanced to pureed diet    #pancytopenia  - was receiving prednisone OP for low blood pressure  - likely bone marrow disorder, outpt fu heme    #Hypothyroidism  - c/w levothyroxine 75 mcg IV    dvt ppx HSQ, eliquis on hold for thrombocytopenia    GOC: DNR/DNI    dispo: pending le doppler and CT NC  eventual dc to home with HHA per dtr    Please call [facility] with questions [phone].

## 2024-08-31 NOTE — PROGRESS NOTE ADULT - ASSESSMENT
Ms. Slater is a 89y Female with PMHx of asthma, HTN, essential tremor, bronchiectasis, GERD, hypothyroidism, paroxysmal A.fib on Eliquis 2.5mg BID, Vit D deficiency, tuberculous peritonitis, anemia moderate AS, hx of falls, aortic regurgitation presented with nausea and vomiting 08/23/2024 and subsequently admitted to MICU with SBO and transferred to floor on 08/25/2024, treated conservatively complicated by septic shock requiring pressor support. Pt has hx of pancytopenia.     Pt has a history of pancytopenia as outpatient with Leukopenia with since about 3y prior initially lymphopenia and then with neutropenia, progressive, anemia over the past year gradually worsening, with progressive macrocytosis, and thrombocytopenia over the past two years. Pt requested conservative treatment and denied BmBx a few times in the past. Discussed with pt again this admission and she stated she would not like to undergo aggressive interventions and she would not like to have a bone marrow biopsy, and she prefers conservative treatment otherwise.     She denied history of blood disorders, bleeding complications, or personal history of malignancy. She denied smoking, ETOH use. She lives in her apt with a 24hr aide and her daughter visits regularly.     08/27/2024 on NRB, noted ongoing tachycardia overnight, cardiology consult pending, no signs of bleeding at this time, thrombocytopenia noted on Hep SubQ for PPx     08/28/2024: awake and alert, quite hard of hearing, nurse at bedside stated pt did well overnight, she had dry mucous membranes, no signs of bleeding, discussed her hematological diagnoses again, she is aware     08/29/2024: awake and alert, prolonged exam due to being hard of hearing, stated she has persistent coughing and associated nausea without vomiting, nursing staff at bedside, pt with minor bleeding from IV site, required pressure. 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   - Outpatient labs show gradual progressive leukopenia x 3y, initial lymphopenia now both lymphopenia and neutropenia   -- Most recent outpatient WBC ranging 1.9-4.6   - Thrombocytopenia outpatient ranging 85-100s x 2y  - Anemia ranging 9.5-11 over past year, currently close to this  - MCV with progressive macrocytosis over past year  - ANC 3250, s/p steroids  - Iron sat 9%, Folate 15.6, B12 179  - Previous paraproteinemia workup negative for obvious plasma cell dyscrasia SPEP/MOOKIE normal, Serum immunoglobulins normal, serum FLCs (ratio 1.44), LDH (normal), Beta-2 Mcg (mild elv 2.6),   - Transfuse PRBC to maintain Hb > 7   - Transfuse Plt to maintain Plt > 10k  - Trend CBC daily with Diff for now   - Continue B12 1000mcg IM weekly  - Again reviewed and discussed pancytopenia with the pt she is aware of its gradual, progressive worsening over past three years, discussed with pt again this admission and she stated she would not like to undergo aggressive interventions and she would declined bone marrow biopsy  - GOC noted ongoing   - Needs to follow up as outpatient     # Small bowel obstruction  - CT scan performed in ED showed evidence of dilated small bowel loops with large stool burden with concern for small bowel obstruction vs enteritis  - S/P MICU admission with NGT decompression  - Transferred to floor 08/25/2024     # Aspiration Pneumonia  -  CT A/P lower chest with with multifocal PNA  -  Pulmonology following, recs noted  -  Resolved septic shock while in MICU  -  CT Chest   - Antibiotics per ID and primary team    #A.fib  - Was previously on Eliquis 2.5mg per cardiology  - Plt 50k >48k >61k  - Pt at risk for CVA vs bleeding given clinical context  - Cardiology consult and recs noted, currently risks vs benefits favoring high risk for AC  - Can proceed with Hep SubQ BID, can consider Eliquis depending on Plt count trajectory, recommend ok to restart if Plt count consistently > 75k and per clinical risk     # Hypothyroidism  - Continue levothyroxine         Thank you for allowing me to participate in the care of Ms. Slater, please do not hesitate to call or text me if you have further questions or concerns.       Roni Polanco MD  [facility] [place]   Division of Hematology/Oncology  [address], [address]  [place] [address]  P: [phone]  F: [phone]    Attestation:    ----Pt evaluated including face-to-face interaction in addition to chart review, reviewing treatment plan, and managing the patient’s chronic diagnoses as listed in the assessment----      Ms. Slater is a 89y Female with PMHx of asthma, HTN, essential tremor, bronchiectasis, GERD, hypothyroidism, paroxysmal A.fib on Eliquis 2.5mg BID, Vit D deficiency, tuberculous peritonitis, anemia moderate AS, hx of falls, aortic regurgitation presented with nausea and vomiting 08/23/2024 and subsequently admitted to MICU with SBO and transferred to floor on 08/25/2024, treated conservatively complicated by septic shock requiring pressor support. Pt has hx of pancytopenia.     Pt has a history of pancytopenia as outpatient with Leukopenia with since about 3y prior initially lymphopenia and then with neutropenia, progressive, anemia over the past year gradually worsening, with progressive macrocytosis, and thrombocytopenia over the past two years. Pt requested conservative treatment and denied BmBx a few times in the past. Discussed with pt again this admission and she stated she would not like to undergo aggressive interventions and she would not like to have a bone marrow biopsy, and she prefers conservative treatment otherwise.     She denied history of blood disorders, bleeding complications, or personal history of malignancy. She denied smoking, ETOH use. She lives in her apt with a 24hr aide and her daughter visits regularly.     08/27/2024 on NRB, noted ongoing tachycardia overnight, cardiology consult pending, no signs of bleeding at this time, thrombocytopenia noted on Hep SubQ for PPx     08/28/2024: awake and alert, quite hard of hearing, nurse at bedside stated pt did well overnight, she had dry mucous membranes, no signs of bleeding, discussed her hematological diagnoses again, she is aware     08/29/2024: awake and alert, prolonged exam due to being hard of hearing, stated she has persistent coughing and associated nausea without vomiting, nursing staff at bedside, pt with minor bleeding from IV site, required pressure. 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   - Outpatient labs show gradual progressive leukopenia x 3y, initial lymphopenia now both lymphopenia and neutropenia   -- Most recent outpatient WBC ranging 1.9-4.6   - Thrombocytopenia outpatient ranging 85-100s x 2y  - Anemia ranging 9.5-11 over past year, currently close to this  - MCV with progressive macrocytosis over past year  - ANC 3250, s/p steroids  - Iron sat 9%, Folate 15.6, B12 179  - Previous paraproteinemia workup negative for obvious plasma cell dyscrasia SPEP/MOOKIE normal, Serum immunoglobulins normal, serum FLCs (ratio 1.44), LDH (normal), Beta-2 Mcg (mild elv 2.6),   - Transfuse PRBC to maintain Hb > 7   - Transfuse Plt to maintain Plt > 10k  - Trend CBC daily with Diff for now   - Continue B12 1000mcg IM weekly  - Again reviewed and discussed pancytopenia with the pt she is aware of its gradual, progressive worsening over past three years, discussed with pt again this admission and she stated she would not like to undergo aggressive interventions and she would declined bone marrow biopsy  - GOC noted ongoing   - Needs to follow up as outpatient     # Small bowel obstruction  - CT scan performed in ED showed evidence of dilated small bowel loops with large stool burden with concern for small bowel obstruction vs enteritis  - S/P MICU admission with NGT decompression  - Transferred to floor 08/25/2024     # Aspiration Pneumonia  -  CT A/P lower chest with with multifocal PNA  -  Pulmonology following, recs noted  -  Resolved septic shock while in MICU  -  CT Chest   - Antibiotics per ID and primary team    #A.fib  - Was previously on Eliquis 2.5mg per cardiology  - Plt 50k >48k >61k>65k  - Pt at risk for CVA vs bleeding given clinical context  - Cardiology consult and recs noted, currently risks vs benefits favoring high risk for AC  - Can proceed with Hep SubQ BID, can consider Eliquis depending on Plt count trajectory, recommend ok to restart if Plt count consistently > 75k and per clinical risk     # Hypothyroidism  - Continue levothyroxine         Thank you for allowing me to participate in the care of Ms. Slater, please do not hesitate to call or text me if you have further questions or concerns.       Roni Polanco MD  [facility] [place]   Division of Hematology/Oncology  [address] [place] [address], [address]  [place] [address]  P: [phone]  F: [phone]    Attestation:    ----Pt evaluated including face-to-face interaction in addition to chart review, reviewing treatment plan, and managing the patient’s chronic diagnoses as listed in the assessment----

## 2024-08-31 NOTE — PROGRESS NOTE ADULT - ASSESSMENT
89F with history of paroxysmal atrial fibrillation, hypothyroidism, pulmonary/abdominal tuberculosis, recurrent small bowel obstruction, recurrent UTIs presenting from home with abdominal pain and vomiting for 1 day. Per daughter and HHA at bedside, patient was in her usual state of health until yesterday. Normally able to communicate and ambulate to the bathroom and eat on her own. Does have a HHA for assistance as needed. HHA reports that around 2:30 AM last night, patient called for assistance and was vomiting and asked to call an ambulance. CT scan performed in ED showed evidence of dilated small bowel loops with large stool burden with concern for small bowel obstruction vs enteritis. Also with some imaging findings concerning for multifocal pneumonia. Surgery consulted given history of small bowel obstruction and imaging and NGT placed with NGT to suction draining minimal dark abdominal contents. Additionally with hypoxemic respiratory failure requiring HFNC 40/40. ED course c/b hypotension 70/40s with transient improvement with 3L IVF however with repeat hypotension despite these measures.   GOC discussion had with daughter at bedside. Patient is DNR/DNI and would not want aggressive measures including surgery or central line placement for pressor therapy. Would like a trial of IV pressors via peripheral IV and if these fails, daughter would not want further escalation. Was also seen by palliative care team in ED and was being considered for PCU on fixed dose pressors however no beds available at this time.   Admitted to MICU for septic shock. (23 Aug 2024 15:40)      Multifocal Pneumonia/ Septic shock / Hypoxia  A trial fibrillation  Hypothyroidism   Hx of pulm/Abd tuberculosis  HTN/Hx of orthostatic hypotension    Multifocal Pneumonia/ Septic shock / Hypoxia  -was neutropenic on admission with multifocal pneumonia and septic shock;  -S/P MICU stay recovered now transferred to floor  -she is on broad spectrum antibtiocs:  would cont for now;   -cxr reviewed:  -consider ct chest  non contrast    -she has wheezing and difficulty to expectorate phlegm : add duoneb and Mucomyst   -may need steroids will wait and see for next 24 hours;  if  needed will add  in am     8/26:-change to solumedrol : cont Mucomyst and nebs  -watch for blood pressure   -cont mucomyst  : add mucinex too    8/27: still with some cough : has coarse breath sounds ; still on steroids increased to q 8 yesterday ;  8/28: her wheezing is better:  decrease 20 mg q 8 hours and if doing  ok : will change and taper:   8/29: she is doing relatively better: cont steroids  decrease tomorrow and change to po    8/30: pn 4 L:  doing same:  change to po steroids and decrease the dose   antibiotics finished   8/31: she completed antibiotics recently:   -rpt ct chest showed:   Occlusive debris/mucous plugging within bronchus intermedius and more distal airways of the right middle lobe and right lower lobe. There is resultant right middle lobe and right lower lobe collapse with  volume loss. Small bilateral pleural effusions.   Patchy ground-glass opacities throughout the right upper lobe and lingula may be secondary to edema or infection.  she is on iv lasix still seems in heart failue  -getting alkalotic:  hco3 32:  no need for diansox:  follow hco3;     A trial fibrillation  -not on any full AC:  -on heparin sq   8/27: now he has RVR:  fill ac per prim svitlana team:  being transferred to monitored unit   8/29: seems stable: on hep sq   8/30? r plts are more then 50K:  defer to hemonc and primary team  for restarting eliquis  8/31: still not on eliquis    Hypothyroidism   -levothyroxine    Hx of pulm/Abd tuberculosis  -treated before    HTN/Hx of orthostatic hypotension  -currently on stress doses of steroids and being tapered   8/27" changed to solumedrol :" monitor for orthostasis   8/28: blood pressure seems stable:   8/30: hemodynamically stable:     dw acp

## 2024-08-31 NOTE — PROGRESS NOTE ADULT - SUBJECTIVE AND OBJECTIVE BOX
Patient is a 89y old  Female who presents with a chief complaint of septic shock (31 Aug 2024 05:57)      SUBJECTIVE / OVERNIGHT EVENTS:    Patient seen and examined. co fatigue. very weak.      Vital Signs Last 24 Hrs  T(C): 36.6 (31 Aug 2024 04:39), Max: 36.6 (30 Aug 2024 18:22)  T(F): 97.9 (31 Aug 2024 04:39), Max: 97.9 (30 Aug 2024 20:13)  HR: 77 (31 Aug 2024 04:39) (75 - 98)  BP: 111/71 (31 Aug 2024 04:39) (106/73 - 135/72)  BP(mean): 84 (30 Aug 2024 20:13) (84 - 91)  RR: 18 (31 Aug 2024 04:39) (18 - 18)  SpO2: 95% (31 Aug 2024 04:39) (93% - 98%)    Parameters below as of 31 Aug 2024 04:39  Patient On (Oxygen Delivery Method): nasal cannula      I&O's Summary    30 Aug 2024 07:01  -  31 Aug 2024 07:00  --------------------------------------------------------  IN: 220 mL / OUT: 1550 mL / NET: -1330 mL        PE:  GENERAL: NAD, AAOx1, [place]  CHEST/LUNG: coarse bs  HEART: Regular rate and rhythm; + murmur  ABDOMEN: Soft, Nontender, Nondistended; Bowel sounds present  EXTREMITIES:  2+ Peripheral Pulses, +2 LE edema  NEURO: No focal deficits      LABS:                        9.4    6.25  )-----------( 65       ( 30 Aug 2024 12:49 )             30.4     08-30    139  |  99  |  33<H>  ----------------------------<  110<H>  5.0   |  30  |  0.51    Ca    8.6      30 Aug 2024 11:52        CAPILLARY BLOOD GLUCOSE      POCT Blood Glucose.: 199 mg/dL (31 Aug 2024 06:41)  POCT Blood Glucose.: 106 mg/dL (30 Aug 2024 23:34)  POCT Blood Glucose.: 114 mg/dL (30 Aug 2024 17:13)  POCT Blood Glucose.: 121 mg/dL (30 Aug 2024 12:19)        Urinalysis Basic - ( 30 Aug 2024 11:52 )    Color: x / Appearance: x / SG: x / pH: x  Gluc: 110 mg/dL / Ketone: x  / Bili: x / Urobili: x   Blood: x / Protein: x / Nitrite: x   Leuk Esterase: x / RBC: x / WBC x   Sq Epi: x / Non Sq Epi: x / Bacteria: x        RADIOLOGY & ADDITIONAL TESTS:    Imaging Personally Reviewed:  [x] YES  [ ] NO    Consultant(s) Notes Reviewed:  [x] YES  [ ] NO    MEDICATIONS  (STANDING):  acetylcysteine 20%  Inhalation 4 milliLiter(s) Inhalation every 6 hours  albuterol/ipratropium for Nebulization 3 milliLiter(s) Nebulizer every 6 hours  buDESOnide    Inhalation Suspension 0.5 milliGRAM(s) Inhalation every 12 hours  furosemide   Injectable 20 milliGRAM(s) IV Push daily  heparin   Injectable 5000 Unit(s) SubCutaneous every 12 hours  levothyroxine Injectable 70 MICROGram(s) IV Push at bedtime  metoprolol tartrate 25 milliGRAM(s) Oral two times a day  pantoprazole  Injectable 40 milliGRAM(s) IV Push daily  predniSONE   Tablet 20 milliGRAM(s) Oral every 12 hours    MEDICATIONS  (PRN):      Care Discussed with Consultants/Other Providers [x] YES  [ ] NO    HEALTH ISSUES - PROBLEM Dx:  Acute hypoxic respiratory failure    SBO (small bowel obstruction)    Functional quadriplegia    Advance care planning    Palliative care encounter    Septic shock         Patient is a 89y old  Female who presents with a chief complaint of septic shock (31 Aug 2024 05:57)      SUBJECTIVE / OVERNIGHT EVENTS:    Patient seen and examined. co fatigue. very weak. on 4/5L.      Vital Signs Last 24 Hrs  T(C): 36.6 (31 Aug 2024 04:39), Max: 36.6 (30 Aug 2024 18:22)  T(F): 97.9 (31 Aug 2024 04:39), Max: 97.9 (30 Aug 2024 20:13)  HR: 77 (31 Aug 2024 04:39) (75 - 98)  BP: 111/71 (31 Aug 2024 04:39) (106/73 - 135/72)  BP(mean): 84 (30 Aug 2024 20:13) (84 - 91)  RR: 18 (31 Aug 2024 04:39) (18 - 18)  SpO2: 95% (31 Aug 2024 04:39) (93% - 98%)    Parameters below as of 31 Aug 2024 04:39  Patient On (Oxygen Delivery Method): nasal cannula      I&O's Summary    30 Aug 2024 07:01  -  31 Aug 2024 07:00  --------------------------------------------------------  IN: 220 mL / OUT: 1550 mL / NET: -1330 mL        PE:  GENERAL: NAD, AAOx1, [place]  CHEST/LUNG: coarse bs  HEART: Regular rate and rhythm; + murmur  ABDOMEN: Soft, Nontender, Nondistended; Bowel sounds present  EXTREMITIES:  2+ Peripheral Pulses, +2 LE edema  NEURO: No focal deficits      LABS:                        9.4    6.25  )-----------( 65       ( 30 Aug 2024 12:49 )             30.4     08-30    139  |  99  |  33<H>  ----------------------------<  110<H>  5.0   |  30  |  0.51    Ca    8.6      30 Aug 2024 11:52        CAPILLARY BLOOD GLUCOSE      POCT Blood Glucose.: 199 mg/dL (31 Aug 2024 06:41)  POCT Blood Glucose.: 106 mg/dL (30 Aug 2024 23:34)  POCT Blood Glucose.: 114 mg/dL (30 Aug 2024 17:13)  POCT Blood Glucose.: 121 mg/dL (30 Aug 2024 12:19)        Urinalysis Basic - ( 30 Aug 2024 11:52 )    Color: x / Appearance: x / SG: x / pH: x  Gluc: 110 mg/dL / Ketone: x  / Bili: x / Urobili: x   Blood: x / Protein: x / Nitrite: x   Leuk Esterase: x / RBC: x / WBC x   Sq Epi: x / Non Sq Epi: x / Bacteria: x        RADIOLOGY & ADDITIONAL TESTS:    Imaging Personally Reviewed:  [x] YES  [ ] NO    Consultant(s) Notes Reviewed:  [x] YES  [ ] NO    MEDICATIONS  (STANDING):  acetylcysteine 20%  Inhalation 4 milliLiter(s) Inhalation every 6 hours  albuterol/ipratropium for Nebulization 3 milliLiter(s) Nebulizer every 6 hours  buDESOnide    Inhalation Suspension 0.5 milliGRAM(s) Inhalation every 12 hours  furosemide   Injectable 20 milliGRAM(s) IV Push daily  heparin   Injectable 5000 Unit(s) SubCutaneous every 12 hours  levothyroxine Injectable 70 MICROGram(s) IV Push at bedtime  metoprolol tartrate 25 milliGRAM(s) Oral two times a day  pantoprazole  Injectable 40 milliGRAM(s) IV Push daily  predniSONE   Tablet 20 milliGRAM(s) Oral every 12 hours    MEDICATIONS  (PRN):      Care Discussed with Consultants/Other Providers [x] YES  [ ] NO    HEALTH ISSUES - PROBLEM Dx:  Acute hypoxic respiratory failure    SBO (small bowel obstruction)    Functional quadriplegia    Advance care planning    Palliative care encounter    Septic shock

## 2024-08-31 NOTE — PROGRESS NOTE ADULT - SUBJECTIVE AND OBJECTIVE BOX
[facility] HEMATOLOGY/ONCOLOGY INPATIENT PROGRESS NOTE     Interval Hx:   08-31-24: Ms. Slater was seen at bedside today.    Meds:   MEDICATIONS  (STANDING):  acetylcysteine 20%  Inhalation 4 milliLiter(s) Inhalation every 6 hours  albuterol/ipratropium for Nebulization 3 milliLiter(s) Nebulizer every 6 hours  buDESOnide    Inhalation Suspension 0.5 milliGRAM(s) Inhalation every 12 hours  furosemide   Injectable 20 milliGRAM(s) IV Push daily  guaiFENesin ER 1200 milliGRAM(s) Oral every 12 hours  heparin   Injectable 5000 Unit(s) SubCutaneous every 12 hours  levothyroxine Injectable 70 MICROGram(s) IV Push at bedtime  metoprolol tartrate 25 milliGRAM(s) Oral two times a day  pantoprazole  Injectable 40 milliGRAM(s) IV Push daily  predniSONE   Tablet 20 milliGRAM(s) Oral every 12 hours    MEDICATIONS  (PRN):    Vital Signs Last 24 Hrs  T(C): 36.6 (31 Aug 2024 04:39), Max: 36.6 (30 Aug 2024 18:22)  T(F): 97.9 (31 Aug 2024 04:39), Max: 97.9 (30 Aug 2024 20:13)  HR: 77 (31 Aug 2024 04:39) (75 - 98)  BP: 111/71 (31 Aug 2024 04:39) (106/73 - 135/72)  BP(mean): 84 (30 Aug 2024 20:13) (84 - 91)  RR: 18 (31 Aug 2024 04:39) (18 - 18)  SpO2: 95% (31 Aug 2024 04:39) (93% - 98%)    Parameters below as of 31 Aug 2024 04:39  Patient On (Oxygen Delivery Method): nasal cannula    Physical Exam:  Gen:  NAD, lethargic  HEENT: EOMI, MMM  Chest: equal chest rise, speaking full sentences  Cardiac: irregular  Abd: soft, non-tender, no hepatomegaly or splenomegaly  Ext: No edema   Neuro: AAOx3, normal mood and affect, hard of hearing    Labs:                        9.4    6.25  )-----------( 65       ( 30 Aug 2024 12:49 )             30.4     CBC Full  -  ( 30 Aug 2024 12:49 )  WBC Count : 6.25 K/uL  RBC Count : 2.92 M/uL  Hemoglobin : 9.4 g/dL  Hematocrit : 30.4 %  Platelet Count - Automated : 65 K/uL  Mean Cell Volume : 104.1 fl  Mean Cell Hemoglobin : 32.2 pg  Mean Cell Hemoglobin Concentration : 30.9 gm/dL    08-30    139  |  99  |  33<H>  ----------------------------<  110<H>  5.0   |  30  |  0.51    Ca    8.6      30 Aug 2024 11:52  Phos  2.2     08-29  Mg     2.3     08-29         [facility] HEMATOLOGY/ONCOLOGY INPATIENT PROGRESS NOTE     Interval Hx:   08-31-24: Ms. Slater was seen at bedside today, overnight had complained of chest pain/back pain, per nursing staff resolved with Tylenol otherwise was comfortable, no signs of bleeding, BMs normal     Meds:   MEDICATIONS  (STANDING):  acetylcysteine 20%  Inhalation 4 milliLiter(s) Inhalation every 6 hours  albuterol/ipratropium for Nebulization 3 milliLiter(s) Nebulizer every 6 hours  buDESOnide    Inhalation Suspension 0.5 milliGRAM(s) Inhalation every 12 hours  furosemide   Injectable 20 milliGRAM(s) IV Push daily  guaiFENesin ER 1200 milliGRAM(s) Oral every 12 hours  heparin   Injectable 5000 Unit(s) SubCutaneous every 12 hours  levothyroxine Injectable 70 MICROGram(s) IV Push at bedtime  metoprolol tartrate 25 milliGRAM(s) Oral two times a day  pantoprazole  Injectable 40 milliGRAM(s) IV Push daily  predniSONE   Tablet 20 milliGRAM(s) Oral every 12 hours    MEDICATIONS  (PRN):    Vital Signs Last 24 Hrs  T(C): 36.6 (31 Aug 2024 04:39), Max: 36.6 (30 Aug 2024 18:22)  T(F): 97.9 (31 Aug 2024 04:39), Max: 97.9 (30 Aug 2024 20:13)  HR: 77 (31 Aug 2024 04:39) (75 - 98)  BP: 111/71 (31 Aug 2024 04:39) (106/73 - 135/72)  BP(mean): 84 (30 Aug 2024 20:13) (84 - 91)  RR: 18 (31 Aug 2024 04:39) (18 - 18)  SpO2: 95% (31 Aug 2024 04:39) (93% - 98%)    Parameters below as of 31 Aug 2024 04:39  Patient On (Oxygen Delivery Method): nasal cannula    Physical Exam:  Gen:  NAD, lethargic  HEENT: EOMI, MMM  Chest: equal chest rise, speaking full sentences  Cardiac: irregular  Abd: soft, non-tender, no hepatomegaly or splenomegaly  Ext: No edema   Neuro: AAOx3, normal mood and affect, hard of hearing    Labs:                        9.4    6.25  )-----------( 65       ( 30 Aug 2024 12:49 )             30.4     CBC Full  -  ( 30 Aug 2024 12:49 )  WBC Count : 6.25 K/uL  RBC Count : 2.92 M/uL  Hemoglobin : 9.4 g/dL  Hematocrit : 30.4 %  Platelet Count - Automated : 65 K/uL  Mean Cell Volume : 104.1 fl  Mean Cell Hemoglobin : 32.2 pg  Mean Cell Hemoglobin Concentration : 30.9 gm/dL    08-30    139  |  99  |  33<H>  ----------------------------<  110<H>  5.0   |  30  |  0.51    Ca    8.6      30 Aug 2024 11:52  Phos  2.2     08-29  Mg     2.3     08-29

## 2024-09-01 LAB
ANION GAP SERPL CALC-SCNC: 9 MMOL/L — SIGNIFICANT CHANGE UP (ref 5–17)
BUN SERPL-MCNC: 21 MG/DL — SIGNIFICANT CHANGE UP (ref 7–23)
CALCIUM SERPL-MCNC: 8.4 MG/DL — SIGNIFICANT CHANGE UP (ref 8.4–10.5)
CHLORIDE SERPL-SCNC: 97 MMOL/L — SIGNIFICANT CHANGE UP (ref 96–108)
CO2 SERPL-SCNC: 32 MMOL/L — HIGH (ref 22–31)
CREAT SERPL-MCNC: 0.63 MG/DL — SIGNIFICANT CHANGE UP (ref 0.5–1.3)
EGFR: 80 ML/MIN/1.73M2 — SIGNIFICANT CHANGE UP
GLUCOSE BLDC GLUCOMTR-MCNC: 122 MG/DL — HIGH (ref 70–99)
GLUCOSE BLDC GLUCOMTR-MCNC: 123 MG/DL — HIGH (ref 70–99)
GLUCOSE BLDC GLUCOMTR-MCNC: 157 MG/DL — HIGH (ref 70–99)
GLUCOSE BLDC GLUCOMTR-MCNC: 161 MG/DL — HIGH (ref 70–99)
GLUCOSE BLDC GLUCOMTR-MCNC: 183 MG/DL — HIGH (ref 70–99)
GLUCOSE BLDC GLUCOMTR-MCNC: 235 MG/DL — HIGH (ref 70–99)
GLUCOSE BLDC GLUCOMTR-MCNC: 236 MG/DL — HIGH (ref 70–99)
GLUCOSE SERPL-MCNC: 89 MG/DL — SIGNIFICANT CHANGE UP (ref 70–99)
HCT VFR BLD CALC: 27.3 % — LOW (ref 34.5–45)
HGB BLD-MCNC: 8.5 G/DL — LOW (ref 11.5–15.5)
MAGNESIUM SERPL-MCNC: 2.1 MG/DL — SIGNIFICANT CHANGE UP (ref 1.6–2.6)
MCHC RBC-ENTMCNC: 31.1 GM/DL — LOW (ref 32–36)
MCHC RBC-ENTMCNC: 33.1 PG — SIGNIFICANT CHANGE UP (ref 27–34)
MCV RBC AUTO: 106.2 FL — HIGH (ref 80–100)
NRBC # BLD: 0 /100 WBCS — SIGNIFICANT CHANGE UP (ref 0–0)
PHOSPHATE SERPL-MCNC: 2.5 MG/DL — SIGNIFICANT CHANGE UP (ref 2.5–4.5)
PLATELET # BLD AUTO: 57 K/UL — LOW (ref 150–400)
POTASSIUM SERPL-MCNC: 3.8 MMOL/L — SIGNIFICANT CHANGE UP (ref 3.5–5.3)
POTASSIUM SERPL-SCNC: 3.8 MMOL/L — SIGNIFICANT CHANGE UP (ref 3.5–5.3)
RBC # BLD: 2.57 M/UL — LOW (ref 3.8–5.2)
RBC # FLD: 14.8 % — HIGH (ref 10.3–14.5)
SODIUM SERPL-SCNC: 138 MMOL/L — SIGNIFICANT CHANGE UP (ref 135–145)
WBC # BLD: 5.63 K/UL — SIGNIFICANT CHANGE UP (ref 3.8–10.5)
WBC # FLD AUTO: 5.63 K/UL — SIGNIFICANT CHANGE UP (ref 3.8–10.5)

## 2024-09-01 RX ORDER — ACETAMINOPHEN 325 MG/1
650 TABLET ORAL EVERY 6 HOURS
Refills: 0 | Status: COMPLETED | OUTPATIENT
Start: 2024-09-01 | End: 2024-09-01

## 2024-09-01 RX ADMIN — IPRATROPIUM BROMIDE AND ALBUTEROL SULFATE 3 MILLILITER(S): .5; 3 SOLUTION RESPIRATORY (INHALATION) at 12:23

## 2024-09-01 RX ADMIN — Medication 20 MILLIGRAM(S): at 06:07

## 2024-09-01 RX ADMIN — IPRATROPIUM BROMIDE AND ALBUTEROL SULFATE 3 MILLILITER(S): .5; 3 SOLUTION RESPIRATORY (INHALATION) at 06:04

## 2024-09-01 RX ADMIN — Medication 4 MILLILITER(S): at 06:04

## 2024-09-01 RX ADMIN — Medication 70 MICROGRAM(S): at 22:22

## 2024-09-01 RX ADMIN — BUDESONIDE 0.5 MILLIGRAM(S): 3 CAPSULE ORAL at 06:04

## 2024-09-01 RX ADMIN — BUDESONIDE 0.5 MILLIGRAM(S): 3 CAPSULE ORAL at 17:12

## 2024-09-01 RX ADMIN — METOPROLOL TARTRATE 25 MILLIGRAM(S): 100 TABLET ORAL at 17:11

## 2024-09-01 RX ADMIN — ACETAMINOPHEN 650 MILLIGRAM(S): 325 TABLET ORAL at 13:00

## 2024-09-01 RX ADMIN — Medication 20 MILLIGRAM(S): at 17:11

## 2024-09-01 RX ADMIN — Medication 5000 UNIT(S): at 17:12

## 2024-09-01 RX ADMIN — ACETAMINOPHEN 650 MILLIGRAM(S): 325 TABLET ORAL at 13:43

## 2024-09-01 RX ADMIN — Medication 40 MILLIGRAM(S): at 12:23

## 2024-09-01 RX ADMIN — Medication 5000 UNIT(S): at 06:05

## 2024-09-01 RX ADMIN — Medication 4 MILLILITER(S): at 12:23

## 2024-09-01 RX ADMIN — METOPROLOL TARTRATE 25 MILLIGRAM(S): 100 TABLET ORAL at 06:07

## 2024-09-01 RX ADMIN — Medication 20 MILLIGRAM(S): at 06:05

## 2024-09-01 RX ADMIN — IPRATROPIUM BROMIDE AND ALBUTEROL SULFATE 3 MILLILITER(S): .5; 3 SOLUTION RESPIRATORY (INHALATION) at 17:12

## 2024-09-01 NOTE — PROGRESS NOTE ADULT - SUBJECTIVE AND OBJECTIVE BOX
[facility] HEMATOLOGY/ONCOLOGY INPATIENT PROGRESS NOTE     Interval Hx:   09-01-24: Ms. Slater was seen at bedside today.    Meds:   MEDICATIONS  (STANDING):  acetylcysteine 20%  Inhalation 4 milliLiter(s) Inhalation every 6 hours  albuterol/ipratropium for Nebulization 3 milliLiter(s) Nebulizer every 6 hours  buDESOnide    Inhalation Suspension 0.5 milliGRAM(s) Inhalation every 12 hours  furosemide   Injectable 20 milliGRAM(s) IV Push daily  heparin   Injectable 5000 Unit(s) SubCutaneous every 12 hours  levothyroxine Injectable 70 MICROGram(s) IV Push at bedtime  metoprolol tartrate 25 milliGRAM(s) Oral two times a day  pantoprazole  Injectable 40 milliGRAM(s) IV Push daily  predniSONE   Tablet 20 milliGRAM(s) Oral every 12 hours    MEDICATIONS  (PRN):    Vital Signs Last 24 Hrs  T(C): 36.9 (01 Sep 2024 04:51), Max: 36.9 (01 Sep 2024 04:51)  T(F): 98.4 (01 Sep 2024 04:51), Max: 98.4 (01 Sep 2024 04:51)  HR: 93 (01 Sep 2024 04:51) (77 - 93)  BP: 130/88 (01 Sep 2024 04:51) (102/61 - 130/88)  BP(mean): --  RR: 18 (01 Sep 2024 04:51) (18 - 18)  SpO2: 99% (01 Sep 2024 04:51) (96% - 99%)    Parameters below as of 01 Sep 2024 04:51  Patient On (Oxygen Delivery Method): nasal cannula    Physical Exam:  Gen:  NAD, lethargic  HEENT: EOMI, MMM  Chest: equal chest rise, speaking full sentences  Cardiac: irregular  Abd: soft, non-tender, no hepatomegaly or splenomegaly  Ext: No edema   Neuro: AAOx3, normal mood and affect, hard of hearing    Labs:                        8.9    6.96  )-----------( 60       ( 31 Aug 2024 14:33 )             28.2     CBC Full  -  ( 31 Aug 2024 14:33 )  WBC Count : 6.96 K/uL  RBC Count : 2.73 M/uL  Hemoglobin : 8.9 g/dL  Hematocrit : 28.2 %  Platelet Count - Automated : 60 K/uL  Mean Cell Volume : 103.3 fl  Mean Cell Hemoglobin : 32.6 pg  Mean Cell Hemoglobin Concentration : 31.6 gm/dL    08-31    138  |  94<L>  |  26<H>  ----------------------------<  123<H>  3.9   |  32<H>  |  0.48<L>    Ca    8.3<L>      31 Aug 2024 14:31  Phos  3.1     08-31  Mg     2.0     08-31         [facility] HEMATOLOGY/ONCOLOGY INPATIENT PROGRESS NOTE     Interval Hx:   09-01-24: Ms. Slater was seen at bedside today, awake and alert, stated she feels lousy today, noted US LE negative for DVT, positive D-Dimer, CT Chest without contrast showed ccclusive debris/mucous plugging within bronchus intermedius and more   distal airways of the right middle lobe and right lower lobe with resultant right middle lobe and right lower lobe collapse with volume loss and small bilateral pleural effusions. Additionally she has patchy ground-glass opacities throughout the right upper lobe and lingula   may be secondary to edema or infection.      Meds:   MEDICATIONS  (STANDING):  acetylcysteine 20%  Inhalation 4 milliLiter(s) Inhalation every 6 hours  albuterol/ipratropium for Nebulization 3 milliLiter(s) Nebulizer every 6 hours  buDESOnide    Inhalation Suspension 0.5 milliGRAM(s) Inhalation every 12 hours  furosemide   Injectable 20 milliGRAM(s) IV Push daily  heparin   Injectable 5000 Unit(s) SubCutaneous every 12 hours  levothyroxine Injectable 70 MICROGram(s) IV Push at bedtime  metoprolol tartrate 25 milliGRAM(s) Oral two times a day  pantoprazole  Injectable 40 milliGRAM(s) IV Push daily  predniSONE   Tablet 20 milliGRAM(s) Oral every 12 hours    MEDICATIONS  (PRN):    Vital Signs Last 24 Hrs  T(C): 36.9 (01 Sep 2024 04:51), Max: 36.9 (01 Sep 2024 04:51)  T(F): 98.4 (01 Sep 2024 04:51), Max: 98.4 (01 Sep 2024 04:51)  HR: 93 (01 Sep 2024 04:51) (77 - 93)  BP: 130/88 (01 Sep 2024 04:51) (102/61 - 130/88)  BP(mean): --  RR: 18 (01 Sep 2024 04:51) (18 - 18)  SpO2: 99% (01 Sep 2024 04:51) (96% - 99%)    Parameters below as of 01 Sep 2024 04:51  Patient On (Oxygen Delivery Method): nasal cannula    Physical Exam:  Gen:  NAD, lethargic  HEENT: EOMI, MMM  Chest: equal chest rise, speaking full sentences  Cardiac: irregular  Abd: soft, non-tender, no hepatomegaly or splenomegaly  Ext: No edema   Neuro: AAOx3, normal mood and affect, hard of hearing    Labs:                        8.9    6.96  )-----------( 60       ( 31 Aug 2024 14:33 )             28.2     CBC Full  -  ( 31 Aug 2024 14:33 )  WBC Count : 6.96 K/uL  RBC Count : 2.73 M/uL  Hemoglobin : 8.9 g/dL  Hematocrit : 28.2 %  Platelet Count - Automated : 60 K/uL  Mean Cell Volume : 103.3 fl  Mean Cell Hemoglobin : 32.6 pg  Mean Cell Hemoglobin Concentration : 31.6 gm/dL    08-31    138  |  94<L>  |  26<H>  ----------------------------<  123<H>  3.9   |  32<H>  |  0.48<L>    Ca    8.3<L>      31 Aug 2024 14:31  Phos  3.1     08-31  Mg     2.0     08-31

## 2024-09-01 NOTE — PROGRESS NOTE ADULT - ASSESSMENT
89F with history of paroxysmal atrial fibrillation, hypothyroidism, pulmonary/abdominal tuberculosis, recurrent small bowel obstruction, recurrent UTIs presenting from home with abdominal pain and vomiting for 1 day with CT scans with evidence of SBO vs enteritis s/p NGT. Course c/b hypotension likely iso septic from multifocal PNA process.    #Septic Shock, resolved  #Multifocal PNA   #Acute metabolic encephalopathy   - required levophed for shock state but now weaned off  - completed abx for pna  - BC NTD  - PT OOB    #Acute hfpef    #Acute Hypoxemic Respiratory Failure  - Strict I & O, Daily weights   - on Lasix 20 mg IV daily per cardio  - CT chest Occlusive debris/mucous plugging within bronchus intermedius and more distal airways of the RML and RLL, RML and RLL collapse with volume loss. Small bilateral pleural effusions. Patchy ground-glass opacities throughout the right upper lobe and lingula may be secondary to edema or infection.  - LE doppler neg dvt  - D dimer elevated, pending CTA chest ro PE  - TTE with preserved lvef  - on 4L, cont to titrate down  - steroid taper per pulm  - pt did not complete swallow eval, no aspiration with liquids, will advance diet, maintain aspiration precautions    #Paroxysmal Atrial Fibrillation  - eliquis on hold for thrombocytopenia, Can proceed with Hep SubQ BID, can consider Eliquis if Plt count consistently > 75k per heme    #Small Bowel Obstruction  #History of Abdominal Tuberculosis   #History of Recurrent Small Bowel Obstruction  - advance diet to soft, aspiration precautions    #pancytopenia  - was receiving prednisone OP for low blood pressure  - 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 Course c/b hypotension likely iso septic from multifocal PNA process.    #Septic Shock, resolved  #Multifocal PNA   #Acute metabolic encephalopathy   - required levophed for shock state but now weaned off  - completed abx for pna  - BC NTD  - PT OOB    #Acute hfpef    #Acute Hypoxemic Respiratory Failure  - Strict I & O, Daily weights   - on Lasix 20 mg IV daily per cardio  - CT chest Occlusive debris/mucous plugging within bronchus intermedius and more distal airways of the RML and RLL, RML and RLL collapse with volume loss. Small bilateral pleural effusions. Patchy ground-glass opacities throughout the right upper lobe and lingula may be secondary to edema or infection.  - LE doppler neg dvt  - D dimer elevated, pending CTA chest ro PE  - TTE with preserved lvef  - on 4L, cont to titrate down  - steroid taper per pulm  - pt did not complete swallow eval, no aspiration with liquids, will advance diet, maintain aspiration precautions    #Paroxysmal Atrial Fibrillation  - eliquis on hold for thrombocytopenia, Can proceed with Hep SubQ BID, can consider Eliquis if Plt count consistently > 75k per heme    #Small Bowel Obstruction  #History of Abdominal Tuberculosis   #History of Recurrent Small Bowel Obstruction  - advance diet to soft, aspiration precautions    #pancytopenia  - was receiving prednisone OP for low blood pressure  - 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

## 2024-09-01 NOTE — PROGRESS NOTE ADULT - SUBJECTIVE AND OBJECTIVE BOX
Patient is a 89y old  Female who presents with a chief complaint of septic shock (01 Sep 2024 06:04)      SUBJECTIVE / OVERNIGHT EVENTS:    Patient seen and examined. remains on 4L. pt co sob.      Vital Signs Last 24 Hrs  T(C): 36.9 (01 Sep 2024 04:51), Max: 36.9 (01 Sep 2024 04:51)  T(F): 98.4 (01 Sep 2024 04:51), Max: 98.4 (01 Sep 2024 04:51)  HR: 93 (01 Sep 2024 04:51) (77 - 93)  BP: 130/88 (01 Sep 2024 04:51) (102/61 - 130/88)  BP(mean): --  RR: 18 (01 Sep 2024 04:51) (18 - 18)  SpO2: 99% (01 Sep 2024 04:51) (96% - 99%)    Parameters below as of 01 Sep 2024 04:51  Patient On (Oxygen Delivery Method): nasal cannula      I&O's Summary    31 Aug 2024 07:01  -  01 Sep 2024 07:00  --------------------------------------------------------  IN: 860 mL / OUT: 1150 mL / NET: -290 mL        PE:  GENERAL: NAD, AAOx1, [place]  CHEST/LUNG: coarse bs  HEART: Regular rate and rhythm; + murmur  ABDOMEN: Soft, Nontender, Nondistended; Bowel sounds present  EXTREMITIES:  2+ Peripheral Pulses, +2 LE edema  NEURO: No focal deficits      LABS:                        8.5    5.63  )-----------( 57       ( 01 Sep 2024 06:48 )             27.3     08-31    138  |  94<L>  |  26<H>  ----------------------------<  123<H>  3.9   |  32<H>  |  0.48<L>    Ca    8.3<L>      31 Aug 2024 14:31  Phos  3.1     08-31  Mg     2.0     08-31        CAPILLARY BLOOD GLUCOSE      POCT Blood Glucose.: 157 mg/dL (01 Sep 2024 06:03)  POCT Blood Glucose.: 236 mg/dL (31 Aug 2024 23:37)  POCT Blood Glucose.: 262 mg/dL (31 Aug 2024 21:43)  POCT Blood Glucose.: 160 mg/dL (31 Aug 2024 17:07)  POCT Blood Glucose.: 147 mg/dL (31 Aug 2024 12:32)        Urinalysis Basic - ( 31 Aug 2024 14:31 )    Color: x / Appearance: x / SG: x / pH: x  Gluc: 123 mg/dL / Ketone: x  / Bili: x / Urobili: x   Blood: x / Protein: x / Nitrite: x   Leuk Esterase: x / RBC: x / WBC x   Sq Epi: x / Non Sq Epi: x / Bacteria: x        RADIOLOGY & ADDITIONAL TESTS:    Imaging Personally Reviewed:  [x] YES  [ ] NO    Consultant(s) Notes Reviewed:  [x] YES  [ ] NO    MEDICATIONS  (STANDING):  acetylcysteine 20%  Inhalation 4 milliLiter(s) Inhalation every 6 hours  albuterol/ipratropium for Nebulization 3 milliLiter(s) Nebulizer every 6 hours  buDESOnide    Inhalation Suspension 0.5 milliGRAM(s) Inhalation every 12 hours  furosemide   Injectable 20 milliGRAM(s) IV Push daily  heparin   Injectable 5000 Unit(s) SubCutaneous every 12 hours  levothyroxine Injectable 70 MICROGram(s) IV Push at bedtime  metoprolol tartrate 25 milliGRAM(s) Oral two times a day  pantoprazole  Injectable 40 milliGRAM(s) IV Push daily  predniSONE   Tablet 20 milliGRAM(s) Oral every 12 hours    MEDICATIONS  (PRN):      Care Discussed with Consultants/Other Providers [x] YES  [ ] NO    HEALTH ISSUES - PROBLEM Dx:  Acute hypoxic respiratory failure    SBO (small bowel obstruction)    Functional quadriplegia    Advance care planning    Palliative care encounter    Septic shock         Patient is a 89y old  Female who presents with a chief complaint of septic shock (01 Sep 2024 06:04)      SUBJECTIVE / OVERNIGHT EVENTS:    Patient seen and examined. remains on 4L. pt co sob. more alert today.      Vital Signs Last 24 Hrs  T(C): 36.9 (01 Sep 2024 04:51), Max: 36.9 (01 Sep 2024 04:51)  T(F): 98.4 (01 Sep 2024 04:51), Max: 98.4 (01 Sep 2024 04:51)  HR: 93 (01 Sep 2024 04:51) (77 - 93)  BP: 130/88 (01 Sep 2024 04:51) (102/61 - 130/88)  BP(mean): --  RR: 18 (01 Sep 2024 04:51) (18 - 18)  SpO2: 99% (01 Sep 2024 04:51) (96% - 99%)    Parameters below as of 01 Sep 2024 04:51  Patient On (Oxygen Delivery Method): nasal cannula      I&O's Summary    31 Aug 2024 07:01  -  01 Sep 2024 07:00  --------------------------------------------------------  IN: 860 mL / OUT: 1150 mL / NET: -290 mL        PE:  GENERAL: NAD, AAOx2, [place]  CHEST/LUNG: coarse bs, crackles bases  HEART: Regular rate and rhythm; + murmur  ABDOMEN: Soft, Nontender, Nondistended; Bowel sounds present  EXTREMITIES:  2+ Peripheral Pulses, +2 LE edema  NEURO: No focal deficits      LABS:                        8.5    5.63  )-----------( 57       ( 01 Sep 2024 06:48 )             27.3     08-31    138  |  94<L>  |  26<H>  ----------------------------<  123<H>  3.9   |  32<H>  |  0.48<L>    Ca    8.3<L>      31 Aug 2024 14:31  Phos  3.1     08-31  Mg     2.0     08-31        CAPILLARY BLOOD GLUCOSE      POCT Blood Glucose.: 157 mg/dL (01 Sep 2024 06:03)  POCT Blood Glucose.: 236 mg/dL (31 Aug 2024 23:37)  POCT Blood Glucose.: 262 mg/dL (31 Aug 2024 21:43)  POCT Blood Glucose.: 160 mg/dL (31 Aug 2024 17:07)  POCT Blood Glucose.: 147 mg/dL (31 Aug 2024 12:32)        Urinalysis Basic - ( 31 Aug 2024 14:31 )    Color: x / Appearance: x / SG: x / pH: x  Gluc: 123 mg/dL / Ketone: x  / Bili: x / Urobili: x   Blood: x / Protein: x / Nitrite: x   Leuk Esterase: x / RBC: x / WBC x   Sq Epi: x / Non Sq Epi: x / Bacteria: x        RADIOLOGY & ADDITIONAL TESTS:    Imaging Personally Reviewed:  [x] YES  [ ] NO    Consultant(s) Notes Reviewed:  [x] YES  [ ] NO    MEDICATIONS  (STANDING):  acetylcysteine 20%  Inhalation 4 milliLiter(s) Inhalation every 6 hours  albuterol/ipratropium for Nebulization 3 milliLiter(s) Nebulizer every 6 hours  buDESOnide    Inhalation Suspension 0.5 milliGRAM(s) Inhalation every 12 hours  furosemide   Injectable 20 milliGRAM(s) IV Push daily  heparin   Injectable 5000 Unit(s) SubCutaneous every 12 hours  levothyroxine Injectable 70 MICROGram(s) IV Push at bedtime  metoprolol tartrate 25 milliGRAM(s) Oral two times a day  pantoprazole  Injectable 40 milliGRAM(s) IV Push daily  predniSONE   Tablet 20 milliGRAM(s) Oral every 12 hours    MEDICATIONS  (PRN):      Care Discussed with Consultants/Other Providers [x] YES  [ ] NO    HEALTH ISSUES - PROBLEM Dx:  Acute hypoxic respiratory failure    SBO (small bowel obstruction)    Functional quadriplegia    Advance care planning    Palliative care encounter    Septic shock

## 2024-09-01 NOTE — PROGRESS NOTE ADULT - ASSESSMENT
Ms. Slater is a 89y Female with PMHx of asthma, HTN, essential tremor, bronchiectasis, GERD, hypothyroidism, paroxysmal A.fib on Eliquis 2.5mg BID, Vit D deficiency, tuberculous peritonitis, anemia moderate AS, hx of falls, aortic regurgitation presented with nausea and vomiting 08/23/2024 and subsequently admitted to MICU with SBO and transferred to floor on 08/25/2024, treated conservatively complicated by septic shock requiring pressor support. Pt has hx of pancytopenia.     Pt has a history of pancytopenia as outpatient with Leukopenia with since about 3y prior initially lymphopenia and then with neutropenia, progressive, anemia over the past year gradually worsening, with progressive macrocytosis, and thrombocytopenia over the past two years. Pt requested conservative treatment and denied BmBx a few times in the past. Discussed with pt again this admission and she stated she would not like to undergo aggressive interventions and she would not like to have a bone marrow biopsy, and she prefers conservative treatment otherwise.     She denied history of blood disorders, bleeding complications, or personal history of malignancy. She denied smoking, ETOH use. She lives in her apt with a 24hr aide and her daughter visits regularly.     08/27/2024 on NRB, noted ongoing tachycardia overnight, cardiology consult pending, no signs of bleeding at this time, thrombocytopenia noted on Hep SubQ for PPx     08/28/2024: awake and alert, quite hard of hearing, nurse at bedside stated pt did well overnight, she had dry mucous membranes, no signs of bleeding, discussed her hematological diagnoses again, she is aware     08/29/2024: awake and alert, prolonged exam due to being hard of hearing, stated she has persistent coughing and associated nausea without vomiting, nursing staff at bedside, pt with minor bleeding from IV site, required pressure. 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   - Outpatient labs show gradual progressive leukopenia x 3y, initial lymphopenia now both lymphopenia and neutropenia   -- Most recent outpatient WBC ranging 1.9-4.6   - Thrombocytopenia outpatient ranging 85-100s x 2y  - Anemia ranging 9.5-11 over past year, currently close to this  - MCV with progressive macrocytosis over past year  - ANC 3250, s/p steroids  - Iron sat 9%, Folate 15.6, B12 179  - Previous paraproteinemia workup negative for obvious plasma cell dyscrasia SPEP/MOOKIE normal, Serum immunoglobulins normal, serum FLCs (ratio 1.44), LDH (normal), Beta-2 Mcg (mild elv 2.6),   - Transfuse PRBC to maintain Hb > 7   - Transfuse Plt to maintain Plt > 10k  - Trend CBC daily with Diff for now   - Continue B12 1000mcg IM weekly  - Again reviewed and discussed pancytopenia with the pt she is aware of its gradual, progressive worsening over past three years, discussed with pt again this admission and she stated she would not like to undergo aggressive interventions and she would declined bone marrow biopsy  - GOC noted ongoing   - Needs to follow up as outpatient     # Small bowel obstruction  - CT scan performed in ED showed evidence of dilated small bowel loops with large stool burden with concern for small bowel obstruction vs enteritis  - S/P MICU admission with NGT decompression  - Transferred to floor 08/25/2024     # Aspiration Pneumonia  -  CT A/P lower chest with with multifocal PNA  -  Pulmonology following, recs noted  -  Resolved septic shock while in MICU  -  CT Chest   - Antibiotics per ID and primary team    #A.fib  - Was previously on Eliquis 2.5mg per cardiology  - Plt 50k >48k >61k>65k  - Pt at risk for CVA vs bleeding given clinical context  - Cardiology consult and recs noted, currently risks vs benefits favoring high risk for AC  - Can proceed with Hep SubQ BID, can consider Eliquis depending on Plt count trajectory, recommend ok to restart if Plt count consistently > 75k and per clinical risk     # Hypothyroidism  - Continue levothyroxine         Thank you for allowing me to participate in the care of Ms. Slater, please do not hesitate to call or text me if you have further questions or concerns.       Roni Polanco MD  [facility] [place]   Division of Hematology/Oncology  [address] [place] [address], [address]  [place] [address]  P: [phone]  F: [phone]    Attestation:    ----Pt evaluated including face-to-face interaction in addition to chart review, reviewing treatment plan, and managing the patient’s chronic diagnoses as listed in the assessment----      Ms. Slater is a 89y Female with PMHx of asthma, HTN, essential tremor, bronchiectasis, GERD, hypothyroidism, paroxysmal A.fib on Eliquis 2.5mg BID, Vit D deficiency, tuberculous peritonitis, anemia moderate AS, hx of falls, aortic regurgitation presented with nausea and vomiting 08/23/2024 and subsequently admitted to MICU with SBO and transferred to floor on 08/25/2024, treated conservatively complicated by septic shock requiring pressor support. Pt has hx of pancytopenia.     Pt has a history of pancytopenia as outpatient with Leukopenia with since about 3y prior initially lymphopenia and then with neutropenia, progressive, anemia over the past year gradually worsening, with progressive macrocytosis, and thrombocytopenia over the past two years. Pt requested conservative treatment and denied BmBx a few times in the past. Discussed with pt again this admission and she stated she would not like to undergo aggressive interventions and she would not like to have a bone marrow biopsy, and she prefers conservative treatment otherwise.     She denied history of blood disorders, bleeding complications, or personal history of malignancy. She denied smoking, ETOH use. She lives in her apt with a 24hr aide and her daughter visits regularly.     08/27/2024 on NRB, noted ongoing tachycardia overnight, cardiology consult pending, no signs of bleeding at this time, thrombocytopenia noted on Hep SubQ for PPx     08/28/2024: awake and alert, quite hard of hearing, nurse at bedside stated pt did well overnight, she had dry mucous membranes, no signs of bleeding, discussed her hematological diagnoses again, she is aware     08/29/2024: awake and alert, prolonged exam due to being hard of hearing, stated she has persistent coughing and associated nausea without vomiting, nursing staff at bedside, pt with minor bleeding from IV site, required pressure. Otherwise no bleeding noted, continues Hep SubQ for now    08/31/2024:  overnight had complained of chest pain/back pain, per nursing staff resolved with Tylenol otherwise was comfortable, no signs of bleeding, BMs normal     09/01/2024: awake and alert, stated she feels lousy today, noted US LE negative for DVT, positive D-Dimer, CT Chest without contrast showed ccclusive debris/mucous plugging within bronchus intermedius and more   distal airways of the right middle lobe and right lower lobe with resultant right middle lobe and right lower lobe collapse with volume loss and small bilateral pleural effusions. Additionally she has patchy ground-glass opacities throughout the right upper lobe and lingula   may be secondary to edema or infection.        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   - Outpatient labs show gradual progressive leukopenia x 3y, initial lymphopenia now both lymphopenia and neutropenia   -- Most recent outpatient WBC ranging 1.9-4.6   - Thrombocytopenia outpatient ranging 85-100s x 2y  - Anemia ranging 9.5-11 over past year, currently close to this  - MCV with progressive macrocytosis over past year  - ANC 3250, s/p steroids  - Iron sat 9%, Folate 15.6, B12 179  - Previous paraproteinemia workup negative for obvious plasma cell dyscrasia SPEP/MOOKIE normal, Serum immunoglobulins normal, serum FLCs (ratio 1.44), LDH (normal), Beta-2 Mcg (mild elv 2.6),   - Transfuse PRBC to maintain Hb > 7   - Transfuse Plt to maintain Plt > 10k  - Trend CBC daily with Diff for now   - Continue B12 1000mcg IM weekly  - Again reviewed and discussed pancytopenia with the pt she is aware of its gradual, progressive worsening over past three years, discussed with pt again this admission and she stated she would not like to undergo aggressive interventions and she would declined bone marrow biopsy  - GOC noted ongoing   - Needs to follow up as outpatient     # Small bowel obstruction  - CT scan performed in ED showed evidence of dilated small bowel loops with large stool burden with concern for small bowel obstruction vs enteritis  - S/P MICU admission with NGT decompression  - Transferred to floor 08/25/2024     # Aspiration Pneumonia  -  CT A/P lower chest with with multifocal PNA  -  Pulmonology following, recs noted  -  Resolved septic shock while in MICU  -  CT Chest CT Chest without contrast showed ccclusive debris/mucous plugging within bronchus intermedius and more   distal airways of the right middle lobe and right lower lobe with resultant right middle lobe and right lower lobe collapse with volume loss and small bilateral pleural effusions. Additionally she has patchy ground-glass opacities throughout the right upper lobe and lingula   may be secondary to edema or infection.  - Pulmonology follow up   - Antibiotics per ID and primary team    #A.fib  - Was previously on Eliquis 2.5mg per cardiology  - Plt 50k >48k >61k>65k  - Pt at risk for CVA vs bleeding given clinical context  - Cardiology consult and recs noted, currently risks vs benefits favoring high risk for AC  - Noted US LE negative for DVT, positive D-Dimer,   - Can proceed with Hep SubQ BID, can consider Eliquis depending on Plt count trajectory, recommend ok to restart if Plt count consistently > 75k and per clinical risk     # Hypothyroidism  - Continue levothyroxine         Thank you for allowing me to participate in the care of Ms. Slater, please do not hesitate to call or text me if you have further questions or concerns.       Roni Polanco MD  [facility] [place]   Division of Hematology/Oncology  [facility], [address]  [place] [address]  P: [phone]  F: [phone]    Attestation:    ----Pt evaluated including face-to-face interaction in addition to chart review, reviewing treatment plan, and managing the patient’s chronic diagnoses as listed in the assessment----

## 2024-09-01 NOTE — PROGRESS NOTE ADULT - SUBJECTIVE AND OBJECTIVE BOX
DATE OF SERVICE: 09-01-24 @ 14:32    Patient is a 89y old  Female who presents with a chief complaint of septic shock (01 Sep 2024 13:19)      INTERVAL HISTORY: feels tired today    REVIEW OF SYSTEMS:  CONSTITUTIONAL: No weakness  EYES/ENT: No visual changes;  No throat pain   NECK: No pain or stiffness  RESPIRATORY: No cough, wheezing; No shortness of breath  CARDIOVASCULAR: No chest pain or palpitations  GASTROINTESTINAL: No abdominal  pain. No nausea, vomiting, or hematemesis  GENITOURINARY: No dysuria, frequency or hematuria  NEUROLOGICAL: No stroke like symptoms  SKIN: No rashes    TELEMETRY Personally reviewed: AF 80s-90  	  MEDICATIONS:  furosemide   Injectable 20 milliGRAM(s) IV Push daily  metoprolol tartrate 25 milliGRAM(s) Oral two times a day        PHYSICAL EXAM:  T(C): 37.2 (09-01-24 @ 11:27), Max: 37.2 (09-01-24 @ 11:27)  HR: 81 (09-01-24 @ 11:27) (80 - 93)  BP: 108/73 (09-01-24 @ 11:27) (102/61 - 130/88)  RR: 18 (09-01-24 @ 11:27) (18 - 18)  SpO2: 96% (09-01-24 @ 11:27) (96% - 99%)  Wt(kg): --  I&O's Summary    31 Aug 2024 07:01  -  01 Sep 2024 07:00  --------------------------------------------------------  IN: 860 mL / OUT: 1150 mL / NET: -290 mL          Appearance: In no distress	  HEENT:    PERRL, EOMI	  Cardiovascular:  S1 S2, No JVD  Respiratory: Lungs clear to auscultation	  Gastrointestinal:  Soft, Non-tender, + BS	  Vascularature:  No edema of LE  Psychiatric: Appropriate affect   Neuro: no acute focal deficits                               8.5    5.63  )-----------( 57       ( 01 Sep 2024 06:48 )             27.3     09-01    138  |  97  |  21  ----------------------------<  89  3.8   |  32<H>  |  0.63    Ca    8.4      01 Sep 2024 07:23  Phos  2.5     09-01  Mg     2.1     09-01          Labs personally reviewed      ASSESSMENT/PLAN: 	    89F with history of paroxysmal atrial fibrillation, hypothyroidism, pulmonary/abdominal tuberculosis, recurrent small bowel obstruction, recurrent UTIs presenting from home with abdominal pain and vomiting for 1 day with CT scans with evidence of SBO vs enteritis s/p NGT. Course c/b hypotension likely iso septic from multifocal PNA process.    1. Paroxysmal Atrial Fibrillation  - Switch Metoprolol IV to Metoprolol 25mg PO BID  - Hold Eliquis given thrombocytopenia ~50, resume when safe per heme but risks seem to outweigh benefits at this time    2. Acute hfpef - On Lasix 20 mg IV daily   - Switch to PO penidng CT chest  - TTE with preserved EF and mild pulm HTN  - Rec Chest CT no contrast given persistent hypoxia  - CT chest : Occlusive debris/mucous plugging within bronchus intermedius and more   distal airways of the right middle lobe and right lower lobe. Small bilateral pleural effusions.Patchy ground-glass opacities throughout the right upper lobe and lingula   may be secondary to edema or infection.  -  c/w IVP lasix 20mg qd    3. Multifocal PNA   - Abx as per primary team     4. DVT PPX - on HSQ          Iolani Behrbom, AMANDA-NP   Nilton Roldan DO [facility]  Cardiovascular Medicine  [address] [place] [address], [address]  Available through call or text on [employer]  Office: [phone]

## 2024-09-01 NOTE — PROGRESS NOTE ADULT - SUBJECTIVE AND OBJECTIVE BOX
Date of Service: 09-01-24 @ 13:19    Patient is a 89y old  Female who presents with a chief complaint of septic shock (01 Sep 2024 07:49)      Any change in ROS: on 2 L of oxygen  : awaiting cta as her d dimer was high  :     MEDICATIONS  (STANDING):  albuterol/ipratropium for Nebulization 3 milliLiter(s) Nebulizer every 6 hours  buDESOnide    Inhalation Suspension 0.5 milliGRAM(s) Inhalation every 12 hours  furosemide   Injectable 20 milliGRAM(s) IV Push daily  heparin   Injectable 5000 Unit(s) SubCutaneous every 12 hours  levothyroxine Injectable 70 MICROGram(s) IV Push at bedtime  metoprolol tartrate 25 milliGRAM(s) Oral two times a day  pantoprazole  Injectable 40 milliGRAM(s) IV Push daily  predniSONE   Tablet 20 milliGRAM(s) Oral every 12 hours    MEDICATIONS  (PRN):    Vital Signs Last 24 Hrs  T(C): 37.2 (01 Sep 2024 11:27), Max: 37.2 (01 Sep 2024 11:27)  T(F): 98.9 (01 Sep 2024 11:27), Max: 98.9 (01 Sep 2024 11:27)  HR: 81 (01 Sep 2024 11:27) (80 - 93)  BP: 108/73 (01 Sep 2024 11:27) (102/61 - 130/88)  BP(mean): --  RR: 18 (01 Sep 2024 11:27) (18 - 18)  SpO2: 96% (01 Sep 2024 11:27) (96% - 99%)    Parameters below as of 01 Sep 2024 11:27  Patient On (Oxygen Delivery Method): nasal cannula        I&O's Summary    31 Aug 2024 07:01  -  01 Sep 2024 07:00  --------------------------------------------------------  IN: 860 mL / OUT: 1150 mL / NET: -290 mL          Physical Exam:   GENERAL: NAD, well-groomed, well-developed  HEENT: OKLBY/   Atraumatic, Normocephalic  ENMT: No tonsillar erythema, exudates, or enlargement; Moist mucous membranes, Good dentition, No lesions  NECK: Supple, No JVD, Normal thyroid  CHEST/LUNG: Clear to auscultaion  CVS: Regular rate and rhythm; No murmurs, rubs, or gallops  GI: : Soft, Nontender, Nondistended; Bowel sounds present  NERVOUS SYSTEM:  Alert & Oriented X3  EXTREMITIES: - edema  LYMPH: No lymphadenopathy noted  SKIN: No rashes or lesions  ENDOCRINOLOGY: No Thyromegaly  PSYCH: Appropriate    Labs:  ABG - ( 31 Aug 2024 05:11 )  pH, Arterial: 7.50  pH, Blood: x     /  pCO2: 49    /  pO2: 114   / HCO3: 38    / Base Excess: 13.0  /  SaO2: 99.4                                        8.5    5.63  )-----------( 57       ( 01 Sep 2024 06:48 )             27.3                         8.9    6.96  )-----------( 60       ( 31 Aug 2024 14:33 )             28.2                         9.4    6.25  )-----------( 65       ( 30 Aug 2024 12:49 )             30.4                         9.1    4.80  )-----------( 60       ( 29 Aug 2024 22:58 )             29.7                         9.5    4.47  )-----------( 64       ( 29 Aug 2024 07:06 )             31.2     09-01    138  |  97  |  21  ----------------------------<  89  3.8   |  32<H>  |  0.63  08-31    138  |  94<L>  |  26<H>  ----------------------------<  123<H>  3.9   |  32<H>  |  0.48<L>  08-30    139  |  99  |  33<H>  ----------------------------<  110<H>  5.0   |  30  |  0.51  08-29    141  |  101  |  38<H>  ----------------------------<  160<H>  4.1   |  30  |  0.66  08-29    144  |  103  |  41<H>  ----------------------------<  116<H>  4.3   |  29  |  0.59    Ca    8.4      01 Sep 2024 07:23  Ca    8.3<L>      31 Aug 2024 14:31  Phos  2.5     09-01  Phos  3.1     08-31  Mg     2.1     09-01  Mg     2.0     08-31      CAPILLARY BLOOD GLUCOSE      POCT Blood Glucose.: 161 mg/dL (01 Sep 2024 12:42)  POCT Blood Glucose.: 183 mg/dL (01 Sep 2024 08:43)  POCT Blood Glucose.: 157 mg/dL (01 Sep 2024 06:03)  POCT Blood Glucose.: 236 mg/dL (31 Aug 2024 23:37)  POCT Blood Glucose.: 262 mg/dL (31 Aug 2024 21:43)  POCT Blood Glucose.: 160 mg/dL (31 Aug 2024 17:07)          Urinalysis Basic - ( 01 Sep 2024 07:23 )    Color: x / Appearance: x / SG: x / pH: x  Gluc: 89 mg/dL / Ketone: x  / Bili: x / Urobili: x   Blood: x / Protein: x / Nitrite: x   Leuk Esterase: x / RBC: x / WBC x   Sq Epi: x / Non Sq Epi: x / Bacteria: x      D-Dimer Assay, Quantitative: 929 ng/mL DDU (08-31 @ 14:33)        RECENT CULTURES:  rad< from: CT Chest No Cont (08.31.24 @ 11:21) >  MEDIASTINUM: Left atrium qualitatively enlarged. No pericardial effusion.   Aortic valve calcifications. Dilated ascending thoracic aorta measures   4.5 cm, unchanged. No large mediastinal nodes.    IMAGED ABDOMEN: Right renal hyperdense lesion measuring 0.6 cm is   indeterminate.    SOFT TISSUES: Unremarkable.    BONES: Unremarkable.      IMPRESSION:.    Occlusive debris/mucous plugging within bronchus intermedius and more   distal airways of the right middle lobe and right lower lobe.    There is resultant right middle lobe and right lower lobe collapse with   volume loss.    Small bilateral pleural effusions.    Patchy ground-glass opacities throughout the right upper lobe and lingula   may be secondary to edema or infection.    --- End of Report ---             WESLY GAMBOA MD; Attending Radiologist  This document has been electronically signed. Aug 31 2024 11:59AM    < end of copied text >        RESPIRATORY CULTURES:          Studies  Chest X-RAY  CT SCAN Chest   Venous Dopplers: LE:   CT Abdomen  Others      rad< from: [facility] Duplex Lower Ext Vein Scan, Bilat (08.31.24 @ 10:51) >    ACC: [id number] EXAM:  DUPLEX SCAN EXT VEINS LOWER BI   ORDERED BY: REBECCA SALAZAR DATE:  08/31/2024          INTERPRETATION:  CLINICAL INFORMATION: Lower extremity swelling.    COMPARISON: None available.    TECHNIQUE: Duplex sonography of the BILATERAL LOWER extremity veins with   color and spectral Doppler, with and without compression.    FINDINGS:    RIGHT:  Normal compressibility of the RIGHT common femoral, femoral and popliteal   veins.  Doppler examination shows normal spontaneous and phasic flow.  No RIGHT calf vein thrombosis is detected.    LEFT:  Normal compressibility of the LEFT common femoral, femoral and popliteal   veins.  Doppler examination shows normal spontaneous and phasic flow.  No LEFT calf vein thrombosis is detected.    IMPRESSION:  No evidence of deep venous thrombosis in either lower extremity.            --- End of Report ---            DEBORAH FERNANDEZ MD; Attending Radiologist  This document has been electronically signed. Aug 31 2024 10:56AM    < end of copied text >

## 2024-09-02 LAB
GLUCOSE BLDC GLUCOMTR-MCNC: 122 MG/DL — HIGH (ref 70–99)
GLUCOSE BLDC GLUCOMTR-MCNC: 135 MG/DL — HIGH (ref 70–99)
GLUCOSE BLDC GLUCOMTR-MCNC: 185 MG/DL — HIGH (ref 70–99)
HCT VFR BLD CALC: 28.6 % — LOW (ref 34.5–45)
HGB BLD-MCNC: 9.1 G/DL — LOW (ref 11.5–15.5)
MCHC RBC-ENTMCNC: 31.8 GM/DL — LOW (ref 32–36)
MCHC RBC-ENTMCNC: 33.1 PG — SIGNIFICANT CHANGE UP (ref 27–34)
MCV RBC AUTO: 104 FL — HIGH (ref 80–100)
NRBC # BLD: 0 /100 WBCS — SIGNIFICANT CHANGE UP (ref 0–0)
PLATELET # BLD AUTO: 86 K/UL — LOW (ref 150–400)
RBC # BLD: 2.75 M/UL — LOW (ref 3.8–5.2)
RBC # FLD: 14.7 % — HIGH (ref 10.3–14.5)
WBC # BLD: 5.62 K/UL — SIGNIFICANT CHANGE UP (ref 3.8–10.5)
WBC # FLD AUTO: 5.62 K/UL — SIGNIFICANT CHANGE UP (ref 3.8–10.5)

## 2024-09-02 PROCEDURE — 71275 CT ANGIOGRAPHY CHEST: CPT | Mod: 26

## 2024-09-02 RX ORDER — ACETAMINOPHEN 325 MG/1
1000 TABLET ORAL ONCE
Refills: 0 | Status: COMPLETED | OUTPATIENT
Start: 2024-09-02 | End: 2024-09-02

## 2024-09-02 RX ADMIN — IPRATROPIUM BROMIDE AND ALBUTEROL SULFATE 3 MILLILITER(S): .5; 3 SOLUTION RESPIRATORY (INHALATION) at 05:35

## 2024-09-02 RX ADMIN — Medication 5000 UNIT(S): at 05:34

## 2024-09-02 RX ADMIN — Medication 20 MILLIGRAM(S): at 05:34

## 2024-09-02 RX ADMIN — IPRATROPIUM BROMIDE AND ALBUTEROL SULFATE 3 MILLILITER(S): .5; 3 SOLUTION RESPIRATORY (INHALATION) at 17:06

## 2024-09-02 RX ADMIN — METOPROLOL TARTRATE 25 MILLIGRAM(S): 100 TABLET ORAL at 16:57

## 2024-09-02 RX ADMIN — Medication 20 MILLIGRAM(S): at 05:35

## 2024-09-02 RX ADMIN — Medication 40 MILLIGRAM(S): at 12:13

## 2024-09-02 RX ADMIN — IPRATROPIUM BROMIDE AND ALBUTEROL SULFATE 3 MILLILITER(S): .5; 3 SOLUTION RESPIRATORY (INHALATION) at 23:03

## 2024-09-02 RX ADMIN — IPRATROPIUM BROMIDE AND ALBUTEROL SULFATE 3 MILLILITER(S): .5; 3 SOLUTION RESPIRATORY (INHALATION) at 12:13

## 2024-09-02 RX ADMIN — METOPROLOL TARTRATE 25 MILLIGRAM(S): 100 TABLET ORAL at 05:34

## 2024-09-02 RX ADMIN — BUDESONIDE 0.5 MILLIGRAM(S): 3 CAPSULE ORAL at 17:06

## 2024-09-02 RX ADMIN — Medication 70 MICROGRAM(S): at 21:15

## 2024-09-02 RX ADMIN — Medication 5000 UNIT(S): at 16:57

## 2024-09-02 RX ADMIN — ACETAMINOPHEN 400 MILLIGRAM(S): 325 TABLET ORAL at 10:03

## 2024-09-02 RX ADMIN — BUDESONIDE 0.5 MILLIGRAM(S): 3 CAPSULE ORAL at 05:35

## 2024-09-02 RX ADMIN — ACETAMINOPHEN 1000 MILLIGRAM(S): 325 TABLET ORAL at 11:52

## 2024-09-02 NOTE — PROGRESS NOTE ADULT - ASSESSMENT
89F with history of paroxysmal atrial fibrillation, hypothyroidism, pulmonary/abdominal tuberculosis, recurrent small bowel obstruction, recurrent UTIs presenting from home with abdominal pain and vomiting for 1 day. Per daughter and HHA at bedside, patient was in her usual state of health until yesterday. Normally able to communicate and ambulate to the bathroom and eat on her own. Does have a HHA for assistance as needed. HHA reports that around 2:30 AM last night, patient called for assistance and was vomiting and asked to call an ambulance. CT scan performed in ED showed evidence of dilated small bowel loops with large stool burden with concern for small bowel obstruction vs enteritis. Also with some imaging findings concerning for multifocal pneumonia. Surgery consulted given history of small bowel obstruction and imaging and NGT placed with NGT to suction draining minimal dark abdominal contents. Additionally with hypoxemic respiratory failure requiring HFNC 40/40. ED course c/b hypotension 70/40s with transient improvement with 3L IVF however with repeat hypotension despite these measures.   GOC discussion had with daughter at bedside. Patient is DNR/DNI and would not want aggressive measures including surgery or central line placement for pressor therapy. Would like a trial of IV pressors via peripheral IV and if these fails, daughter would not want further escalation. Was also seen by palliative care team in ED and was being considered for PCU on fixed dose pressors however no beds available at this time.   Admitted to MICU for septic shock. (23 Aug 2024 15:40)      Multifocal Pneumonia/ Septic shock / Hypoxia  A trial fibrillation  Hypothyroidism   Hx of pulm/Abd tuberculosis  HTN/Hx of orthostatic hypotension    Multifocal Pneumonia/ Septic shock / Hypoxia  -was neutropenic on admission with multifocal pneumonia and septic shock;  -S/P MICU stay recovered now transferred to floor  -she is on broad spectrum antibtiocs:  would cont for now;   -cxr reviewed:  -consider ct chest  non contrast    -she has wheezing and difficulty to expectorate phlegm : add duoneb and Mucomyst   -may need steroids will wait and see for next 24 hours;  if  needed will add  in am     8/26:-change to solumedrol : cont Mucomyst and nebs  -watch for blood pressure   -cont mucomyst  : add mucinex too    8/27: still with some cough : has coarse breath sounds ; still on steroids increased to q 8 yesterday ;  8/28: her wheezing is better:  decrease 20 mg q 8 hours and if doing  ok : will change and taper:   8/29: she is doing relatively better: cont steroids  decrease tomorrow and change to po    8/30: pn 4 L:  doing same:  change to po steroids and decrease the dose   antibiotics finished   8/31: she completed antibiotics recently:   -rpt ct chest showed:   Occlusive debris/mucous plugging within bronchus intermedius and more distal airways of the right middle lobe and right lower lobe. There is resultant right middle lobe and right lower lobe collapse with  volume loss. Small bilateral pleural effusions.   Patchy ground-glass opacities throughout the right upper lobe and lingula may be secondary to edema or infection.  -she is on iv lasix still seems in heart failue  -getting alkalotic:  hco3 32:  no need for Diamox  follow hco3;     9/1:  -cont conservative management for atelectasis with chest pt:  chest vest and mucomyst inhaler;   -awaiting cta as ursula c  Dimer  was high : sheis alert and awake and is responsive to questions   -her dopplers done yesterday were negative     9/2:  -she looks the same:   -oxygen requirement has not increased:   -CTA is awaited:  renal functions are OK;   -clinical suspicion for pe is low to moderate     A trial fibrillation  -not on any full AC:  -on heparin sq   8/27: now he has RVR:  fill ac per prim svitlana team:  being transferred to monitored unit   8/29: seems stable: on hep sq   8/30? r plts are more then 50K:  defer to hemonc and primary team  for restarting eliquis  8/31: still not on eliquis  9/1: remans off ac  9/2: the plts counts cont to be low;  ac per hemonc  team     Hypothyroidism   -levothyroxine    Hx of pulm/Abd tuberculosis  -treated before    HTN/Hx of orthostatic hypotension  -currently on stress doses of steroids and being tapered   8/27" changed to solumedrol :" monitor for orthostasis   8/28: blood pressure seems stable:   8/30: hemodynamically stable:   9/1: stable    dw acp

## 2024-09-02 NOTE — PROGRESS NOTE ADULT - SUBJECTIVE AND OBJECTIVE BOX
Patient is a 89y old  Female who presents with a chief complaint of septic shock (02 Sep 2024 06:07)      SUBJECTIVE / OVERNIGHT EVENTS:    Patient seen and examined. no acute events. on 4L stable.      Vital Signs Last 24 Hrs  T(C): 36.7 (02 Sep 2024 05:08), Max: 37.2 (01 Sep 2024 11:27)  T(F): 98.1 (02 Sep 2024 05:08), Max: 98.9 (01 Sep 2024 11:27)  HR: 76 (02 Sep 2024 05:08) (76 - 88)  BP: 117/77 (02 Sep 2024 05:08) (108/73 - 126/75)  BP(mean): --  RR: 18 (02 Sep 2024 05:08) (18 - 18)  SpO2: 97% (02 Sep 2024 05:08) (96% - 99%)    Parameters below as of 02 Sep 2024 05:08  Patient On (Oxygen Delivery Method): nasal cannula      I&O's Summary    01 Sep 2024 07:01  -  02 Sep 2024 07:00  --------------------------------------------------------  IN: 360 mL / OUT: 500 mL / NET: -140 mL    PE:  GENERAL: NAD, AAOx2, [place]  CHEST/LUNG: coarse bs, crackles bases  HEART: Regular rate and rhythm; + murmur  ABDOMEN: Soft, Nontender, Nondistended; Bowel sounds present  EXTREMITIES:  2+ Peripheral Pulses, +1 LE edema  NEURO: No focal deficits      LABS:                        9.1    5.62  )-----------( 86       ( 02 Sep 2024 06:31 )             28.6     09-01    138  |  97  |  21  ----------------------------<  89  3.8   |  32<H>  |  0.63    Ca    8.4      01 Sep 2024 07:23  Phos  2.5     09-01  Mg     2.1     09-01        CAPILLARY BLOOD GLUCOSE      POCT Blood Glucose.: 135 mg/dL (02 Sep 2024 05:58)  POCT Blood Glucose.: 235 mg/dL (01 Sep 2024 23:47)  POCT Blood Glucose.: 123 mg/dL (01 Sep 2024 21:32)  POCT Blood Glucose.: 122 mg/dL (01 Sep 2024 17:27)  POCT Blood Glucose.: 161 mg/dL (01 Sep 2024 12:42)  POCT Blood Glucose.: 183 mg/dL (01 Sep 2024 08:43)        Urinalysis Basic - ( 01 Sep 2024 07:23 )    Color: x / Appearance: x / SG: x / pH: x  Gluc: 89 mg/dL / Ketone: x  / Bili: x / Urobili: x   Blood: x / Protein: x / Nitrite: x   Leuk Esterase: x / RBC: x / WBC x   Sq Epi: x / Non Sq Epi: x / Bacteria: x        RADIOLOGY & ADDITIONAL TESTS:    Imaging Personally Reviewed:  [x] YES  [ ] NO    Consultant(s) Notes Reviewed:  [x] YES  [ ] NO    MEDICATIONS  (STANDING):  albuterol/ipratropium for Nebulization 3 milliLiter(s) Nebulizer every 6 hours  buDESOnide    Inhalation Suspension 0.5 milliGRAM(s) Inhalation every 12 hours  furosemide   Injectable 20 milliGRAM(s) IV Push daily  heparin   Injectable 5000 Unit(s) SubCutaneous every 12 hours  levothyroxine Injectable 70 MICROGram(s) IV Push at bedtime  metoprolol tartrate 25 milliGRAM(s) Oral two times a day  pantoprazole  Injectable 40 milliGRAM(s) IV Push daily    MEDICATIONS  (PRN):      Care Discussed with Consultants/Other Providers [x] YES  [ ] NO    HEALTH ISSUES - PROBLEM Dx:  Acute hypoxic respiratory failure    SBO (small bowel obstruction)    Functional quadriplegia    Advance care planning    Palliative care encounter    Septic shock

## 2024-09-02 NOTE — PROGRESS NOTE ADULT - SUBJECTIVE AND OBJECTIVE BOX
DATE OF SERVICE: 09-02-24 @ 21:55    Patient is a 89y old  Female who presents with a chief complaint of septic shock (02 Sep 2024 14:08)      INTERVAL HISTORY: feels ok    TELEMETRY Personally reviewed: no events  	  MEDICATIONS:  furosemide   Injectable 20 milliGRAM(s) IV Push daily  metoprolol tartrate 25 milliGRAM(s) Oral two times a day        PHYSICAL EXAM:  T(C): 36.7 (09-02-24 @ 20:12), Max: 36.7 (09-02-24 @ 05:08)  HR: 99 (09-02-24 @ 20:12) (76 - 99)  BP: 111/63 (09-02-24 @ 20:12) (111/63 - 119/71)  RR: 18 (09-02-24 @ 20:12) (18 - 20)  SpO2: 98% (09-02-24 @ 20:12) (84% - 98%)  Wt(kg): --  I&O's Summary    01 Sep 2024 07:01  -  02 Sep 2024 07:00  --------------------------------------------------------  IN: 360 mL / OUT: 500 mL / NET: -140 mL    02 Sep 2024 07:01  -  02 Sep 2024 21:55  --------------------------------------------------------  IN: 200 mL / OUT: 600 mL / NET: -400 mL          Appearance: In no distress	  HEENT:    PERRL, EOMI	  Cardiovascular:  S1 S2, No JVD  Respiratory: Lungs clear to auscultation	  Gastrointestinal:  Soft, Non-tender, + BS	  Vascularature:  No edema of LE  Psychiatric: Appropriate affect   Neuro: no acute focal deficits                               9.1    5.62  )-----------( 86       ( 02 Sep 2024 06:31 )             28.6     09-01    138  |  97  |  21  ----------------------------<  89  3.8   |  32<H>  |  0.63    Ca    8.4      01 Sep 2024 07:23  Phos  2.5     09-01  Mg     2.1     09-01          Labs personally reviewed      ASSESSMENT/PLAN: 	    89F with history of paroxysmal atrial fibrillation, hypothyroidism, pulmonary/abdominal tuberculosis, recurrent small bowel obstruction, recurrent UTIs presenting from home with abdominal pain and vomiting for 1 day with CT scans with evidence of SBO vs enteritis s/p NGT. Course c/b hypotension likely iso septic from multifocal PNA process.    1. Paroxysmal Atrial Fibrillation  - Switch Metoprolol IV to Metoprolol 25mg PO BID  - Hold Eliquis given thrombocytopenia ~50, resume when safe per heme but risks seem to outweigh benefits at this time    2. Acute hfpef - On Lasix 20 mg IV daily   - Switch to PO penidng CT chest  - TTE with preserved EF and mild pulm HTN  - Rec Chest CT no contrast given persistent hypoxia  - CT chest : Occlusive debris/mucous plugging within bronchus intermedius and more   distal airways of the right middle lobe and right lower lobe. Small bilateral pleural effusions.Patchy ground-glass opacities throughout the right upper lobe and lingula   may be secondary to edema or infection.  -  c/w IVP lasix 20mg qd, oxygen requirements improving  - CTA chest pending to r/o PE given elevated dimer    3. Multifocal PNA   - Abx as per primary team     4. DVT PPX - on HSQ            Nilton Roldan DO [facility]  Cardiovascular Medicine  [address] [place] [address], [address]  Office: [phone]  Available via Text/call on [employer] Teams

## 2024-09-02 NOTE — PROGRESS NOTE ADULT - SUBJECTIVE AND OBJECTIVE BOX
Date of Service: 09-02-24 @ 14:08    Patient is a 89y old  Female who presents with a chief complaint of septic shock (02 Sep 2024 08:37)      Any change in ROS: Alert and awake:  no sob:   on 2 L:  cta is still pending:   no overnght events       MEDICATIONS  (STANDING):  albuterol/ipratropium for Nebulization 3 milliLiter(s) Nebulizer every 6 hours  buDESOnide    Inhalation Suspension 0.5 milliGRAM(s) Inhalation every 12 hours  furosemide   Injectable 20 milliGRAM(s) IV Push daily  heparin   Injectable 5000 Unit(s) SubCutaneous every 12 hours  levothyroxine Injectable 70 MICROGram(s) IV Push at bedtime  metoprolol tartrate 25 milliGRAM(s) Oral two times a day  pantoprazole  Injectable 40 milliGRAM(s) IV Push daily    MEDICATIONS  (PRN):    Vital Signs Last 24 Hrs  T(C): 36.7 (02 Sep 2024 12:16), Max: 36.8 (01 Sep 2024 15:25)  T(F): 98 (02 Sep 2024 12:16), Max: 98.2 (01 Sep 2024 15:25)  HR: 82 (02 Sep 2024 12:16) (76 - 88)  BP: 119/71 (02 Sep 2024 12:16) (112/73 - 126/75)  BP(mean): --  RR: 18 (02 Sep 2024 12:16) (18 - 20)  SpO2: 95% (02 Sep 2024 12:16) (84% - 99%)    Parameters below as of 02 Sep 2024 12:16  Patient On (Oxygen Delivery Method): nasal cannula  O2 Flow (L/min): 3      I&O's Summary    01 Sep 2024 07:01  -  02 Sep 2024 07:00  --------------------------------------------------------  IN: 360 mL / OUT: 500 mL / NET: -140 mL          Physical Exam:   GENERAL: NAD, well-groomed, well-developed  HEENT: KOLBY/   Atraumatic, Normocephalic  ENMT: No tonsillar erythema, exudates, or enlargement; Moist mucous membranes, Good dentition, No lesions  NECK: Supple, No JVD, Normal thyroid  CHEST/LUNG: Clear to auscultaion  CVS: Regular rate and rhythm; No murmurs, rubs, or gallops  GI: : Soft, Nontender, Nondistended; Bowel sounds present  NERVOUS SYSTEM:  Alert & awake and responsive to questions  EXTREMITIES: Mild  edema  LYMPH: No lymphadenopathy noted  SKIN: No rashes or lesions  ENDOCRINOLOGY: No Thyromegaly  PSYCH: Appropriate    Labs:                              9.1    5.62  )-----------( 86       ( 02 Sep 2024 06:31 )             28.6                         8.5    5.63  )-----------( 57       ( 01 Sep 2024 06:48 )             27.3                         8.9    6.96  )-----------( 60       ( 31 Aug 2024 14:33 )             28.2                         9.4    6.25  )-----------( 65       ( 30 Aug 2024 12:49 )             30.4                         9.1    4.80  )-----------( 60       ( 29 Aug 2024 22:58 )             29.7     09-01    138  |  97  |  21  ----------------------------<  89  3.8   |  32<H>  |  0.63  08-31    138  |  94<L>  |  26<H>  ----------------------------<  123<H>  3.9   |  32<H>  |  0.48<L>  08-30    139  |  99  |  33<H>  ----------------------------<  110<H>  5.0   |  30  |  0.51  08-29    141  |  101  |  38<H>  ----------------------------<  160<H>  4.1   |  30  |  0.66    Ca    8.4      01 Sep 2024 07:23  Ca    8.3<L>      31 Aug 2024 14:31  Phos  2.5     09-01  Phos  3.1     08-31  Mg     2.1     09-01  Mg     2.0     08-31      CAPILLARY BLOOD GLUCOSE      POCT Blood Glucose.: 122 mg/dL (02 Sep 2024 12:47)  POCT Blood Glucose.: 135 mg/dL (02 Sep 2024 05:58)  POCT Blood Glucose.: 235 mg/dL (01 Sep 2024 23:47)  POCT Blood Glucose.: 123 mg/dL (01 Sep 2024 21:32)  POCT Blood Glucose.: 122 mg/dL (01 Sep 2024 17:27)          Urinalysis Basic - ( 01 Sep 2024 07:23 )    Color: x / Appearance: x / SG: x / pH: x  Gluc: 89 mg/dL / Ketone: x  / Bili: x / Urobili: x   Blood: x / Protein: x / Nitrite: x   Leuk Esterase: x / RBC: x / WBC x   Sq Epi: x / Non Sq Epi: x / Bacteria: x      D-Dimer Assay, Quantitative: 929 ng/mL DDU (08-31 @ 14:33)        RECENT CULTURES:    rad< from: CT Chest No Cont (08.31.24 @ 11:21) >  AIRWAYS, LUNGS, PLEURA: Occlusive debris/mucous plugging within bronchus   intermedius and more distal airways of the right middle lobe and right   lower lobe. Complete collapse of the right middle lobe and right lower   lobe with volume loss. Patchy ground-glass opacities throughout the right   upper lobe and lingula. Left apical scarring with bronchiectasis and   mucus plugging. Subsegmental left lower lobe atelectasis. Small bilateral   pleural effusions.    MEDIASTINUM: Left atrium qualitatively enlarged. No pericardial effusion.   Aortic valve calcifications. Dilated ascending thoracic aorta measures   4.5 cm, unchanged. No large mediastinal nodes.    IMAGED ABDOMEN: Right renal hyperdense lesion measuring 0.6 cm is   indeterminate.    SOFT TISSUES: Unremarkable.    BONES: Unremarkable.      IMPRESSION:.    Occlusive debris/mucous plugging within bronchus intermedius and more   distal airways of the right middle lobe and right lower lobe.    There is resultant right middle lobe and right lower lobe collapse with   volume loss.    Small bilateral pleural effusions.    Patchy ground-glass opacities throughout the right upper lobe and lingula   may be secondary to edema or infection.    --- End of Report ---             WESLY GAMBOA MD; Attending Radiologist  This document has been electronically signed. Aug 31 2024 11:59AM    < end of copied text >      RESPIRATORY CULTURES:          Studies  Chest X-RAY  CT SCAN Chest   Venous Dopplers: LE:   CT Abdomen  Others

## 2024-09-02 NOTE — PROGRESS NOTE ADULT - ASSESSMENT
Ms. Slater is a 89y Female with PMHx of asthma, HTN, essential tremor, bronchiectasis, GERD, hypothyroidism, paroxysmal A.fib on Eliquis 2.5mg BID, Vit D deficiency, tuberculous peritonitis, anemia moderate AS, hx of falls, aortic regurgitation presented with nausea and vomiting 08/23/2024 and subsequently admitted to MICU with SBO and transferred to floor on 08/25/2024, treated conservatively complicated by septic shock requiring pressor support. Pt has hx of pancytopenia.     Pt has a history of pancytopenia as outpatient with Leukopenia with since about 3y prior initially lymphopenia and then with neutropenia, progressive, anemia over the past year gradually worsening, with progressive macrocytosis, and thrombocytopenia over the past two years. Pt requested conservative treatment and denied BmBx a few times in the past. Discussed with pt again this admission and she stated she would not like to undergo aggressive interventions and she would not like to have a bone marrow biopsy, and she prefers conservative treatment otherwise.     She denied history of blood disorders, bleeding complications, or personal history of malignancy. She denied smoking, ETOH use. She lives in her apt with a 24hr aide and her daughter visits regularly.     08/27/2024 on NRB, noted ongoing tachycardia overnight, cardiology consult pending, no signs of bleeding at this time, thrombocytopenia noted on Hep SubQ for PPx     08/28/2024: awake and alert, quite hard of hearing, nurse at bedside stated pt did well overnight, she had dry mucous membranes, no signs of bleeding, discussed her hematological diagnoses again, she is aware     08/29/2024: awake and alert, prolonged exam due to being hard of hearing, stated she has persistent coughing and associated nausea without vomiting, nursing staff at bedside, pt with minor bleeding from IV site, required pressure. Otherwise no bleeding noted, continues Hep SubQ for now    08/31/2024:  overnight had complained of chest pain/back pain, per nursing staff resolved with Tylenol otherwise was comfortable, no signs of bleeding, BMs normal     09/01/2024: awake and alert, stated she feels lousy today, noted US LE negative for DVT, positive D-Dimer, CT Chest without contrast showed ccclusive debris/mucous plugging within bronchus intermedius and more   distal airways of the right middle lobe and right lower lobe with resultant right middle lobe and right lower lobe collapse with volume loss and small bilateral pleural effusions. Additionally she has patchy ground-glass opacities throughout the right upper lobe and lingula   may be secondary to edema or infection.        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   - Outpatient labs show gradual progressive leukopenia x 3y, initial lymphopenia now both lymphopenia and neutropenia   -- Most recent outpatient WBC ranging 1.9-4.6   - Thrombocytopenia outpatient ranging 85-100s x 2y  - Anemia ranging 9.5-11 over past year, currently close to this  - MCV with progressive macrocytosis over past year  - ANC 3250, s/p steroids  - Iron sat 9%, Folate 15.6, B12 179  - Previous paraproteinemia workup negative for obvious plasma cell dyscrasia SPEP/MOOKIE normal, Serum immunoglobulins normal, serum FLCs (ratio 1.44), LDH (normal), Beta-2 Mcg (mild elv 2.6),   - Transfuse PRBC to maintain Hb > 7   - Transfuse Plt to maintain Plt > 10k  - Trend CBC daily with Diff for now   - Continue B12 1000mcg IM weekly  - Again reviewed and discussed pancytopenia with the pt she is aware of its gradual, progressive worsening over past three years, discussed with pt again this admission and she stated she would not like to undergo aggressive interventions and she would declined bone marrow biopsy  - GOC noted ongoing   - Needs to follow up as outpatient     # Small bowel obstruction  - CT scan performed in ED showed evidence of dilated small bowel loops with large stool burden with concern for small bowel obstruction vs enteritis  - S/P MICU admission with NGT decompression  - Transferred to floor 08/25/2024     # Aspiration Pneumonia  -  CT A/P lower chest with with multifocal PNA  -  Pulmonology following, recs noted  -  Resolved septic shock while in MICU  -  CT Chest CT Chest without contrast showed ccclusive debris/mucous plugging within bronchus intermedius and more   distal airways of the right middle lobe and right lower lobe with resultant right middle lobe and right lower lobe collapse with volume loss and small bilateral pleural effusions. Additionally she has patchy ground-glass opacities throughout the right upper lobe and lingula   may be secondary to edema or infection.  - Pulmonology follow up   - Antibiotics per ID and primary team    #A.fib  - Was previously on Eliquis 2.5mg per cardiology  - Plt 50k >48k >61k>65k  - Pt at risk for CVA vs bleeding given clinical context  - Cardiology consult and recs noted, currently risks vs benefits favoring high risk for AC  - Noted US LE negative for DVT, positive D-Dimer,   - Can proceed with Hep SubQ BID, can consider Eliquis depending on Plt count trajectory, recommend ok to restart if Plt count consistently > 75k and per clinical risk     # Hypothyroidism  - Continue levothyroxine         Thank you for allowing me to participate in the care of Ms. Slater, please do not hesitate to call or text me if you have further questions or concerns.       Roni Polanco MD  [facility] [place]   Division of Hematology/Oncology  Formerly named [facility], [address]  [place] [address]  P: [phone]  F: [phone]    Attestation:    ----Pt evaluated including face-to-face interaction in addition to chart review, reviewing treatment plan, and managing the patient’s chronic diagnoses as listed in the assessment----      Ms. Slater is a 89y Female with PMHx of asthma, HTN, essential tremor, bronchiectasis, GERD, hypothyroidism, paroxysmal A.fib on Eliquis 2.5mg BID, Vit D deficiency, tuberculous peritonitis, anemia moderate AS, hx of falls, aortic regurgitation presented with nausea and vomiting 08/23/2024 and subsequently admitted to MICU with SBO and transferred to floor on 08/25/2024, treated conservatively complicated by septic shock requiring pressor support. Pt has hx of pancytopenia.     Pt has a history of pancytopenia as outpatient with Leukopenia with since about 3y prior initially lymphopenia and then with neutropenia, progressive, anemia over the past year gradually worsening, with progressive macrocytosis, and thrombocytopenia over the past two years. Pt requested conservative treatment and denied BmBx a few times in the past. Discussed with pt again this admission and she stated she would not like to undergo aggressive interventions and she would not like to have a bone marrow biopsy, and she prefers conservative treatment otherwise.     She denied history of blood disorders, bleeding complications, or personal history of malignancy. She denied smoking, ETOH use. She lives in her apt with a 24hr aide and her daughter visits regularly.     08/27/2024 on NRB, noted ongoing tachycardia overnight, cardiology consult pending, no signs of bleeding at this time, thrombocytopenia noted on Hep SubQ for PPx     08/28/2024: awake and alert, quite hard of hearing, nurse at bedside stated pt did well overnight, she had dry mucous membranes, no signs of bleeding, discussed her hematological diagnoses again, she is aware     08/29/2024: awake and alert, prolonged exam due to being hard of hearing, stated she has persistent coughing and associated nausea without vomiting, nursing staff at bedside, pt with minor bleeding from IV site, required pressure. Otherwise no bleeding noted, continues Hep SubQ for now    08/31/2024:  overnight had complained of chest pain/back pain, per nursing staff resolved with Tylenol otherwise was comfortable, no signs of bleeding, BMs normal     09/01/2024: awake and alert, stated she feels lousy today, noted US LE negative for DVT, positive D-Dimer, CT Chest without contrast showed ccclusive debris/mucous plugging within bronchus intermedius and more   distal airways of the right middle lobe and right lower lobe with resultant right middle lobe and right lower lobe collapse with volume loss and small bilateral pleural effusions. Additionally she has patchy ground-glass opacities throughout the right upper lobe and lingula   may be secondary to edema or infection.    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   - Outpatient labs show gradual progressive leukopenia x 3y, initial lymphopenia now both lymphopenia and neutropenia   -- Most recent outpatient WBC ranging 1.9-4.6   - Thrombocytopenia outpatient ranging 85-100s x 2y  - Anemia ranging 9.5-11 over past year, currently close to this  - MCV with progressive macrocytosis over past year  - ANC 3250, s/p steroids  - Iron sat 9%, Folate 15.6, B12 179  - Previous paraproteinemia workup negative for obvious plasma cell dyscrasia SPEP/MOOKIE normal, Serum immunoglobulins normal, serum FLCs (ratio 1.44), LDH (normal), Beta-2 Mcg (mild elv 2.6),   - Transfuse PRBC to maintain Hb > 7   - Transfuse Plt to maintain Plt > 10k  - Trend CBC daily with Diff for now   - Continue B12 1000mcg IM weekly  - Again reviewed and discussed pancytopenia with the pt she is aware of its gradual, progressive worsening over past three years, discussed with pt again this admission and she stated she would not like to undergo aggressive interventions and she would declined bone marrow biopsy  - GOC noted ongoing   - Needs to follow up as outpatient     # Small bowel obstruction  - CT scan performed in ED showed evidence of dilated small bowel loops with large stool burden with concern for small bowel obstruction vs enteritis  - S/P MICU admission with NGT decompression  - Transferred to floor 08/25/2024     # Aspiration Pneumonia  -  CT A/P lower chest with with multifocal PNA  -  Pulmonology following, recs noted  -  Resolved septic shock while in MICU  -  CT Chest CT Chest without contrast showed ccclusive debris/mucous plugging within bronchus intermedius and more   distal airways of the right middle lobe and right lower lobe with resultant right middle lobe and right lower lobe collapse with volume loss and small bilateral pleural effusions. Additionally she has patchy ground-glass opacities throughout the right upper lobe and lingula   may be secondary to edema or infection.  - Pulmonology follow up   - Antibiotics per ID and primary team    #AMariefib  - Was previously on Eliquis 2.5mg per cardiology  - Plt 50k >48k >61k>65k>57k  - Pt at risk for CVA vs bleeding given clinical context  - Cardiology consult and recs noted, currently risks vs benefits favoring high risk for AC  - Noted US LE negative for DVT, positive D-Dimer,   - Can proceed with Hep SubQ BID, can consider Eliquis depending on Plt count trajectory, recommend ok to restart if Plt count consistently > 75k and per clinical risk     # Hypothyroidism  - Continue levothyroxine         Thank you for allowing me to participate in the care of Ms. Slater, please do not hesitate to call or text me if you have further questions or concerns.       Roni Polanco MD  [facility] [place]   Division of Hematology/Oncology  [address] [place] [address], [address]  [place] [address]  P: [phone]  F: [phone]    Attestation:    ----Pt evaluated including face-to-face interaction in addition to chart review, reviewing treatment plan, and managing the patient’s chronic diagnoses as listed in the assessment----

## 2024-09-02 NOTE — PROGRESS NOTE ADULT - SUBJECTIVE AND OBJECTIVE BOX
[facility] HEMATOLOGY/ONCOLOGY INPATIENT PROGRESS NOTE     Interval Hx:   09-02-24: Ms. Slater was seen at bedside today.    Meds:   MEDICATIONS  (STANDING):  albuterol/ipratropium for Nebulization 3 milliLiter(s) Nebulizer every 6 hours  buDESOnide    Inhalation Suspension 0.5 milliGRAM(s) Inhalation every 12 hours  furosemide   Injectable 20 milliGRAM(s) IV Push daily  heparin   Injectable 5000 Unit(s) SubCutaneous every 12 hours  levothyroxine Injectable 70 MICROGram(s) IV Push at bedtime  metoprolol tartrate 25 milliGRAM(s) Oral two times a day  pantoprazole  Injectable 40 milliGRAM(s) IV Push daily    MEDICATIONS  (PRN):    Vital Signs Last 24 Hrs  T(C): 36.7 (02 Sep 2024 05:08), Max: 37.2 (01 Sep 2024 11:27)  T(F): 98.1 (02 Sep 2024 05:08), Max: 98.9 (01 Sep 2024 11:27)  HR: 76 (02 Sep 2024 05:08) (76 - 88)  BP: 117/77 (02 Sep 2024 05:08) (108/73 - 126/75)  BP(mean): --  RR: 18 (02 Sep 2024 05:08) (18 - 18)  SpO2: 97% (02 Sep 2024 05:08) (96% - 99%)    Parameters below as of 02 Sep 2024 05:08  Patient On (Oxygen Delivery Method): nasal cannula    Physical Exam:  Gen:  NAD, lethargic  HEENT: EOMI, MMM  Chest: equal chest rise, speaking full sentences  Cardiac: irregular  Abd: soft, non-tender, no hepatomegaly or splenomegaly  Ext: No edema   Neuro: AAOx3, normal mood and affect, hard of hearing    Labs:                        8.5    5.63  )-----------( 57       ( 01 Sep 2024 06:48 )             27.3     CBC Full  -  ( 01 Sep 2024 06:48 )  WBC Count : 5.63 K/uL  RBC Count : 2.57 M/uL  Hemoglobin : 8.5 g/dL  Hematocrit : 27.3 %  Platelet Count - Automated : 57 K/uL  Mean Cell Volume : 106.2 fl  Mean Cell Hemoglobin : 33.1 pg  Mean Cell Hemoglobin Concentration : 31.1 gm/dL    09-01    138  |  97  |  21  ----------------------------<  89  3.8   |  32<H>  |  0.63    Ca    8.4      01 Sep 2024 07:23  Phos  2.5     09-01  Mg     2.1     09-01         \[facility]\"" HEMATOLOGY/ONCOLOGY INPATIENT PROGRESS NOTE     Interval Hx:   09-02-24: Ms. Slater was seen at bedside today, on nebulizer, no overnight events noted, counts noted persistent anemia and thrombocytopenia leukopenia resolved likely due to steroids     Meds:   MEDICATIONS  (STANDING):  albuterol/ipratropium for Nebulization 3 milliLiter(s) Nebulizer every 6 hours  buDESOnide    Inhalation Suspension 0.5 milliGRAM(s) Inhalation every 12 hours  furosemide   Injectable 20 milliGRAM(s) IV Push daily  heparin   Injectable 5000 Unit(s) SubCutaneous every 12 hours  levothyroxine Injectable 70 MICROGram(s) IV Push at bedtime  metoprolol tartrate 25 milliGRAM(s) Oral two times a day  pantoprazole  Injectable 40 milliGRAM(s) IV Push daily    MEDICATIONS  (PRN):    Vital Signs Last 24 Hrs  T(C): 36.7 (02 Sep 2024 05:08), Max: 37.2 (01 Sep 2024 11:27)  T(F): 98.1 (02 Sep 2024 05:08), Max: 98.9 (01 Sep 2024 11:27)  HR: 76 (02 Sep 2024 05:08) (76 - 88)  BP: 117/77 (02 Sep 2024 05:08) (108/73 - 126/75)  BP(mean): --  RR: 18 (02 Sep 2024 05:08) (18 - 18)  SpO2: 97% (02 Sep 2024 05:08) (96% - 99%)    Parameters below as of 02 Sep 2024 05:08  Patient On (Oxygen Delivery Method): nasal cannula    Physical Exam:  Gen:  NAD, lethargic  HEENT: EOMI, MMM  Chest: equal chest rise, speaking full sentences  Cardiac: irregular  Abd: soft, non-tender, no hepatomegaly or splenomegaly  Ext: No edema   Neuro: AAOx3, normal mood and affect, hard of hearing    Labs:                        8.5    5.63  )-----------( 57       ( 01 Sep 2024 06:48 )             27.3     CBC Full  -  ( 01 Sep 2024 06:48 )  WBC Count : 5.63 K/uL  RBC Count : 2.57 M/uL  Hemoglobin : 8.5 g/dL  Hematocrit : 27.3 %  Platelet Count - Automated : 57 K/uL  Mean Cell Volume : 106.2 fl  Mean Cell Hemoglobin : 33.1 pg  Mean Cell Hemoglobin Concentration : 31.1 gm/dL    09-01    138  |  97  |  21  ----------------------------<  89  3.8   |  32<H>  |  0.63    Ca    8.4      01 Sep 2024 07:23  Phos  2.5     09-01  Mg     2.1     09-01

## 2024-09-02 NOTE — PROGRESS NOTE ADULT - ASSESSMENT
89F with history of paroxysmal atrial fibrillation, hypothyroidism, pulmonary/abdominal tuberculosis, recurrent small bowel obstruction, recurrent UTIs presenting from home with abdominal pain and vomiting for 1 day with CT scans with evidence of SBO vs enteritis s/p NGT. Course c/b hypotension likely iso septic from multifocal PNA process.    #Septic Shock, resolved  #Multifocal PNA   #Acute metabolic encephalopathy   - required levophed for shock state but now weaned off  - completed abx for pna  - BC NTD  - PT OOB    #Acute hfpef    #Acute Hypoxemic Respiratory Failure  - Strict I & O, Daily weights   - on Lasix 20 mg IV daily per cardio  - CT chest Occlusive debris/mucous plugging within bronchus intermedius and more distal airways of the RML and RLL, RML and RLL collapse with volume loss. Small bilateral pleural effusions. Patchy ground-glass opacities throughout the right upper lobe and lingula may be secondary to edema or infection.  - LE doppler neg dvt  - D dimer elevated, pending CTA chest ro PE  - TTE with preserved lvef  - on 4L, cont to titrate down  - steroid taper per pulm  - pt did not complete swallow eval, no aspiration with liquids, will advance diet, maintain aspiration precautions    #Paroxysmal Atrial Fibrillation  - eliquis on hold for thrombocytopenia, Can proceed with Hep SubQ BID, can consider Eliquis if Plt count consistently > 75k per heme    #Small Bowel Obstruction  #History of Abdominal Tuberculosis   #History of Recurrent Small Bowel Obstruction  - advance diet to soft, aspiration precautions    #pancytopenia  - was receiving prednisone OP for low blood pressure  - likely bone marrow disorder, outpt fu heme    #Hypothyroidism  - c/w levothyroxine 75 mcg IV    dvt ppx HSQ, eliquis on hold for thrombocytopenia    GOC: DNR/DNI    dispo: pending S&S, CTA chest  eventual dc to home with HHA per dtr once medically stable    Please call [facility] with questions [phone]

## 2024-09-03 LAB
ANION GAP SERPL CALC-SCNC: 13 MMOL/L — SIGNIFICANT CHANGE UP (ref 5–17)
BUN SERPL-MCNC: 18 MG/DL — SIGNIFICANT CHANGE UP (ref 7–23)
CALCIUM SERPL-MCNC: 8.9 MG/DL — SIGNIFICANT CHANGE UP (ref 8.4–10.5)
CHLORIDE SERPL-SCNC: 95 MMOL/L — LOW (ref 96–108)
CO2 SERPL-SCNC: 31 MMOL/L — SIGNIFICANT CHANGE UP (ref 22–31)
CREAT SERPL-MCNC: 0.48 MG/DL — LOW (ref 0.5–1.3)
EGFR: 85 ML/MIN/1.73M2 — SIGNIFICANT CHANGE UP
GLUCOSE BLDC GLUCOMTR-MCNC: 140 MG/DL — HIGH (ref 70–99)
GLUCOSE BLDC GLUCOMTR-MCNC: 147 MG/DL — HIGH (ref 70–99)
GLUCOSE BLDC GLUCOMTR-MCNC: 84 MG/DL — SIGNIFICANT CHANGE UP (ref 70–99)
GLUCOSE SERPL-MCNC: 73 MG/DL — SIGNIFICANT CHANGE UP (ref 70–99)
HCT VFR BLD CALC: 33.5 % — LOW (ref 34.5–45)
HGB BLD-MCNC: 10.6 G/DL — LOW (ref 11.5–15.5)
MAGNESIUM SERPL-MCNC: 2.2 MG/DL — SIGNIFICANT CHANGE UP (ref 1.6–2.6)
MCHC RBC-ENTMCNC: 31.6 GM/DL — LOW (ref 32–36)
MCHC RBC-ENTMCNC: 32.9 PG — SIGNIFICANT CHANGE UP (ref 27–34)
MCV RBC AUTO: 104 FL — HIGH (ref 80–100)
NRBC # BLD: 0 /100 WBCS — SIGNIFICANT CHANGE UP (ref 0–0)
PHOSPHATE SERPL-MCNC: 2.5 MG/DL — SIGNIFICANT CHANGE UP (ref 2.5–4.5)
PLATELET # BLD AUTO: 72 K/UL — LOW (ref 150–400)
POTASSIUM SERPL-MCNC: 3.5 MMOL/L — SIGNIFICANT CHANGE UP (ref 3.5–5.3)
POTASSIUM SERPL-SCNC: 3.5 MMOL/L — SIGNIFICANT CHANGE UP (ref 3.5–5.3)
RBC # BLD: 3.22 M/UL — LOW (ref 3.8–5.2)
RBC # FLD: 14.8 % — HIGH (ref 10.3–14.5)
SODIUM SERPL-SCNC: 139 MMOL/L — SIGNIFICANT CHANGE UP (ref 135–145)
TROPONIN T, HIGH SENSITIVITY RESULT: <6 NG/L — SIGNIFICANT CHANGE UP (ref 0–51)
WBC # BLD: 6.23 K/UL — SIGNIFICANT CHANGE UP (ref 3.8–10.5)
WBC # FLD AUTO: 6.23 K/UL — SIGNIFICANT CHANGE UP (ref 3.8–10.5)

## 2024-09-03 PROCEDURE — 93010 ELECTROCARDIOGRAM REPORT: CPT

## 2024-09-03 PROCEDURE — 71045 X-RAY EXAM CHEST 1 VIEW: CPT | Mod: 26

## 2024-09-03 RX ORDER — ACETAMINOPHEN 325 MG/1
1000 TABLET ORAL ONCE
Refills: 0 | Status: COMPLETED | OUTPATIENT
Start: 2024-09-03 | End: 2024-09-03

## 2024-09-03 RX ADMIN — METOPROLOL TARTRATE 25 MILLIGRAM(S): 100 TABLET ORAL at 05:02

## 2024-09-03 RX ADMIN — IPRATROPIUM BROMIDE AND ALBUTEROL SULFATE 3 MILLILITER(S): .5; 3 SOLUTION RESPIRATORY (INHALATION) at 13:08

## 2024-09-03 RX ADMIN — Medication 5000 UNIT(S): at 17:24

## 2024-09-03 RX ADMIN — Medication 20 MILLIGRAM(S): at 05:02

## 2024-09-03 RX ADMIN — ACETAMINOPHEN 400 MILLIGRAM(S): 325 TABLET ORAL at 06:09

## 2024-09-03 RX ADMIN — BUDESONIDE 0.5 MILLIGRAM(S): 3 CAPSULE ORAL at 05:03

## 2024-09-03 RX ADMIN — IPRATROPIUM BROMIDE AND ALBUTEROL SULFATE 3 MILLILITER(S): .5; 3 SOLUTION RESPIRATORY (INHALATION) at 05:36

## 2024-09-03 RX ADMIN — Medication 40 MILLIGRAM(S): at 13:08

## 2024-09-03 RX ADMIN — ACETAMINOPHEN 1000 MILLIGRAM(S): 325 TABLET ORAL at 06:40

## 2024-09-03 RX ADMIN — Medication 70 MICROGRAM(S): at 21:37

## 2024-09-03 RX ADMIN — IPRATROPIUM BROMIDE AND ALBUTEROL SULFATE 3 MILLILITER(S): .5; 3 SOLUTION RESPIRATORY (INHALATION) at 17:25

## 2024-09-03 RX ADMIN — Medication 5000 UNIT(S): at 05:03

## 2024-09-03 RX ADMIN — BUDESONIDE 0.5 MILLIGRAM(S): 3 CAPSULE ORAL at 17:24

## 2024-09-03 NOTE — CHART NOTE - NSCHARTNOTESELECT_GEN_ALL_CORE
Event Note
Event Note
Transfer Note
Respiratory Distress/ Tachycardia/Event Note
Transfer to telemetry/Event Note

## 2024-09-03 NOTE — PROGRESS NOTE ADULT - SUBJECTIVE AND OBJECTIVE BOX
DATE OF SERVICE: 09-03-24 @ 11:23    Patient is a 89y old  Female who presents with a chief complaint of septic shock (03 Sep 2024 10:47)      INTERVAL HISTORY: Feels ok. Lethargic.     REVIEW OF SYSTEMS: Limited participant in ROS  CONSTITUTIONAL: No weakness  EYES/ENT: No visual changes;  No throat pain   NECK: No pain or stiffness  RESPIRATORY: No cough, wheezing; No shortness of breath  CARDIOVASCULAR: No chest pain or palpitations  GASTROINTESTINAL: No abdominal  pain. No nausea, vomiting, or hematemesis  GENITOURINARY: No dysuria, frequency or hematuria  NEUROLOGICAL: No stroke like symptoms  SKIN: No rashes    TELEMETRY Personally reviewed: AF   	  MEDICATIONS:  furosemide   Injectable 20 milliGRAM(s) IV Push daily  metoprolol tartrate 25 milliGRAM(s) Oral two times a day        PHYSICAL EXAM:  T(C): 36.7 (09-03-24 @ 04:04), Max: 36.7 (09-02-24 @ 12:16)  HR: 74 (09-03-24 @ 04:04) (66 - 99)  BP: 115/72 (09-03-24 @ 04:04) (109/57 - 119/71)  RR: 18 (09-03-24 @ 11:00) (18 - 18)  SpO2: 92% (09-03-24 @ 11:00) (92% - 99%)  Wt(kg): --  I&O's Summary    02 Sep 2024 07:01  -  03 Sep 2024 07:00  --------------------------------------------------------  IN: 980 mL / OUT: 600 mL / NET: 380 mL          Appearance: In no distress	  HEENT:    PERRL, EOMI	  Cardiovascular:  S1 S2, No JVD  Respiratory: Lungs clear to auscultation	  Gastrointestinal:  Soft, Non-tender, + BS	  Vascularature:  No edema of LE  Psychiatric: Appropriate affect   Neuro: no acute focal deficits                               10.6   6.23  )-----------( 72       ( 03 Sep 2024 06:52 )             33.5     09-03    139  |  95<L>  |  18  ----------------------------<  73  3.5   |  31  |  0.48<L>    Ca    8.9      03 Sep 2024 07:33  Phos  2.5     09-03  Mg     2.2     09-03          Labs personally reviewed      ASSESSMENT/PLAN: 	    89F with history of paroxysmal atrial fibrillation, hypothyroidism, pulmonary/abdominal tuberculosis, recurrent small bowel obstruction, recurrent UTIs presenting from home with abdominal pain and vomiting for 1 day with CT scans with evidence of SBO vs enteritis s/p NGT. Course c/b hypotension likely iso septic from multifocal PNA process.    1. Paroxysmal Atrial Fibrillation  - Switch Metoprolol IV to Metoprolol 25mg PO BID  - Hold Eliquis given thrombocytopenia ~50, resume when safe per heme but risks seem to outweigh benefits at this time    2. Acute hfpef - On Lasix 20 mg IV daily   - Switch to PO penidng CT chest  - TTE with preserved EF and mild pulm HTN  - Rec Chest CT no contrast given persistent hypoxia  - CT chest : Occlusive debris/mucous plugging within bronchus intermedius and more   distal airways of the right middle lobe and right lower lobe. Small bilateral pleural effusions.Patchy ground-glass opacities throughout the right upper lobe and lingula may be secondary to edema or infection.  -  c/w IVP lasix 20mg qd, likely transition to PO 9/4  - CTA chest negative for PE and decreased pulm edema but persistent mucous plugging.     3. Multifocal PNA   - Abx as per primary team     4. DVT PPX - on HSQ        Rosalia Tee, AG-NP   Nilton Roldan DO [facility]  Cardiovascular Medicine  [address] [facility], [address]  Available through call or text on [employer] TEAM[employer]  Office: [phone]   DATE OF SERVICE: 09-03-24 @ 11:23    Patient is a 89y old  Female who presents with a chief complaint of septic shock (03 Sep 2024 10:47)      INTERVAL HISTORY: Feels ok. Lethargic.     REVIEW OF SYSTEMS: Limited participant in ROS  CONSTITUTIONAL: No weakness  EYES/ENT: No visual changes;  No throat pain   NECK: No pain or stiffness  RESPIRATORY: No cough, wheezing; No shortness of breath  CARDIOVASCULAR: No chest pain or palpitations  GASTROINTESTINAL: No abdominal  pain. No nausea, vomiting, or hematemesis  GENITOURINARY: No dysuria, frequency or hematuria  NEUROLOGICAL: No stroke like symptoms  SKIN: No rashes    TELEMETRY Personally reviewed: AF   	  MEDICATIONS:  furosemide   Injectable 20 milliGRAM(s) IV Push daily  metoprolol tartrate 25 milliGRAM(s) Oral two times a day        PHYSICAL EXAM:  T(C): 36.7 (09-03-24 @ 04:04), Max: 36.7 (09-02-24 @ 12:16)  HR: 74 (09-03-24 @ 04:04) (66 - 99)  BP: 115/72 (09-03-24 @ 04:04) (109/57 - 119/71)  RR: 18 (09-03-24 @ 11:00) (18 - 18)  SpO2: 92% (09-03-24 @ 11:00) (92% - 99%)  Wt(kg): --  I&O's Summary    02 Sep 2024 07:01  -  03 Sep 2024 07:00  --------------------------------------------------------  IN: 980 mL / OUT: 600 mL / NET: 380 mL          Appearance: In no distress	  HEENT:    PERRL, EOMI	  Cardiovascular:  S1 S2, No JVD  Respiratory: Lungs clear to auscultation	  Gastrointestinal:  Soft, Non-tender, + BS	  Vascularature:  No edema of LE  Psychiatric: Appropriate affect   Neuro: no acute focal deficits                               10.6   6.23  )-----------( 72       ( 03 Sep 2024 06:52 )             33.5     09-03    139  |  95<L>  |  18  ----------------------------<  73  3.5   |  31  |  0.48<L>    Ca    8.9      03 Sep 2024 07:33  Phos  2.5     09-03  Mg     2.2     09-03          Labs personally reviewed      ASSESSMENT/PLAN: 	    89F with history of paroxysmal atrial fibrillation, hypothyroidism, pulmonary/abdominal tuberculosis, recurrent small bowel obstruction, recurrent UTIs presenting from home with abdominal pain and vomiting for 1 day with CT scans with evidence of SBO vs enteritis s/p NGT. Course c/b hypotension likely iso septic from multifocal PNA process.    1. Paroxysmal Atrial Fibrillation  - Switch Metoprolol IV to Metoprolol 25mg PO BID  - Hold Eliquis given thrombocytopenia ~50, resume when safe per heme but risks seem to outweigh benefits at this time    2. Acute hfpef - On Lasix 20 mg IV daily   - Switch to PO penidng CT chest  - TTE with preserved EF and mild pulm HTN  - Rec Chest CT no contrast given persistent hypoxia  - CT chest : Occlusive debris/mucous plugging within bronchus intermedius and more   distal airways of the right middle lobe and right lower lobe. Small bilateral pleural effusions.Patchy ground-glass opacities throughout the right upper lobe and lingula may be secondary to edema or infection.  -  c/w IVP lasix 20mg qd, transition to PO 9/4  - CTA chest negative for PE and decreased pulm edema but persistent mucous plugging.     3. Multifocal PNA   - Abx as per primary team     4. DVT PPX - on HSQ        Rosalia Tee, AG-NP   Nilton Roldan DO [facility]  Cardiovascular Medicine  [address] [place] [address], [address]  Available through call or text on [employer] TEAM[employer]  Office: [phone]

## 2024-09-03 NOTE — PROGRESS NOTE ADULT - ASSESSMENT
Ms. Slater is a 89y Female with PMHx of asthma, HTN, essential tremor, bronchiectasis, GERD, hypothyroidism, paroxysmal A.fib on Eliquis 2.5mg BID, Vit D deficiency, tuberculous peritonitis, anemia moderate AS, hx of falls, aortic regurgitation presented with nausea and vomiting 08/23/2024 and subsequently admitted to MICU with SBO and transferred to floor on 08/25/2024, treated conservatively complicated by septic shock requiring pressor support. Pt has hx of pancytopenia.     Pt has a history of pancytopenia as outpatient with Leukopenia with since about 3y prior initially lymphopenia and then with neutropenia, progressive, anemia over the past year gradually worsening, with progressive macrocytosis, and thrombocytopenia over the past two years. Pt requested conservative treatment and denied BmBx a few times in the past. Discussed with pt again this admission and she stated she would not like to undergo aggressive interventions and she would not like to have a bone marrow biopsy, and she prefers conservative treatment otherwise.     She denied history of blood disorders, bleeding complications, or personal history of malignancy. She denied smoking, ETOH use. She lives in her apt with a 24hr aide and her daughter visits regularly.     08/27/2024 on NRB, noted ongoing tachycardia overnight, cardiology consult pending, no signs of bleeding at this time, thrombocytopenia noted on Hep SubQ for PPx     08/28/2024: awake and alert, quite hard of hearing, nurse at bedside stated pt did well overnight, she had dry mucous membranes, no signs of bleeding, discussed her hematological diagnoses again, she is aware     08/29/2024: awake and alert, prolonged exam due to being hard of hearing, stated she has persistent coughing and associated nausea without vomiting, nursing staff at bedside, pt with minor bleeding from IV site, required pressure. Otherwise no bleeding noted, continues Hep SubQ for now    08/31/2024:  overnight had complained of chest pain/back pain, per nursing staff resolved with Tylenol otherwise was comfortable, no signs of bleeding, BMs normal     09/01/2024: awake and alert, stated she feels lousy today, noted US LE negative for DVT, positive D-Dimer, CT Chest without contrast showed ccclusive debris/mucous plugging within bronchus intermedius and more   distal airways of the right middle lobe and right lower lobe with resultant right middle lobe and right lower lobe collapse with volume loss and small bilateral pleural effusions. Additionally she has patchy ground-glass opacities throughout the right upper lobe and lingula   may be secondary to edema or infection.    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   - Outpatient labs show gradual progressive leukopenia x 3y, initial lymphopenia now both lymphopenia and neutropenia   -- Most recent outpatient WBC ranging 1.9-4.6   - Thrombocytopenia outpatient ranging 85-100s x 2y  - Anemia ranging 9.5-11 over past year, currently close to this  - MCV with progressive macrocytosis over past year  - ANC 3250, s/p steroids  - Iron sat 9%, Folate 15.6, B12 179  - Previous paraproteinemia workup negative for obvious plasma cell dyscrasia SPEP/MOOKIE normal, Serum immunoglobulins normal, serum FLCs (ratio 1.44), LDH (normal), Beta-2 Mcg (mild elv 2.6),   - Transfuse PRBC to maintain Hb > 7   - Transfuse Plt to maintain Plt > 10k  - Trend CBC daily with Diff for now   - Continue B12 1000mcg IM weekly  - Again reviewed and discussed pancytopenia with the pt she is aware of its gradual, progressive worsening over past three years, discussed with pt again this admission and she stated she would not like to undergo aggressive interventions and she would declined bone marrow biopsy  - GOC noted ongoing   - Needs to follow up as outpatient     # Small bowel obstruction  - CT scan performed in ED showed evidence of dilated small bowel loops with large stool burden with concern for small bowel obstruction vs enteritis  - S/P MICU admission with NGT decompression  - Transferred to floor 08/25/2024     # Aspiration Pneumonia  -  CT A/P lower chest with with multifocal PNA  -  Pulmonology following, recs noted  -  Resolved septic shock while in MICU  -  CT Chest CT Chest without contrast showed ccclusive debris/mucous plugging within bronchus intermedius and more   distal airways of the right middle lobe and right lower lobe with resultant right middle lobe and right lower lobe collapse with volume loss and small bilateral pleural effusions. Additionally she has patchy ground-glass opacities throughout the right upper lobe and lingula   may be secondary to edema or infection.  - Pulmonology follow up   - Antibiotics per ID and primary team    #AMariefib  - Was previously on Eliquis 2.5mg per cardiology  - Plt 50k >48k >61k>65k>57k  - Pt at risk for CVA vs bleeding given clinical context  - Cardiology consult and recs noted, currently risks vs benefits favoring high risk for AC  - Noted US LE negative for DVT, positive D-Dimer,   - Can proceed with Hep SubQ BID, can consider Eliquis depending on Plt count trajectory, recommend ok to restart if Plt count consistently > 75k and per clinical risk     # Hypothyroidism  - Continue levothyroxine         Thank you for allowing me to participate in the care of Ms. Slater, please do not hesitate to call or text me if you have further questions or concerns.       Roni Polanco MD  [facility] [place]   Division of Hematology/Oncology  [address] [place] [address], [address]  [place] [address]  P: [phone]  F: [phone]    Attestation:    ----Pt evaluated including face-to-face interaction in addition to chart review, reviewing treatment plan, and managing the patient’s chronic diagnoses as listed in the assessment----      Ms. Slater is a 89y Female with PMHx of asthma, HTN, essential tremor, bronchiectasis, GERD, hypothyroidism, paroxysmal A.fib on Eliquis 2.5mg BID, Vit D deficiency, tuberculous peritonitis, anemia moderate AS, hx of falls, aortic regurgitation presented with nausea and vomiting 08/23/2024 and subsequently admitted to MICU with SBO and transferred to floor on 08/25/2024, treated conservatively complicated by septic shock requiring pressor support. Pt has hx of pancytopenia.     Pt has a history of pancytopenia as outpatient with Leukopenia with since about 3y prior initially lymphopenia and then with neutropenia, progressive, anemia over the past year gradually worsening, with progressive macrocytosis, and thrombocytopenia over the past two years. Pt requested conservative treatment and denied BmBx a few times in the past. Discussed with pt again this admission and she stated she would not like to undergo aggressive interventions and she would not like to have a bone marrow biopsy, and she prefers conservative treatment otherwise.     She denied history of blood disorders, bleeding complications, or personal history of malignancy. She denied smoking, ETOH use. She lives in her apt with a 24hr aide and her daughter visits regularly.     08/27/2024 on NRB, noted ongoing tachycardia overnight, cardiology consult pending, no signs of bleeding at this time, thrombocytopenia noted on Hep SubQ for PPx     08/28/2024: awake and alert, quite hard of hearing, nurse at bedside stated pt did well overnight, she had dry mucous membranes, no signs of bleeding, discussed her hematological diagnoses again, she is aware     08/29/2024: awake and alert, prolonged exam due to being hard of hearing, stated she has persistent coughing and associated nausea without vomiting, nursing staff at bedside, pt with minor bleeding from IV site, required pressure. Otherwise no bleeding noted, continues Hep SubQ for now    08/31/2024:  overnight had complained of chest pain/back pain, per nursing staff resolved with Tylenol otherwise was comfortable, no signs of bleeding, BMs normal     09/01/2024: awake and alert, stated she feels lousy today, noted US LE negative for DVT, positive D-Dimer, CT Chest without contrast showed ccclusive debris/mucous plugging within bronchus intermedius and more   distal airways of the right middle lobe and right lower lobe with resultant right middle lobe and right lower lobe collapse with volume loss and small bilateral pleural effusions. Additionally she has patchy ground-glass opacities throughout the right upper lobe and lingula   may be secondary to edema or infection.    09/02/2024: no overnight events noted, counts noted persistent anemia and thrombocytopenia leukopenia resolved likely due to steroids     09/03/2024:  awake and alert, endorsed ongoing chest discomfort, nursing staff at bedside to discuss with overnight staff, CTA Chest completed, recommend repeat EKG this morning, awake and alert, respiratory status appropriate, speaking full sentences, no signs of bleeding, bruising resolving, endorsed ongoing chest discomfort, nursing staff at bedside to discuss with overnight staff, CTA Chest completed, recommend repeat EKG this morning, awake and alert, respiratory status appropriate, speaking full sentences, no signs of bleeding, bruising resolving       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   - Outpatient labs show gradual progressive leukopenia x 3y, initial lymphopenia now both lymphopenia and neutropenia   -- Most recent outpatient WBC ranging 1.9-4.6   - Thrombocytopenia outpatient ranging 85-100s x 2y  - Anemia ranging 9.5-11 over past year, currently close to this  - MCV with progressive macrocytosis over past year  - ANC 3250, s/p steroids  - Iron sat 9%, Folate 15.6, B12 179  - Previous paraproteinemia workup negative for obvious plasma cell dyscrasia SPEP/MOOKIE normal, Serum immunoglobulins normal, serum FLCs (ratio 1.44), LDH (normal), Beta-2 Mcg (mild elv 2.6),   - Transfuse PRBC to maintain Hb > 7   - Transfuse Plt to maintain Plt > 10k  - Trend CBC daily with Diff for now   - Continue B12 1000mcg IM weekly  - Again reviewed and discussed pancytopenia with the pt she is aware of its gradual, progressive worsening over past three years, discussed with pt again this admission and she stated she would not like to undergo aggressive interventions and she would declined bone marrow biopsy  - GOC noted ongoing   - Needs to follow up as outpatient     # Small bowel obstruction  - CT scan performed in ED showed evidence of dilated small bowel loops with large stool burden with concern for small bowel obstruction vs enteritis  - S/P MICU admission with NGT decompression  - Transferred to floor 08/25/2024     # Aspiration Pneumonia  -  CT A/P lower chest with with multifocal PNA  -  Pulmonology following, recs noted  -  Resolved septic shock while in MICU  -  CT Chest CT Chest without contrast showed ccclusive debris/mucous plugging within bronchus intermedius and more   distal airways of the right middle lobe and right lower lobe with resultant right middle lobe and right lower lobe collapse with volume loss and small bilateral pleural effusions. Additionally she has patchy ground-glass opacities throughout the right upper lobe and lingula   may be secondary to edema or infection.  - Pulmonology follow up   - Antibiotics per ID and primary team    #A.fib  - Was previously on Eliquis 2.5mg per cardiology  - Plt 50k >48k >61k>65k>57k  - Pt at risk for CVA vs bleeding given clinical context  - Cardiology consult and recs noted, currently risks vs benefits favoring high risk for AC  - Noted US LE negative for DVT, positive D-Dimer  - CTA Chest 09/02/2024 read pending   - Can proceed with Hep SubQ BID, can consider Eliquis depending on Plt count trajectory, recommend ok to restart if Plt count consistently > 75k and per clinical risk     # Hypothyroidism  - Continue levothyroxine         Thank you for allowing me to participate in the care of Ms. Slater, please do not hesitate to call or text me if you have further questions or concerns.       Roni Nadir Polanco MD  [facility] [place]   Division of Hematology/Oncology  [address] [place] [address], [address]  [place] [address]  P: [phone]  F: [phone]    Attestation:    ----Pt evaluated including face-to-face interaction in addition to chart review, reviewing treatment plan, and managing the patient’s chronic diagnoses as listed in the assessment----

## 2024-09-03 NOTE — PROVIDER CONTACT NOTE (OTHER) - ASSESSMENT
BP 88/60 manually, HR 83, sating 92% nasal cannula 2L, temp 98 oral
Patient is not in distress
VS stable, no nausea or vomiting noted

## 2024-09-03 NOTE — PROVIDER CONTACT NOTE (OTHER) - ACTION/TREATMENT ORDERED:
No new order from MD
EKG done, labs sent, Tylenol IV given. EKG seen by NP
EKG done. amiordarone 200mg given. chest xray done. pt npo
NP at bedside, shaheed lasix??

## 2024-09-03 NOTE — PROGRESS NOTE ADULT - SUBJECTIVE AND OBJECTIVE BOX
[facility] HEMATOLOGY/ONCOLOGY INPATIENT PROGRESS NOTE     Interval Hx:   09-03-24: Ms. Slater was seen at bedside today.    Meds:   MEDICATIONS  (STANDING):  albuterol/ipratropium for Nebulization 3 milliLiter(s) Nebulizer every 6 hours  buDESOnide    Inhalation Suspension 0.5 milliGRAM(s) Inhalation every 12 hours  furosemide   Injectable 20 milliGRAM(s) IV Push daily  heparin   Injectable 5000 Unit(s) SubCutaneous every 12 hours  levothyroxine Injectable 70 MICROGram(s) IV Push at bedtime  metoprolol tartrate 25 milliGRAM(s) Oral two times a day  pantoprazole  Injectable 40 milliGRAM(s) IV Push daily    MEDICATIONS  (PRN):    Vital Signs Last 24 Hrs  T(C): 36.7 (03 Sep 2024 04:04), Max: 36.7 (02 Sep 2024 05:08)  T(F): 98.1 (03 Sep 2024 04:04), Max: 98.1 (02 Sep 2024 05:08)  HR: 74 (03 Sep 2024 04:04) (66 - 99)  BP: 115/72 (03 Sep 2024 04:04) (109/57 - 119/71)  BP(mean): --  RR: 18 (03 Sep 2024 04:04) (18 - 20)  SpO2: 99% (03 Sep 2024 04:04) (84% - 99%)    Parameters below as of 03 Sep 2024 04:04  Patient On (Oxygen Delivery Method): nasal cannula    Physical Exam:  Gen:  NAD, lethargic  HEENT: EOMI, MMM  Chest: equal chest rise, speaking full sentences  Cardiac: irregular  Abd: soft, non-tender, no hepatomegaly or splenomegaly  Ext: No edema   Neuro: AAOx3, normal mood and affect, hard of hearing    Labs:                        9.1    5.62  )-----------( 86       ( 02 Sep 2024 06:31 )             28.6     CBC Full  -  ( 02 Sep 2024 06:31 )  WBC Count : 5.62 K/uL  RBC Count : 2.75 M/uL  Hemoglobin : 9.1 g/dL  Hematocrit : 28.6 %  Platelet Count - Automated : 86 K/uL  Mean Cell Volume : 104.0 fl  Mean Cell Hemoglobin : 33.1 pg  Mean Cell Hemoglobin Concentration : 31.8 gm/dL    09-01    138  |  97  |  21  ----------------------------<  89  3.8   |  32<H>  |  0.63    Ca    8.4      01 Sep 2024 07:23  Phos  2.5     09-01  Mg     2.1     09-01         [facility] HEMATOLOGY/ONCOLOGY INPATIENT PROGRESS NOTE     Interval Hx:   09-03-24: Ms. Slater was seen at bedside today, awake and alert, endorsed ongoing chest discomfort, nursing staff at bedside to discuss with overnight staff, CTA Chest completed, recommend repeat EKG this morning, awake and alert, respiratory status appropriate, speaking full sentences, no signs of bleeding, bruising resolving     Meds:   MEDICATIONS  (STANDING):  albuterol/ipratropium for Nebulization 3 milliLiter(s) Nebulizer every 6 hours  buDESOnide    Inhalation Suspension 0.5 milliGRAM(s) Inhalation every 12 hours  furosemide   Injectable 20 milliGRAM(s) IV Push daily  heparin   Injectable 5000 Unit(s) SubCutaneous every 12 hours  levothyroxine Injectable 70 MICROGram(s) IV Push at bedtime  metoprolol tartrate 25 milliGRAM(s) Oral two times a day  pantoprazole  Injectable 40 milliGRAM(s) IV Push daily    MEDICATIONS  (PRN):    Vital Signs Last 24 Hrs  T(C): 36.7 (03 Sep 2024 04:04), Max: 36.7 (02 Sep 2024 05:08)  T(F): 98.1 (03 Sep 2024 04:04), Max: 98.1 (02 Sep 2024 05:08)  HR: 74 (03 Sep 2024 04:04) (66 - 99)  BP: 115/72 (03 Sep 2024 04:04) (109/57 - 119/71)  BP(mean): --  RR: 18 (03 Sep 2024 04:04) (18 - 20)  SpO2: 99% (03 Sep 2024 04:04) (84% - 99%)    Parameters below as of 03 Sep 2024 04:04  Patient On (Oxygen Delivery Method): nasal cannula    Physical Exam:  Gen:  NAD, lethargic  HEENT: EOMI, MMM  Chest: equal chest rise, speaking full sentences  Cardiac: irregular  Abd: soft, non-tender, no hepatomegaly or splenomegaly  Ext: No edema   Neuro: AAOx3, normal mood and affect, hard of hearing    Labs:                        9.1    5.62  )-----------( 86       ( 02 Sep 2024 06:31 )             28.6     CBC Full  -  ( 02 Sep 2024 06:31 )  WBC Count : 5.62 K/uL  RBC Count : 2.75 M/uL  Hemoglobin : 9.1 g/dL  Hematocrit : 28.6 %  Platelet Count - Automated : 86 K/uL  Mean Cell Volume : 104.0 fl  Mean Cell Hemoglobin : 33.1 pg  Mean Cell Hemoglobin Concentration : 31.8 gm/dL    09-01    138  |  97  |  21  ----------------------------<  89  3.8   |  32<H>  |  0.63    Ca    8.4      01 Sep 2024 07:23  Phos  2.5     09-01  Mg     2.1     09-01

## 2024-09-03 NOTE — PROVIDER CONTACT NOTE (OTHER) - SITUATION
pt c/o chest pressure, feeling uncomfortable
pt hypotensive 88/60 manually
pt admitted with respiratory failure

## 2024-09-03 NOTE — CHART NOTE - NSCHARTNOTEFT_GEN_A_CORE
MEDICINE NP    Notified by RN patient with pressure. Seen and examined patient at bedside. Patient is alert, NAD. Denies HA, SOB, cough, N/V, or abd pain. patient complaint of chest pressure not palpable nor reproducible. Pa. trops and ekg ordered will continue to monitor for worsening condition.    VITAL SIGNS:  T(C): 36.7 (09-03-24 @ 04:04), Max: 36.7 (09-02-24 @ 12:16)  HR: 74 (09-03-24 @ 04:04) (66 - 99)  BP: 115/72 (09-03-24 @ 04:04) (109/57 - 119/71)  RR: 18 (09-03-24 @ 04:04) (18 - 20)  SpO2: 99% (09-03-24 @ 04:04) (84% - 99%)  Wt(kg): --      LABORATORY:                          9.1    5.62  )-----------( 86       ( 02 Sep 2024 06:31 )             28.6       09-01    138  |  97  |  21  ----------------------------<  89  3.8   |  32<H>  |  0.63    Ca    8.4      01 Sep 2024 07:23  Phos  2.5     09-01  Mg     2.1     09-01            MICROBIOLOGY:           RADIOLOGY:          PHYSICAL EXAM:    Constitutional: AOx3. NAD.    Respiratory: clear lungs bilaterally. No wheezing, rhonchi, or crackles.    Cardiovascular: S1 S2. No murmurs.    Gastrointestinal: BS X4 active. soft. nontender.    Extremities/Vascular: +2 pulses bilaterally. No BLE edema.      ASSESSMENT/PLAN:   HPI:  89F with history of paroxysmal atrial fibrillation, hypothyroidism, pulmonary/abdominal tuberculosis, recurrent small bowel obstruction, recurrent UTIs presenting from home with abdominal pain and vomiting for 1 day. Per daughter and HHA at bedside, patient was in her usual state of health until yesterday. Normally able to communicate and ambulate to the bathroom and eat on her own. Does have a HHA for assistance as needed. HHA reports that around 2:30 AM last night, patient called for assistance and was vomiting and asked to call an ambulance. CT scan performed in ED showed evidence of dilated small bowel loops with large stool burden with concern for small bowel obstruction vs enteritis. Also with some imaging findings concerning for multifocal pneumonia. Surgery consulted given history of small bowel obstruction and imaging and NGT placed with NGT to suction draining minimal dark abdominal contents. Additionally with hypoxemic respiratory failure requiring HFNC 40/40. ED course c/b hypotension 70/40s with transient improvement with 3L IVF however with repeat hypotension despite these measures.   GOC discussion had with daughter at bedside. Patient is DNR/DNI and would not want aggressive measures including surgery or central line placement for pressor therapy. Would like a trial of IV pressors via peripheral IV and if these fails, daughter would not want further escalation. Was also seen by palliative care team in ED and was being considered for PCU on fixed dose pressors however no beds available at this time.  Admitted to MICU for septic shock. experiencing chest pressure again        1) chest pressure  -tylenol   -trop  -CXR  -c/w tele  -F/U primary team in AM

## 2024-09-03 NOTE — PROGRESS NOTE ADULT - SUBJECTIVE AND OBJECTIVE BOX
Date of Service: 09-03-24 @ 14:54    Patient is a 89y old  Female who presents with a chief complaint of septic shock (03 Sep 2024 11:23)      Any change in ROS: remains on 2 L of oxygen : doing ok : no sob:      MEDICATIONS  (STANDING):  albuterol/ipratropium for Nebulization 3 milliLiter(s) Nebulizer every 6 hours  buDESOnide    Inhalation Suspension 0.5 milliGRAM(s) Inhalation every 12 hours  furosemide   Injectable 20 milliGRAM(s) IV Push daily  heparin   Injectable 5000 Unit(s) SubCutaneous every 12 hours  levothyroxine Injectable 70 MICROGram(s) IV Push at bedtime  metoprolol tartrate 25 milliGRAM(s) Oral two times a day  pantoprazole  Injectable 40 milliGRAM(s) IV Push daily    MEDICATIONS  (PRN):    Vital Signs Last 24 Hrs  T(C): 36.7 (03 Sep 2024 04:04), Max: 36.7 (02 Sep 2024 20:12)  T(F): 98.1 (03 Sep 2024 04:04), Max: 98.1 (02 Sep 2024 20:12)  HR: 83 (03 Sep 2024 12:40) (66 - 99)  BP: 91/58 (03 Sep 2024 12:41) (73/47 - 115/72)  BP(mean): --  RR: 18 (03 Sep 2024 12:40) (18 - 18)  SpO2: 93% (03 Sep 2024 12:40) (92% - 99%)    Parameters below as of 03 Sep 2024 12:40  Patient On (Oxygen Delivery Method): nasal cannula  O2 Flow (L/min): 2      I&O's Summary    02 Sep 2024 07:01  -  03 Sep 2024 07:00  --------------------------------------------------------  IN: 980 mL / OUT: 600 mL / NET: 380 mL    03 Sep 2024 07:01  -  03 Sep 2024 14:54  --------------------------------------------------------  IN: 0 mL / OUT: 200 mL / NET: -200 mL          Physical Exam:   GENERAL: NAD, well-groomed, well-developed  HEENT: [place]OLBY/   Atraumatic, Normocephalic  ENMT: No tonsillar erythema, exudates, or enlargement; Moist mucous membranes, Good dentition, No lesions  NECK: Supple, No JVD, Normal thyroid  CHEST/LUNG: Clear to auscultaion  CVS: Regular rate and rhythm; No murmurs, rubs, or gallops  GI: : Soft, Nontender, Nondistended; Bowel sounds present  NERVOUS SYSTEM:  Alert & Oriented X3  EXTREMITIES:  2+ Peripheral Pulses, No clubbing, cyanosis, or edema  LYMPH: No lymphadenopathy noted  SKIN: No rashes or lesions  ENDOCRINOLOGY: No Thyromegaly  PSYCH: Appropriate    Labs:                              10.6   6.23  )-----------( 72       ( 03 Sep 2024 06:52 )             33.5                         9.1    5.62  )-----------( 86       ( 02 Sep 2024 06:31 )             28.6                         8.5    5.63  )-----------( 57       ( 01 Sep 2024 06:48 )             27.3                         8.9    6.96  )-----------( 60       ( 31 Aug 2024 14:33 )             28.2     09-03    139  |  95<L>  |  18  ----------------------------<  73  3.5   |  31  |  0.48<L>  09-01    138  |  97  |  21  ----------------------------<  89  3.8   |  32<H>  |  0.63  08-31    138  |  94<L>  |  26<H>  ----------------------------<  123<H>  3.9   |  32<H>  |  0.48<L>    Ca    8.9      03 Sep 2024 07:33  Phos  2.5     09-03  Mg     2.2     09-03      CAPILLARY BLOOD GLUCOSE      POCT Blood Glucose.: 140 mg/dL (03 Sep 2024 12:25)  POCT Blood Glucose.: 84 mg/dL (03 Sep 2024 05:35)  POCT Blood Glucose.: 147 mg/dL (03 Sep 2024 00:01)  POCT Blood Glucose.: 185 mg/dL (02 Sep 2024 18:19)          Urinalysis Basic - ( 03 Sep 2024 07:33 )    Color: x / Appearance: x / SG: x / pH: x  Gluc: 73 mg/dL / Ketone: x  / Bili: x / Urobili: x   Blood: x / Protein: x / Nitrite: x   Leuk Esterase: x / RBC: x / WBC x   Sq Epi: x / Non Sq Epi: x / Bacteria: x      D-Dimer Assay, Quantitative: 929 ng/mL DDU (08-31 @ 14:33)  rad< from: CT Angio Chest PE Protocol w/ IV Cont (09.02.24 @ 17:56) >  BONES/SOFT TISSUES: No bone lesion or acute fracture.      IMPRESSION:    No pulmonary embolus.    Decreased pulmonary edema. Unchanged small pleural effusions.    Persistent occlusive mucous plugs in the right middle and lower lobes.   Decreased distal atelectasis.    --- End of Report ---          JOHANN SWARTZ DO; Resident Radiologist  This document has been electronically signed.  DENNY HOWARD M.D., ATTENDING RADIOLOGIST  This document has been electronically signed. Sep  3 2024 10:05AM    < end of copied text >        RECENT CULTURES:        RESPIRATORY CULTURES:          Studies  Chest X-RAY  CT SCAN Chest   Venous Dopplers: LE:   CT Abdomen  Others

## 2024-09-03 NOTE — PROGRESS NOTE ADULT - ASSESSMENT
89F with history of paroxysmal atrial fibrillation, hypothyroidism, pulmonary/abdominal tuberculosis, recurrent small bowel obstruction, recurrent UTIs presenting from home with abdominal pain and vomiting for 1 day with CT scans with evidence of SBO vs enteritis s/p NGT. Course c/b hypotension likely iso septic from multifocal PNA process.    #Septic Shock, resolved  #Multifocal PNA   #Acute metabolic encephalopathy   - required levophed for shock state but now weaned off  - completed abx for pna  - BC NTD  - PT OOB    #Acute hfpef    #Acute Hypoxemic Respiratory Failure  - Strict I & O, Daily weights   - on Lasix 20 mg IV daily per cardio, change to PO soon  - CT chest Occlusive debris/mucous plugging within bronchus intermedius and more distal airways of the RML and RLL, RML and RLL collapse with volume loss. Small bilateral pleural effusions. Patchy ground-glass opacities throughout the right upper lobe and lingula may be secondary to edema or infection.  - LE doppler neg dvt, CTA chest neg PE  - TTE with preserved lvef  - on 3L, cont to titrate down  - steroid taper per pulm  - pt did not complete swallow eval, no aspiration with liquids, will advance diet, maintain aspiration precautions    #Paroxysmal Atrial Fibrillation  - eliquis on hold for thrombocytopenia, Can proceed with Hep SubQ BID, can consider Eliquis if Plt count consistently > 75k per heme    #Small Bowel Obstruction  #History of Abdominal Tuberculosis   #History of Recurrent Small Bowel Obstruction  - advance diet to soft, aspiration precautions    #pancytopenia  - was receiving prednisone OP for low blood pressure  - likely bone marrow disorder, outpt fu heme    #Hypothyroidism  - c/w levothyroxine 75 mcg IV    dvt ppx HSQ, eliquis on hold for thrombocytopenia    GOC: DNR/DNI    dispo: cont to titrate down O2, may need home O2  eventual dc to home with HHA per dtr once medically stable    Dr White will be covering me starting tomorrow.  Please contact with any questions or concerns [phone].

## 2024-09-03 NOTE — PROVIDER CONTACT NOTE (OTHER) - REASON
chest pressure
changes in pt hr
pt hypotensive 88/60
Patient noted with HR fluctuating between 107 and 135. HR does not sustain above 130

## 2024-09-03 NOTE — PROVIDER CONTACT NOTE (OTHER) - BACKGROUND
pt admitted for acute resp failure, PNA, hx CHF lasix 20 mg IVP daily
Afib  UTI  Small bowel obstruction
septic shock. pneumonia, SBO, respiratory failure, , PAF,

## 2024-09-03 NOTE — PROGRESS NOTE ADULT - SUBJECTIVE AND OBJECTIVE BOX
Patient is a 89y old  Female who presents with a chief complaint of septic shock (03 Sep 2024 04:24)      SUBJECTIVE / OVERNIGHT EVENTS:    Patient seen and examined. pt co CP this morning.      Vital Signs Last 24 Hrs  T(C): 36.7 (03 Sep 2024 04:04), Max: 36.7 (02 Sep 2024 12:16)  T(F): 98.1 (03 Sep 2024 04:04), Max: 98.1 (02 Sep 2024 20:12)  HR: 74 (03 Sep 2024 04:04) (66 - 99)  BP: 115/72 (03 Sep 2024 04:04) (109/57 - 119/71)  BP(mean): --  RR: 18 (03 Sep 2024 04:04) (18 - 18)  SpO2: 99% (03 Sep 2024 04:04) (95% - 99%)    Parameters below as of 03 Sep 2024 04:04  Patient On (Oxygen Delivery Method): nasal cannula  O2 Flow (L/min): 3    I&O's Summary    02 Sep 2024 07:01  -  03 Sep 2024 07:00  --------------------------------------------------------  IN: 980 mL / OUT: 600 mL / NET: 380 mL        PE:  GENERAL: NAD, AAOx2, [place]  CHEST/LUNG: coarse bs  HEART: Regular rate and rhythm; + murmur  ABDOMEN: Soft, Nontender, Nondistended; Bowel sounds present  EXTREMITIES:  2+ Peripheral Pulses, +1 LE edema  NEURO: No focal deficits    LABS:                        10.6   6.23  )-----------( 72       ( 03 Sep 2024 06:52 )             33.5     09-03    139  |  95<L>  |  18  ----------------------------<  73  3.5   |  31  |  0.48<L>    Ca    8.9      03 Sep 2024 07:33  Phos  2.5     09-03  Mg     2.2     09-03        CAPILLARY BLOOD GLUCOSE      POCT Blood Glucose.: 84 mg/dL (03 Sep 2024 05:35)  POCT Blood Glucose.: 147 mg/dL (03 Sep 2024 00:01)  POCT Blood Glucose.: 185 mg/dL (02 Sep 2024 18:19)  POCT Blood Glucose.: 122 mg/dL (02 Sep 2024 12:47)        Urinalysis Basic - ( 03 Sep 2024 07:33 )    Color: x / Appearance: x / SG: x / pH: x  Gluc: 73 mg/dL / Ketone: x  / Bili: x / Urobili: x   Blood: x / Protein: x / Nitrite: x   Leuk Esterase: x / RBC: x / WBC x   Sq Epi: x / Non Sq Epi: x / Bacteria: x        RADIOLOGY & ADDITIONAL TESTS:    Imaging Personally Reviewed:  [x] YES  [ ] NO    Consultant(s) Notes Reviewed:  [x] YES  [ ] NO    MEDICATIONS  (STANDING):  albuterol/ipratropium for Nebulization 3 milliLiter(s) Nebulizer every 6 hours  buDESOnide    Inhalation Suspension 0.5 milliGRAM(s) Inhalation every 12 hours  furosemide   Injectable 20 milliGRAM(s) IV Push daily  heparin   Injectable 5000 Unit(s) SubCutaneous every 12 hours  levothyroxine Injectable 70 MICROGram(s) IV Push at bedtime  metoprolol tartrate 25 milliGRAM(s) Oral two times a day  pantoprazole  Injectable 40 milliGRAM(s) IV Push daily    MEDICATIONS  (PRN):      Care Discussed with Consultants/Other Providers [x] YES  [ ] NO    HEALTH ISSUES - PROBLEM Dx:  Acute hypoxic respiratory failure    SBO (small bowel obstruction)    Functional quadriplegia    Advance care planning    Palliative care encounter    Septic shock

## 2024-09-04 LAB
ANION GAP SERPL CALC-SCNC: 8 MMOL/L — SIGNIFICANT CHANGE UP (ref 5–17)
BUN SERPL-MCNC: 16 MG/DL — SIGNIFICANT CHANGE UP (ref 7–23)
CALCIUM SERPL-MCNC: 8.4 MG/DL — SIGNIFICANT CHANGE UP (ref 8.4–10.5)
CHLORIDE SERPL-SCNC: 93 MMOL/L — LOW (ref 96–108)
CO2 SERPL-SCNC: 35 MMOL/L — HIGH (ref 22–31)
CREAT SERPL-MCNC: 0.54 MG/DL — SIGNIFICANT CHANGE UP (ref 0.5–1.3)
EGFR: 83 ML/MIN/1.73M2 — SIGNIFICANT CHANGE UP
GLUCOSE BLDC GLUCOMTR-MCNC: 101 MG/DL — HIGH (ref 70–99)
GLUCOSE BLDC GLUCOMTR-MCNC: 103 MG/DL — HIGH (ref 70–99)
GLUCOSE BLDC GLUCOMTR-MCNC: 123 MG/DL — HIGH (ref 70–99)
GLUCOSE BLDC GLUCOMTR-MCNC: 80 MG/DL — SIGNIFICANT CHANGE UP (ref 70–99)
GLUCOSE BLDC GLUCOMTR-MCNC: 92 MG/DL — SIGNIFICANT CHANGE UP (ref 70–99)
GLUCOSE SERPL-MCNC: 89 MG/DL — SIGNIFICANT CHANGE UP (ref 70–99)
HCT VFR BLD CALC: 30.3 % — LOW (ref 34.5–45)
HGB BLD-MCNC: 9.5 G/DL — LOW (ref 11.5–15.5)
MAGNESIUM SERPL-MCNC: 2.1 MG/DL — SIGNIFICANT CHANGE UP (ref 1.6–2.6)
MCHC RBC-ENTMCNC: 31.4 GM/DL — LOW (ref 32–36)
MCHC RBC-ENTMCNC: 32.1 PG — SIGNIFICANT CHANGE UP (ref 27–34)
MCV RBC AUTO: 102.4 FL — HIGH (ref 80–100)
NRBC # BLD: 0 /100 WBCS — SIGNIFICANT CHANGE UP (ref 0–0)
PHOSPHATE SERPL-MCNC: 3.1 MG/DL — SIGNIFICANT CHANGE UP (ref 2.5–4.5)
PLATELET # BLD AUTO: 106 K/UL — LOW (ref 150–400)
POTASSIUM SERPL-MCNC: 3.5 MMOL/L — SIGNIFICANT CHANGE UP (ref 3.5–5.3)
POTASSIUM SERPL-SCNC: 3.5 MMOL/L — SIGNIFICANT CHANGE UP (ref 3.5–5.3)
RBC # BLD: 2.96 M/UL — LOW (ref 3.8–5.2)
RBC # FLD: 14.9 % — HIGH (ref 10.3–14.5)
SODIUM SERPL-SCNC: 136 MMOL/L — SIGNIFICANT CHANGE UP (ref 135–145)
WBC # BLD: 5.1 K/UL — SIGNIFICANT CHANGE UP (ref 3.8–10.5)
WBC # FLD AUTO: 5.1 K/UL — SIGNIFICANT CHANGE UP (ref 3.8–10.5)

## 2024-09-04 PROCEDURE — 93010 ELECTROCARDIOGRAM REPORT: CPT

## 2024-09-04 RX ORDER — ACETAMINOPHEN 325 MG/1
1000 TABLET ORAL ONCE
Refills: 0 | Status: COMPLETED | OUTPATIENT
Start: 2024-09-04 | End: 2024-09-04

## 2024-09-04 RX ADMIN — Medication 5000 UNIT(S): at 17:42

## 2024-09-04 RX ADMIN — METOPROLOL TARTRATE 25 MILLIGRAM(S): 100 TABLET ORAL at 08:31

## 2024-09-04 RX ADMIN — IPRATROPIUM BROMIDE AND ALBUTEROL SULFATE 3 MILLILITER(S): .5; 3 SOLUTION RESPIRATORY (INHALATION) at 11:15

## 2024-09-04 RX ADMIN — Medication 40 MILLIGRAM(S): at 11:15

## 2024-09-04 RX ADMIN — METOPROLOL TARTRATE 25 MILLIGRAM(S): 100 TABLET ORAL at 21:28

## 2024-09-04 RX ADMIN — Medication 5000 UNIT(S): at 06:13

## 2024-09-04 RX ADMIN — IPRATROPIUM BROMIDE AND ALBUTEROL SULFATE 3 MILLILITER(S): .5; 3 SOLUTION RESPIRATORY (INHALATION) at 00:13

## 2024-09-04 RX ADMIN — Medication 70 MICROGRAM(S): at 21:29

## 2024-09-04 RX ADMIN — BUDESONIDE 0.5 MILLIGRAM(S): 3 CAPSULE ORAL at 17:41

## 2024-09-04 RX ADMIN — IPRATROPIUM BROMIDE AND ALBUTEROL SULFATE 3 MILLILITER(S): .5; 3 SOLUTION RESPIRATORY (INHALATION) at 06:13

## 2024-09-04 RX ADMIN — BUDESONIDE 0.5 MILLIGRAM(S): 3 CAPSULE ORAL at 06:13

## 2024-09-04 RX ADMIN — IPRATROPIUM BROMIDE AND ALBUTEROL SULFATE 3 MILLILITER(S): .5; 3 SOLUTION RESPIRATORY (INHALATION) at 17:41

## 2024-09-04 RX ADMIN — ACETAMINOPHEN 400 MILLIGRAM(S): 325 TABLET ORAL at 06:14

## 2024-09-04 NOTE — PROGRESS NOTE ADULT - SUBJECTIVE AND OBJECTIVE BOX
DATE OF SERVICE: 09-04-24 @ 21:57    Patient is a 89y old  Female who presents with a chief complaint of septic shock (04 Sep 2024 16:08)      INTERVAL HISTORY: feels ok    TELEMETRY Personally reviewed: no events  	  MEDICATIONS:  metoprolol tartrate 25 milliGRAM(s) Oral two times a day        PHYSICAL EXAM:  T(C): 36.7 (09-04-24 @ 20:00), Max: 36.7 (09-04-24 @ 20:00)  HR: 89 (09-04-24 @ 20:00) (89 - 100)  BP: 102/69 (09-04-24 @ 20:00) (98/65 - 105/64)  RR: 18 (09-04-24 @ 20:00) (18 - 18)  SpO2: 97% (09-04-24 @ 20:00) (93% - 99%)  Wt(kg): --  I&O's Summary    03 Sep 2024 07:01  -  04 Sep 2024 07:00  --------------------------------------------------------  IN: 400 mL / OUT: 200 mL / NET: 200 mL    04 Sep 2024 07:01  -  04 Sep 2024 21:57  --------------------------------------------------------  IN: 420 mL / OUT: 750 mL / NET: -330 mL          Appearance: In no distress	  HEENT:    PERRL, EOMI	  Cardiovascular:  S1 S2, No JVD  Respiratory: Lungs clear to auscultation	  Gastrointestinal:  Soft, Non-tender, + BS	  Vascularature:  No edema of LE  Psychiatric: Appropriate affect   Neuro: no acute focal deficits                               9.5    5.10  )-----------( 106      ( 04 Sep 2024 11:31 )             30.3     09-04    136  |  93<L>  |  16  ----------------------------<  89  3.5   |  35<H>  |  0.54    Ca    8.4      04 Sep 2024 11:32  Phos  3.1     09-04  Mg     2.1     09-04          Labs personally reviewed      ASSESSMENT/PLAN: 	    89F with history of paroxysmal atrial fibrillation, hypothyroidism, pulmonary/abdominal tuberculosis, recurrent small bowel obstruction, recurrent UTIs presenting from home with abdominal pain and vomiting for 1 day with CT scans with evidence of SBO vs enteritis s/p NGT. Course c/b hypotension likely iso septic from multifocal PNA process.    1. Paroxysmal Atrial Fibrillation  - Switch Metoprolol IV to Metoprolol 25mg PO BID  - Hold Eliquis given thrombocytopenia ~50, resume when safe per heme but risks seem to outweigh benefits at this time    2. Acute hfpef - On Lasix 20 mg IV daily   - Switch to PO penidng CT chest  - TTE with preserved EF and mild pulm HTN  - Rec Chest CT no contrast given persistent hypoxia  - CT chest : Occlusive debris/mucous plugging within bronchus intermedius and more   distal airways of the right middle lobe and right lower lobe. Small bilateral pleural effusions.Patchy ground-glass opacities throughout the right upper lobe and lingula may be secondary to edema or infection.  -  c/w IVP lasix 20mg qd, transition to PO 9/4, Lasix 20mg PO daily for discharge  - CTA chest negative for PE and decreased pulm edema but persistent mucous plugging.     3. Multifocal PNA   - Abx as per primary team     4. DVT PPX - on HSQ          Nilton Roldan DO [facility]  Cardiovascular Medicine  [address], [address]  Office: [phone]  Available via Text/call on Microsoft Teams

## 2024-09-04 NOTE — PROGRESS NOTE ADULT - SUBJECTIVE AND OBJECTIVE BOX
Patient is a 89y old  Female who presents with a chief complaint of septic shock (04 Sep 2024 04:45)      SUBJECTIVE / OVERNIGHT EVENTS:  Patient seen and examined.   Doing well.       Vital Signs Last 24 Hrs  T(C): 36.2 (04 Sep 2024 11:37), Max: 36.7 (03 Sep 2024 20:20)  T(F): 97.2 (04 Sep 2024 11:37), Max: 98.1 (03 Sep 2024 20:20)  HR: 100 (04 Sep 2024 11:37) (80 - 100)  BP: 98/65 (04 Sep 2024 11:37) (93/58 - 105/64)  BP(mean): --  RR: 18 (04 Sep 2024 11:37) (18 - 18)  SpO2: 99% (04 Sep 2024 11:37) (93% - 99%)    Parameters below as of 04 Sep 2024 11:37  Patient On (Oxygen Delivery Method): nasal cannula      I&O's Summary    03 Sep 2024 07:01  -  04 Sep 2024 07:00  --------------------------------------------------------  IN: 400 mL / OUT: 200 mL / NET: 200 mL        PHYSICAL EXAM:  GENERAL: NAD, AAO  HEAD:  Atraumatic, Normocephalic  EYES: EOMI; conjunctiva and sclera clear  NECK: Supple, No JVD, No LAD  CHEST/LUNG: B/L air entry; No wheezes, rales or rhonci   HEART: Regular rate and rhythm; No murmurs, rubs, or gallops  ABDOMEN: Soft, Nontender, Nondistended; Bowel sounds present  EXTREMITIES:  2+ Peripheral Pulses, No clubbing, cyanosis, or edema  SKIN: No rashes or lesions    LABS:                        9.5    5.10  )-----------( 106      ( 04 Sep 2024 11:31 )             30.3     09-04    136  |  93<L>  |  16  ----------------------------<  89  3.5   |  35<H>  |  0.54    Ca    8.4      04 Sep 2024 11:32  Phos  3.1     09-04  Mg     2.1     09-04        CAPILLARY BLOOD GLUCOSE      POCT Blood Glucose.: 101 mg/dL (04 Sep 2024 12:24)  POCT Blood Glucose.: 103 mg/dL (04 Sep 2024 06:33)  POCT Blood Glucose.: 123 mg/dL (04 Sep 2024 00:59)        Urinalysis Basic - ( 04 Sep 2024 11:32 )    Color: x / Appearance: x / SG: x / pH: x  Gluc: 89 mg/dL / Ketone: x  / Bili: x / Urobili: x   Blood: x / Protein: x / Nitrite: x   Leuk Esterase: x / RBC: x / WBC x   Sq Epi: x / Non Sq Epi: x / Bacteria: x        RADIOLOGY & ADDITIONAL TESTS:    Imaging Personally Reviewed:  [x] YES  [ ] NO    Consultant(s) Notes Reviewed:  [x] YES  [ ] NO      MEDICATIONS  (STANDING):  albuterol/ipratropium for Nebulization 3 milliLiter(s) Nebulizer every 6 hours  buDESOnide    Inhalation Suspension 0.5 milliGRAM(s) Inhalation every 12 hours  heparin   Injectable 5000 Unit(s) SubCutaneous every 12 hours  levothyroxine Injectable 70 MICROGram(s) IV Push at bedtime  metoprolol tartrate 25 milliGRAM(s) Oral two times a day  pantoprazole  Injectable 40 milliGRAM(s) IV Push daily    MEDICATIONS  (PRN):      Care Discussed with Consultants/Other Providers [x] YES  [ ] NO    HEALTH ISSUES - PROBLEM Dx:  Acute hypoxic respiratory failure    SBO (small bowel obstruction)    Functional quadriplegia    Advance care planning    Palliative care encounter    Septic shock

## 2024-09-04 NOTE — PROGRESS NOTE ADULT - SUBJECTIVE AND OBJECTIVE BOX
[facility] HEMATOLOGY/ONCOLOGY INPATIENT PROGRESS NOTE     Interval Hx:   09-04-24: Ms. Slater was seen at bedside today.    Meds:   MEDICATIONS  (STANDING):  acetaminophen   IVPB .. 1000 milliGRAM(s) IV Intermittent once  albuterol/ipratropium for Nebulization 3 milliLiter(s) Nebulizer every 6 hours  buDESOnide    Inhalation Suspension 0.5 milliGRAM(s) Inhalation every 12 hours  heparin   Injectable 5000 Unit(s) SubCutaneous every 12 hours  levothyroxine Injectable 70 MICROGram(s) IV Push at bedtime  metoprolol tartrate 25 milliGRAM(s) Oral two times a day  pantoprazole  Injectable 40 milliGRAM(s) IV Push daily    MEDICATIONS  (PRN):    Vital Signs Last 24 Hrs  T(C): 36.4 (04 Sep 2024 04:30), Max: 36.7 (03 Sep 2024 12:00)  T(F): 97.5 (04 Sep 2024 04:30), Max: 98.1 (03 Sep 2024 20:20)  HR: 93 (04 Sep 2024 04:30) (80 - 94)  BP: 99/64 (04 Sep 2024 04:30) (73/47 - 101/68)  BP(mean): --  RR: 18 (04 Sep 2024 04:30) (18 - 18)  SpO2: 93% (04 Sep 2024 04:30) (92% - 94%)    Parameters below as of 04 Sep 2024 04:30  Patient On (Oxygen Delivery Method): nasal cannula    Physical Exam:  Gen:  NAD, lethargic  HEENT: EOMI, MMM  Chest: equal chest rise, speaking full sentences  Cardiac: irregular  Abd: soft, non-tender, no hepatomegaly or splenomegaly  Ext: No edema   Neuro: AAOx3, normal mood and affect, hard of hearing    Labs:                        10.6   6.23  )-----------( 72       ( 03 Sep 2024 06:52 )             33.5     CBC Full  -  ( 03 Sep 2024 06:52 )  WBC Count : 6.23 K/uL  RBC Count : 3.22 M/uL  Hemoglobin : 10.6 g/dL  Hematocrit : 33.5 %  Platelet Count - Automated : 72 K/uL  Mean Cell Volume : 104.0 fl  Mean Cell Hemoglobin : 32.9 pg  Mean Cell Hemoglobin Concentration : 31.6 gm/dL    09-03    139  |  95<L>  |  18  ----------------------------<  73  3.5   |  31  |  0.48<L>    Ca    8.9      03 Sep 2024 07:33  Phos  2.5     09-03  Mg     2.2     09-03         [facility] HEMATOLOGY/ONCOLOGY INPATIENT PROGRESS NOTE     Interval Hx:   09-04-24: Ms. Slater was seen at bedside today, awake, no overnight events noted, no signs of bleeding, without new significant bruising, CTA Chest negative for PE, showed overall decreased pulmonary edema with unchanged small pleural effusions.    Persistent occlusive mucous plugs in the right middle and lower lobes.   Decreased distal atelectasis.    Meds:   MEDICATIONS  (STANDING):  acetaminophen   IVPB .. 1000 milliGRAM(s) IV Intermittent once  albuterol/ipratropium for Nebulization 3 milliLiter(s) Nebulizer every 6 hours  buDESOnide    Inhalation Suspension 0.5 milliGRAM(s) Inhalation every 12 hours  heparin   Injectable 5000 Unit(s) SubCutaneous every 12 hours  levothyroxine Injectable 70 MICROGram(s) IV Push at bedtime  metoprolol tartrate 25 milliGRAM(s) Oral two times a day  pantoprazole  Injectable 40 milliGRAM(s) IV Push daily    MEDICATIONS  (PRN):    Vital Signs Last 24 Hrs  T(C): 36.4 (04 Sep 2024 04:30), Max: 36.7 (03 Sep 2024 12:00)  T(F): 97.5 (04 Sep 2024 04:30), Max: 98.1 (03 Sep 2024 20:20)  HR: 93 (04 Sep 2024 04:30) (80 - 94)  BP: 99/64 (04 Sep 2024 04:30) (73/47 - 101/68)  BP(mean): --  RR: 18 (04 Sep 2024 04:30) (18 - 18)  SpO2: 93% (04 Sep 2024 04:30) (92% - 94%)    Parameters below as of 04 Sep 2024 04:30  Patient On (Oxygen Delivery Method): nasal cannula    Physical Exam:  Gen:  NAD, lethargic  HEENT: EOMI, MMM  Chest: equal chest rise, speaking full sentences  Cardiac: irregular  Abd: soft, non-tender, no hepatomegaly or splenomegaly  Ext: No edema   Neuro: AAOx3, normal mood and affect, hard of hearing    Labs:                        10.6   6.23  )-----------( 72       ( 03 Sep 2024 06:52 )             33.5     CBC Full  -  ( 03 Sep 2024 06:52 )  WBC Count : 6.23 K/uL  RBC Count : 3.22 M/uL  Hemoglobin : 10.6 g/dL  Hematocrit : 33.5 %  Platelet Count - Automated : 72 K/uL  Mean Cell Volume : 104.0 fl  Mean Cell Hemoglobin : 32.9 pg  Mean Cell Hemoglobin Concentration : 31.6 gm/dL    09-03    139  |  95<L>  |  18  ----------------------------<  73  3.5   |  31  |  0.48<L>    Ca    8.9      03 Sep 2024 07:33  Phos  2.5     09-03  Mg     2.2     09-03

## 2024-09-04 NOTE — PROGRESS NOTE ADULT - SUBJECTIVE AND OBJECTIVE BOX
Date of Service: 09-04-24 @ 16:08    Patient is a 89y old  Female who presents with a chief complaint of septic shock (04 Sep 2024 13:00)      Any change in ROS: seems to be doing ok : no sob:  2 L     MEDICATIONS  (STANDING):  albuterol/ipratropium for Nebulization 3 milliLiter(s) Nebulizer every 6 hours  buDESOnide    Inhalation Suspension 0.5 milliGRAM(s) Inhalation every 12 hours  heparin   Injectable 5000 Unit(s) SubCutaneous every 12 hours  levothyroxine Injectable 70 MICROGram(s) IV Push at bedtime  metoprolol tartrate 25 milliGRAM(s) Oral two times a day  pantoprazole  Injectable 40 milliGRAM(s) IV Push daily    MEDICATIONS  (PRN):    Vital Signs Last 24 Hrs  T(C): 36.2 (04 Sep 2024 11:37), Max: 36.7 (03 Sep 2024 20:20)  T(F): 97.2 (04 Sep 2024 11:37), Max: 98.1 (03 Sep 2024 20:20)  HR: 100 (04 Sep 2024 11:37) (80 - 100)  BP: 98/65 (04 Sep 2024 11:37) (93/58 - 105/64)  BP(mean): --  RR: 18 (04 Sep 2024 11:37) (18 - 18)  SpO2: 99% (04 Sep 2024 11:37) (93% - 99%)    Parameters below as of 04 Sep 2024 11:37  Patient On (Oxygen Delivery Method): nasal cannula        I&O's Summary    03 Sep 2024 07:01  -  04 Sep 2024 07:00  --------------------------------------------------------  IN: 400 mL / OUT: 200 mL / NET: 200 mL    04 Sep 2024 07:01  -  04 Sep 2024 16:08  --------------------------------------------------------  IN: 220 mL / OUT: 200 mL / NET: 20 mL          Physical Exam:   GENERAL: NAD, well-groomed, well-developed  HEENT: KOLBY/   Atraumatic, Normocephalic  ENMT: No tonsillar erythema, exudates, or enlargement; Moist mucous membranes, Good dentition, No lesions  NECK: Supple, No JVD, Normal thyroid  CHEST/LUNG: Clear to auscultaion  CVS: Regular rate and rhythm; No murmurs, rubs, or gallops  GI: : Soft, Nontender, Nondistended; Bowel sounds present  NERVOUS SYSTEM:  Alert & Oriented X3  EXTREMITIES:  2+ Peripheral Pulses, No clubbing, cyanosis, or edema  LYMPH: No lymphadenopathy noted  SKIN: No rashes or lesions  ENDOCRINOLOGY: No Thyromegaly  PSYCH: Appropriate    Labs:                              9.5    5.10  )-----------( 106      ( 04 Sep 2024 11:31 )             30.3                         10.6   6.23  )-----------( 72       ( 03 Sep 2024 06:52 )             33.5                         9.1    5.62  )-----------( 86       ( 02 Sep 2024 06:31 )             28.6                         8.5    5.63  )-----------( 57       ( 01 Sep 2024 06:48 )             27.3     09-04    136  |  93<L>  |  16  ----------------------------<  89  3.5   |  35<H>  |  0.54  09-03    139  |  95<L>  |  18  ----------------------------<  73  3.5   |  31  |  0.48<L>  09-01    138  |  97  |  21  ----------------------------<  89  3.8   |  32<H>  |  0.63    Ca    8.4      04 Sep 2024 11:32  Ca    8.9      03 Sep 2024 07:33  Phos  3.1     09-04  Phos  2.5     09-03  Mg     2.1     09-04  Mg     2.2     09-03      CAPILLARY BLOOD GLUCOSE      POCT Blood Glucose.: 101 mg/dL (04 Sep 2024 12:24)  POCT Blood Glucose.: 103 mg/dL (04 Sep 2024 06:33)  POCT Blood Glucose.: 123 mg/dL (04 Sep 2024 00:59)          Urinalysis Basic - ( 04 Sep 2024 11:32 )    Color: x / Appearance: x / SG: x / pH: x  Gluc: 89 mg/dL / Ketone: x  / Bili: x / Urobili: x   Blood: x / Protein: x / Nitrite: x   Leuk Esterase: x / RBC: x / WBC x   Sq Epi: x / Non Sq Epi: x / Bacteria: x      D-Dimer Assay, Quantitative: 929 ng/mL DDU (08-31 @ 14:33)        RECENT CULTURES:        RESPIRATORY CULTURES:      rad< from: Xray Chest 1 View- PORTABLE-Urgent (Xray Chest 1 View- PORTABLE-Urgent .) (09.03.24 @ 07:02) >    TECHNIQUE: Single frontal, portable view of the chest was obtained.    COMPARISON: CT chest 9/2/2024    FINDINGS:  Cardiomegaly. Calcified aortic knob.  Right lower lung atelectasis. Left basilar atelectasis. Punctate   calcifications in the left apex.  Small bilateral pleural effusions.  No pneumothorax.    IMPRESSION:  Bibasilar atelectasis.    Small bilateral pleural effusions.    --- End of Report ---           JEFF BAIN MD; Resident Radiologist  This document has been electronically signed.  DESHAUN BARRON MD; Attending Radiologist  This document has been electronically signed. Sep  3 2024 10:28AM    < end of copied text >  < from: CT Angio Chest PE Protocol w/ IV Cont (09.02.24 @ 17:56) >  Decreased bilateral groundglass. Calcified nodules and scarring in the   apical left upper lobe, likely from old granulomatous disease. Unchanged   small pleural effusions.    LYMPH NODES/MEDIASTINUM: No lymphadenopathy. Calcified subcarinal lymph   node compatible with old granulomatous disease.    UPPER ABDOMEN: Calcified granulomas in the liver and spleen.   Cholelithiasis. Right renal cyst.    BONES/SOFT TISSUES: No bone lesion or acute fracture.      IMPRESSION:    No pulmonary embolus.    Decreased pulmonary edema. Unchanged small pleural effusions.    Persistent occlusive mucous plugs in the right middle and lower lobes.   Decreased distal atelectasis.    --- End of Report ---          JOHANN SWARTZ DO; Resident Radiologist  This document has been electronically signed.  DNENY HOWARD M.D., ATTENDING RADIOLOGIST  This document has been electronically signed. Sep  3 2024 10:05AM    < end of copied text >      Studies  Chest X-RAY  CT SCAN Chest   Venous Dopplers: LE:   CT Abdomen  Others

## 2024-09-04 NOTE — PROGRESS NOTE ADULT - ASSESSMENT
89F with history of paroxysmal atrial fibrillation, hypothyroidism, pulmonary/abdominal tuberculosis, recurrent small bowel obstruction, recurrent UTIs presenting from home with abdominal pain and vomiting for 1 day with CT scans with evidence of SBO vs enteritis s/p NGT. Course c/b hypotension likely iso septic from multifocal PNA process.    #Septic Shock, resolved  #Multifocal PNA   #Acute metabolic encephalopathy   - required levophed for shock state but now weaned off  - completed abx for pna  - BC NTD  - PT OOB    #Acute hfpef    #Acute Hypoxemic Respiratory Failure  - Strict I & O, Daily weights   - on Lasix 20 mg IV daily per cardio, change to PO soon  - CT chest Occlusive debris/mucous plugging within bronchus intermedius and more distal airways of the RML and RLL, RML and RLL collapse with volume loss. Small bilateral pleural effusions. Patchy ground-glass opacities throughout the right upper lobe and lingula may be secondary to edema or infection.  - LE doppler neg dvt, CTA chest neg PE  - TTE with preserved lvef  - on 3L, cont to titrate down  - steroid taper per pulm  - pt did not complete swallow eval, no aspiration with liquids, will advance diet, maintain aspiration precautions    #Paroxysmal Atrial Fibrillation  - eliquis on hold for thrombocytopenia, Can proceed with Hep SubQ BID, can consider Eliquis if Plt count consistently > 75k per heme    #Small Bowel Obstruction  #History of Abdominal Tuberculosis   #History of Recurrent Small Bowel Obstruction  - advance diet to soft, aspiration precautions    #pancytopenia  - was receiving prednisone OP for low blood pressure  - likely bone marrow disorder, outpt fu heme    #Hypothyroidism  - c/w levothyroxine 75 mcg IV    dvt ppx HSQ, eliquis on hold for thrombocytopenia    GOC: DNR/DNI    dispo: cont to titrate down O2, may need home O2  eventual dc to home with HHA per dtr once medically stable    Please contact with any questions or concerns [phone].

## 2024-09-04 NOTE — PROGRESS NOTE ADULT - ASSESSMENT
Ms. Slater is a 89y Female with PMHx of asthma, HTN, essential tremor, bronchiectasis, GERD, hypothyroidism, paroxysmal A.fib on Eliquis 2.5mg BID, Vit D deficiency, tuberculous peritonitis, anemia moderate AS, hx of falls, aortic regurgitation presented with nausea and vomiting 08/23/2024 and subsequently admitted to MICU with SBO and transferred to floor on 08/25/2024, treated conservatively complicated by septic shock requiring pressor support. Pt has hx of pancytopenia.     Pt has a history of pancytopenia as outpatient with Leukopenia with since about 3y prior initially lymphopenia and then with neutropenia, progressive, anemia over the past year gradually worsening, with progressive macrocytosis, and thrombocytopenia over the past two years. Pt requested conservative treatment and denied BmBx a few times in the past. Discussed with pt again this admission and she stated she would not like to undergo aggressive interventions and she would not like to have a bone marrow biopsy, and she prefers conservative treatment otherwise.     She denied history of blood disorders, bleeding complications, or personal history of malignancy. She denied smoking, ETOH use. She lives in her apt with a 24hr aide and her daughter visits regularly.     08/27/2024 on NRB, noted ongoing tachycardia overnight, cardiology consult pending, no signs of bleeding at this time, thrombocytopenia noted on Hep SubQ for PPx     08/28/2024: awake and alert, quite hard of hearing, nurse at bedside stated pt did well overnight, she had dry mucous membranes, no signs of bleeding, discussed her hematological diagnoses again, she is aware     08/29/2024: awake and alert, prolonged exam due to being hard of hearing, stated she has persistent coughing and associated nausea without vomiting, nursing staff at bedside, pt with minor bleeding from IV site, required pressure. Otherwise no bleeding noted, continues Hep SubQ for now    08/31/2024:  overnight had complained of chest pain/back pain, per nursing staff resolved with Tylenol otherwise was comfortable, no signs of bleeding, BMs normal     09/01/2024: awake and alert, stated she feels lousy today, noted US LE negative for DVT, positive D-Dimer, CT Chest without contrast showed ccclusive debris/mucous plugging within bronchus intermedius and more   distal airways of the right middle lobe and right lower lobe with resultant right middle lobe and right lower lobe collapse with volume loss and small bilateral pleural effusions. Additionally she has patchy ground-glass opacities throughout the right upper lobe and lingula   may be secondary to edema or infection.    09/02/2024: no overnight events noted, counts noted persistent anemia and thrombocytopenia leukopenia resolved likely due to steroids     09/03/2024:  awake and alert, endorsed ongoing chest discomfort, nursing staff at bedside to discuss with overnight staff, CTA Chest completed, recommend repeat EKG this morning, awake and alert, respiratory status appropriate, speaking full sentences, no signs of bleeding, bruising resolving, endorsed ongoing chest discomfort, nursing staff at bedside to discuss with overnight staff, CTA Chest completed, recommend repeat EKG this morning, awake and alert, respiratory status appropriate, speaking full sentences, no signs of bleeding, bruising resolving       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   - Outpatient labs show gradual progressive leukopenia x 3y, initial lymphopenia now both lymphopenia and neutropenia   -- Most recent outpatient WBC ranging 1.9-4.6   - Thrombocytopenia outpatient ranging 85-100s x 2y  - Anemia ranging 9.5-11 over past year, currently close to this  - MCV with progressive macrocytosis over past year  - ANC 3250, s/p steroids  - Iron sat 9%, Folate 15.6, B12 179  - Previous paraproteinemia workup negative for obvious plasma cell dyscrasia SPEP/MOOKIE normal, Serum immunoglobulins normal, serum FLCs (ratio 1.44), LDH (normal), Beta-2 Mcg (mild elv 2.6),   - Transfuse PRBC to maintain Hb > 7   - Transfuse Plt to maintain Plt > 10k  - Trend CBC daily with Diff for now   - Continue B12 1000mcg IM weekly  - Again reviewed and discussed pancytopenia with the pt she is aware of its gradual, progressive worsening over past three years, discussed with pt again this admission and she stated she would not like to undergo aggressive interventions and she would declined bone marrow biopsy  - GOC noted ongoing   - Needs to follow up as outpatient     # Small bowel obstruction  - CT scan performed in ED showed evidence of dilated small bowel loops with large stool burden with concern for small bowel obstruction vs enteritis  - S/P MICU admission with NGT decompression  - Transferred to floor 08/25/2024     # Aspiration Pneumonia  -  CT A/P lower chest with with multifocal PNA  -  Pulmonology following, recs noted  -  Resolved septic shock while in MICU  -  CT Chest CT Chest without contrast showed ccclusive debris/mucous plugging within bronchus intermedius and more   distal airways of the right middle lobe and right lower lobe with resultant right middle lobe and right lower lobe collapse with volume loss and small bilateral pleural effusions. Additionally she has patchy ground-glass opacities throughout the right upper lobe and lingula   may be secondary to edema or infection.  - Pulmonology follow up   - Antibiotics per ID and primary team    #A.fib  - Was previously on Eliquis 2.5mg per cardiology  - Plt 50k >48k >61k>65k>57k  - Pt at risk for CVA vs bleeding given clinical context  - Cardiology consult and recs noted, currently risks vs benefits favoring high risk for AC  - Noted US LE negative for DVT, positive D-Dimer  - CTA Chest 09/02/2024 read pending   - Can proceed with Hep SubQ BID, can consider Eliquis depending on Plt count trajectory, recommend ok to restart if Plt count consistently > 75k and per clinical risk     # Hypothyroidism  - Continue levothyroxine         Thank you for allowing me to participate in the care of Ms. Slater, please do not hesitate to call or text me if you have further questions or concerns.       Roni Nadir Polanco MD  [facility] [place]   Division of Hematology/Oncology  [address] [place] [address], [address]  [place] [address]  P: [phone]  F: [phone]    Attestation:    ----Pt evaluated including face-to-face interaction in addition to chart review, reviewing treatment plan, and managing the patient’s chronic diagnoses as listed in the assessment----      Ms. Slater is a 89y Female with PMHx of asthma, HTN, essential tremor, bronchiectasis, GERD, hypothyroidism, paroxysmal A.fib on Eliquis 2.5mg BID, Vit D deficiency, tuberculous peritonitis, anemia moderate AS, hx of falls, aortic regurgitation presented with nausea and vomiting 08/23/2024 and subsequently admitted to MICU with SBO and transferred to floor on 08/25/2024, treated conservatively complicated by septic shock requiring pressor support. Pt has hx of pancytopenia.     Pt has a history of pancytopenia as outpatient with Leukopenia with since about 3y prior initially lymphopenia and then with neutropenia, progressive, anemia over the past year gradually worsening, with progressive macrocytosis, and thrombocytopenia over the past two years. Pt requested conservative treatment and denied BmBx a few times in the past. Discussed with pt again this admission and she stated she would not like to undergo aggressive interventions and she would not like to have a bone marrow biopsy, and she prefers conservative treatment otherwise.     She denied history of blood disorders, bleeding complications, or personal history of malignancy. She denied smoking, ETOH use. She lives in her apt with a 24hr aide and her daughter visits regularly.     08/27/2024 on NRB, noted ongoing tachycardia overnight, cardiology consult pending, no signs of bleeding at this time, thrombocytopenia noted on Hep SubQ for PPx     08/28/2024: awake and alert, quite hard of hearing, nurse at bedside stated pt did well overnight, she had dry mucous membranes, no signs of bleeding, discussed her hematological diagnoses again, she is aware     08/29/2024: awake and alert, prolonged exam due to being hard of hearing, stated she has persistent coughing and associated nausea without vomiting, nursing staff at bedside, pt with minor bleeding from IV site, required pressure. Otherwise no bleeding noted, continues Hep SubQ for now    08/31/2024:  overnight had complained of chest pain/back pain, per nursing staff resolved with Tylenol otherwise was comfortable, no signs of bleeding, BMs normal     09/01/2024: awake and alert, stated she feels lousy today, noted US LE negative for DVT, positive D-Dimer, CT Chest without contrast showed ccclusive debris/mucous plugging within bronchus intermedius and more   distal airways of the right middle lobe and right lower lobe with resultant right middle lobe and right lower lobe collapse with volume loss and small bilateral pleural effusions. Additionally she has patchy ground-glass opacities throughout the right upper lobe and lingula   may be secondary to edema or infection.    09/02/2024: no overnight events noted, counts noted persistent anemia and thrombocytopenia leukopenia resolved likely due to steroids     09/03/2024:  awake and alert, endorsed ongoing chest discomfort, nursing staff at bedside to discuss with overnight staff, CTA Chest completed, recommend repeat EKG this morning, awake and alert, respiratory status appropriate, speaking full sentences, no signs of bleeding, bruising resolving, endorsed ongoing chest discomfort, nursing staff at bedside to discuss with overnight staff, CTA Chest completed, recommend repeat EKG this morning, awake and alert, respiratory status appropriate, speaking full sentences, no signs of bleeding, bruising resolving     09/04/2024: CTA Chest negative for PE, showed overall decreased pulmonary edema with unchanged small pleural effusions.      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   - Outpatient labs show gradual progressive leukopenia x 3y, initial lymphopenia now both lymphopenia and neutropenia   -- Most recent outpatient WBC ranging 1.9-4.6   - Thrombocytopenia outpatient ranging 85-100s x 2y  - Anemia ranging 9.5-11 over past year, currently close to this  - MCV with progressive macrocytosis over past year  - ANC 3250, s/p steroids  - Iron sat 9%, Folate 15.6, B12 179  - Previous paraproteinemia workup negative for obvious plasma cell dyscrasia SPEP/MOOKIE normal, Serum immunoglobulins normal, serum FLCs (ratio 1.44), LDH (normal), Beta-2 Mcg (mild elv 2.6),   - Transfuse PRBC to maintain Hb > 7   - Transfuse Plt to maintain Plt > 10k  - Trend CBC daily with Diff for now   - Continue B12 1000mcg IM weekly  - Again reviewed and discussed pancytopenia with the pt she is aware of its gradual, progressive worsening over past three years, discussed with pt again this admission and she stated she would not like to undergo aggressive interventions and she would declined bone marrow biopsy  - GOC noted ongoing   - Needs to follow up as outpatient     # Small bowel obstruction  - CT scan performed in ED showed evidence of dilated small bowel loops with large stool burden with concern for small bowel obstruction vs enteritis  - S/P MICU admission with NGT decompression  - Transferred to floor 08/25/2024     # Aspiration Pneumonia  -  CT A/P lower chest with with multifocal PNA  -  Pulmonology following, recs noted  -  Resolved septic shock while in MICU  -  CT Chest CT Chest without contrast showed ccclusive debris/mucous plugging within bronchus intermedius and more   distal airways of the right middle lobe and right lower lobe with resultant right middle lobe and right lower lobe collapse with volume loss and small bilateral pleural effusions. Additionally she has patchy ground-glass opacities throughout the right upper lobe and lingula   may be secondary to edema or infection.  - Pulmonology follow up   - Antibiotics per ID and primary team    #A.fib  - Was previously on Eliquis 2.5mg per cardiology  - Plt 50k >48k >61k>65k>57k  - Pt at risk for CVA vs bleeding given clinical context  - Cardiology consult and recs noted, currently risks vs benefits favoring high risk for AC  - Noted US LE negative for DVT, positive D-Dimer  - CTA Chest 09/02/2024 negative for PE, showed overall decreased pulmonary edema with unchanged small pleural effusions.  - Can proceed with Hep SubQ BID, can consider Eliquis depending on Plt count trajectory, recommend ok to restart if Plt count consistently > 75k and per clinical risk     # Hypothyroidism  - Continue levothyroxine         Thank you for allowing me to participate in the care of Ms. Slater, please do not hesitate to call or text me if you have further questions or concerns.       Roni Polanco MD  [facility] [place]   Division of Hematology/Oncology  [address] [facility], [address]  [place] [address]  P: [phone]  F: [phone]    Attestation:    ----Pt evaluated including face-to-face interaction in addition to chart review, reviewing treatment plan, and managing the patient’s chronic diagnoses as listed in the assessment----

## 2024-09-04 NOTE — PROGRESS NOTE ADULT - ASSESSMENT
89F with history of paroxysmal atrial fibrillation, hypothyroidism, pulmonary/abdominal tuberculosis, recurrent small bowel obstruction, recurrent UTIs presenting from home with abdominal pain and vomiting for 1 day. Per daughter and HHA at bedside, patient was in her usual state of health until yesterday. Normally able to communicate and ambulate to the bathroom and eat on her own. Does have a HHA for assistance as needed. HHA reports that around 2:30 AM last night, patient called for assistance and was vomiting and asked to call an ambulance. CT scan performed in ED showed evidence of dilated small bowel loops with large stool burden with concern for small bowel obstruction vs enteritis. Also with some imaging findings concerning for multifocal pneumonia. Surgery consulted given history of small bowel obstruction and imaging and NGT placed with NGT to suction draining minimal dark abdominal contents. Additionally with hypoxemic respiratory failure requiring HFNC 40/40. ED course c/b hypotension 70/40s with transient improvement with 3L IVF however with repeat hypotension despite these measures.   GOC discussion had with daughter at bedside. Patient is DNR/DNI and would not want aggressive measures including surgery or central line placement for pressor therapy. Would like a trial of IV pressors via peripheral IV and if these fails, daughter would not want further escalation. Was also seen by palliative care team in ED and was being considered for PCU on fixed dose pressors however no beds available at this time.   Admitted to MICU for septic shock. (23 Aug 2024 15:40)      Multifocal Pneumonia/ Septic shock / Hypoxia  A trial fibrillation  Hypothyroidism   Hx of pulm/Abd tuberculosis  HTN/Hx of orthostatic hypotension    Multifocal Pneumonia/ Septic shock / Hypoxia  -was neutropenic on admission with multifocal pneumonia and septic shock;  -S/P MICU stay recovered now transferred to floor  -she is on broad spectrum antibtiocs:  would cont for now;   -cxr reviewed:  -consider ct chest  non contrast    -she has wheezing and difficulty to expectorate phlegm : add duoneb and Mucomyst   -may need steroids will wait and see for next 24 hours;  if  needed will add  in am     8/26:-change to solumedrol : cont Mucomyst and nebs  -watch for blood pressure   -cont mucomyst  : add mucinex too    8/27: still with some cough : has coarse breath sounds ; still on steroids increased to q 8 yesterday ;  8/28: her wheezing is better:  decrease 20 mg q 8 hours and if doing  ok : will change and taper:   8/29: she is doing relatively better: cont steroids  decrease tomorrow and change to po    8/30: pn 4 L:  doing same:  change to po steroids and decrease the dose   antibiotics finished   8/31: she completed antibiotics recently:   -rpt ct chest showed:   Occlusive debris/mucous plugging within bronchus intermedius and more distal airways of the right middle lobe and right lower lobe. There is resultant right middle lobe and right lower lobe collapse with  volume loss. Small bilateral pleural effusions.   Patchy ground-glass opacities throughout the right upper lobe and lingula may be secondary to edema or infection.  -she is on iv lasix still seems in heart failue  -getting alkalotic:  hco3 32:  no need for Diamox  follow hco3;     9/1:  -cont conservative management for atelectasis with chest pt:  chest vest and mucomyst inhaler;   -awaiting cta as ursula c  Dimer  was high : sheis alert and awake and is responsive to questions   -her dopplers done yesterday were negative     9/2:  -she looks the same:   -oxygen requirement has not increased:   -CTA is awaited:  renal functions are OK;   -clinical suspicion for pe is low to moderate     9/3:  cta chest showed: No pulmonary embolus. Decreased pulmonary edema. Unchanged small pleural effusions. Persistent occlusive mucous plugs in the right middle and lower lobes.  Decreased distal atelectasis. Sheis 89 years old delon DNR and dni ; conservative management:    9/4: wean off oxygen : she looks comfortable:  for dc planning in am       A trial fibrillation  -not on any full AC:  -on heparin sq   8/27: now he has RVR:  fill ac per prim svitlana team:  being transferred to monitored unit   8/29: seems stable: on hep sq   8/30? r plts are more then 50K:  defer to hemonc and primary team  for restarting eliquis  8/31: still not on eliquis  9/1: remans off ac  9/2: the plts counts cont to be low;  ac per hemonc  team   9/3: remains off ac:   9/4: plts counts have now imrpoved?  start ac: DEFER TO HEMONC     Hypothyroidism   -levothyroxine    Hx of pulm/Abd tuberculosis  -treated before    HTN/Hx of orthostatic hypotension  -currently on stress doses of steroids and being tapered   8/27" changed to solumedrol :" monitor for orthostasis   8/28: blood pressure seems stable:   8/30: hemodynamically stable:   9/1: stable    dw acp

## 2024-09-05 ENCOUNTER — TRANSCRIPTION ENCOUNTER (OUTPATIENT)
Age: 89
End: 2024-09-05

## 2024-09-05 VITALS
SYSTOLIC BLOOD PRESSURE: 103 MMHG | HEART RATE: 109 BPM | TEMPERATURE: 98 F | OXYGEN SATURATION: 96 % | DIASTOLIC BLOOD PRESSURE: 68 MMHG | RESPIRATION RATE: 18 BRPM

## 2024-09-05 LAB
GLUCOSE BLDC GLUCOMTR-MCNC: 107 MG/DL — HIGH (ref 70–99)
GLUCOSE BLDC GLUCOMTR-MCNC: 144 MG/DL — HIGH (ref 70–99)
GLUCOSE BLDC GLUCOMTR-MCNC: 82 MG/DL — SIGNIFICANT CHANGE UP (ref 70–99)
GLUCOSE BLDC GLUCOMTR-MCNC: 86 MG/DL — SIGNIFICANT CHANGE UP (ref 70–99)
GLUCOSE BLDC GLUCOMTR-MCNC: 94 MG/DL — SIGNIFICANT CHANGE UP (ref 70–99)

## 2024-09-05 PROCEDURE — 94640 AIRWAY INHALATION TREATMENT: CPT

## 2024-09-05 PROCEDURE — 93005 ELECTROCARDIOGRAM TRACING: CPT

## 2024-09-05 PROCEDURE — 86900 BLOOD TYPING SEROLOGIC ABO: CPT

## 2024-09-05 PROCEDURE — 36415 COLL VENOUS BLD VENIPUNCTURE: CPT

## 2024-09-05 PROCEDURE — 82962 GLUCOSE BLOOD TEST: CPT

## 2024-09-05 PROCEDURE — 85014 HEMATOCRIT: CPT

## 2024-09-05 PROCEDURE — 86901 BLOOD TYPING SEROLOGIC RH(D): CPT

## 2024-09-05 PROCEDURE — 96374 THER/PROPH/DIAG INJ IV PUSH: CPT

## 2024-09-05 PROCEDURE — 80053 COMPREHEN METABOLIC PANEL: CPT

## 2024-09-05 PROCEDURE — 92610 EVALUATE SWALLOWING FUNCTION: CPT

## 2024-09-05 PROCEDURE — 97110 THERAPEUTIC EXERCISES: CPT

## 2024-09-05 PROCEDURE — 80048 BASIC METABOLIC PNL TOTAL CA: CPT

## 2024-09-05 PROCEDURE — 82947 ASSAY GLUCOSE BLOOD QUANT: CPT

## 2024-09-05 PROCEDURE — 93970 EXTREMITY STUDY: CPT

## 2024-09-05 PROCEDURE — 82435 ASSAY OF BLOOD CHLORIDE: CPT

## 2024-09-05 PROCEDURE — 81001 URINALYSIS AUTO W/SCOPE: CPT

## 2024-09-05 PROCEDURE — 87040 BLOOD CULTURE FOR BACTERIA: CPT

## 2024-09-05 PROCEDURE — 71250 CT THORAX DX C-: CPT | Mod: MC

## 2024-09-05 PROCEDURE — 87899 AGENT NOS ASSAY W/OPTIC: CPT

## 2024-09-05 PROCEDURE — 82330 ASSAY OF CALCIUM: CPT

## 2024-09-05 PROCEDURE — 84484 ASSAY OF TROPONIN QUANT: CPT

## 2024-09-05 PROCEDURE — 93306 TTE W/DOPPLER COMPLETE: CPT

## 2024-09-05 PROCEDURE — 96375 TX/PRO/DX INJ NEW DRUG ADDON: CPT

## 2024-09-05 PROCEDURE — 84443 ASSAY THYROID STIM HORMONE: CPT

## 2024-09-05 PROCEDURE — 84132 ASSAY OF SERUM POTASSIUM: CPT

## 2024-09-05 PROCEDURE — 85730 THROMBOPLASTIN TIME PARTIAL: CPT

## 2024-09-05 PROCEDURE — 87637 SARSCOV2&INF A&B&RSV AMP PRB: CPT

## 2024-09-05 PROCEDURE — 82533 TOTAL CORTISOL: CPT

## 2024-09-05 PROCEDURE — 74177 CT ABD & PELVIS W/CONTRAST: CPT | Mod: MC

## 2024-09-05 PROCEDURE — 99285 EMERGENCY DEPT VISIT HI MDM: CPT

## 2024-09-05 PROCEDURE — 84295 ASSAY OF SERUM SODIUM: CPT

## 2024-09-05 PROCEDURE — 84449 ASSAY OF TRANSCORTIN: CPT

## 2024-09-05 PROCEDURE — 97530 THERAPEUTIC ACTIVITIES: CPT

## 2024-09-05 PROCEDURE — 83605 ASSAY OF LACTIC ACID: CPT

## 2024-09-05 PROCEDURE — 85018 HEMOGLOBIN: CPT

## 2024-09-05 PROCEDURE — 85025 COMPLETE CBC W/AUTO DIFF WBC: CPT

## 2024-09-05 PROCEDURE — 83550 IRON BINDING TEST: CPT

## 2024-09-05 PROCEDURE — 71275 CT ANGIOGRAPHY CHEST: CPT | Mod: MC

## 2024-09-05 PROCEDURE — 82746 ASSAY OF FOLIC ACID SERUM: CPT

## 2024-09-05 PROCEDURE — 71045 X-RAY EXAM CHEST 1 VIEW: CPT

## 2024-09-05 PROCEDURE — 84100 ASSAY OF PHOSPHORUS: CPT

## 2024-09-05 PROCEDURE — 82803 BLOOD GASES ANY COMBINATION: CPT

## 2024-09-05 PROCEDURE — 83540 ASSAY OF IRON: CPT

## 2024-09-05 PROCEDURE — 85379 FIBRIN DEGRADATION QUANT: CPT

## 2024-09-05 PROCEDURE — 85027 COMPLETE CBC AUTOMATED: CPT

## 2024-09-05 PROCEDURE — 82607 VITAMIN B-12: CPT

## 2024-09-05 PROCEDURE — 83735 ASSAY OF MAGNESIUM: CPT

## 2024-09-05 PROCEDURE — 83690 ASSAY OF LIPASE: CPT

## 2024-09-05 PROCEDURE — 86850 RBC ANTIBODY SCREEN: CPT

## 2024-09-05 PROCEDURE — 36600 WITHDRAWAL OF ARTERIAL BLOOD: CPT

## 2024-09-05 PROCEDURE — 83880 ASSAY OF NATRIURETIC PEPTIDE: CPT

## 2024-09-05 PROCEDURE — 87449 NOS EACH ORGANISM AG IA: CPT

## 2024-09-05 PROCEDURE — 87086 URINE CULTURE/COLONY COUNT: CPT

## 2024-09-05 PROCEDURE — 97161 PT EVAL LOW COMPLEX 20 MIN: CPT

## 2024-09-05 PROCEDURE — 85610 PROTHROMBIN TIME: CPT

## 2024-09-05 RX ORDER — PREDNISONE 10 MG
1 TABLET, DOSE PACK ORAL
Refills: 0 | DISCHARGE

## 2024-09-05 RX ORDER — METOPROLOL TARTRATE 100 MG/1
1 TABLET ORAL
Qty: 60 | Refills: 0
Start: 2024-09-05 | End: 2024-10-04

## 2024-09-05 RX ORDER — METOPROLOL SUCCINATE 25 MG
1 TABLET, EXTENDED RELEASE 24 HR ORAL
Qty: 60 | Refills: 0 | DISCHARGE
Start: 2024-09-05 | End: 2024-10-04

## 2024-09-05 RX ADMIN — Medication 40 MILLIGRAM(S): at 11:33

## 2024-09-05 RX ADMIN — BUDESONIDE 0.5 MILLIGRAM(S): 3 CAPSULE ORAL at 05:02

## 2024-09-05 RX ADMIN — Medication 5000 UNIT(S): at 05:02

## 2024-09-05 RX ADMIN — IPRATROPIUM BROMIDE AND ALBUTEROL SULFATE 3 MILLILITER(S): .5; 3 SOLUTION RESPIRATORY (INHALATION) at 11:33

## 2024-09-05 RX ADMIN — IPRATROPIUM BROMIDE AND ALBUTEROL SULFATE 3 MILLILITER(S): .5; 3 SOLUTION RESPIRATORY (INHALATION) at 00:52

## 2024-09-05 RX ADMIN — IPRATROPIUM BROMIDE AND ALBUTEROL SULFATE 3 MILLILITER(S): .5; 3 SOLUTION RESPIRATORY (INHALATION) at 05:32

## 2024-09-05 NOTE — PROGRESS NOTE ADULT - SUBJECTIVE AND OBJECTIVE BOX
Date of Service: 09-05-24 @ 17:03    Patient is a 89y old  Female who presents with a chief complaint of septic shock (05 Sep 2024 15:49)      Any change in ROS: the daughter is at bedside : on 2 L: no apparent resp distress    MEDICATIONS  (STANDING):  albuterol/ipratropium for Nebulization 3 milliLiter(s) Nebulizer every 6 hours  buDESOnide    Inhalation Suspension 0.5 milliGRAM(s) Inhalation every 12 hours  heparin   Injectable 5000 Unit(s) SubCutaneous every 12 hours  levothyroxine Injectable 70 MICROGram(s) IV Push at bedtime  metoprolol tartrate 25 milliGRAM(s) Oral two times a day  pantoprazole  Injectable 40 milliGRAM(s) IV Push daily    MEDICATIONS  (PRN):    Vital Signs Last 24 Hrs  T(C): 36.7 (05 Sep 2024 11:00), Max: 36.9 (05 Sep 2024 04:00)  T(F): 98 (05 Sep 2024 11:00), Max: 98.4 (05 Sep 2024 04:00)  HR: 100 (05 Sep 2024 11:00) (89 - 104)  BP: 90/59 (05 Sep 2024 11:00) (90/59 - 102/69)  BP(mean): --  RR: 18 (05 Sep 2024 11:00) (18 - 18)  SpO2: 96% (05 Sep 2024 11:00) (95% - 97%)    Parameters below as of 05 Sep 2024 11:00  Patient On (Oxygen Delivery Method): nasal cannula  O2 Flow (L/min): 2      I&O's Summary    04 Sep 2024 07:01  -  05 Sep 2024 07:00  --------------------------------------------------------  IN: 720 mL / OUT: 750 mL / NET: -30 mL    05 Sep 2024 07:01  -  05 Sep 2024 17:03  --------------------------------------------------------  IN: 120 mL / OUT: 200 mL / NET: -80 mL          Physical Exam:   GENERAL: NAD, well-groomed, well-developed  HEENT: KOLBY/   Atraumatic, Normocephalic  ENMT: No tonsillar erythema, exudates, or enlargement; Moist mucous membranes, Good dentition, No lesions  NECK: Supple, No JVD, Normal thyroid  CHEST/LUNG: Clear to auscultaion- no wheezing  CVS: Regular rate and rhythm; No murmurs, rubs, or gallops  GI: : Soft, Nontender, Nondistended; Bowel sounds present  NERVOUS SYSTEM:  Alert & Oriented X3  EXTREMITIES: -edema  LYMPH: No lymphadenopathy noted  SKIN: No rashes or lesions  ENDOCRINOLOGY: No Thyromegaly  PSYCH: Appropriate    Labs:                              9.5    5.10  )-----------( 106      ( 04 Sep 2024 11:31 )             30.3                         10.6   6.23  )-----------( 72       ( 03 Sep 2024 06:52 )             33.5                         9.1    5.62  )-----------( 86       ( 02 Sep 2024 06:31 )             28.6     09-04    136  |  93<L>  |  16  ----------------------------<  89  3.5   |  35<H>  |  0.54  09-03    139  |  95<L>  |  18  ----------------------------<  73  3.5   |  31  |  0.48<L>    Ca    8.4      04 Sep 2024 11:32  Phos  3.1     09-04  Mg     2.1     09-04      CAPILLARY BLOOD GLUCOSE      POCT Blood Glucose.: 144 mg/dL (05 Sep 2024 12:38)  POCT Blood Glucose.: 82 mg/dL (05 Sep 2024 06:30)  POCT Blood Glucose.: 107 mg/dL (04 Sep 2024 23:57)  POCT Blood Glucose.: 80 mg/dL (04 Sep 2024 21:57)  POCT Blood Glucose.: 92 mg/dL (04 Sep 2024 17:09)          Urinalysis Basic - ( 04 Sep 2024 11:32 )    Color: x / Appearance: x / SG: x / pH: x  Gluc: 89 mg/dL / Ketone: x  / Bili: x / Urobili: x   Blood: x / Protein: x / Nitrite: x   Leuk Esterase: x / RBC: x / WBC x   Sq Epi: x / Non Sq Epi: x / Bacteria: x      D-Dimer Assay, Quantitative: 929 ng/mL DDU (08-31 @ 14:33)      rad< from: Xray Chest 1 View- PORTABLE-Urgent (Xray Chest 1 View- PORTABLE-Urgent .) (09.03.24 @ 07:02) >    FINDINGS:  Cardiomegaly. Calcified aortic knob.  Right lower lung atelectasis. Left basilar atelectasis. Punctate   calcifications in the left apex.  Small bilateral pleural effusions.  No pneumothorax.    IMPRESSION:  Bibasilar atelectasis.    Small bilateral pleural effusions.    --- End of Report ---           JEFF BAIN MD; Resident Radiologist  This document has been electronically signed.  DESHAUN BARRON MD; Attending Radiologist  This document has been electronically signed. Sep  3 2024 10:28AM    < end of copied text >    RECENT CULTURES:        RESPIRATORY CULTURES:          Studies  Chest X-RAY  CT SCAN Chest   Venous Dopplers: LE:   CT Abdomen  Others

## 2024-09-05 NOTE — PROGRESS NOTE ADULT - SUBJECTIVE AND OBJECTIVE BOX
\[facility] [place] [facility]\"" HEMATOLOGY/ONCOLOGY INPATIENT PROGRESS NOTE     Interval Hx:   09-05-24: Ms. Slater was seen at bedside today.    Meds:   MEDICATIONS  (STANDING):  albuterol/ipratropium for Nebulization 3 milliLiter(s) Nebulizer every 6 hours  buDESOnide    Inhalation Suspension 0.5 milliGRAM(s) Inhalation every 12 hours  heparin   Injectable 5000 Unit(s) SubCutaneous every 12 hours  levothyroxine Injectable 70 MICROGram(s) IV Push at bedtime  metoprolol tartrate 25 milliGRAM(s) Oral two times a day  pantoprazole  Injectable 40 milliGRAM(s) IV Push daily    MEDICATIONS  (PRN):    Vital Signs Last 24 Hrs  T(C): 36.6 (04 Sep 2024 23:51), Max: 36.7 (04 Sep 2024 20:00)  T(F): 97.9 (04 Sep 2024 23:51), Max: 98.1 (04 Sep 2024 20:00)  HR: 104 (04 Sep 2024 23:51) (89 - 104)  BP: 90/60 (05 Sep 2024 01:00) (90/60 - 105/64)  BP(mean): --  RR: 18 (04 Sep 2024 23:51) (18 - 18)  SpO2: 95% (04 Sep 2024 23:51) (93% - 99%)    Parameters below as of 04 Sep 2024 23:51  Patient On (Oxygen Delivery Method): nasal cannula  O2 Flow (L/min): 2    Physical Exam:  Gen:  NAD, lethargic  HEENT: EOMI, MMM  Chest: equal chest rise, speaking full sentences  Cardiac: irregular  Abd: soft, non-tender, no hepatomegaly or splenomegaly  Ext: No edema   Neuro: AAOx3, normal mood and affect, hard of hearing    Labs:                        9.5    5.10  )-----------( 106      ( 04 Sep 2024 11:31 )             30.3     CBC Full  -  ( 04 Sep 2024 11:31 )  WBC Count : 5.10 K/uL  RBC Count : 2.96 M/uL  Hemoglobin : 9.5 g/dL  Hematocrit : 30.3 %  Platelet Count - Automated : 106 K/uL  Mean Cell Volume : 102.4 fl  Mean Cell Hemoglobin : 32.1 pg  Mean Cell Hemoglobin Concentration : 31.4 gm/dL    09-04    136  |  93<L>  |  16  ----------------------------<  89  3.5   |  35<H>  |  0.54    Ca    8.4      04 Sep 2024 11:32  Phos  3.1     09-04  Mg     2.1     09-04         [facility] HEMATOLOGY/ONCOLOGY INPATIENT PROGRESS NOTE     Interval Hx:   09-05-24: Ms. Slater was seen at bedside today, no overnight events noted, no signs of bleeding, counts noted, if plt count continues >75k can start Eliquis 2.5mg BID to abrogate risk of CVA in setting of A.fib, cardiology recs noted, risks vs benefits need to continually  reasses with clinical context.     Meds:   MEDICATIONS  (STANDING):  albuterol/ipratropium for Nebulization 3 milliLiter(s) Nebulizer every 6 hours  buDESOnide    Inhalation Suspension 0.5 milliGRAM(s) Inhalation every 12 hours  heparin   Injectable 5000 Unit(s) SubCutaneous every 12 hours  levothyroxine Injectable 70 MICROGram(s) IV Push at bedtime  metoprolol tartrate 25 milliGRAM(s) Oral two times a day  pantoprazole  Injectable 40 milliGRAM(s) IV Push daily    MEDICATIONS  (PRN):    Vital Signs Last 24 Hrs  T(C): 36.6 (04 Sep 2024 23:51), Max: 36.7 (04 Sep 2024 20:00)  T(F): 97.9 (04 Sep 2024 23:51), Max: 98.1 (04 Sep 2024 20:00)  HR: 104 (04 Sep 2024 23:51) (89 - 104)  BP: 90/60 (05 Sep 2024 01:00) (90/60 - 105/64)  BP(mean): --  RR: 18 (04 Sep 2024 23:51) (18 - 18)  SpO2: 95% (04 Sep 2024 23:51) (93% - 99%)    Parameters below as of 04 Sep 2024 23:51  Patient On (Oxygen Delivery Method): nasal cannula  O2 Flow (L/min): 2    Physical Exam:  Gen:  NAD, lethargic  HEENT: EOMI, MMM  Chest: equal chest rise, speaking full sentences  Cardiac: irregular  Abd: soft, non-tender, no hepatomegaly or splenomegaly  Ext: No edema   Neuro: AAOx3, normal mood and affect, hard of hearing    Labs:                        9.5    5.10  )-----------( 106      ( 04 Sep 2024 11:31 )             30.3     CBC Full  -  ( 04 Sep 2024 11:31 )  WBC Count : 5.10 K/uL  RBC Count : 2.96 M/uL  Hemoglobin : 9.5 g/dL  Hematocrit : 30.3 %  Platelet Count - Automated : 106 K/uL  Mean Cell Volume : 102.4 fl  Mean Cell Hemoglobin : 32.1 pg  Mean Cell Hemoglobin Concentration : 31.4 gm/dL    09-04    136  |  93<L>  |  16  ----------------------------<  89  3.5   |  35<H>  |  0.54    Ca    8.4      04 Sep 2024 11:32  Phos  3.1     09-04  Mg     2.1     09-04

## 2024-09-05 NOTE — PROGRESS NOTE ADULT - ASSESSMENT
89F with history of paroxysmal atrial fibrillation, hypothyroidism, pulmonary/abdominal tuberculosis, recurrent small bowel obstruction, recurrent UTIs presenting from home with abdominal pain and vomiting for 1 day. Per daughter and HHA at bedside, patient was in her usual state of health until yesterday. Normally able to communicate and ambulate to the bathroom and eat on her own. Does have a HHA for assistance as needed. HHA reports that around 2:30 AM last night, patient called for assistance and was vomiting and asked to call an ambulance. CT scan performed in ED showed evidence of dilated small bowel loops with large stool burden with concern for small bowel obstruction vs enteritis. Also with some imaging findings concerning for multifocal pneumonia. Surgery consulted given history of small bowel obstruction and imaging and NGT placed with NGT to suction draining minimal dark abdominal contents. Additionally with hypoxemic respiratory failure requiring HFNC 40/40. ED course c/b hypotension 70/40s with transient improvement with 3L IVF however with repeat hypotension despite these measures.   GOC discussion had with daughter at bedside. Patient is DNR/DNI and would not want aggressive measures including surgery or central line placement for pressor therapy. Would like a trial of IV pressors via peripheral IV and if these fails, daughter would not want further escalation. Was also seen by palliative care team in ED and was being considered for PCU on fixed dose pressors however no beds available at this time.   Admitted to MICU for septic shock. (23 Aug 2024 15:40)      Multifocal Pneumonia/ Septic shock / Hypoxia  A trial fibrillation  Hypothyroidism   Hx of pulm/Abd tuberculosis  HTN/Hx of orthostatic hypotension    Multifocal Pneumonia/ Septic shock / Hypoxia  -was neutropenic on admission with multifocal pneumonia and septic shock;  -S/P MICU stay recovered now transferred to floor  -she is on broad spectrum antibtiocs:  would cont for now;   -cxr reviewed:  -consider ct chest  non contrast    -she has wheezing and difficulty to expectorate phlegm : add duoneb and Mucomyst   -may need steroids will wait and see for next 24 hours;  if  needed will add  in am     8/26:-change to solumedrol : cont Mucomyst and nebs  -watch for blood pressure   -cont mucomyst  : add mucinex too    8/27: still with some cough : has coarse breath sounds ; still on steroids increased to q 8 yesterday ;  8/28: her wheezing is better:  decrease 20 mg q 8 hours and if doing  ok : will change and taper:   8/29: she is doing relatively better: cont steroids  decrease tomorrow and change to po    8/30: pn 4 L:  doing same:  change to po steroids and decrease the dose   antibiotics finished   8/31: she completed antibiotics recently:   -rpt ct chest showed:   Occlusive debris/mucous plugging within bronchus intermedius and more distal airways of the right middle lobe and right lower lobe. There is resultant right middle lobe and right lower lobe collapse with  volume loss. Small bilateral pleural effusions.   Patchy ground-glass opacities throughout the right upper lobe and lingula may be secondary to edema or infection.  -she is on iv lasix still seems in heart failue  -getting alkalotic:  hco3 32:  no need for Diamox  follow hco3;     9/1:  -cont conservative management for atelectasis with chest pt:  chest vest and mucomyst inhaler;   -awaiting cta as ursula c  Dimer  was high : sheis alert and awake and is responsive to questions   -her dopplers done yesterday were negative     9/2:  -she looks the same:   -oxygen requirement has not increased:   -CTA is awaited:  renal functions are OK;   -clinical suspicion for pe is low to moderate     9/3:  cta chest showed: No pulmonary embolus. Decreased pulmonary edema. Unchanged small pleural effusions. Persistent occlusive mucous plugs in the right middle and lower lobes.  Decreased distal atelectasis. Sheis 89 years old delon DNR and dni ; conservative management:    9/4: wean off oxygen : she looks comfortable:  for dc planning in am       9/5: sheis doing pretty good:   -still on oxygen : for dc today   -no pe on ct scan     A trial fibrillation  -not on any full AC:  -on heparin sq   8/27: now he has RVR:  fill ac per prim svitlana team:  being transferred to monitored unit   8/29: seems stable: on hep sq   8/30? r plts are more then 50K:  defer to hemonc and primary team  for restarting eliquis  8/31: still not on eliquis  9/1: remans off ac  9/2: the plts counts cont to be low;  ac per hemonc  team   9/3: remains off ac:   9/4: plts counts have now imrpoved?  start ac: DEFER TO HEMONC   9/5 : no labs today    Hypothyroidism   -levothyroxine    Hx of pulm/Abd tuberculosis  -treated before    HTN/Hx of orthostatic hypotension  -currently on stress doses of steroids and being tapered   8/27" changed to solumedrol :" monitor for orthostasis   8/28: blood pressure seems stable:   8/30: hemodynamically stable:   9/1: stable    dw acp

## 2024-09-05 NOTE — PROGRESS NOTE ADULT - ASSESSMENT
89F with history of paroxysmal atrial fibrillation, hypothyroidism, pulmonary/abdominal tuberculosis, recurrent small bowel obstruction, recurrent UTIs presenting from home with abdominal pain and vomiting for 1 day with CT scans with evidence of SBO vs enteritis s/p NGT. Course c/b hypotension likely iso septic from multifocal PNA process.    #Septic Shock, resolved  #Multifocal PNA   #Acute metabolic encephalopathy   - required levophed for shock state but now weaned off  - completed abx for pna  - BC NTD  - PT OOB    #Acute hfpef    #Acute Hypoxemic Respiratory Failure  - Strict I & O, Daily weights   - Lasix per cardiology   - CT chest Occlusive debris/mucous plugging within bronchus intermedius and more distal airways of the RML and RLL, RML and RLL collapse with volume loss. Small bilateral pleural effusions. Patchy ground-glass opacities throughout the right upper lobe and lingula may be secondary to edema or infection.  - LE doppler neg dvt, CTA chest neg PE  - TTE with preserved lvef  - on 3L, cont to titrate down  - steroid taper per pulm  - pt did not complete swallow eval, no aspiration with liquids, will advance diet, maintain aspiration precautions    #Paroxysmal Atrial Fibrillation  - eliquis .    #Small Bowel Obstruction  #History of Abdominal Tuberculosis   #History of Recurrent Small Bowel Obstruction  - advance diet to soft, aspiration precautions    #pancytopenia  - was receiving prednisone OP for low blood pressure  - likely bone marrow disorder, outpt fu heme    #Hypothyroidism  - c/w levothyroxine 75 mcg IV    dvt ppx HSQ, eliquis on hold for thrombocytopenia    GOC: DNR/DNI    dispo: cont to titrate down O2, may need home O2  eventual dc to home with HHA .     Please contact with any questions or concerns [phone].

## 2024-09-05 NOTE — PROGRESS NOTE ADULT - SUBJECTIVE AND OBJECTIVE BOX
DATE OF SERVICE: 09-05-24 @ 15:50    Patient is a 89y old  Female who presents with a chief complaint of septic shock (05 Sep 2024 15:38)      INTERVAL HISTORY: feels ok     	  MEDICATIONS:  metoprolol tartrate 25 milliGRAM(s) Oral two times a day        PHYSICAL EXAM:  T(C): 36.7 (09-05-24 @ 11:00), Max: 36.9 (09-05-24 @ 04:00)  HR: 100 (09-05-24 @ 11:00) (89 - 104)  BP: 90/59 (09-05-24 @ 11:00) (90/59 - 102/69)  RR: 18 (09-05-24 @ 11:00) (18 - 18)  SpO2: 96% (09-05-24 @ 11:00) (95% - 97%)  Wt(kg): --  I&O's Summary    04 Sep 2024 07:01  -  05 Sep 2024 07:00  --------------------------------------------------------  IN: 720 mL / OUT: 750 mL / NET: -30 mL    05 Sep 2024 07:01  -  05 Sep 2024 15:50  --------------------------------------------------------  IN: 120 mL / OUT: 200 mL / NET: -80 mL          Appearance: In no distress	  HEENT:    PERRL, EOMI	  Cardiovascular:  S1 S2, No JVD  Respiratory: Lungs clear to auscultation	  Gastrointestinal:  Soft, Non-tender, + BS	  Vascularature:  No edema of LE  Psychiatric: Appropriate affect   Neuro: no acute focal deficits                               9.5    5.10  )-----------( 106      ( 04 Sep 2024 11:31 )             30.3     09-04    136  |  93<L>  |  16  ----------------------------<  89  3.5   |  35<H>  |  0.54    Ca    8.4      04 Sep 2024 11:32  Phos  3.1     09-04  Mg     2.1     09-04          Labs personally reviewed      ASSESSMENT/PLAN: 	    89F with history of paroxysmal atrial fibrillation, hypothyroidism, pulmonary/abdominal tuberculosis, recurrent small bowel obstruction, recurrent UTIs presenting from home with abdominal pain and vomiting for 1 day with CT scans with evidence of SBO vs enteritis s/p NGT. Course c/b hypotension likely iso septic from multifocal PNA process.    1. Paroxysmal Atrial Fibrillation  - Switch Metoprolol IV to Metoprolol 25mg PO BID  - Hold Eliquis given thrombocytopenia ~50, resume when safe per heme but risks seem to outweigh benefits at this time    2. Acute hfpef - On Lasix 20 mg IV daily   - Switch to PO penidng CT chest  - TTE with preserved EF and mild pulm HTN  - Rec Chest CT no contrast given persistent hypoxia  - CT chest : Occlusive debris/mucous plugging within bronchus intermedius and more   distal airways of the right middle lobe and right lower lobe. Small bilateral pleural effusions.Patchy ground-glass opacities throughout the right upper lobe and lingula may be secondary to edema or infection.  -  c/w IVP lasix 20mg qd, transition to PO 9/4, Lasix 20mg PO daily for discharge  - CTA chest negative for PE and decreased pulm edema but persistent mucous plugging.     3. Multifocal PNA   - Abx as per primary team     4. DVT PPX - on HSQ            Nilton Roldan DO [facility]  Cardiovascular Medicine  [address] [place] [address], [address]  Office: [phone]  Available via Text/call on [employer] Teams

## 2024-09-05 NOTE — DISCHARGE NOTE NURSING/CASE MANAGEMENT/SOCIAL WORK - PATIENT PORTAL LINK FT
You can access the FollowMyHealth Patient Portal offered by [facility] by registering at the following website: [email or web address]. By joining [facility]’s FollowMyHealth portal, you will also be able to view your health information using other applications (apps) compatible with our system.

## 2024-09-05 NOTE — PROGRESS NOTE ADULT - REASON FOR ADMISSION
septic shock

## 2024-09-09 LAB
CORTICOSTEROID BINDING GLOBULIN RESULT: 2.1 MG/DL — SIGNIFICANT CHANGE UP
CORTIS F/TOTAL MFR SERPL: 74 % — SIGNIFICANT CHANGE UP
CORTIS SERPL-MCNC: 56 UG/DL — HIGH
CORTISOL, FREE RESULT: 42 UG/DL — HIGH

## 2024-10-22 NOTE — DISCHARGE NOTE PROVIDER - NSDCQMSTROKE_NEU_ALL_CORE
Post-Care Instructions: I reviewed with the patient in detail post-care instructions. Patient is to wear sunprotection, and avoid picking at any of the treated lesions. Pt may apply Vaseline to crusted or scabbing areas. Show Aperture Variable?: Yes Consent: The patient's consent was obtained including but not limited to risks of crusting, scabbing, blistering, scarring, darker or lighter pigmentary change, recurrence, incomplete removal and infection. Render Note In Bullet Format When Appropriate: No Duration Of Freeze Thaw-Cycle (Seconds): 0 Detail Level: Detailed No

## 2024-12-03 NOTE — H&P ADULT - HIV OFFER
MRI has been faxed to Dr. Anuj Cleveland's office @ [phone].   Unable to answer due to medical condition/unresponsive/etc...

## 2024-12-23 NOTE — PHYSICAL THERAPY INITIAL EVALUATION ADULT - RANGE OF MOTION EXAMINATION, REHAB EVAL
bilateral upper extremity ROM was WFL (within functional limits)/bilateral lower extremity ROM was WFL (within functional limits) Pain overall improved after lidocaine patch and Motrin.  CT results discussed with patient.  No evidence of acute ICH or fracture.  General soft tissue instructions and head injury instructions explained.  Will follow-up with primary care doctor

## 2025-01-05 ENCOUNTER — EMERGENCY (EMERGENCY)
Facility: HOSPITAL | Age: 89
LOS: 1 days | Discharge: HOME | End: 2025-01-05
Attending: EMERGENCY MEDICINE
Payer: MEDICARE

## 2025-01-05 VITALS
DIASTOLIC BLOOD PRESSURE: 86 MMHG | HEIGHT: 67 IN | TEMPERATURE: 100 F | HEART RATE: 86 BPM | WEIGHT: 149.91 LBS | SYSTOLIC BLOOD PRESSURE: 134 MMHG | RESPIRATION RATE: 17 BRPM | OXYGEN SATURATION: 97 %

## 2025-01-05 DIAGNOSIS — Z98.89 OTHER SPECIFIED POSTPROCEDURAL STATES: Chronic | ICD-10-CM

## 2025-01-05 LAB
ALBUMIN SERPL ELPH-MCNC: 3.8 G/DL — SIGNIFICANT CHANGE UP (ref 3.3–5)
ALP SERPL-CCNC: 52 U/L — SIGNIFICANT CHANGE UP (ref 40–120)
ALT FLD-CCNC: 8 U/L — LOW (ref 10–45)
ANION GAP SERPL CALC-SCNC: 12 MMOL/L — SIGNIFICANT CHANGE UP (ref 5–17)
APTT BLD: 28.9 SEC — SIGNIFICANT CHANGE UP (ref 24.5–35.6)
AST SERPL-CCNC: 10 U/L — SIGNIFICANT CHANGE UP (ref 10–40)
BASOPHILS # BLD AUTO: 0 K/UL — SIGNIFICANT CHANGE UP (ref 0–0.2)
BASOPHILS NFR BLD AUTO: 0 % — SIGNIFICANT CHANGE UP (ref 0–2)
BILIRUB SERPL-MCNC: 0.6 MG/DL — SIGNIFICANT CHANGE UP (ref 0.2–1.2)
BUN SERPL-MCNC: 16 MG/DL — SIGNIFICANT CHANGE UP (ref 7–23)
CALCIUM SERPL-MCNC: 9.4 MG/DL — SIGNIFICANT CHANGE UP (ref 8.4–10.5)
CHLORIDE SERPL-SCNC: 103 MMOL/L — SIGNIFICANT CHANGE UP (ref 96–108)
CO2 SERPL-SCNC: 25 MMOL/L — SIGNIFICANT CHANGE UP (ref 22–31)
CREAT SERPL-MCNC: 0.67 MG/DL — SIGNIFICANT CHANGE UP (ref 0.5–1.3)
EGFR: 78 ML/MIN/1.73M2 — SIGNIFICANT CHANGE UP
EOSINOPHIL # BLD AUTO: 0.11 K/UL — SIGNIFICANT CHANGE UP (ref 0–0.5)
EOSINOPHIL NFR BLD AUTO: 3.4 % — SIGNIFICANT CHANGE UP (ref 0–6)
FLUAV AG NPH QL: SIGNIFICANT CHANGE UP
FLUBV AG NPH QL: SIGNIFICANT CHANGE UP
GAS PNL BLDV: SIGNIFICANT CHANGE UP
GLUCOSE SERPL-MCNC: 87 MG/DL — SIGNIFICANT CHANGE UP (ref 70–99)
HCT VFR BLD CALC: 37.1 % — SIGNIFICANT CHANGE UP (ref 34.5–45)
HGB BLD-MCNC: 11.7 G/DL — SIGNIFICANT CHANGE UP (ref 11.5–15.5)
INR BLD: 1.01 RATIO — SIGNIFICANT CHANGE UP (ref 0.85–1.16)
LYMPHOCYTES # BLD AUTO: 0.61 K/UL — LOW (ref 1–3.3)
LYMPHOCYTES # BLD AUTO: 19 % — SIGNIFICANT CHANGE UP (ref 13–44)
MCHC RBC-ENTMCNC: 31.5 G/DL — LOW (ref 32–36)
MCHC RBC-ENTMCNC: 33.2 PG — SIGNIFICANT CHANGE UP (ref 27–34)
MCV RBC AUTO: 105.4 FL — HIGH (ref 80–100)
MONOCYTES # BLD AUTO: 0.08 K/UL — SIGNIFICANT CHANGE UP (ref 0–0.9)
MONOCYTES NFR BLD AUTO: 2.6 % — SIGNIFICANT CHANGE UP (ref 2–14)
NEUTROPHILS # BLD AUTO: 2.4 K/UL — SIGNIFICANT CHANGE UP (ref 1.8–7.4)
NEUTROPHILS NFR BLD AUTO: 73.3 % — SIGNIFICANT CHANGE UP (ref 43–77)
NT-PROBNP SERPL-SCNC: 1123 PG/ML — HIGH (ref 0–300)
PLATELET # BLD AUTO: 51 K/UL — LOW (ref 150–400)
POTASSIUM SERPL-MCNC: 4.1 MMOL/L — SIGNIFICANT CHANGE UP (ref 3.5–5.3)
POTASSIUM SERPL-SCNC: 4.1 MMOL/L — SIGNIFICANT CHANGE UP (ref 3.5–5.3)
PROT SERPL-MCNC: 6.7 G/DL — SIGNIFICANT CHANGE UP (ref 6–8.3)
PROTHROM AB SERPL-ACNC: 11.6 SEC — SIGNIFICANT CHANGE UP (ref 9.9–13.4)
RBC # BLD: 3.52 M/UL — LOW (ref 3.8–5.2)
RBC # FLD: 16.1 % — HIGH (ref 10.3–14.5)
RSV RNA NPH QL NAA+NON-PROBE: SIGNIFICANT CHANGE UP
SARS-COV-2 RNA SPEC QL NAA+PROBE: SIGNIFICANT CHANGE UP
SODIUM SERPL-SCNC: 140 MMOL/L — SIGNIFICANT CHANGE UP (ref 135–145)
TROPONIN T, HIGH SENSITIVITY RESULT: <6 NG/L — SIGNIFICANT CHANGE UP (ref 0–51)
WBC # BLD: 3.2 K/UL — LOW (ref 3.8–10.5)
WBC # FLD AUTO: 3.2 K/UL — LOW (ref 3.8–10.5)

## 2025-01-05 PROCEDURE — 87637 SARSCOV2&INF A&B&RSV AMP PRB: CPT

## 2025-01-05 PROCEDURE — 99285 EMERGENCY DEPT VISIT HI MDM: CPT | Mod: 25

## 2025-01-05 PROCEDURE — 85018 HEMOGLOBIN: CPT

## 2025-01-05 PROCEDURE — 82947 ASSAY GLUCOSE BLOOD QUANT: CPT

## 2025-01-05 PROCEDURE — 83880 ASSAY OF NATRIURETIC PEPTIDE: CPT

## 2025-01-05 PROCEDURE — 82330 ASSAY OF CALCIUM: CPT

## 2025-01-05 PROCEDURE — 85014 HEMATOCRIT: CPT

## 2025-01-05 PROCEDURE — 93005 ELECTROCARDIOGRAM TRACING: CPT

## 2025-01-05 PROCEDURE — 83605 ASSAY OF LACTIC ACID: CPT

## 2025-01-05 PROCEDURE — 71045 X-RAY EXAM CHEST 1 VIEW: CPT

## 2025-01-05 PROCEDURE — 85610 PROTHROMBIN TIME: CPT

## 2025-01-05 PROCEDURE — 71045 X-RAY EXAM CHEST 1 VIEW: CPT | Mod: 26

## 2025-01-05 PROCEDURE — 85730 THROMBOPLASTIN TIME PARTIAL: CPT

## 2025-01-05 PROCEDURE — 85025 COMPLETE CBC W/AUTO DIFF WBC: CPT

## 2025-01-05 PROCEDURE — 80053 COMPREHEN METABOLIC PANEL: CPT

## 2025-01-05 PROCEDURE — 84484 ASSAY OF TROPONIN QUANT: CPT

## 2025-01-05 PROCEDURE — 82435 ASSAY OF BLOOD CHLORIDE: CPT

## 2025-01-05 PROCEDURE — 84295 ASSAY OF SERUM SODIUM: CPT

## 2025-01-05 PROCEDURE — 82803 BLOOD GASES ANY COMBINATION: CPT

## 2025-01-05 PROCEDURE — 84132 ASSAY OF SERUM POTASSIUM: CPT

## 2025-01-05 PROCEDURE — 99284 EMERGENCY DEPT VISIT MOD MDM: CPT | Mod: GC

## 2025-01-05 PROCEDURE — 87040 BLOOD CULTURE FOR BACTERIA: CPT

## 2025-01-05 NOTE — ED ADULT NURSE NOTE - ED STAT RN HANDOFF WHERE
Called patient using [employer] to discuss scheduling US and NOB appointments. No answer, left voicemail requesting call back.   ED purple

## 2025-01-05 NOTE — ED PROVIDER NOTE - PHYSICAL EXAMINATION
GEN: AAOx3, NAD     HEENT: NCAT, EOMI, PERRLA, Neck Supple.    RESP: Good air entry, CTA B/L, no wheezes/rales/rhonchi  CVS: Regular rate and rhythm, S1 and S2 heard, no murmurs/rubs/gallops, Pulses +2, Cap refill <2s     Abd: BS+, abdomen NTND, soft to palpation  Extremity: No obvious skeletal deformity  SKIN: No Rashes/lesions, warm, dry     NEURO: Grossly intact

## 2025-01-05 NOTE — ED PROVIDER NOTE - NSICDXPASTSURGICALHX_GEN_ALL_CORE_FT
No PAST SURGICAL HISTORY:  H/O hemorrhoidectomy     History of Bilateral Breast Biopsy     S/P Exploratory Laparotomy TB in 1960s    S/P vein stripping

## 2025-01-05 NOTE — ED PROVIDER NOTE - OBJECTIVE STATEMENT
Patient is a 89-year-old female, history of paroxysmal A-fib on metoprolol, hypothyroidism, pulmonary and abdominal tuberculosis (nonactive), recurrent small bowel obstruction, recurrent UTIs, bronchiectasis, asthma, and aortic stenosis, presents today for palpitations and shortness of breath.  Patient was in usual state of health when this afternoon, patient developed sudden palpitations, described as heart skipping beats, associated with chest tightness.  This episode has since resolved and patient does not currently experience the symptoms.  During the episode, denies diaphoresis, dizziness, visual changes, or focal weakness.  Denies fever, rhinorrhea, congestion, nausea, vomiting, abdominal pain, syncope.  Per daughter, patient takes metoprolol, pantoprazole, levothyroxine, prednisone, sertraline, and B12.  Patient has a penicillin allergy, unclear reaction type.  No recent surgeries.

## 2025-01-05 NOTE — ED ADULT NURSE NOTE - OBJECTIVE STATEMENT
89 yo  F presents to ED A+Ox3 via EMS from home accompanied by daughter and home aide c/o palpitations. Patient states since this afternoon she has had "heart beat skipping sensations and chest discomfort." Patient denies chest pain, SOB, abdominal pain, N/V. Daughter states she gave patient 324mg ASA prior to arrival. Skin warm pink and dry. Breathing is spontaneous and unlabored on room air. AFib on cardiac monitor.

## 2025-01-05 NOTE — ED PROVIDER NOTE - CLINICAL SUMMARY MEDICAL DECISION MAKING FREE TEXT BOX
In short, 89-year-old female, history of paroxysmal A-fib, hypothyroidism, recurrent SBO, recurrent UTI, and an active pulmonary and abdominal TB, who presents today with acute palpitation and chest tightness.  No other associated symptoms.  Patient vital signs nonactionable.  On examination, patient is well-appearing, not in acute distress, no diaphoresis, clear to auscultation bilaterally, abdomen soft nontender, warm and well-perfused, radial pulses palpable, cap refill less than 2 seconds.  Differential diagnoses broad for chest tightness and palpitation, including atrial fibrillation, other arrhythmia, ACS, pneumonia, electrolyte abnormality, and infection.  Will proceed with broad workup including blood work, urinalysis, respiratory panel, and chest x-ray to assess for above etiologies. - Joshua Jara MD, PGY6 In short, 89-year-old female, history of paroxysmal A-fib, hypothyroidism, recurrent SBO, recurrent UTI, and an active pulmonary and abdominal TB, who presents today with acute palpitation and chest tightness.  No other associated symptoms.  Patient vital signs nonactionable.  On examination, patient is well-appearing, not in acute distress, no diaphoresis, clear to auscultation bilaterally, abdomen soft nontender, warm and well-perfused, radial pulses palpable, cap refill less than 2 seconds.  Differential diagnoses broad for chest tightness and palpitation, including atrial fibrillation, other arrhythmia, ACS, pneumonia, electrolyte abnormality, and infection.  Will proceed with broad workup including blood work, urinalysis, respiratory panel, and chest x-ray to assess for above etiologies. - Joshua Jara MD, PGY6    DANIEL Poe MD: Agree with resident/ACP MDM, assessment and plan as above.

## 2025-01-05 NOTE — ED PROVIDER NOTE - NSFOLLOWUPINSTRUCTIONS_ED_ALL_ED_FT
After a thorough evaluation in the emergency room, your tests have come back negative for any immediate life-threatening conditions. However, it is essential to follow up with your primary care physician or cardiologist to monitor your symptoms and overall health. Please adhere to the following instructions:    Rest: Get plenty of rest and avoid strenuous activities until your follow-up appointment.    Medications: Continue taking your regular medications as prescribed. If you were given any new medications in the ER, take them as directed.    Follow-up Appointment: Schedule a follow-up visit with your primary care doctor or cardiologist within the next 7 days to further assess your condition.    Symptom Monitoring: Keep track of your symptoms, including the frequency and duration of palpitations and chest tightness. Note any factors that seem to trigger these episodes.    Lifestyle Changes:    Avoid caffeine, alcohol, and tobacco, as they may worsen your symptoms.  Manage stress through relaxation techniques, such as deep breathing or meditation.  Maintain a healthy diet and stay hydrated.  Seek Immediate Medical Attention: Return to the emergency room or call 911 if you experience:    Severe, crushing chest pain  Difficulty breathing or shortness of breath  Fainting or loss of consciousness  Rapid or irregular heartbeat that persists for more than a few minutes  Home Safety: Ensure your living environment is safe to prevent falls. Consider having a family member or caregiver assist you, especially if you feel dizzy or lightheaded.    Emergency Contacts: Keep a list of emergency contacts, including family members and your healthcare providers, easily accessible.

## 2025-01-05 NOTE — ED ADULT NURSE NOTE - NSFALLHARMRISKINTERV_ED_ALL_ED
Assessment/Plan     Healthy female exam. Mostly well but significant sleep issues and still dealing with depression, I believe these are linked together and would like to order an in-lab sleep study. Will see patient in 1 month for followup for depression and to see how sleep study scheduling is going. 2. Patient Counseling:  --Nutrition: Stressed importance of moderation in sodium/caffeine intake, saturated fat and cholesterol, caloric balance, sufficient intake of fresh fruits, vegetables, fiber, calcium, iron, and 1 mg of folate supplement per day (for females capable of pregnancy). --Exercise: Stressed the importance of regular exercise. --Substance Abuse: Discussed cessation/primary prevention of tobacco, alcohol, or other drug use; driving or other dangerous activities under the influence; availability of treatment for abuse. --Sexuality: Discussed sexually transmitted diseases, partner selection, use of condoms, avoidance of unintended pregnancy  and contraceptive alternatives. --Injury prevention: Discussed safety belts, safety helmets, smoke detector, smoking near bedding or upholstery. --Dental health: Discussed importance of regular tooth brushing, flossing, and dental visits. --Immunizations reviewed. 3. Discussed the patient's BMI with her. The BMI is above average; BMI management plan is completed  4. Follow up within 2-3 months. 1. Sleep disturbance  - Diagnostic Sleep Study; Future    2. Snoring  - Diagnostic Sleep Study; Future    3. Class 1 obesity with body mass index (BMI) of 32.0 to 32.9 in adult, unspecified obesity type, unspecified whether serious comorbidity present  - TSH, 3rd generation; Future  - Lipid panel; Future    4. Screening due  - CBC and differential; Future  - Comprehensive metabolic panel;  Future      Subjective     Vena Ken is a 28 y.o. female and is here for a comprehensive physical exam. The patient reports chronic GERD, depression & agoraphobia but these are chronic, no new complaints. 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 States antidepressants increase agoraphobia so she is not on them currently: effexor, prozac, paxil and "others since I was 14." Patient would "rather be sad than unable to leave my house." Patient used to see Dr. Chip Parks (spelled phonetically). Patient sleeps 2-6 hours per night and must "force herself" to sleep, does not feel tired throughout the day but drinks caffeine throughout the day, encouraged her not to drink caffeine 8-10 hours prior to bed. No weight loss, gained 15 pounds since moving to , states <1400 calories/day, cup of starch, <1/4lb protein, 1 cup vegetables, rare fruits because kids eat them all. Snores "all the time" since childhood, states was evaluated for sleep apnea 18yo, no results were given to her by the hospital that performed it according to her. Other Hx: GDM, tobacco use disorder, depression, migraine  FMHx leukemia in sister, class I obesity, R breast inclusion cyst in   Medications reconciled, no prescription medications at this time but taking tylenol prn for migraines and tums for GERD  Last pap  normal, needs new w/ or w/o HPV cotesting, wants to follow with OBGYN for this  Needs screening labs, is amenable    Do you take any herbs or supplements that were not prescribed by a doctor? no  Are you taking calcium supplements? no  Are you taking aspirin daily?  no     History:  LMP: last month  Tubes tied after last child  Last pap date: as above  Abnormal pap? no      The following portions of the patient's history were reviewed and updated as appropriate: allergies, current medications, past family history, past medical history, past social history, past surgical history and problem list.    Review of Systems  Do you have pain that bothers you in your daily life? no  Review of Systems   Constitutional: Negative for chills and fever. HENT: Negative for ear pain and sore throat. Eyes: Negative for pain and visual disturbance. Respiratory: Negative for cough and shortness of breath. Cardiovascular: Negative for chest pain and palpitations. Gastrointestinal: Negative for abdominal pain, constipation, diarrhea, nausea and vomiting. Genitourinary: Negative for dysuria and hematuria. Musculoskeletal: Negative for arthralgias and back pain. Skin: Negative for color change and rash. Neurological: Negative for seizures and syncope. Psychiatric/Behavioral: Positive for dysphoric mood. Negative for suicidal ideas. All other systems reviewed and are negative. Objective   Physical Exam  Vitals reviewed. Constitutional:       General: She is not in acute distress. Appearance: Normal appearance. She is well-developed. She is not ill-appearing. HENT:      Head: Normocephalic and atraumatic. Eyes:      General: No scleral icterus. Right eye: No discharge. Left eye: No discharge. Conjunctiva/sclera: Conjunctivae normal.   Cardiovascular:      Rate and Rhythm: Normal rate and regular rhythm. Pulses: Normal pulses. Heart sounds: Normal heart sounds. No murmur heard. No friction rub. No gallop. Pulmonary:      Effort: Pulmonary effort is normal. No respiratory distress. Breath sounds: Normal breath sounds. No stridor. No wheezing, rhonchi or rales. Abdominal:      General: Bowel sounds are normal. There is no distension. Palpations: Abdomen is soft. There is no mass. Tenderness: There is no abdominal tenderness. There is no guarding or rebound. Musculoskeletal:         General: No swelling. Cervical back: Neck supple. Skin:     General: Skin is warm and dry. Capillary Refill: Capillary refill takes less than 2 seconds. Coloration: Skin is not jaundiced or pale. Neurological:      Mental Status: She is alert.    Psychiatric:         Mood and Affect: Mood normal.         Behavior: Behavior normal.      Comments: Jovial when discussing her child Communicate risk of Fall with Harm to all staff, patient, and family/Provide visual cue: red socks, yellow wristband, yellow gown, etc/Reinforce activity limits and safety measures with patient and family/Bed in lowest position, wheels locked, appropriate side rails in place/Call bell, personal items and telephone in reach/Instruct patient to call for assistance before getting out of bed/chair/stretcher/Non-slip footwear applied when patient is off stretcher/[place] to call system/Physically safe environment - no spills, clutter or unnecessary equipment/Purposeful Proactive Rounding/Room/bathroom lighting operational, light cord in reach

## 2025-01-05 NOTE — ED PROVIDER NOTE - PATIENT PORTAL LINK FT
You can access the FollowMyHealth Patient Portal offered by [facility] by registering at the following website: [email or web address]. By joining [facility]’s FollowMyHealth portal, you will also be able to view your health information using other applications (apps) compatible with our system.

## 2025-01-05 NOTE — ED PROVIDER NOTE - DIFFERENTIAL DIAGNOSIS
Differential Diagnosis Ddx includes, however, is not limited to: infectious process, metabolic process, ACS, arrhythmia, other

## 2025-01-05 NOTE — ED PROVIDER NOTE - PROGRESS NOTE DETAILS
Labs back, WBC 3.2, hgb 11.7, plt 51 without active bleeding, normal coags, no electrolyte abnormlaities/SHEILA/transaminitis, troponin <5, and BNP 1123 (last one in Aug was 5685). VBG nonactionable, CXR no focal consolidations, and RVP negative. Updated family on results, pt reports continue mild chest tightness however significantly improved. UA still pending. Family wishes to go home if there are no interventions needed at this time. Will likely sign out to evening to with plan to follow up on UA and likely discharge. - Joshua Jara MD, PGY6 Reyes Tate MD (Attending Physician): Pt unable to give urine sample. Pt not endorsing any urinary symptoms at this time. Family wants to take pt home. Other labs/imaging non-actionable. Had shared decision making, we are ok with patient being discharged home. Pt was re-evaluated at bedside, VSS, feeling better overall. Results were discussed with patient as well as return precautions and follow up plan with PCP and cardiologist. Time was taken to answer any questions that the patient had before providing them with discharge paperwork.

## 2025-01-06 VITALS
TEMPERATURE: 98 F | SYSTOLIC BLOOD PRESSURE: 151 MMHG | OXYGEN SATURATION: 97 % | HEART RATE: 98 BPM | DIASTOLIC BLOOD PRESSURE: 98 MMHG | RESPIRATION RATE: 20 BRPM

## 2025-01-19 ENCOUNTER — INPATIENT (INPATIENT)
Facility: HOSPITAL | Age: 89
LOS: 4 days | Discharge: HOME HEALTH | DRG: 195 | End: 2025-01-24
Attending: STUDENT IN AN ORGANIZED HEALTH CARE EDUCATION/TRAINING PROGRAM | Admitting: STUDENT IN AN ORGANIZED HEALTH CARE EDUCATION/TRAINING PROGRAM
Payer: MEDICARE

## 2025-01-19 VITALS
HEIGHT: 67 IN | OXYGEN SATURATION: 100 % | TEMPERATURE: 98 F | SYSTOLIC BLOOD PRESSURE: 165 MMHG | HEART RATE: 110 BPM | RESPIRATION RATE: 32 BRPM | WEIGHT: 134.48 LBS | DIASTOLIC BLOOD PRESSURE: 94 MMHG

## 2025-01-19 DIAGNOSIS — E03.9 HYPOTHYROIDISM, UNSPECIFIED: ICD-10-CM

## 2025-01-19 DIAGNOSIS — Z98.89 OTHER SPECIFIED POSTPROCEDURAL STATES: Chronic | ICD-10-CM

## 2025-01-19 DIAGNOSIS — J18.9 PNEUMONIA, UNSPECIFIED ORGANISM: ICD-10-CM

## 2025-01-19 DIAGNOSIS — J96.01 ACUTE RESPIRATORY FAILURE WITH HYPOXIA: ICD-10-CM

## 2025-01-19 DIAGNOSIS — J44.9 CHRONIC OBSTRUCTIVE PULMONARY DISEASE, UNSPECIFIED: ICD-10-CM

## 2025-01-19 DIAGNOSIS — I48.20 CHRONIC ATRIAL FIBRILLATION, UNSPECIFIED: ICD-10-CM

## 2025-01-19 DIAGNOSIS — B34.8 OTHER VIRAL INFECTIONS OF UNSPECIFIED SITE: ICD-10-CM

## 2025-01-19 DIAGNOSIS — D69.6 THROMBOCYTOPENIA, UNSPECIFIED: ICD-10-CM

## 2025-01-19 LAB
ADD ON TEST-SPECIMEN IN LAB: SIGNIFICANT CHANGE UP
ALBUMIN SERPL ELPH-MCNC: 3.9 G/DL — SIGNIFICANT CHANGE UP (ref 3.3–5)
ALP SERPL-CCNC: 61 U/L — SIGNIFICANT CHANGE UP (ref 40–120)
ALT FLD-CCNC: 7 U/L — LOW (ref 10–45)
ANION GAP SERPL CALC-SCNC: 12 MMOL/L — SIGNIFICANT CHANGE UP (ref 5–17)
ANISOCYTOSIS BLD QL: SLIGHT — SIGNIFICANT CHANGE UP
APPEARANCE UR: CLEAR — SIGNIFICANT CHANGE UP
APTT BLD: 22.7 SEC — LOW (ref 24.5–35.6)
AST SERPL-CCNC: 11 U/L — SIGNIFICANT CHANGE UP (ref 10–40)
BACTERIA # UR AUTO: ABNORMAL /HPF
BASOPHILS # BLD AUTO: 0 K/UL — SIGNIFICANT CHANGE UP (ref 0–0.2)
BASOPHILS NFR BLD AUTO: 0 % — SIGNIFICANT CHANGE UP (ref 0–2)
BILIRUB SERPL-MCNC: 0.7 MG/DL — SIGNIFICANT CHANGE UP (ref 0.2–1.2)
BILIRUB UR-MCNC: NEGATIVE — SIGNIFICANT CHANGE UP
BUN SERPL-MCNC: 19 MG/DL — SIGNIFICANT CHANGE UP (ref 7–23)
CALCIUM SERPL-MCNC: 9.2 MG/DL — SIGNIFICANT CHANGE UP (ref 8.4–10.5)
CAST: 6 /LPF — HIGH (ref 0–4)
CHLORIDE SERPL-SCNC: 104 MMOL/L — SIGNIFICANT CHANGE UP (ref 96–108)
CO2 SERPL-SCNC: 26 MMOL/L — SIGNIFICANT CHANGE UP (ref 22–31)
COLOR SPEC: SIGNIFICANT CHANGE UP
CREAT SERPL-MCNC: 0.62 MG/DL — SIGNIFICANT CHANGE UP (ref 0.5–1.3)
DACRYOCYTES BLD QL SMEAR: SLIGHT — SIGNIFICANT CHANGE UP
DIFF PNL FLD: NEGATIVE — SIGNIFICANT CHANGE UP
EGFR: 79 ML/MIN/1.73M2 — SIGNIFICANT CHANGE UP
EOSINOPHIL # BLD AUTO: 0.06 K/UL — SIGNIFICANT CHANGE UP (ref 0–0.5)
EOSINOPHIL NFR BLD AUTO: 1.7 % — SIGNIFICANT CHANGE UP (ref 0–6)
FLUAV AG NPH QL: SIGNIFICANT CHANGE UP
FLUBV AG NPH QL: SIGNIFICANT CHANGE UP
GAS PNL BLDV: SIGNIFICANT CHANGE UP
GAS PNL BLDV: SIGNIFICANT CHANGE UP
GIANT PLATELETS BLD QL SMEAR: PRESENT — SIGNIFICANT CHANGE UP
GLUCOSE SERPL-MCNC: 116 MG/DL — HIGH (ref 70–99)
GLUCOSE UR QL: NEGATIVE MG/DL — SIGNIFICANT CHANGE UP
HCT VFR BLD CALC: 37.1 % — SIGNIFICANT CHANGE UP (ref 34.5–45)
HGB BLD-MCNC: 11.4 G/DL — LOW (ref 11.5–15.5)
INR BLD: 0.99 RATIO — SIGNIFICANT CHANGE UP (ref 0.85–1.16)
KETONES UR-MCNC: NEGATIVE MG/DL — SIGNIFICANT CHANGE UP
LEUKOCYTE ESTERASE UR-ACNC: NEGATIVE — SIGNIFICANT CHANGE UP
LYMPHOCYTES # BLD AUTO: 0.62 K/UL — LOW (ref 1–3.3)
LYMPHOCYTES # BLD AUTO: 17.5 % — SIGNIFICANT CHANGE UP (ref 13–44)
MACROCYTES BLD QL: SIGNIFICANT CHANGE UP
MANUAL SMEAR VERIFICATION: SIGNIFICANT CHANGE UP
MCHC RBC-ENTMCNC: 30.7 G/DL — LOW (ref 32–36)
MCHC RBC-ENTMCNC: 33.5 PG — SIGNIFICANT CHANGE UP (ref 27–34)
MCV RBC AUTO: 109.1 FL — HIGH (ref 80–100)
MONOCYTES # BLD AUTO: 0.06 K/UL — SIGNIFICANT CHANGE UP (ref 0–0.9)
MONOCYTES NFR BLD AUTO: 1.8 % — LOW (ref 2–14)
NEUTROPHILS # BLD AUTO: 2.77 K/UL — SIGNIFICANT CHANGE UP (ref 1.8–7.4)
NEUTROPHILS NFR BLD AUTO: 72.8 % — SIGNIFICANT CHANGE UP (ref 43–77)
NEUTS BAND # BLD: 5.3 % — SIGNIFICANT CHANGE UP (ref 0–8)
NEUTS BAND NFR BLD: 5.3 % — SIGNIFICANT CHANGE UP (ref 0–8)
NITRITE UR-MCNC: NEGATIVE — SIGNIFICANT CHANGE UP
OVALOCYTES BLD QL SMEAR: SLIGHT — SIGNIFICANT CHANGE UP
PH UR: 5.5 — SIGNIFICANT CHANGE UP (ref 5–8)
PLAT MORPH BLD: NORMAL — SIGNIFICANT CHANGE UP
PLATELET # BLD AUTO: 55 K/UL — LOW (ref 150–400)
PLATELET COUNT - ESTIMATE: ABNORMAL
POIKILOCYTOSIS BLD QL AUTO: SIGNIFICANT CHANGE UP
POTASSIUM SERPL-MCNC: 4.4 MMOL/L — SIGNIFICANT CHANGE UP (ref 3.5–5.3)
POTASSIUM SERPL-SCNC: 4.4 MMOL/L — SIGNIFICANT CHANGE UP (ref 3.5–5.3)
PROT SERPL-MCNC: 6.8 G/DL — SIGNIFICANT CHANGE UP (ref 6–8.3)
PROT UR-MCNC: SIGNIFICANT CHANGE UP MG/DL
PROTHROM AB SERPL-ACNC: 11.3 SEC — SIGNIFICANT CHANGE UP (ref 9.9–13.4)
RBC # BLD: 3.4 M/UL — LOW (ref 3.8–5.2)
RBC # FLD: 15 % — HIGH (ref 10.3–14.5)
RBC BLD AUTO: ABNORMAL
RBC CASTS # UR COMP ASSIST: 2 /HPF — SIGNIFICANT CHANGE UP (ref 0–4)
REVIEW: SIGNIFICANT CHANGE UP
RSV RNA NPH QL NAA+NON-PROBE: SIGNIFICANT CHANGE UP
S PNEUM AG UR QL: NEGATIVE — SIGNIFICANT CHANGE UP
SARS-COV-2 RNA SPEC QL NAA+PROBE: SIGNIFICANT CHANGE UP
SODIUM SERPL-SCNC: 142 MMOL/L — SIGNIFICANT CHANGE UP (ref 135–145)
SP GR SPEC: >1.03 — HIGH (ref 1–1.03)
SQUAMOUS # UR AUTO: 5 /HPF — SIGNIFICANT CHANGE UP (ref 0–5)
UROBILINOGEN FLD QL: 1 MG/DL — SIGNIFICANT CHANGE UP (ref 0.2–1)
VARIANT LYMPHS # BLD: 0.9 % — SIGNIFICANT CHANGE UP (ref 0–6)
VARIANT LYMPHS NFR BLD MANUAL: 0.9 % — SIGNIFICANT CHANGE UP (ref 0–6)
WBC # BLD: 3.55 K/UL — LOW (ref 3.8–10.5)
WBC # FLD AUTO: 3.55 K/UL — LOW (ref 3.8–10.5)
WBC UR QL: 2 /HPF — SIGNIFICANT CHANGE UP (ref 0–5)

## 2025-01-19 PROCEDURE — 99223 1ST HOSP IP/OBS HIGH 75: CPT

## 2025-01-19 PROCEDURE — 71045 X-RAY EXAM CHEST 1 VIEW: CPT | Mod: 26

## 2025-01-19 PROCEDURE — 99285 EMERGENCY DEPT VISIT HI MDM: CPT | Mod: GC

## 2025-01-19 RX ORDER — AZITHROMYCIN DIHYDRATE 500 MG/1
500 TABLET, FILM COATED ORAL EVERY 24 HOURS
Refills: 0 | Status: DISCONTINUED | OUTPATIENT
Start: 2025-01-20 | End: 2025-01-21

## 2025-01-19 RX ORDER — SERTRALINE HCL 100 MG
1 TABLET ORAL
Refills: 0 | DISCHARGE

## 2025-01-19 RX ORDER — AMIODARONE HYDROCHLORIDE 50 MG/ML
200 INJECTION, SOLUTION INTRAVENOUS DAILY
Refills: 0 | Status: DISCONTINUED | OUTPATIENT
Start: 2025-01-19 | End: 2025-01-24

## 2025-01-19 RX ORDER — CEFTRIAXONE 250 MG/1
1000 INJECTION, POWDER, FOR SOLUTION INTRAMUSCULAR; INTRAVENOUS EVERY 24 HOURS
Refills: 0 | Status: COMPLETED | OUTPATIENT
Start: 2025-01-19 | End: 2025-01-23

## 2025-01-19 RX ORDER — CEFEPIME HCL 1 G
1000 IV SOLUTION, PIGGYBACK, BOTTLE (EA) INTRAVENOUS ONCE
Refills: 0 | Status: COMPLETED | OUTPATIENT
Start: 2025-01-19 | End: 2025-01-19

## 2025-01-19 RX ORDER — IPRATROPIUM BROMIDE AND ALBUTEROL SULFATE .5; 2.5 MG/3ML; MG/3ML
3 SOLUTION RESPIRATORY (INHALATION) EVERY 6 HOURS
Refills: 0 | Status: DISCONTINUED | OUTPATIENT
Start: 2025-01-19 | End: 2025-01-24

## 2025-01-19 RX ORDER — BACTERIOSTATIC SODIUM CHLORIDE 0.9 %
500 VIAL (ML) INJECTION ONCE
Refills: 0 | Status: COMPLETED | OUTPATIENT
Start: 2025-01-19 | End: 2025-01-19

## 2025-01-19 RX ORDER — METHYLPREDNISOLONE ACETATE 40 MG/ML
125 VIAL (ML) INJECTION ONCE
Refills: 0 | Status: COMPLETED | OUTPATIENT
Start: 2025-01-19 | End: 2025-01-19

## 2025-01-19 RX ORDER — PANTOPRAZOLE 20 MG/1
1 TABLET, DELAYED RELEASE ORAL
Refills: 0 | DISCHARGE

## 2025-01-19 RX ORDER — METOPROLOL SUCCINATE 25 MG
25 TABLET, EXTENDED RELEASE 24 HR ORAL
Refills: 0 | Status: DISCONTINUED | OUTPATIENT
Start: 2025-01-19 | End: 2025-01-24

## 2025-01-19 RX ORDER — CYANOCOBALAMIN (VITAMIN B-12) 1000MCG/ML
1 VIAL (ML) INJECTION
Refills: 0 | DISCHARGE

## 2025-01-19 RX ORDER — CYANOCOBALAMIN (VITAMIN B-12) 1000MCG/ML
1000 VIAL (ML) INJECTION DAILY
Refills: 0 | Status: DISCONTINUED | OUTPATIENT
Start: 2025-01-19 | End: 2025-01-24

## 2025-01-19 RX ORDER — METHYLPREDNISOLONE ACETATE 40 MG/ML
20 VIAL (ML) INJECTION EVERY 8 HOURS
Refills: 0 | Status: DISCONTINUED | OUTPATIENT
Start: 2025-01-19 | End: 2025-01-21

## 2025-01-19 RX ORDER — SERTRALINE HCL 100 MG
50 TABLET ORAL DAILY
Refills: 0 | Status: DISCONTINUED | OUTPATIENT
Start: 2025-01-19 | End: 2025-01-24

## 2025-01-19 RX ORDER — CEFTRIAXONE 250 MG/1
1000 INJECTION, POWDER, FOR SOLUTION INTRAMUSCULAR; INTRAVENOUS EVERY 24 HOURS
Refills: 0 | Status: DISCONTINUED | OUTPATIENT
Start: 2025-01-19 | End: 2025-01-19

## 2025-01-19 RX ORDER — IPRATROPIUM BROMIDE AND ALBUTEROL SULFATE .5; 2.5 MG/3ML; MG/3ML
3 SOLUTION RESPIRATORY (INHALATION)
Refills: 0 | Status: COMPLETED | OUTPATIENT
Start: 2025-01-19 | End: 2025-01-19

## 2025-01-19 RX ORDER — AMIODARONE HYDROCHLORIDE 50 MG/ML
1 INJECTION, SOLUTION INTRAVENOUS
Refills: 0 | DISCHARGE

## 2025-01-19 RX ORDER — BUDESONIDE 9 MG/1
0.5 TABLET, EXTENDED RELEASE ORAL EVERY 12 HOURS
Refills: 0 | Status: DISCONTINUED | OUTPATIENT
Start: 2025-01-19 | End: 2025-01-24

## 2025-01-19 RX ORDER — ACETAMINOPHEN 160 MG/5ML
1000 SUSPENSION ORAL ONCE
Refills: 0 | Status: COMPLETED | OUTPATIENT
Start: 2025-01-19 | End: 2025-01-19

## 2025-01-19 RX ORDER — AZITHROMYCIN DIHYDRATE 500 MG/1
TABLET, FILM COATED ORAL
Refills: 0 | Status: DISCONTINUED | OUTPATIENT
Start: 2025-01-19 | End: 2025-01-19

## 2025-01-19 RX ORDER — VANCOMYCIN HYDROCHLORIDE 50 MG/ML
1000 KIT ORAL ONCE
Refills: 0 | Status: COMPLETED | OUTPATIENT
Start: 2025-01-19 | End: 2025-01-19

## 2025-01-19 RX ORDER — LEVOTHYROXINE SODIUM 25 UG/1
100 TABLET ORAL DAILY
Refills: 0 | Status: DISCONTINUED | OUTPATIENT
Start: 2025-01-19 | End: 2025-01-24

## 2025-01-19 RX ORDER — AZITHROMYCIN DIHYDRATE 500 MG/1
500 TABLET, FILM COATED ORAL ONCE
Refills: 0 | Status: COMPLETED | OUTPATIENT
Start: 2025-01-19 | End: 2025-01-19

## 2025-01-19 RX ORDER — PANTOPRAZOLE 20 MG/1
40 TABLET, DELAYED RELEASE ORAL
Refills: 0 | Status: DISCONTINUED | OUTPATIENT
Start: 2025-01-19 | End: 2025-01-24

## 2025-01-19 RX ADMIN — IPRATROPIUM BROMIDE AND ALBUTEROL SULFATE 3 MILLILITER(S): .5; 2.5 SOLUTION RESPIRATORY (INHALATION) at 06:39

## 2025-01-19 RX ADMIN — IPRATROPIUM BROMIDE AND ALBUTEROL SULFATE 3 MILLILITER(S): .5; 2.5 SOLUTION RESPIRATORY (INHALATION) at 02:55

## 2025-01-19 RX ADMIN — AZITHROMYCIN DIHYDRATE 255 MILLIGRAM(S): 500 TABLET, FILM COATED ORAL at 02:53

## 2025-01-19 RX ADMIN — Medication 500 MILLILITER(S): at 06:38

## 2025-01-19 RX ADMIN — Medication 100 MILLIGRAM(S): at 05:14

## 2025-01-19 RX ADMIN — CEFTRIAXONE 100 MILLIGRAM(S): 250 INJECTION, POWDER, FOR SOLUTION INTRAMUSCULAR; INTRAVENOUS at 15:16

## 2025-01-19 RX ADMIN — IPRATROPIUM BROMIDE AND ALBUTEROL SULFATE 3 MILLILITER(S): .5; 2.5 SOLUTION RESPIRATORY (INHALATION) at 02:52

## 2025-01-19 RX ADMIN — Medication 20 MILLIGRAM(S): at 19:00

## 2025-01-19 RX ADMIN — VANCOMYCIN HYDROCHLORIDE 250 MILLIGRAM(S): KIT at 04:13

## 2025-01-19 RX ADMIN — Medication 125 MILLIGRAM(S): at 02:53

## 2025-01-19 RX ADMIN — ACETAMINOPHEN 400 MILLIGRAM(S): 160 SUSPENSION ORAL at 02:52

## 2025-01-19 NOTE — ED ADULT NURSE NOTE - OBJECTIVE STATEMENT
Pt is 89y F with PMH afib, HTN, asthma, COPD, hypothyroidism, BIBEMS for lethargy and increased WOB. Pt's daughter reports pt having productive cough and increased WOB x 3 days, with decreased PO intake. Pt's aide reports sudden change in AMS 0100 when pt would not complete sentences but is awake. Upon assessment, A&Ox1, awake but unable to speak in complete sentences, labored breathing. SpO2 89% on RA, 96% on 2LNC. Afib on CM, 110s bpm. Febrile 100.6F rectally. Tachypneic. Daughter and aide at bedside.

## 2025-01-19 NOTE — H&P ADULT - PROBLEM SELECTOR PLAN 1
concern for sepsis from severe acute bacterial pneumonia.  Has +parainfluenza on RVP but also bandemia favor treating superimposed bacterial infection.  No h/o MRSA.  continue aztihro/ceftriaxone (1/19-)  f/u legionella/strep/mrsa swab  urine/blood culture pending  s/p 500NS  wean bipap as tolerated

## 2025-01-19 NOTE — H&P ADULT - ASSESSMENT
89yo F PMH gastritis, A-fib, SBO, hypothyroid, hypertension, asthma, COPD (on 4 L nasal cannula at home), presents emergency department with home health aide for evaluation of low O2 saturation, increased work of breathing and lethargy.

## 2025-01-19 NOTE — CONSULT NOTE ADULT - SUBJECTIVE AND OBJECTIVE BOX
01-19-25 @ 14:46    Patient is a 89y old  Female who presents with a chief complaint of     HPI:  89-year-old female with past medical history of gastritis, A-fib, SBO, hypothyroid, hypertension, asthma, COPD (on 4 L nasal cannula at home), presents emergency department with home health aide for evaluation of low O2 saturation, increased work of breathing and lethargy.  Per daughter, the patient has had a productive cough for 2 to 3 days.  She has had decreased p.o. intake, and today was sleepier than normal.  Of note, the patient had an admission in October 2024 for hypoxic respiratory failure and was on BiPAP at that time.  The patient is DNR/DNI, but was trial of noninvasive ventilation.  Denies measured fevers, palpitations, nausea or vomiting, diarrhea or dysuria.  Has albuterol inhaler for home.  Has not been on steroids recently.  No sick contacts.   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 reported allergy (Unknown)    Intolerances        PAST MEDICAL & SURGICAL HISTORY:  Pulmonary TB      Gastritis      Afib      SBO (small bowel obstruction)      Hypothyroid      HTN (hypertension)      TB (pulmonary tuberculosis)  TB of abdomen 1960      Orthostatic hypotension      Asthma      Aortic stenosis      Multifocal pneumonia      History of Bilateral Breast Biopsy      S/P Exploratory Laparotomy  TB in 1960s      H/O hemorrhoidectomy      S/P vein stripping          FAMILY HISTORY:  Family history of essential hypertension        Social History: [  x] TOBACCO                  [ x ] ETOH                                 [ x ] IVDA/DRUGS    REVIEW OF SYSTEMS      General:	x    Skin/Breast:x  	  Ophthalmologic:x  	  ENMT:	x    Respiratory and Thorax:  sob,  confused;  on bipap   	  Cardiovascular:	x    Gastrointestinal:	x    Genitourinary:	x    Musculoskeletal:	x    Neurological:	x    Psychiatric:	x    Hematology/Lymphatics:	x    Endocrine:	x    Allergic/Immunologic:	x    MEDICATIONS  (STANDING):  aMIOdarone    Tablet 200 milliGRAM(s) Oral daily  azithromycin  IVPB 500 milliGRAM(s) IV Intermittent every 24 hours  cefTRIAXone   IVPB 1000 milliGRAM(s) IV Intermittent every 24 hours  cyanocobalamin 1000 MICROGram(s) Oral daily  levothyroxine 100 MICROGram(s) Oral daily  metoprolol tartrate 25 milliGRAM(s) Oral two times a day  pantoprazole    Tablet 40 milliGRAM(s) Oral before breakfast  sertraline 50 milliGRAM(s) Oral daily    MEDICATIONS  (PRN):       Vital Signs Last 24 Hrs  T(C): 36.7 (19 Jan 2025 07:34), Max: 38.2 (19 Jan 2025 02:45)  T(F): 98 (19 Jan 2025 07:34), Max: 100.7 (19 Jan 2025 02:45)  HR: 92 (19 Jan 2025 12:43) (80 - 111)  BP: 100/73 (19 Jan 2025 12:43) (91/61 - 165/94)  BP(mean): 80 (19 Jan 2025 07:34) (72 - 80)  RR: 17 (19 Jan 2025 12:43) (17 - 32)  SpO2: 100% (19 Jan 2025 12:43) (98% - 100%)    Parameters below as of 19 Jan 2025 12:43  Patient On (Oxygen Delivery Method): BiPAP/CPAP    Orthostatic VS          I&O's Summary      Physical Exam:   GENERAL: NAD, well-groomed, well-developed  HEENT: KOLBY/   Atraumatic, Normocephalic  ENMT: No tonsillar erythema, exudates, or enlargement; Moist mucous membranes, Good dentition, No lesions  NECK: Supple, No JVD, Normal thyroid  CHEST/LUNG:poor air entry bilaterally;   CVS: Regular rate and rhythm; No murmurs, rubs, or gallops  GI: : Soft, Nontender, Nondistended; Bowel sounds present  NERVOUS SYSTEM:  awake  EXTREMITIES:  -edema  LYMPH: No lymphadenopathy noted  SKIN: No rashes or lesions  ENDOCRINOLOGY: No Thyromegaly  PSYCH: confused     Labs:  3.7<60<4>>58<<7.325>>3.7<<3><<4><<5<<589>>, Venous<72<4>>49<<7.255>>Venous<<3><<4><<5<<499>>SARS-CoV-2: NotDetec (19 Jan 2025 02:48)                              11.4   3.55  )-----------( 55       ( 19 Jan 2025 02:48 )             37.1     01-19    142  |  104  |  19  ----------------------------<  116[H]  4.4   |  26  |  0.62    Ca    9.2      19 Jan 2025 02:48    TPro  6.8  /  Alb  3.9  /  TBili  0.7  /  DBili  x   /  AST  11  /  ALT  7[L]  /  AlkPhos  61  01-19    CAPILLARY BLOOD GLUCOSE        LIVER FUNCTIONS - ( 19 Jan 2025 02:48 )  Alb: 3.9 g/dL / Pro: 6.8 g/dL / ALK PHOS: 61 U/L / ALT: 7 U/L / AST: 11 U/L / GGT: x           PT/INR - ( 19 Jan 2025 02:48 )   PT: 11.3 sec;   INR: 0.99 ratio         PTT - ( 19 Jan 2025 02:48 )  PTT:22.7 sec  Urinalysis Basic - ( 19 Jan 2025 12:15 )    Color: Dark Yellow / Appearance: Clear / SG: >1.030 / pH: x  Gluc: x / Ketone: Negative mg/dL  / Bili: Negative / Urobili: 1.0 mg/dL   Blood: x / Protein: Trace mg/dL / Nitrite: Negative   Leuk Esterase: Negative / RBC: 2 /HPF / WBC 2 /HPF   Sq Epi: x / Non Sq Epi: 5 /HPF / Bacteria: Moderate /HPF      D DImer  Procalcitonin: 0.07 ng/mL (01-19 @ 02:48)      Studies  Chest X-RAY  CT SCAN Chest   CT Abdomen  Venous Dopplers: LE:   Others        rad< from: Xray Chest 1 View AP/PA (01.19.25 @ 05:01) >    ACC: [id number] EXAM:  XR CHEST AP OR PA 1V   ORDERED BY:  PAT MIRZA     PROCEDURE DATE:  01/19/2025          INTERPRETATION:  CLINICAL INDICATION: Shortness of breath, evaluate for   pneumonia    EXAM: Frontal radiograph of the chest.    COMPARISON: Chest radiograph from 1/5/2025    FINDINGS:  Bibasilar peripheral airspace opacities.  Small left pleural effusion with adjacent passive atelectasis  No pneumothorax. The heart is not well evaluated in this position  The visualized osseous structures demonstrate no acute pathology.    IMPRESSION:  Bibasilar airspace opacities may reflect multifocal infection.    --- End of Report ---          JOHANN SWARTZ DO; Resident Radiologist  This document has been electronically signed.  KARYN CHRISTIE MD; Attending Interventional Radiologist  This document has been electronically signed. Jan 19 2025  9:56AM    < end of copied text >  < from: CT Angio Chest PE Protocol w/ IV Cont (09.02.24 @ 17:56) >  Cholelithiasis. Right renal cyst.    BONES/SOFT TISSUES: No bone lesion or acute fracture.      IMPRESSION:    No pulmonary embolus.    Decreased pulmonary edema. Unchanged small pleural effusions.    Persistent occlusive mucous plugs in the right middle and lower lobes.   Decreased distal atelectasis.    --- End of Report ---          JOHANN SWARTZ DO; Resident Radiologist  This document has been electronically signed.  DENNY HOWARD M.D., ATTENDING RADIOLOGIST  This document has been electronically signed. Sep  3 2024 10:05AM    < end of copied text >      echo< from: TTE W or WO Ultrasound Enhancing Agent (08.27.24 @ 15:27) >  CONCLUSIONS:      1. Left ventricular cavity is small. Left ventricular wall thickness is normal. Left ventricular systolic function is normal. There are no regional wall motion abnormalities seen.   2. Normal right ventricular cavity size, with normal wall thickness, and normal right ventricular systolic function.   3. Mild pulmonary hypertension.    < end of copied text >

## 2025-01-19 NOTE — ED PROVIDER NOTE - CARE PLAN
1 Principal Discharge DX:	Pneumonia   Principal Discharge DX:	Pneumonia  Secondary Diagnosis:	Acute respiratory failure with hypercapnia

## 2025-01-19 NOTE — ED ADULT NURSE NOTE - ED CARDIAC RHYTHM
Occupational Therapy Daily Treatment    Visit Count: 4    Plan of Care: 4/18/2019 Through: 7/16/2019  Insurance Information:[employer] APPROVED THROUGH 5/4/2019   Referred by: Ronaldo Holder MD; Next provider visit (if known/scheduled):  Medical Diagnosis (from order):  Malignant neoplasm involving both nipple and areola of left breast in female, estrogen receptor negative (CMS/HCC)   Treatment Diagnosis: s/p left breast cancer with weakness symptoms with impaired strength, impaired gait, impaired mobility, impaired activity tolerance, impaired balance, impaired body mechanics     Date of onset/injury: Recurrent left breast cancer with extensive metastases discovered in January 2019  Diagnosis Precautions: lymphedema risk practices  Chart reviewed at time of initial evaluation (relevant co-morbidities, allergies, tests and medications listed):   Breast Cancer                                   1990            Comment: stage II carcinoma of the left breast status                post partial mastectomy, followed by lymph node                dissection. Treated with chemotherapy (CAF                regimen) and the five years Tamoxifen     Cirrhosis of liver (CMS/HCC)                                  Thrombocytopenia (CMS/HCC)                                      Comment: chronic probably secondary to cirrhosis of the                liver  and hypersplenism     Anemia                                                        Hypertension                                                    Comment: essential     Leukopenia                                                      Comment: mild    Type II diabetes mellitus (CMS/HCC)                             Comment: Diet controlled/No longer on medications/Does                not check blood sugars at home, machine broke    Regional wall motion abnormality of heart       08/02/2013      Comment: akinetic apex, severely hypokinetic apical                septal and apical  lateral segments.     Open wound of breast                                            Comment: Left (25yr s/p Lumpectomy) dressing with VAC in               place    Wears dentures                                                  Comment: Upper    Non-healing surgical wound                      3/12/2015     Gastroesophageal reflux disease                               Pulmonary nodules                                             Lymphadenopathy                                                      SUBJECTIVE   Has been doing more walking around the house.  She describes herself as \"a homebody.\"  She likes to be active in her home.  Current Pain (0-10 scale): 0/10 pain  Functional Change: She feels like her walking is getting better.      OBJECTIVE     UE LYMPHEDEMA CIRCUMFERENCE  Date 4/23 4/23             Landmarks   left right             MCP 18.5 18.5             wrist 17 17             4 cm 19 19             8 cm 23 23             12 cm 27 27             16 cm 30.5 29.5             20 cm 32 30             24 cm 30 29.5             28 cm 35 37.5             32 cm 38.5 40             36 cm 39.5 38.5             40 cm 40 40             44 cm 41 40             48 cm               52 cm               56 cm               60 cm               64 cm               68 cm               Total 391 389.5             % loss/gain               *All measurements are in cm unless otherwise noted    Scar Tissue Integrity: Left Lumpectomy ALND restricted healed old scars  Surrounding Soft Tissue: noted mild tightness at axilla  Lymphatic Cording: not present   Location: not applicable   Tissue Tension Cording: not present    Location: not applicable  Treatment   Therapeutic Exercise:   Exercise Repetitions Sets Position/Cues   Seated Bilateral Shoulder flexion 15 1 Cane 1#   Seated  Bilateral shoulder abd 15 1 Cane 1#   Seated  bilateral shoulder circles with cane      Nu-step  1   4 min    Standing hip flex 20 1 With holding onto  bar at mirror   Standing  Hip abducttion 20 1 With holding onto bar at mirror   Standing squats  10 1 With bar at mirror   Seated elbow curl  10  1  Cane 1#         Standing weight shifting with holding onto yellow ball   Shifting weight with bilateral reaching left to right   Seated marching in place 10 1 Seated in front of mirror   Standing holding onto bar, toe touch marching onto step 10 1 At mirror   Seated hamstring stretch      Supine knee to chest   Single knee to chest with use of towel hold 15 seconds   Stairs--2 steps 2 2 reciprocal   Wrist flexion/extension 15 1 1#   Supination/Pronation 15 1 1#   Yellow putty gross grasp 25 1    Knee extension yellow Theraband 10 1 Sitting   Sidestep at mirror-8 feet either direction 1 1 Holding onto bar   Shoulder press with cane 15 1 1#   Ambulated in clinic 1 100 feet    Comments: moderate Vc's needed for foot placement for getting wider foot stance and base of support    Skilled input: verbal instruction/cues, posture correction, education/instruction on activity tolerance    Home Program:   Cane exercises at home    Writer verbally educated the patient and received verbal consent from the patient on hand placement, positioning of patient, and techniques to be performed today including clothing adjustments for techniques, therapist position for techniques as described above and how they are pertinent to the patient's plan of care.       Suggestions for next session as indicated: progress per plan of care, NuStep, balance activities, Standing UE exercises.    ASSESSMENT   Cues for step length.  No shortness of breath noted.  Right knee discomfort reported times 1 but resolved after changing activity.  Does all tasks, motivated.  Mild balance deficits noted.  May benefit from continued formal testing.    Pain after treatment (patient reported, 0-10 scale): 0  Result of above outlined education: Verbalizes understanding, Demonstrates understanding and Needs  reinforcement    THERAPY DAILY BILLING   Insurance: N/A 2. N/A    Evaluation Procedures:  No evaluation codes were used on this date of service    Timed Procedures:  Therapeutic Exercise, 45 minutes    Untimed Procedures:  No untimed codes were used on this date of service    Total Treatment Time: 45 minutes   irregular

## 2025-01-19 NOTE — PATIENT PROFILE ADULT - SAFE PLACE TO LIVE
Comment: Pyogenic granuloma- nature and unknown etiology (potentially trauma) discussed. This is a benign vascular lesion that is common in kids- most common on H/N or extremities, thought to be potentially related to trauma. Although benign, the lesion will grow and can frequently bleed. Pyogenic granulomas will not resolve on their own and definitive treatment is surgical excision. Lesion was removed with shave removal today in the office, Johnathon tolerated the procedure well. Follow up PRN. Detail Level: Simple no

## 2025-01-19 NOTE — ED CLERICAL - NS ED CLERK UNITS
APER Assessment/Plan


Assessment/Plan


1. Sinus tachycardia with no evidence of atrial fibrillation or SVT due to  

pain and respiratory failure. 


    Echocardiogram showed EF 65%. ECG  also sinus tach


2. Malignant pleural effusion and metastatic cancer. Follow up Dr. Fraga. S/p 

Left Thoracentesis on 4/20/18


    Morphine pump in abdomen. On BIPAP


3. Lung mass.   


4. Crohn disease and history of exploratory laparotomy, under management of Dr. Mcleod.


5. Ascites  paracentesis with cytology and also awaiting mediastinoscopy versus 

CT-guided lung biopsy , per Dr. Russell.


6. Still full Code 





DW RN





Subjective


Subjective


Altered, in ICU on BIPAP  in sinus tach 120-130s.fAMILY AND rn AT BEDSIDE. 

sTILL FULL CODE.





Objective





Last 24 Hour Vital Signs








  Date Time  Temp Pulse Resp B/P (MAP) Pulse Ox O2 Delivery O2 Flow Rate FiO2


 


5/2/18 14:00  129 21 126/80 100 Bi-pap  50


 


5/2/18 13:00  126 25  98 Facial  50


 


5/2/18 13:00  126 25 136/90 100 Bi-pap  50


 


5/2/18 12:00        50


 


5/2/18 12:00  129      


 


5/2/18 12:00 98.6 132 13 135/86 100 Bi-pap  70





 98.6       


 


5/2/18 11:09  130 20  98 Facial  50


 


5/2/18 11:09      Bi-pap  


 


5/2/18 11:09      Bi-pap  


 


5/2/18 11:00  132 13 127/85 100 Bi-pap  50


 


5/2/18 10:00  132 17 123/84 100 Bi-pap  50


 


5/2/18 09:08  130 15  100 Facial  50


 


5/2/18 09:00  132 17 119/82 97 Bi-pap  70


 


5/2/18 09:00        50


 


5/2/18 08:00 98.6 132 16 128/90 100 Bi-pap  70





 98.6       


 


5/2/18 08:00  131      


 


5/2/18 07:02      Bi-pap  


 


5/2/18 07:01      Bi-pap  


 


5/2/18 07:01      Bi-pap  


 


5/2/18 07:01     100 Bi-pap  50


 


5/2/18 07:00  132 16 132/94 100 Bi-pap  70


 


5/2/18 06:58  130 15  100 Facial  50


 


5/2/18 06:00  123 16 106/64 100 Bi-pap  70


 


5/2/18 05:00  130 18 127/76 100 Bi-pap  70


 


5/2/18 04:48  130 17  100 Facial  70


 


5/2/18 04:00  130      


 


5/2/18 04:00        70


 


5/2/18 04:00 98.5 130 18 108/77 100 Bi-pap  70





 98.5       


 


5/2/18 03:40  124 16  100 Bi-pap  


 


5/2/18 03:30  122 16  96 Bi-pap  


 


5/2/18 03:30  124 18  99 Facial  70


 


5/2/18 03:00  125 18 110/67 98 Bi-pap  70


 


5/2/18 02:00  130 18 106/69 98 Bi-pap  70


 


5/2/18 01:30  132 16  100 Facial  70


 


5/2/18 01:00 98.5 126 18 100/66 98 Bi-pap  70





 98.5       


 


5/2/18 00:00 98.5 130 18 119/77 98 Bi-pap  70





 98.5       


 


5/2/18 00:00        70


 


5/1/18 23:26      Bi-pap  


 


5/1/18 23:26  130 25  95 Bi-pap  


 


5/1/18 23:25  131 14  96 Facial  70


 


5/1/18 23:00  130 19 117/77 98 Bi-pap  70


 


5/1/18 22:00  121 16 119/88 98 Bi-pap  70


 


5/1/18 21:30  130 17  95 Facial  70


 


5/1/18 21:00  121 18 127/74 98 Bi-pap  70


 


5/1/18 20:00  131      


 


5/1/18 20:00        70


 


5/1/18 20:00 98.5 131 18 121/72 98 Bi-pap  70





 98.5       


 


5/1/18 19:51      Bi-pap  


 


5/1/18 19:50      Bi-pap  


 


5/1/18 19:50      Bi-pap  


 


5/1/18 19:45  133 20  94 Bi-pap  50


 


5/1/18 19:30  133 20  94 Facial  50


 


5/1/18 19:00  128 18 118/72 100 Bi-pap  50


 


5/1/18 18:00  125 18 112/70 99 Bi-pap  50


 


5/1/18 17:00  122 16 116/75 100 Bi-pap  50


 


5/1/18 16:44  130 22  99 Facial  40


 


5/1/18 16:00 98.6 135 17 110/71 96 Bi-pap  50





 98.6       


 


5/1/18 16:00        50


 


5/1/18 16:00  125      


 


5/1/18 15:00  127 18 117/72 100 Bi-pap  50


 


5/1/18 14:52  131 16  99 Bi-pap  50


 


5/1/18 14:45  131 20  99 Facial  50


 


5/1/18 14:45  131 15  99 Bi-pap  50

















Intake and Output  


 


 5/1/18 5/2/18





 19:00 07:00


 


Output Total 650 ml 600 ml


 


Balance -650 ml -600 ml


 


  


 


Output Urine Total 650 ml 600 ml











Laboratory Tests








Test


  5/2/18


04:10 5/2/18


11:15


 


White Blood Count


  12.2 K/UL


(4.8-10.8)  H 


 


 


Red Blood Count


  4.66 M/UL


(4.20-5.40) 


 


 


Hemoglobin


  14.0 G/DL


(12.0-16.0) 


 


 


Hematocrit


  41.0 %


(37.0-47.0) 


 


 


Mean Corpuscular Volume 88 FL (80-99)   


 


Mean Corpuscular Hemoglobin


  30.1 PG


(27.0-31.0) 


 


 


Mean Corpuscular Hemoglobin


Concent 34.2 G/DL


(32.0-36.0) 


 


 


Red Cell Distribution Width


  14.9 %


(11.6-14.8)  H 


 


 


Platelet Count


  185 K/UL


(150-450) 


 


 


Mean Platelet Volume


  7.8 FL


(6.5-10.1) 


 


 


Neutrophils (%) (Auto)


  78.8 %


(45.0-75.0)  H 


 


 


Lymphocytes (%) (Auto)


  16.7 %


(20.0-45.0)  L 


 


 


Monocytes (%) (Auto)


  3.3 %


(1.0-10.0) 


 


 


Eosinophils (%) (Auto)


  0.8 %


(0.0-3.0) 


 


 


Basophils (%) (Auto)


  0.5 %


(0.0-2.0) 


 


 


Sodium Level


  142 MMOL/L


(136-145) 


 


 


Potassium Level


  3.5 MMOL/L


(3.5-5.1) 


 


 


Chloride Level


  101 MMOL/L


() 


 


 


Carbon Dioxide Level


  29 MMOL/L


(21-32) 


 


 


Anion Gap


  12 mmol/L


(5-15) 


 


 


Blood Urea Nitrogen


  26 mg/dL


(7-18)  H 


 


 


Creatinine


  1.0 MG/DL


(0.55-1.30) 


 


 


Estimat Glomerular Filtration


Rate > 60 mL/min


(>60) 


 


 


Glucose Level


  118 MG/DL


()  H 


 


 


Calcium Level


  10.0 MG/DL


(8.5-10.1) 


 


 


Total Bilirubin


  0.5 MG/DL


(0.2-1.0) 


 


 


Aspartate Amino Transf


(AST/SGOT) 95 U/L (15-37)


H 


 


 


Alanine Aminotransferase


(ALT/SGPT) 31 U/L (12-78)


  


 


 


Alkaline Phosphatase


  147 U/L


()  H 


 


 


Pro-B-Type Natriuretic Peptide


  3659 pg/mL


(0-125)  H 


 


 


Total Protein


  9.0 G/DL


(6.4-8.2)  H 


 


 


Albumin


  1.7 G/DL


(3.4-5.0)  L 


 


 


Globulin 6.3 g/dL   


 


Arterial Blood pH


  


  7.416


(7.350-7.450)


 


Arterial Blood Partial


Pressure CO2 


  48.5 mmHg


(35.0-45.0)  H


 


Arterial Blood Partial


Pressure O2 


  76.4 mmHg


(75.0-100.0)


 


Arterial Blood HCO3


  


  30.5 mmol/L


(22.0-26.0)  H


 


Arterial Blood Oxygen


Saturation 


  93.3 %


(92.0-98.0)


 


Arterial Blood Base Excess  5.0  


 


Jonn Test  Positive  








Objective





HEAD AND NECK:  No JVD.BIPAP on


LUNGS:  Coaarse rhonchi.


CARDIOVASCULAR:  Tachy S1 and S2


ABDOMEN:  Tender with a pump under skin.


EXTREMITIES:  No pitting edema.











Delon Chawla MD May 2, 2018 14:31

## 2025-01-19 NOTE — H&P ADULT - NSHPLABSRESULTS_GEN_ALL_CORE
ekg personally reviewed: afib with rvr@103, no q's/st changes  cxr: bibasilar opacities < from: Xray Chest 1 View AP/PA (01.19.25 @ 05:01) >    IMPRESSION:  Bibasilar airspace opacities may reflect multifocal infection.    < end of copied text >

## 2025-01-19 NOTE — ED ADULT NURSE NOTE - NSFALLHARMRISKINTERV_ED_ALL_ED

## 2025-01-19 NOTE — ED PROVIDER NOTE - OBJECTIVE STATEMENT
89-year-old female with past medical history of gastritis, A-fib, SBO, hypothyroid, hypertension, asthma, COPD (on 4 L nasal cannula at home), presents emergency department with home health aide for evaluation of low O2 saturation, increased work of breathing and lethargy.  Per daughter, the patient has had a productive cough for 2 to 3 days.  She has had decreased p.o. intake, and today was sleepier than normal.  Of note, the patient had an admission in October 2024 for hypoxic respiratory failure and was on BiPAP at that time.  The patient is DNR/DNI, but was trial of noninvasive ventilation.  Denies measured fevers, palpitations, nausea or vomiting, diarrhea or dysuria.  Has albuterol inhaler for home.  Has not been on steroids recently.  No sick contacts.

## 2025-01-19 NOTE — ED PROVIDER NOTE - CLINICAL SUMMARY MEDICAL DECISION MAKING FREE TEXT BOX
89-year-old female with past medical history of gastritis, A-fib, SBO, hypothyroid, hypertension, asthma, COPD (on 4 L nasal cannula at home), presents emergency department with home health aide for evaluation of low O2 saturation, increased work of breathing and lethargy. Patient arrives febrile, tachycardic and tachypneic. With audible wheezing. Hx of hypoxic respiratory failure and aspiration. Suspect source of fever is likely respiratory infection. Will place on bipap, cover with abx and steroids, pt will required admission.

## 2025-01-19 NOTE — ED PROVIDER NOTE - ATTENDING CONTRIBUTION TO CARE
Glenn Guajardo DO: I have personally performed a face to face medical and diagnostic evaluation of the patient. I have discussed with and reviewed the Resident's and/or ACP's and/or Medical/PA/NP student's note and agree with the History, ROS, Physical Exam and MDM unless otherwise indicated. A brief summary of my personal evaluation and impression can be found below.     HPI above    CONSTITUTIONAL: Uncomfortable appearing  SKIN: Warm dry, normal skin turgor  HEAD: NCAT  EYES: EOMI, PERRLA, no scleral icterus, conjunctiva pink  NECK: Supple; non tender. Full ROM.  CARD: RRR  RESP: Coarse breath sounds bilaterally, tachypneic.  ABD: non-distended, no abdominal tenderness  MSK: Full ROM, no leg swelling  PSYCH: Cooperative, appropriate.     Suspect with patient's presentation of fever or tachycardia and dyspnea patient has pneumonia or other upper respiratory illness patient is tachypneic requires BiPAP for ventilation support although has MOLST with DNR and DNI but allows for noninvasive ventilation.  Will give antibiotics IV fluids supportive care with ventilation treatments, patient will require admission for sepsis secondary to upper respiratory cause.  Other cause of sepsis likely given respiratory symptoms, no abdominal tenderness concerning for intra-abdominal infection

## 2025-01-19 NOTE — PATIENT PROFILE ADULT - FALL HARM RISK - HARM RISK INTERVENTIONS
Assistance with ambulation/Assistance OOB with selected safe patient handling equipment/Communicate Risk of Fall with Harm to all staff/Discuss with provider need for PT consult/Monitor gait and stability/Provide patient with walking aids - walker, cane, crutches/Reinforce activity limits and safety measures with patient and family/Sit up slowly, dangle for a short time, stand at bedside before walking/Tailored Fall Risk Interventions/Visual Cue: Yellow wristband and red socks/Bed in lowest position, wheels locked, appropriate side rails in place/Call bell, personal items and telephone in reach/Instruct patient to call for assistance before getting out of bed or chair/Non-slip footwear when patient is out of bed/[place] to call system/Physically safe environment - no spills, clutter or unnecessary equipment/Purposeful Proactive Rounding/Room/bathroom lighting operational, light cord in reach

## 2025-01-19 NOTE — H&P ADULT - HISTORY OF PRESENT ILLNESS
Patient unable to give history due to fatigue/on bipap.  Attempted to reach daughter Connie at [phone] no answer.  History from ED chart:    89yo F PMH gastritis, A-fib, SBO, hypothyroid, hypertension, asthma, COPD (on 4 L nasal cannula at home), presents emergency department with home health aide for evaluation of low O2 saturation, increased work of breathing and lethargy.  Per daughter, the patient has had a productive cough for 2 to 3 days.  She has had decreased p.o. intake, and today was sleepier than normal.  Of note, the patient had an admission in October 2024 for hypoxic respiratory failure and was on BiPAP at that time.  The patient is DNR/DNI, but was trial of noninvasive ventilation.  Denies measured fevers, palpitations, nausea or vomiting, diarrhea or dysuria.  Has albuterol inhaler for home.  Has not been on steroids recently.  No sick contacts.    In the ED: azithro 500mg, cefepime 1gm, vanc 1g, 500 NS, IV tylenol, nebs, 125 solumedrol

## 2025-01-19 NOTE — ED PROVIDER NOTE - PHYSICAL EXAMINATION
Gen: well appearing, in no acute distress   Head: normal appearing  HEENT: normal conjunctiva, oral mucosa moist, vision grossly intact   Lung: tachypneic, audible wheezing without auscultation, on auscultation with inspiratory and expiratory wheezing and coarse breath sounds   CV: regular rate and rhythm, no murmurs  Abd: soft, non distended, non tender   MSK: no visible deformities, ambulating without difficulty   Neuro: No focal deficits, AAOx3  Skin: Warm, no rashes   Psych: normal affect

## 2025-01-19 NOTE — ED PROVIDER NOTE - PROGRESS NOTE DETAILS
Andi PGY-2: patient appears more comfortable now that she has been on biPAP. lab work notable for neutropenia, consistent with SIRS vitals when she arrived. CXR with RLL consolidation. pending rpt blood gas, as initial was with evidence of respiratory acidosis. if rpt gas with improving numbers, will admit to medicine. Maria Dolores Romero PGY3 - sign out -89-year-old female presenting with shortness of breath hypercapnia requiring BiPAP.  Likely pneumonia given opacity on CXR.  Prior admission in fall 2024 for similar symptoms.  Patient is DNR/DNI but trial of noninvasive ventilation.  Admit to medicine for acute respiratory failure on BiPAP.

## 2025-01-20 LAB
ANION GAP SERPL CALC-SCNC: 11 MMOL/L — SIGNIFICANT CHANGE UP (ref 5–17)
BUN SERPL-MCNC: 20 MG/DL — SIGNIFICANT CHANGE UP (ref 7–23)
CALCIUM SERPL-MCNC: 9.1 MG/DL — SIGNIFICANT CHANGE UP (ref 8.4–10.5)
CHLORIDE SERPL-SCNC: 106 MMOL/L — SIGNIFICANT CHANGE UP (ref 96–108)
CO2 SERPL-SCNC: 26 MMOL/L — SIGNIFICANT CHANGE UP (ref 22–31)
CREAT SERPL-MCNC: 0.58 MG/DL — SIGNIFICANT CHANGE UP (ref 0.5–1.3)
CULTURE RESULTS: NO GROWTH — SIGNIFICANT CHANGE UP
EGFR: 81 ML/MIN/1.73M2 — SIGNIFICANT CHANGE UP
GAS PNL BLDV: SIGNIFICANT CHANGE UP
GLUCOSE SERPL-MCNC: 104 MG/DL — HIGH (ref 70–99)
LEGIONELLA AG UR QL: NEGATIVE — SIGNIFICANT CHANGE UP
MRSA PCR RESULT.: SIGNIFICANT CHANGE UP
POTASSIUM SERPL-MCNC: 4.7 MMOL/L — SIGNIFICANT CHANGE UP (ref 3.5–5.3)
POTASSIUM SERPL-SCNC: 4.7 MMOL/L — SIGNIFICANT CHANGE UP (ref 3.5–5.3)
S AUREUS DNA NOSE QL NAA+PROBE: SIGNIFICANT CHANGE UP
SODIUM SERPL-SCNC: 143 MMOL/L — SIGNIFICANT CHANGE UP (ref 135–145)
SPECIMEN SOURCE: SIGNIFICANT CHANGE UP

## 2025-01-20 RX ORDER — ACETYLCYSTEINE 200 MG/ML
3 VIAL (ML) MISCELLANEOUS EVERY 6 HOURS
Refills: 0 | Status: COMPLETED | OUTPATIENT
Start: 2025-01-20 | End: 2025-01-23

## 2025-01-20 RX ADMIN — PANTOPRAZOLE 40 MILLIGRAM(S): 20 TABLET, DELAYED RELEASE ORAL at 06:28

## 2025-01-20 RX ADMIN — LEVOTHYROXINE SODIUM 100 MICROGRAM(S): 25 TABLET ORAL at 06:28

## 2025-01-20 RX ADMIN — AZITHROMYCIN DIHYDRATE 255 MILLIGRAM(S): 500 TABLET, FILM COATED ORAL at 05:31

## 2025-01-20 RX ADMIN — CEFTRIAXONE 100 MILLIGRAM(S): 250 INJECTION, POWDER, FOR SOLUTION INTRAMUSCULAR; INTRAVENOUS at 14:38

## 2025-01-20 RX ADMIN — IPRATROPIUM BROMIDE AND ALBUTEROL SULFATE 3 MILLILITER(S): .5; 2.5 SOLUTION RESPIRATORY (INHALATION) at 00:20

## 2025-01-20 RX ADMIN — Medication 20 MILLIGRAM(S): at 23:35

## 2025-01-20 RX ADMIN — Medication 50 MILLIGRAM(S): at 12:45

## 2025-01-20 RX ADMIN — IPRATROPIUM BROMIDE AND ALBUTEROL SULFATE 3 MILLILITER(S): .5; 2.5 SOLUTION RESPIRATORY (INHALATION) at 12:45

## 2025-01-20 RX ADMIN — IPRATROPIUM BROMIDE AND ALBUTEROL SULFATE 3 MILLILITER(S): .5; 2.5 SOLUTION RESPIRATORY (INHALATION) at 18:10

## 2025-01-20 RX ADMIN — Medication 25 MILLIGRAM(S): at 18:10

## 2025-01-20 RX ADMIN — BUDESONIDE 0.5 MILLIGRAM(S): 9 TABLET, EXTENDED RELEASE ORAL at 06:28

## 2025-01-20 RX ADMIN — Medication 1000 MICROGRAM(S): at 12:45

## 2025-01-20 RX ADMIN — BUDESONIDE 0.5 MILLIGRAM(S): 9 TABLET, EXTENDED RELEASE ORAL at 18:10

## 2025-01-20 RX ADMIN — Medication 3 MILLILITER(S): at 23:35

## 2025-01-20 RX ADMIN — AMIODARONE HYDROCHLORIDE 200 MILLIGRAM(S): 50 INJECTION, SOLUTION INTRAVENOUS at 06:28

## 2025-01-20 RX ADMIN — IPRATROPIUM BROMIDE AND ALBUTEROL SULFATE 3 MILLILITER(S): .5; 2.5 SOLUTION RESPIRATORY (INHALATION) at 06:28

## 2025-01-20 RX ADMIN — IPRATROPIUM BROMIDE AND ALBUTEROL SULFATE 3 MILLILITER(S): .5; 2.5 SOLUTION RESPIRATORY (INHALATION) at 23:35

## 2025-01-20 RX ADMIN — Medication 20 MILLIGRAM(S): at 06:28

## 2025-01-20 RX ADMIN — Medication 25 MILLIGRAM(S): at 06:28

## 2025-01-20 RX ADMIN — Medication 3 MILLILITER(S): at 19:23

## 2025-01-20 RX ADMIN — Medication 20 MILLIGRAM(S): at 14:36

## 2025-01-20 NOTE — PROGRESS NOTE ADULT - SUBJECTIVE AND OBJECTIVE BOX
Date of Service: 01-20-25 @ 13:44    Patient is a 89y old  Female who presents with a chief complaint of hypoxia (20 Jan 2025 09:39)      Any change in ROS:   O2 sats 97% on 4LNC  Bipap qHS  +Congested cough     MEDICATIONS  (STANDING):  albuterol/ipratropium for Nebulization 3 milliLiter(s) Nebulizer every 6 hours  aMIOdarone    Tablet 200 milliGRAM(s) Oral daily  azithromycin  IVPB 500 milliGRAM(s) IV Intermittent every 24 hours  buDESOnide    Inhalation Suspension 0.5 milliGRAM(s) Inhalation every 12 hours  cefTRIAXone   IVPB 1000 milliGRAM(s) IV Intermittent every 24 hours  cyanocobalamin 1000 MICROGram(s) Oral daily  levothyroxine 100 MICROGram(s) Oral daily  methylPREDNISolone sodium succinate Injectable 20 milliGRAM(s) IV Push every 8 hours  metoprolol tartrate 25 milliGRAM(s) Oral two times a day  pantoprazole    Tablet 40 milliGRAM(s) Oral before breakfast  sertraline 50 milliGRAM(s) Oral daily    MEDICATIONS  (PRN):    Vital Signs Last 24 Hrs  T(C): 36.2 (20 Jan 2025 10:44), Max: 38.2 (19 Jan 2025 14:53)  T(F): 97.2 (20 Jan 2025 10:44), Max: 100.8 (19 Jan 2025 14:53)  HR: 93 (20 Jan 2025 10:45) (54 - 99)  BP: 99/47 (20 Jan 2025 10:44) (97/64 - 118/82)  BP(mean): 82 (19 Jan 2025 14:53) (82 - 82)  RR: 18 (20 Jan 2025 10:44) (17 - 18)  SpO2: 100% (20 Jan 2025 10:45) (98% - 100%)    Parameters below as of 20 Jan 2025 05:20  Patient On (Oxygen Delivery Method): nasal cannula  O2 Flow (L/min): 4            Physical Exam:   GENERAL: NAD  HEENT: KOLBY/   Atraumatic, Normocephalic  ENMT: No tonsillar erythema, exudates, or enlargement  NECK: Supple, No JVD  CHEST/LUNG: b/l rhonchi, few expiratory wheeze   CVS: Regular rate and rhythm; No murmurs, rubs, or gallops  GI: : Soft, Nontender, Nondistended; Bowel sounds present  NERVOUS SYSTEM:  Alert & Oriented X2  EXTREMITIES:  2+ Peripheral Pulses, No clubbing, cyanosis, or edema  SKIN: No rashes or lesions  PSYCH: Appropriate    Labs:  31, 31, 32                            11.4   3.55  )-----------( 55       ( 19 Jan 2025 02:48 )             37.1     01-20    143  |  106  |  20  ----------------------------<  104[H]  4.7   |  26  |  0.58  01-19    142  |  104  |  19  ----------------------------<  116[H]  4.4   |  26  |  0.62    Ca    9.1      20 Jan 2025 07:02  Ca    9.2      19 Jan 2025 02:48    TPro  6.8  /  Alb  3.9  /  TBili  0.7  /  DBili  x   /  AST  11  /  ALT  7[L]  /  AlkPhos  61  01-19    CAPILLARY BLOOD GLUCOSE          LIVER FUNCTIONS - ( 19 Jan 2025 02:48 )  Alb: 3.9 g/dL / Pro: 6.8 g/dL / ALK PHOS: 61 U/L / ALT: 7 U/L / AST: 11 U/L / GGT: x           PT/INR - ( 19 Jan 2025 02:48 )   PT: 11.3 sec;   INR: 0.99 ratio         PTT - ( 19 Jan 2025 02:48 )  PTT:22.7 sec  Urinalysis Basic - ( 20 Jan 2025 07:02 )    Color: x / Appearance: x / SG: x / pH: x  Gluc: 104 mg/dL / Ketone: x  / Bili: x / Urobili: x   Blood: x / Protein: x / Nitrite: x   Leuk Esterase: x / RBC: x / WBC x   Sq Epi: x / Non Sq Epi: x / Bacteria: x      Procalcitonin: 0.07 ng/mL (01-19 @ 02:48)        RECENT CULTURES:  01-19 @ 12:15 Clean Catch Clean Catch (Midstream)                No growth    01-19 @ 02:30 .Blood BLOOD                No growth at 24 hours    01-19 @ 02:15 .Blood BLOOD                No growth at 24 hours    Studies  Chest X-RAY  < from: Xray Chest 1 View AP/PA (01.19.25 @ 05:01) >    INTERPRETATION:  CLINICAL INDICATION: Shortness of breath, evaluate for   pneumonia    EXAM: Frontal radiograph of the chest.    COMPARISON: Chest radiograph from 1/5/2025    FINDINGS:  Bibasilar peripheral airspace opacities.  Small left pleural effusion with adjacent passive atelectasis  No pneumothorax. The heart is not well evaluated in this position  The visualized osseous structures demonstrate no acute pathology.    IMPRESSION:  Bibasilar airspace opacities may reflect multifocal infection.    --- End of Report ---          < end of copied text >

## 2025-01-20 NOTE — PROGRESS NOTE ADULT - ASSESSMENT
89-year-old female with past medical history of gastritis, A-fib, SBO, hypothyroid, hypertension, asthma vs COPD (on 4 L nasal cannula at home), bronchiectasis. Presents to emergency department with home health aide for evaluation of low O2 saturation, increased work of breathing and lethargy.  Per daughter, the patient has had a productive cough for 2 to 3 days.  She has had decreased p.o. intake, and today was sleepier than normal.  Of note, the patient had an admission in October 2024 for hypoxic respiratory failure and was on BiPAP at that time.  The patient is DNR/DNI, but was trial of noninvasive ventilation.  Denies measured fevers, palpitations, nausea or vomiting, diarrhea or dysuria.  Has albuterol inhaler for home.  Has not been on steroids recently.  No sick contacts. RVP + parainfluenza.       Asthma/ COPD exacerbation  parainfluenza, r/o PNA       Asthma vs COPD exacerbation  -Asthma vs COPD exacerbation 2nd to parainfluenza - pt's daughter at bedside denies hx of COPD, smoking but does report hx of asthma & bronchiectasis, frequent PNA  -Continue Solumedrol 20 mg IVP q8h for now, will continue to evaluate further taper  -Continue bronchodilators  -Start Mucomyst 20% 3ml q6h x3 days (give with Duoneb)  -At baseline o2 requirements (4LNC). Keep sats >90% with O2  -Bipap qHS and PRN for now    Parainfluenza, r/o PNA   -Bronchodilators/steroids as above  -ABX as per ID, agree with continuing for now as pt's daughter notes hx of frequent PNA & bronchiectasis  -Check CT chest.

## 2025-01-20 NOTE — PROGRESS NOTE ADULT - SUBJECTIVE AND OBJECTIVE BOX
Patient is a 89y old  Female who presents with a chief complaint of hypoxia (20 Jan 2025 08:39)      SUBJECTIVE / OVERNIGHT EVENTS:    Patient seen and examined. states she does not know how she feels. on O2. appears comfortable. denies abd pain.      Vital Signs Last 24 Hrs  T(C): 36.3 (20 Jan 2025 04:54), Max: 38.2 (19 Jan 2025 14:53)  T(F): 97.3 (20 Jan 2025 04:54), Max: 100.8 (19 Jan 2025 14:53)  HR: 94 (20 Jan 2025 04:54) (79 - 99)  BP: 109/76 (20 Jan 2025 04:54) (92/70 - 118/82)  BP(mean): 82 (19 Jan 2025 14:53) (82 - 82)  RR: 18 (20 Jan 2025 05:20) (17 - 20)  SpO2: 98% (20 Jan 2025 05:20) (98% - 100%)    Parameters below as of 20 Jan 2025 05:20  Patient On (Oxygen Delivery Method): nasal cannula  O2 Flow (L/min): 4    I&O's Summary      PE:  GENERAL: NAD, AAOx2  CHEST/LUNG: bl ronchi  HEART: Regular rate and rhythm; no murmur  ABDOMEN: Soft, Nontender, Nondistended; Bowel sounds present  EXTREMITIES:  2+ Peripheral Pulses, No edema  NEURO: No focal deficits    LABS:                        11.4   3.55  )-----------( 55       ( 19 Jan 2025 02:48 )             37.1     01-20    143  |  106  |  20  ----------------------------<  104[H]  4.7   |  26  |  0.58    Ca    9.1      20 Jan 2025 07:02    TPro  6.8  /  Alb  3.9  /  TBili  0.7  /  DBili  x   /  AST  11  /  ALT  7[L]  /  AlkPhos  61  01-19    PT/INR - ( 19 Jan 2025 02:48 )   PT: 11.3 sec;   INR: 0.99 ratio         PTT - ( 19 Jan 2025 02:48 )  PTT:22.7 sec  CAPILLARY BLOOD GLUCOSE            Urinalysis Basic - ( 20 Jan 2025 07:02 )    Color: x / Appearance: x / SG: x / pH: x  Gluc: 104 mg/dL / Ketone: x  / Bili: x / Urobili: x   Blood: x / Protein: x / Nitrite: x   Leuk Esterase: x / RBC: x / WBC x   Sq Epi: x / Non Sq Epi: x / Bacteria: x        RADIOLOGY & ADDITIONAL TESTS:    Imaging Personally Reviewed:  [x] YES  [ ] NO    Consultant(s) Notes Reviewed:  [x] YES  [ ] NO    MEDICATIONS  (STANDING):  albuterol/ipratropium for Nebulization 3 milliLiter(s) Nebulizer every 6 hours  aMIOdarone    Tablet 200 milliGRAM(s) Oral daily  azithromycin  IVPB 500 milliGRAM(s) IV Intermittent every 24 hours  buDESOnide    Inhalation Suspension 0.5 milliGRAM(s) Inhalation every 12 hours  cefTRIAXone   IVPB 1000 milliGRAM(s) IV Intermittent every 24 hours  cyanocobalamin 1000 MICROGram(s) Oral daily  levothyroxine 100 MICROGram(s) Oral daily  methylPREDNISolone sodium succinate Injectable 20 milliGRAM(s) IV Push every 8 hours  metoprolol tartrate 25 milliGRAM(s) Oral two times a day  pantoprazole    Tablet 40 milliGRAM(s) Oral before breakfast  sertraline 50 milliGRAM(s) Oral daily    MEDICATIONS  (PRN):      Care Discussed with Consultants/Other Providers [x] YES  [ ] NO    HEALTH ISSUES - PROBLEM Dx:  Acute respiratory failure with hypoxia and hypercapnia    Sepsis due to pneumonia    Parainfluenza    Chronic atrial fibrillation    Advanced COPD    Hypothyroid    Thrombocytopenia

## 2025-01-20 NOTE — CONSULT NOTE ADULT - SUBJECTIVE AND OBJECTIVE BOX
[facility]  SWAPNIL Rene S. Shah, Y. Patel, G. [facility]  [phone]  ([phone] - weekdays after 5pm and weekends)    CATHRYN LARA  89y, Female  [id number]    HPI:  Patient is a 89 year old female with PMH of gastritis, A-fib, SBO, hypothyroid, hypertension, asthma, COPD (on 4 L nasal cannula at home), presents emergency department with home health aide for evaluation of low O2 saturation, increased work of breathing and lethargy. Patient reports having productive cough for the past few days. Noted per daughter, the patient has had decreased p.o. intake, and now was sleepier than normal. Patent this morning on NC, having wet cough with some chest discomfort when coughing. Denies fever, chills, nausea, vomiting, diarrhea, dysuria or any other complaints. Of note, the patient had an admission in October 2024 for hypoxic respiratory failure and was on BiPAP at that time. The patient is DNR/DNI, but was trial of noninvasive ventilation. She has albuterol inhaler for home. Has not been on steroids recently. She denies any recent known sick contacts.  ROS: 14 point review of systems completed, pertinent positives and negatives as per HPI.    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 reported allergy (Unknown)    PMH -- Gastritis  Afib  SBO (small bowel obstruction)  Hypothyroid  UTI (urinary tract infection)  HTN (hypertension)  TB (pulmonary tuberculosis)  Orthostatic hypotension  Asthma  Aortic stenosis  Multifocal pneumonia    PSH -- Afib  History of Small Bowel Obstruction  History of Bilateral Breast Biopsy  S/P Exploratory Laparotomy  H/O hemorrhoidectomy  S/P vein stripping    FH -- Family history of essential hypertension  Social History -- denies tobacco, alcohol or illicit drug use    Physical Exam--  Vital Signs Last 24 Hrs  T(F): 97.3 (20 Jan 2025 04:54), Max: 100.8 (19 Jan 2025 14:53)  HR: 94 (20 Jan 2025 04:54) (79 - 99)  BP: 109/76 (20 Jan 2025 04:54) (92/70 - 118/82)  RR: 18 (20 Jan 2025 05:20) (17 - 20)  SpO2: 98% (20 Jan 2025 05:20) (98% - 100%)  General: no acute distress, NC, cough  HEENT: NC/AT, EOMI, anicteric  Lungs: decreased breath sounds b/l   Heart: S1, S2 present, normal rate  Abdomen: Soft. ND. NT. BS present.   Neuro: AAOx3, no obvious focal deficits   Extremities: No cyanosis. No edema.   Skin: Warm. Dry. No visible rash.   Lines: PIV     Laboratory & Imaging Data--  CBC:                       11.4   3.55  )-----------( 55       ( 19 Jan 2025 02:48 )             37.1     WBC Count: 3.55 K/uL (01-19-25 @ 02:48)    CMP: 01-20    143  |  106  |  20  ----------------------------<  104[H]  4.7   |  26  |  0.58    Ca    9.1      20 Jan 2025 07:02    TPro  6.8  /  Alb  3.9  /  TBili  0.7  /  DBili  x   /  AST  11  /  ALT  7[L]  /  AlkPhos  61  01-19    LIVER FUNCTIONS - ( 19 Jan 2025 02:48 )  Alb: 3.9 g/dL / Pro: 6.8 g/dL / ALK PHOS: 61 U/L / ALT: 7 U/L / AST: 11 U/L / GGT: x           Urinalysis + Microscopic Examination (01.19.25 @ 12:15)    pH Urine: 5.5   Urine Appearance: Clear   Color: Dark Yellow   Specific Gravity: >1.030   Protein, Urine: Trace mg/dL   Glucose Qualitative, Urine: Negative mg/dL   Ketone - Urine: Negative mg/dL   Blood, Urine: Negative   Bilirubin: Negative   Urobilinogen: 1.0 mg/dL   Leukocyte Esterase Concentration: Negative   Nitrite: Negative   Review: Reviewed   White Blood Cell - Urine: 2 /HPF   Red Blood Cell - Urine: 2 /HPF   Bacteria: Moderate /HPF   Cast: 6 /LPF   Epithelial Cells: 5 /HPF    Microbiology: reviewed  Culture - Blood (collected 01-19-25 @ 02:30)  Source: .Blood BLOOD  Preliminary Report (01-20-25 @ 09:01):    No growth at 24 hours    Culture - Blood (collected 01-19-25 @ 02:15)  Source: .Blood BLOOD  Preliminary Report (01-20-25 @ 09:01):    No growth at 24 hours    Respiratory Viral Panel with COVID-19 by FLORES (01.19.25 @ 02:48)    Rapid RVP Result: Detected   SARS-CoV-2: NotDetec: This Respiratory Panel uses polymerase chain reaction (PCR) to detect for  adenovirus; coronavirus (HKU1, NL63, 229E, OC43); human metapneumovirus  (hMPV); human enterovirus/rhinovirus (Entero/RV); influenza A; influenza  A/H1; influenza A/H3; influenza A/H1-2009; influenza B; parainfluenza  viruses 1, 2, 3, 4; respiratory syncytial virus; Mycoplasma pneumoniae;  Chlamydophila pneumoniae; and SARS-CoV-2.   Parainfluenza 4 (RapRVP): Detected    Radiology--reviewed  < from: Xray Chest 1 View AP/PA (01.19.25 @ 05:01) >  IMPRESSION:  Bibasilar airspace opacities may reflect multifocal infection.    < end of copied text >    Active Medications--  albuterol/ipratropium for Nebulization 3 milliLiter(s) Nebulizer every 6 hours  aMIOdarone    Tablet 200 milliGRAM(s) Oral daily  azithromycin  IVPB 500 milliGRAM(s) IV Intermittent every 24 hours  buDESOnide    Inhalation Suspension 0.5 milliGRAM(s) Inhalation every 12 hours  cefTRIAXone   IVPB 1000 milliGRAM(s) IV Intermittent every 24 hours  cyanocobalamin 1000 MICROGram(s) Oral daily  levothyroxine 100 MICROGram(s) Oral daily  methylPREDNISolone sodium succinate Injectable 20 milliGRAM(s) IV Push every 8 hours  metoprolol tartrate 25 milliGRAM(s) Oral two times a day  pantoprazole    Tablet 40 milliGRAM(s) Oral before breakfast  sertraline 50 milliGRAM(s) Oral daily    Current Antimicrobials:   azithromycin  IVPB 500 milliGRAM(s) IV Intermittent every 24 hours  cefTRIAXone   IVPB 1000 milliGRAM(s) IV Intermittent every 24 hours    Prior/Completed Antimicrobials:  azithromycin  IVPB  cefepime   IVPB  vancomycin  IVPB.    Immunologic:

## 2025-01-20 NOTE — PROGRESS NOTE ADULT - ASSESSMENT
89yo F PMH gastritis, A-fib, SBO, hypothyroid, hypertension, asthma, COPD (on 4 L nasal cannula at home), presents emergency department with home health aide for evaluation of low O2 saturation, increased work of breathing and lethargy.     Acute respiratory failure with hypoxia and hypercapnia.   sepsis 2/2 bacterial pneumonia.  Has +parainfluenza on RVP but also bandemia favor treating superimposed bacterial infection.  No h/o MRSA.  continue aztihro/ceftriaxone (1/19-)  f/u legionella/strep/mrsa swab  urine/blood culture pending  s/p 500NS  wean bipap as tolerated, 4L cont to titrate  ID called  pulm following    Chronic atrial fibrillation  continue home metop/amio    Advanced COPD.   on po pred m/w/f  give iv steroids while on bipap  s/p 125 solumedrol in ED    Hypothyroid  continue synthroid    Thrombocytopenia, chronic  no acute bleeding on exam    hold dvt ppx    Please call [facility] with questions [phone]. 89yo F PMH gastritis, A-fib, SBO, hypothyroid, hypertension, asthma, COPD (on 4 L nasal cannula at home), presents emergency department with home health aide for evaluation of low O2 saturation, increased work of breathing and lethargy.     Acute respiratory failure with hypoxia and hypercapnia.   sepsis 2/2 bacterial pneumonia.  Has +parainfluenza on RVP but also bandemia favor treating superimposed bacterial infection.  No h/o MRSA.  continue aztihro/ceftriaxone (1/19-)  f/u legionella/strep/mrsa swab  BC NTD  s/p 500NS  off bipap, on 4L cont to titrate  ID called  pulm following    Chronic atrial fibrillation  continue home metop/amio    Advanced COPD.   on po pred m/w/f  give iv steroids while on bipap  s/p 125 solumedrol in ED    Hypothyroid  continue synthroid    Thrombocytopenia, chronic  no acute bleeding on exam    hold dvt ppx    Please call [employer] with questions [phone].

## 2025-01-21 LAB
ALBUMIN SERPL ELPH-MCNC: 3.4 G/DL — SIGNIFICANT CHANGE UP (ref 3.3–5)
ALP SERPL-CCNC: 50 U/L — SIGNIFICANT CHANGE UP (ref 40–120)
ALT FLD-CCNC: 7 U/L — LOW (ref 10–45)
ANION GAP SERPL CALC-SCNC: 10 MMOL/L — SIGNIFICANT CHANGE UP (ref 5–17)
AST SERPL-CCNC: 9 U/L — LOW (ref 10–40)
BASE EXCESS BLDV CALC-SCNC: 3.8 MMOL/L — HIGH (ref -2–3)
BASOPHILS # BLD AUTO: 0 K/UL — SIGNIFICANT CHANGE UP (ref 0–0.2)
BASOPHILS NFR BLD AUTO: 0 % — SIGNIFICANT CHANGE UP (ref 0–2)
BILIRUB SERPL-MCNC: 0.4 MG/DL — SIGNIFICANT CHANGE UP (ref 0.2–1.2)
BUN SERPL-MCNC: 25 MG/DL — HIGH (ref 7–23)
CALCIUM SERPL-MCNC: 9.3 MG/DL — SIGNIFICANT CHANGE UP (ref 8.4–10.5)
CHLORIDE SERPL-SCNC: 104 MMOL/L — SIGNIFICANT CHANGE UP (ref 96–108)
CO2 BLDV-SCNC: 33 MMOL/L — HIGH (ref 22–26)
CO2 SERPL-SCNC: 24 MMOL/L — SIGNIFICANT CHANGE UP (ref 22–31)
CREAT SERPL-MCNC: 0.63 MG/DL — SIGNIFICANT CHANGE UP (ref 0.5–1.3)
EGFR: 79 ML/MIN/1.73M2 — SIGNIFICANT CHANGE UP
EOSINOPHIL # BLD AUTO: 0 K/UL — SIGNIFICANT CHANGE UP (ref 0–0.5)
EOSINOPHIL NFR BLD AUTO: 0 % — SIGNIFICANT CHANGE UP (ref 0–6)
GLUCOSE SERPL-MCNC: 147 MG/DL — HIGH (ref 70–99)
HCO3 BLDV-SCNC: 31 MMOL/L — HIGH (ref 22–29)
HCT VFR BLD CALC: 30.5 % — LOW (ref 34.5–45)
HGB BLD-MCNC: 9.4 G/DL — LOW (ref 11.5–15.5)
IMM GRANULOCYTES NFR BLD AUTO: 0.5 % — SIGNIFICANT CHANGE UP (ref 0–0.9)
LYMPHOCYTES # BLD AUTO: 0.42 K/UL — LOW (ref 1–3.3)
LYMPHOCYTES # BLD AUTO: 10.8 % — LOW (ref 13–44)
MAGNESIUM SERPL-MCNC: 2.4 MG/DL — SIGNIFICANT CHANGE UP (ref 1.6–2.6)
MCHC RBC-ENTMCNC: 30.8 G/DL — LOW (ref 32–36)
MCHC RBC-ENTMCNC: 32.8 PG — SIGNIFICANT CHANGE UP (ref 27–34)
MCV RBC AUTO: 106.3 FL — HIGH (ref 80–100)
MONOCYTES # BLD AUTO: 0.08 K/UL — SIGNIFICANT CHANGE UP (ref 0–0.9)
MONOCYTES NFR BLD AUTO: 2.1 % — SIGNIFICANT CHANGE UP (ref 2–14)
NEUTROPHILS # BLD AUTO: 3.37 K/UL — SIGNIFICANT CHANGE UP (ref 1.8–7.4)
NEUTROPHILS NFR BLD AUTO: 86.6 % — HIGH (ref 43–77)
NRBC # BLD: 0 /100 WBCS — SIGNIFICANT CHANGE UP (ref 0–0)
NRBC BLD-RTO: 0 /100 WBCS — SIGNIFICANT CHANGE UP (ref 0–0)
PCO2 BLDV: 56 MMHG — HIGH (ref 39–42)
PH BLDV: 7.35 — SIGNIFICANT CHANGE UP (ref 7.32–7.43)
PHOSPHATE SERPL-MCNC: 3.1 MG/DL — SIGNIFICANT CHANGE UP (ref 2.5–4.5)
PLATELET # BLD AUTO: 50 K/UL — LOW (ref 150–400)
PO2 BLDV: 71 MMHG — HIGH (ref 25–45)
POTASSIUM SERPL-MCNC: 4.3 MMOL/L — SIGNIFICANT CHANGE UP (ref 3.5–5.3)
POTASSIUM SERPL-SCNC: 4.3 MMOL/L — SIGNIFICANT CHANGE UP (ref 3.5–5.3)
PROT SERPL-MCNC: 6.2 G/DL — SIGNIFICANT CHANGE UP (ref 6–8.3)
RBC # BLD: 2.87 M/UL — LOW (ref 3.8–5.2)
RBC # FLD: 14.8 % — HIGH (ref 10.3–14.5)
SAO2 % BLDV: 95.9 % — HIGH (ref 67–88)
SODIUM SERPL-SCNC: 138 MMOL/L — SIGNIFICANT CHANGE UP (ref 135–145)
WBC # BLD: 3.89 K/UL — SIGNIFICANT CHANGE UP (ref 3.8–10.5)
WBC # FLD AUTO: 3.89 K/UL — SIGNIFICANT CHANGE UP (ref 3.8–10.5)

## 2025-01-21 PROCEDURE — 71250 CT THORAX DX C-: CPT | Mod: 26

## 2025-01-21 RX ORDER — PREDNISONE 5 MG/1
20 TABLET ORAL DAILY
Refills: 0 | Status: CANCELLED | OUTPATIENT
Start: 2025-01-28 | End: 2025-01-24

## 2025-01-21 RX ORDER — PREDNISONE 5 MG/1
40 TABLET ORAL DAILY
Refills: 0 | Status: COMPLETED | OUTPATIENT
Start: 2025-01-22 | End: 2025-01-24

## 2025-01-21 RX ORDER — PREDNISONE 5 MG/1
TABLET ORAL
Refills: 0 | Status: DISCONTINUED | OUTPATIENT
Start: 2025-01-22 | End: 2025-01-24

## 2025-01-21 RX ORDER — PREDNISONE 5 MG/1
15 TABLET ORAL DAILY
Refills: 0 | Status: CANCELLED | OUTPATIENT
Start: 2025-01-31 | End: 2025-01-24

## 2025-01-21 RX ORDER — PREDNISONE 5 MG/1
30 TABLET ORAL DAILY
Refills: 0 | Status: DISCONTINUED | OUTPATIENT
Start: 2025-01-25 | End: 2025-01-24

## 2025-01-21 RX ADMIN — Medication 3 MILLILITER(S): at 05:05

## 2025-01-21 RX ADMIN — BUDESONIDE 0.5 MILLIGRAM(S): 9 TABLET, EXTENDED RELEASE ORAL at 05:05

## 2025-01-21 RX ADMIN — Medication 3 MILLILITER(S): at 20:15

## 2025-01-21 RX ADMIN — AZITHROMYCIN DIHYDRATE 255 MILLIGRAM(S): 500 TABLET, FILM COATED ORAL at 03:00

## 2025-01-21 RX ADMIN — Medication 1000 MICROGRAM(S): at 14:14

## 2025-01-21 RX ADMIN — AMIODARONE HYDROCHLORIDE 200 MILLIGRAM(S): 50 INJECTION, SOLUTION INTRAVENOUS at 05:47

## 2025-01-21 RX ADMIN — Medication 3 MILLILITER(S): at 14:13

## 2025-01-21 RX ADMIN — Medication 20 MILLIGRAM(S): at 14:13

## 2025-01-21 RX ADMIN — IPRATROPIUM BROMIDE AND ALBUTEROL SULFATE 3 MILLILITER(S): .5; 2.5 SOLUTION RESPIRATORY (INHALATION) at 20:15

## 2025-01-21 RX ADMIN — Medication 20 MILLIGRAM(S): at 05:05

## 2025-01-21 RX ADMIN — BUDESONIDE 0.5 MILLIGRAM(S): 9 TABLET, EXTENDED RELEASE ORAL at 18:24

## 2025-01-21 RX ADMIN — PANTOPRAZOLE 40 MILLIGRAM(S): 20 TABLET, DELAYED RELEASE ORAL at 05:47

## 2025-01-21 RX ADMIN — IPRATROPIUM BROMIDE AND ALBUTEROL SULFATE 3 MILLILITER(S): .5; 2.5 SOLUTION RESPIRATORY (INHALATION) at 05:05

## 2025-01-21 RX ADMIN — Medication 25 MILLIGRAM(S): at 05:47

## 2025-01-21 RX ADMIN — CEFTRIAXONE 100 MILLIGRAM(S): 250 INJECTION, POWDER, FOR SOLUTION INTRAMUSCULAR; INTRAVENOUS at 14:15

## 2025-01-21 RX ADMIN — Medication 25 MILLIGRAM(S): at 18:23

## 2025-01-21 RX ADMIN — IPRATROPIUM BROMIDE AND ALBUTEROL SULFATE 3 MILLILITER(S): .5; 2.5 SOLUTION RESPIRATORY (INHALATION) at 14:14

## 2025-01-21 RX ADMIN — LEVOTHYROXINE SODIUM 100 MICROGRAM(S): 25 TABLET ORAL at 05:46

## 2025-01-21 RX ADMIN — Medication 50 MILLIGRAM(S): at 14:14

## 2025-01-21 NOTE — ADVANCED PRACTICE NURSE CONSULT - ASSESSMENT
Patient encountered on [address] [place]. When wound care RN arrived on unit, patient was found lying in a low air loss pressure redistribution support surface style bed with her daughter at bedside. Patient was alert and oriented and gave consent to skin consult. Ms Slater is able to turn independently and staff assistance x 2 was provided as needed. Once turned, fecal and urinary incontinence noted and perineal care was provided. Purewick external female urinary device noted in place to divert urine from skin. The wound care RN was able to visualize an area of persistent nonblanchable maroon discoloration with purple hues over B/L buttocks/sacral skin, area measures approximately 3cm x 4cm x 0cm - consistent with a deep tissue injury with incontinence involvement, present on admission. B/L heels with hyperpigmentation and bogginess, cannot rule out a deep tissue injury present on admission. Xerosis noted on B/L lower extremities. Once consult was complete, patient and family were educated regarding the need for routine turning and positioning to prevent pressure injuries and patient was placed in a right side-lying position utilizing pillow positioner assistive devices.

## 2025-01-21 NOTE — PROGRESS NOTE ADULT - ASSESSMENT
89-year-old female with past medical history of gastritis, A-fib, SBO, hypothyroid, hypertension, asthma vs COPD (on 4 L nasal cannula at home), bronchiectasis. Presents to emergency department with home health aide for evaluation of low O2 saturation, increased work of breathing and lethargy.  Per daughter, the patient has had a productive cough for 2 to 3 days.  She has had decreased p.o. intake, and today was sleepier than normal.  Of note, the patient had an admission in October 2024 for hypoxic respiratory failure and was on BiPAP at that time.  The patient is DNR/DNI, but was trial of noninvasive ventilation.  Denies measured fevers, palpitations, nausea or vomiting, diarrhea or dysuria.  Has albuterol inhaler for home.  Has not been on steroids recently.  No sick contacts. RVP + parainfluenza.       Asthma/ COPD exacerbation  parainfluenza, r/o PNA       Asthma vs COPD exacerbation  -Asthma vs COPD exacerbation 2nd to parainfluenza - pt's daughter at bedside denies hx of COPD, smoking but does report hx of asthma & bronchiectasis, frequent PNA  -Continue bronchodilators  -Continue Mucomyst 20% 3ml q6h x3 days (give with Duoneb)  -At baseline o2 requirements (4LNC). Keep sats >90% with O2  -Bipap qHS and PRN for now  -Wheezing improving, suggest d/c IV steroids. Start Prednisone 40 mg PO qd & taper back to home dose (most recently Prednisone 15 mg PO qd per patient's daughter)     Parainfluenza, r/o PNA   -Bronchodilators/steroids as above  -ABX as per ID  -F/u CT chest.

## 2025-01-21 NOTE — CONSULT NOTE ADULT - ASSESSMENT
Patient is a 89 year old female with PMH of gastritis, A-fib, SBO, hypothyroid, hypertension, asthma, COPD (on 4 L nasal cannula at home), presents emergency department with home health aide for evaluation of low O2 saturation, increased work of breathing, cough and lethargy with decreased p.o. intake.     Sepsis due to parainfluenza pneumonia, r/o superimposed bacterial pneumonia   Acute on chronic respiratory failure d/t COPD/asthma exacerbation iso above    H/o advanced COPD/asthma on NC 4L  H/o penicillin allergy noted   - febrile to 100.8F with tachycardia, leukopenia, bandemia, s/p BiPAP, now on NC 4L  - tested + Parainfluenza 4 on admission  - CXR with bibasilar airspace opacities may be multifocal infection   - noted recent prior xray with no opacities/consolidations  - PCT is negative, typically goes against bacterial process   - s/p vancomycin and cefepime in the ER     Recommendations:   Follow Bcx - NGTD x2   Follow Legionella and Strep pneumoniae urine ags  Follow MRSA PCR screen   Continue ceftriaxone and azithromycin for now   Pulmonary following, on IV steroids  Isolation per IC protocol  Supportive care   Aspiration precautions   Continue rest of care per primary team       Lilli Polanco M.D.  [facility]  Available on [employer] TEAMS - *PREFERRED*  [phone]  After 5pm on weekdays and all day on weekends - please call [phone]     Thank you for consulting us and involving us in the management of this patients case. In addition to reviewing history, imaging, documents, labs, microbiology, took into account antibiotic stewardship, local antibiogram and infection control strategies and potential transmission issues.       
89-year-old female with past medical history of gastritis, A-fib, SBO, hypothyroid, hypertension, asthma, COPD (on 4 L nasal cannula at home), presents emergency department with home health aide for evaluation of low O2 saturation, increased work of breathing and lethargy.  Per daughter, the patient has had a productive cough for 2 to 3 days.  She has had decreased p.o. intake, and today was sleepier than normal.  Of note, the patient had an admission in October 2024 for hypoxic respiratory failure and was on BiPAP at that time.  The patient is DNR/DNI, but was trial of noninvasive ventilation.  Denies measured fevers, palpitations, nausea or vomiting, diarrhea or dysuria.  Has albuterol inhaler for home.  Has not been on steroids recently.  No sick contacts.   currently she is on bipap tries to respond but difficult to understand: :  called her daughter to get more information: :during night the side saw that she was not breathing well : and was bit confused and  have been coughing for last few days;  unusually dry ; clear;  , no fever:   mentally she has mild dementia:  in last one week more confused:  more so then previous ti me:  she gets confused about the time:  she reads the newspapere every day  : she denieis having dementia:     Asthma/ COPD exacerbation /  parainfluenza  A fibrillation  Hypothyroidism   Gastritis    Asthma/ COPD exacerbation /  parainfluenza  -parainfluenza induced asthma/  copd exacerbation:   she is on 4 L of oxygen at home:  currently she is on bipap  :  her VBG is ac on chr  hypercapnic resp failure  cont bipap for now:  can give her a break frm bipap after some ti me and u se it in  night time:   cont steroids  and antibiotics  has had ld tb which was treated many  years ago    A fibrillation  -on amiodarone  -not on ac  Hypothyroidism   levothyroxine  Gastritis  ppi    dw acp :  pt is dnr and dni : confirmed with the daughter   
Ms. Slater is a 89y Female with PMHx of asthma, HTN, essential tremor, bronchiectasis, GERD, hypothyroidism, paroxysmal A.fib on Eliquis 2.5mg BID, Vit D deficiency, tuberculous peritonitis, anemia moderate AS, hx of falls, aortic regurgitation, SBO who is now admitted with Acute respiratory failure with hypoxia and hypercapnia and sepsis due to pneumonia.     Pt has a history of pancytopenia as outpatient with Leukopenia with since about 3y prior initially lymphopenia and then with neutropenia, progressive, anemia over the past year gradually worsening, with progressive macrocytosis, and thrombocytopenia over the past two years. Pt requested conservative treatment and denied BmBx a few times in the past.       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   - Outpatient labs show gradual progressive leukopenia x 3y  - Thrombocytopenia outpatient ranging 85-100s x 2y  - Anemia ranging 9.5-11 over past two years  - MCV with progressive macrocytosis over past year  - Previous paraproteinemia workup negative for obvious plasma cell dyscrasia SPEP/MOOKIE normal, Serum immunoglobulins normal, serum FLCs (ratio 1.44), LDH (normal), Beta-2 Mcg (mild elv 2.6),   - Transfuse PRBC to maintain Hb > 7   - Transfuse Plt to maintain Plt > 10k  - Trend CBC daily with Diff for now   - Continue B12 1000mcg IM weekly    #Acute hypoxic hypercapnic respiratory failure  # Sepsis due to pneumonia   - CXR Bibasilar airspace opacities may reflect multifocal infection.  - Pulmonary team following, recs noted  - Antibiotics per primary team and ID  - Prefer longer course if ok given pancytopenia and overall immunocompromised state     #A.fib  - Was previously on Eliquis 2.5mg per cardiology  - Plt count currently 50k   - Ok to continue with Hep SubQ BID     # Hypothyroidism  - Continue levothyroxine         Thank you for allowing me to participate in the care of Ms. Slater, please do not hesitate to call or text me if you have further questions or concerns.       Roni Polanco MD  [facility] [place]   Division of Hematology/Oncology  [address], [address]  [place] [address]  P: [phone]  F: [phone]    Attestation:   Total time spent on the encounter: >60 minutes   ----Including face-to-face interaction in addition to chart review, reviewing treatment plan, and managing the patient’s chronic diagnoses as listed in the assessment----     1.	I have reviewed, analyzed and interpreted the following labs: CBC, CMP, imaging: CXR Head impressions as above.   2.	I have reviewed notes stating pts current admission, consultants and follow ups.   3.	I have reviewed the past medical, family, and surgical history and confirmed with outpatient records

## 2025-01-21 NOTE — PROGRESS NOTE ADULT - SUBJECTIVE AND OBJECTIVE BOX
Patient is a 89y old  Female who presents with a chief complaint of hypoxia (21 Jan 2025 08:22)      SUBJECTIVE / OVERNIGHT EVENTS:    Patient seen and examined. states she feels a little better. dtr states she is coughing less.      Vital Signs Last 24 Hrs  T(C): 36.9 (21 Jan 2025 05:44), Max: 37.1 (21 Jan 2025 01:22)  T(F): 98.5 (21 Jan 2025 05:44), Max: 98.8 (21 Jan 2025 01:22)  HR: 89 (21 Jan 2025 10:56) (86 - 106)  BP: 127/73 (21 Jan 2025 05:44) (112/71 - 127/73)  BP(mean): --  RR: 16 (21 Jan 2025 05:44) (16 - 18)  SpO2: 97% (21 Jan 2025 10:56) (94% - 100%)    Parameters below as of 21 Jan 2025 05:44  Patient On (Oxygen Delivery Method): nasal cannula  O2 Flow (L/min): 4    I&O's Summary      PE:  GENERAL: NAD, AAOx2  CHEST/LUNG: bl coarse bs, ronchi  HEART: Regular rate and rhythm; no murmur  ABDOMEN: Soft, Nontender, Nondistended; Bowel sounds present  EXTREMITIES:  2+ Peripheral Pulses, No edema  NEURO: No focal deficits    LABS:                        9.4    3.89  )-----------( 50       ( 21 Jan 2025 07:28 )             30.5     01-21    138  |  104  |  25[H]  ----------------------------<  147[H]  4.3   |  24  |  0.63    Ca    9.3      21 Jan 2025 07:26  Phos  3.1     01-21  Mg     2.4     01-21    TPro  6.2  /  Alb  3.4  /  TBili  0.4  /  DBili  x   /  AST  9[L]  /  ALT  7[L]  /  AlkPhos  50  01-21      CAPILLARY BLOOD GLUCOSE            Urinalysis Basic - ( 21 Jan 2025 07:26 )    Color: x / Appearance: x / SG: x / pH: x  Gluc: 147 mg/dL / Ketone: x  / Bili: x / Urobili: x   Blood: x / Protein: x / Nitrite: x   Leuk Esterase: x / RBC: x / WBC x   Sq Epi: x / Non Sq Epi: x / Bacteria: x        RADIOLOGY & ADDITIONAL TESTS:    Imaging Personally Reviewed:  [x] YES  [ ] NO    Consultant(s) Notes Reviewed:  [x] YES  [ ] NO    MEDICATIONS  (STANDING):  acetylcysteine 20%  Inhalation 3 milliLiter(s) Inhalation every 6 hours  albuterol/ipratropium for Nebulization 3 milliLiter(s) Nebulizer every 6 hours  aMIOdarone    Tablet 200 milliGRAM(s) Oral daily  buDESOnide    Inhalation Suspension 0.5 milliGRAM(s) Inhalation every 12 hours  cefTRIAXone   IVPB 1000 milliGRAM(s) IV Intermittent every 24 hours  cyanocobalamin 1000 MICROGram(s) Oral daily  levothyroxine 100 MICROGram(s) Oral daily  methylPREDNISolone sodium succinate Injectable 20 milliGRAM(s) IV Push every 8 hours  metoprolol tartrate 25 milliGRAM(s) Oral two times a day  pantoprazole    Tablet 40 milliGRAM(s) Oral before breakfast  sertraline 50 milliGRAM(s) Oral daily    MEDICATIONS  (PRN):      Care Discussed with Consultants/Other Providers [x] YES  [ ] NO    HEALTH ISSUES - PROBLEM Dx:  Acute respiratory failure with hypoxia and hypercapnia    Sepsis due to pneumonia    Parainfluenza    Chronic atrial fibrillation    Advanced COPD    Hypothyroid    Thrombocytopenia

## 2025-01-21 NOTE — ADVANCED PRACTICE NURSE CONSULT - REASON FOR CONSULT
Wound care consult initiated by RN to assess patient's skin for a possible right buttocks deep tissue injury present on admission     Reason for Admission: hypoxia  History of Present Illness:   Patient unable to give history due to fatigue/on bipap.  Attempted to reach daughter Connie at [phone] no answer.  History from ED chart:    89yo F PMH gastritis, A-fib, SBO, hypothyroid, hypertension, asthma, COPD (on 4 L nasal cannula at home), presents emergency department with home health aide for evaluation of low O2 saturation, increased work of breathing and lethargy.  Per daughter, the patient has had a productive cough for 2 to 3 days.  She has had decreased p.o. intake, and today was sleepier than normal.  Of note, the patient had an admission in October 2024 for hypoxic respiratory failure and was on BiPAP at that time.  The patient is DNR/DNI, but was trial of noninvasive ventilation.  Denies measured fevers, palpitations, nausea or vomiting, diarrhea or dysuria.  Has albuterol inhaler for home.  Has not been on steroids recently.  No sick contacts.    In the ED: azithro 500mg, cefepime 1gm, vanc 1g, 500 NS, IV tylenol, nebs, 125 solumedrol

## 2025-01-21 NOTE — PROGRESS NOTE ADULT - SUBJECTIVE AND OBJECTIVE BOX
[facility]  SWAPNIL Rene Y. Patel, S. Shah, G. Casimir  [phone]  ([phone] - weekdays after 5pm and weekends)    Name: CATHRYN LARA  Age/Gender: 89y Female  MRN: [id number]    Interval History:  Patient seen and examined this morning.   Feels about the same, no fever, pain or new complaints.   Notes reviewed. 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 reported allergy (Unknown)      Objective:  Vitals:   T(F): 98.5 (01-21-25 @ 05:44), Max: 98.8 (01-21-25 @ 01:22)  HR: 89 (01-21-25 @ 10:56) (86 - 106)  BP: 127/73 (01-21-25 @ 05:44) (112/71 - 127/73)  RR: 16 (01-21-25 @ 05:44) (16 - 18)  SpO2: 97% (01-21-25 @ 10:56) (94% - 100%)  Physical Examination:  General: no acute distress, NC, [place]  HEENT: NC/AT, anicteric, EOMI  Respiratory: decreased breath sounds b/l   Cardiovascular: S1 and S2 present, normal rate   Gastrointestinal: soft, nontender, nondistended  Extremities: no edema, no cyanosis  Skin: no visible rash    Laboratory Studies:  CBC:                       9.4    3.89  )-----------( 50       ( 21 Jan 2025 07:28 )             30.5     WBC Trend:  3.89 01-21-25 @ 07:28  3.55 01-19-25 @ 02:48    CMP: 01-21    138  |  104  |  25[H]  ----------------------------<  147[H]  4.3   |  24  |  0.63    Ca    9.3      21 Jan 2025 07:26  Phos  3.1     01-21  Mg     2.4     01-21    TPro  6.2  /  Alb  3.4  /  TBili  0.4  /  DBili  x   /  AST  9[L]  /  ALT  7[L]  /  AlkPhos  50  01-21    Creatinine: 0.63 mg/dL (01-21-25 @ 07:26)  Creatinine: 0.58 mg/dL (01-20-25 @ 07:02)  Creatinine: 0.62 mg/dL (01-19-25 @ 02:48)    LIVER FUNCTIONS - ( 21 Jan 2025 07:26 )  Alb: 3.4 g/dL / Pro: 6.2 g/dL / ALK PHOS: 50 U/L / ALT: 7 U/L / AST: 9 U/L / GGT: x           Microbiology: reviewed  Culture - Urine (collected 01-19-25 @ 12:15)  Source: Clean Catch Clean Catch (Midstream)  Final Report (01-20-25 @ 13:24):    No growth    Culture - Blood (collected 01-19-25 @ 02:30)  Source: .Blood BLOOD  Preliminary Report (01-21-25 @ 09:02):    No growth at 48 Hours    Culture - Blood (collected 01-19-25 @ 02:15)  Source: .Blood BLOOD  Preliminary Report (01-21-25 @ 09:02):    No growth at 48 Hours    SARS-CoV-2 Result: NotDetec (19 Jan 2025 02:48)    Radiology: reviewed     Medications:  acetylcysteine 20%  Inhalation 3 milliLiter(s) Inhalation every 6 hours  albuterol/ipratropium for Nebulization 3 milliLiter(s) Nebulizer every 6 hours  aMIOdarone    Tablet 200 milliGRAM(s) Oral daily  azithromycin  IVPB 500 milliGRAM(s) IV Intermittent every 24 hours  buDESOnide    Inhalation Suspension 0.5 milliGRAM(s) Inhalation every 12 hours  cefTRIAXone   IVPB 1000 milliGRAM(s) IV Intermittent every 24 hours  cyanocobalamin 1000 MICROGram(s) Oral daily  levothyroxine 100 MICROGram(s) Oral daily  methylPREDNISolone sodium succinate Injectable 20 milliGRAM(s) IV Push every 8 hours  metoprolol tartrate 25 milliGRAM(s) Oral two times a day  pantoprazole    Tablet 40 milliGRAM(s) Oral before breakfast  sertraline 50 milliGRAM(s) Oral daily    Current Antimicrobials:  azithromycin  IVPB 500 milliGRAM(s) IV Intermittent every 24 hours  cefTRIAXone   IVPB 1000 milliGRAM(s) IV Intermittent every 24 hours    Prior/Completed Antimicrobials:  azithromycin  IVPB  cefepime   IVPB  vancomycin  IVPB.

## 2025-01-21 NOTE — PROGRESS NOTE ADULT - SUBJECTIVE AND OBJECTIVE BOX
Date of Service: 01-21-25 @ 14:23    Patient is a 89y old  Female who presents with a chief complaint of hypoxia (21 Jan 2025 12:33)      Any change in ROS:   No new respiratory events overnight. Denies SOB/CP.  Still unable to expectorate secretions     MEDICATIONS  (STANDING):  acetylcysteine 20%  Inhalation 3 milliLiter(s) Inhalation every 6 hours  albuterol/ipratropium for Nebulization 3 milliLiter(s) Nebulizer every 6 hours  aMIOdarone    Tablet 200 milliGRAM(s) Oral daily  buDESOnide    Inhalation Suspension 0.5 milliGRAM(s) Inhalation every 12 hours  cefTRIAXone   IVPB 1000 milliGRAM(s) IV Intermittent every 24 hours  cyanocobalamin 1000 MICROGram(s) Oral daily  levothyroxine 100 MICROGram(s) Oral daily  methylPREDNISolone sodium succinate Injectable 20 milliGRAM(s) IV Push every 8 hours  metoprolol tartrate 25 milliGRAM(s) Oral two times a day  pantoprazole    Tablet 40 milliGRAM(s) Oral before breakfast  sertraline 50 milliGRAM(s) Oral daily    Vital Signs Last 24 Hrs  T(C): 36.5 (21 Jan 2025 13:06), Max: 37.1 (21 Jan 2025 01:22)  T(F): 97.7 (21 Jan 2025 13:06), Max: 98.8 (21 Jan 2025 01:22)  HR: 92 (21 Jan 2025 13:06) (86 - 106)  BP: 151/77 (21 Jan 2025 13:06) (112/71 - 151/77)  BP(mean): --  RR: 18 (21 Jan 2025 13:06) (16 - 18)  SpO2: 97% (21 Jan 2025 13:06) (94% - 100%)    Parameters below as of 21 Jan 2025 13:06  Patient On (Oxygen Delivery Method): nasal cannula        I&O's Summary        Physical Exam:   GENERAL: NAD, well-groomed, well-developed  HEENT: KOLBY/   Atraumatic, Normocephalic  ENMT: No tonsillar erythema, exudates, or enlargement  NECK: Supple, No JVD  CHEST/LUNG: Few scattered rhonchi, expiratory wheeze   CVS: Regular rate and rhythm  GI: : Soft, Nontender, Nondistended  NERVOUS SYSTEM:  Alert & Oriented X3  EXTREMITIES:  2+ Peripheral Pulses, No clubbing, cyanosis, or edema  SKIN: No rashes or lesions  PSYCH: Appropriate    Labs:  31, 31, 31, 32                            9.4    3.89  )-----------( 50       ( 21 Jan 2025 07:28 )             30.5                         11.4   3.55  )-----------( 55       ( 19 Jan 2025 02:48 )             37.1     01-21    138  |  104  |  25[H]  ----------------------------<  147[H]  4.3   |  24  |  0.63  01-20    143  |  106  |  20  ----------------------------<  104[H]  4.7   |  26  |  0.58  01-19    142  |  104  |  19  ----------------------------<  116[H]  4.4   |  26  |  0.62    Ca    9.3      21 Jan 2025 07:26  Ca    9.1      20 Jan 2025 07:02  Phos  3.1     01-21  Mg     2.4     01-21    TPro  6.2  /  Alb  3.4  /  TBili  0.4  /  DBili  x   /  AST  9[L]  /  ALT  7[L]  /  AlkPhos  50  01-21  TPro  6.8  /  Alb  3.9  /  TBili  0.7  /  DBili  x   /  AST  11  /  ALT  7[L]  /  AlkPhos  61  01-19    CAPILLARY BLOOD GLUCOSE          LIVER FUNCTIONS - ( 21 Jan 2025 07:26 )  Alb: 3.4 g/dL / Pro: 6.2 g/dL / ALK PHOS: 50 U/L / ALT: 7 U/L / AST: 9 U/L / GGT: x             Urinalysis Basic - ( 21 Jan 2025 07:26 )    Color: x / Appearance: x / SG: x / pH: x  Gluc: 147 mg/dL / Ketone: x  / Bili: x / Urobili: x   Blood: x / Protein: x / Nitrite: x   Leuk Esterase: x / RBC: x / WBC x   Sq Epi: x / Non Sq Epi: x / Bacteria: x      Procalcitonin: 0.07 ng/mL (01-19 @ 02:48)        RECENT CULTURES:  01-19 @ 12:15 Clean Catch Clean Catch (Midstream)                No growth    01-19 @ 02:30 .Blood BLOOD                No growth at 48 Hours    01-19 @ 02:15 .Blood BLOOD                No growth at 48 Hours          RESPIRATORY CULTURES:          Studies  < from: Xray Chest 1 View AP/PA (01.19.25 @ 05:01) >    ACC: [id number] EXAM:  XR CHEST AP OR PA 1V   ORDERED BY:  PAT MIRZA     PROCEDURE DATE:  01/19/2025          INTERPRETATION:  CLINICAL INDICATION: Shortness of breath, evaluate for   pneumonia    EXAM: Frontal radiograph of the chest.    COMPARISON: Chest radiograph from 1/5/2025    FINDINGS:  Bibasilar peripheral airspace opacities.  Small left pleural effusion with adjacent passive atelectasis  No pneumothorax. The heart is not well evaluated in this position  The visualized osseous structures demonstrate no acute pathology.    IMPRESSION:  Bibasilar airspace opacities may reflect multifocal infection.    --- End of Report ---        < end of copied text >

## 2025-01-21 NOTE — ADVANCED PRACTICE NURSE CONSULT - RECOMMEDATIONS
Impression:    B/L heel hyperpigmentation, cannot rule out a deep tissue injury present on admission  B/L buttocks/sacral deep tissue injury present on admission  xerosis B/L lower extremities     Recommendations:    1) turn and position q2 and PRN utilizing offloading assistive devices  2) routine pericare daily and PRN soiling  3) encourage optimal nutrition  4) waffle cushion when oob to chair  5) B/L LE complete cair air fluidized boots or lauren-lock pillow to offload heels/feet  6) triad protective barrier cream to B/L buttocks/sacrum daily and PRN soiling  7) incontinence management - consider external urinary catheter to divert urine from skin if incontinent  8) sween 24 moisturizer to dry skin daily     Plan discussed with ANTHONY Calvo at bedside      For questions/comments regarding the recommendations in this consult, please contact Juju Márquez via [employer] Teams. Wound care will not actively follow. For new concerns, please enter new consult. Thank you!     Impression:    B/L heel hyperpigmentation, cannot rule out a deep tissue injury present on admission  B/L buttocks/sacral deep tissue injury present on admission  xerosis B/L lower extremities   fecal incontinence  urinary incontinence    Recommendations:    1) turn and position q2 and PRN utilizing offloading assistive devices  2) routine pericare daily and PRN soiling  3) encourage optimal nutrition  4) waffle cushion when oob to chair  5) B/L LE complete cair air fluidized boots or lauren-lock pillow to offload heels/feet  6) triad protective barrier cream to B/L buttocks/sacrum daily and PRN soiling  7) incontinence management - consider external urinary catheter to divert urine from skin if incontinent  8) sween 24 moisturizer to dry skin daily     Plan discussed with ANTHONY Calvo at bedside      For questions/comments regarding the recommendations in this consult, please contact Juju Márquez via [employer] Teams. Wound care will not actively follow. For new concerns, please enter new consult. Thank you!

## 2025-01-21 NOTE — PROGRESS NOTE ADULT - ASSESSMENT
89yo F PMH gastritis, A-fib, SBO, hypothyroid, hypertension, asthma, COPD (on 4 L nasal cannula at home), presents emergency department with home health aide for evaluation of low O2 saturation, increased work of breathing and lethargy.     Acute respiratory failure with hypoxia and hypercapnia  sepsis 2/2 bacterial pneumonia.  Has +parainfluenza on RVP but also bandemia favor treating superimposed bacterial infection.  cont ceftriaxone  CT chest per pulm  BC NTD  cont to titrate 3 L  ID called  pulm following    Chronic atrial fibrillation  continue home metop/amio    Advanced COPD  on po pred m/w/f  on solumedrol 20mg iv X 3 days    Hypothyroid  continue synthroid    Thrombocytopenia, chronic  no acute bleeding on exam    hold dvt ppx    dw dtr at bedside    Dr White will be covering me starting tomorrow.  Please contact with any questions or concerns [phone]. 89yo F PMH gastritis, A-fib, SBO, hypothyroid, hypertension, asthma, COPD (on 4 L nasal cannula at home), presents emergency department with home health aide for evaluation of low O2 saturation, increased work of breathing and lethargy.     Acute respiratory failure with hypoxia and hypercapnia  sepsis 2/2 bacterial pneumonia.  Has +parainfluenza on RVP but also bandemia favor treating superimposed bacterial infection.  cont ceftriaxone  CT chest per pulm  BC NTD  cont to titrate 3 L  ID called  pulm following    Chronic atrial fibrillation  continue home metop/amio    Advanced COPD  on po pred m/w/f  on solumedrol 20mg iv X 3 days    Hypothyroid  continue synthroid    Thrombocytopenia, chronic  anemia  no signs of acute bleeding  hgb has been around 9 previously, heme called, cont to monitor    hold dvt ppx    dw dtr at bedside    Dr White will be covering me starting tomorrow.  Please contact with any questions or concerns [phone].

## 2025-01-21 NOTE — PROGRESS NOTE ADULT - ASSESSMENT
Patient is a 89 year old female with PMH of gastritis, A-fib, SBO, hypothyroid, hypertension, asthma, COPD (on 4 L nasal cannula at home), presents emergency department with home health aide for evaluation of low O2 saturation, increased work of breathing, cough and lethargy with decreased p.o. intake.     Sepsis due to parainfluenza pneumonia, r/o superimposed bacterial pneumonia   Acute on chronic respiratory failure d/t COPD/asthma exacerbation iso above    H/o advanced COPD/asthma on NC 4L  H/o penicillin allergy noted   - febrile to 100.8F with tachycardia, leukopenia, bandemia, s/p BiPAP, on NC 4L  - tested + Parainfluenza 4 on admission  - CXR with bibasilar airspace opacities may be multifocal infection   - noted recent prior xray with no opacities/consolidations  - PCT is negative, less likely bacterial but h/o freq pna and bronchiectasis, will treat  - Legionella and Strep pneumoniae urine ags negative   - MRSA PCR screen negative   - Bcx NGTD x2    - s/p vancomycin and cefepime in the ER     Recommendations:   Continue ceftriaxone 1g IV Q24h x5d course   Discontinued azithromycin  Pulmonary following, on IV steroids  Isolation per IC protocol  Supportive care   Aspiration precautions   Continue rest of care per primary team       Lilli Polanco M.D.  [facility]  Available on [employer] TEAMS - *PREFERRED*  [phone]  After 5pm on weekdays and all day on weekends - please call [phone]     Thank you for consulting us and involving us in the management of this patients case. In addition to reviewing history, imaging, documents, labs, microbiology, took into account antibiotic stewardship, local antibiogram and infection control strategies and potential transmission issues.

## 2025-01-21 NOTE — CONSULT NOTE ADULT - CONSULT REQUESTED DATE/TIME
Reason for exam: screening  (asymptomatic).

Last mammogram was performed 1 year and 1 month ago.



History:

Patient is postmenopausal.

2 excisional biopsies of both breasts.  Benign excisional biopsy of both breasts. 

2 excisional biopsies of the right breast.

Took hormonal contraceptives for 1 year beginning at age 21.  Took estrogen for 1 

year beginning at age 44.



Physical Findings:

A clinical breast exam by your physician is recommended on an annual basis and 

results should be correlated with mammographic findings.



MG 3D Screening Mammo W/Cad

Bilateral CC and MLO view(s) were taken.

Prior study comparison: March 22, 2018, bilateral MG 3d screening mammo w/cad.  

February 10, 2017, bilateral MG 3d screening mammo w/cad.

There are scattered fibroglandular densities.  There is chronic nodularity in the 

left breast.  No significant changes when compared with prior studies.





ASSESSMENT: Benign, BI-RAD 2



RECOMMENDATION:

Routine screening mammogram of both breasts in 1 year.
21-Jan-2025 14:28
20-Jan-2025 09:39
19-Jan-2025 14:46

## 2025-01-21 NOTE — CONSULT NOTE ADULT - SUBJECTIVE AND OBJECTIVE BOX
[facility] HEMATOLOGY/ONCOLOGY CONSULT NOTE     HPI:  Ms. Slater is a 89y Female with PMHx of     ROS: pertinent positives and negatives as per HPI    Allergies: penicillin (Rash)  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 reported allergy (Unknown)    PMHx:  Pulmonary TB  Gastritis  Afib  SBO (small bowel obstruction)  Hypothyroid  UTI (urinary tract infection)  HTN (hypertension)  TB (pulmonary tuberculosis)  Orthostatic hypotension  Asthma  Aortic stenosis  Multifocal pneumonia    SurgHx:   Afib  History of Small Bowel Obstruction  History of Bilateral Breast Biopsy  S/P Exploratory Laparotomy  H/O hemorrhoidectomy  S/P vein stripping    FHx:   Family history of essential hypertension    SocHx:     Meds:   MEDICATIONS  (STANDING):  acetylcysteine 20%  Inhalation 3 milliLiter(s) Inhalation every 6 hours  albuterol/ipratropium for Nebulization 3 milliLiter(s) Nebulizer every 6 hours  aMIOdarone    Tablet 200 milliGRAM(s) Oral daily  buDESOnide    Inhalation Suspension 0.5 milliGRAM(s) Inhalation every 12 hours  cefTRIAXone   IVPB 1000 milliGRAM(s) IV Intermittent every 24 hours  cyanocobalamin 1000 MICROGram(s) Oral daily  levothyroxine 100 MICROGram(s) Oral daily  methylPREDNISolone sodium succinate Injectable 20 milliGRAM(s) IV Push every 8 hours  metoprolol tartrate 25 milliGRAM(s) Oral two times a day  pantoprazole    Tablet 40 milliGRAM(s) Oral before breakfast  sertraline 50 milliGRAM(s) Oral daily    MEDICATIONS  (PRN):    Vital Signs  T(C): 36.5 (01-21-25 @ 13:06), Max: 37.1 (01-21-25 @ 01:22)  T(F): 97.7 (01-21-25 @ 13:06), Max: 98.8 (01-21-25 @ 01:22)  HR: 92 (01-21-25 @ 13:06) (86 - 106)  BP: 151/77 (01-21-25 @ 13:06) (112/71 - 151/77)  RR: 18 (01-21-25 @ 13:06) (16 - 18)  SpO2: 97% (01-21-25 @ 13:06) (94% - 100%)    Physical Exam:  Gen:   HEENT:   Chest:   Cardiac:  Abd:   Ext:   Neuro:   Integument:     Labs:  CBC Full  -  ( 21 Jan 2025 07:28 )  WBC Count : 3.89 K/uL  RBC Count : 2.87 M/uL  Hemoglobin : 9.4 g/dL  Hematocrit : 30.5 %  Platelet Count - Automated : 50 K/uL  Mean Cell Volume : 106.3 fl  Mean Cell Hemoglobin : 32.8 pg  Mean Cell Hemoglobin Concentration : 30.8 g/dL  Auto Neutrophil # : 3.37 K/uL  Auto Lymphocyte # : 0.42 K/uL  Auto Monocyte # : 0.08 K/uL  Auto Eosinophil # : 0.00 K/uL  Auto Basophil # : 0.00 K/uL  Auto Neutrophil % : 86.6 %  Auto Lymphocyte % : 10.8 %  Auto Monocyte % : 2.1 %  Auto Eosinophil % : 0.0 %  Auto Basophil % : 0.0 %    01-21    138  |  104  |  25[H]  ----------------------------<  147[H]  4.3   |  24  |  0.63    Ca    9.3      21 Jan 2025 07:26  Phos  3.1     01-21  Mg     2.4     01-21    TPro  6.2  /  Alb  3.4  /  TBili  0.4  /  DBili  x   /  AST  9[L]  /  ALT  7[L]  /  AlkPhos  50  01-21      Bilirubin Total: 0.4 mg/dL (01-21-25 @ 07:26)   [facility] HEMATOLOGY/ONCOLOGY CONSULT NOTE     HPI:  Ms. Slater is a 89y Female with PMHx of asthma, HTN, essential tremor, bronchiectasis, GERD, hypothyroidism, paroxysmal A.fib on Eliquis 2.5mg BID, Vit D deficiency, tuberculous peritonitis, anemia moderate AS, hx of falls, aortic regurgitation, SBO who is now admitted with Acute respiratory failure with hypoxia and hypercapnia and sepsis due to pneumonia.     Pt has a history of pancytopenia as outpatient with Leukopenia with since about 3y prior initially lymphopenia and then with neutropenia, progressive, anemia over the past year gradually worsening, with progressive macrocytosis, and thrombocytopenia over the past two years. Pt requested conservative treatment and denied BmBx a few times in the past.     She denied history of blood disorders, bleeding complications, or personal history of malignancy. She denied smoking, ETOH use. She lives in her apt with a 24hr aide and her daughter visits regularly.       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 reported allergy (Unknown)    PMHx:  Pulmonary TB  Gastritis  Afib  SBO (small bowel obstruction)  Hypothyroid  UTI (urinary tract infection)  HTN (hypertension)  TB (pulmonary tuberculosis)  Orthostatic hypotension  Asthma  Aortic stenosis  Multifocal pneumonia    SurgHx:   Afib  History of Small Bowel Obstruction  History of Bilateral Breast Biopsy  S/P Exploratory Laparotomy  H/O hemorrhoidectomy  S/P vein stripping    FHx:   Family history of essential hypertension    Meds:   MEDICATIONS  (STANDING):  acetylcysteine 20%  Inhalation 3 milliLiter(s) Inhalation every 6 hours  albuterol/ipratropium for Nebulization 3 milliLiter(s) Nebulizer every 6 hours  aMIOdarone    Tablet 200 milliGRAM(s) Oral daily  buDESOnide    Inhalation Suspension 0.5 milliGRAM(s) Inhalation every 12 hours  cefTRIAXone   IVPB 1000 milliGRAM(s) IV Intermittent every 24 hours  cyanocobalamin 1000 MICROGram(s) Oral daily  levothyroxine 100 MICROGram(s) Oral daily  methylPREDNISolone sodium succinate Injectable 20 milliGRAM(s) IV Push every 8 hours  metoprolol tartrate 25 milliGRAM(s) Oral two times a day  pantoprazole    Tablet 40 milliGRAM(s) Oral before breakfast  sertraline 50 milliGRAM(s) Oral daily    MEDICATIONS  (PRN):    Vital Signs  T(C): 36.5 (01-21-25 @ 13:06), Max: 37.1 (01-21-25 @ 01:22)  T(F): 97.7 (01-21-25 @ 13:06), Max: 98.8 (01-21-25 @ 01:22)  HR: 92 (01-21-25 @ 13:06) (86 - 106)  BP: 151/77 (01-21-25 @ 13:06) (112/71 - 151/77)  RR: 18 (01-21-25 @ 13:06) (16 - 18)  SpO2: 97% (01-21-25 @ 13:06) (94% - 100%)    Physical Exam:  Gen:   HEENT:   Chest:   Cardiac:  Abd:   Ext:   Neuro:   Integument:     Labs:    CBC Full  -  ( 21 Jan 2025 07:28 )  WBC Count : 3.89 K/uL  RBC Count : 2.87 M/uL  Hemoglobin : 9.4 g/dL  Hematocrit : 30.5 %  Platelet Count - Automated : 50 K/uL  Mean Cell Volume : 106.3 fl  Mean Cell Hemoglobin : 32.8 pg  Mean Cell Hemoglobin Concentration : 30.8 g/dL  Auto Neutrophil # : 3.37 K/uL  Auto Lymphocyte # : 0.42 K/uL  Auto Monocyte # : 0.08 K/uL  Auto Eosinophil # : 0.00 K/uL  Auto Basophil # : 0.00 K/uL  Auto Neutrophil % : 86.6 %  Auto Lymphocyte % : 10.8 %  Auto Monocyte % : 2.1 %  Auto Eosinophil % : 0.0 %  Auto Basophil % : 0.0 %    01-21    138  |  104  |  25[H]  ----------------------------<  147[H]  4.3   |  24  |  0.63    Ca    9.3      21 Jan 2025 07:26  Phos  3.1     01-21  Mg     2.4     01-21    TPro  6.2  /  Alb  3.4  /  TBili  0.4  /  DBili  x   /  AST  9[L]  /  ALT  7[L]  /  AlkPhos  50  01-21      Bilirubin Total: 0.4 mg/dL (01-21-25 @ 07:26)   [facility] HEMATOLOGY/ONCOLOGY CONSULT NOTE     HPI:  Ms. Slater is a 89y Female with PMHx of asthma, HTN, essential tremor, bronchiectasis, GERD, hypothyroidism, paroxysmal A.fib on Eliquis 2.5mg BID, Vit D deficiency, tuberculous peritonitis, anemia moderate AS, hx of falls, aortic regurgitation, SBO who is now admitted with Acute respiratory failure with hypoxia and hypercapnia and sepsis due to pneumonia.     Pt has a history of pancytopenia as outpatient with Leukopenia with since about 3y prior initially lymphopenia and then with neutropenia, progressive, anemia over the past year gradually worsening, with progressive macrocytosis, and thrombocytopenia over the past two years. Pt requested conservative treatment and denied BmBx a few times in the past.     She denied history of blood disorders, bleeding complications, or personal history of malignancy. She denied smoking, ETOH use. She lives in her apt with a 24hr aide and her daughter visits regularly.       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 reported allergy (Unknown)    PMHx:  Pulmonary TB  Gastritis  Afib  SBO (small bowel obstruction)  Hypothyroid  UTI (urinary tract infection)  HTN (hypertension)  TB (pulmonary tuberculosis)  Orthostatic hypotension  Asthma  Aortic stenosis  Multifocal pneumonia    SurgHx:   Afib  History of Small Bowel Obstruction  History of Bilateral Breast Biopsy  S/P Exploratory Laparotomy  H/O hemorrhoidectomy  S/P vein stripping    FHx:   Family history of essential hypertension    Meds:   MEDICATIONS  (STANDING):  acetylcysteine 20%  Inhalation 3 milliLiter(s) Inhalation every 6 hours  albuterol/ipratropium for Nebulization 3 milliLiter(s) Nebulizer every 6 hours  aMIOdarone    Tablet 200 milliGRAM(s) Oral daily  buDESOnide    Inhalation Suspension 0.5 milliGRAM(s) Inhalation every 12 hours  cefTRIAXone   IVPB 1000 milliGRAM(s) IV Intermittent every 24 hours  cyanocobalamin 1000 MICROGram(s) Oral daily  levothyroxine 100 MICROGram(s) Oral daily  methylPREDNISolone sodium succinate Injectable 20 milliGRAM(s) IV Push every 8 hours  metoprolol tartrate 25 milliGRAM(s) Oral two times a day  pantoprazole    Tablet 40 milliGRAM(s) Oral before breakfast  sertraline 50 milliGRAM(s) Oral daily    MEDICATIONS  (PRN):    Vital Signs  T(C): 36.5 (01-21-25 @ 13:06), Max: 37.1 (01-21-25 @ 01:22)  T(F): 97.7 (01-21-25 @ 13:06), Max: 98.8 (01-21-25 @ 01:22)  HR: 92 (01-21-25 @ 13:06) (86 - 106)  BP: 151/77 (01-21-25 @ 13:06) (112/71 - 151/77)  RR: 18 (01-21-25 @ 13:06) (16 - 18)  SpO2: 97% (01-21-25 @ 13:06) (94% - 100%)    Physical Exam:  Gen:  NAD, lethargic  HEENT: EOMI, MMM  Chest: equal chest rise  Cardiac: irregular  Abd: Nondistended  Ext: No edema    Labs:    CBC Full  -  ( 21 Jan 2025 07:28 )  WBC Count : 3.89 K/uL  RBC Count : 2.87 M/uL  Hemoglobin : 9.4 g/dL  Hematocrit : 30.5 %  Platelet Count - Automated : 50 K/uL  Mean Cell Volume : 106.3 fl  Mean Cell Hemoglobin : 32.8 pg  Mean Cell Hemoglobin Concentration : 30.8 g/dL  Auto Neutrophil # : 3.37 K/uL  Auto Lymphocyte # : 0.42 K/uL  Auto Monocyte # : 0.08 K/uL  Auto Eosinophil # : 0.00 K/uL  Auto Basophil # : 0.00 K/uL  Auto Neutrophil % : 86.6 %  Auto Lymphocyte % : 10.8 %  Auto Monocyte % : 2.1 %  Auto Eosinophil % : 0.0 %  Auto Basophil % : 0.0 %    01-21    138  |  104  |  25[H]  ----------------------------<  147[H]  4.3   |  24  |  0.63    Ca    9.3      21 Jan 2025 07:26  Phos  3.1     01-21  Mg     2.4     01-21    TPro  6.2  /  Alb  3.4  /  TBili  0.4  /  DBili  x   /  AST  9[L]  /  ALT  7[L]  /  AlkPhos  50  01-21      Bilirubin Total: 0.4 mg/dL (01-21-25 @ 07:26)

## 2025-01-22 LAB
ANION GAP SERPL CALC-SCNC: 10 MMOL/L — SIGNIFICANT CHANGE UP (ref 5–17)
BUN SERPL-MCNC: 22 MG/DL — SIGNIFICANT CHANGE UP (ref 7–23)
CALCIUM SERPL-MCNC: 9.1 MG/DL — SIGNIFICANT CHANGE UP (ref 8.4–10.5)
CHLORIDE SERPL-SCNC: 105 MMOL/L — SIGNIFICANT CHANGE UP (ref 96–108)
CO2 SERPL-SCNC: 28 MMOL/L — SIGNIFICANT CHANGE UP (ref 22–31)
CREAT SERPL-MCNC: 0.59 MG/DL — SIGNIFICANT CHANGE UP (ref 0.5–1.3)
EGFR: 80 ML/MIN/1.73M2 — SIGNIFICANT CHANGE UP
GLUCOSE SERPL-MCNC: 88 MG/DL — SIGNIFICANT CHANGE UP (ref 70–99)
HCT VFR BLD CALC: 30.1 % — LOW (ref 34.5–45)
HGB BLD-MCNC: 9.4 G/DL — LOW (ref 11.5–15.5)
MCHC RBC-ENTMCNC: 31.2 G/DL — LOW (ref 32–36)
MCHC RBC-ENTMCNC: 33.7 PG — SIGNIFICANT CHANGE UP (ref 27–34)
MCV RBC AUTO: 107.9 FL — HIGH (ref 80–100)
NRBC # BLD: 0 /100 WBCS — SIGNIFICANT CHANGE UP (ref 0–0)
NRBC BLD-RTO: 0 /100 WBCS — SIGNIFICANT CHANGE UP (ref 0–0)
PLATELET # BLD AUTO: 52 K/UL — LOW (ref 150–400)
POTASSIUM SERPL-MCNC: 4.3 MMOL/L — SIGNIFICANT CHANGE UP (ref 3.5–5.3)
POTASSIUM SERPL-SCNC: 4.3 MMOL/L — SIGNIFICANT CHANGE UP (ref 3.5–5.3)
RBC # BLD: 2.79 M/UL — LOW (ref 3.8–5.2)
RBC # FLD: 14.6 % — HIGH (ref 10.3–14.5)
SODIUM SERPL-SCNC: 143 MMOL/L — SIGNIFICANT CHANGE UP (ref 135–145)
WBC # BLD: 4.3 K/UL — SIGNIFICANT CHANGE UP (ref 3.8–10.5)
WBC # FLD AUTO: 4.3 K/UL — SIGNIFICANT CHANGE UP (ref 3.8–10.5)

## 2025-01-22 RX ORDER — ASCORBIC ACID 500 MG/ML
500 VIAL (ML) INJECTION DAILY
Refills: 0 | Status: DISCONTINUED | OUTPATIENT
Start: 2025-01-22 | End: 2025-01-24

## 2025-01-22 RX ORDER — MAGNESIUM, ALUMINUM HYDROXIDE 200-225/5
30 SUSPENSION, ORAL (FINAL DOSE FORM) ORAL EVERY 4 HOURS
Refills: 0 | Status: DISCONTINUED | OUTPATIENT
Start: 2025-01-22 | End: 2025-01-24

## 2025-01-22 RX ADMIN — Medication 1000 MICROGRAM(S): at 11:49

## 2025-01-22 RX ADMIN — BUDESONIDE 0.5 MILLIGRAM(S): 9 TABLET, EXTENDED RELEASE ORAL at 06:33

## 2025-01-22 RX ADMIN — IPRATROPIUM BROMIDE AND ALBUTEROL SULFATE 3 MILLILITER(S): .5; 2.5 SOLUTION RESPIRATORY (INHALATION) at 01:10

## 2025-01-22 RX ADMIN — LEVOTHYROXINE SODIUM 100 MICROGRAM(S): 25 TABLET ORAL at 06:32

## 2025-01-22 RX ADMIN — Medication 25 MILLIGRAM(S): at 06:33

## 2025-01-22 RX ADMIN — AMIODARONE HYDROCHLORIDE 200 MILLIGRAM(S): 50 INJECTION, SOLUTION INTRAVENOUS at 06:32

## 2025-01-22 RX ADMIN — Medication 3 MILLILITER(S): at 11:49

## 2025-01-22 RX ADMIN — Medication 3 MILLILITER(S): at 18:33

## 2025-01-22 RX ADMIN — Medication 500 MILLIGRAM(S): at 18:33

## 2025-01-22 RX ADMIN — Medication 50 MILLIGRAM(S): at 11:49

## 2025-01-22 RX ADMIN — Medication 30 MILLILITER(S): at 14:20

## 2025-01-22 RX ADMIN — PANTOPRAZOLE 40 MILLIGRAM(S): 20 TABLET, DELAYED RELEASE ORAL at 06:32

## 2025-01-22 RX ADMIN — IPRATROPIUM BROMIDE AND ALBUTEROL SULFATE 3 MILLILITER(S): .5; 2.5 SOLUTION RESPIRATORY (INHALATION) at 11:48

## 2025-01-22 RX ADMIN — Medication 3 MILLILITER(S): at 06:33

## 2025-01-22 RX ADMIN — BUDESONIDE 0.5 MILLIGRAM(S): 9 TABLET, EXTENDED RELEASE ORAL at 18:33

## 2025-01-22 RX ADMIN — IPRATROPIUM BROMIDE AND ALBUTEROL SULFATE 3 MILLILITER(S): .5; 2.5 SOLUTION RESPIRATORY (INHALATION) at 06:33

## 2025-01-22 RX ADMIN — IPRATROPIUM BROMIDE AND ALBUTEROL SULFATE 3 MILLILITER(S): .5; 2.5 SOLUTION RESPIRATORY (INHALATION) at 18:33

## 2025-01-22 RX ADMIN — PREDNISONE 40 MILLIGRAM(S): 5 TABLET ORAL at 06:32

## 2025-01-22 RX ADMIN — Medication 3 MILLILITER(S): at 01:10

## 2025-01-22 RX ADMIN — Medication 25 MILLIGRAM(S): at 18:33

## 2025-01-22 RX ADMIN — CEFTRIAXONE 100 MILLIGRAM(S): 250 INJECTION, POWDER, FOR SOLUTION INTRAMUSCULAR; INTRAVENOUS at 11:48

## 2025-01-22 NOTE — PROGRESS NOTE ADULT - ASSESSMENT
Patient is a 89 year old female with PMH of gastritis, A-fib, SBO, hypothyroid, hypertension, asthma, COPD (on 4 L nasal cannula at home), presents emergency department with home health aide for evaluation of low O2 saturation, increased work of breathing, cough and lethargy with decreased p.o. intake.     Sepsis due to parainfluenza pneumonia, r/o superimposed bacterial pneumonia   Acute on chronic respiratory failure d/t COPD/asthma exacerbation iso above    H/o advanced COPD/asthma on NC 4L  H/o penicillin allergy noted   - febrile to 100.8F with tachycardia, leukopenia, bandemia, s/p BiPAP, on NC 4L  - tested + Parainfluenza 4 on admission  - CXR with bibasilar airspace opacities may be multifocal infection   - noted recent prior xray with no opacities/consolidations  - PCT is negative, less likely bacterial but h/o freq pna and bronchiectasis, will treat  - Legionella and Strep pneumoniae urine ags negative   - MRSA PCR screen negative   - Bcx NGTD x2    - s/p vancomycin and cefepime in the ER   s/p azithromycin 1/19-1/21    Recommendations:   Continue ceftriaxone 1g IV Q24h x5d course -end tomorrow 1/23  Pulmonary following, now on PO steroids  Isolation per IC protocol  Supportive care   Aspiration precautions   Continue rest of care per primary team       Lilli Polanco M.D.  [facility]  Available on [employer] TEAMS - *PREFERRED*  [phone]  After 5pm on weekdays and all day on weekends - please call [phone]     Thank you for consulting us and involving us in the management of this patients case. In addition to reviewing history, imaging, documents, labs, microbiology, took into account antibiotic stewardship, local antibiogram and infection control strategies and potential transmission issues.

## 2025-01-22 NOTE — DIETITIAN INITIAL EVALUATION ADULT - PERTINENT LABORATORY DATA
01-22    143  |  105  |  22  ----------------------------<  88  4.3   |  28  |  0.59    Ca    9.1      22 Jan 2025 07:13  Phos  3.1     01-21  Mg     2.4     01-21    TPro  6.2  /  Alb  3.4  /  TBili  0.4  /  DBili  x   /  AST  9[L]  /  ALT  7[L]  /  AlkPhos  50  01-21  A1C with Estimated Average Glucose Result: 4.9 % (05-09-24 @ 07:01)

## 2025-01-22 NOTE — DIETITIAN INITIAL EVALUATION ADULT - ENERGY INTAKE
Fair (50-75%) In-house pt seen with breakfast tray at bedside, drinking juice and coffee, reports dislike of eggs. Obtained food preferences, will honor as able.

## 2025-01-22 NOTE — PROGRESS NOTE ADULT - SUBJECTIVE AND OBJECTIVE BOX
[facility] HEMAT[facility]Y INPATIENT [facility]GRESS NOTE     Interval Hx:   01-22-25: Ms. Slater was seen at bedside today.    Meds:   MEDICATIONS  (STANDING):  acetylcysteine 20%  Inhalation 3 milliLiter(s) Inhalation every 6 hours  albuterol/ipratropium for Nebulization 3 milliLiter(s) Nebulizer every 6 hours  aMIOdarone    Tablet 200 milliGRAM(s) Oral daily  buDESOnide    Inhalation Suspension 0.5 milliGRAM(s) Inhalation every 12 hours  cefTRIAXone   IVPB 1000 milliGRAM(s) IV Intermittent every 24 hours  cyanocobalamin 1000 MICROGram(s) Oral daily  levothyroxine 100 MICROGram(s) Oral daily  metoprolol tartrate 25 milliGRAM(s) Oral two times a day  pantoprazole    Tablet 40 milliGRAM(s) Oral before breakfast  predniSONE   Tablet   Oral   predniSONE   Tablet 40 milliGRAM(s) Oral daily  sertraline 50 milliGRAM(s) Oral daily    MEDICATIONS  (PRN):    Vital Signs Last 24 Hrs  T(C): 36.4 (22 Jan 2025 00:26), Max: 36.9 (21 Jan 2025 05:44)  T(F): 97.5 (22 Jan 2025 00:26), Max: 98.5 (21 Jan 2025 05:44)  HR: 84 (22 Jan 2025 03:54) (84 - 109)  BP: 112/73 (22 Jan 2025 00:26) (112/73 - 156/89)  BP(mean): --  RR: 18 (22 Jan 2025 00:26) (16 - 18)  SpO2: 98% (22 Jan 2025 03:54) (95% - 100%)    Parameters below as of 22 Jan 2025 00:26  Patient On (Oxygen Delivery Method): nasal cannula  O2 Flow (L/min): 4    Physical Exam:    Labs:                        9.4    3.89  )-----------( 50       ( 21 Jan 2025 07:28 )             30.5     CBC Full  -  ( 21 Jan 2025 07:28 )  WBC Count : 3.89 K/uL  RBC Count : 2.87 M/uL  Hemoglobin : 9.4 g/dL  Hematocrit : 30.5 %  Platelet Count - Automated : 50 K/uL  Mean Cell Volume : 106.3 fl  Mean Cell Hemoglobin : 32.8 pg  Mean Cell Hemoglobin Concentration : 30.8 g/dL  Auto Neutrophil # : 3.37 K/uL  Auto Lymphocyte # : 0.42 K/uL  Auto Monocyte # : 0.08 K/uL  Auto Eosinophil # : 0.00 K/uL  Auto Basophil # : 0.00 K/uL  Auto Neutrophil % : 86.6 %  Auto Lymphocyte % : 10.8 %  Auto Monocyte % : 2.1 %  Auto Eosinophil % : 0.0 %  Auto Basophil % : 0.0 %    01-21    138  |  104  |  25[H]  ----------------------------<  147[H]  4.3   |  24  |  0.63    Ca    9.3      21 Jan 2025 07:26  Phos  3.1     01-21  Mg     2.4     01-21    TPro  6.2  /  Alb  3.4  /  TBili  0.4  /  DBili  x   /  AST  9[L]  /  ALT  7[L]  /  AlkPhos  50  01-21      Bilirubin Total: 0.4 mg/dL (01-21-25 @ 07:26)   [facility] HEMATOLOGY/ONCOLOGY INPATIENT PROGRESS NOTE     Interval Hx:   01-22-25: Ms. Slater was seen at bedside today, on Bipap, no overnight events noted, CT chest noted with bilateral tree-in-bed opacities and incidental 7 mm hyperdense exophytic right renal lesion, which may represent an   hemorrhagic/proteinaceous cyst. However malignancy such as RCC cannot be excluded, given pts previous wishes of conservative management would favor repeating imaging in 3mo as outpatient       Meds:   MEDICATIONS  (STANDING):  acetylcysteine 20%  Inhalation 3 milliLiter(s) Inhalation every 6 hours  albuterol/ipratropium for Nebulization 3 milliLiter(s) Nebulizer every 6 hours  aMIOdarone    Tablet 200 milliGRAM(s) Oral daily  buDESOnide    Inhalation Suspension 0.5 milliGRAM(s) Inhalation every 12 hours  cefTRIAXone   IVPB 1000 milliGRAM(s) IV Intermittent every 24 hours  cyanocobalamin 1000 MICROGram(s) Oral daily  levothyroxine 100 MICROGram(s) Oral daily  metoprolol tartrate 25 milliGRAM(s) Oral two times a day  pantoprazole    Tablet 40 milliGRAM(s) Oral before breakfast  predniSONE   Tablet   Oral   predniSONE   Tablet 40 milliGRAM(s) Oral daily  sertraline 50 milliGRAM(s) Oral daily    MEDICATIONS  (PRN):    Vital Signs Last 24 Hrs  T(C): 36.4 (22 Jan 2025 00:26), Max: 36.9 (21 Jan 2025 05:44)  T(F): 97.5 (22 Jan 2025 00:26), Max: 98.5 (21 Jan 2025 05:44)  HR: 84 (22 Jan 2025 03:54) (84 - 109)  BP: 112/73 (22 Jan 2025 00:26) (112/73 - 156/89)  BP(mean): --  RR: 18 (22 Jan 2025 00:26) (16 - 18)  SpO2: 98% (22 Jan 2025 03:54) (95% - 100%)    Parameters below as of 22 Jan 2025 00:26  Patient On (Oxygen Delivery Method): nasal cannula  O2 Flow (L/min): 4    Physical Exam:  Gen:  NAD, lethargic  HEENT: EOMI, MMM  Chest: equal chest rise  Cardiac: irregular  Abd: Nondistended  Ext: No edema     Labs:                        9.4    3.89  )-----------( 50       ( 21 Jan 2025 07:28 )             30.5     CBC Full  -  ( 21 Jan 2025 07:28 )  WBC Count : 3.89 K/uL  RBC Count : 2.87 M/uL  Hemoglobin : 9.4 g/dL  Hematocrit : 30.5 %  Platelet Count - Automated : 50 K/uL  Mean Cell Volume : 106.3 fl  Mean Cell Hemoglobin : 32.8 pg  Mean Cell Hemoglobin Concentration : 30.8 g/dL  Auto Neutrophil # : 3.37 K/uL  Auto Lymphocyte # : 0.42 K/uL  Auto Monocyte # : 0.08 K/uL  Auto Eosinophil # : 0.00 K/uL  Auto Basophil # : 0.00 K/uL  Auto Neutrophil % : 86.6 %  Auto Lymphocyte % : 10.8 %  Auto Monocyte % : 2.1 %  Auto Eosinophil % : 0.0 %  Auto Basophil % : 0.0 %    01-21    138  |  104  |  25[H]  ----------------------------<  147[H]  4.3   |  24  |  0.63    Ca    9.3      21 Jan 2025 07:26  Phos  3.1     01-21  Mg     2.4     01-21    TPro  6.2  /  Alb  3.4  /  TBili  0.4  /  DBili  x   /  AST  9[L]  /  ALT  7[L]  /  AlkPhos  50  01-21      Bilirubin Total: 0.4 mg/dL (01-21-25 @ 07:26)

## 2025-01-22 NOTE — DIETITIAN INITIAL EVALUATION ADULT - REASON FOR ADMISSION
Pneumonia due to infectious organism    Chart reviewed, events noted. This is a "89yo F PMH gastritis, A-fib, SBO, hypothyroid, hypertension, asthma, COPD (on 4 L nasal cannula at home), presents emergency department with home health aide for evaluation of low O2 saturation, increased work of breathing and lethargy."

## 2025-01-22 NOTE — PROGRESS NOTE ADULT - SUBJECTIVE AND OBJECTIVE BOX
Date of Service: 01-22-25 @ 16:38    Patient is a 89y old  Female who presents with a chief complaint of hypoxia (22 Jan 2025 16:37)      Any change in ROS: She is on 3 L of oxygen   : feels OK:  no sob      MEDICATIONS  (STANDING):  acetylcysteine 20%  Inhalation 3 milliLiter(s) Inhalation every 6 hours  albuterol/ipratropium for Nebulization 3 milliLiter(s) Nebulizer every 6 hours  aMIOdarone    Tablet 200 milliGRAM(s) Oral daily  buDESOnide    Inhalation Suspension 0.5 milliGRAM(s) Inhalation every 12 hours  cefTRIAXone   IVPB 1000 milliGRAM(s) IV Intermittent every 24 hours  cyanocobalamin 1000 MICROGram(s) Oral daily  levothyroxine 100 MICROGram(s) Oral daily  metoprolol tartrate 25 milliGRAM(s) Oral two times a day  pantoprazole    Tablet 40 milliGRAM(s) Oral before breakfast  predniSONE   Tablet   Oral   predniSONE   Tablet 40 milliGRAM(s) Oral daily  sertraline 50 milliGRAM(s) Oral daily    MEDICATIONS  (PRN):  aluminum hydroxide/magnesium hydroxide/simethicone Suspension 30 milliLiter(s) Oral every 4 hours PRN Dyspepsia    Vital Signs Last 24 Hrs  T(C): 36.7 (22 Jan 2025 13:43), Max: 36.9 (21 Jan 2025 17:23)  T(F): 98.1 (22 Jan 2025 13:43), Max: 98.5 (21 Jan 2025 17:23)  HR: 99 (22 Jan 2025 15:19) (84 - 109)  BP: 112/67 (22 Jan 2025 13:43) (112/67 - 156/89)  BP(mean): --  RR: 18 (22 Jan 2025 13:43) (18 - 18)  SpO2: 98% (22 Jan 2025 15:19) (97% - 99%)    Parameters below as of 22 Jan 2025 13:43  Patient On (Oxygen Delivery Method): nasal cannula  O2 Flow (L/min): 3      I&O's Summary    21 Jan 2025 07:01  -  22 Jan 2025 07:00  --------------------------------------------------------  IN: 0 mL / OUT: 150 mL / NET: -150 mL          Physical Exam:   GENERAL: NAD, well-groomed, well-developed  HEENT: KOLBY/   Atraumatic, Normocephalic  ENMT: No tonsillar erythema, exudates, or enlargement; Moist mucous membranes, Good dentition, No lesions  NECK: Supple, No JVD, Normal thyroid  CHEST/LUNG: Clear to auscultaion  CVS: Regular rate and rhythm; No murmurs, rubs, or gallops  GI: : Soft, Nontender, Nondistended; Bowel sounds present  NERVOUS SYSTEM:  Alert & Oriented X3  EXTREMITIES:  - edema  LYMPH: No lymphadenopathy noted  SKIN: No rashes or lesions  ENDOCRINOLOGY: No Thyromegaly  PSYCH: Appropriate    Labs:  31, 31, 31, 32                            9.4    4.30  )-----------( 52       ( 22 Jan 2025 07:13 )             30.1                         9.4    3.89  )-----------( 50       ( 21 Jan 2025 07:28 )             30.5                         11.4   3.55  )-----------( 55       ( 19 Jan 2025 02:48 )             37.1     01-22    143  |  105  |  22  ----------------------------<  88  4.3   |  28  |  0.59  01-21    138  |  104  |  25[H]  ----------------------------<  147[H]  4.3   |  24  |  0.63  01-20    143  |  106  |  20  ----------------------------<  104[H]  4.7   |  26  |  0.58  01-19    142  |  104  |  19  ----------------------------<  116[H]  4.4   |  26  |  0.62    Ca    9.1      22 Jan 2025 07:13  Ca    9.3      21 Jan 2025 07:26  Phos  3.1     01-21  Mg     2.4     01-21    TPro  6.2  /  Alb  3.4  /  TBili  0.4  /  DBili  x   /  AST  9[L]  /  ALT  7[L]  /  AlkPhos  50  01-21  TPro  6.8  /  Alb  3.9  /  TBili  0.7  /  DBili  x   /  AST  11  /  ALT  7[L]  /  AlkPhos  61  01-19    CAPILLARY BLOOD GLUCOSE          LIVER FUNCTIONS - ( 21 Jan 2025 07:26 )  Alb: 3.4 g/dL / Pro: 6.2 g/dL / ALK PHOS: 50 U/L / ALT: 7 U/L / AST: 9 U/L / GGT: x             Urinalysis Basic - ( 22 Jan 2025 07:13 )    Color: x / Appearance: x / SG: x / pH: x  Gluc: 88 mg/dL / Ketone: x  / Bili: x / Urobili: x   Blood: x / Protein: x / Nitrite: x   Leuk Esterase: x / RBC: x / WBC x   Sq Epi: x / Non Sq Epi: x / Bacteria: x      Procalcitonin: 0.07 ng/mL (01-19 @ 02:48)        RECENT CULTURES:  01-19 @ 12:15 Clean Catch Clean Catch (Midstream)            ad< from: CT Chest No Cont (01.21.25 @ 13:45) >    Bilateral scattered tree-in-bud opacities, may be secondary to infection   or mucoid impaction. Trace bibasilar pleural effusions  7 mm hyperdense exophytic right renal lesion, which may represent an   hemorrhagic/proteinaceous cyst. However malignancy such as RCC cannot be   excluded. If clinically indicated, CT or MRI abdomen/pelvis is   recommended for further evaluation.  Thoracic aorta ectasia.    --- End of Report ---    < end of copied text >      No growth    01-19 @ 02:30 .Blood BLOOD                No growth at 72 Hours    01-19 @ 02:15 .Blood BLOOD                No growth at 72 Hours          RESPIRATORY CULTURES:          Studies  Chest X-RAY  CT SCAN Chest   Venous Dopplers: LE:   CT Abdomen  Others

## 2025-01-22 NOTE — DIETITIAN INITIAL EVALUATION ADULT - ORAL INTAKE PTA/DIET HISTORY
Pt seen at bedside, [place], difficult to interview. Pt reports fair intake. NKFA per chart. No reports nausea, vomiting, diarrhea, constipation. Patient on soft and bite sized diet in-house.

## 2025-01-22 NOTE — PROGRESS NOTE ADULT - ASSESSMENT
Ms. Slater is a 89y Female with PMHx of asthma, HTN, essential tremor, bronchiectasis, GERD, hypothyroidism, paroxysmal A.fib on Eliquis 2.5mg BID, Vit D deficiency, tuberculous peritonitis, anemia moderate AS, hx of falls, aortic regurgitation, SBO who is now admitted with Acute respiratory failure with hypoxia and hypercapnia and sepsis due to pneumonia.     Pt has a history of pancytopenia as outpatient with Leukopenia with since about 3y prior initially lymphopenia and then with neutropenia, progressive, anemia over the past year gradually worsening, with progressive macrocytosis, and thrombocytopenia over the past two years. Pt requested conservative treatment and denied BmBx a few times in the past.       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   - Outpatient labs show gradual progressive leukopenia x 3y  - Thrombocytopenia outpatient ranging 85-100s x 2y  - Anemia ranging 9.5-11 over past two years  - MCV with progressive macrocytosis over past year  - Previous paraproteinemia workup negative for obvious plasma cell dyscrasia SPEP/MOOKIE normal, Serum immunoglobulins normal, serum FLCs (ratio 1.44), LDH (normal), Beta-2 Mcg (mild elv 2.6),   - Transfuse PRBC to maintain Hb > 7   - Transfuse Plt to maintain Plt > 10k  - Trend CBC daily with Diff for now   - Continue B12 1000mcg IM weekly    #Acute hypoxic hypercapnic respiratory failure  # Sepsis due to pneumonia   - CXR Bibasilar airspace opacities may reflect multifocal infection.  - Pulmonary team following, recs noted  - Antibiotics per primary team and ID  - Prefer longer course if ok given pancytopenia and overall immunocompromised state     #A.fib  - Was previously on Eliquis 2.5mg per cardiology  - Plt count currently 50k   - Ok to continue with Hep SubQ BID     # Hypothyroidism  - Continue levothyroxine         Thank you for allowing me to participate in the care of Ms. Slater, please do not hesitate to call or text me if you have further questions or concerns.       Roni Polanco MD  [facility] [place]   Division of Hematology/Oncology  [address], [address]  [place] [address]  P: [phone]  F: [phone]    Attestation:   Total time spent on the encounter: >60 minutes   ----Including face-to-face interaction in addition to chart review, reviewing treatment plan, and managing the patient’s chronic diagnoses as listed in the assessment----     1.	I have reviewed, analyzed and interpreted the following labs: CBC, CMP, imaging: CXR Head impressions as above.   2.	I have reviewed notes stating pts current admission, consultants and follow ups.   3.	I have reviewed the past medical, family, and surgical history and confirmed with outpatient records  Ms. Slater is a 89y Female with PMHx of asthma, HTN, essential tremor, bronchiectasis, GERD, hypothyroidism, paroxysmal A.fib on Eliquis 2.5mg BID, Vit D deficiency, tuberculous peritonitis, anemia moderate AS, hx of falls, aortic regurgitation, SBO who is now admitted with Acute respiratory failure with hypoxia and hypercapnia and sepsis due to pneumonia.     Pt has a history of pancytopenia as outpatient with Leukopenia with since about 3y prior initially lymphopenia and then with neutropenia, progressive, anemia over the past year gradually worsening, with progressive macrocytosis, and thrombocytopenia over the past two years. Pt requested conservative treatment and denied BmBx a few times in the past.     01/22/2025: on Bipap, no overnight events noted, CT chest noted with bilateral tree-in-bed opacities and incidental 7 mm hyperdense exophytic right renal lesion, which may represent an   hemorrhagic/proteinaceous cyst. 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   - Outpatient labs show gradual progressive leukopenia x 3y  - Thrombocytopenia outpatient ranging 85-100s x 2y  - Anemia ranging 9.5-11 over past two years  - MCV with progressive macrocytosis over past year  - Previous paraproteinemia workup negative for obvious plasma cell dyscrasia SPEP/MOOKIE normal, Serum immunoglobulins normal, serum FLCs (ratio 1.44), LDH (normal), Beta-2 Mcg (mild elv 2.6),   - Transfuse PRBC to maintain Hb > 7   - Transfuse Plt to maintain Plt > 10k  - Trend CBC daily with Diff for now   - Continue B12 1000mcg IM weekly    #Acute hypoxic hypercapnic respiratory failure  # Parainfluenza   # Sepsis due to pneumonia / parainfluenza   - CXR Bibasilar airspace opacities may reflect multifocal infection.  - Pulmonary team following, recs noted  - Antibiotics per primary team and ID  - Prefer longer course if ok given pancytopenia and overall immunocompromised state     #A.fib  - Was previously on Eliquis 2.5mg per cardiology  - Plt count currently 50k   - Ok to continue with Hep SubQ BID     # Hypothyroidism  - Continue levothyroxine         Thank you for allowing me to participate in the care of Ms. Slater, please do not hesitate to call or text me if you have further questions or concerns.       Roni Polanco MD  [facility]   Division of Hematology/Oncology  [facility], [address]  [place] [address]  P: [phone]  F: [phone]    Attestation:    ----Pt evaluated including face-to-face interaction in addition to chart review, reviewing treatment plan, and managing the patient’s chronic diagnoses as listed in the assessment----    Ms. Slater is a 89y Female with PMHx of asthma, HTN, essential tremor, bronchiectasis, GERD, hypothyroidism, paroxysmal A.fib on Eliquis 2.5mg BID, Vit D deficiency, tuberculous peritonitis, anemia moderate AS, hx of falls, aortic regurgitation, SBO who is now admitted with Acute respiratory failure with hypoxia and hypercapnia and sepsis due to pneumonia.     Pt has a history of pancytopenia as outpatient with Leukopenia with since about 3y prior initially lymphopenia and then with neutropenia, progressive, anemia over the past year gradually worsening, with progressive macrocytosis, and thrombocytopenia over the past two years. Pt requested conservative treatment and denied BmBx a few times in the past.     01/22/2025: on Bipap, no overnight events noted, CT chest noted with bilateral tree-in-bed opacities and incidental 7 mm hyperdense exophytic right renal lesion, which may represent an   hemorrhagic/proteinaceous cyst. 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   - Outpatient labs show gradual progressive leukopenia x 3y  - Thrombocytopenia outpatient ranging 85-100s x 2y  - Anemia ranging 9.5-11 over past two years  - MCV with progressive macrocytosis over past year  - Previous paraproteinemia workup negative for obvious plasma cell dyscrasia SPEP/MOOKIE normal, Serum immunoglobulins normal, serum FLCs (ratio 1.44), LDH (normal), Beta-2 Mcg (mild elv 2.6),   - Transfuse PRBC to maintain Hb > 7   - Transfuse Plt to maintain Plt > 10k  - Trend CBC daily with Diff for now   - Continue B12 1000mcg IM weekly    #Acute hypoxic hypercapnic respiratory failure  # Parainfluenza   # Sepsis due to pneumonia / parainfluenza   - CXR Bibasilar airspace opacities may reflect multifocal infection  - CT chest with bilateral tree-in-bud   - Pulmonary team following, recs noted  - Antibiotics per primary team and ID  - Prefer longer course if ok given pancytopenia and overall immunocompromised state     #A.fib  - Was previously on Eliquis 2.5mg per cardiology  - Plt count currently 50k   - Ok to continue with Hep SubQ BID     # Hypothyroidism  - Continue levothyroxine     # Right renal lesion   - CT with 7 mm hyperdense exophytic right renal lesion, which may represent an hemorrhagic/proteinaceous cyst. However malignancy such as RCC cannot be excluded  - Given pts previous wishes of conservative management would favor repeating imaging in 3mo as outpatient       Thank you for allowing me to participate in the care of Ms. Slater, please do not hesitate to call or text me if you have further questions or concerns.       Roni Polanco MD  [facility] [place]   Division of Hematology/Oncology  [address] [place] [address], [address]  [place] [address]  P: [phone]  F: [phone]    Attestation:    ----Pt evaluated including face-to-face interaction in addition to chart review, reviewing treatment plan, and managing the patient’s chronic diagnoses as listed in the assessment----

## 2025-01-22 NOTE — DIETITIAN INITIAL EVALUATION ADULT - PERTINENT MEDS FT
MEDICATIONS  (STANDING):  acetylcysteine 20%  Inhalation 3 milliLiter(s) Inhalation every 6 hours  albuterol/ipratropium for Nebulization 3 milliLiter(s) Nebulizer every 6 hours  aMIOdarone    Tablet 200 milliGRAM(s) Oral daily  buDESOnide    Inhalation Suspension 0.5 milliGRAM(s) Inhalation every 12 hours  cefTRIAXone   IVPB 1000 milliGRAM(s) IV Intermittent every 24 hours  cyanocobalamin 1000 MICROGram(s) Oral daily  levothyroxine 100 MICROGram(s) Oral daily  metoprolol tartrate 25 milliGRAM(s) Oral two times a day  pantoprazole    Tablet 40 milliGRAM(s) Oral before breakfast  predniSONE   Tablet   Oral   predniSONE   Tablet 40 milliGRAM(s) Oral daily  sertraline 50 milliGRAM(s) Oral daily    MEDICATIONS  (PRN):  aluminum hydroxide/magnesium hydroxide/simethicone Suspension 30 milliLiter(s) Oral every 4 hours PRN Dyspepsia

## 2025-01-22 NOTE — PROGRESS NOTE ADULT - SUBJECTIVE AND OBJECTIVE BOX
[facility]  SWAPNIL Rene Y. Patel, S. Shah, G. Casimir  [phone]  ([phone] - weekdays after 5pm and weekends)    Name: CATHRYN LARA  Age/Gender: 89y Female  MRN: [id number]    Interval History:  Patient seen and examined this morning.   Feels better, no fever or new complaints.   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 reported allergy (Unknown)      Objective:  Vitals:   T(F): 98.1 (01-22-25 @ 13:43), Max: 98.5 (01-21-25 @ 17:23)  HR: 94 (01-22-25 @ 13:43) (84 - 109)  BP: 112/67 (01-22-25 @ 13:43) (112/67 - 156/89)  RR: 18 (01-22-25 @ 13:43) (18 - 18)  SpO2: 99% (01-22-25 @ 13:43) (97% - 99%)  Physical Examination:  General: no acute distress, NC  HEENT: NC/AT, anicteric, EOMI  Respiratory: decreased breath sounds b/l   Cardiovascular: S1 and S2 present, normal rate   Gastrointestinal: normal appearing, nondistended  Extremities: no edema, no cyanosis  Skin: no visible rash    Laboratory Studies:  CBC:                       9.4    4.30  )-----------( 52       ( 22 Jan 2025 07:13 )             30.1     WBC Trend:  4.30 01-22-25 @ 07:13  3.89 01-21-25 @ 07:28  3.55 01-19-25 @ 02:48    CMP: 01-22    143  |  105  |  22  ----------------------------<  88  4.3   |  28  |  0.59    Ca    9.1      22 Jan 2025 07:13  Phos  3.1     01-21  Mg     2.4     01-21    TPro  6.2  /  Alb  3.4  /  TBili  0.4  /  DBili  x   /  AST  9[L]  /  ALT  7[L]  /  AlkPhos  50  01-21    Creatinine: 0.59 mg/dL (01-22-25 @ 07:13)  Creatinine: 0.63 mg/dL (01-21-25 @ 07:26)  Creatinine: 0.58 mg/dL (01-20-25 @ 07:02)  Creatinine: 0.62 mg/dL (01-19-25 @ 02:48)    LIVER FUNCTIONS - ( 21 Jan 2025 07:26 )  Alb: 3.4 g/dL / Pro: 6.2 g/dL / ALK PHOS: 50 U/L / ALT: 7 U/L / AST: 9 U/L / GGT: x           Microbiology: reviewed   Culture - Urine (collected 01-19-25 @ 12:15)  Source: Clean Catch Clean Catch (Midstream)  Final Report (01-20-25 @ 13:24):    No growth    Culture - Blood (collected 01-19-25 @ 02:30)  Source: .Blood BLOOD  Preliminary Report (01-22-25 @ 09:01):    No growth at 72 Hours    Culture - Blood (collected 01-19-25 @ 02:15)  Source: .Blood BLOOD  Preliminary Report (01-22-25 @ 09:01):    No growth at 72 Hours    SARS-CoV-2 Result: NotDetec (19 Jan 2025 02:48)    Radiology: reviewed     Medications:  acetylcysteine 20%  Inhalation 3 milliLiter(s) Inhalation every 6 hours  albuterol/ipratropium for Nebulization 3 milliLiter(s) Nebulizer every 6 hours  aluminum hydroxide/magnesium hydroxide/simethicone Suspension 30 milliLiter(s) Oral every 4 hours PRN  aMIOdarone    Tablet 200 milliGRAM(s) Oral daily  buDESOnide    Inhalation Suspension 0.5 milliGRAM(s) Inhalation every 12 hours  cefTRIAXone   IVPB 1000 milliGRAM(s) IV Intermittent every 24 hours  cyanocobalamin 1000 MICROGram(s) Oral daily  levothyroxine 100 MICROGram(s) Oral daily  metoprolol tartrate 25 milliGRAM(s) Oral two times a day  pantoprazole    Tablet 40 milliGRAM(s) Oral before breakfast  predniSONE   Tablet   Oral   predniSONE   Tablet 40 milliGRAM(s) Oral daily  sertraline 50 milliGRAM(s) Oral daily    Current Antimicrobials:  cefTRIAXone   IVPB 1000 milliGRAM(s) IV Intermittent every 24 hours    Prior/Completed Antimicrobials:  azithromycin  IVPB  cefepime   IVPB  vancomycin  IVPB.

## 2025-01-22 NOTE — DIETITIAN INITIAL EVALUATION ADULT - ADD RECOMMEND
1) Continue Soft and Bite Sized Diet. 2) RD to add Mighty Shakes TID to supplement PO intake 3) Recommend addition of multivitamin and ascorbic acid daily to promote wound healing if no contraindications. 4) RD to remain available and follow-up as medically appropriate.

## 2025-01-22 NOTE — PROGRESS NOTE ADULT - ASSESSMENT
89-year-old female with past medical history of gastritis, A-fib, SBO, hypothyroid, hypertension, asthma vs COPD (on 4 L nasal cannula at home), bronchiectasis. Presents to emergency department with home health aide for evaluation of low O2 saturation, increased work of breathing and lethargy.  Per daughter, the patient has had a productive cough for 2 to 3 days.  She has had decreased p.o. intake, and today was sleepier than normal.  Of note, the patient had an admission in October 2024 for hypoxic respiratory failure and was on BiPAP at that time.  The patient is DNR/DNI, but was trial of noninvasive ventilation.  Denies measured fevers, palpitations, nausea or vomiting, diarrhea or dysuria.  Has albuterol inhaler for home.  Has not been on steroids recently.  No sick contacts. RVP + parainfluenza.       Asthma/ COPD exacerbation  parainfluenza, r/o PNA       Asthma vs COPD exacerbation  -Asthma vs COPD exacerbation 2nd to parainfluenza - pt's daughter at bedside denies hx of COPD, smoking but does report hx of asthma & bronchiectasis, frequent PNA  -Continue bronchodilators  -Continue Mucomyst 20% 3ml q6h x3 days (give with Duoneb)  -At baseline o2 requirements (4LNC). Keep sats >90% with O2  -Bipap qHS and PRN for now  -Wheezing improving, suggest d/c IV steroids. Start Prednisone 40 mg PO qd & taper back to home dose (most recently Prednisone 15 mg PO qd per patient's daughter)   -ct chest showed: Bilateral scattered tree-in-bud opacities, may be secondary to infection or mucoid impaction. Trace bibasilar pleural effusions 7 mm hyperdense exophytic right renal lesion, which may represent an hemorrhagic/proteinaceous cyst. However malignancy such as RCC cannot be excluded. If clinically indicated, CT or MRI abdomen/pelvis is recommended for further evaluation.Thoracic aorta ectasia.    Parainfluenza, r/o PNA   -Bronchodilators/steroids as above  -ABX as per ID  -ct chest as above

## 2025-01-22 NOTE — PROGRESS NOTE ADULT - SUBJECTIVE AND OBJECTIVE BOX
Patient is a 89y old  Female who presents with a chief complaint of Pneumonia due to infectious organism    Chart reviewed, events noted. This is a "89yo F PMH gastritis, A-fib, SBO, hypothyroid, hypertension, asthma, COPD (on 4 L nasal cannula at home), presents emergency department with home health aide for evaluation of low O2 saturation, increased work of breathing and lethargy." (22 Jan 2025 14:11)      SUBJECTIVE / OVERNIGHT EVENTS:  Patient seen and examined.   Some sob but better.       Vital Signs Last 24 Hrs  T(C): 36.7 (22 Jan 2025 13:43), Max: 36.9 (21 Jan 2025 17:23)  T(F): 98.1 (22 Jan 2025 13:43), Max: 98.5 (21 Jan 2025 17:23)  HR: 99 (22 Jan 2025 15:19) (84 - 109)  BP: 112/67 (22 Jan 2025 13:43) (112/67 - 156/89)  BP(mean): --  RR: 18 (22 Jan 2025 13:43) (18 - 18)  SpO2: 98% (22 Jan 2025 15:19) (97% - 99%)    Parameters below as of 22 Jan 2025 13:43  Patient On (Oxygen Delivery Method): nasal cannula  O2 Flow (L/min): 3    I&O's Summary    21 Jan 2025 07:01  -  22 Jan 2025 07:00  --------------------------------------------------------  IN: 0 mL / OUT: 150 mL / NET: -150 mL        PE:  GENERAL: NAD, AAOx2  CHEST/LUNG: bl coarse bs, ronchi  HEART: Regular rate and rhythm; no murmur  ABDOMEN: Soft, Nontender, Nondistended; Bowel sounds present  EXTREMITIES:  2+ Peripheral Pulses, No edema  NEURO: No focal deficits      LABS:                        9.4    4.30  )-----------( 52       ( 22 Jan 2025 07:13 )             30.1     01-22    143  |  105  |  22  ----------------------------<  88  4.3   |  28  |  0.59    Ca    9.1      22 Jan 2025 07:13  Phos  3.1     01-21  Mg     2.4     01-21    TPro  6.2  /  Alb  3.4  /  TBili  0.4  /  DBili  x   /  AST  9[L]  /  ALT  7[L]  /  AlkPhos  50  01-21      CAPILLARY BLOOD GLUCOSE            Urinalysis Basic - ( 22 Jan 2025 07:13 )    Color: x / Appearance: x / SG: x / pH: x  Gluc: 88 mg/dL / Ketone: x  / Bili: x / Urobili: x   Blood: x / Protein: x / Nitrite: x   Leuk Esterase: x / RBC: x / WBC x   Sq Epi: x / Non Sq Epi: x / Bacteria: x        RADIOLOGY & ADDITIONAL TESTS:    Imaging Personally Reviewed:  [x] YES  [ ] NO    Consultant(s) Notes Reviewed:  [x] YES  [ ] NO      MEDICATIONS  (STANDING):  acetylcysteine 20%  Inhalation 3 milliLiter(s) Inhalation every 6 hours  albuterol/ipratropium for Nebulization 3 milliLiter(s) Nebulizer every 6 hours  aMIOdarone    Tablet 200 milliGRAM(s) Oral daily  buDESOnide    Inhalation Suspension 0.5 milliGRAM(s) Inhalation every 12 hours  cefTRIAXone   IVPB 1000 milliGRAM(s) IV Intermittent every 24 hours  cyanocobalamin 1000 MICROGram(s) Oral daily  levothyroxine 100 MICROGram(s) Oral daily  metoprolol tartrate 25 milliGRAM(s) Oral two times a day  pantoprazole    Tablet 40 milliGRAM(s) Oral before breakfast  predniSONE   Tablet   Oral   predniSONE   Tablet 40 milliGRAM(s) Oral daily  sertraline 50 milliGRAM(s) Oral daily    MEDICATIONS  (PRN):  aluminum hydroxide/magnesium hydroxide/simethicone Suspension 30 milliLiter(s) Oral every 4 hours PRN Dyspepsia      Care Discussed with Consultants/Other Providers [x] YES  [ ] NO    HEALTH ISSUES - PROBLEM Dx:  Acute respiratory failure with hypoxia and hypercapnia    Sepsis due to pneumonia    Parainfluenza    Chronic atrial fibrillation    Advanced COPD    Hypothyroid    Thrombocytopenia

## 2025-01-22 NOTE — DIETITIAN INITIAL EVALUATION ADULT - ETIOLOGY
Patient aware results have been mailed out to her, buy also wants to  a copy of recent lab result from 4/1       - please leave result at , call patient when done      .Rosie BERGERN, RN     related to increased physiological demand for nutrients

## 2025-01-22 NOTE — PROGRESS NOTE ADULT - ASSESSMENT
89yo F PMH gastritis, A-fib, SBO, hypothyroid, hypertension, asthma, COPD (on 4 L nasal cannula at home), presents emergency department with home health aide for evaluation of low O2 saturation, increased work of breathing and lethargy.     Acute respiratory failure with hypoxia and hypercapnia  sepsis 2/2 bacterial pneumonia.  Has +parainfluenza on RVP but also bandemia favor treating superimposed bacterial infection.  cont ceftriaxone  CT chest per pulm  BC NTD  cont to titrate 3 L  ID called  pulm following    Chronic atrial fibrillation  continue home metop/amio    Advanced COPD  on po pred m/w/f  on solumedrol 20mg iv X 3 days    Hypothyroid  continue synthroid    Thrombocytopenia, chronic  anemia  no signs of acute bleeding  hgb has been around 9 previously, heme called, cont to monitor    hold dvt ppx    dw dtr at bedside    Please contact with any questions or concerns [phone].

## 2025-01-23 ENCOUNTER — TRANSCRIPTION ENCOUNTER (OUTPATIENT)
Age: 89
End: 2025-01-23

## 2025-01-23 RX ORDER — SENNOSIDES 8.6 MG
2 TABLET ORAL AT BEDTIME
Refills: 0 | Status: DISCONTINUED | OUTPATIENT
Start: 2025-01-23 | End: 2025-01-24

## 2025-01-23 RX ORDER — POLYETHYLENE GLYCOL 3350 17 G/17G
17 POWDER, FOR SOLUTION ORAL
Refills: 0 | Status: DISCONTINUED | OUTPATIENT
Start: 2025-01-23 | End: 2025-01-24

## 2025-01-23 RX ADMIN — CEFTRIAXONE 100 MILLIGRAM(S): 250 INJECTION, POWDER, FOR SOLUTION INTRAMUSCULAR; INTRAVENOUS at 14:08

## 2025-01-23 RX ADMIN — IPRATROPIUM BROMIDE AND ALBUTEROL SULFATE 3 MILLILITER(S): .5; 2.5 SOLUTION RESPIRATORY (INHALATION) at 23:01

## 2025-01-23 RX ADMIN — Medication 3 MILLILITER(S): at 14:11

## 2025-01-23 RX ADMIN — PANTOPRAZOLE 40 MILLIGRAM(S): 20 TABLET, DELAYED RELEASE ORAL at 06:13

## 2025-01-23 RX ADMIN — BUDESONIDE 0.5 MILLIGRAM(S): 9 TABLET, EXTENDED RELEASE ORAL at 06:13

## 2025-01-23 RX ADMIN — BUDESONIDE 0.5 MILLIGRAM(S): 9 TABLET, EXTENDED RELEASE ORAL at 18:37

## 2025-01-23 RX ADMIN — Medication 500 MILLIGRAM(S): at 14:09

## 2025-01-23 RX ADMIN — IPRATROPIUM BROMIDE AND ALBUTEROL SULFATE 3 MILLILITER(S): .5; 2.5 SOLUTION RESPIRATORY (INHALATION) at 14:11

## 2025-01-23 RX ADMIN — Medication 25 MILLIGRAM(S): at 18:37

## 2025-01-23 RX ADMIN — Medication 1000 MICROGRAM(S): at 14:09

## 2025-01-23 RX ADMIN — IPRATROPIUM BROMIDE AND ALBUTEROL SULFATE 3 MILLILITER(S): .5; 2.5 SOLUTION RESPIRATORY (INHALATION) at 18:36

## 2025-01-23 RX ADMIN — LEVOTHYROXINE SODIUM 100 MICROGRAM(S): 25 TABLET ORAL at 06:14

## 2025-01-23 RX ADMIN — PREDNISONE 40 MILLIGRAM(S): 5 TABLET ORAL at 06:13

## 2025-01-23 RX ADMIN — Medication 50 MILLIGRAM(S): at 14:09

## 2025-01-23 RX ADMIN — POLYETHYLENE GLYCOL 3350 17 GRAM(S): 17 POWDER, FOR SOLUTION ORAL at 18:37

## 2025-01-23 RX ADMIN — AMIODARONE HYDROCHLORIDE 200 MILLIGRAM(S): 50 INJECTION, SOLUTION INTRAVENOUS at 06:14

## 2025-01-23 RX ADMIN — IPRATROPIUM BROMIDE AND ALBUTEROL SULFATE 3 MILLILITER(S): .5; 2.5 SOLUTION RESPIRATORY (INHALATION) at 06:35

## 2025-01-23 RX ADMIN — Medication 3 MILLILITER(S): at 00:00

## 2025-01-23 RX ADMIN — Medication 2 TABLET(S): at 22:00

## 2025-01-23 RX ADMIN — Medication 3 MILLILITER(S): at 06:33

## 2025-01-23 RX ADMIN — IPRATROPIUM BROMIDE AND ALBUTEROL SULFATE 3 MILLILITER(S): .5; 2.5 SOLUTION RESPIRATORY (INHALATION) at 00:00

## 2025-01-23 RX ADMIN — Medication 25 MILLIGRAM(S): at 06:13

## 2025-01-23 NOTE — PHYSICAL THERAPY INITIAL EVALUATION ADULT - PERTINENT HX OF CURRENT PROBLEM, REHAB EVAL
Pt is a 89-year-old female with past medical history of gastritis, A-fib, SBO, hypothyroid, hypertension, asthma, COPD (on 4 L nasal cannula at home), presents emergency department with home health aide for evaluation of low O2 saturation, increased work of breathing and lethargy.  Per daughter, the patient has had a productive cough for 2 to 3 days.  She has had decreased p.o. intake, and today was sleepier than normal.  Of note, the patient had an admission in October 2024 for hypoxic respiratory failure and was on BiPAP at that time.   CXR: Bibasilar airspace opacities may reflect multifocal infection.  CT Chest: Bilateral scattered tree-in-bud opacities, may be secondary to infection or mucoid impaction. Trace bibasilar pleural effusions 7 mm hyperdense exophytic right renal lesion, which may represent an hemorrhagic/proteinaceous cyst. However malignancy such as RCC cannot be excluded.

## 2025-01-23 NOTE — PROGRESS NOTE ADULT - ASSESSMENT
Ms. Slater is a 89y Female with PMHx of asthma, HTN, essential tremor, bronchiectasis, GERD, hypothyroidism, paroxysmal A.fib on Eliquis 2.5mg BID, Vit D deficiency, tuberculous peritonitis, anemia moderate AS, hx of falls, aortic regurgitation, SBO who is now admitted with Acute respiratory failure with hypoxia and hypercapnia and sepsis due to pneumonia.     Pt has a history of pancytopenia as outpatient with Leukopenia with since about 3y prior initially lymphopenia and then with neutropenia, progressive, anemia over the past year gradually worsening, with progressive macrocytosis, and thrombocytopenia over the past two years. Pt requested conservative treatment and denied BmBx a few times in the past.     01/22/2025: on Bipap, no overnight events noted, CT chest noted with bilateral tree-in-bed opacities and incidental 7 mm hyperdense exophytic right renal lesion, which may represent an   hemorrhagic/proteinaceous cyst. 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   - Outpatient labs show gradual progressive leukopenia x 3y  - Thrombocytopenia outpatient ranging 85-100s x 2y  - Anemia ranging 9.5-11 over past two years  - MCV with progressive macrocytosis over past year  - Previous paraproteinemia workup negative for obvious plasma cell dyscrasia SPEP/MOOKIE normal, Serum immunoglobulins normal, serum FLCs (ratio 1.44), LDH (normal), Beta-2 Mcg (mild elv 2.6),   - Transfuse PRBC to maintain Hb > 7   - Transfuse Plt to maintain Plt > 10k  - Trend CBC daily with Diff for now   - Continue B12 1000mcg IM weekly    #Acute hypoxic hypercapnic respiratory failure  # Parainfluenza   # Sepsis due to pneumonia / parainfluenza   - CXR Bibasilar airspace opacities may reflect multifocal infection  - CT chest with bilateral tree-in-bud   - Pulmonary team following, recs noted  - Antibiotics per primary team and ID  - Prefer longer course if ok given pancytopenia and overall immunocompromised state     #A.fib  - Was previously on Eliquis 2.5mg per cardiology  - Plt count currently 50k   - Ok to continue with Hep SubQ BID     # Hypothyroidism  - Continue levothyroxine     # Right renal lesion   - CT with 7 mm hyperdense exophytic right renal lesion, which may represent an hemorrhagic/proteinaceous cyst. However malignancy such as RCC cannot be excluded  - Given pts previous wishes of conservative management would favor repeating imaging in 3mo as outpatient       Thank you for allowing me to participate in the care of Ms. Slater, please do not hesitate to call or text me if you have further questions or concerns.       Roni Polanco MD  [facility]   Division of Hematology/Oncology  [address] [place] [address], [address]  [place] [address]  P: [phone]  F: [phone]    Attestation:    ----Pt evaluated including face-to-face interaction in addition to chart review, reviewing treatment plan, and managing the patient’s chronic diagnoses as listed in the assessment----    Ms. Slater is a 89y Female with PMHx of asthma, HTN, essential tremor, bronchiectasis, GERD, hypothyroidism, paroxysmal A.fib on Eliquis 2.5mg BID, Vit D deficiency, tuberculous peritonitis, anemia moderate AS, hx of falls, aortic regurgitation, SBO who is now admitted with Acute respiratory failure with hypoxia and hypercapnia and sepsis due to pneumonia.     Pt has a history of pancytopenia as outpatient with Leukopenia with since about 3y prior initially lymphopenia and then with neutropenia, progressive, anemia over the past year gradually worsening, with progressive macrocytosis, and thrombocytopenia over the past two years. Pt requested conservative treatment and denied BmBx a few times in the past.     01/22/2025: on Bipap, no overnight events noted, CT chest noted with bilateral tree-in-bed opacities and incidental 7 mm hyperdense exophytic right renal lesion, which may represent an   hemorrhagic/proteinaceous cyst. 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   - Outpatient labs show gradual progressive leukopenia x 3y  - Thrombocytopenia outpatient ranging 85-100s x 2y  - Anemia ranging 9.5-11 over past two years  - MCV with progressive macrocytosis over past year  - Previous paraproteinemia workup negative for obvious plasma cell dyscrasia SPEP/MOOKIE normal, Serum immunoglobulins normal, serum FLCs (ratio 1.44), LDH (normal), Beta-2 Mcg (mild elv 2.6),   - Transfuse PRBC to maintain Hb > 7   - Transfuse Plt to maintain Plt > 10k  - Trend CBC daily with Diff for now   - Continue B12 1000mcg IM weekly    #Acute hypoxic hypercapnic respiratory failure  # Parainfluenza   # Sepsis due to pneumonia / parainfluenza   - CXR Bibasilar airspace opacities may reflect multifocal infection  - CT chest with bilateral tree-in-bud   - Pulmonary team following, recs noted  - Antibiotics per primary team and ID  - Prefer longer course if ok given pancytopenia and overall immunocompromised state     #A.fib  - Was previously on Eliquis 2.5mg per cardiology  - Plt count currently 50k   - Ok to continue with Hep SubQ BID     # Hypothyroidism  - Continue levothyroxine     # Right renal lesion   - CT with 7 mm hyperdense exophytic right renal lesion, which may represent an hemorrhagic/proteinaceous cyst. However malignancy such as RCC cannot be excluded  - Given pts previous wishes of conservative management would favor repeating imaging in 3mo as outpatient       Thank you for allowing me to participate in the care of Ms. Slater, please do not hesitate to call or text me if you have further questions or concerns.       Roni Polanco MD  [facility] [place]   Division of Hematology/Oncology  [facility], [address]  [place] [address]  P: [phone]  F: [phone]    Attestation:    ----Pt evaluated including face-to-face interaction in addition to chart review, reviewing treatment plan, and managing the patient’s chronic diagnoses as listed in the assessment----

## 2025-01-23 NOTE — DISCHARGE NOTE NURSING/CASE MANAGEMENT/SOCIAL WORK - FINANCIAL ASSISTANCE
[facility] provides services at a reduced cost to those who are determined to be eligible through [facility]’s financial assistance program. Information regarding [facility]’s financial assistance program can be found by going to [email or web address] or by calling [phone].

## 2025-01-23 NOTE — PROGRESS NOTE ADULT - SUBJECTIVE AND OBJECTIVE BOX
Patient is a 89y old  Female who presents with a chief complaint of hypoxia (23 Jan 2025 11:22)      SUBJECTIVE / OVERNIGHT EVENTS:  Patient seen and examined.   Doing better today.       Vital Signs Last 24 Hrs  T(C): 36.8 (23 Jan 2025 09:00), Max: 37 (23 Jan 2025 04:40)  T(F): 98.2 (23 Jan 2025 09:00), Max: 98.6 (23 Jan 2025 04:40)  HR: 96 (23 Jan 2025 09:36) (69 - 99)  BP: 131/79 (23 Jan 2025 09:00) (112/67 - 136/77)  BP(mean): --  RR: 18 (23 Jan 2025 09:00) (18 - 24)  SpO2: 96% (23 Jan 2025 09:36) (87% - 100%)    Parameters below as of 23 Jan 2025 09:00  Patient On (Oxygen Delivery Method): nasal cannula  O2 Flow (L/min): 2    I&O's Summary      PHYSICAL EXAM:  GENERAL: NAD, AAOx3  HEAD:  Atraumatic, Normocephalic  EYES: EOMI; conjunctiva and sclera clear  NECK: Supple, No JVD, No LAD  CHEST/LUNG: B/L air entry; No wheezes, rales or rhonci   HEART: Regular rate and rhythm; No murmurs, rubs, or gallops  ABDOMEN: Soft, Nontender, Nondistended; Bowel sounds present  EXTREMITIES:  2+ Peripheral Pulses, No clubbing, cyanosis, or edema  SKIN: No rashes or lesions    LABS:                        9.4    4.30  )-----------( 52       ( 22 Jan 2025 07:13 )             30.1     01-22    143  |  105  |  22  ----------------------------<  88  4.3   |  28  |  0.59    Ca    9.1      22 Jan 2025 07:13        CAPILLARY BLOOD GLUCOSE            Urinalysis Basic - ( 22 Jan 2025 07:13 )    Color: x / Appearance: x / SG: x / pH: x  Gluc: 88 mg/dL / Ketone: x  / Bili: x / Urobili: x   Blood: x / Protein: x / Nitrite: x   Leuk Esterase: x / RBC: x / WBC x   Sq Epi: x / Non Sq Epi: x / Bacteria: x        RADIOLOGY & ADDITIONAL TESTS:    Imaging Personally Reviewed:  [x] YES  [ ] NO    Consultant(s) Notes Reviewed:  [x] YES  [ ] NO      MEDICATIONS  (STANDING):  acetylcysteine 20%  Inhalation 3 milliLiter(s) Inhalation every 6 hours  albuterol/ipratropium for Nebulization 3 milliLiter(s) Nebulizer every 6 hours  aMIOdarone    Tablet 200 milliGRAM(s) Oral daily  ascorbic acid 500 milliGRAM(s) Oral daily  buDESOnide    Inhalation Suspension 0.5 milliGRAM(s) Inhalation every 12 hours  cefTRIAXone   IVPB 1000 milliGRAM(s) IV Intermittent every 24 hours  cyanocobalamin 1000 MICROGram(s) Oral daily  levothyroxine 100 MICROGram(s) Oral daily  metoprolol tartrate 25 milliGRAM(s) Oral two times a day  pantoprazole    Tablet 40 milliGRAM(s) Oral before breakfast  polyethylene glycol 3350 17 Gram(s) Oral two times a day  predniSONE   Tablet   Oral   predniSONE   Tablet 40 milliGRAM(s) Oral daily  senna 2 Tablet(s) Oral at bedtime  sertraline 50 milliGRAM(s) Oral daily    MEDICATIONS  (PRN):  aluminum hydroxide/magnesium hydroxide/simethicone Suspension 30 milliLiter(s) Oral every 4 hours PRN Dyspepsia      Care Discussed with Consultants/Other Providers [x] YES  [ ] NO    HEALTH ISSUES - PROBLEM Dx:  Acute respiratory failure with hypoxia and hypercapnia    Sepsis due to pneumonia    Parainfluenza    Chronic atrial fibrillation    Advanced COPD    Hypothyroid    Thrombocytopenia

## 2025-01-23 NOTE — PROGRESS NOTE ADULT - ASSESSMENT
89yo F PMH gastritis, A-fib, SBO, hypothyroid, hypertension, asthma, COPD (on 4 L nasal cannula at home), presents emergency department with home health aide for evaluation of low O2 saturation, increased work of breathing and lethargy.     Acute respiratory failure with hypoxia and hypercapnia  sepsis 2/2 bacterial pneumonia.  Has +parainfluenza on RVP but also bandemia favor treating superimposed bacterial infection.  cont ceftriaxone  CT chest per pulm  BC NTD  cont to titrate 3 L  ID called  pulm following    Chronic atrial fibrillation  continue home metop/amio    Advanced COPD  on po pred m/w/f  on solumedrol 20mg iv X 3 days    Hypothyroid  continue synthroid    Thrombocytopenia, chronic  anemia  no signs of acute bleeding  hgb has been around 9 previously, heme called, cont to monitor    hold dvt ppx    dw dtr at bedside  Dispo: pending dc in am     Please contact with any questions or concerns [phone].

## 2025-01-23 NOTE — PHYSICAL THERAPY INITIAL EVALUATION ADULT - ACTIVE RANGE OF MOTION EXAMINATION, REHAB EVAL
sierra. upper extremity Active ROM was WNL (within normal limits)/bilateral lower extremity Active ROM was WNL (within normal limits)

## 2025-01-23 NOTE — DISCHARGE NOTE NURSING/CASE MANAGEMENT/SOCIAL WORK - PATIENT PORTAL LINK FT
You can access the FollowMyHealth Patient Portal offered by [facility] by registering at the following website: [email or web address]. By joining [employer]’s FollowMyHealth portal, you will also be able to view your health information using other applications (apps) compatible with our system.

## 2025-01-23 NOTE — PROGRESS NOTE ADULT - SUBJECTIVE AND OBJECTIVE BOX
[facility]  SWAPNIL Rene Y. Patel, S. Shah, G. Casimir  [phone]  ([phone] - weekdays after 5pm and weekends)    Name: CATHRYN LARA  Age/Gender: 89y Female  MRN: [id number]    Interval History:  Patient seen and examined this morning.   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 reported allergy (Unknown)      Objective:  Vitals:   T(F): 98.2 (01-23-25 @ 12:47), Max: 98.6 (01-23-25 @ 04:40)  HR: 82 (01-23-25 @ 12:47) (69 - 99)  BP: 115/75 (01-23-25 @ 12:47) (115/75 - 136/77)  RR: 18 (01-23-25 @ 12:47) (18 - 24)  SpO2: 98% (01-23-25 @ 12:47) (87% - 100%)  Physical Examination:  General: no acute distress, NC  HEENT: NC/AT, anicteric, EOMI  Respiratory: decreased breath sounds b/l   Cardiovascular: S1 and S2 present, normal rate   Gastrointestinal: soft, nontender, nondistended  Extremities: no edema, no cyanosis  Skin: no visible rash    Laboratory Studies:  CBC:                       9.4    4.30  )-----------( 52       ( 22 Jan 2025 07:13 )             30.1     WBC Trend:  4.30 01-22-25 @ 07:13  3.89 01-21-25 @ 07:28  3.55 01-19-25 @ 02:48    CMP: 01-22    143  |  105  |  22  ----------------------------<  88  4.3   |  28  |  0.59    Ca    9.1      22 Jan 2025 07:13    Creatinine: 0.59 mg/dL (01-22-25 @ 07:13)  Creatinine: 0.63 mg/dL (01-21-25 @ 07:26)  Creatinine: 0.58 mg/dL (01-20-25 @ 07:02)  Creatinine: 0.62 mg/dL (01-19-25 @ 02:48)    Microbiology: reviewed   Culture - Urine (collected 01-19-25 @ 12:15)  Source: Clean Catch Clean Catch (Midstream)  Final Report (01-20-25 @ 13:24):    No growth    Culture - Blood (collected 01-19-25 @ 02:30)  Source: .Blood BLOOD  Preliminary Report (01-23-25 @ 09:02):    No growth at 4 days    Culture - Blood (collected 01-19-25 @ 02:15)  Source: .Blood BLOOD  Preliminary Report (01-23-25 @ 09:02):    No growth at 4 days    Radiology: reviewed     Medications:  acetylcysteine 20%  Inhalation 3 milliLiter(s) Inhalation every 6 hours  albuterol/ipratropium for Nebulization 3 milliLiter(s) Nebulizer every 6 hours  aluminum hydroxide/magnesium hydroxide/simethicone Suspension 30 milliLiter(s) Oral every 4 hours PRN  aMIOdarone    Tablet 200 milliGRAM(s) Oral daily  ascorbic acid 500 milliGRAM(s) Oral daily  buDESOnide    Inhalation Suspension 0.5 milliGRAM(s) Inhalation every 12 hours  cefTRIAXone   IVPB 1000 milliGRAM(s) IV Intermittent every 24 hours  cyanocobalamin 1000 MICROGram(s) Oral daily  levothyroxine 100 MICROGram(s) Oral daily  metoprolol tartrate 25 milliGRAM(s) Oral two times a day  pantoprazole    Tablet 40 milliGRAM(s) Oral before breakfast  polyethylene glycol 3350 17 Gram(s) Oral two times a day  predniSONE   Tablet   Oral   predniSONE   Tablet 40 milliGRAM(s) Oral daily  senna 2 Tablet(s) Oral at bedtime  sertraline 50 milliGRAM(s) Oral daily    Current Antimicrobials:  cefTRIAXone   IVPB 1000 milliGRAM(s) IV Intermittent every 24 hours    Prior/Completed Antimicrobials:  azithromycin  IVPB  cefepime   IVPB  vancomycin  IVPB.

## 2025-01-23 NOTE — PROGRESS NOTE ADULT - SUBJECTIVE AND OBJECTIVE BOX
[facility] HEMATOLOGY/ONCOLOGY INPATIENT PROGRESS NOTE     Interval Hx:   01-23-25: Ms. Slater was seen at bedside today.    Meds:   MEDICATIONS  (STANDING):  acetylcysteine 20%  Inhalation 3 milliLiter(s) Inhalation every 6 hours  albuterol/ipratropium for Nebulization 3 milliLiter(s) Nebulizer every 6 hours  aMIOdarone    Tablet 200 milliGRAM(s) Oral daily  ascorbic acid 500 milliGRAM(s) Oral daily  buDESOnide    Inhalation Suspension 0.5 milliGRAM(s) Inhalation every 12 hours  cefTRIAXone   IVPB 1000 milliGRAM(s) IV Intermittent every 24 hours  cyanocobalamin 1000 MICROGram(s) Oral daily  levothyroxine 100 MICROGram(s) Oral daily  metoprolol tartrate 25 milliGRAM(s) Oral two times a day  pantoprazole    Tablet 40 milliGRAM(s) Oral before breakfast  predniSONE   Tablet   Oral   predniSONE   Tablet 40 milliGRAM(s) Oral daily  sertraline 50 milliGRAM(s) Oral daily    MEDICATIONS  (PRN):  aluminum hydroxide/magnesium hydroxide/simethicone Suspension 30 milliLiter(s) Oral every 4 hours PRN Dyspepsia    Vital Signs Last 24 Hrs  T(C): 36.6 (22 Jan 2025 21:51), Max: 36.8 (22 Jan 2025 16:58)  T(F): 97.9 (22 Jan 2025 21:51), Max: 98.2 (22 Jan 2025 16:58)  HR: 80 (23 Jan 2025 05:02) (74 - 100)  BP: 134/89 (22 Jan 2025 21:51) (112/67 - 144/83)  BP(mean): --  RR: 18 (23 Jan 2025 05:02) (18 - 24)  SpO2: 100% (23 Jan 2025 05:02) (87% - 100%)    Parameters below as of 23 Jan 2025 05:02  Patient On (Oxygen Delivery Method): nasal cannula  O2 Flow (L/min): 2    Physical Exam:  Gen:  NAD, lethargic  HEENT: EOMI, MMM  Chest: equal chest rise  Cardiac: irregular  Abd: Nondistended  Ext: No edema     Labs:                        9.4    4.30  )-----------( 52       ( 22 Jan 2025 07:13 )             30.1     CBC Full  -  ( 22 Jan 2025 07:13 )  WBC Count : 4.30 K/uL  RBC Count : 2.79 M/uL  Hemoglobin : 9.4 g/dL  Hematocrit : 30.1 %  Platelet Count - Automated : 52 K/uL  Mean Cell Volume : 107.9 fl  Mean Cell Hemoglobin : 33.7 pg  Mean Cell Hemoglobin Concentration : 31.2 g/dL    01-22    143  |  105  |  22  ----------------------------<  88  4.3   |  28  |  0.59    Ca    9.1      22 Jan 2025 07:13  Phos  3.1     01-21  Mg     2.4     01-21    TPro  6.2  /  Alb  3.4  /  TBili  0.4  /  DBili  x   /  AST  9[L]  /  ALT  7[L]  /  AlkPhos  50  01-21       [facility] HEMATOLOGY/ONCOLOGY INPATIENT PROGRESS NOTE     Interval Hx:   01-23-25: Ms. Slater was seen at bedside today, awake, on NC, no overnight events noted, no signs of bleeding, thrombocytopenia (pancytopenia) continues, but stable     Meds:   MEDICATIONS  (STANDING):  acetylcysteine 20%  Inhalation 3 milliLiter(s) Inhalation every 6 hours  albuterol/ipratropium for Nebulization 3 milliLiter(s) Nebulizer every 6 hours  aMIOdarone    Tablet 200 milliGRAM(s) Oral daily  ascorbic acid 500 milliGRAM(s) Oral daily  buDESOnide    Inhalation Suspension 0.5 milliGRAM(s) Inhalation every 12 hours  cefTRIAXone   IVPB 1000 milliGRAM(s) IV Intermittent every 24 hours  cyanocobalamin 1000 MICROGram(s) Oral daily  levothyroxine 100 MICROGram(s) Oral daily  metoprolol tartrate 25 milliGRAM(s) Oral two times a day  pantoprazole    Tablet 40 milliGRAM(s) Oral before breakfast  predniSONE   Tablet   Oral   predniSONE   Tablet 40 milliGRAM(s) Oral daily  sertraline 50 milliGRAM(s) Oral daily    MEDICATIONS  (PRN):  aluminum hydroxide/magnesium hydroxide/simethicone Suspension 30 milliLiter(s) Oral every 4 hours PRN Dyspepsia    Vital Signs Last 24 Hrs  T(C): 36.6 (22 Jan 2025 21:51), Max: 36.8 (22 Jan 2025 16:58)  T(F): 97.9 (22 Jan 2025 21:51), Max: 98.2 (22 Jan 2025 16:58)  HR: 80 (23 Jan 2025 05:02) (74 - 100)  BP: 134/89 (22 Jan 2025 21:51) (112/67 - 144/83)  BP(mean): --  RR: 18 (23 Jan 2025 05:02) (18 - 24)  SpO2: 100% (23 Jan 2025 05:02) (87% - 100%)    Parameters below as of 23 Jan 2025 05:02  Patient On (Oxygen Delivery Method): nasal cannula  O2 Flow (L/min): 2    Physical Exam:  Gen:  NAD, lethargic  HEENT: EOMI, MMM  Chest: equal chest rise  Cardiac: irregular  Abd: Nondistended  Ext: No edema     Labs:                        9.4    4.30  )-----------( 52       ( 22 Jan 2025 07:13 )             30.1     CBC Full  -  ( 22 Jan 2025 07:13 )  WBC Count : 4.30 K/uL  RBC Count : 2.79 M/uL  Hemoglobin : 9.4 g/dL  Hematocrit : 30.1 %  Platelet Count - Automated : 52 K/uL  Mean Cell Volume : 107.9 fl  Mean Cell Hemoglobin : 33.7 pg  Mean Cell Hemoglobin Concentration : 31.2 g/dL    01-22    143  |  105  |  22  ----------------------------<  88  4.3   |  28  |  0.59    Ca    9.1      22 Jan 2025 07:13  Phos  3.1     01-21  Mg     2.4     01-21    TPro  6.2  /  Alb  3.4  /  TBili  0.4  /  DBili  x   /  AST  9[L]  /  ALT  7[L]  /  AlkPhos  50  01-21

## 2025-01-23 NOTE — PROGRESS NOTE ADULT - NS ATTEND AMEND GEN_ALL_CORE FT
seems pretty good:   agree with dcing bipap and checking vbg in am:  cont steroids and BD:   keep o2 sao2 between 88 -90%
pt seen and examined;  she does have ac on chr mild hypercapnic resp failure  check ct scan chest  :  cont current rx for now;
she looks very good:   steroids changed to po steroids;  not much of wheezing:  cont antibiotics:  pulm wise as above

## 2025-01-23 NOTE — PROGRESS NOTE ADULT - SUBJECTIVE AND OBJECTIVE BOX
Date of Service: 01-23-25 @ 11:22    Patient is a 89y old  Female who presents with a chief complaint of hypoxia (23 Jan 2025 05:14)      Any change in ROS:   Denies CP, SOB  O2 sats mid to high 90s on 2LNC     MEDICATIONS  (STANDING):  acetylcysteine 20%  Inhalation 3 milliLiter(s) Inhalation every 6 hours  albuterol/ipratropium for Nebulization 3 milliLiter(s) Nebulizer every 6 hours  aMIOdarone    Tablet 200 milliGRAM(s) Oral daily  ascorbic acid 500 milliGRAM(s) Oral daily  buDESOnide    Inhalation Suspension 0.5 milliGRAM(s) Inhalation every 12 hours  cefTRIAXone   IVPB 1000 milliGRAM(s) IV Intermittent every 24 hours  cyanocobalamin 1000 MICROGram(s) Oral daily  levothyroxine 100 MICROGram(s) Oral daily  metoprolol tartrate 25 milliGRAM(s) Oral two times a day  pantoprazole    Tablet 40 milliGRAM(s) Oral before breakfast  polyethylene glycol 3350 17 Gram(s) Oral two times a day  predniSONE   Tablet   Oral   predniSONE   Tablet 40 milliGRAM(s) Oral daily  senna 2 Tablet(s) Oral at bedtime  sertraline 50 milliGRAM(s) Oral daily    MEDICATIONS  (PRN):  aluminum hydroxide/magnesium hydroxide/simethicone Suspension 30 milliLiter(s) Oral every 4 hours PRN Dyspepsia    Vital Signs Last 24 Hrs  T(C): 36.8 (23 Jan 2025 09:00), Max: 37 (23 Jan 2025 04:40)  T(F): 98.2 (23 Jan 2025 09:00), Max: 98.6 (23 Jan 2025 04:40)  HR: 96 (23 Jan 2025 09:36) (69 - 99)  BP: 131/79 (23 Jan 2025 09:00) (112/67 - 136/77)  BP(mean): --  RR: 18 (23 Jan 2025 09:00) (18 - 24)  SpO2: 96% (23 Jan 2025 09:36) (87% - 100%)    Parameters below as of 23 Jan 2025 09:00  Patient On (Oxygen Delivery Method): nasal cannula        I&O's Summary        Physical Exam:   GENERAL: NAD  HEENT: KOLBY/   Atraumatic, Normocephalic  ENMT: No tonsillar erythema, exudates, or enlargement; Moist mucous membranes, Good dentition, No lesions; very [place]   NECK: Supple, No JVD, Normal thyroid  CHEST/LUNG: few b/l rhonchi, no wheeze   CVS: Regular rate and rhythm; No murmurs, rubs, or gallops  GI: : Soft, Nontender, Nondistended; Bowel sounds present  NERVOUS SYSTEM:  Alert & Oriented X3, Good concentration; Motor Strength 5/5 B/L upper and lower extremities; DTRs 2+ intact and symmetric  EXTREMITIES:  2+ Peripheral Pulses, No clubbing, cyanosis, or edema  LYMPH: No lymphadenopathy noted  SKIN: No rashes or lesions  ENDOCRINOLOGY: No Thyromegaly  PSYCH: Appropriate    Labs:  31, 31, 31, 32                            9.4    4.30  )-----------( 52       ( 22 Jan 2025 07:13 )             30.1                         9.4    3.89  )-----------( 50       ( 21 Jan 2025 07:28 )             30.5     01-22    143  |  105  |  22  ----------------------------<  88  4.3   |  28  |  0.59  01-21    138  |  104  |  25[H]  ----------------------------<  147[H]  4.3   |  24  |  0.63  01-20    143  |  106  |  20  ----------------------------<  104[H]  4.7   |  26  |  0.58    Ca    9.1      22 Jan 2025 07:13    TPro  6.2  /  Alb  3.4  /  TBili  0.4  /  DBili  x   /  AST  9[L]  /  ALT  7[L]  /  AlkPhos  50  01-21                  Urinalysis Basic - ( 22 Jan 2025 07:13 )    Color: x / Appearance: x / SG: x / pH: x  Gluc: 88 mg/dL / Ketone: x  / Bili: x / Urobili: x   Blood: x / Protein: x / Nitrite: x   Leuk Esterase: x / RBC: x / WBC x   Sq Epi: x / Non Sq Epi: x / Bacteria: x            RECENT CULTURES:  01-19 @ 12:15 Clean Catch Clean Catch (Midstream)                No growth    01-19 @ 02:30 .Blood BLOOD                No growth at 4 days    01-19 @ 02:15 .Blood BLOOD                No growth at 4 days            Studies  < from: CT Chest No Cont (01.21.25 @ 13:45) >  FINDINGS:    AIRWAYS/LUNGS/PLEURA: There is secretion in right main stem bronchus and   bronchus intermedius. Mucoid impacted distal airways in the bilateral   upper and and lower lobes are noted. Scattered tree-in-bud opacities   throughout the right lung and left lower lobe. Bilateral lower lobe   subsegmental atelectasis. Left apical scarring with calcified granulomas.   Trace bibasilar pleural effusions.    MEDIASTINUM AND TUNG: Prominent subcentimeter mediastinal hilar lymph   nodes, within normal size limits.. Partially calcified subcarinal lymph   node.    VESSELS: Dilated ascending aorta, measuring up to 4.4 cm, unchanged.   Dilated descending thoracic aorta measuring up to 4.0 cm Aortic and   coronary artery calcifications.    HEART: Cardiomegaly with left atrial enlargement.. Aortic valve and   mitral annular calcifications. No pericardial effusion.    VISUALIZED UPPER ABDOMEN: Cholelithiasis. 7 mm hyperdense exophytic right   renal lesion. Additional right renal cysts. There are calcified   granulomas in the spleen and liver parenchyma.    CHEST WALL AND BONES: Osteopenic osseous structures. Multilevel   degenerative changes of thoracic spine are noted..    IMPRESSION:    Bilateral scattered tree-in-bud opacities, may be secondary to infection   or mucoid impaction. Trace bibasilar pleural effusions  7 mm hyperdense exophytic right renal lesion, which may represent an   hemorrhagic/proteinaceous cyst. However malignancy such as RCC cannot be   excluded. If clinically indicated, CT or MRI abdomen/pelvis is   recommended for further evaluation.  Thoracic aorta ectasia.    --- End of Report ---        < end of copied text >

## 2025-01-23 NOTE — PHYSICAL THERAPY INITIAL EVALUATION ADULT - ADDITIONAL COMMENTS
Pt reports she lives in an apartment complex alone with 24/7 HHA. Pt ambulates short distances with rolling walker and uses wheelchair around apartment. Pt owns commode. Pt has HPT x2 a week.

## 2025-01-23 NOTE — PROGRESS NOTE ADULT - ASSESSMENT
89-year-old female with past medical history of gastritis, A-fib, SBO, hypothyroid, hypertension, asthma vs COPD (on 4 L nasal cannula at home), bronchiectasis. Presents to emergency department with home health aide for evaluation of low O2 saturation, increased work of breathing and lethargy.  Per daughter, the patient has had a productive cough for 2 to 3 days.  She has had decreased p.o. intake, and today was sleepier than normal.  Of note, the patient had an admission in October 2024 for hypoxic respiratory failure and was on BiPAP at that time.  The patient is DNR/DNI, but was trial of noninvasive ventilation.  Denies measured fevers, palpitations, nausea or vomiting, diarrhea or dysuria.  Has albuterol inhaler for home.  Has not been on steroids recently.  No sick contacts. RVP + parainfluenza.       Asthma/ COPD exacerbation  parainfluenza, r/o PNA       Asthma vs COPD exacerbation  -Asthma vs COPD exacerbation 2nd to parainfluenza - pt's daughter at bedside denies hx of COPD, smoking but does report hx of asthma & bronchiectasis, frequent PNA  -Continue bronchodilators  -Continue Mucomyst 20% 3ml q6h x3 days (give with Duoneb)  -Keep sats >90% with O2 PRN (currently 2LNC). Has home O2   -Wheezing now resolved, s/p IV steroids. Continue Prednisone taper as ordered back to home dose (pt's daughter reports baseline Prednisone 15 mg PO qd)   -Suggest monitor off Bipap tonight, check VBG in AM    Parainfluenza, r/o PNA   -CT chest with scattered TIB opacities, may be viral but favor treating   -Bronchodilators/steroids as above  -ABX as per ID

## 2025-01-23 NOTE — PROGRESS NOTE ADULT - ASSESSMENT
Patient is a 89 year old female with PMH of gastritis, A-fib, SBO, hypothyroid, hypertension, asthma, COPD (on 4 L nasal cannula at home), presents emergency department with home health aide for evaluation of low O2 saturation, increased work of breathing, cough and lethargy with decreased p.o. intake.     Sepsis due to Parainfluenza pneumonia, r/o superimposed bacterial pneumonia   Acute on chronic respiratory failure d/t COPD/asthma exacerbation iso above    H/o advanced COPD/asthma on NC 4L  H/o penicillin allergy noted   - febrile to 100.8F with tachycardia, leukopenia, bandemia, s/p BiPAP, on NC  - tested + Parainfluenza 4 on admission  - CXR with bibasilar airspace opacities may be multifocal infection   - noted recent prior xray with no opacities/consolidations  - PCT is negative, less likely bacterial but h/o freq pna and bronchiectasis, will treat  - Legionella and Strep pneumoniae urine ags negative   - MRSA PCR screen negative   - Bcx NGTD x2    - s/p vancomycin and cefepime in the ER   s/p azithromycin 1/19-1/21    Recommendations:   Continue ceftriaxone 1g IV Q24h x5d course -end today 1/23  Pulmonary following, now on PO steroids  Isolation per IC protocol  Supportive care   Aspiration precautions   Continue rest of care per primary team       Lilli Polanco M.D.  [facility]  Available on [employer] TEAMS - *PREFERRED*  [phone]  After 5pm on weekdays and all day on weekends - please call [phone]     Thank you for consulting us and involving us in the management of this patients case. In addition to reviewing history, imaging, documents, labs, microbiology, took into account antibiotic stewardship, local antibiogram and infection control strategies and potential transmission issues.

## 2025-01-24 ENCOUNTER — TRANSCRIPTION ENCOUNTER (OUTPATIENT)
Age: 89
End: 2025-01-24

## 2025-01-24 VITALS
DIASTOLIC BLOOD PRESSURE: 78 MMHG | OXYGEN SATURATION: 97 % | SYSTOLIC BLOOD PRESSURE: 115 MMHG | HEART RATE: 84 BPM | RESPIRATION RATE: 18 BRPM | TEMPERATURE: 97 F

## 2025-01-24 LAB
ANION GAP SERPL CALC-SCNC: 10 MMOL/L — SIGNIFICANT CHANGE UP (ref 5–17)
BASE EXCESS BLDV CALC-SCNC: 5.2 MMOL/L — HIGH (ref -2–3)
BUN SERPL-MCNC: 16 MG/DL — SIGNIFICANT CHANGE UP (ref 7–23)
CALCIUM SERPL-MCNC: 8.8 MG/DL — SIGNIFICANT CHANGE UP (ref 8.4–10.5)
CHLORIDE SERPL-SCNC: 104 MMOL/L — SIGNIFICANT CHANGE UP (ref 96–108)
CO2 BLDV-SCNC: 35 MMOL/L — HIGH (ref 22–26)
CO2 SERPL-SCNC: 28 MMOL/L — SIGNIFICANT CHANGE UP (ref 22–31)
CREAT SERPL-MCNC: 0.51 MG/DL — SIGNIFICANT CHANGE UP (ref 0.5–1.3)
CULTURE RESULTS: SIGNIFICANT CHANGE UP
CULTURE RESULTS: SIGNIFICANT CHANGE UP
EGFR: 83 ML/MIN/1.73M2 — SIGNIFICANT CHANGE UP
GLUCOSE SERPL-MCNC: 69 MG/DL — LOW (ref 70–99)
HCO3 BLDV-SCNC: 33 MMOL/L — HIGH (ref 22–29)
HCT VFR BLD CALC: 32.9 % — LOW (ref 34.5–45)
HGB BLD-MCNC: 10.1 G/DL — LOW (ref 11.5–15.5)
MCHC RBC-ENTMCNC: 30.7 G/DL — LOW (ref 32–36)
MCHC RBC-ENTMCNC: 33 PG — SIGNIFICANT CHANGE UP (ref 27–34)
MCV RBC AUTO: 107.5 FL — HIGH (ref 80–100)
NRBC # BLD: 0 /100 WBCS — SIGNIFICANT CHANGE UP (ref 0–0)
NRBC BLD-RTO: 0 /100 WBCS — SIGNIFICANT CHANGE UP (ref 0–0)
PCO2 BLDV: 61 MMHG — HIGH (ref 39–42)
PH BLDV: 7.34 — SIGNIFICANT CHANGE UP (ref 7.32–7.43)
PLATELET # BLD AUTO: 47 K/UL — LOW (ref 150–400)
PO2 BLDV: 61 MMHG — HIGH (ref 25–45)
POTASSIUM SERPL-MCNC: 4 MMOL/L — SIGNIFICANT CHANGE UP (ref 3.5–5.3)
POTASSIUM SERPL-SCNC: 4 MMOL/L — SIGNIFICANT CHANGE UP (ref 3.5–5.3)
RBC # BLD: 3.06 M/UL — LOW (ref 3.8–5.2)
RBC # FLD: 14.8 % — HIGH (ref 10.3–14.5)
SAO2 % BLDV: 92.4 % — HIGH (ref 67–88)
SODIUM SERPL-SCNC: 142 MMOL/L — SIGNIFICANT CHANGE UP (ref 135–145)
SPECIMEN SOURCE: SIGNIFICANT CHANGE UP
SPECIMEN SOURCE: SIGNIFICANT CHANGE UP
WBC # BLD: 4.35 K/UL — SIGNIFICANT CHANGE UP (ref 3.8–10.5)
WBC # FLD AUTO: 4.35 K/UL — SIGNIFICANT CHANGE UP (ref 3.8–10.5)

## 2025-01-24 PROCEDURE — 87640 STAPH A DNA AMP PROBE: CPT

## 2025-01-24 PROCEDURE — 80048 BASIC METABOLIC PNL TOTAL CA: CPT

## 2025-01-24 PROCEDURE — 84145 PROCALCITONIN (PCT): CPT

## 2025-01-24 PROCEDURE — 71250 CT THORAX DX C-: CPT | Mod: MC

## 2025-01-24 PROCEDURE — 87641 MR-STAPH DNA AMP PROBE: CPT

## 2025-01-24 PROCEDURE — 85027 COMPLETE CBC AUTOMATED: CPT

## 2025-01-24 PROCEDURE — 84295 ASSAY OF SERUM SODIUM: CPT

## 2025-01-24 PROCEDURE — 87637 SARSCOV2&INF A&B&RSV AMP PRB: CPT

## 2025-01-24 PROCEDURE — 85025 COMPLETE CBC W/AUTO DIFF WBC: CPT

## 2025-01-24 PROCEDURE — 83605 ASSAY OF LACTIC ACID: CPT

## 2025-01-24 PROCEDURE — 85610 PROTHROMBIN TIME: CPT

## 2025-01-24 PROCEDURE — 84100 ASSAY OF PHOSPHORUS: CPT

## 2025-01-24 PROCEDURE — 0225U NFCT DS DNA&RNA 21 SARSCOV2: CPT

## 2025-01-24 PROCEDURE — 87040 BLOOD CULTURE FOR BACTERIA: CPT

## 2025-01-24 PROCEDURE — 94660 CPAP INITIATION&MGMT: CPT

## 2025-01-24 PROCEDURE — 87899 AGENT NOS ASSAY W/OPTIC: CPT

## 2025-01-24 PROCEDURE — 97161 PT EVAL LOW COMPLEX 20 MIN: CPT

## 2025-01-24 PROCEDURE — 82947 ASSAY GLUCOSE BLOOD QUANT: CPT

## 2025-01-24 PROCEDURE — 96375 TX/PRO/DX INJ NEW DRUG ADDON: CPT

## 2025-01-24 PROCEDURE — 87086 URINE CULTURE/COLONY COUNT: CPT

## 2025-01-24 PROCEDURE — 99285 EMERGENCY DEPT VISIT HI MDM: CPT

## 2025-01-24 PROCEDURE — 96374 THER/PROPH/DIAG INJ IV PUSH: CPT

## 2025-01-24 PROCEDURE — 94640 AIRWAY INHALATION TREATMENT: CPT

## 2025-01-24 PROCEDURE — 82803 BLOOD GASES ANY COMBINATION: CPT

## 2025-01-24 PROCEDURE — 87449 NOS EACH ORGANISM AG IA: CPT

## 2025-01-24 PROCEDURE — 85018 HEMOGLOBIN: CPT

## 2025-01-24 PROCEDURE — 84132 ASSAY OF SERUM POTASSIUM: CPT

## 2025-01-24 PROCEDURE — 85730 THROMBOPLASTIN TIME PARTIAL: CPT

## 2025-01-24 PROCEDURE — 83735 ASSAY OF MAGNESIUM: CPT

## 2025-01-24 PROCEDURE — 82435 ASSAY OF BLOOD CHLORIDE: CPT

## 2025-01-24 PROCEDURE — 80053 COMPREHEN METABOLIC PANEL: CPT

## 2025-01-24 PROCEDURE — 71045 X-RAY EXAM CHEST 1 VIEW: CPT

## 2025-01-24 PROCEDURE — 85014 HEMATOCRIT: CPT

## 2025-01-24 PROCEDURE — 82330 ASSAY OF CALCIUM: CPT

## 2025-01-24 PROCEDURE — 81001 URINALYSIS AUTO W/SCOPE: CPT

## 2025-01-24 RX ORDER — PREDNISONE 5 MG/1
1 TABLET ORAL
Refills: 0 | DISCHARGE

## 2025-01-24 RX ORDER — ASCORBIC ACID 500 MG/ML
1 VIAL (ML) INJECTION
Qty: 30 | Refills: 0 | DISCHARGE
Start: 2025-01-24 | End: 2025-02-22

## 2025-01-24 RX ORDER — SENNOSIDES 8.6 MG
2 TABLET ORAL
Qty: 0 | Refills: 0 | DISCHARGE
Start: 2025-01-24

## 2025-01-24 RX ORDER — PREDNISONE 5 MG/1
3 TABLET ORAL
Qty: 108 | Refills: 0
Start: 2025-01-24 | End: 2025-02-22

## 2025-01-24 RX ORDER — POLYETHYLENE GLYCOL 3350 17 G/17G
17 POWDER, FOR SOLUTION ORAL
Qty: 0 | Refills: 0 | DISCHARGE
Start: 2025-01-24

## 2025-01-24 RX ADMIN — Medication 1000 MICROGRAM(S): at 12:10

## 2025-01-24 RX ADMIN — Medication 500 MILLIGRAM(S): at 12:09

## 2025-01-24 RX ADMIN — Medication 50 MILLIGRAM(S): at 12:09

## 2025-01-24 RX ADMIN — PANTOPRAZOLE 40 MILLIGRAM(S): 20 TABLET, DELAYED RELEASE ORAL at 05:07

## 2025-01-24 RX ADMIN — IPRATROPIUM BROMIDE AND ALBUTEROL SULFATE 3 MILLILITER(S): .5; 2.5 SOLUTION RESPIRATORY (INHALATION) at 05:07

## 2025-01-24 RX ADMIN — Medication 25 MILLIGRAM(S): at 05:06

## 2025-01-24 RX ADMIN — PREDNISONE 40 MILLIGRAM(S): 5 TABLET ORAL at 05:06

## 2025-01-24 RX ADMIN — BUDESONIDE 0.5 MILLIGRAM(S): 9 TABLET, EXTENDED RELEASE ORAL at 05:07

## 2025-01-24 RX ADMIN — LEVOTHYROXINE SODIUM 100 MICROGRAM(S): 25 TABLET ORAL at 05:07

## 2025-01-24 RX ADMIN — AMIODARONE HYDROCHLORIDE 200 MILLIGRAM(S): 50 INJECTION, SOLUTION INTRAVENOUS at 05:07

## 2025-01-24 RX ADMIN — POLYETHYLENE GLYCOL 3350 17 GRAM(S): 17 POWDER, FOR SOLUTION ORAL at 05:08

## 2025-01-24 RX ADMIN — IPRATROPIUM BROMIDE AND ALBUTEROL SULFATE 3 MILLILITER(S): .5; 2.5 SOLUTION RESPIRATORY (INHALATION) at 12:09

## 2025-01-24 NOTE — PROGRESS NOTE ADULT - PROVIDER SPECIALTY LIST ADULT
Heme/Onc
Heme/Onc
Infectious Disease
Internal Medicine
Pulmonology
Internal Medicine
Pulmonology
Heme/Onc
Infectious Disease
Infectious Disease
Internal Medicine
Infectious Disease
Internal Medicine
Internal Medicine
Pulmonology

## 2025-01-24 NOTE — PROGRESS NOTE ADULT - SUBJECTIVE AND OBJECTIVE BOX
Date of Service: 01-24-25 @ 13:26    Patient is a 89y old  Female who presents with a chief complaint of hypoxia (24 Jan 2025 11:01)      Any change in ROS:   No new respiratory events overnight. Denies SOB/CP.  Monitored off bipap overnight     MEDICATIONS  (STANDING):  albuterol/ipratropium for Nebulization 3 milliLiter(s) Nebulizer every 6 hours  aMIOdarone    Tablet 200 milliGRAM(s) Oral daily  ascorbic acid 500 milliGRAM(s) Oral daily  buDESOnide    Inhalation Suspension 0.5 milliGRAM(s) Inhalation every 12 hours  cyanocobalamin 1000 MICROGram(s) Oral daily  levothyroxine 100 MICROGram(s) Oral daily  metoprolol tartrate 25 milliGRAM(s) Oral two times a day  pantoprazole    Tablet 40 milliGRAM(s) Oral before breakfast  polyethylene glycol 3350 17 Gram(s) Oral two times a day  predniSONE   Tablet   Oral   senna 2 Tablet(s) Oral at bedtime  sertraline 50 milliGRAM(s) Oral daily    MEDICATIONS  (PRN):  aluminum hydroxide/magnesium hydroxide/simethicone Suspension 30 milliLiter(s) Oral every 4 hours PRN Dyspepsia    Vital Signs Last 24 Hrs  T(C): 36.7 (24 Jan 2025 09:33), Max: 36.8 (24 Jan 2025 05:01)  T(F): 98 (24 Jan 2025 09:33), Max: 98.3 (24 Jan 2025 05:01)  HR: 90 (24 Jan 2025 09:33) (79 - 95)  BP: 123/78 (24 Jan 2025 09:33) (110/66 - 142/92)  BP(mean): --  RR: 18 (24 Jan 2025 09:33) (18 - 18)  SpO2: 100% (24 Jan 2025 09:33) (97% - 100%)    Parameters below as of 24 Jan 2025 09:33  Patient On (Oxygen Delivery Method): nasal cannula          Physical Exam:   GENERAL: NAD, well-groomed, well-developed  HEENT: KOLBY/   Atraumatic, Normocephalic  ENMT: No tonsillar erythema, exudates, or enlargement; Moist mucous membranes, Good dentition, No lesions; very [place]  NECK: Supple, No JVD, Normal thyroid  CHEST/LUNG: Few scattered rhonchi, clears with cough. No wheeze   CVS: Regular rate and rhythm; No murmurs, rubs, or gallops  GI: : Soft, Nontender, Nondistended; Bowel sounds present  NERVOUS SYSTEM:  Alert & Oriented X3, Good concentration; Motor Strength 5/5 B/L upper and lower extremities; DTRs 2+ intact and symmetric  EXTREMITIES:  2+ Peripheral Pulses, No clubbing, cyanosis, or edema  LYMPH: No lymphadenopathy noted  SKIN: No rashes or lesions  ENDOCRINOLOGY: No Thyromegaly  PSYCH: Appropriate    Labs:  33, 31, 31, 31, 32                            10.1   4.35  )-----------( 47       ( 24 Jan 2025 07:08 )             32.9                         9.4    4.30  )-----------( 52       ( 22 Jan 2025 07:13 )             30.1                         9.4    3.89  )-----------( 50       ( 21 Jan 2025 07:28 )             30.5     01-24    142  |  104  |  16  ----------------------------<  69[L]  4.0   |  28  |  0.51  01-22    143  |  105  |  22  ----------------------------<  88  4.3   |  28  |  0.59  01-21    138  |  104  |  25[H]  ----------------------------<  147[H]  4.3   |  24  |  0.63    Ca    8.8      24 Jan 2025 07:08    TPro  6.2  /  Alb  3.4  /  TBili  0.4  /  DBili  x   /  AST  9[L]  /  ALT  7[L]  /  AlkPhos  50  01-21    CAPILLARY BLOOD GLUCOSE              Urinalysis Basic - ( 24 Jan 2025 07:08 )    Color: x / Appearance: x / SG: x / pH: x  Gluc: 69 mg/dL / Ketone: x  / Bili: x / Urobili: x   Blood: x / Protein: x / Nitrite: x   Leuk Esterase: x / RBC: x / WBC x   Sq Epi: x / Non Sq Epi: x / Bacteria: x            RECENT CULTURES:  01-19 @ 12:15 Clean Catch Clean Catch (Midstream)                No growth    01-19 @ 02:30 .Blood BLOOD                No growth at 5 days    01-19 @ 02:15 .Blood BLOOD                No growth at 5 days          RESPIRATORY CULTURES:          Studies  < from: CT Chest No Cont (01.21.25 @ 13:45) >      INTERPRETATION:  CLINICAL INFORMATION: Influenza. Bronchiectasis.   Evaluate for pneumonia.    COMPARISON: CT chest 9/2/2024.    CONTRAST/COMPLICATIONS:  IV Contrast: NONE  Oral Contrast: NONE  .    PROCEDURE:  CT scan of the chest was obtained without intravenous contrast.    FINDINGS:    AIRWAYS/LUNGS/PLEURA: There is secretion in right main stem bronchus and   bronchus intermedius. Mucoid impacted distal airways in the bilateral   upper and and lower lobes are noted. Scattered tree-in-bud opacities   throughout the right lung and left lower lobe. Bilateral lower lobe   subsegmental atelectasis. Left apical scarring with calcified granulomas.   Trace bibasilar pleural effusions.    MEDIASTINUM AND TUNG: Prominent subcentimeter mediastinal hilar lymph   nodes, within normal size limits.. Partially calcified subcarinal lymph   node.    VESSELS: Dilated ascending aorta, measuring up to 4.4 cm, unchanged.   Dilated descending thoracic aorta measuring up to 4.0 cm Aortic and   coronary artery calcifications.    HEART: Cardiomegaly with left atrial enlargement.. Aortic valve and   mitral annular calcifications. No pericardial effusion.    VISUALIZED UPPER ABDOMEN: Cholelithiasis. 7 mm hyperdense exophytic right   renal lesion. Additional right renal cysts. There are calcified   granulomas in the spleen and liver parenchyma.    CHEST WALL AND BONES: Osteopenic osseous structures. Multilevel   degenerative changes of thoracic spine are noted..    IMPRESSION:    Bilateral scattered tree-in-bud opacities, may be secondary to infection   or mucoid impaction. Trace bibasilar pleural effusions  7 mm hyperdense exophytic right renal lesion, which may represent an   hemorrhagic/proteinaceous cyst. However malignancy such as RCC cannot be   excluded. If clinically indicated, CT or MRI abdomen/pelvis is   recommended for further evaluation.  Thoracic aorta ectasia.    --- End of Report ---    < end of copied text >

## 2025-01-24 NOTE — PROGRESS NOTE ADULT - SUBJECTIVE AND OBJECTIVE BOX
SUBJECTIVE / OVERNIGHT EVENTS:    patient seen and examined  resting comfortably in bed  no events noted overnight    --------------------------------------------------------------------------------------------  LABS:                        10.1   4.35  )-----------( 47       ( 24 Jan 2025 07:08 )             32.9     01-24    142  |  104  |  16  ----------------------------<  69[L]  4.0   |  28  |  0.51    Ca    8.8      24 Jan 2025 07:08        CAPILLARY BLOOD GLUCOSE            Urinalysis Basic - ( 24 Jan 2025 07:08 )    Color: x / Appearance: x / SG: x / pH: x  Gluc: 69 mg/dL / Ketone: x  / Bili: x / Urobili: x   Blood: x / Protein: x / Nitrite: x   Leuk Esterase: x / RBC: x / WBC x   Sq Epi: x / Non Sq Epi: x / Bacteria: x        RADIOLOGY & ADDITIONAL TESTS:    Imaging Personally Reviewed:  [x] YES  [ ] NO    Consultant(s) Notes Reviewed:  [x] YES  [ ] NO    MEDICATIONS  (STANDING):  albuterol/ipratropium for Nebulization 3 milliLiter(s) Nebulizer every 6 hours  aMIOdarone    Tablet 200 milliGRAM(s) Oral daily  ascorbic acid 500 milliGRAM(s) Oral daily  buDESOnide    Inhalation Suspension 0.5 milliGRAM(s) Inhalation every 12 hours  cyanocobalamin 1000 MICROGram(s) Oral daily  levothyroxine 100 MICROGram(s) Oral daily  metoprolol tartrate 25 milliGRAM(s) Oral two times a day  pantoprazole    Tablet 40 milliGRAM(s) Oral before breakfast  polyethylene glycol 3350 17 Gram(s) Oral two times a day  predniSONE   Tablet   Oral   senna 2 Tablet(s) Oral at bedtime  sertraline 50 milliGRAM(s) Oral daily    MEDICATIONS  (PRN):  aluminum hydroxide/magnesium hydroxide/simethicone Suspension 30 milliLiter(s) Oral every 4 hours PRN Dyspepsia      Care Discussed with Consultants/Other Providers [x] YES  [ ] NO    Vital Signs Last 24 Hrs  T(C): 36.8 (24 Jan 2025 05:01), Max: 36.8 (23 Jan 2025 12:47)  T(F): 98.3 (24 Jan 2025 05:01), Max: 98.3 (24 Jan 2025 05:01)  HR: 79 (24 Jan 2025 05:01) (79 - 95)  BP: 142/92 (24 Jan 2025 05:01) (110/66 - 142/92)  BP(mean): --  RR: 18 (24 Jan 2025 05:01) (18 - 18)  SpO2: 100% (24 Jan 2025 05:01) (97% - 100%)    Parameters below as of 24 Jan 2025 05:01  Patient On (Oxygen Delivery Method): nasal cannula  O2 Flow (L/min): 2    I&O's Summary    PHYSICAL EXAM:  GENERAL: NAD, Lethargic at times, A&Ox2-3  HEAD:  Atraumatic, Normocephalic  EYES: EOMI; conjunctiva and sclera clear  NECK: Supple, No JVD, No LAD  CHEST/LUNG: B/L air entry; No wheezes, rales or rhonci   HEART: Regular rate and rhythm; No murmurs, rubs, or gallops  ABDOMEN: Soft, Nontender, Nondistended; Bowel sounds present  EXTREMITIES:  2+ Peripheral Pulses, No clubbing, cyanosis, or edema  SKIN: No rashes or lesions

## 2025-01-24 NOTE — DISCHARGE NOTE PROVIDER - NSDCCPCAREPLAN_GEN_ALL_CORE_FT
PRINCIPAL DISCHARGE DIAGNOSIS  Diagnosis: Acute respiratory failure with hypercapnia  Assessment and Plan of Treatment: Found to have parinfluenza   Continue bronchodilators  -Continue Mucomyst 20% 3ml q6h x3 days (give with Duoneb)  -Keep sats >90% with O2 PRN (currently 2LNC). Has home O2   -Wheezing now resolved, s/p IV steroids. Continue Prednisone taper as ordered back to home      SECONDARY DISCHARGE DIAGNOSES  Diagnosis: Sepsis due to pneumonia  Assessment and Plan of Treatment: She completed a course of azithromycin and ceftriaxone. Blood cultures were negative after initial bandemia. A CT chest revealed bilateral tree-in-bud opacities consistent with infection.    Diagnosis: Thrombocytopenia  Assessment and Plan of Treatment: previously declined bone marrow biopsy. She received vitamin B12 supplementation and will continue this at discharge    Diagnosis: Renal lesion  Assessment and Plan of Treatment: Incidental 7mm hyperdense exophytic right renal lesion, possibly a hemorrhagic cyst, but concerning for malignancy (RCC cannot be ruled out). Repeat imaging in 3 months is recommended given the patient's preference for conservative management    Diagnosis: Chronic atrial fibrillation  Assessment and Plan of Treatment: chronic atrial fibrillation was managed with metoprolol and amiodarone. Eliquis was held due to thrombocytopenia.     PRINCIPAL DISCHARGE DIAGNOSIS  Diagnosis: Acute respiratory failure with hypercapnia  Assessment and Plan of Treatment: Found to have parinfluenza   Continue bronchodilators  -Keep sats >90% with O2 PRN (currently 2LNC). Has home O2   -Wheezing now resolved, s/p IV steroids. Continue Prednisone taper as ordered back to home  Take 30mg (6 tablets of 5mg strength) once a day from 1/25/25 through 1/27, then  Take 20mg (4 tablets of 5mg stregnth) once a day from 1/28 through 1/30, then starting on 1/31 you will be taking 15mg (3 tablets of 5mg strength) once per day. Follow up with Primary Care and Pulmonology within 1 week of discharge.      SECONDARY DISCHARGE DIAGNOSES  Diagnosis: Sepsis due to pneumonia  Assessment and Plan of Treatment: She completed a course of azithromycin and ceftriaxone. Blood cultures were negative after initial bandemia. A CT chest revealed bilateral tree-in-bud opacities consistent with infection.   Nutrition is important, eat small frequent meals to help ensure you get adequate calories.  Do not stay in bed all day!  Increase your activity daily as tolerated.  If you develop fever, chills, malaise, or change in mental status, call your Health Care Provider or go to the Emergency Department.    Diagnosis: Thrombocytopenia  Assessment and Plan of Treatment: previously declined bone marrow biopsy. She received vitamin B12 supplementation and will continue this at discharge    Diagnosis: Renal lesion  Assessment and Plan of Treatment: Incidental 7mm hyperdense exophytic right renal lesion, possibly a hemorrhagic cyst, but concerning for malignancy (RCC cannot be ruled out). Repeat imaging in 3 months is recommended given the patient's preference for conservative management    Diagnosis: Chronic atrial fibrillation  Assessment and Plan of Treatment: chronic atrial fibrillation was managed with metoprolol and amiodarone. Eliquis was held due to thrombocytopenia.

## 2025-01-24 NOTE — DISCHARGE NOTE PROVIDER - NSDCMRMEDTOKEN_GEN_ALL_CORE_FT
Albuterol (Eqv-ProAir HFA) 90 mcg/inh inhalation aerosol: 2 puff(s) inhaled 3 times a day as needed  amiodarone 200 mg oral tablet: 1 tab(s) orally once a day  budesonide-formoterol 160 mcg-4.5 mcg/inh inhalation aerosol: 2 puff(s) inhaled 2 times a day  levothyroxine 100 mcg (0.1 mg) oral tablet: 1 tab(s) orally once a day  metoprolol tartrate 25 mg oral tablet: 1 tab(s) orally 2 times a day MDD: 2  pantoprazole 40 mg oral delayed release tablet: 1 tab(s) orally once a day  predniSONE 10 mg oral tablet: 1 tab(s) orally on MWF, 2 tablets orally on TTHSatSun  sertraline 50 mg oral tablet: 1 tab(s) orally once a day  Vitamin B-12 1000 mcg sublingual tablet: 1 tab(s) sublingually once a day   Albuterol (Eqv-ProAir HFA) 90 mcg/inh inhalation aerosol: 2 puff(s) inhaled 3 times a day as needed  amiodarone 200 mg oral tablet: 1 tab(s) orally once a day  budesonide-formoterol 160 mcg-4.5 mcg/inh inhalation aerosol: 2 puff(s) inhaled 2 times a day  levothyroxine 100 mcg (0.1 mg) oral tablet: 1 tab(s) orally once a day  metoprolol tartrate 25 mg oral tablet: 1 tab(s) orally 2 times a day MDD: 2  pantoprazole 40 mg oral delayed release tablet: 1 tab(s) orally once a day  polyethylene glycol 3350 oral powder for reconstitution: 17 gram(s) orally 2 times a day  predniSONE 5 mg oral tablet: 3 tab(s) orally once a day Take 6 tablets once a day from 01/25 through 1/27, then  Take 4 tablets once a day from 1/28 through 1/30, then   Take 3 tablets once a day starting 1/31 onwards  senna leaf extract oral tablet: 2 tab(s) orally once a day (at bedtime)  sertraline 50 mg oral tablet: 1 tab(s) orally once a day  Vitamin B-12 1000 mcg sublingual tablet: 1 tab(s) sublingually once a day

## 2025-01-24 NOTE — PROGRESS NOTE ADULT - REASON FOR ADMISSION
hypoxia

## 2025-01-24 NOTE — DISCHARGE NOTE PROVIDER - NSDCFUADDAPPT_GEN_ALL_CORE_FT
APPTS ARE READY TO BE MADE: [X] YES    Best Family or Patient Contact (if needed):    Additional Information about above appointments (if needed):    1: STAR pneumonia- Home Pulmonary follow up  2: Primary Care within 1 week of discharge  3:     Other comments or requests:    APPTS ARE READY TO BE MADE: [X] YES    Best Family or Patient Contact (if needed):    Additional Information about above appointments (if needed):    1: STAR pneumonia- Home Pulmonary follow up  2: Primary Care within 1 week of discharge  3:     Other comments or requests:     Patient informed us they already have secured a follow up appointment which is not visible on [facility]. Dr. Goldberg on 2/3    Patient's daughter advised they did not want to proceed with scheduling Home Pulm appointment, but will schedule a pulm appointment for next week

## 2025-01-24 NOTE — PROGRESS NOTE ADULT - SUBJECTIVE AND OBJECTIVE BOX
[facility]  SWAPNIL Rene Y. Patel, S. Shah, G. Casimir  [phone]  ([phone] - weekdays after 5pm and weekends)    Name: CATHRYN LARA  Age/Gender: 89y Female  MRN: [id number]    Interval History:  Patient seen and examined this morning.   Resting comfortably.   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 reported allergy (Unknown)      Objective:  Vitals:   T(F): 98 (01-24-25 @ 09:33), Max: 98.3 (01-24-25 @ 05:01)  HR: 90 (01-24-25 @ 09:33) (79 - 95)  BP: 123/78 (01-24-25 @ 09:33) (110/66 - 142/92)  RR: 18 (01-24-25 @ 09:33) (18 - 18)  SpO2: 100% (01-24-25 @ 09:33) (97% - 100%)  Physical Examination:  General: no acute distress, NC  HEENT: normocephalic, atraumatic I  Respiratory: no acc muscle use, breathing comfortably  Cardiovascular: S1 and S2 present  Gastrointestinal: normal appearing, nondistended  Extremities: no edema, no cyanosis  Skin: no visible rash    Laboratory Studies:  CBC:                       10.1   4.35  )-----------( 47       ( 24 Jan 2025 07:08 )             32.9     WBC Trend:  4.35 01-24-25 @ 07:08  4.30 01-22-25 @ 07:13  3.89 01-21-25 @ 07:28  3.55 01-19-25 @ 02:48    CMP: 01-24    142  |  104  |  16  ----------------------------<  69[L]  4.0   |  28  |  0.51    Ca    8.8      24 Jan 2025 07:08    Creatinine: 0.51 mg/dL (01-24-25 @ 07:08)  Creatinine: 0.59 mg/dL (01-22-25 @ 07:13)  Creatinine: 0.63 mg/dL (01-21-25 @ 07:26)  Creatinine: 0.58 mg/dL (01-20-25 @ 07:02)  Creatinine: 0.62 mg/dL (01-19-25 @ 02:48)    Microbiology: reviewed   Culture - Urine (collected 01-19-25 @ 12:15)  Source: Clean Catch Clean Catch (Midstream)  Final Report (01-20-25 @ 13:24):    No growth    Culture - Blood (collected 01-19-25 @ 02:30)  Source: .Blood BLOOD  Final Report (01-24-25 @ 09:00):    No growth at 5 days    Culture - Blood (collected 01-19-25 @ 02:15)  Source: .Blood BLOOD  Final Report (01-24-25 @ 09:00):    No growth at 5 days    Radiology: reviewed     Medications:  albuterol/ipratropium for Nebulization 3 milliLiter(s) Nebulizer every 6 hours  aluminum hydroxide/magnesium hydroxide/simethicone Suspension 30 milliLiter(s) Oral every 4 hours PRN  aMIOdarone    Tablet 200 milliGRAM(s) Oral daily  ascorbic acid 500 milliGRAM(s) Oral daily  buDESOnide    Inhalation Suspension 0.5 milliGRAM(s) Inhalation every 12 hours  cyanocobalamin 1000 MICROGram(s) Oral daily  levothyroxine 100 MICROGram(s) Oral daily  metoprolol tartrate 25 milliGRAM(s) Oral two times a day  pantoprazole    Tablet 40 milliGRAM(s) Oral before breakfast  polyethylene glycol 3350 17 Gram(s) Oral two times a day  predniSONE   Tablet   Oral   senna 2 Tablet(s) Oral at bedtime  sertraline 50 milliGRAM(s) Oral daily    Prior/Completed Antimicrobials:  azithromycin  IVPB  cefepime   IVPB  cefTRIAXone   IVPB  vancomycin  IVPB.

## 2025-01-24 NOTE — DISCHARGE NOTE PROVIDER - CARE PROVIDER_API CALL
Goldberg, Steven M  Cardiovascular Disease  [address] [place] [id number]  Phone: [phone]  Fax: [phone]  Established Patient  Follow Up Time: 1-3 days

## 2025-01-24 NOTE — PROGRESS NOTE ADULT - ASSESSMENT
Ms. Slater is a 89y Female with PMHx of asthma, HTN, essential tremor, bronchiectasis, GERD, hypothyroidism, paroxysmal A.fib on Eliquis 2.5mg BID, Vit D deficiency, tuberculous peritonitis, anemia moderate AS, hx of falls, aortic regurgitation, SBO who is now admitted with Acute respiratory failure with hypoxia and hypercapnia and sepsis due to pneumonia.     Pt has a history of pancytopenia as outpatient with Leukopenia with since about 3y prior initially lymphopenia and then with neutropenia, progressive, anemia over the past year gradually worsening, with progressive macrocytosis, and thrombocytopenia over the past two years. Pt requested conservative treatment and denied BmBx a few times in the past.     01/22/2025: on Bipap, no overnight events noted, CT chest noted with bilateral tree-in-bed opacities and incidental 7 mm hyperdense exophytic right renal lesion, which may represent an   hemorrhagic/proteinaceous cyst. 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   - Outpatient labs show gradual progressive leukopenia x 3y  - Thrombocytopenia outpatient ranging 85-100s x 2y  - Anemia ranging 9.5-11 over past two years  - MCV with progressive macrocytosis over past year  - Previous paraproteinemia workup negative for obvious plasma cell dyscrasia SPEP/MOOKIE normal, Serum immunoglobulins normal, serum FLCs (ratio 1.44), LDH (normal), Beta-2 Mcg (mild elv 2.6),   - Transfuse PRBC to maintain Hb > 7   - Transfuse Plt to maintain Plt > 10k  - Trend CBC daily with Diff for now   - Continue B12 1000mcg IM weekly    #Acute hypoxic hypercapnic respiratory failure  # Parainfluenza   # Sepsis due to pneumonia / parainfluenza   - CXR Bibasilar airspace opacities may reflect multifocal infection  - CT chest with bilateral tree-in-bud   - Pulmonary team following, recs noted  - Antibiotics per primary team and ID  - Prefer longer course if ok given pancytopenia and overall immunocompromised state     #A.fib  - Was previously on Eliquis 2.5mg per cardiology  - Plt count currently 50k   - Ok to continue with Hep SubQ BID     # Hypothyroidism  - Continue levothyroxine     # Right renal lesion   - CT with 7 mm hyperdense exophytic right renal lesion, which may represent an hemorrhagic/proteinaceous cyst. However malignancy such as RCC cannot be excluded  - Given pts previous wishes of conservative management would favor repeating imaging in 3mo as outpatient       Thank you for allowing me to participate in the care of Ms. Slater, please do not hesitate to call or text me if you have further questions or concerns.       Roni Polanco MD  [facility]   Division of Hematology/Oncology  [facility] [facility], [address]  [place] [address]  P: [phone]  F: [phone]    Attestation:    ----Pt evaluated including face-to-face interaction in addition to chart review, reviewing treatment plan, and managing the patient’s chronic diagnoses as listed in the assessment----    Ms. Slater is a 89y Female with PMHx of asthma, HTN, essential tremor, bronchiectasis, GERD, hypothyroidism, paroxysmal A.fib on Eliquis 2.5mg BID, Vit D deficiency, tuberculous peritonitis, anemia moderate AS, hx of falls, aortic regurgitation, SBO who is now admitted with Acute respiratory failure with hypoxia and hypercapnia and sepsis due to pneumonia.     Pt has a history of pancytopenia as outpatient with Leukopenia with since about 3y prior initially lymphopenia and then with neutropenia, progressive, anemia over the past year gradually worsening, with progressive macrocytosis, and thrombocytopenia over the past two years. Pt requested conservative treatment and denied BmBx a few times in the past.     01/22/2025: on Bipap, no overnight events noted, CT chest noted with bilateral tree-in-bed opacities and incidental 7 mm hyperdense exophytic right renal lesion, which may represent an   hemorrhagic/proteinaceous cyst. 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   - Outpatient labs show gradual progressive leukopenia x 3y  - Thrombocytopenia outpatient ranging 85-100s x 2y  - Anemia ranging 9.5-11 over past two years  - MCV with progressive macrocytosis over past year  - Previous paraproteinemia workup negative for obvious plasma cell dyscrasia SPEP/MOOKIE normal, Serum immunoglobulins normal, serum FLCs (ratio 1.44), LDH (normal), Beta-2 Mcg (mild elv 2.6),   - Transfuse PRBC to maintain Hb > 7   - Transfuse Plt to maintain Plt > 10k  - Trend CBC daily with Diff for now   - Continue B12 1000mcg IM weekly    #Acute hypoxic hypercapnic respiratory failure  # Parainfluenza   # Sepsis due to pneumonia / parainfluenza   - CXR Bibasilar airspace opacities may reflect multifocal infection  - CT chest with bilateral tree-in-bud   - Pulmonary team following, recs noted  - Antibiotics per primary team and ID  - Prefer longer course if ok given pancytopenia and overall immunocompromised state     #A.fib  - Was previously on Eliquis 2.5mg per cardiology  - Plt count currently 50k   - Ok to continue with Hep SubQ BID     # Hypothyroidism  - Continue levothyroxine     # Right renal lesion   - CT with 7 mm hyperdense exophytic right renal lesion, which may represent an hemorrhagic/proteinaceous cyst. However malignancy such as RCC cannot be excluded  - Given pts previous wishes of conservative management would favor repeating imaging in 3mo as outpatient       Thank you for allowing me to participate in the care of Ms. Slater, please do not hesitate to call or text me if you have further questions or concerns.       Roni Polanco MD  [facility]   Division of Hematology/Oncology  [address] [facility], [address]  [place] [address]  P: [phone]  F: [phone]    Attestation:    ----Pt evaluated including face-to-face interaction in addition to chart review, reviewing treatment plan, and managing the patient’s chronic diagnoses as listed in the assessment----

## 2025-01-24 NOTE — PROGRESS NOTE ADULT - SUBJECTIVE AND OBJECTIVE BOX
[facility] HEMATOLOGY/ONCOLOGY INPATIENT PROGRESS NOTE     Interval Hx:   01-24-25: Ms. Slater was seen at bedside today.    Meds:   MEDICATIONS  (STANDING):  albuterol/ipratropium for Nebulization 3 milliLiter(s) Nebulizer every 6 hours  aMIOdarone    Tablet 200 milliGRAM(s) Oral daily  ascorbic acid 500 milliGRAM(s) Oral daily  buDESOnide    Inhalation Suspension 0.5 milliGRAM(s) Inhalation every 12 hours  cyanocobalamin 1000 MICROGram(s) Oral daily  levothyroxine 100 MICROGram(s) Oral daily  metoprolol tartrate 25 milliGRAM(s) Oral two times a day  pantoprazole    Tablet 40 milliGRAM(s) Oral before breakfast  polyethylene glycol 3350 17 Gram(s) Oral two times a day  predniSONE   Tablet   Oral   predniSONE   Tablet 40 milliGRAM(s) Oral daily  senna 2 Tablet(s) Oral at bedtime  sertraline 50 milliGRAM(s) Oral daily    MEDICATIONS  (PRN):  aluminum hydroxide/magnesium hydroxide/simethicone Suspension 30 milliLiter(s) Oral every 4 hours PRN Dyspepsia    Vital Signs Last 24 Hrs  T(C): 36.7 (23 Jan 2025 21:17), Max: 36.8 (23 Jan 2025 09:00)  T(F): 98.1 (23 Jan 2025 21:17), Max: 98.2 (23 Jan 2025 09:00)  HR: 90 (23 Jan 2025 21:17) (69 - 96)  BP: 115/80 (23 Jan 2025 21:17) (110/66 - 131/79)  BP(mean): --  RR: 18 (23 Jan 2025 21:17) (18 - 18)  SpO2: 97% (23 Jan 2025 21:17) (96% - 100%)    Parameters below as of 23 Jan 2025 21:17  Patient On (Oxygen Delivery Method): nasal cannula  O2 Flow (L/min): 2    Physical Exam:  Gen:  NAD, lethargic  HEENT: EOMI, MMM  Chest: equal chest rise  Cardiac: irregular  Abd: Nondistended  Ext: No edema     Labs:                        9.4    4.30  )-----------( 52       ( 22 Jan 2025 07:13 )             30.1     CBC Full  -  ( 22 Jan 2025 07:13 )  WBC Count : 4.30 K/uL  RBC Count : 2.79 M/uL  Hemoglobin : 9.4 g/dL  Hematocrit : 30.1 %  Platelet Count - Automated : 52 K/uL  Mean Cell Volume : 107.9 fl  Mean Cell Hemoglobin : 33.7 pg  Mean Cell Hemoglobin Concentration : 31.2 g/dL    01-22    143  |  105  |  22  ----------------------------<  88  4.3   |  28  |  0.59    Ca    9.1      22 Jan 2025 07:13         [facility] HEMATOLOGY/ONCOLOGY INPATIENT PROGRESS NOTE     Interval Hx:   01-24-25: Ms. Slater was seen at bedside today, awake, on neb, no overnight events noted, no signs of bleeding noted    Meds:   MEDICATIONS  (STANDING):  albuterol/ipratropium for Nebulization 3 milliLiter(s) Nebulizer every 6 hours  aMIOdarone    Tablet 200 milliGRAM(s) Oral daily  ascorbic acid 500 milliGRAM(s) Oral daily  buDESOnide    Inhalation Suspension 0.5 milliGRAM(s) Inhalation every 12 hours  cyanocobalamin 1000 MICROGram(s) Oral daily  levothyroxine 100 MICROGram(s) Oral daily  metoprolol tartrate 25 milliGRAM(s) Oral two times a day  pantoprazole    Tablet 40 milliGRAM(s) Oral before breakfast  polyethylene glycol 3350 17 Gram(s) Oral two times a day  predniSONE   Tablet   Oral   predniSONE   Tablet 40 milliGRAM(s) Oral daily  senna 2 Tablet(s) Oral at bedtime  sertraline 50 milliGRAM(s) Oral daily    MEDICATIONS  (PRN):  aluminum hydroxide/magnesium hydroxide/simethicone Suspension 30 milliLiter(s) Oral every 4 hours PRN Dyspepsia    Vital Signs Last 24 Hrs  T(C): 36.7 (23 Jan 2025 21:17), Max: 36.8 (23 Jan 2025 09:00)  T(F): 98.1 (23 Jan 2025 21:17), Max: 98.2 (23 Jan 2025 09:00)  HR: 90 (23 Jan 2025 21:17) (69 - 96)  BP: 115/80 (23 Jan 2025 21:17) (110/66 - 131/79)  BP(mean): --  RR: 18 (23 Jan 2025 21:17) (18 - 18)  SpO2: 97% (23 Jan 2025 21:17) (96% - 100%)    Parameters below as of 23 Jan 2025 21:17  Patient On (Oxygen Delivery Method): nasal cannula  O2 Flow (L/min): 2    Physical Exam:  Gen:  NAD, lethargic  HEENT: EOMI, MMM  Chest: equal chest rise  Cardiac: irregular  Abd: Nondistended  Ext: No edema     Labs:                        9.4    4.30  )-----------( 52       ( 22 Jan 2025 07:13 )             30.1     CBC Full  -  ( 22 Jan 2025 07:13 )  WBC Count : 4.30 K/uL  RBC Count : 2.79 M/uL  Hemoglobin : 9.4 g/dL  Hematocrit : 30.1 %  Platelet Count - Automated : 52 K/uL  Mean Cell Volume : 107.9 fl  Mean Cell Hemoglobin : 33.7 pg  Mean Cell Hemoglobin Concentration : 31.2 g/dL    01-22    143  |  105  |  22  ----------------------------<  88  4.3   |  28  |  0.59    Ca    9.1      22 Jan 2025 07:13

## 2025-01-24 NOTE — PROGRESS NOTE ADULT - ASSESSMENT
Patient is a 89 year old female with PMH of gastritis, A-fib, SBO, hypothyroid, hypertension, asthma, COPD (on 4 L nasal cannula at home), presents emergency department with home health aide for evaluation of low O2 saturation, increased work of breathing, cough and lethargy with decreased p.o. intake.     Sepsis due to Parainfluenza pneumonia, r/o superimposed bacterial pneumonia   Acute on chronic respiratory failure d/t COPD/asthma exacerbation iso above    H/o advanced COPD/asthma on NC 4L  H/o penicillin allergy noted   - febrile to 100.8F with tachycardia, leukopenia, bandemia, s/p BiPAP, on NC  - tested + Parainfluenza 4 on admission  - CXR with bibasilar airspace opacities may be multifocal infection   - noted recent prior xray with no opacities/consolidations  - PCT is negative, less likely bacterial but h/o freq pna and bronchiectasis, will treat  - Legionella and Strep pneumoniae urine ags negative   - MRSA PCR screen negative   - Bcx NGTD x2    - s/p vancomycin and cefepime in the ER   s/p azithromycin 1/19-1/21  s/p ceftriaxone 1/19-1/23    Recommendations:   Continue off antibiotics   Pulmonary following, now on PO steroids  Supportive care   Aspiration precautions   Continue rest of care per primary team     Stable from ID standpoint at this time.  Discharge planning per primary team.   Please call if any questions, thank you.     Lilli Polanco M.D.  [facility]  Available on [employer] TEAMS - *PREFERRED*  [phone]  After 5pm on weekdays and all day on weekends - please call [phone]

## 2025-01-24 NOTE — DISCHARGE NOTE PROVIDER - HOSPITAL COURSE
HPI:  Patient unable to give history due to fatigue/on bipap.  Attempted to reach daughter Connie at [phone] no answer.  History from ED chart:    89yo F PMH gastritis, A-fib, SBO, hypothyroid, hypertension, asthma, COPD (on 4 L nasal cannula at home), presents emergency department with home health aide for evaluation of low O2 saturation, increased work of breathing and lethargy.  Per daughter, the patient has had a productive cough for 2 to 3 days.  She has had decreased p.o. intake, and today was sleepier than normal.  Of note, the patient had an admission in October 2024 for hypoxic respiratory failure and was on BiPAP at that time.  The patient is DNR/DNI, but was trial of noninvasive ventilation.  Denies measured fevers, palpitations, nausea or vomiting, diarrhea or dysuria.  Has albuterol inhaler for home.  Has not been on steroids recently.  No sick contacts.    In the ED: azithro 500mg, cefepime 1gm, vanc 1g, 500 NS, IV tylenol, nebs, 125 solumedrol (19 Jan 2025 14:53)    Hospital Course: The patient was admitted with acute hypoxic-hypercapnic respiratory failure secondary to parainfluenza with superimposed bacterial pneumonia and sepsis. She completed a course of azithromycin and ceftriaxone. Blood cultures were negative after initial bandemia. A CT chest revealed bilateral tree-in-bud opacities consistent with infection, and an incidental 7mm hyperdense exophytic right renal lesion, possibly a hemorrhagic cyst, but concerning for malignancy (RCC cannot be ruled out). Repeat imaging in 3 months is recommended given the patient's preference for conservative management. The patient's respiratory status improved with supplemental oxygen and BiPAP support. She was eventually transitioned to nasal cannula.  The patient also has chronic pancytopenia, with progressive leukopenia, macrocytic anemia, and thrombocytopenia. She has previously declined bone marrow biopsy. She received vitamin B12 supplementation and will continue this at discharge. Transfusion thresholds were set for hemoglobin <7 g/dL and platelets <10,000/µL.  Her chronic atrial fibrillation was managed with metoprolol and amiodarone. Eliquis was held due to thrombocytopenia. She received subcutaneous heparin for DVT prophylaxis. She continued her home dose of levothyroxine for hypothyroidism. She received steroids (Solumedrol and a Prednisone taper) for her asthma/COPD exacerbation, along with bronchodilators and nebulized Mucomyst.    Discharge/Dispo/Med rec discussed with  Dr. Argueta who has medically cleared patient for discharge with follow-up as advised.    Important Medication Changes and Reason:    Active or Pending Issues Requiring Follow-up:    Advanced Directives:   [ ] Full code  [ ] DNR  [ ] Hospice    Discharge Diagnoses:  Acute hypoxic-hypercapnic respiratory failure  Parainfluenza infection  Bacterial pneumonia  Sepsis  Pancytopenia (leukopenia, macrocytic anemia, thrombocytopenia)  Right renal lesion (suspicious for malignancy)  afib     HPI:  Patient unable to give history due to fatigue/on bipap.  Attempted to reach daughter Connie at [phone] no answer.  History from ED chart:    89yo F PMH gastritis, A-fib, SBO, hypothyroid, hypertension, asthma, COPD (on 4 L nasal cannula at home), presents emergency department with home health aide for evaluation of low O2 saturation, increased work of breathing and lethargy.  Per daughter, the patient has had a productive cough for 2 to 3 days.  She has had decreased p.o. intake, and today was sleepier than normal.  Of note, the patient had an admission in October 2024 for hypoxic respiratory failure and was on BiPAP at that time.  The patient is DNR/DNI, but was trial of noninvasive ventilation.  Denies measured fevers, palpitations, nausea or vomiting, diarrhea or dysuria.  Has albuterol inhaler for home.  Has not been on steroids recently.  No sick contacts.    In the ED: azithro 500mg, cefepime 1gm, vanc 1g, 500 NS, IV tylenol, nebs, 125 solumedrol (19 Jan 2025 14:53)    Hospital Course: The patient was admitted with acute hypoxic-hypercapnic respiratory failure secondary to parainfluenza with superimposed bacterial pneumonia and sepsis. She completed a course of azithromycin and ceftriaxone. Blood cultures were negative after initial bandemia. A CT chest revealed bilateral tree-in-bud opacities consistent with infection, and an incidental 7mm hyperdense exophytic right renal lesion, possibly a hemorrhagic cyst, but concerning for malignancy (RCC cannot be ruled out). Repeat imaging in 3 months is recommended given the patient's preference for conservative management. The patient's respiratory status improved with supplemental oxygen and BiPAP support. She was eventually transitioned to nasal cannula.  The patient also has chronic pancytopenia, with progressive leukopenia, macrocytic anemia, and thrombocytopenia. She has previously declined bone marrow biopsy. She received vitamin B12 supplementation and will continue this at discharge. Transfusion thresholds were set for hemoglobin <7 g/dL and platelets <10,000/µL.  Her chronic atrial fibrillation was managed with metoprolol and amiodarone. Eliquis was held due to thrombocytopenia. She received subcutaneous heparin for DVT prophylaxis. She continued her home dose of levothyroxine for hypothyroidism. She received steroids (Solumedrol and a Prednisone taper) for her asthma/COPD exacerbation, along with bronchodilators and nebulized Mucomyst.    Discharge/Dispo/Med rec discussed with  Dr. Argueta who has medically cleared patient for discharge with follow-up as advised.      Important Medication Changes and Reason:  Prednisone taper for COPD+parainfluenza+pneumonia      Active or Pending Issues Requiring Follow-up:  [facility] Pneumonia, parainfluenza- Primary Care, HOME pulmonary      Advanced Directives:   [ ] Full code  [X] DNR  [ ] Hospice      Discharge Diagnoses:  Acute hypoxic-hypercapnic respiratory failure  Parainfluenza infection  Bacterial pneumonia  Sepsis  Pancytopenia (leukopenia, macrocytic anemia, thrombocytopenia)  Right renal lesion (suspicious for malignancy)  afib

## 2025-01-24 NOTE — PROGRESS NOTE ADULT - ASSESSMENT
89-year-old female with past medical history of gastritis, A-fib, SBO, hypothyroid, hypertension, asthma vs COPD (on 4 L nasal cannula at home), bronchiectasis. Presents to emergency department with home health aide for evaluation of low O2 saturation, increased work of breathing and lethargy.  Per daughter, the patient has had a productive cough for 2 to 3 days.  She has had decreased p.o. intake, and today was sleepier than normal.  Of note, the patient had an admission in October 2024 for hypoxic respiratory failure and was on BiPAP at that time.  The patient is DNR/DNI, but was trial of noninvasive ventilation.  Denies measured fevers, palpitations, nausea or vomiting, diarrhea or dysuria.  Has albuterol inhaler for home.  Has not been on steroids recently. No sick contacts. RVP + parainfluenza.       Asthma/ COPD exacerbation  parainfluenza, r/o PNA     Asthma vs COPD exacerbation  -Asthma vs COPD exacerbation 2nd to parainfluenza - pt's daughter at bedside denies hx of COPD, smoking but does report hx of asthma & bronchiectasis, frequent PNA  -Continue bronchodilators  -S/p Mucomyst 20% 3ml q6h x3 days  -Keep sats >90% with O2 PRN (currently 2LNC). Has home O2   -Wheezing now resolved, s/p IV steroids. Continue Prednisone taper as ordered back to home dose (pt's daughter reports baseline Prednisone 15 mg PO qd)   -Monitored off Bipap overnight, VBG this AM acceptable  -D/c planning per primary team.     Parainfluenza, r/o PNA   -CT chest with scattered TIB opacities, may be viral but favor treating   -Bronchodilators/steroids as above  -S/p ABX as per ID

## 2025-01-24 NOTE — PROGRESS NOTE ADULT - ASSESSMENT
89yo F PMH gastritis, A-fib, SBO, hypothyroid, hypertension, asthma, COPD (on 4 L nasal cannula at home), presents emergency department with home health aide for evaluation of low O2 saturation, increased work of breathing and lethargy.     Acute respiratory failure with hypoxia and hypercapnia  sepsis 2/2 bacterial pneumonia.  Has +parainfluenza on RVP but also bandemia favor treating superimposed bacterial infection.  abx course completed (sp azithromycin and ceftriaxone)   CT chest per pulm  BC NTD  cont to titrate 3 L  ID/pulm following    Chronic atrial fibrillation  continue home metop/amio    Advanced COPD  on po pred m/w/f  sp IV solumedrol 20mg x3 days    Hypothyroid  continue synthroid    Thrombocytopenia, chronic  anemia  no signs of acute bleeding  hgb has been around 9 previously, heme following     hold dvt ppx    Dispo: home w/ aides    [facility]  [phone]      Please contact with any questions or concerns [phone].

## 2025-05-14 NOTE — ED ADULT NURSE NOTE - CARDIO ASSESSMENT
Restorative Technician Note      Patient Name: Queen Julieth     Note Type: Mobility  Patient Position Upon Consult: Supine  Activity Performed: Repositioned  Education Provided: Yes  Patient Position at End of Consult: Supine; All needs within reach; Bed/Chair alarm activated    Kala ROLON, Restorative Technician,              ---

## 2025-05-29 NOTE — DISCHARGE NOTE PROVIDER - CARE PROVIDER_API CALL
----- Message from Beth Zamora 
----- Message from Beth Zamora 
Bone density  Pt was informed of lab result and verbalizes understanding  
Goldberg, Steven M  Cardiovascular Disease  [address] [place] [id number]  Phone: [phone]  Fax: [phone]  Follow Up Time:

## 2025-06-02 NOTE — ED PROVIDER NOTE - CARDIAC, MLM
Normal rate, regular rhythm.  Heart sounds S1, S2.  LLSB holosystolic murmur 2/6 sensation intact/responds to pain/responds to verbal commands/no spontaneous movement negative

## 2025-06-20 NOTE — PHYSICAL THERAPY INITIAL EVALUATION ADULT - BALANCE TRAINING, PT EVAL
[id number]:  [id number]       POC Glucose Once [[id number]]  (Abnormal) Collected: 06/19/25 2016    Specimen: Blood Updated: 06/19/25 2018     Glucose 366 mg/dL      Comment: Serial Number: [id number]:  [id number]       POC Glucose Once [[id number]]  (Abnormal) Collected: 06/19/25 1618    Specimen: Blood Updated: 06/19/25 1620     Glucose 200 mg/dL      Comment: Serial Number: [id number]:  [id number]                GOAL: Pt will increase static/ dynamic standing balance to Fair+ with rolling walker to improve safety with functional activities in 4 weeks.

## 2025-07-02 ENCOUNTER — EMERGENCY (EMERGENCY)
Facility: HOSPITAL | Age: 89
LOS: 1 days | End: 2025-07-02
Attending: EMERGENCY MEDICINE
Payer: MEDICARE

## 2025-07-02 VITALS
OXYGEN SATURATION: 97 % | HEART RATE: 101 BPM | RESPIRATION RATE: 20 BRPM | SYSTOLIC BLOOD PRESSURE: 118 MMHG | WEIGHT: 130.07 LBS | TEMPERATURE: 98 F | DIASTOLIC BLOOD PRESSURE: 76 MMHG

## 2025-07-02 DIAGNOSIS — Z98.89 OTHER SPECIFIED POSTPROCEDURAL STATES: Chronic | ICD-10-CM

## 2025-07-02 LAB
ALBUMIN SERPL ELPH-MCNC: 4.1 G/DL — SIGNIFICANT CHANGE UP (ref 3.3–5)
ALP SERPL-CCNC: 71 U/L — SIGNIFICANT CHANGE UP (ref 40–120)
ALT FLD-CCNC: 9 U/L — LOW (ref 10–45)
ANION GAP SERPL CALC-SCNC: 13 MMOL/L — SIGNIFICANT CHANGE UP (ref 5–17)
ANISOCYTOSIS BLD QL: SLIGHT — SIGNIFICANT CHANGE UP
APTT BLD: 25.3 SEC — LOW (ref 26.1–36.8)
AST SERPL-CCNC: 13 U/L — SIGNIFICANT CHANGE UP (ref 10–40)
BASOPHILS # BLD AUTO: 0 K/UL — SIGNIFICANT CHANGE UP (ref 0–0.2)
BASOPHILS # BLD MANUAL: 0 K/UL — SIGNIFICANT CHANGE UP (ref 0–0.2)
BASOPHILS NFR BLD AUTO: 0 % — SIGNIFICANT CHANGE UP (ref 0–2)
BASOPHILS NFR BLD MANUAL: 0 % — SIGNIFICANT CHANGE UP (ref 0–2)
BILIRUB SERPL-MCNC: 0.4 MG/DL — SIGNIFICANT CHANGE UP (ref 0.2–1.2)
BUN SERPL-MCNC: 14 MG/DL — SIGNIFICANT CHANGE UP (ref 7–23)
CALCIUM SERPL-MCNC: 9.1 MG/DL — SIGNIFICANT CHANGE UP (ref 8.4–10.5)
CHLORIDE SERPL-SCNC: 105 MMOL/L — SIGNIFICANT CHANGE UP (ref 96–108)
CO2 SERPL-SCNC: 24 MMOL/L — SIGNIFICANT CHANGE UP (ref 22–31)
CREAT SERPL-MCNC: 0.54 MG/DL — SIGNIFICANT CHANGE UP (ref 0.5–1.3)
DACRYOCYTES BLD QL SMEAR: SLIGHT — SIGNIFICANT CHANGE UP
EGFR: 82 ML/MIN/1.73M2 — SIGNIFICANT CHANGE UP
EGFR: 82 ML/MIN/1.73M2 — SIGNIFICANT CHANGE UP
ELLIPTOCYTES BLD QL SMEAR: SLIGHT — SIGNIFICANT CHANGE UP
EOSINOPHIL # BLD AUTO: 0 K/UL — SIGNIFICANT CHANGE UP (ref 0–0.5)
EOSINOPHIL # BLD MANUAL: 0 K/UL — SIGNIFICANT CHANGE UP (ref 0–0.5)
EOSINOPHIL NFR BLD AUTO: 0 % — SIGNIFICANT CHANGE UP (ref 0–6)
EOSINOPHIL NFR BLD MANUAL: 0 % — SIGNIFICANT CHANGE UP (ref 0–6)
GAS PNL BLDV: SIGNIFICANT CHANGE UP
GAS PNL BLDV: SIGNIFICANT CHANGE UP
GLUCOSE SERPL-MCNC: 88 MG/DL — SIGNIFICANT CHANGE UP (ref 70–99)
HCT VFR BLD CALC: 28.5 % — LOW (ref 34.5–45)
HGB BLD-MCNC: 8.6 G/DL — LOW (ref 11.5–15.5)
IMM GRANULOCYTES # BLD AUTO: 0.02 K/UL — SIGNIFICANT CHANGE UP (ref 0–0.07)
IMM GRANULOCYTES NFR BLD AUTO: 0.7 % — SIGNIFICANT CHANGE UP (ref 0–0.9)
IMMATURE PLATELET FRACTION #: 3.9 K/UL — LOW (ref 4.7–11.1)
IMMATURE PLATELET FRACTION %: 12.6 % — HIGH (ref 1.6–4.9)
INR BLD: 0.99 RATIO — SIGNIFICANT CHANGE UP (ref 0.85–1.16)
LYMPHOCYTES # BLD AUTO: 0.64 K/UL — LOW (ref 1–3.3)
LYMPHOCYTES # BLD MANUAL: 0.77 K/UL — LOW (ref 1–3.3)
LYMPHOCYTES NFR BLD AUTO: 24 % — SIGNIFICANT CHANGE UP (ref 13–44)
LYMPHOCYTES NFR BLD MANUAL: 28.7 % — SIGNIFICANT CHANGE UP (ref 13–44)
MACROCYTES BLD QL: ABNORMAL
MAGNESIUM SERPL-MCNC: 2.1 MG/DL — SIGNIFICANT CHANGE UP (ref 1.6–2.6)
MCHC RBC-ENTMCNC: 30.2 G/DL — LOW (ref 32–36)
MCHC RBC-ENTMCNC: 35.7 PG — HIGH (ref 27–34)
MCV RBC AUTO: 118.3 FL — HIGH (ref 80–100)
MONOCYTES # BLD AUTO: 0.1 K/UL — SIGNIFICANT CHANGE UP (ref 0–0.9)
MONOCYTES # BLD MANUAL: 0 K/UL — SIGNIFICANT CHANGE UP (ref 0–0.9)
MONOCYTES NFR BLD AUTO: 3.7 % — SIGNIFICANT CHANGE UP (ref 2–14)
MONOCYTES NFR BLD MANUAL: 0 % — LOW (ref 2–14)
NEUTROPHILS # BLD AUTO: 1.91 K/UL — SIGNIFICANT CHANGE UP (ref 1.8–7.4)
NEUTROPHILS # BLD MANUAL: 1.9 K/UL — SIGNIFICANT CHANGE UP (ref 1.8–7.4)
NEUTROPHILS NFR BLD AUTO: 71.6 % — SIGNIFICANT CHANGE UP (ref 43–77)
NEUTROPHILS NFR BLD MANUAL: 71.3 % — SIGNIFICANT CHANGE UP (ref 43–77)
NRBC # BLD AUTO: 0.02 K/UL — HIGH (ref 0–0)
NRBC # FLD: 0.02 K/UL — HIGH (ref 0–0)
OVALOCYTES BLD QL SMEAR: SLIGHT — SIGNIFICANT CHANGE UP
PLAT MORPH BLD: NORMAL — SIGNIFICANT CHANGE UP
PLATELET # BLD AUTO: 31 K/UL — LOW (ref 150–400)
PMV BLD: SIGNIFICANT CHANGE UP FL (ref 7–13)
POIKILOCYTOSIS BLD QL AUTO: SLIGHT — SIGNIFICANT CHANGE UP
POTASSIUM SERPL-MCNC: 4.6 MMOL/L — SIGNIFICANT CHANGE UP (ref 3.5–5.3)
POTASSIUM SERPL-SCNC: 4.6 MMOL/L — SIGNIFICANT CHANGE UP (ref 3.5–5.3)
PROT SERPL-MCNC: 6.4 G/DL — SIGNIFICANT CHANGE UP (ref 6–8.3)
PROTHROM AB SERPL-ACNC: 11.3 SEC — SIGNIFICANT CHANGE UP (ref 9.9–13.4)
RBC # BLD: 2.41 M/UL — LOW (ref 3.8–5.2)
RBC # FLD: 16.4 % — HIGH (ref 10.3–14.5)
RBC BLD AUTO: ABNORMAL
SODIUM SERPL-SCNC: 142 MMOL/L — SIGNIFICANT CHANGE UP (ref 135–145)
TROPONIN T, HIGH SENSITIVITY RESULT: <6 NG/L — SIGNIFICANT CHANGE UP (ref 0–51)
WBC # BLD: 2.67 K/UL — LOW (ref 3.8–10.5)
WBC # FLD AUTO: 2.67 K/UL — LOW (ref 3.8–10.5)

## 2025-07-02 PROCEDURE — 84132 ASSAY OF SERUM POTASSIUM: CPT

## 2025-07-02 PROCEDURE — 82947 ASSAY GLUCOSE BLOOD QUANT: CPT

## 2025-07-02 PROCEDURE — 85730 THROMBOPLASTIN TIME PARTIAL: CPT

## 2025-07-02 PROCEDURE — 99285 EMERGENCY DEPT VISIT HI MDM: CPT | Mod: FS

## 2025-07-02 PROCEDURE — 80053 COMPREHEN METABOLIC PANEL: CPT

## 2025-07-02 PROCEDURE — 71045 X-RAY EXAM CHEST 1 VIEW: CPT

## 2025-07-02 PROCEDURE — 85014 HEMATOCRIT: CPT

## 2025-07-02 PROCEDURE — 85610 PROTHROMBIN TIME: CPT

## 2025-07-02 PROCEDURE — 94640 AIRWAY INHALATION TREATMENT: CPT

## 2025-07-02 PROCEDURE — 82803 BLOOD GASES ANY COMBINATION: CPT

## 2025-07-02 PROCEDURE — 71045 X-RAY EXAM CHEST 1 VIEW: CPT | Mod: 26

## 2025-07-02 PROCEDURE — 83735 ASSAY OF MAGNESIUM: CPT

## 2025-07-02 PROCEDURE — 82330 ASSAY OF CALCIUM: CPT

## 2025-07-02 PROCEDURE — 83605 ASSAY OF LACTIC ACID: CPT

## 2025-07-02 PROCEDURE — 93010 ELECTROCARDIOGRAM REPORT: CPT

## 2025-07-02 PROCEDURE — 85018 HEMOGLOBIN: CPT

## 2025-07-02 PROCEDURE — 85025 COMPLETE CBC W/AUTO DIFF WBC: CPT

## 2025-07-02 PROCEDURE — 84484 ASSAY OF TROPONIN QUANT: CPT

## 2025-07-02 PROCEDURE — 84295 ASSAY OF SERUM SODIUM: CPT

## 2025-07-02 PROCEDURE — 82435 ASSAY OF BLOOD CHLORIDE: CPT

## 2025-07-02 RX ORDER — ALBUTEROL SULFATE 2.5 MG/3ML
2.5 VIAL, NEBULIZER (ML) INHALATION
Refills: 0 | Status: DISCONTINUED | OUTPATIENT
Start: 2025-07-02 | End: 2025-07-02

## 2025-07-02 RX ORDER — IPRATROPIUM BROMIDE AND ALBUTEROL SULFATE .5; 2.5 MG/3ML; MG/3ML
3 SOLUTION RESPIRATORY (INHALATION) ONCE
Refills: 0 | Status: COMPLETED | OUTPATIENT
Start: 2025-07-02 | End: 2025-07-02

## 2025-07-02 RX ORDER — METHYLPREDNISOLONE ACETATE 80 MG/ML
125 INJECTION, SUSPENSION INTRA-ARTICULAR; INTRALESIONAL; INTRAMUSCULAR; SOFT TISSUE ONCE
Refills: 0 | Status: COMPLETED | OUTPATIENT
Start: 2025-07-02 | End: 2025-07-02

## 2025-07-02 RX ADMIN — IPRATROPIUM BROMIDE AND ALBUTEROL SULFATE 3 MILLILITER(S): .5; 2.5 SOLUTION RESPIRATORY (INHALATION) at 22:32

## 2025-07-02 RX ADMIN — IPRATROPIUM BROMIDE AND ALBUTEROL SULFATE 3 MILLILITER(S): .5; 2.5 SOLUTION RESPIRATORY (INHALATION) at 22:55

## 2025-07-02 RX ADMIN — IPRATROPIUM BROMIDE AND ALBUTEROL SULFATE 3 MILLILITER(S): .5; 2.5 SOLUTION RESPIRATORY (INHALATION) at 22:03

## 2025-07-02 RX ADMIN — METHYLPREDNISOLONE ACETATE 125 MILLIGRAM(S): 80 INJECTION, SUSPENSION INTRA-ARTICULAR; INTRALESIONAL; INTRAMUSCULAR; SOFT TISSUE at 22:03

## 2025-07-03 VITALS
TEMPERATURE: 98 F | SYSTOLIC BLOOD PRESSURE: 115 MMHG | HEART RATE: 71 BPM | OXYGEN SATURATION: 98 % | DIASTOLIC BLOOD PRESSURE: 73 MMHG | RESPIRATION RATE: 18 BRPM

## 2025-07-03 LAB — TROPONIN T, HIGH SENSITIVITY RESULT: <6 NG/L — SIGNIFICANT CHANGE UP (ref 0–51)

## 2025-07-03 PROCEDURE — 82330 ASSAY OF CALCIUM: CPT

## 2025-07-03 PROCEDURE — 96375 TX/PRO/DX INJ NEW DRUG ADDON: CPT

## 2025-07-03 PROCEDURE — 85014 HEMATOCRIT: CPT

## 2025-07-03 PROCEDURE — 84484 ASSAY OF TROPONIN QUANT: CPT

## 2025-07-03 PROCEDURE — 94640 AIRWAY INHALATION TREATMENT: CPT

## 2025-07-03 PROCEDURE — 83735 ASSAY OF MAGNESIUM: CPT

## 2025-07-03 PROCEDURE — 82947 ASSAY GLUCOSE BLOOD QUANT: CPT

## 2025-07-03 PROCEDURE — 71045 X-RAY EXAM CHEST 1 VIEW: CPT

## 2025-07-03 PROCEDURE — 93005 ELECTROCARDIOGRAM TRACING: CPT

## 2025-07-03 PROCEDURE — 85730 THROMBOPLASTIN TIME PARTIAL: CPT

## 2025-07-03 PROCEDURE — 85018 HEMOGLOBIN: CPT

## 2025-07-03 PROCEDURE — 85610 PROTHROMBIN TIME: CPT

## 2025-07-03 PROCEDURE — 80053 COMPREHEN METABOLIC PANEL: CPT

## 2025-07-03 PROCEDURE — 83605 ASSAY OF LACTIC ACID: CPT

## 2025-07-03 PROCEDURE — 82803 BLOOD GASES ANY COMBINATION: CPT

## 2025-07-03 PROCEDURE — 99285 EMERGENCY DEPT VISIT HI MDM: CPT | Mod: 25

## 2025-07-03 PROCEDURE — 96374 THER/PROPH/DIAG INJ IV PUSH: CPT

## 2025-07-03 PROCEDURE — 82435 ASSAY OF BLOOD CHLORIDE: CPT

## 2025-07-03 PROCEDURE — 85025 COMPLETE CBC W/AUTO DIFF WBC: CPT

## 2025-07-03 PROCEDURE — 84295 ASSAY OF SERUM SODIUM: CPT

## 2025-07-03 PROCEDURE — 84132 ASSAY OF SERUM POTASSIUM: CPT

## 2025-07-03 RX ORDER — AZITHROMYCIN 250 MG
500 CAPSULE ORAL ONCE
Refills: 0 | Status: COMPLETED | OUTPATIENT
Start: 2025-07-03 | End: 2025-07-03

## 2025-07-03 RX ORDER — CEFTRIAXONE 500 MG/1
1000 INJECTION, POWDER, FOR SOLUTION INTRAMUSCULAR; INTRAVENOUS ONCE
Refills: 0 | Status: COMPLETED | OUTPATIENT
Start: 2025-07-03 | End: 2025-07-03

## 2025-07-03 RX ORDER — AZITHROMYCIN 250 MG
1 CAPSULE ORAL
Qty: 5 | Refills: 0
Start: 2025-07-03 | End: 2025-07-07

## 2025-07-03 RX ORDER — CEFPODOXIME PROXETIL 200 MG/1
1 TABLET, FILM COATED ORAL
Qty: 14 | Refills: 0
Start: 2025-07-03 | End: 2025-07-09

## 2025-07-03 RX ADMIN — Medication 250 MILLIGRAM(S): at 01:35

## 2025-07-03 RX ADMIN — CEFTRIAXONE 100 MILLIGRAM(S): 500 INJECTION, POWDER, FOR SOLUTION INTRAMUSCULAR; INTRAVENOUS at 00:41

## 2025-07-23 NOTE — PATIENT PROFILE ADULT - DOES PATIENT HAVE ADVANCE DIRECTIVE
Patient with Hypercapnic and Hypoxic Respiratory failure which is Acute.  he is not on home oxygen. Supplemental oxygen was provided and noted- Oxygen Concentration (%):  [35-40] 35    .   Signs/symptoms of respiratory failure include- tachypnea, increased work of breathing, respiratory distress, and use of accessory muscles. Contributing diagnoses includes - COPD and Pneumonia Labs and images were reviewed. Patient Has recent ABG, which has been reviewed. Will treat underlying causes and adjust management of respiratory failure as follows-   ,CTA chest show no PE,but pneumonia,respiratory stress is much improved,stable on 3 liter NC O 2,consulted PT,OT.   No

## 2025-07-24 NOTE — ED ADULT TRIAGE NOTE - NS ED NURSE AMBULANCES
[id number]  [phone]  Go to   If symptoms worsen      DISCHARGE MEDICATIONS:  Discharge Medication List as of 7/22/2025 10:21 PM             (Please note that portions of this note were completed with a voice recognition program.  Efforts were made to edit the dictations but occasionally words are mis-transcribed.)    Chloe Garcia PA-C (electronically signed)            Chloe Garcia PA-C  07/24/25 0833     [facility]

## 2025-07-29 NOTE — H&P ADULT - NSICDXPASTSURGICALHX_GEN_ALL_CORE_FT
PAST SURGICAL HISTORY:  H/O hemorrhoidectomy     History of Bilateral Breast Biopsy     S/P Exploratory Laparotomy TB in 1960s    S/P vein stripping      No

## 2025-09-03 ENCOUNTER — EMERGENCY (EMERGENCY)
Facility: HOSPITAL | Age: 89
LOS: 1 days | End: 2025-09-03
Attending: EMERGENCY MEDICINE
Payer: MEDICARE

## 2025-09-03 VITALS
HEIGHT: 65 IN | HEART RATE: 70 BPM | DIASTOLIC BLOOD PRESSURE: 84 MMHG | RESPIRATION RATE: 22 BRPM | SYSTOLIC BLOOD PRESSURE: 128 MMHG | TEMPERATURE: 98 F | OXYGEN SATURATION: 94 % | WEIGHT: 139.99 LBS

## 2025-09-03 DIAGNOSIS — Z71.89 OTHER SPECIFIED COUNSELING: ICD-10-CM

## 2025-09-03 DIAGNOSIS — Z51.5 ENCOUNTER FOR PALLIATIVE CARE: ICD-10-CM

## 2025-09-03 DIAGNOSIS — R53.2 FUNCTIONAL QUADRIPLEGIA: ICD-10-CM

## 2025-09-03 DIAGNOSIS — R07.89 OTHER CHEST PAIN: ICD-10-CM

## 2025-09-03 DIAGNOSIS — Z98.89 OTHER SPECIFIED POSTPROCEDURAL STATES: Chronic | ICD-10-CM

## 2025-09-03 DIAGNOSIS — R06.02 SHORTNESS OF BREATH: ICD-10-CM

## 2025-09-03 LAB
ALBUMIN SERPL ELPH-MCNC: 4 G/DL — SIGNIFICANT CHANGE UP (ref 3.3–5)
ALP SERPL-CCNC: 56 U/L — SIGNIFICANT CHANGE UP (ref 40–120)
ALT FLD-CCNC: 10 U/L — SIGNIFICANT CHANGE UP (ref 10–45)
ANION GAP SERPL CALC-SCNC: 14 MMOL/L — SIGNIFICANT CHANGE UP (ref 5–17)
ANISOCYTOSIS BLD QL: ABNORMAL
AST SERPL-CCNC: 14 U/L — SIGNIFICANT CHANGE UP (ref 10–40)
BASOPHILS # BLD AUTO: 0.01 K/UL — SIGNIFICANT CHANGE UP (ref 0–0.2)
BASOPHILS # BLD MANUAL: 0.02 K/UL — SIGNIFICANT CHANGE UP (ref 0–0.2)
BASOPHILS NFR BLD AUTO: 0.4 % — SIGNIFICANT CHANGE UP (ref 0–2)
BASOPHILS NFR BLD MANUAL: 0.8 % — SIGNIFICANT CHANGE UP (ref 0–2)
BILIRUB SERPL-MCNC: 0.6 MG/DL — SIGNIFICANT CHANGE UP (ref 0.2–1.2)
BUN SERPL-MCNC: 23 MG/DL — SIGNIFICANT CHANGE UP (ref 7–23)
BURR CELLS BLD QL SMEAR: SLIGHT — SIGNIFICANT CHANGE UP
CALCIUM SERPL-MCNC: 9.2 MG/DL — SIGNIFICANT CHANGE UP (ref 8.4–10.5)
CHLORIDE SERPL-SCNC: 104 MMOL/L — SIGNIFICANT CHANGE UP (ref 96–108)
CO2 SERPL-SCNC: 23 MMOL/L — SIGNIFICANT CHANGE UP (ref 22–31)
CREAT SERPL-MCNC: 0.61 MG/DL — SIGNIFICANT CHANGE UP (ref 0.5–1.3)
D DIMER BLD IA.RAPID-MCNC: 1175 NG/ML DDU — HIGH
DACRYOCYTES BLD QL SMEAR: ABNORMAL
EGFR: 80 ML/MIN/1.73M2 — SIGNIFICANT CHANGE UP
EGFR: 80 ML/MIN/1.73M2 — SIGNIFICANT CHANGE UP
ELLIPTOCYTES BLD QL SMEAR: SLIGHT — SIGNIFICANT CHANGE UP
EOSINOPHIL # BLD AUTO: 0.02 K/UL — SIGNIFICANT CHANGE UP (ref 0–0.5)
EOSINOPHIL # BLD MANUAL: 0.05 K/UL — SIGNIFICANT CHANGE UP (ref 0–0.5)
EOSINOPHIL NFR BLD AUTO: 0.7 % — SIGNIFICANT CHANGE UP (ref 0–6)
EOSINOPHIL NFR BLD MANUAL: 1.7 % — SIGNIFICANT CHANGE UP (ref 0–6)
GLUCOSE SERPL-MCNC: 121 MG/DL — HIGH (ref 70–99)
HCT VFR BLD CALC: 31.5 % — LOW (ref 34.5–45)
HGB BLD-MCNC: 9.2 G/DL — LOW (ref 11.5–15.5)
IMM GRANULOCYTES # BLD AUTO: 0.01 K/UL — SIGNIFICANT CHANGE UP (ref 0–0.07)
IMM GRANULOCYTES NFR BLD AUTO: 0.4 % — SIGNIFICANT CHANGE UP (ref 0–0.9)
IMMATURE PLATELET FRACTION #: 4.7 K/UL — SIGNIFICANT CHANGE UP (ref 4.7–11.1)
IMMATURE PLATELET FRACTION %: 12.4 % — HIGH (ref 1.6–4.9)
LYMPHOCYTES # BLD AUTO: 0.82 K/UL — LOW (ref 1–3.3)
LYMPHOCYTES # BLD MANUAL: 0.88 K/UL — LOW (ref 1–3.3)
LYMPHOCYTES NFR BLD AUTO: 29.4 % — SIGNIFICANT CHANGE UP (ref 13–44)
LYMPHOCYTES NFR BLD MANUAL: 31.7 % — SIGNIFICANT CHANGE UP (ref 13–44)
MACROCYTES BLD QL: ABNORMAL
MANUAL NEUTROPHIL BANDS #: 0.05 K/UL — SIGNIFICANT CHANGE UP (ref 0–0.84)
MANUAL NRBC #: 0.06 K/UL — HIGH (ref 0–0)
MCHC RBC-ENTMCNC: 29.2 G/DL — LOW (ref 32–36)
MCHC RBC-ENTMCNC: 35.2 PG — HIGH (ref 27–34)
MCV RBC AUTO: 120.7 FL — HIGH (ref 80–100)
MONOCYTES # BLD AUTO: 0.08 K/UL — SIGNIFICANT CHANGE UP (ref 0–0.9)
MONOCYTES # BLD MANUAL: 0.02 K/UL — SIGNIFICANT CHANGE UP (ref 0–0.9)
MONOCYTES NFR BLD AUTO: 2.9 % — SIGNIFICANT CHANGE UP (ref 2–14)
MONOCYTES NFR BLD MANUAL: 0.8 % — LOW (ref 2–14)
NEUTROPHILS # BLD AUTO: 1.85 K/UL — SIGNIFICANT CHANGE UP (ref 1.8–7.4)
NEUTROPHILS # BLD MANUAL: 1.77 K/UL — LOW (ref 1.8–7.4)
NEUTROPHILS NFR BLD AUTO: 66.2 % — SIGNIFICANT CHANGE UP (ref 43–77)
NEUTROPHILS NFR BLD MANUAL: 63.3 % — SIGNIFICANT CHANGE UP (ref 43–77)
NEUTS BAND # BLD: 1.7 % — SIGNIFICANT CHANGE UP (ref 0–8)
NEUTS BAND NFR BLD: 1.7 % — SIGNIFICANT CHANGE UP (ref 0–8)
NRBC # BLD AUTO: 0.04 K/UL — HIGH (ref 0–0)
NRBC # BLD: 2 /100 WBCS — HIGH (ref 0–0)
NRBC # FLD: 0.04 K/UL — HIGH (ref 0–0)
NRBC BLD AUTO-RTO: 1 /100 WBCS — HIGH (ref 0–0)
NRBC BLD-RTO: 2 /100 WBCS — HIGH (ref 0–0)
NT-PROBNP SERPL-SCNC: 1213 PG/ML — HIGH (ref 0–300)
OVALOCYTES BLD QL SMEAR: ABNORMAL
PLAT MORPH BLD: NORMAL — SIGNIFICANT CHANGE UP
PLATELET # BLD AUTO: 38 K/UL — LOW (ref 150–400)
PMV BLD: SIGNIFICANT CHANGE UP FL (ref 7–13)
POIKILOCYTOSIS BLD QL AUTO: ABNORMAL
POLYCHROMASIA BLD QL SMEAR: SLIGHT — SIGNIFICANT CHANGE UP
POTASSIUM SERPL-MCNC: 4.4 MMOL/L — SIGNIFICANT CHANGE UP (ref 3.5–5.3)
POTASSIUM SERPL-SCNC: 4.4 MMOL/L — SIGNIFICANT CHANGE UP (ref 3.5–5.3)
PROT SERPL-MCNC: 6.9 G/DL — SIGNIFICANT CHANGE UP (ref 6–8.3)
RBC # BLD: 2.61 M/UL — LOW (ref 3.8–5.2)
RBC # FLD: 16.7 % — HIGH (ref 10.3–14.5)
RBC BLD AUTO: ABNORMAL
SCHISTOCYTES BLD QL AUTO: SLIGHT — SIGNIFICANT CHANGE UP
SODIUM SERPL-SCNC: 141 MMOL/L — SIGNIFICANT CHANGE UP (ref 135–145)
TROPONIN T, HIGH SENSITIVITY RESULT: 8 NG/L — SIGNIFICANT CHANGE UP
TROPONIN T, HIGH SENSITIVITY RESULT: 9 NG/L — SIGNIFICANT CHANGE UP
WBC # BLD: 2.79 K/UL — LOW (ref 3.8–10.5)
WBC # FLD AUTO: 2.79 K/UL — LOW (ref 3.8–10.5)

## 2025-09-03 PROCEDURE — 83880 ASSAY OF NATRIURETIC PEPTIDE: CPT

## 2025-09-03 PROCEDURE — 71275 CT ANGIOGRAPHY CHEST: CPT

## 2025-09-03 PROCEDURE — 71045 X-RAY EXAM CHEST 1 VIEW: CPT | Mod: 26

## 2025-09-03 PROCEDURE — 71275 CT ANGIOGRAPHY CHEST: CPT | Mod: 26

## 2025-09-03 PROCEDURE — 94640 AIRWAY INHALATION TREATMENT: CPT

## 2025-09-03 PROCEDURE — 93010 ELECTROCARDIOGRAM REPORT: CPT

## 2025-09-03 PROCEDURE — 84484 ASSAY OF TROPONIN QUANT: CPT

## 2025-09-03 PROCEDURE — 99497 ADVNCD CARE PLAN 30 MIN: CPT

## 2025-09-03 PROCEDURE — 85379 FIBRIN DEGRADATION QUANT: CPT

## 2025-09-03 PROCEDURE — 80053 COMPREHEN METABOLIC PANEL: CPT

## 2025-09-03 PROCEDURE — 99223 1ST HOSP IP/OBS HIGH 75: CPT | Mod: FS

## 2025-09-03 PROCEDURE — 85025 COMPLETE CBC W/AUTO DIFF WBC: CPT

## 2025-09-03 PROCEDURE — 99285 EMERGENCY DEPT VISIT HI MDM: CPT

## 2025-09-03 PROCEDURE — 71045 X-RAY EXAM CHEST 1 VIEW: CPT

## 2025-09-03 RX ORDER — IPRATROPIUM BROMIDE AND ALBUTEROL SULFATE .5; 2.5 MG/3ML; MG/3ML
3 SOLUTION RESPIRATORY (INHALATION) ONCE
Refills: 0 | Status: COMPLETED | OUTPATIENT
Start: 2025-09-03 | End: 2025-09-03

## 2025-09-03 RX ORDER — CARBIDOPA/LEVODOPA 25MG-100MG
1 TABLET ORAL THREE TIMES A DAY
Refills: 0 | Status: ACTIVE | OUTPATIENT
Start: 2025-09-03 | End: 2026-08-02

## 2025-09-03 RX ORDER — SERTRALINE 100 MG/1
50 TABLET, FILM COATED ORAL DAILY
Refills: 0 | Status: ACTIVE | OUTPATIENT
Start: 2025-09-03 | End: 2026-08-02

## 2025-09-03 RX ORDER — METOPROLOL SUCCINATE 50 MG/1
25 TABLET, EXTENDED RELEASE ORAL
Refills: 0 | Status: ACTIVE | OUTPATIENT
Start: 2025-09-03 | End: 2026-08-02

## 2025-09-03 RX ORDER — IPRATROPIUM BROMIDE AND ALBUTEROL SULFATE .5; 2.5 MG/3ML; MG/3ML
3 SOLUTION RESPIRATORY (INHALATION) EVERY 6 HOURS
Refills: 0 | Status: ACTIVE | OUTPATIENT
Start: 2025-09-03 | End: 2026-08-02

## 2025-09-03 RX ORDER — AMIODARONE HYDROCHLORIDE 50 MG/ML
200 INJECTION, SOLUTION INTRAVENOUS DAILY
Refills: 0 | Status: ACTIVE | OUTPATIENT
Start: 2025-09-03 | End: 2026-08-02

## 2025-09-03 RX ORDER — LEVOTHYROXINE SODIUM 300 MCG
100 TABLET ORAL DAILY
Refills: 0 | Status: ACTIVE | OUTPATIENT
Start: 2025-09-03 | End: 2026-08-02

## 2025-09-03 RX ORDER — PREDNISONE 20 MG/1
12.5 TABLET ORAL DAILY
Refills: 0 | Status: ACTIVE | OUTPATIENT
Start: 2025-09-03 | End: 2026-08-02

## 2025-09-03 RX ADMIN — IPRATROPIUM BROMIDE AND ALBUTEROL SULFATE 3 MILLILITER(S): .5; 2.5 SOLUTION RESPIRATORY (INHALATION) at 11:01

## 2025-09-03 RX ADMIN — Medication 1 TABLET(S): at 22:37

## 2025-09-03 RX ADMIN — METOPROLOL SUCCINATE 25 MILLIGRAM(S): 50 TABLET, EXTENDED RELEASE ORAL at 22:38

## 2025-09-04 VITALS
RESPIRATION RATE: 18 BRPM | DIASTOLIC BLOOD PRESSURE: 68 MMHG | HEART RATE: 73 BPM | OXYGEN SATURATION: 99 % | TEMPERATURE: 98 F | SYSTOLIC BLOOD PRESSURE: 104 MMHG

## 2025-09-04 LAB
FLUAV AG NPH QL: SIGNIFICANT CHANGE UP
FLUBV AG NPH QL: SIGNIFICANT CHANGE UP
RAPID RVP RESULT: SIGNIFICANT CHANGE UP
RSV RNA NPH QL NAA+NON-PROBE: SIGNIFICANT CHANGE UP
SARS-COV-2 RNA SPEC QL NAA+PROBE: SIGNIFICANT CHANGE UP
SARS-COV-2 RNA SPEC QL NAA+PROBE: SIGNIFICANT CHANGE UP
SOURCE RESPIRATORY: SIGNIFICANT CHANGE UP

## 2025-09-04 PROCEDURE — 85379 FIBRIN DEGRADATION QUANT: CPT

## 2025-09-04 PROCEDURE — 99238 HOSP IP/OBS DSCHRG MGMT 30/<: CPT

## 2025-09-04 PROCEDURE — 99285 EMERGENCY DEPT VISIT HI MDM: CPT | Mod: 25

## 2025-09-04 PROCEDURE — 94640 AIRWAY INHALATION TREATMENT: CPT

## 2025-09-04 PROCEDURE — 84484 ASSAY OF TROPONIN QUANT: CPT

## 2025-09-04 PROCEDURE — 80053 COMPREHEN METABOLIC PANEL: CPT

## 2025-09-04 PROCEDURE — 93005 ELECTROCARDIOGRAM TRACING: CPT

## 2025-09-04 PROCEDURE — 36415 COLL VENOUS BLD VENIPUNCTURE: CPT

## 2025-09-04 PROCEDURE — 0225U NFCT DS DNA&RNA 21 SARSCOV2: CPT

## 2025-09-04 PROCEDURE — 83880 ASSAY OF NATRIURETIC PEPTIDE: CPT

## 2025-09-04 PROCEDURE — 87637 SARSCOV2&INF A&B&RSV AMP PRB: CPT

## 2025-09-04 PROCEDURE — 71275 CT ANGIOGRAPHY CHEST: CPT

## 2025-09-04 PROCEDURE — 71045 X-RAY EXAM CHEST 1 VIEW: CPT

## 2025-09-04 PROCEDURE — G0378: CPT

## 2025-09-04 PROCEDURE — 85025 COMPLETE CBC W/AUTO DIFF WBC: CPT

## 2025-09-04 RX ADMIN — METOPROLOL SUCCINATE 25 MILLIGRAM(S): 50 TABLET, EXTENDED RELEASE ORAL at 10:40

## 2025-09-04 RX ADMIN — PREDNISONE 12.5 MILLIGRAM(S): 20 TABLET ORAL at 06:15

## 2025-09-04 RX ADMIN — Medication 1 TABLET(S): at 06:14

## 2025-09-04 RX ADMIN — Medication 100 MICROGRAM(S): at 06:14

## 2025-09-04 RX ADMIN — Medication 40 MILLIGRAM(S): at 08:07

## 2025-09-04 RX ADMIN — AMIODARONE HYDROCHLORIDE 200 MILLIGRAM(S): 50 INJECTION, SOLUTION INTRAVENOUS at 06:15
